# Patient Record
Sex: MALE | Race: BLACK OR AFRICAN AMERICAN | Employment: OTHER | URBAN - METROPOLITAN AREA
[De-identification: names, ages, dates, MRNs, and addresses within clinical notes are randomized per-mention and may not be internally consistent; named-entity substitution may affect disease eponyms.]

---

## 2017-03-05 RX ORDER — OMEPRAZOLE 20 MG/1
CAPSULE, DELAYED RELEASE ORAL
Qty: 90 CAP | Refills: 1 | Status: SHIPPED | OUTPATIENT
Start: 2017-03-05 | End: 2017-09-01 | Stop reason: SDUPTHER

## 2017-03-09 ENCOUNTER — OFFICE VISIT (OUTPATIENT)
Dept: INTERNAL MEDICINE CLINIC | Age: 82
End: 2017-03-09

## 2017-03-09 VITALS
HEIGHT: 66 IN | SYSTOLIC BLOOD PRESSURE: 138 MMHG | OXYGEN SATURATION: 95 % | RESPIRATION RATE: 17 BRPM | TEMPERATURE: 97.6 F | BODY MASS INDEX: 27.97 KG/M2 | HEART RATE: 58 BPM | DIASTOLIC BLOOD PRESSURE: 71 MMHG | WEIGHT: 174 LBS

## 2017-03-09 DIAGNOSIS — E11.9 CONTROLLED TYPE 2 DIABETES MELLITUS WITHOUT COMPLICATION, WITHOUT LONG-TERM CURRENT USE OF INSULIN (HCC): Primary | ICD-10-CM

## 2017-03-09 DIAGNOSIS — C91.10 CLL (CHRONIC LYMPHOCYTIC LEUKEMIA) (HCC): ICD-10-CM

## 2017-03-09 DIAGNOSIS — Z95.1 S/P CABG X 3: ICD-10-CM

## 2017-03-09 DIAGNOSIS — I25.9 CHRONIC ISCHEMIC HEART DISEASE: ICD-10-CM

## 2017-03-09 DIAGNOSIS — E78.2 MIXED HYPERLIPIDEMIA: ICD-10-CM

## 2017-03-09 DIAGNOSIS — M12.819 ROTATOR CUFF ARTHROPATHY, UNSPECIFIED LATERALITY: ICD-10-CM

## 2017-03-09 DIAGNOSIS — I70.209 ATHEROSCLEROSIS OF NATIVE ARTERIES OF THE EXTREMITIES, UNSPECIFIED: ICD-10-CM

## 2017-03-09 DIAGNOSIS — I10 ESSENTIAL HYPERTENSION, BENIGN: ICD-10-CM

## 2017-03-09 DIAGNOSIS — I73.9 PERIPHERAL VASCULAR DISEASE (HCC): ICD-10-CM

## 2017-03-09 NOTE — PROGRESS NOTES
SPORTS MEDICINE AND PRIMARY CARE  Mc Rodriguez MD, [address] [address]  Phone:  [phone]  Fax: [phone]      Chief Complaint   Patient presents with    Diabetes         SUBECTIVE:    Alissa Workman is a 80 y.o. male Patient returns today alert and appropriate and has the capacity to give an accurate history. He has a known history of coronary artery disease, diabetes mellitus, chronic lymphocytic leukemia and is seen for evaluation. Patient returns today with his daughter and states \"I feel good everyday. \"        Current Outpatient Prescriptions   Medication Sig Dispense Refill    omeprazole (PRILOSEC) 20 mg capsule TAKE 1 CAPSULE DAILY 90 Cap 1    amLODIPine (NORVASC) 5 mg tablet TAKE 1 TABLET DAILY 90 Tab 0    simvastatin (ZOCOR) 40 mg tablet TAKE 1 TABLET DAILY IN THE EVENING 90 Tab 3    metFORMIN (GLUCOPHAGE) 500 mg tablet TAKE 1 TABLET DAILY WITH A MEAL 30 Tab 6    glucose blood VI test strips (ONETOUCH ULTRA TEST) strip 1 BID ac 60 Strip 11    metoprolol tartrate (LOPRESSOR) 25 mg tablet TAKE 1 TABLET TWICE A  Tab 3    allopurinol (ZYLOPRIM) 300 mg tablet TAKE 1 TABLET DAILY 90 Tab 7    amLODIPine (NORVASC) 2.5 mg tablet Take 1 Tab by mouth daily. 30 Tab 11    aspirin 81 mg chewable tablet Take 81 mg by mouth daily.  brimonidine (ALPHAGAN) 0.15 % ophthalmic solution Administer 1 Drop to both eyes two (2) times a day.  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 No shortness of breath. Social History     Social History    Marital status:      Spouse name: N/A    Number of children: N/A    Years of education: N/A     Social History Main Topics    Smoking status: Former Smoker     Quit date: 1/15/1984    Smokeless tobacco: Never Used      Comment: QUIT     Alcohol use No      Comment: quit in     Drug use: No    Sexual activity: Yes     Partners: Female     Other Topics Concern    None     Social History Narrative    Family History: Mother:  61 yrs, DM complicationsFather:  80 yrs, strokeSister(s):  76 yrs, pneumonia, DM,    HTNBrother(s):  61 yrs, DM, HTN, CVAChildren: aliveGrandmother: unknow nGrandfather: deceasedSpouse: deceased1 brother(s) -    healthy. 2daughter(s) - healthy. Social History: Alcohol Use Patient does not use alcohol. Smoking Status Patient is a former smoker, Quit in: 36. Caffeine: none. Marital    Status: W idow . Lives w ith: alone. Children: yes 2 d. Education/School: has highschool diploma, college 2 y.   r  Family History   Problem Relation Age of Onset    Diabetes Mother     Stroke Father        OBJECTIVE:  Visit Vitals    /71 (BP 1 Location: Right arm, BP Patient Position: Sitting)    Pulse (!) 58    Temp 97.6 °F (36.4 °C) (Oral)    Resp 17    Ht 5' 6\" (1.676 m)    Wt 174 lb (78.9 kg)    SpO2 95%    BMI 28.08 kg/m2     ENT: perrla,  eom intact  NECK: supple.  Thyroid normal  CHEST: clear to ascultation and percussion   HEART: regular rate and rhythm  ABD: soft, bowel sounds active  EXTREMITIES: no edema, pulse 1+     No visits with results within 3 Month(s) from this visit. Latest known visit with results is:    Office Visit on 11/29/2016   Component Date Value Ref Range Status    WBC 11/29/2016 49.0* 3.4 - 10.8 x10E3/uL Final    RBC 11/29/2016 4.69  4.14 - 5.80 x10E6/uL Final    HGB 11/29/2016 14.5  12.6 - 17.7 g/dL Final    HCT 11/29/2016 43.4  37.5 - 51.0 % Final    MCV 11/29/2016 93  79 - 97 fL Final    MCH 11/29/2016 30.9  26.6 - 33.0 pg Final    MCHC 11/29/2016 33.4  31.5 - 35.7 g/dL Final    RDW 11/29/2016 15.2  12.3 - 15.4 % Final    PLATELET 53/11/9561 650  150 - 379 x10E3/uL Final    NEUTROPHILS 11/29/2016 12  % Final    Lymphocytes 11/29/2016 86  % Final    Comment: Smudge cells present  Atypical lymphocytes.  MONOCYTES 11/29/2016 2  % Final    EOSINOPHILS 11/29/2016 0  % Final    BASOPHILS 11/29/2016 0  % Final    ABS. NEUTROPHILS 11/29/2016 5.9  1.4 - 7.0 x10E3/uL Final    Abs Lymphocytes 11/29/2016 42.1* 0.7 - 3.1 x10E3/uL Final    ABS. MONOCYTES 11/29/2016 1.0* 0.1 - 0.9 x10E3/uL Final    ABS. EOSINOPHILS 11/29/2016 0.0  0.0 - 0.4 x10E3/uL Final    ABS. BASOPHILS 11/29/2016 0.0  0.0 - 0.2 x10E3/uL Final    NRBC 11/29/2016 0  0 - 0 % Final    Hematology comments: 11/29/2016 Note:   Final    Manual differential was performed.  Hemoglobin A1c 11/29/2016 6.7* 4.8 - 5.6 % Final    Comment:          Pre-diabetes: 5.7 - 6.4           Diabetes: >6.4           Glycemic control for adults with diabetes: <7.0      Estimated average glucose 11/29/2016 146  mg/dL Final          ASSESSMENT:  1. Controlled type 2 diabetes mellitus without complication, without long-term current use of insulin ([address]    2. Peripheral vascular disease ([address]    3. 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 CLL (chronic lymphocytic leukemia) ([address]    5. Chronic ischemic heart disease    6.  Essential hypertension, benign    7. Mixed hyperlipidemia    8. Rotator cuff arthropathy, unspecified laterality    9. S/P CABG x 3      Patient's medical status is satisfactory. Blood pressure control is at goal.  Patient's medical status is satisfactory. Blood pressure control is at goal.  BMI is 28.08 and reflects a one pound weight gain. We continue to encourage physical activity for 30 minutes five days a week. We also encourage a heart healthy diabetic diet. We will check appropriate laboratory studies. He will return to the office in four months, sooner if he has any problems. PLAN:  .  Orders Placed This Encounter    METABOLIC PANEL, BASIC    HEMOGLOBIN A1C WITH EAG       Follow-up Disposition:  Return in about 4 months (around 7/9/2017). ATTENTION:   This medical record was transcribed using an electronic medical records system. Although proofread, it may and can contain electronic and spelling errors. Other human spelling and other errors may be present. Corrections may be executed at a later time. Please feel free to contact us for any clarifications as needed.

## 2017-03-09 NOTE — PROGRESS NOTES
.1. Have you been to the ER, urgent care clinic since your last visit? Hospitalized since your last visit? No    2. Have you seen or consulted any other health care providers outside of the [address] [place] [address] since your last visit? Include any pap smears or colon screening.  No

## 2017-03-09 NOTE — MR AVS SNAPSHOT
Visit Information Date & Time Provider Department Dept. Phone Encounter #  
 3/9/2017  9:00 AM Zenon DormanJazmin [facility] [phone] [id number] Follow-up Instructions Return in about 4 months (around 7/9/2017). Follow-up and Disposition History Your Appointments 6/13/2017 10:30 AM  
[facility]) Appt Note: 6 MO. F/U  
 [address]  
[phone] [address] [place] [address] Upcoming Health Maintenance Date Due  
 EYE EXAM RETINAL OR DILATED Q1 2/24/2017 MICROALBUMIN Q1 5/18/2017 LIPID PANEL Q1 5/18/2017 MEDICARE YEARLY EXAM 5/19/2017 HEMOGLOBIN A1C Q6M 5/29/2017 GLAUCOMA SCREENING Q2Y 2/24/2018 FOOT EXAM Q1 3/9/2018 DTaP/Tdap/Td series (2 - Td) 5/18/2026 Allergies as of 3/9/2017  Review Complete On: 3/9/2017 By: Zenon Dorman MD  
  
 Severity Noted Reaction Type Reactions Codeine  05/31/2012    Shortness of Breath Lipitor [Atorvastatin]  06/27/2013   Intolerance Nausea Only Current Immunizations  Reviewed on 11/9/2015 Name Date Influenza Vaccine (Quad) PF 11/2/2015  3:31 PM  
 Influenza Vaccine PF 12/5/2013  8:10 AM  
 Pneumococcal Vaccine (Unspecified Type) 1/1/2012 Not reviewed this visit You Were Diagnosed With   
  
 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 
ICD-9-CM: 401.1 Mixed hyperlipidemia     ICD-10-CM: E78.2 ICD-9-CM: 272.2 Rotator cuff arthropathy, unspecified laterality     ICD-10-CM: M12.819 ICD-9-CM: 716.81 S/P CABG x 3     ICD-10-CM: Z95.1 ICD-9-CM: V45.81 Vitals BP Pulse Temp Resp Height(growth percentile) Weight(growth percentile) 138/71 (BP 1 Location: Right arm, BP Patient Position: Sitting) (!) 58 97.6 °F (36.4 °C) (Oral) 17 5' 6\" (1.676 m) 174 lb (78.9 kg) SpO2 BMI Smoking Status 95% 28.08 kg/m2 Former Smoker BMI and BSA Data Body Mass Index Body Surface Area 28.08 kg/m 2 1.92 m 2 Preferred Pharmacy Pharmacy Name Phone [address] [facility] [phone] Your Updated Medication List  
  
   
This list is accurate as of: 3/9/17 10:25 AM.  Always use your most recent med list.  
  
  
  
  
 allopurinol 300 mg tablet Commonly known as:  ZYLOPRIM  
TAKE 1 TABLET DAILY  
  
 * amLODIPine 2.5 mg tablet Commonly known as:  [place] Take 1 Tab by mouth daily. * amLODIPine 5 mg tablet Commonly known as:  [place] TAKE 1 TABLET DAILY  
  
 aspirin 81 mg chewable tablet Take 81 mg by mouth daily. brimonidine 0.15 % ophthalmic solution Commonly known as:  [place]wn [place] Administer 1 Drop to both eyes two (2) times a day. Indications: OPEN ANGLE GLAUCOMA  
  
 glucose blood VI test strips strip Commonly known as:  ONETOUCH ULTRA TEST  
1 BID ac  
  
 metFORMIN 500 mg tablet Commonly known as:  GLUCOPHAGE  
TAKE 1 TABLET DAILY WITH A MEAL  
  
 metoprolol tartrate 25 mg tablet Commonly known as:  LOPRESSOR  
TAKE 1 TABLET TWICE A DAY  
  
 omeprazole 20 mg capsule Commonly known as:  PRILOSEC  
TAKE 1 CAPSULE DAILY  
  
 simvastatin 40 mg tablet Commonly known as:  ZOCOR  
 TAKE 1 TABLET DAILY IN THE EVENING  
  
 * Notice: This list has 2 medication(s) that are the same as other medications prescribed for you. Read the directions carefully, and ask your doctor or other care provider to review them with you. We Performed the Following HEMOGLOBIN A1C WITH EAG [41938 CPT(R)] METABOLIC PANEL, BASIC [17201 CPT(R)] [place] COLLECTION VENOUS BLOOD,VENIPUNCTURE [id number] CPT(R)] Follow-up Instructions Return in about 4 months (around 7/9/2017). Introducing [facility] SERVICES! [employer] introduces [employer]With patient portal. Now you can access parts of your medical record, email your doctor's office, and request medication refills online. 1. In your internet browser, go to [email or web address] 2. Click on the First Time User? Click Here link in the Sign In box. You will see the New Member Sign Up page. 3. Enter your [employer]With Access Code exactly as it appears below. You will not need to use this code after youve completed the sign-up process. If you do not sign up before the expiration date, you must request a new code. · AimWith Access Code: [email or web address] Expires: 6/7/2017 10:25 AM 
 
4. Enter the last four digits of your Social Security Number (xxxx) and Date of Birth (mm/dd/yyyy) as indicated and click Submit. You will be taken to the next sign-up page. 5. Create a AimWith ID. This will be your AimWith login ID and cannot be changed, so think of one that is secure and easy to remember. 6. Create a [employer]With password. You can change your password at any time. 7. Enter your Password Reset Question and Answer. This can be used at a later time if you forget your password. 8. Enter your e-mail address. You will receive e-mail notification when new information is available in [address] 9. Click Sign Up. You can now view and download portions of your medical record. 10. Click the Download Summary menu link to download a portable copy of your medical information. If you have questions, please visit the Frequently Asked Questions section of the [employer] website. Remember, [employer] is NOT to be used for urgent needs. For medical emergencies, dial 911. Now available from your iPhone and Android! Please provide this summary of care documentation to your next provider. Your primary care clinician is listed as Javier Borden. If you have any questions after today's visit, please call [phone].

## 2017-03-10 LAB
BUN SERPL-MCNC: 16 MG/DL (ref 8–27)
BUN/CREAT SERPL: 12 (ref 10–22)
CALCIUM SERPL-MCNC: 9.9 MG/DL (ref 8.6–10.2)
CHLORIDE SERPL-SCNC: 99 MMOL/L (ref 96–106)
CO2 SERPL-SCNC: 24 MMOL/L (ref 18–29)
CREAT SERPL-MCNC: 1.3 MG/DL (ref 0.76–1.27)
EST. AVERAGE GLUCOSE BLD GHB EST-MCNC: 137 MG/DL
GLUCOSE SERPL-MCNC: 153 MG/DL (ref 65–99)
HBA1C MFR BLD: 6.4 % (ref 4.8–5.6)
POTASSIUM SERPL-SCNC: 5 MMOL/L (ref 3.5–5.2)
SODIUM SERPL-SCNC: 140 MMOL/L (ref 134–144)

## 2017-05-19 RX ORDER — AMLODIPINE BESYLATE 5 MG/1
TABLET ORAL
Qty: 90 TAB | OUTPATIENT
Start: 2017-05-19

## 2017-05-19 RX ORDER — AMLODIPINE BESYLATE 2.5 MG/1
2.5 TABLET ORAL DAILY
Qty: 90 TAB | Refills: 0 | Status: SHIPPED | OUTPATIENT
Start: 2017-05-19 | End: 2017-11-19 | Stop reason: SDUPTHER

## 2017-05-28 ENCOUNTER — APPOINTMENT (OUTPATIENT)
Dept: GENERAL RADIOLOGY | Age: 82
End: 2017-05-28
Attending: EMERGENCY MEDICINE
Payer: MEDICARE

## 2017-05-28 ENCOUNTER — HOSPITAL ENCOUNTER (EMERGENCY)
Age: 82
Discharge: HOME | End: 2017-05-28
Attending: EMERGENCY MEDICINE
Payer: MEDICARE

## 2017-05-28 VITALS
TEMPERATURE: 97.9 F | RESPIRATION RATE: 16 BRPM | WEIGHT: 175.71 LBS | OXYGEN SATURATION: 96 % | BODY MASS INDEX: 27.58 KG/M2 | HEIGHT: 67 IN | SYSTOLIC BLOOD PRESSURE: 134 MMHG | DIASTOLIC BLOOD PRESSURE: 61 MMHG | HEART RATE: 59 BPM

## 2017-05-28 DIAGNOSIS — R06.02 SHORTNESS OF BREATH: Primary | ICD-10-CM

## 2017-05-28 LAB
ALBUMIN SERPL BCP-MCNC: 3.8 G/DL (ref 3.5–5)
ALBUMIN/GLOB SERPL: 1 {RATIO} (ref 1.1–2.2)
ALP SERPL-CCNC: 79 U/L (ref 45–117)
ALT SERPL-CCNC: 22 U/L (ref 12–78)
ANION GAP BLD CALC-SCNC: 8 MMOL/L (ref 5–15)
AST SERPL W P-5'-P-CCNC: 16 U/L (ref 15–37)
BASOPHILS # BLD AUTO: 0 K/UL
BASOPHILS # BLD: 0 %
BILIRUB SERPL-MCNC: 1.1 MG/DL (ref 0.2–1)
BNP SERPL-MCNC: 117 PG/ML (ref 0–450)
BUN SERPL-MCNC: 12 MG/DL (ref 6–20)
BUN/CREAT SERPL: 10 (ref 12–20)
CALCIUM SERPL-MCNC: 9 MG/DL (ref 8.5–10.1)
CHLORIDE SERPL-SCNC: 107 MMOL/L (ref 97–108)
CK MB CFR SERPL CALC: NORMAL % (ref 0–2.5)
CK MB SERPL-MCNC: <1 NG/ML (ref 5–25)
CK SERPL-CCNC: 113 U/L (ref 39–308)
CO2 SERPL-SCNC: 26 MMOL/L (ref 21–32)
CREAT SERPL-MCNC: 1.18 MG/DL (ref 0.7–1.3)
DIFFERENTIAL METHOD BLD: ABNORMAL
EOSINOPHIL # BLD: 0 K/UL
EOSINOPHIL NFR BLD: 0 %
ERYTHROCYTE [DISTWIDTH] IN BLOOD BY AUTOMATED COUNT: 14.2 % (ref 11.5–14.5)
GLOBULIN SER CALC-MCNC: 3.8 G/DL (ref 2–4)
GLUCOSE SERPL-MCNC: 89 MG/DL (ref 65–100)
HCT VFR BLD AUTO: 41.9 % (ref 36.6–50.3)
HGB BLD-MCNC: 14.1 G/DL (ref 12.1–17)
LYMPHOCYTES # BLD AUTO: 91 %
LYMPHOCYTES # BLD: 46 K/UL
MAGNESIUM SERPL-MCNC: 2.2 MG/DL (ref 1.6–2.4)
MCH RBC QN AUTO: 30.9 PG (ref 26–34)
MCHC RBC AUTO-ENTMCNC: 33.7 G/DL (ref 30–36.5)
MCV RBC AUTO: 91.7 FL (ref 80–99)
MONOCYTES # BLD: 1 K/UL
MONOCYTES NFR BLD AUTO: 2 %
NEUTS SEG # BLD: 3.5 K/UL
NEUTS SEG NFR BLD AUTO: 7 %
NRBC # BLD: 0 K/UL (ref 0–0.01)
NRBC BLD-RTO: 0 PER 100 WBC
PLATELET # BLD AUTO: 167 K/UL (ref 150–400)
POTASSIUM SERPL-SCNC: 4.3 MMOL/L (ref 3.5–5.1)
PROT SERPL-MCNC: 7.6 G/DL (ref 6.4–8.2)
RBC # BLD AUTO: 4.57 M/UL (ref 4.1–5.7)
RBC MORPH BLD: ABNORMAL
SODIUM SERPL-SCNC: 141 MMOL/L (ref 136–145)
TROPONIN I SERPL-MCNC: <0.04 NG/ML
WBC # BLD AUTO: 50.5 K/UL (ref 4.1–11.1)
WBC MORPH BLD: ABNORMAL

## 2017-05-28 PROCEDURE — 93005 ELECTROCARDIOGRAM TRACING: CPT

## 2017-05-28 PROCEDURE — 71020 XR CHEST PA LAT: CPT

## 2017-05-28 PROCEDURE — 36415 COLL VENOUS BLD VENIPUNCTURE: CPT | Performed by: EMERGENCY MEDICINE

## 2017-05-28 PROCEDURE — 99284 EMERGENCY DEPT VISIT MOD MDM: CPT

## 2017-05-28 PROCEDURE — 84484 ASSAY OF TROPONIN QUANT: CPT | Performed by: EMERGENCY MEDICINE

## 2017-05-28 PROCEDURE — 83880 ASSAY OF NATRIURETIC PEPTIDE: CPT | Performed by: EMERGENCY MEDICINE

## 2017-05-28 PROCEDURE — 82550 ASSAY OF CK (CPK): CPT | Performed by: EMERGENCY MEDICINE

## 2017-05-28 PROCEDURE — 83735 ASSAY OF MAGNESIUM: CPT | Performed by: EMERGENCY MEDICINE

## 2017-05-28 PROCEDURE — 85025 COMPLETE CBC W/AUTO DIFF WBC: CPT | Performed by: EMERGENCY MEDICINE

## 2017-05-28 PROCEDURE — 80053 COMPREHEN METABOLIC PANEL: CPT | Performed by: EMERGENCY MEDICINE

## 2017-05-28 NOTE — DISCHARGE INSTRUCTIONS

## 2017-05-28 NOTE — ED PROVIDER NOTES
HPI Comments: Roslyn Gleason is a 80 y.o. M with PMHx significant for CAD / HTN / DM / MI / Stent / Leukemia / GERD / CABG x 3 who presents ambulatory to [address] [facility] ED c/o intermittent episodes of SOB x 0500 today. Pt states that last night SOB caused him to wake up. He denies orthopnea. Pt reports episode of SOB while singing in [employer] today. He denies any SOB while in ED. Pt notes that he was previously on Brilinta, but has been off of it \"for a while\". He denies any recent surgeries, recent travel or hx significant for DVT/PE. He reports clear drainage from his nose that he attributes to allergies. Pt denies any leg swelling, cough, chest pain, abdominal pain, fever, chills, hemoptysis, vomiting, diarrhea, nausea or calf pain. PCP: Katey Lucas MD  Cardiology: Dr. Tl Jaramillo  Oncology: Dr. Adela Pillai    There are no other complaints, changes, or physical findings at this time. The history is provided by the patient.         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 Social History Main Topics    Smoking status: Former Smoker     Quit date: 1/15/1984    Smokeless tobacco: Never Used      Comment: QUIT     Alcohol use No      Comment: quit in     Drug use: No    Sexual activity: Yes     Partners: Female     Other Topics Concern    Not on file     Social History Narrative    Family History: Mother:  61 yrs, DM complicationsFather:  80 yrs, strokeSister(s):  76 yrs, pneumonia, DM,    HTNBrother(s):  61 yrs, DM, HTN, CVAChildren: aliveGrandmother: unknow nGrandfather: deceasedSpouse: deceased1 brother(s) -    healthy. 2daughter(s) - healthy. Social History: Alcohol Use Patient does not use alcohol. Smoking Status Patient is a former smoker, Quit in: 36. Caffeine: none. Marital    Status: W idow . Lives w ith: alone. Children: yes 2 d. Education/School: has highschool diploma, college 2 y. ALLERGIES: Codeine and Lipitor [atorvastatin]    Review of Systems   Constitutional: Negative for chills, fatigue and fever. HENT: Negative for congestion, rhinorrhea and sore throat. Eyes: Negative for pain, discharge and visual disturbance. Respiratory: Positive for shortness of breath. Negative for cough, chest tightness and wheezing. Negative for hemoptysis    Cardiovascular: Negative for chest pain, palpitations and leg swelling. Negative for calf pain   Gastrointestinal: Negative for abdominal pain, constipation, diarrhea, nausea and vomiting. Genitourinary: Negative for dysuria, frequency and hematuria. Musculoskeletal: Negative for arthralgias, back pain and myalgias. Skin: Negative for rash. Neurological: Negative for dizziness, weakness, light-headedness and headaches. Psychiatric/Behavioral: Negative.         Vitals:    17 1331 17 1613   BP: 139/67 134/61   Pulse: 65 (!) 59   Resp: 18 16   Temp: 97.9 °F (36.6 °C)    SpO2: 99% 96%   Weight: 79.7 kg (175 lb 11.3 oz)    Height: 5' 7\" (1.702 m)             Physical Exam   Constitutional: He is oriented to person, place, and time. He appears well-developed and well-nourished. No distress. HENT:   Head: Normocephalic and atraumatic. Eyes: EOM are normal. Right eye exhibits no discharge. Left eye exhibits no discharge. No scleral icterus. Neck: Normal range of motion. Neck supple. No tracheal deviation present. Cardiovascular: Normal rate, regular rhythm, normal heart sounds and intact distal pulses. Exam reveals no gallop and no friction rub. No murmur heard. Pulmonary/Chest: Effort normal and breath sounds normal. No respiratory distress. He has no wheezes. He has no rales. Abdominal: Soft. He exhibits no distension. There is no tenderness. Musculoskeletal: Normal range of motion. He exhibits edema. 1+ BL Lower extremity edema  No calf tenderness   Lymphadenopathy:     He has no cervical adenopathy. Neurological: He is alert and oriented to person, place, and time. Skin: Skin is warm and dry. No rash noted. Psychiatric: He has a normal mood and affect. Nursing note and vitals reviewed. MDM  Number of Diagnoses or Management Options  Shortness of breath:   Diagnosis management comments:     Differential includes heart failure, pulmonary edema, pleural effusion, pneumonia, ACS. Overall low suspicion for PE - no tachycardia or hypoxia (Well's score 1).   - CBC, CMP, Fareed, BNP  - CXR         Amount and/or Complexity of Data Reviewed  Clinical lab tests: ordered and reviewed  Tests in the radiology section of CPT®: ordered and reviewed  Tests in the medicine section of CPT®: ordered and reviewed  Review and summarize past medical records: yes  Independent visualization of images, tracings, or specimens: yes    Patient Progress  Patient progress: stable    ED Course       Procedures    EKG interpretation: (Preliminary)  1334  Rhythm: normal sinus rhythm; and regular . Rate (approx.): 61 bpm; Axis: normal; IL interval: normal; QRS interval: normal ; ST/T wave: T wave inverted; in anterolateral leads; Other findings: no significant changes from previous ekg. Patient Vitals for the past 12 hrs:   Temp Pulse Resp BP SpO2   05/28/17 1613 - (!) 59 16 134/61 96 %   05/28/17 1331 97.9 °F (36.6 °C) 65 18 139/67 99 %     PROGRESS NOTE:  4:15 PM  Labs unremarkable with exception of leukocytosis consistent with CLL. CXR indicates opacity left lung base - patient denies cough, sputum production, hemoptysis. Pneumonia seems less likely. Advised patient to follow up with PCP for repeat CXR and with cardiology for reevaluation. Pt ambulated and maintained oxygen saturation and denied feeling short of breath. Discussed results and follow up plan with patient. Provided customary return to ED instructions. Patient expressed understanding.   Cynthia Huagn MD    LABORATORY TESTS:  Recent Results (from the past 12 hour(s))   EKG, 12 LEAD, INITIAL    Collection Time: 05/28/17  1:34 PM   Result Value Ref Range    Ventricular Rate 61 BPM    Atrial Rate 61 BPM    P-R Interval 158 ms    QRS Duration 86 ms    Q-T Interval 386 ms    QTC Calculation (Bezet) 388 ms    Calculated P Axis 38 degrees    Calculated R Axis -17 degrees    Diagnosis       Normal sinus rhythm  Minimal voltage criteria for LVH, may be normal variant  ST & T wave abnormality, consider anterolateral ischemia  When compared with ECG of 06-NOV-2015 11:19,  T wave inversion now evident in Lateral leads  QT has shortened     CBC WITH AUTOMATED DIFF    Collection Time: 05/28/17  2:46 PM   Result Value Ref Range    WBC 50.5 (HH) 4.1 - 11.1 K/uL    RBC 4.57 4.10 - 5.70 M/uL    HGB 14.1 12.1 - 17.0 g/dL    HCT 41.9 36.6 - 50.3 %    MCV 91.7 80.0 - 99.0 FL    MCH 30.9 26.0 - 34.0 PG    MCHC 33.7 30.0 - 36.5 g/dL    RDW 14.2 11.5 - 14.5 %    PLATELET 218 258 - 165 K/uL    NEUTROPHILS 7 %    LYMPHOCYTES 91 % MONOCYTES 2 %    EOSINOPHILS 0 %    BASOPHILS 0 %    ABS. NEUTROPHILS 3.5 K/UL    ABS. LYMPHOCYTES 46.0 K/UL    ABS. MONOCYTES 1.0 K/UL    ABS. EOSINOPHILS 0.0 K/UL    ABS. BASOPHILS 0.0 K/UL    DF MANUAL      RBC COMMENTS NORMOCYTIC, NORMOCHROMIC      WBC COMMENTS REACTIVE LYMPHS     METABOLIC PANEL, COMPREHENSIVE    Collection Time: 05/28/17  2:46 PM   Result Value Ref Range    Sodium 141 136 - 145 mmol/L    Potassium 4.3 3.5 - 5.1 mmol/L    Chloride 107 97 - 108 mmol/L    CO2 26 21 - 32 mmol/L    Anion gap 8 5 - 15 mmol/L    Glucose 89 65 - 100 mg/dL    BUN 12 6 - 20 MG/DL    Creatinine 1.18 0.70 - 1.30 MG/DL    BUN/Creatinine ratio 10 (L) 12 - 20      GFR est AA >60 >60 ml/min/1.73m2    GFR est non-AA 59 (L) >60 ml/min/1.73m2    Calcium 9.0 8.5 - 10.1 MG/DL    Bilirubin, total 1.1 (H) 0.2 - 1.0 MG/DL    ALT (SGPT) 22 12 - 78 U/L    AST (SGOT) 16 15 - 37 U/L    Alk. phosphatase 79 45 - 117 U/L    Protein, total 7.6 6.4 - 8.2 g/dL    Albumin 3.8 3.5 - 5.0 g/dL    Globulin 3.8 2.0 - 4.0 g/dL    A-G Ratio 1.0 (L) 1.1 - 2.2     CK W/ CKMB & INDEX    Collection Time: 05/28/17  2:46 PM   Result Value Ref Range     39 - 308 U/L    CK - MB <1.0 <3.6 NG/ML    CK-MB Index Cannot be calulated 0 - 2.5     TROPONIN I    Collection Time: 05/28/17  2:46 PM   Result Value Ref Range    Troponin-I, Qt. <0.04 <0.05 ng/mL   MAGNESIUM    Collection Time: 05/28/17  2:46 PM   Result Value Ref Range    Magnesium 2.2 1.6 - 2.4 mg/dL   PRO-BNP    Collection Time: 05/28/17  2:46 PM   Result Value Ref Range    NT pro- 0 - 450 PG/ML   NUCLEATED RBC    Collection Time: 05/28/17  2:46 PM   Result Value Ref Range    NRBC 0.0 0  WBC    ABSOLUTE NRBC 0.00 0.00 - 0.01 K/uL       IMAGING RESULTS:  XR CHEST PA LAT   Final Result   INDICATION: . dyspnea x today  COMPARISON: Previous chest xray, 11/9/2015 and 11/3/2015. Ana Chen FINDINGS: PA and lateral view of the chest.   .  Lines/tubes/surgical: None.    Heart/mediastinum: Status post median sternotomy with vascular graft markers. Coronary artery stents. Lungs/pleura: Minimal opacity in the left base. No visualized pleural effusion  or pneumothorax. Additional Comments: Degenerative changes in the spine. .  IMPRESSION  IMPRESSION:  1. Opacity in the left base which could represent pneumonia. Recommend follow-up  until resolution. IMPRESSION:  1. Shortness of breath        PLAN:  1. Current Discharge Medication List        2. Follow-up Information     Follow up With Details Comments 464 Camilo Gambino MD In 3 days Please follow up for repeat CXR [facility] 14 [facility] [address]      Paolo Thomas MD  Please follow up with cardiology as soon as possible [address]  [phone]      [facility] EMERGENCY DEPT  As needed, If symptoms worsen [address]  [address] [facility]  [phone]        Return to ED if worse     DISCHARGE NOTE:  4:19 PM  The patient's results have been reviewed with family and/or caregiver. They verbally convey their understanding and agreement of the patient's signs, symptoms, diagnosis, treatment, and prognosis. They additionally agree to follow up as recommended in the discharge instructions or to return to the Emergency Room should the patient's condition change prior to their follow-up appointment. The family and/or caregiver verbally agrees with the care-plan and all of their questions have been answered. The discharge instructions have also been provided to the them along with educational information regarding the patient's diagnosis and a list of reasons why the patient would want to return to the ER prior to their follow-up appointment should their condition change. Written by Yumiko Lacy. Barbi Rodriguez ED Scribe, as dictated by Rosi Martin MD.        Attestations: This note is prepared by Yumiko Lacy.  Jamie, acting as Scribe for [employer] Merary Acevedo MD: The scribe's documentation has been prepared under my direction and personally reviewed by me in its entirety. I confirm that the note above accurately reflects all work, treatment, procedures, and medical decision making performed by me.

## 2017-05-28 NOTE — ED NOTES
Patient is resting quietly with daughter at bedside. He has his call bell within reach and is denying pain at this time. Patient requested a urinal and stood at bedside. No complaints of dizziness or lightheadedness.

## 2017-05-28 NOTE — ED NOTES
Pt ambulatory in hallway with continuous pulse ox. Sats 97%-100% during ambulation. Denies shortness of breath with ambulation.

## 2017-05-28 NOTE — ED NOTES
Pt given discharge instructions by Dr. La Benavidez. Saline lock removed. Pt discharged ambulatory with steady gait. No acute distress at time of discharge. Accompanied by family.

## 2017-05-28 NOTE — ED NOTES
Patient was sleeping and woke up short of breath. He reports sinus drainage \"dripping from nose. \"

## 2017-05-28 NOTE — ED NOTES
Bedside shift change report given to RN Gage Martinez (oncoming nurse) by ARGELIA Nunez and Mis Alonzo (offgoing nurse). Report included the following information SBAR, ED Summary and MAR.

## 2017-05-29 LAB
ATRIAL RATE: 61 BPM
CALCULATED P AXIS, ECG09: 38 DEGREES
CALCULATED R AXIS, ECG10: -17 DEGREES
DIAGNOSIS, 93000: NORMAL
P-R INTERVAL, ECG05: 158 MS
Q-T INTERVAL, ECG07: 386 MS
QRS DURATION, ECG06: 86 MS
QTC CALCULATION (BEZET), ECG08: 388 MS
VENTRICULAR RATE, ECG03: 61 BPM

## 2017-05-30 ENCOUNTER — PATIENT OUTREACH (OUTPATIENT)
Dept: INTERNAL MEDICINE CLINIC | Age: 82
End: 2017-05-30

## 2017-05-30 NOTE — PROGRESS NOTES
Nicholas Lee Discharge Follow-Up      Date/Time:  2017 1:24 PM    Patient listed on discharge [facility]) report on . Patient discharged from [facility] for SOB. Daughter states patient has Leukemia for approx 2yrs now. States BP today 131/67. Denied SOB today. RRAT score: Medium Risk            19       Total Score        3 Relationship with PCP    16 Charlson Comorbidity Score        Criteria that do not apply:    Patient Living Status    Patient Length of Stay > 5    More than 1 Admission in calendar year    Patient Insurance is Medicare, Medicaid or Self Pay     Moderate    Medical History:     Past Medical History:   Diagnosis Date    Abnormal nuclear stress test 2014    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 ([facility] 2015    CAD (coronary artery disease)     Chest pain, unspecified     Chronic pain     right leg    CLL (chronic lymphocytic leukemia) (HCC)     Diabetes ([facility]     Essential hypertension     GERD (gastroesophageal reflux disease)     Hyperlipidemia     Hypertension     Rotator cuff arthropathy     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 Verified  and address with patient as identifiers. Provided introduction to self, and explanation of the Nurses Navigator role. Diet:   Patient reports: Diabetic Diet    Activity:    Patient reports: mostly moving around the house    Medication:   Performed medication reconciliation with patient's daughter Jevon Nogueira, and patient verbalizes understanding of administration of home medications. There were no barriers to obtaining medications identified at this time.     Support system:   patient and daughter    Discharge Instructions :  Reviewed discharge instructions with patient's daughter who verbalizes understanding of discharge instructions and follow-up care. Red Flags: SOB. AMS. Chest Pain    Labs Reviewed:  Recent Labs      05/28/17   1446   WBC  50.5*   HGB  14.1   HCT  41.9   PLT  167     Recent Labs      05/28/17   1446   NA  141   K  4.3   CL  107   CO2  26   GLU  89   BUN  12   CREA  1.18   CA  9.0   MG  2.2   ALB  3.8   SGOT  16   ALT  22   Daughter states patient has Leukemia--noted elev WBC. Imaging results reviewed: chest:  IMPRESSION:   1. Opacity in the left base which could represent pneumonia. Recommend follow-up   until resolution. Advance Care Planning:   Patient was offered the opportunity to discuss advance care planning:  yes     Does patient have an Advance Directive:  no   If no, did you provide information on Caring Connections? yes     PCP/Specialist follow up: Patient scheduled to follow up with Chichi Cannon MD on 5/31/2017. Reviewed red flags with patient, and patient's daughter;  verbalizes understanding. Patient's daughter given an opportunity to ask questions. No other clinical/social/functional needs noted. The patient's daughter agrees to contact the PCP office for questions related to their healthcare. The patient expressed thanks, offered no additional questions and ended the call. Case management plan: Attempt to contact the patient by telephone or during office visit within the next 7-10 days. Will continue to follow as necessary for the next 30 days. Will reassess for case management needs prior to discharge from case management service on or about 30 days.

## 2017-05-30 NOTE — PROGRESS NOTES
Nicholas Lee Discharge Follow-Up      Date/Time:  5/30/2017 1:00 PM    Patient listed on discharge [facility]) report on 5/28/2017. Patient discharged from [facility] for SOB. F/u appt scheduled 6/13/2017-need sooner appt. Chart Review:   80 y.o. M with PMHx significant for CAD / HTN / DM / MI / Stent / Leukemia / GERD / CABG x 3 who presents ambulatory to [address] [facility] ED c/o intermittent episodes of SOB x 0500 today. Pt states that last night SOB caused him to wake up. He denies orthopnea. Pt reports episode of SOB while singing in [employer] today. He denies any SOB while in ED. Pt notes that he was previously on Brilinta, but has been off of it \"for a while\". Pulse: 65 (!) 59   Musculoskeletal: Normal range of motion. He exhibits edema.    1+ BL Lower extremity edema  No calf tenderness

## 2017-05-31 ENCOUNTER — OFFICE VISIT (OUTPATIENT)
Dept: INTERNAL MEDICINE CLINIC | Age: 82
End: 2017-05-31

## 2017-05-31 VITALS
WEIGHT: 173 LBS | DIASTOLIC BLOOD PRESSURE: 62 MMHG | BODY MASS INDEX: 27.15 KG/M2 | RESPIRATION RATE: 16 BRPM | HEART RATE: 59 BPM | OXYGEN SATURATION: 93 % | HEIGHT: 67 IN | TEMPERATURE: 97.9 F | SYSTOLIC BLOOD PRESSURE: 117 MMHG

## 2017-05-31 DIAGNOSIS — C91.10 CLL (CHRONIC LYMPHOCYTIC LEUKEMIA) (HCC): ICD-10-CM

## 2017-05-31 DIAGNOSIS — R91.8 OPACITY OF LUNG ON IMAGING STUDY: Primary | ICD-10-CM

## 2017-05-31 DIAGNOSIS — I10 ESSENTIAL HYPERTENSION, BENIGN: ICD-10-CM

## 2017-05-31 DIAGNOSIS — R91.8 OPACITY OF LUNG ON IMAGING STUDY: ICD-10-CM

## 2017-05-31 DIAGNOSIS — E11.9 CONTROLLED TYPE 2 DIABETES MELLITUS WITHOUT COMPLICATION, WITHOUT LONG-TERM CURRENT USE OF INSULIN (HCC): Primary | ICD-10-CM

## 2017-05-31 RX ORDER — CEFUROXIME AXETIL 500 MG/1
500 TABLET ORAL 2 TIMES DAILY
Qty: 20 TAB | Refills: 0 | Status: SHIPPED | OUTPATIENT
Start: 2017-05-31 | End: 2017-06-10

## 2017-05-31 RX ORDER — CEFUROXIME AXETIL 500 MG/1
500 TABLET ORAL 2 TIMES DAILY
Qty: 20 TAB | Refills: 0 | Status: SHIPPED | OUTPATIENT
Start: 2017-05-31 | End: 2017-05-31 | Stop reason: SDUPTHER

## 2017-05-31 NOTE — MR AVS SNAPSHOT
Visit Information Date & Time Provider Department Dept. Phone Encounter #  
 5/31/2017  9:15 AM Jazmin Hagan [facility] Sports Medicine and Primary Care [phone] [id number] Follow-up Instructions Return in about 3 months (around 8/21/2017). Follow-up and Disposition History Your Appointments 6/13/2017 10:30 AM  
ESTABLISHED PATIENT with MD [facility]) Appt Note: 6 MO. F/U  
 [address] [facility]  
[phone] [address]  
  
    
 8/21/2017  9:30 AM  
Any with Kathia Babcock MD  
[address] [place] [address] and Primary Care [address] [place] [address]) Appt Note: 4 month f/u  
 [address] [place]  
  
   
 [address] [facility] [address] Upcoming Health Maintenance Date Due  
 EYE EXAM RETINAL OR DILATED Q1 2/24/2017 MICROALBUMIN Q1 5/18/2017 LIPID PANEL Q1 5/18/2017 MEDICARE YEARLY EXAM 5/19/2017 INFLUENZA AGE 9 TO ADULT 8/1/2017 HEMOGLOBIN A1C Q6M 9/9/2017 GLAUCOMA SCREENING Q2Y 2/24/2018 FOOT EXAM Q1 3/9/2018 DTaP/Tdap/Td series (2 - Td) 5/18/2026 Allergies as of 5/31/2017  Review Complete On: 5/31/2017 By: Kathia Babcock MD  
  
 Severity Noted Reaction Type Reactions Codeine  05/31/2012    Shortness of Breath Lipitor [Atorvastatin]  06/27/2013   Intolerance Nausea Only Current Immunizations  Reviewed on 11/9/2015 Name Date Influenza Vaccine (Quad) PF 11/2/2015  3:31 PM  
 Influenza Vaccine PF 12/5/2013  8:10 AM  
 Pneumococcal Vaccine (Unspecified Type) 1/1/2012 Not reviewed this visit You Were Diagnosed With   
  
 Codes Comments Controlled type 2 diabetes mellitus without complication, without long-term current use of insulin ([facility]    -  Primary ICD-10-CM: E11.9 ICD-9-CM: 250.00 CLL (chronic lymphocytic leukemia) (HCC)     ICD-10-CM: C91.10 ICD-9-CM: 204.10 Essential hypertension, benign     ICD-10-CM: I10 
ICD-9-CM: 401.1 Opacity of lung on imaging study     ICD-10-CM: R91.8 ICD-9-CM: 793.19 Vitals BP Pulse Temp Resp Height(growth percentile) Weight(growth percentile)  
 117/62 (BP 1 Location: Right arm, BP Patient Position: Sitting) (!) 59 97.9 °F (36.6 °C) (Oral) 16 5' 7\" (1.702 m) 173 lb (78.5 kg) SpO2 BMI Smoking Status 93% 27.1 kg/m2 Former Smoker Vitals History BMI and BSA Data Body Mass Index Body Surface Area  
 27.1 kg/m 2 1.93 m 2 Preferred Pharmacy Pharmacy Name Phone [address] [facility] [phone] Your Updated Medication List  
  
   
This list is accurate as of: 5/31/17 10:41 AM.  Always use your most recent med list.  
  
  
  
  
 allopurinol 300 mg tablet Commonly known as:  ZYLOPRIM  
TAKE 1 TABLET DAILY  
  
 * amLODIPine 5 mg tablet Commonly known as:  Mabel Zimmer TAKE 1 TABLET DAILY  
  
 * amLODIPine 2.5 mg tablet Commonly known as:  Mabel Zimmer Take 1 Tab by mouth daily. aspirin 81 mg chewable tablet Take 81 mg by mouth daily. brimonidine 0.15 % ophthalmic solution Commonly known as:  Tyson Georges Administer 1 Drop to both eyes two (2) times a day. Indications: OPEN ANGLE GLAUCOMA  
  
 glucose blood VI test strips strip Commonly known as:  ONETOUCH ULTRA TEST  
1 BID ac  
  
 metFORMIN 500 mg tablet Commonly known as:  GLUCOPHAGE  
TAKE 1 TABLET DAILY WITH A MEAL  
  
 metoprolol tartrate 25 mg tablet Commonly known as:  LOPRESSOR  
TAKE 1 TABLET TWICE A DAY  
  
 omeprazole 20 mg capsule Commonly known as:  PRILOSEC  
TAKE 1 CAPSULE DAILY  
  
 simvastatin 40 mg tablet Commonly known as:  ZOCOR  
TAKE 1 TABLET DAILY IN THE EVENING  
  
 * Notice:   This list has 2 medication(s) that are the same as other medications prescribed for you. Read the directions carefully, and ask your doctor or other care provider to review them with you. Follow-up Instructions Return in about 3 months (around 8/21/2017). To-Do List   
 05/31/2017 Imaging:  XR CHEST PA LAT Introducing [facility]! Raj Rowan introduces [employer] patient portal. Now you can access parts of your medical record, email your doctor's office, and request medication refills online. 1. In your internet browser, go to [email or web address] ([email or web address]) [email or web address] mines 2. Click on the First Time User? Click Here link in the Sign In box. You will see the New Member Sign Up page. 3. Enter your [employer] Access Code exactly as it appears below. You will not need to use this code after youve completed the sign-up process. If you do not sign up before the expiration date, you must request a new code. · [employer] Access Code: [email or web address] Expires: 6/7/2017 11:25 AM 
 
4. Enter the last four digits of your Social Security Number (xxxx) and Date of Birth (mm/dd/yyyy) as indicated and click Submit. You will be taken to the next sign-up page. 5. Create a [employer] ID. This will be your [employer] login ID and cannot be changed, so think of one that is secure and easy to remember. 6. Create a [employer] password. You can change your password at any time. 7. Enter your Password Reset Question and Answer. This can be used at a later time if you forget your password. 8. Enter your e-mail address. You will receive e-mail notification when new information is available in [address] 9. Click Sign Up. You can now view and download portions of your medical record. 10. Click the Download Summary menu link to download a portable copy of your medical information. If you have questions, please visit the Frequently Asked Questions section of the [employer] website. Remember, [employer] is NOT to be used for urgent needs. For medical emergencies, dial 911. Now available from your iPhone and Android! Please provide this summary of care documentation to your next provider. Your primary care clinician is listed as Marian Infante. If you have any questions after today's visit, please call [phone].

## 2017-05-31 NOTE — PROGRESS NOTES
SPORTS MEDICINE AND PRIMARY CARE  Monica Edmond MD, [address] [place] [address],[address] [address]  Phone:  [phone]  Fax: [phone]       Chief Complaint   Patient presents with   [facility] ED Follow-up     shortness of breath   . SUBJECTIVE:    Colton Morales is a 80 y.o. male Patient returns today with his daughter, alert and appropriate and has the capacity to give an accurate history. He has a known history of coronary artery disease, diabetes, primary hypertension, and was seen in the emergency room at [facility] by Hong Garcia MD for upper respiratory symptoms and episodes of shortness of breath. An evaluation was undertaken today in the emergency room which was remarkable for opacity of the left lung base. Otherwise the evaluation was unremarkable. Cardiac enzymes were negative as would be expected and a pro BNP was also noted to be 117 thus ruling out cardiac origin of the symptoms. He has been with Ana Maria Meehan his daughter and the symptoms seem to be less. She raises the question of allergens in his home. Patient is seen for evaluation. Current Outpatient Prescriptions   Medication Sig Dispense Refill    amLODIPine (NORVASC) 2.5 mg tablet Take 1 Tab by mouth daily. 90 Tab 0    omeprazole (PRILOSEC) 20 mg capsule TAKE 1 CAPSULE DAILY 90 Cap 1    amLODIPine (NORVASC) 5 mg tablet TAKE 1 TABLET DAILY 90 Tab 0    simvastatin (ZOCOR) 40 mg tablet TAKE 1 TABLET DAILY IN THE EVENING 90 Tab 3    metFORMIN (GLUCOPHAGE) 500 mg tablet TAKE 1 TABLET DAILY WITH A MEAL 30 Tab 6    glucose blood VI test strips (ONETOUCH ULTRA TEST) strip 1 BID ac 60 Strip 11    metoprolol tartrate (LOPRESSOR) 25 mg tablet TAKE 1 TABLET TWICE A  Tab 3    allopurinol (ZYLOPRIM) 300 mg tablet TAKE 1 TABLET DAILY 90 Tab 7    aspirin 81 mg chewable tablet Take 81 mg by mouth daily.       brimonidine (ALPHAGAN) 0.15 % ophthalmic solution Administer 1 Drop to both eyes two (2) times a day.  Indications: OPEN ANGLE GLAUCOMA       Past Medical History:   Diagnosis Date    Abnormal nuclear stress test 6/6/2014    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 ([facility] 11/2/2015    CAD (coronary artery disease)     Chest pain, unspecified     Chronic pain     right leg    CLL (chronic lymphocytic leukemia) (HCC)     Diabetes ([facility]     Essential hypertension     GERD (gastroesophageal reflux disease)     Hyperlipidemia     Hypertension     Opacity of lung on imaging study 05/28/2017    cxr    Rotator cuff arthropathy     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    S/P coronary artery stent placement 6/6/2014 6/6/14 PCI/GUANAKO to prox LAD     Past Surgical History:   Procedure Laterality Date    HX COLONOSCOPY      HX HEART CATHETERIZATION      PTCA SINGLE VESSEL  4/4/2015         VASCULAR SURGERY PROCEDURE UNLIST  2014 and 2015    BLE stenting     Allergies   Allergen Reactions    Codeine Shortness of Breath    Lipitor [Atorvastatin] Nausea Only         REVIEW OF SYSTEMS:  General: negative for - chills or fever  ENT: negative for - headaches, nasal congestion or tinnitus  Respiratory: negative for - cough, hemoptysis, shortness of breath or wheezing  Cardiovascular : negative for - chest pain, edema, palpitations or shortness of breath  Gastrointestinal: negative for - abdominal pain, blood in stools, heartburn or nausea/vomiting  Genito-Urinary: no dysuria, trouble voiding, or hematuria  Musculoskeletal: negative for - gait disturbance, joint pain, joint stiffness or joint swelling  Neurological: no TIA or stroke symptoms  Hematologic: no bruises, no bleeding, no swollen glands  Integument: no lumps, mole changes, nail changes or rash  Endocrine: no malaise/lethargy or unexpected weight changes      Social History     Social History    Marital status:      Spouse name: N/A    Number of children: N/A    Years of education: N/A     Social History Main Topics    Smoking status: Former Smoker     Quit date: 1/15/1984    Smokeless tobacco: Never Used      Comment: QUIT     Alcohol use No      Comment: quit in     Drug use: No    Sexual activity: Yes     Partners: Female     Other Topics Concern    None     Social History Narrative    Family History: Mother:  61 yrs, DM complicationsFather:  80 yrs, strokeSister(s):  76 yrs, pneumonia, DM,    HTNBrother(s):  61 yrs, DM, HTN, CVAChildren: aliveGrandmother: unknow nGrandfather: deceasedSpouse: deceased1 brother(s) -    healthy. 2daughter(s) - healthy. Social History: Alcohol Use Patient does not use alcohol. Smoking Status Patient is a former smoker, Quit in: 36. Caffeine: none. Marital    Status: W idow . Lives w ith: alone. Children: yes 2 d. Education/School: has highschool diploma, college 2 y. Family History   Problem Relation Age of Onset    Diabetes Mother     Stroke Father        OBJECTIVE:    Visit Vitals    /62 (BP 1 Location: Right arm, BP Patient Position: Sitting)    Pulse (!) 59    Temp 97.9 °F (36.6 °C) (Oral)    Resp 16    Ht 5' 7\" (1.702 m)    Wt 173 lb (78.5 kg)    SpO2 93%    BMI 27.1 kg/m2     CONSTITUTIONAL: well , well nourished, appears age appropriate  EYES: perrla, eom intact  ENMT:moist mucous membranes, pharynx clear  NECK: supple. Thyroid normal  RESPIRATORY: Chest: clear bilaterally   CARDIOVASCULAR: Heart: regular rate and rhythm  GASTROINTESTINAL: Abdomen: soft, bowel sounds active  HEMATOLOGIC: no pathological lymph nodes palpated  MUSCULOSKELETAL: Extremities: no edema, pulse 1+   INTEGUMENT: No unusual rashes or suspicious skin lesions noted. Nails appear normal.  NEUROLOGIC: non-focal exam   MENTAL STATUS: alert and oriented, appropriate affect           ASSESSMENT:  1. Controlled type 2 diabetes mellitus without complication, without long-term current use of insulin ([address]    2. CLL (chronic lymphocytic leukemia) ([facility]    3. Essential hypertension, benign    4. Opacity of lung on imaging study      We are concerned about the lung opacity and we will repeat the chest x-ray today. If it is becoming more severe we will add an antibiotic to his regimen. If it is about the same we will observe it and repeat in about six or eight weeks here in the office. Blood sugar control has been adequate. He would like to wait until the next visit to repeat the hemoglobin A1C which I cannot disagree. BMI is at his ideal body weight. Blood pressure control is excellent. He will return to the office in about two or three months. We ask him to let us know if he starts coughing up any yellow or greenish stuff or if he develops chills or fever. PLAN:  .  Orders Placed This Encounter    XR CHEST PA LAT       Follow-up Disposition:  Return in about 3 months (around 8/21/2017). ATTENTION:   This medical record was transcribed using an electronic medical records system. Although proofread, it may and can contain electronic and spelling errors. Other human spelling and other errors may be present. Corrections may be executed at a later time. Please feel free to contact us for any clarifications as needed.

## 2017-06-13 ENCOUNTER — OFFICE VISIT (OUTPATIENT)
Dept: CARDIOLOGY CLINIC | Age: 82
End: 2017-06-13

## 2017-06-13 VITALS
HEIGHT: 67 IN | SYSTOLIC BLOOD PRESSURE: 145 MMHG | HEART RATE: 56 BPM | DIASTOLIC BLOOD PRESSURE: 65 MMHG | BODY MASS INDEX: 27.18 KG/M2 | OXYGEN SATURATION: 96 % | WEIGHT: 173.2 LBS

## 2017-06-13 DIAGNOSIS — R91.8 OPACITY OF LUNG ON IMAGING STUDY: ICD-10-CM

## 2017-06-13 DIAGNOSIS — I10 ESSENTIAL HYPERTENSION, BENIGN: ICD-10-CM

## 2017-06-13 DIAGNOSIS — Z95.1 S/P CABG X 3: ICD-10-CM

## 2017-06-13 DIAGNOSIS — E11.9 CONTROLLED TYPE 2 DIABETES MELLITUS WITHOUT COMPLICATION, WITHOUT LONG-TERM CURRENT USE OF INSULIN (HCC): ICD-10-CM

## 2017-06-13 DIAGNOSIS — Z95.820 S/P ANGIOPLASTY WITH STENT: ICD-10-CM

## 2017-06-13 DIAGNOSIS — R91.8 OPACITY OF LUNG ON IMAGING STUDY: Primary | ICD-10-CM

## 2017-06-13 DIAGNOSIS — I25.10 CORONARY ARTERY DISEASE INVOLVING NATIVE CORONARY ARTERY OF NATIVE HEART WITHOUT ANGINA PECTORIS: Primary | ICD-10-CM

## 2017-06-13 NOTE — PROGRESS NOTES
[facility]   [address] [facility], [address] [place] [address] Road                                          NEW PATIENT HPI/FOLLOW-UP      NAME:  Michelle Yanes   :   10/4/1933   MRN:   [id number]   PCP:  Mick Osorio MD           Subjective: The patient is a 80y.o. year old male with h/o CAD, CABG x 3, controlled [email or web address] and [email or web address] with recent diagnosis and treatment completion for pneumonia who returns for a routine follow-up. Since the last visit, patient reports no new cardiac symptoms. Reports SOB with heavy productive cough, but notes this has been improving since Abx regimen started by his PCP last week. He is planning on repeat chest CXR per PCP to follow lung opacity to resolution. No fevers. Denies change in exercise tolerance, chest pain, edema, medication intolerance, palpitations, PND/orthopnea wheezing, sputum, syncope, dizziness or light headedness. Doing satisfactorily. Review of Systems  General: Pt denies excessive weight gain or loss. Pt is able to conduct ADL's. Respiratory: +shortness of breath, productive cough, Denies JETER, wheezing or stridor.   Cardiovascular: Denies precordial pain, palpitations, edema or PND  Gastrointestinal: Denies poor appetite, indigestion, abdominal pain or blood in stool  Peripheral vascular: Denies claudication, leg cramps  Neuropsychiatric: Denies paresthesias,tingling,numbness,anxiety,depression,fatigue  Musculoskeletal: Denies pain,tenderness, soreness,swelling      Past Medical History:   Diagnosis Date    Abnormal nuclear stress test 2014    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 ([address] 2015    CAD (coronary artery disease)     Chest pain, unspecified     Chronic pain     right leg    CLL (chronic lymphocytic leukemia) (HCC)     Diabetes ([address]     Essential hypertension     GERD (gastroesophageal reflux disease)     Hyperlipidemia     Hypertension     Opacity of lung on imaging study 05/28/2017    cxr    Rotator cuff arthropathy     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    S/P coronary artery stent placement 6/6/2014 6/6/14 PCI/GUANAKO to prox LAD     Patient Active Problem List    Diagnosis Date Noted    Opacity of lung on imaging study 05/28/2017    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/06/2015    Unstable angina ([address] 11/03/2015    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 ([address] 11/02/2015    Rotator cuff arthropathy     CLL (chronic lymphocytic leukemia) ([address]     Chest pain, atypical 04/26/2015    S/P angioplasty with stent--4/15 04/26/2015    Leukocytosis     NSTEMI (non-ST elevated myocardial infarction) ([address] 04/04/2015    ASHD (arteriosclerotic heart disease) 04/04/2015    CAD (coronary artery disease)     Hyperlipidemia     Diabetes (HCC)     GERD (gastroesophageal reflux disease)     S/P coronary artery stent placement 06/06/2014    Abnormal nuclear stress test 06/06/2014    Pre-operative cardiovascular examination, class IV angina ([address] 06/04/2014    Chest pain 09/16/2013    Diabetes mellitus type 2, controlled ([address] 05/22/2012    Mixed hyperlipidemia 05/22/2012    Peripheral vascular disease ([address] 05/22/2012    Chronic ischemic heart disease 05/22/2012    Coronary atherosclerosis of native coronary artery 05/22/2012    Atherosclerosis of native arteries of the extremities with intermittent claudication 05/22/2012    Essential hypertension, benign 05/22/2012    Atherosclerosis of native arteries of the extremities, unspecified 05/22/2012      Past Surgical History:   Procedure Laterality Date    HX COLONOSCOPY      HX HEART CATHETERIZATION      PTCA SINGLE VESSEL  4/4/2015         VASCULAR SURGERY PROCEDURE UNLIST  2014 and 2015    BLE stenting     Allergies   Allergen Reactions    Codeine Shortness of Breath    Lipitor [Atorvastatin] Nausea Only      Family History   Problem Relation Age of Onset    Diabetes Mother     Stroke Father       Social History Social History    Marital status:      Spouse name: N/A    Number of children: N/A    Years of education: N/A     Occupational History    Not on file. Social History Main Topics    Smoking status: Former Smoker     Quit date: 1/15/1984    Smokeless tobacco: Never Used      Comment: QUIT     Alcohol use No      Comment: quit in     Drug use: No    Sexual activity: Yes     Partners: Female     Other Topics Concern    Not on file     Social History Narrative    Family History: Mother:  61 yrs, DM complicationsFather:  80 yrs, strokeSister(s):  76 yrs, pneumonia, DM,    HTNBrother(s):  61 yrs, DM, HTN, CVAChildren: aliveGrandmother: unknow nGrandfather: deceasedSpouse: deceased1 brother(s) -    healthy. 2daughter(s) - healthy. Social History: Alcohol Use Patient does not use alcohol. Smoking Status Patient is a former smoker, Quit in: 36. Caffeine: none. Marital    Status: W idow . Lives w ith: alone. Children: yes 2 d. Education/School: has highschool diploma, college 2 y. Current Outpatient Prescriptions   Medication Sig    amLODIPine (NORVASC) 2.5 mg tablet Take 1 Tab by mouth daily.  omeprazole (PRILOSEC) 20 mg capsule TAKE 1 CAPSULE DAILY    simvastatin (ZOCOR) 40 mg tablet TAKE 1 TABLET DAILY IN THE EVENING    metFORMIN (GLUCOPHAGE) 500 mg tablet TAKE 1 TABLET DAILY WITH A MEAL    glucose blood VI test strips (ONETOUCH ULTRA TEST) strip 1 BID ac    metoprolol tartrate (LOPRESSOR) 25 mg tablet TAKE 1 TABLET TWICE A DAY    allopurinol (ZYLOPRIM) 300 mg tablet TAKE 1 TABLET DAILY    aspirin 81 mg chewable tablet Take 81 mg by mouth daily.  brimonidine (ALPHAGAN) 0.15 % ophthalmic solution Administer 1 Drop to both eyes two (2) times a day. Indications: OPEN ANGLE GLAUCOMA    amLODIPine (NORVASC) 5 mg tablet TAKE 1 TABLET DAILY     No current facility-administered medications for this visit.          I have reviewed the MAs notes, vitals, problem list, allergy list, medical history, family medical, social history and medications. Objective:     Physical Exam:     Vitals:    06/13/17 1007   BP: 145/65   Pulse: (!) 56   SpO2: 96%   Weight: 173 lb 3.2 oz (78.6 kg)   Height: 5' 7\" (1.702 m)    Body mass index is 27.13 kg/(m^2). General: WDWN adult male in no acute distress. Accompanied by daughter. HEENT: No carotid bruits, no JVD, trach is midline. Heart:  Normal S1/S2 negative S3 or S4. Regular, no murmur, gallop or rub.   Respiratory: Clear bilaterally, with slight diminished BS in LLF but no wheezing or rales  Abdomen:   Soft, non-tender, bowel sounds are active.   Extremities:  No edema, normal cap refill, no cyanosis. Neuro: A&Ox3, speech clear, gait stable. Skin: Skin color is normal. No rashes or lesions. No diaphoresis. Vascular: 2+ pulses symmetric in all extremities      Data Review:       Cardiographics:    EKG: sinus kenneth with negative T waves.  No significant change from EKG of 5/28/2017    Cardiology Labs:    Results for orders placed or performed during the hospital encounter of 05/28/17   EKG, 12 LEAD, INITIAL   Result Value Ref Range    Ventricular Rate 61 BPM    Atrial Rate 61 BPM    P-R Interval 158 ms    QRS Duration 86 ms    Q-T Interval 386 ms    QTC Calculation (Bezet) 388 ms    Calculated P Axis 38 degrees    Calculated R Axis -17 degrees    Diagnosis       Normal sinus rhythm  Minimal voltage criteria for LVH, may be normal variant  ST & T wave abnormality, consider anterolateral ischemia  When compared with ECG of 06-NOV-2015 11:19,  T wave inversion now evident in Lateral leads  QT has shortened  Confirmed by Lilly Sen ([id number]) on 5/29/2017 8:47:47 AM         Lab Results   Component Value Date/Time    Cholesterol, total 112 05/18/2016 12:11 PM    HDL Cholesterol 30 05/18/2016 12:11 PM    LDL, calculated 58 05/18/2016 12:11 PM    Triglyceride 118 05/18/2016 12:11 PM       Lab Results   Component Value Date/Time    Sodium 141 05/28/2017 02:46 PM    Potassium 4.3 05/28/2017 02:46 PM    Chloride 107 05/28/2017 02:46 PM    CO2 26 05/28/2017 02:46 PM    Anion gap 8 05/28/2017 02:46 PM    Glucose 89 05/28/2017 02:46 PM    BUN 12 05/28/2017 02:46 PM    Creatinine 1.18 05/28/2017 02:46 PM    BUN/Creatinine ratio 10 05/28/2017 02:46 PM    GFR est AA >60 05/28/2017 02:46 PM    GFR est non-AA 59 05/28/2017 02:46 PM    Calcium 9.0 05/28/2017 02:46 PM    Bilirubin, total 1.1 05/28/2017 02:46 PM    AST (SGOT) 16 05/28/2017 02:46 PM    Alk. phosphatase 79 05/28/2017 02:46 PM    Protein, total 7.6 05/28/2017 02:46 PM    Albumin 3.8 05/28/2017 02:46 PM    Globulin 3.8 05/28/2017 02:46 PM    A-G Ratio 1.0 05/28/2017 02:46 PM    ALT (SGPT) 22 05/28/2017 02:46 PM          Assessment:       ICD-10-CM ICD-9-CM    1. Coronary artery disease involving native coronary artery of native heart without angina pectoris I25.10 414.01 AMB POC EKG ROUTINE W/ 12 LEADS, INTER & REP   2. Essential hypertension, benign I10 401.1 AMB POC EKG ROUTINE W/ 12 LEADS, INTER & REP   3. Controlled type 2 diabetes mellitus without complication, without long-term current use of insulin (HCC) E11.9 250.00    4. S/P CABG x 3 Z95.1 V45.81    5. Opacity of lung on imaging study R91.8 793.19    6. S/P angioplasty with stent--4/15 Z95.9 V45.89          Discussion: Patient presents at this time stable from a cardiac perspective. Pleased with present status. Plan: 1. Continue same meds. Lipid profile and labs followed by PCP. 2.Encouraged to exercise to tolerance, lose weight, and follow low fat, low cholesterol, low sodium predominantly Plant-based (consider Mediterranean) diet. Call with questions or concerns. Will follow up any test results by phone and/or f/u here in office if needed. Scotty Stevenson 3.Follow up: 6 months    I have discussed the diagnosis with the patient and the intended plan as seen in the above orders.   The patient has received an after-visit summary and questions were answered concerning future plans. I have discussed any concerning medication side effects and warnings with the patient as well. Patient seen and examined. All pertinent data reviewed. I have reviewed detailed note as outlined by Rosalio Pastor. Case discussed with Nursing/medical assistant staff and Rosalio Pastor. Patient with resolving early pneumonia and sinusitis. Cardiac status stable. Plans as outlined.     Zac Guevara PA-C  6/13/2017     LEROY Lundberg

## 2017-06-13 NOTE — MR AVS SNAPSHOT
Visit Information Date & Time Provider Department Dept. Phone Encounter #  
 6/13/2017 10:30 AM Ofelia John MD [facility] 1 [id number] Your Appointments 9/5/2017 10:00 AM  
Any with Edmond Infante MD  
[address] [place] [address] and Primary Care [place] campus) Appt Note: 4 month f/u; 4 month f/u  
 [address] [facility]  
  
   
 [address] [id number] Upcoming Health Maintenance Date Due  
 EYE EXAM RETINAL OR DILATED Q1 2/24/2017 MICROALBUMIN Q1 5/18/2017 LIPID PANEL Q1 5/18/2017 INFLUENZA AGE 9 TO ADULT 8/1/2017 HEMOGLOBIN A1C Q6M 9/9/2017 GLAUCOMA SCREENING Q2Y 2/24/2018 FOOT EXAM Q1 3/9/2018 MEDICARE YEARLY EXAM 6/1/2018 DTaP/Tdap/Td series (2 - Td) 5/18/2026 Allergies as of 6/13/2017  Review Complete On: 6/13/2017 By: Key Dominguez Severity Noted Reaction Type Reactions Codeine  05/31/2012    Shortness of Breath Lipitor [Atorvastatin]  06/27/2013   Intolerance Nausea Only Current Immunizations  Reviewed on 11/9/2015 Name Date Influenza Vaccine (Quad) PF 11/2/2015  3:31 PM  
 Influenza Vaccine PF 12/5/2013  8:10 AM  
 Pneumococcal Vaccine (Unspecified Type) 1/1/2012 Not reviewed this visit You Were Diagnosed With   
  
 Codes Comments NSTEMI (non-ST elevated myocardial infarction) ([facility]    -  Primary ICD-10-CM: I21.4 ICD-9-CM: 410.70 Coronary artery disease involving native coronary artery of native heart without angina pectoris     ICD-10-CM: I25.10 ICD-9-CM: 414.01 Essential hypertension, benign     ICD-10-CM: I10 
ICD-9-CM: 401.1 Controlled type 2 diabetes mellitus without complication, without long-term current use of insulin ([facility]     ICD-10-CM: E11.9 ICD-9-CM: 250.00 S/P CABG x 3     ICD-10-CM: Z95.1 ICD-9-CM: V45.81   
 Opacity of lung on imaging study     ICD-10-CM: R91.8 ICD-9-CM: 793.19 Vitals BP Pulse Height(growth percentile) Weight(growth percentile) SpO2 BMI  
 145/65 (!) 56 5' 7\" (1.702 m) 173 lb 3.2 oz (78.6 kg) 96% 27.13 kg/m2 Smoking Status Former Smoker Vitals History BMI and BSA Data Body Mass Index Body Surface Area  
 27.13 kg/m 2 1.93 m 2 Preferred Pharmacy Pharmacy Name Phone [id number] Tian Rader [facility] [phone] Your Updated Medication List  
  
   
This list is accurate as of: 6/13/17 10:42 AM.  Always use your most recent med list.  
  
  
  
  
 allopurinol 300 mg tablet Commonly known as:  ZYLOPRIM  
TAKE 1 TABLET DAILY  
  
 * amLODIPine 5 mg tablet Commonly known as:  Mabel Zimmer TAKE 1 TABLET DAILY  
  
 * amLODIPine 2.5 mg tablet Commonly known as:  Mabel Zimmer Take 1 Tab by mouth daily. aspirin 81 mg chewable tablet Take 81 mg by mouth daily. brimonidine 0.15 % ophthalmic solution Commonly known as:  Tysonkarla Georges Administer 1 Drop to both eyes two (2) times a day. Indications: OPEN ANGLE GLAUCOMA  
  
 glucose blood VI test strips strip Commonly known as:  ONETOUCH ULTRA TEST  
1 BID ac  
  
 metFORMIN 500 mg tablet Commonly known as:  GLUCOPHAGE  
TAKE 1 TABLET DAILY WITH A MEAL  
  
 metoprolol tartrate 25 mg tablet Commonly known as:  LOPRESSOR  
TAKE 1 TABLET TWICE A DAY  
  
 omeprazole 20 mg capsule Commonly known as:  PRILOSEC  
TAKE 1 CAPSULE DAILY  
  
 simvastatin 40 mg tablet Commonly known as:  ZOCOR  
TAKE 1 TABLET DAILY IN THE EVENING  
  
 * Notice: This list has 2 medication(s) that are the same as other medications prescribed for you. Read the directions carefully, and ask your doctor or other care provider to review them with you. We Performed the Following AMB POC EKG ROUTINE W/ 12 LEADS, INTER & REP [50095 CPT(R)] Introducing [facility]! [facility] introduces [employer] patient portal. Now you can access parts of your medical record, email your doctor's office, and request medication refills online. 1. In your internet browser, go to [email or web address] 2. Click on the First Time User? Click Here link in the Sign In box. You will see the New Member Sign Up page. 3. Enter your [employer] Access Code exactly as it appears below. You will not need to use this code after youve completed the sign-up process. If you do not sign up before the expiration date, you must request a new code. · [employer] Access Code: [email or web address] Expires: 9/11/2017 10:42 AM 
 
4. Enter the last four digits of your Social Security Number (xxxx) and Date of Birth (mm/dd/yyyy) as indicated and click Submit. You will be taken to the next sign-up page. 5. Create a [employer] ID. This will be your [employer] login ID and cannot be changed, so think of one that is secure and easy to remember. 6. Create a [employer] password. You can change your password at any time. 7. Enter your Password Reset Question and Answer. This can be used at a later time if you forget your password. 8. Enter your e-mail address. You will receive e-mail notification when new information is available in [address] 9. Click Sign Up. You can now view and download portions of your medical record. 10. Click the Download Summary menu link to download a portable copy of your medical information. If you have questions, please visit the Frequently Asked Questions section of the [employer] website. Remember, [employer] is NOT to be used for urgent needs. For medical emergencies, dial 911. Now available from your iPhone and Android! Please provide this summary of care documentation to your next provider. Your primary care clinician is listed as Judi Burr. If you have any questions after today's visit, please call [phone].

## 2017-06-26 ENCOUNTER — HOSPITAL ENCOUNTER (OUTPATIENT)
Dept: CT IMAGING | Age: 82
Discharge: HOME | End: 2017-06-26
Attending: INTERNAL MEDICINE
Payer: MEDICARE

## 2017-06-26 DIAGNOSIS — R91.8 OPACITY OF LUNG ON IMAGING STUDY: ICD-10-CM

## 2017-06-26 PROCEDURE — 71250 CT THORAX DX C-: CPT

## 2017-06-27 DIAGNOSIS — C91.10 CLL (CHRONIC LYMPHOCYTIC LEUKEMIA) (HCC): Primary | ICD-10-CM

## 2017-06-27 NOTE — PROGRESS NOTES
Please call the patient and advise ct abd and pelvis - no  iv contrast and immunoelectrophoresis and bmp

## 2017-06-28 DIAGNOSIS — C91.10 CLL (CHRONIC LYMPHOCYTIC LEUKEMIA) (HCC): Primary | ICD-10-CM

## 2017-07-05 ENCOUNTER — TELEPHONE (OUTPATIENT)
Dept: INTERNAL MEDICINE CLINIC | Age: 82
End: 2017-07-05

## 2017-07-05 NOTE — TELEPHONE ENCOUNTER
SPOKE WITH PATIENT AND ASK HIM PER DR. OBRIEN THAT HE RETURN TO THE OFFICE FOR IMMUNOELECTROHORESIS, AND BMP.

## 2017-07-21 ENCOUNTER — HOSPITAL ENCOUNTER (OUTPATIENT)
Dept: CT IMAGING | Age: 82
Discharge: HOME | End: 2017-07-21
Attending: INTERNAL MEDICINE
Payer: MEDICARE

## 2017-07-21 ENCOUNTER — LAB ONLY (OUTPATIENT)
Dept: INTERNAL MEDICINE CLINIC | Age: 82
End: 2017-07-21

## 2017-07-21 DIAGNOSIS — C91.10 CLL (CHRONIC LYMPHOCYTIC LEUKEMIA) (HCC): Primary | ICD-10-CM

## 2017-07-21 DIAGNOSIS — C91.10 CLL (CHRONIC LYMPHOCYTIC LEUKEMIA) (HCC): ICD-10-CM

## 2017-07-21 PROCEDURE — 74176 CT ABD & PELVIS W/O CONTRAST: CPT

## 2017-07-24 LAB
BUN SERPL-MCNC: 10 MG/DL (ref 8–27)
BUN/CREAT SERPL: 9 (ref 10–24)
CALCIUM SERPL-MCNC: 9.4 MG/DL (ref 8.6–10.2)
CHLORIDE SERPL-SCNC: 101 MMOL/L (ref 96–106)
CO2 SERPL-SCNC: 21 MMOL/L (ref 18–29)
CREAT SERPL-MCNC: 1.06 MG/DL (ref 0.76–1.27)
GLUCOSE SERPL-MCNC: 124 MG/DL (ref 65–99)
IGA SERPL-MCNC: 108 MG/DL (ref 61–437)
IGG SERPL-MCNC: 1147 MG/DL (ref 700–1600)
IGM SERPL-MCNC: 17 MG/DL (ref 15–143)
POTASSIUM SERPL-SCNC: 4.7 MMOL/L (ref 3.5–5.2)
PROT PATTERN SERPL IFE-IMP: NORMAL
SODIUM SERPL-SCNC: 141 MMOL/L (ref 134–144)

## 2017-09-01 RX ORDER — OMEPRAZOLE 20 MG/1
CAPSULE, DELAYED RELEASE ORAL
Qty: 90 CAP | Refills: 1 | Status: SHIPPED | OUTPATIENT
Start: 2017-09-01 | End: 2017-09-02

## 2017-09-02 ENCOUNTER — APPOINTMENT (OUTPATIENT)
Dept: GENERAL RADIOLOGY | Age: 82
End: 2017-09-02
Attending: EMERGENCY MEDICINE
Payer: MEDICARE

## 2017-09-02 ENCOUNTER — HOSPITAL ENCOUNTER (EMERGENCY)
Age: 82
Discharge: HOME | End: 2017-09-02
Attending: EMERGENCY MEDICINE | Admitting: EMERGENCY MEDICINE
Payer: MEDICARE

## 2017-09-02 VITALS
TEMPERATURE: 97.7 F | RESPIRATION RATE: 17 BRPM | HEART RATE: 59 BPM | OXYGEN SATURATION: 97 % | SYSTOLIC BLOOD PRESSURE: 147 MMHG | BODY MASS INDEX: 27.89 KG/M2 | HEIGHT: 67 IN | WEIGHT: 177.69 LBS | DIASTOLIC BLOOD PRESSURE: 63 MMHG

## 2017-09-02 DIAGNOSIS — R07.89 LEFT-SIDED CHEST WALL PAIN: Primary | ICD-10-CM

## 2017-09-02 LAB
ALBUMIN SERPL-MCNC: 3.8 G/DL (ref 3.5–5)
ALBUMIN/GLOB SERPL: 1 {RATIO} (ref 1.1–2.2)
ALP SERPL-CCNC: 77 U/L (ref 45–117)
ALT SERPL-CCNC: 21 U/L (ref 12–78)
ANION GAP SERPL CALC-SCNC: 6 MMOL/L (ref 5–15)
AST SERPL-CCNC: 23 U/L (ref 15–37)
BASOPHILS # BLD: 0 K/UL (ref 0–0.1)
BASOPHILS NFR BLD: 0 % (ref 0–1)
BILIRUB SERPL-MCNC: 1.1 MG/DL (ref 0.2–1)
BUN SERPL-MCNC: 16 MG/DL (ref 6–20)
BUN/CREAT SERPL: 13 (ref 12–20)
CALCIUM SERPL-MCNC: 8.6 MG/DL (ref 8.5–10.1)
CHLORIDE SERPL-SCNC: 104 MMOL/L (ref 97–108)
CO2 SERPL-SCNC: 24 MMOL/L (ref 21–32)
CREAT SERPL-MCNC: 1.22 MG/DL (ref 0.7–1.3)
DIFFERENTIAL METHOD BLD: ABNORMAL
EOSINOPHIL # BLD: 0 K/UL (ref 0–0.4)
EOSINOPHIL NFR BLD: 0 % (ref 0–7)
ERYTHROCYTE [DISTWIDTH] IN BLOOD BY AUTOMATED COUNT: 14.1 % (ref 11.5–14.5)
GLOBULIN SER CALC-MCNC: 3.9 G/DL (ref 2–4)
GLUCOSE SERPL-MCNC: 82 MG/DL (ref 65–100)
HCT VFR BLD AUTO: 41.2 % (ref 36.6–50.3)
HGB BLD-MCNC: 14.5 G/DL (ref 12.1–17)
LYMPHOCYTES # BLD: 53.5 K/UL (ref 0.8–3.5)
LYMPHOCYTES NFR BLD: 93 % (ref 12–49)
MCH RBC QN AUTO: 32.3 PG (ref 26–34)
MCHC RBC AUTO-ENTMCNC: 35.2 G/DL (ref 30–36.5)
MCV RBC AUTO: 91.8 FL (ref 80–99)
MONOCYTES # BLD: 0 K/UL (ref 0–1)
MONOCYTES NFR BLD: 0 % (ref 5–13)
NEUTS SEG # BLD: 4 K/UL (ref 1.8–8)
NEUTS SEG NFR BLD: 7 % (ref 32–75)
NRBC # BLD: 0 K/UL (ref 0–0.01)
NRBC BLD-RTO: 0 PER 100 WBC
PLATELET # BLD AUTO: 153 K/UL (ref 150–400)
POTASSIUM SERPL-SCNC: 4.3 MMOL/L (ref 3.5–5.1)
PROT SERPL-MCNC: 7.7 G/DL (ref 6.4–8.2)
RBC # BLD AUTO: 4.49 M/UL (ref 4.1–5.7)
RBC MORPH BLD: ABNORMAL
SODIUM SERPL-SCNC: 134 MMOL/L (ref 136–145)
TROPONIN I SERPL-MCNC: <0.04 NG/ML
TROPONIN I SERPL-MCNC: <0.04 NG/ML
WBC # BLD AUTO: 57.5 K/UL (ref 4.1–11.1)
WBC MORPH BLD: ABNORMAL

## 2017-09-02 PROCEDURE — 71020 XR CHEST PA LAT: CPT

## 2017-09-02 PROCEDURE — 85025 COMPLETE CBC W/AUTO DIFF WBC: CPT | Performed by: EMERGENCY MEDICINE

## 2017-09-02 PROCEDURE — 36415 COLL VENOUS BLD VENIPUNCTURE: CPT | Performed by: EMERGENCY MEDICINE

## 2017-09-02 PROCEDURE — 74011250637 HC RX REV CODE- 250/637: Performed by: EMERGENCY MEDICINE

## 2017-09-02 PROCEDURE — 99284 EMERGENCY DEPT VISIT MOD MDM: CPT

## 2017-09-02 PROCEDURE — 84484 ASSAY OF TROPONIN QUANT: CPT | Performed by: EMERGENCY MEDICINE

## 2017-09-02 PROCEDURE — 93005 ELECTROCARDIOGRAM TRACING: CPT

## 2017-09-02 PROCEDURE — 74011000250 HC RX REV CODE- 250: Performed by: EMERGENCY MEDICINE

## 2017-09-02 PROCEDURE — 80053 COMPREHEN METABOLIC PANEL: CPT | Performed by: EMERGENCY MEDICINE

## 2017-09-02 RX ORDER — ACETAMINOPHEN 325 MG/1
650 TABLET ORAL
Qty: 20 TAB | Refills: 0 | Status: SHIPPED | OUTPATIENT
Start: 2017-09-02 | End: 2019-07-24 | Stop reason: ALTCHOICE

## 2017-09-02 RX ORDER — LIDOCAINE 50 MG/G
1 PATCH TOPICAL EVERY 24 HOURS
Status: DISCONTINUED | OUTPATIENT
Start: 2017-09-02 | End: 2017-09-02 | Stop reason: HOSPADM

## 2017-09-02 RX ADMIN — LIDOCAINE HYDROCHLORIDE 40 ML: 20 SOLUTION ORAL; TOPICAL at 14:52

## 2017-09-02 NOTE — DISCHARGE INSTRUCTIONS
Musculoskeletal Chest Pain: Care Instructions  Your Care Instructions  Chest pain is not always a sign that something is wrong with your heart or that you have another serious problem. The doctor thinks your chest pain is caused by strained muscles or ligaments, inflamed chest cartilage, or another problem in your chest, rather than by your heart. You may need more tests to find the cause of your chest pain. Follow-up care is a key part of your treatment and safety. Be sure to make and go to all appointments, and call your doctor if you are having problems. Its also a good idea to know your test results and keep a list of the medicines you take. How can you care for yourself at home? · Take pain medicines exactly as directed. ¨ If the doctor gave you a prescription medicine for pain, take it as prescribed. ¨ If you are not taking a prescription pain medicine, ask your doctor if you can take an over-the-counter medicine. · Rest and protect the sore area. · Stop, change, or take a break from any activity that may be causing your pain or soreness. · Put ice or a cold pack on the sore area for 10 to 20 minutes at a time. Try to do this every 1 to 2 hours for the next 3 days (when you are awake) or until the swelling goes down. Put a thin cloth between the ice and your skin. · After 2 or 3 days, apply a heating pad set on low or a warm cloth to the area that hurts. Some doctors suggest that you go back and forth between hot and cold. · Do not wrap or tape your ribs for support. This may cause you to take smaller breaths, which could increase your risk of lung problems. · Mentholated creams such as Bengay or Icy Hot may soothe sore muscles. Follow the instructions on the package. · Follow your doctor's instructions for exercising. · Gentle stretching and massage may help you get better faster. Stretch slowly to the point just before pain begins, and hold the stretch for at least 15 to 30 seconds.  Do this 3 or 4 times a day. Stretch just after you have applied heat. · As your pain gets better, slowly return to your normal activities. Any increased pain may be a sign that you need to rest a while longer. When should you call for help? Call 911 anytime you think you may need emergency care. For example, call if:  · You have chest pain or pressure. This may occur with:  ¨ Sweating. ¨ Shortness of breath. ¨ Nausea or vomiting. ¨ Pain that spreads from the chest to the neck, jaw, or one or both shoulders or arms. ¨ Dizziness or lightheadedness. ¨ A fast or uneven pulse. After calling 911, chew 1 adult-strength aspirin. Wait for an ambulance. Do not try to drive yourself. · You have sudden chest pain and shortness of breath, or you cough up blood. Call your doctor now or seek immediate medical care if:  · You have any trouble breathing. · Your chest pain gets worse. · Your chest pain occurs consistently with exercise and is relieved by rest.  Watch closely for changes in your health, and be sure to contact your doctor if:  · Your chest pain does not get better after 1 week. Where can you learn more? Go to http://darrel-linda.info/. Enter V293 in the search box to learn more about \"Musculoskeletal Chest Pain: Care Instructions. \"  Current as of: March 20, 2017  Content Version: 11.3  © 0950-7269 [employer]. Care instructions adapted under license by [employer] (which disclaims liability or warranty for this information). If you have questions about a medical condition or this instruction, always ask your healthcare professional. [place] [address] any warranty or liability for your use of this information.

## 2017-09-02 NOTE — ED NOTES
Santana Mcintosh MD   has done a bedside review of the discharge instructions. The patient is in understanding. The patients line(s) are removed. The patient is dressed, and belongings together for discharge.

## 2017-09-02 NOTE — ED PROVIDER NOTES
HPI Comments: Dewanda Scheuermann, 80 y.o. male, with PMHx of CP, CAD, HLD, HTN, GERD, CABG presents ambulatory to [facility] ED with cc of left-sided \"steady\" CP since 10:45AM today. He rates the pain a 5/10 on the pain scale. Pt reports that the pain began while he was sitting down eating breakfast (bananas). Family member reports that the patient often experiences indigestion when eating bananas. Pt denies exacerbation of the CP while ambulating. He denies any hx of similar episodes of CP, but does note that he has a hx of MI/bypass surgery. Cardiologist is Dr. Ankur Strickland. Patient specifically denies abdominal pain, N/V/D, SOB, palpitations, diaphoresis, cough, leg swelling. PCP: Blayne Carlson MD    Social history significant for: - Tobacco, - EtOH, - Illicit Drug Use    There are no other complaints, changes, or physical findings at this time. The history is provided by the patient. No  was used.         Past Medical History:   Diagnosis Date    Abnormal nuclear stress test 6/6/2014    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 ([facility] [address] 11/2/2015    CAD (coronary artery disease)     Chest pain, unspecified     Chronic pain     right leg    CLL (chronic lymphocytic leukemia) (HCC)     Diabetes ([facility] [address]     Essential hypertension     GERD (gastroesophageal reflux disease)     Hyperlipidemia     Hypertension     Opacity of lung on imaging study 05/28/2017    cxr    Rotator cuff arthropathy     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 Social History Main Topics    Smoking status: Former Smoker     Quit date: 1/15/1984    Smokeless tobacco: Never Used      Comment: QUIT     Alcohol use No      Comment: quit in     Drug use: No    Sexual activity: Yes     Partners: Female     Other Topics Concern    Not on file     Social History Narrative    Family History: Mother:  61 yrs, DM complicationsFather:  80 yrs, strokeSister(s):  76 yrs, pneumonia, DM,    HTNBrother(s):  61 yrs, DM, HTN, CVAChildren: aliveGrandmother: unknow nGrandfather: deceasedSpouse: deceased1 brother(s) -    healthy. 2daughter(s) - healthy. Social History: Alcohol Use Patient does not use alcohol. Smoking Status Patient is a former smoker, Quit in: 36. Caffeine: none. Marital    Status: W idow . Lives w ith: alone. Children: yes 2 d. Education/School: has highschool diploma, college 2 y. ALLERGIES: Codeine and Lipitor [atorvastatin]    Review of Systems   Constitutional: Negative for chills, diaphoresis and fever. Respiratory: Negative for cough and shortness of breath. Cardiovascular: Positive for chest pain. Negative for palpitations and leg swelling. Gastrointestinal: Negative for abdominal pain, constipation, diarrhea, nausea and vomiting. Neurological: Negative for weakness and numbness. All other systems reviewed and are negative. Patient Vitals for the past 12 hrs:   Temp Pulse Resp BP SpO2   17 1435 - - - - 97 %   17 1417 97.7 °F (36.5 °C) (!) 59 17 147/63 99 %     Physical Exam   Constitutional: He is oriented to person, place, and time. He appears well-developed and well-nourished. HENT:   Head: Normocephalic and atraumatic. Eyes: Conjunctivae and EOM are normal.   Neck: Normal range of motion. Neck supple.    Cardiovascular: Normal rate and regular rhythm. Pulmonary/Chest: Effort normal and breath sounds normal. No respiratory distress. + Tenderness over left chest  + Midline surgical scar, well-healing  Lungs are clear   Abdominal: Soft. He exhibits no distension. There is no tenderness. Musculoskeletal: Normal range of motion. Neurological: He is alert and oriented to person, place, and time. Skin: Skin is warm and dry. Psychiatric: He has a normal mood and affect. Nursing note and vitals reviewed. MDM  Number of Diagnoses or Management Options  Left-sided chest wall pain:   Diagnosis management comments: Patient presents with CP. DDx:  ACS, Aortic dissection, PNA, PE, PTX, pericarditis, myocarditis, GERD, costochondritis, anxiety. Most likely gastritis since sx occurred after eating, however could be ACS given hx. Will obtain labs, CXR, EKG. - I have re-examined the patient and the patient denies chest pain on re-examination. The patient has had onset of chest pain greater than 8 hours and one negative set of cardiac enzymes or 2 negative sets of cardiac enzymes in the ER during this visit. The diagnosis, follow up, return instructions, test results, x-rays and medications have been discussed and reviewed with the patient. The patient has been given the opportunity to ask questions. The patient  expresses understanding of the diagnosis, follow-up and return instructions. The patient agrees to follow up with primary care or cardiology as directed and to return immediately if the chest pain worsens. The patient expresses understanding that although cardiac testing at this time is negative, a cardiac problem could still be present and that a follow-up appointment for further evaluation and risk factor modification is necessary to complete the evaluation of this complaint.          Amount and/or Complexity of Data Reviewed  Clinical lab tests: ordered and reviewed  Tests in the radiology section of CPT®: ordered and reviewed  Tests in the medicine section of CPT®: ordered and reviewed  Review and summarize past medical records: yes  Independent visualization of images, tracings, or specimens: yes    Patient Progress  Patient progress: stable    ED Course       Procedures       EKG interpretation: (Preliminary)   TIME: 14:43  Rhythm: sinus bradycardia; Rate (approx.): 59; Axis: normal  DC interval: normal  QRS interval: normal   ST/T wave: normal  Other findings: T wave inversions from V2-V6, all old; minimal voltage criteria for LVH, may be normal variant; ST and T wave abnormality, consider anterolateral ischemia. LABORATORY TESTS:  Recent Results (from the past 12 hour(s))   EKG, 12 LEAD, INITIAL    Collection Time: 09/02/17  2:10 PM   Result Value Ref Range    Ventricular Rate 59 BPM    Atrial Rate 59 BPM    P-R Interval 164 ms    QRS Duration 88 ms    Q-T Interval 396 ms    QTC Calculation (Bezet) 392 ms    Calculated P Axis 42 degrees    Calculated R Axis -20 degrees    Calculated T Axis -15 degrees    Diagnosis       Sinus bradycardia  Minimal voltage criteria for LVH, may be normal variant  ST & T wave abnormality, consider anterolateral ischemia  Abnormal ECG  When compared with ECG of 28-MAY-2017 13:34,  No significant change was found     CBC WITH AUTOMATED DIFF    Collection Time: 09/02/17  2:42 PM   Result Value Ref Range    WBC 57.5 (HH) 4.1 - 11.1 K/uL    RBC 4.49 4. 10 - 5.70 M/uL    HGB 14.5 12.1 - 17.0 g/dL    HCT 41.2 36.6 - 50.3 %    MCV 91.8 80.0 - 99.0 FL    MCH 32.3 26.0 - 34.0 PG    MCHC 35.2 30.0 - 36.5 g/dL    RDW 14.1 11.5 - 14.5 %    PLATELET 073 046 - 390 K/uL    NEUTROPHILS 7 (L) 32 - 75 %    LYMPHOCYTES 93 (H) 12 - 49 %    MONOCYTES 0 (L) 5 - 13 %    EOSINOPHILS 0 0 - 7 %    BASOPHILS 0 0 - 1 %    ABS. NEUTROPHILS 4.0 1.8 - 8.0 K/UL    ABS. LYMPHOCYTES 53.5 (H) 0.8 - 3.5 K/UL    ABS. MONOCYTES 0.0 0.0 - 1.0 K/UL    ABS. EOSINOPHILS 0.0 0.0 - 0.4 K/UL    ABS.  BASOPHILS 0.0 0.0 - 0.1 K/UL    DF MANUAL      RBC COMMENTS NORMOCYTIC, NORMOCHROMIC      WBC COMMENTS ATYPICAL LYMPHOCYTES PRESENT     METABOLIC PANEL, COMPREHENSIVE    Collection Time: 09/02/17  2:42 PM   Result Value Ref Range    Sodium 134 (L) 136 - 145 mmol/L    Potassium 4.3 3.5 - 5.1 mmol/L    Chloride 104 97 - 108 mmol/L    CO2 24 21 - 32 mmol/L    Anion gap 6 5 - 15 mmol/L    Glucose 82 65 - 100 mg/dL    BUN 16 6 - 20 MG/DL    Creatinine 1.22 0.70 - 1.30 MG/DL    BUN/Creatinine ratio 13 12 - 20      GFR est AA >60 >60 ml/min/1.73m2    GFR est non-AA 57 (L) >60 ml/min/1.73m2    Calcium 8.6 8.5 - 10.1 MG/DL    Bilirubin, total 1.1 (H) 0.2 - 1.0 MG/DL    ALT (SGPT) 21 12 - 78 U/L    AST (SGOT) 23 15 - 37 U/L    Alk. phosphatase 77 45 - 117 U/L    Protein, total 7.7 6.4 - 8.2 g/dL    Albumin 3.8 3.5 - 5.0 g/dL    Globulin 3.9 2.0 - 4.0 g/dL    A-G Ratio 1.0 (L) 1.1 - 2.2     TROPONIN I    Collection Time: 09/02/17  2:42 PM   Result Value Ref Range    Troponin-I, Qt. <0.04 <0.05 ng/mL   NUCLEATED RBC    Collection Time: 09/02/17  2:42 PM   Result Value Ref Range    NRBC 0.0 0  WBC    ABSOLUTE NRBC 0.00 0.00 - 0.01 K/uL   TROPONIN I    Collection Time: 09/02/17  4:25 PM   Result Value Ref Range    Troponin-I, Qt. <0.04 <0.05 ng/mL       IMAGING RESULTS:  XR CHEST PA LAT   Final Result   INDICATION:  Chest pain since this morning.     COMPARISON:  6/13/2017.     FINDINGS:  PA and lateral views were obtained of the chest.    Median sternotomy sutures from previous coronary surgery are present. The heart is normal in size. The aorta is atherosclerotic. Slight left basilar interstitial seen. No consolidation, pulmonary edema, or pleural effusion is evident. The regional osseous structures are unremarkable.          IMPRESSION  IMPRESSION:    No consolidation.        MEDICATIONS GIVEN:  Medications   lidocaine (LIDODERM) 5 % patch 1 Patch (1 Patch TransDERmal Apply Patch 9/2/17 0577)   mylanta/viscous lidocaine (SUMA)(GI COCKTAIL) (40 mL Oral Given 9/2/17 8730)       IMPRESSION:  1. Left-sided chest wall pain        PLAN:  1. Current Discharge Medication List      START taking these medications    Details   acetaminophen (TYLENOL) 325 mg tablet Take 2 Tabs by mouth every six (6) hours as needed for Pain. Qty: 20 Tab, Refills: 0           2. Follow-up Information     Follow up With Details Comments 464 Camilo Gambino MD  As needed [place] [id number] [facility] [phone]          Return to ED if worse     Discharge Note:  5:19 PM  The pt is ready for discharge. The pt's signs, symptoms, diagnosis, and discharge instructions have been discussed and pt has conveyed their understanding. The pt is to follow up as recommended or return to ER should their symptoms worsen. Plan has been discussed and pt is in agreement. This note is prepared by Santiago Browning, acting as a Scribe for Rafa Vasques MD.    Rafa Vasques MD: The scribe's documentation has been prepared under my direction and personally reviewed by me in its entirety. I confirm that the notes above accurately reflects all work, treatment, procedures, and medical decision making performed by me.

## 2017-09-02 NOTE — ED NOTES
Bedside and Verbal shift change report given to Abbie Brizuela (oncoming nurse) by Louise Solorio (offgoing nurse). Report included the following information SBAR, ED Summary and Recent Results. Pt updated on plan of care. Pt resting on stretcher with daughter at bedside.

## 2017-09-02 NOTE — ED TRIAGE NOTES
Pt arrives via triage with CC of chest pain that started this morning around 11:30 after he ate lunch. Pt has a pos cardiac history. Dr. Yobani Burciaga is cardiologist.  Pt on monitor x 3 VSS with family at bedside.

## 2017-09-03 LAB
ATRIAL RATE: 59 BPM
CALCULATED P AXIS, ECG09: 42 DEGREES
CALCULATED R AXIS, ECG10: -20 DEGREES
CALCULATED T AXIS, ECG11: -15 DEGREES
DIAGNOSIS, 93000: NORMAL
P-R INTERVAL, ECG05: 164 MS
Q-T INTERVAL, ECG07: 396 MS
QRS DURATION, ECG06: 88 MS
QTC CALCULATION (BEZET), ECG08: 392 MS
VENTRICULAR RATE, ECG03: 59 BPM

## 2017-09-06 ENCOUNTER — PATIENT OUTREACH (OUTPATIENT)
Dept: INTERNAL MEDICINE CLINIC | Age: 82
End: 2017-09-06

## 2017-09-06 RX ORDER — METOPROLOL TARTRATE 25 MG/1
TABLET, FILM COATED ORAL
Qty: 180 TAB | Refills: 3 | Status: SHIPPED | OUTPATIENT
Start: 2017-09-06 | End: 2018-09-07 | Stop reason: SDUPTHER

## 2017-09-06 NOTE — PROGRESS NOTES
NNTOCED [facility] 9/2/2017  Left-sided chest wall pain  Hospital Discharge Follow-Up      Date/Time:  9/6/2017 2:57 PM    Patient listed on discharge Daily Census report on 9/5/2017. Patient discharged from [facility] for as above. Libby Panda RRAT score: Medium Risk            19       Total Score        3 Has Seen PCP in Last 6 Months (Yes=3, No=0)    16 Charlson Comorbidity Score (Age + Comorbid Conditions)        Criteria that do not apply:    . Living with Significant Other. Assisted Living. LTAC. SNF. or   Rehab    Patient Length of Stay (>5 days = 3)    IP Visits Last 12 Months (1-3=4, 4=9, >4=11)    Pt. Coverage (Medicare=5 , Medicaid, or Self-Pay=4)     Moderate    Pt reports that the pain began while he was sitting down eating breakfast (bananas). Family member reports that the patient often experiences indigestion when eating bananas. Pt denies exacerbation of the CP while ambulating. He denies any hx of similar episodes of CP, but does note that he has a hx of MI/bypass surgery. Cardiologist is Dr. Nora Rogers. Patient specifically denies abdominal pain, N/V/D, SOB, palpitations, diaphoresis, cough, leg swelling. 09/02/17 1417 97.7 °F (36.5 °C) (!) 59 17 147/63 99 %   DDx:  ACS, Aortic dissection, PNA, PE, PTX, pericarditis, myocarditis, GERD, costochondritis, anxiety. Most likely gastritis since sx occurred after eating, however could be ACS given hx. EKG interpretation: Rhythm: sinus bradycardia; Rate (approx.): 59;   Radiology:  No consolidation.     Medical History:     Past Medical History:   Diagnosis Date    Abnormal nuclear stress test 6/6/2014    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 ([facility] [address] 11/2/2015    CAD (coronary artery disease)     Chest pain, unspecified     Chronic pain     right leg    CLL (chronic lymphocytic leukemia) (HCC)     Diabetes ([facility] [address]     Essential hypertension     GERD (gastroesophageal reflux disease)     Hyperlipidemia  Hypertension     Opacity of lung on imaging study 2017    cxr    Rotator cuff arthropathy     S/P CABG x 3 -6-15    S/P coronary artery stent placement 2014 PCI/GUANAKO to prox LAD     Nurse Navigator(NN) contacted the patient by telephone to perform post ED [facility] ED discharge assessment. Verified  and address with patient as identifiers. Provided introduction to self, and explanation of the Nurses Navigator role. NN spoke with patient's daughter as requested for next office visit. Patient stated Cardiology Appt is not until 2017. Diet:   Patient reports: Diabetic Diet    Activity:    Patient reports: somewalking outside the house    Medication:   Performed medication reconciliation with patient, and patient verbalizes understanding of administration of home medications. There were no barriers to obtaining medications identified at this time. Support system:  patient and daughter    Discharge Instructions :  Reviewed discharge instructions with patient. Patient verbalizes understanding of discharge instructions and follow-up care. Red Flags:  Chest Pain    Imaging results reviewed:  EkG. Advance Care Planning:   Patient was offered the opportunity to discuss advance care planning:  yes     Does patient have an Advance Directive:  no   If no, did you provide information on Caring Connections? yes     PCP/Specialist follow up: Patient scheduled to follow up with Serena Campbell MD on 2017. Daughter is a nurse and will not be off during the week before scheduled date. Reviewed red flags with patient & daughter who both  verbalized understanding. Patient given an opportunity to ask questions. No other clinical/social/functional needs noted. The patient agrees to contact the PCP office for questions related to their healthcare. The patient expressed thanks, offered no additional questions and ended the call. Case management plan:  Attempt to contact the patient by telephone or during office visit within the next 7-10 days. Will continue to follow as necessary for the next 30 days. Will reassess for case management needs prior to discharge from case management service on or about 30 days.

## 2017-09-10 RX ORDER — ALLOPURINOL 300 MG/1
TABLET ORAL
Qty: 90 TAB | Refills: 7 | Status: SHIPPED | OUTPATIENT
Start: 2017-09-10 | End: 2018-12-04 | Stop reason: SDUPTHER

## 2017-09-22 ENCOUNTER — OFFICE VISIT (OUTPATIENT)
Dept: INTERNAL MEDICINE CLINIC | Age: 82
End: 2017-09-22

## 2017-09-22 VITALS
DIASTOLIC BLOOD PRESSURE: 63 MMHG | SYSTOLIC BLOOD PRESSURE: 124 MMHG | TEMPERATURE: 95.8 F | OXYGEN SATURATION: 94 % | HEART RATE: 59 BPM | HEIGHT: 67 IN | WEIGHT: 174.3 LBS | BODY MASS INDEX: 27.36 KG/M2 | RESPIRATION RATE: 18 BRPM

## 2017-09-22 DIAGNOSIS — E11.9 CONTROLLED TYPE 2 DIABETES MELLITUS WITHOUT COMPLICATION, WITHOUT LONG-TERM CURRENT USE OF INSULIN (HCC): ICD-10-CM

## 2017-09-22 DIAGNOSIS — Z23 ENCOUNTER FOR IMMUNIZATION: ICD-10-CM

## 2017-09-22 DIAGNOSIS — C91.10 CLL (CHRONIC LYMPHOCYTIC LEUKEMIA) (HCC): Primary | ICD-10-CM

## 2017-09-22 DIAGNOSIS — J47.9 BRONCHIECTASIS WITHOUT COMPLICATION (HCC): ICD-10-CM

## 2017-09-22 NOTE — PROGRESS NOTES
1. Have you been to the ER, urgent care clinic since your last visit? Hospitalized since your last visit? Yes Reason for visit: chest pain    2. Have you seen or consulted any other health care providers outside of the [address] [place] [address] since your last visit? Include any pap smears or colon screening.  Yes Where: [facility]

## 2017-09-22 NOTE — PROGRESS NOTES
SPORTS MEDICINE AND PRIMARY CARE  Amol North MD, Diane Cabral[address] [address]  Phone:  [phone]  Fax: [phone]       Chief Complaint   Patient presents with   [facility] Follow Up   . SUBJECTIVE:    Zac Dunbar is a 80 y.o. male Patient returns today alert, appropriate and has the capacity to give an accurate history. Since we last saw him he was seen by Dr. Archie Shi, with whom we had a discussion regarding the patient the other day. He suggested the consideration of a pulmonary consult in view of the CT scan findings. We recall that on 07/21 areas of ground glass opacification visualized on lower lungs, bibasilar tubular bronchiectasis again is also shown. There were small right cardiophrenic lymph nodes. The CT scan is reviewed and reveals bronchiectasis, bibasilar, no pleural effusion, small right cardiophrenic lymph nodes and areas of ground glass opacification in the lower lungs. Patient's daughter comes with him, who is a nurse, and we discussed this further. Current Outpatient Prescriptions   Medication Sig Dispense Refill    influenza vaccine 2015-16, 65yr+,,PF, (FLUZONE HIGH-DOSE) syrg injection 0.5 mL by IntraMUSCular route PRIOR TO DISCHARGE for 1 dose. 1 Syringe 0    allopurinol (ZYLOPRIM) 300 mg tablet TAKE 1 TABLET DAILY 90 Tab 7    metoprolol tartrate (LOPRESSOR) 25 mg tablet TAKE 1 TABLET TWICE A  Tab 3    acetaminophen (TYLENOL) 325 mg tablet Take 2 Tabs by mouth every six (6) hours as needed for Pain. 20 Tab 0    amLODIPine (NORVASC) 2.5 mg tablet Take 1 Tab by mouth daily. 90 Tab 0    simvastatin (ZOCOR) 40 mg tablet TAKE 1 TABLET DAILY IN THE EVENING 90 Tab 3    metFORMIN (GLUCOPHAGE) 500 mg tablet TAKE 1 TABLET DAILY WITH A MEAL 30 Tab 6    glucose blood VI test strips (ONETOUCH ULTRA TEST) strip 1 BID ac 60 Strip 11    aspirin 81 mg chewable tablet Take 81 mg by mouth daily.       brimonidine (ALPHAGAN) 0.15 % ophthalmic solution Administer 1 Drop to both eyes two (2) times a day.  Indications: OPEN ANGLE GLAUCOMA       Past Medical History:   Diagnosis Date    Abnormal nuclear stress test 6/6/2014    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 ([facility] 11/2/2015    Bronchiectasis ([facility]     CAD (coronary artery disease)     Chest pain, unspecified     Chronic pain     right leg    CLL (chronic lymphocytic leukemia) (HCC)     Diabetes (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    S/P coronary artery stent placement 6/6/2014 6/6/14 PCI/GUANAKO to prox LAD     Past Surgical History:   Procedure Laterality Date    HX COLONOSCOPY      HX HEART CATHETERIZATION      PTCA SINGLE VESSEL  4/4/2015         VASCULAR SURGERY PROCEDURE UNLIST  2014 and 2015    BLE stenting     Allergies   Allergen Reactions    Codeine Shortness of Breath    Lipitor [Atorvastatin] Nausea Only         REVIEW OF SYSTEMS:  General: negative for - chills or fever  ENT: negative for - headaches, nasal congestion or tinnitus  Respiratory: negative for - cough, hemoptysis, shortness of breath or wheezing  Cardiovascular : negative for - chest pain, edema, palpitations or shortness of breath  Gastrointestinal: negative for - abdominal pain, blood in stools, heartburn or nausea/vomiting  Genito-Urinary: no dysuria, trouble voiding, or hematuria  Musculoskeletal: negative for - gait disturbance, joint pain, joint stiffness or joint swelling  Neurological: no TIA or stroke symptoms  Hematologic: no bruises, no bleeding, no swollen glands  Integument: no lumps, mole changes, nail changes or rash  Endocrine: no malaise/lethargy or unexpected weight changes      Social History     Social History    Marital status:      Spouse name: N/A    Number of children: N/A    Years of education: N/A     Social History Main Topics    Smoking status: Former Smoker     Quit date: 1/15/1984    Smokeless tobacco: Never Used      Comment: QUIT     Alcohol use No      Comment: quit in     Drug use: No    Sexual activity: Yes     Partners: Female     Other Topics Concern    None     Social History Narrative    Family History: Mother:  61 yrs, DM complicationsFather:  80 yrs, strokeSister(s):  76 yrs, pneumonia, DM,    HTNBrother(s):  61 yrs, DM, HTN, CVAChildren: aliveGrandmother: unknow nGrandfather: deceasedSpouse: deceased1 brother(s) -    healthy. 2daughter(s) - healthy. Social History: Alcohol Use Patient does not use alcohol. Smoking Status Patient is a former smoker, Quit in: 36. Caffeine: none. Marital    Status: W idow . Lives w ith: alone. Children: yes 2 d. Education/School: has highschool diploma, college 2 y. Family History   Problem Relation Age of Onset    Diabetes Mother     Stroke Father        OBJECTIVE:    Visit Vitals    /63 (BP 1 Location: Left arm, BP Patient Position: Sitting)    Pulse (!) 59    Temp 95.8 °F (35.4 °C) (Oral)    Resp 18    Ht 5' 7\" (1.702 m)    Wt 174 lb 4.8 oz (79.1 kg)    SpO2 94%    BMI 27.3 kg/m2     CONSTITUTIONAL: well , well nourished, appears age appropriate  EYES: perrla, eom intact  ENMT:moist mucous membranes, pharynx clear  NECK: supple. Thyroid normal  RESPIRATORY: Chest: clear bilaterally   CARDIOVASCULAR: Heart: regular rate and rhythm  GASTROINTESTINAL: Abdomen: soft, bowel sounds active  HEMATOLOGIC: no pathological lymph nodes palpated  MUSCULOSKELETAL: Extremities: no edema, pulse 1+   INTEGUMENT: No unusual rashes or suspicious skin lesions noted. Nails appear normal.  NEUROLOGIC: non-focal exam   MENTAL STATUS: alert and oriented, appropriate affect           ASSESSMENT:  1. CLL (chronic lymphocytic leukemia) ([facility]    2.  Bronchiectasis without complication ([address]    3. Controlled type 2 diabetes mellitus without complication, without long-term current use of insulin [facility])      Laboratory studies are reviewed. CT scan is reviewed and discussed with Susie Juan, his daughter, who is a patient. We reminded him that Dr. Bridget Patten had suggested a pulmonary consultation and consideration of bronchoscopy. Patient reminds us that he is completely asymptomatic and is about to have his 84th birthday. Neither patient nor daughter are particularly interested in seeing a pulmonary specialist at this time, although if pushed they would do so. We compromised, therefore, and suggested repeat CT of the chest, abdomen and pelvis in maybe 6-12 months after the last one to see if there is any progression, or if he should develop symptoms then CT scan would be done earlier. Will assess blood sugar control with Hgb A1c. They were concerned about his pulse. His pulse in the 50s is perfectly okay. Blood pressure control is at goal.    His BMI is 27.3, which is overweight. We don't suggest any particular change in his weight or eating pattern. We do encourage exercise for 30 minutes five days a week. He'll return to the office in three months. PLAN:  .  Orders Placed This Encounter    MICROALBUMIN, UR, RAND W/ MICROALBUMIN/CREA RATIO    LIPID PANEL    HEMOGLOBIN A1C WITH EAG    CBC WITH AUTOMATED DIFF    METABOLIC PANEL, BASIC    REFERRAL TO OPHTHALMOLOGY    influenza vaccine 2015-16, 65yr+,,PF, (FLUZONE HIGH-DOSE) syrg injection       Follow-up Disposition:  Return in about 3 months (around 12/22/2017). ATTENTION:   This medical record was transcribed using an electronic medical records system. Although proofread, it may and can contain electronic and spelling errors. Other human spelling and other errors may be present. Corrections may be executed at a later time. Please feel free to contact us for any clarifications as needed.

## 2017-09-22 NOTE — MR AVS SNAPSHOT
Visit Information Date & Time Provider Department Dept. Phone Encounter #  
 9/22/2017 10:00 AM Jazmin Noguera [facility] Sports Medicine and Primary Care [phone] [id number] Follow-up Instructions Return in about 3 months (around 12/22/2017). Follow-up and Disposition History Your Appointments 10/2/2017  9:45 AM  
Any with Kath Hughes MD  
[address] [place] [address] and Primary Care [address] [facility]) Appt Note: f/u, reschedule [address] [facility] [address] [place]  
  
   
 [address] [address]  
  
    
 12/13/2017  9:00 AM  
ESTABLISHED PATIENT with Claudeen Platts, [address] [facility]) Appt Note: 6 MO. F/U  
 [address]  
[phone] [address] Upcoming Health Maintenance Date Due  
 EYE EXAM RETINAL OR DILATED Q1 2/24/2017 MICROALBUMIN Q1 5/18/2017 LIPID PANEL Q1 5/18/2017 INFLUENZA AGE 9 TO ADULT 8/1/2017 HEMOGLOBIN A1C Q6M 9/9/2017 GLAUCOMA SCREENING Q2Y 2/24/2018 FOOT EXAM Q1 3/9/2018 MEDICARE YEARLY EXAM 6/1/2018 DTaP/Tdap/Td series (2 - Td) 5/18/2026 Allergies as of 9/22/2017  Review Complete On: 9/22/2017 By: Kath Hughes MD  
  
 Severity Noted Reaction Type Reactions Codeine  05/31/2012    Shortness of Breath Lipitor [Atorvastatin]  06/27/2013   Intolerance Nausea Only Current Immunizations  Reviewed on 11/9/2015 Name Date Influenza Vaccine (Quad) PF 11/2/2015  3:31 PM  
 Influenza Vaccine PF 12/5/2013  8:10 AM  
 Pneumococcal Vaccine (Unspecified Type) 1/1/2012 Not reviewed this visit You Were Diagnosed With   
  
 Codes Comments CLL [facility]chronic lymphocytic leukemia) ([facility]    -  Primary ICD-10-CM: C91.10 ICD-9-CM: 204.10 Bronchiectasis without complication ([facility]     LSL-68-WT: J47.9 ICD-9-CM: 494.0 Controlled type 2 diabetes mellitus without complication, without long-term current use of insulin ([facility]     ICD-10-CM: E11.9 ICD-9-CM: 250.00 Vitals BP Pulse Temp Resp Height(growth percentile) Weight(growth percentile) 124/63 (BP 1 Location: Left arm, BP Patient Position: Sitting) (!) 59 95.8 °F (35.4 °C) (Oral) 18 5' 7\" (1.702 m) 174 lb 4.8 oz (79.1 kg) SpO2 BMI Smoking Status 94% 27.3 kg/m2 Former Smoker BMI and BSA Data Body Mass Index Body Surface Area  
 27.3 kg/m 2 1.93 m 2 Preferred Pharmacy Pharmacy Name Phone [address] [facility] [phone] Your Updated Medication List  
  
   
This list is accurate as of: 9/22/17 11:26 AM.  Always use your most recent med list.  
  
  
  
  
 acetaminophen 325 mg tablet Commonly known as:  TYLENOL Take 2 Tabs by mouth every six (6) hours as needed for Pain. allopurinol 300 mg tablet Commonly known as:  ZYLOPRIM  
TAKE 1 TABLET DAILY  
  
 amLODIPine 2.5 mg tablet Commonly known as:  Mario Alberto Veronica Take 1 Tab by mouth daily. aspirin 81 mg chewable tablet Take 81 mg by mouth daily. brimonidine 0.15 % ophthalmic solution Commonly known as:  Cici Reynolds Administer 1 Drop to both eyes two (2) times a day. Indications: OPEN ANGLE GLAUCOMA  
  
 glucose blood VI test strips strip Commonly known as:  ONETOUCH ULTRA TEST  
1 BID ac  
  
 influenza vaccine 2015-16 (65yr+)(PF) Syrg injection Commonly known as:  FLUZONE HIGH-DOSE  
0.5 mL by IntraMUSCular route PRIOR TO DISCHARGE for 1 dose. metFORMIN 500 mg tablet Commonly known as:  GLUCOPHAGE  
TAKE 1 TABLET DAILY WITH A MEAL  
  
 metoprolol tartrate 25 mg tablet Commonly known as:  LOPRESSOR  
TAKE 1 TABLET TWICE A DAY  
  
 simvastatin 40 mg tablet Commonly known as:  ZOCOR  
 TAKE 1 TABLET DAILY IN THE EVENING Prescriptions Sent to Pharmacy Refills  
 influenza vaccine -, 65yr+,,PF, (FLUZONE HIGH-DOSE) syrg injection 0 Si.5 mL by IntraMUSCular route PRIOR TO DISCHARGE for 1 dose. Class: Normal  
 Pharmacy: [address], [address] [place] [address] #: [phone] Route: IntraMUSCular We Performed the Following CBC WITH AUTOMATED DIFF [36122 CPT(R)] HEMOGLOBIN A1C WITH EAG [71977 CPT(R)] LIPID PANEL [15230 CPT(R)] METABOLIC PANEL, BASIC [37303 CPT(R)] MICROALBUMIN, UR, RAND W/ MICROALBUMIN/CREA RATIO [id number] CPT(R)] SD COLLECTION VENOUS BLOOD,VENIPUNCTURE [id number] CPT(R)] REFERRAL TO OPHTHALMOLOGY [REF57 Custom] Follow-up Instructions Return in about 3 months (around 2017). Referral Information Referral ID Referred By Referred To  
  
 [id number] Paulo LIU Not Available Visits Status Start Date End Date 1 New Request 17 If your referral has a status of pending review or denied, additional information will be sent to support the outcome of this decision. Introducing [facility]! Lexy Morales introduces [employer] patient portal. Now you can access parts of your medical record, email your doctor's office, and request medication refills online. 1. In your internet browser, go to [email or web address] 2. Click on the First Time User? Click Here link in the Sign In box. You will see the New Member Sign Up page. 3. Enter your [employer] Access Code exactly as it appears below. You will not need to use this code after youve completed the sign-up process. If you do not sign up before the expiration date, you must request a new code. · [employer] Access Code: [email or web address] Expires: 2017 11:26 AM 
 
4.  Enter the last four digits of your Social Security Number (xxxx) and Date of Birth (mm/dd/yyyy) as indicated and click Submit. You will be taken to the next sign-up page. 5. Create a [employer] ID. This will be your [employer] login ID and cannot be changed, so think of one that is secure and easy to remember. 6. Create a [employer] password. You can change your password at any time. 7. Enter your Password Reset Question and Answer. This can be used at a later time if you forget your password. 8. Enter your e-mail address. You will receive e-mail notification when new information is available in [address] 9. Click Sign Up. You can now view and download portions of your medical record. 10. Click the Download Summary menu link to download a portable copy of your medical information. If you have questions, please visit the Frequently Asked Questions section of the [employer] website. Remember, [employer] is NOT to be used for urgent needs. For medical emergencies, dial 911. Now available from your iPhone and Android! Please provide this summary of care documentation to your next provider. Your primary care clinician is listed as Daiana Mcgovern. If you have any questions after today's visit, please call [phone].

## 2017-09-23 LAB
ALBUMIN/CREAT UR: 8.2 MG/G CREAT (ref 0–30)
BASOPHILS # BLD AUTO: 0 X10E3/UL (ref 0–0.2)
BASOPHILS NFR BLD AUTO: 0 %
BUN SERPL-MCNC: 14 MG/DL (ref 8–27)
BUN/CREAT SERPL: 11 (ref 10–24)
CALCIUM SERPL-MCNC: 9.6 MG/DL (ref 8.6–10.2)
CHLORIDE SERPL-SCNC: 98 MMOL/L (ref 96–106)
CHOLEST SERPL-MCNC: 115 MG/DL (ref 100–199)
CO2 SERPL-SCNC: 21 MMOL/L (ref 18–29)
CREAT SERPL-MCNC: 1.25 MG/DL (ref 0.76–1.27)
CREAT UR-MCNC: 105 MG/DL
EOSINOPHIL # BLD AUTO: 0 X10E3/UL (ref 0–0.4)
EOSINOPHIL NFR BLD AUTO: 0 %
ERYTHROCYTE [DISTWIDTH] IN BLOOD BY AUTOMATED COUNT: 14.9 % (ref 12.3–15.4)
EST. AVERAGE GLUCOSE BLD GHB EST-MCNC: 131 MG/DL
GLUCOSE SERPL-MCNC: 88 MG/DL (ref 65–99)
HBA1C MFR BLD: 6.2 % (ref 4.8–5.6)
HCT VFR BLD AUTO: 42.4 % (ref 37.5–51)
HDLC SERPL-MCNC: 25 MG/DL
HGB BLD-MCNC: 14.4 G/DL (ref 12.6–17.7)
LDLC SERPL CALC-MCNC: 56 MG/DL (ref 0–99)
LYMPHOCYTES # BLD AUTO: 53.6 X10E3/UL (ref 0.7–3.1)
LYMPHOCYTES NFR BLD AUTO: 92 %
MCH RBC QN AUTO: 31.6 PG (ref 26.6–33)
MCHC RBC AUTO-ENTMCNC: 34 G/DL (ref 31.5–35.7)
MCV RBC AUTO: 93 FL (ref 79–97)
MICROALBUMIN UR-MCNC: 8.6 UG/ML
MONOCYTES # BLD AUTO: 1.7 X10E3/UL (ref 0.1–0.9)
MONOCYTES NFR BLD AUTO: 3 %
MORPHOLOGY BLD-IMP: ABNORMAL
NEUTROPHILS # BLD AUTO: 2.9 X10E3/UL (ref 1.4–7)
NEUTROPHILS NFR BLD AUTO: 5 %
PLATELET # BLD AUTO: 184 X10E3/UL (ref 150–379)
POTASSIUM SERPL-SCNC: 4.9 MMOL/L (ref 3.5–5.2)
RBC # BLD AUTO: 4.55 X10E6/UL (ref 4.14–5.8)
SODIUM SERPL-SCNC: 136 MMOL/L (ref 134–144)
TRIGL SERPL-MCNC: 169 MG/DL (ref 0–149)
VLDLC SERPL CALC-MCNC: 34 MG/DL (ref 5–40)
WBC # BLD AUTO: 58.3 X10E3/UL (ref 3.4–10.8)

## 2017-10-06 ENCOUNTER — OFFICE VISIT (OUTPATIENT)
Dept: INTERNAL MEDICINE CLINIC | Age: 82
End: 2017-10-06

## 2017-10-06 VITALS
WEIGHT: 176.6 LBS | RESPIRATION RATE: 16 BRPM | OXYGEN SATURATION: 94 % | TEMPERATURE: 97.4 F | DIASTOLIC BLOOD PRESSURE: 72 MMHG | HEART RATE: 54 BPM | SYSTOLIC BLOOD PRESSURE: 132 MMHG | BODY MASS INDEX: 27.72 KG/M2 | HEIGHT: 67 IN

## 2017-10-06 DIAGNOSIS — I73.9 PERIPHERAL VASCULAR DISEASE (HCC): ICD-10-CM

## 2017-10-06 DIAGNOSIS — R06.02 SOB (SHORTNESS OF BREATH): ICD-10-CM

## 2017-10-06 DIAGNOSIS — J47.9 BRONCHIECTASIS WITHOUT COMPLICATION (HCC): ICD-10-CM

## 2017-10-06 DIAGNOSIS — E11.9 CONTROLLED TYPE 2 DIABETES MELLITUS WITHOUT COMPLICATION, WITHOUT LONG-TERM CURRENT USE OF INSULIN (HCC): Primary | ICD-10-CM

## 2017-10-06 DIAGNOSIS — D72.828 OTHER ELEVATED WHITE BLOOD CELL (WBC) COUNT: ICD-10-CM

## 2017-10-06 DIAGNOSIS — I25.9 CHRONIC ISCHEMIC HEART DISEASE: ICD-10-CM

## 2017-10-06 RX ORDER — ALBUTEROL SULFATE 90 UG/1
2 AEROSOL, METERED RESPIRATORY (INHALATION)
Qty: 1 INHALER | Refills: 11 | Status: SHIPPED | OUTPATIENT
Start: 2017-10-06 | End: 2017-12-13 | Stop reason: ALTCHOICE

## 2017-10-06 NOTE — PROGRESS NOTES
SPORTS MEDICINE AND PRIMARY CARE  Rose Balderas MD, [address] [address]  Phone:  [phone]  Fax: [phone]       Chief Complaint   Patient presents with    Shortness of Breath   . SUBJECTIVE:    Lina Orozco is a 80 y.o. male Patient returns today alert, appropriate, ambulatory and has the capacity to give an accurate history. He has a known history of coronary artery disease, bronchiectasis, CLL, diabetes, primary hypertension, status post  x three, and is seen for evaluation. He comes in today with his son Governor Walton, whose phone number is [phone]. His grandfather is very proud of him . Since we last saw him he had the flu shot on Friday. Last Sunday he was sitting in [employer] and had a 3-4 minute episode of shortness of breath without associated diaphoresis or chest pain. Three or four days after Orthodoxy, after the episode on Sunday, he had another episode after eating breakfast and going to the den to sit down, lasted for about 3-4 minutes, then subsequently subsided. He's had none for the past 10-14 days. Patient comes in for evaluation. Patient is also complaining of left precordial burning sensation that occurs only when he's hungry. He stopped the Prilosec, which he will now start again. Current Outpatient Prescriptions   Medication Sig Dispense Refill    albuterol (PROVENTIL HFA, VENTOLIN HFA, PROAIR HFA) 90 mcg/actuation inhaler Take 2 Puffs by inhalation every four (4) hours as needed for Wheezing. 1 Inhaler 11    inhalational spacing device 1 Each by Does Not Apply route as needed. 1 Device 11    allopurinol (ZYLOPRIM) 300 mg tablet TAKE 1 TABLET DAILY 90 Tab 7    metoprolol tartrate (LOPRESSOR) 25 mg tablet TAKE 1 TABLET TWICE A  Tab 3    acetaminophen (TYLENOL) 325 mg tablet Take 2 Tabs by mouth every six (6) hours as needed for Pain. 20 Tab 0    amLODIPine (NORVASC) 2.5 mg tablet Take 1 Tab by mouth daily.  90 Tab 0  simvastatin (ZOCOR) 40 mg tablet TAKE 1 TABLET DAILY IN THE EVENING 90 Tab 3    metFORMIN (GLUCOPHAGE) 500 mg tablet TAKE 1 TABLET DAILY WITH A MEAL 30 Tab 6    glucose blood VI test strips (ONETOUCH ULTRA TEST) strip 1 BID ac 60 Strip 11    aspirin 81 mg chewable tablet Take 81 mg by mouth daily.  brimonidine (ALPHAGAN) 0.15 % ophthalmic solution Administer 1 Drop to both eyes two (2) times a day.  Indications: OPEN ANGLE GLAUCOMA       Past Medical History:   Diagnosis Date    Abnormal nuclear stress test 6/6/2014    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 ([address] 11/2/2015    Bronchiectasis ([address]     CAD (coronary artery disease)     Chest pain, unspecified     Chronic pain     right leg    CLL (chronic lymphocytic leukemia) (HCC)     Diabetes ([address]     Essential hypertension     GERD (gastroesophageal reflux disease)     Hyperlipidemia     Hypertension     Opacity of lung on imaging study 05/28/2017    cxr    Rotator cuff arthropathy     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    S/P coronary artery stent placement 6/6/2014 6/6/14 PCI/GUANAKO to prox LAD     Past Surgical History:   Procedure Laterality Date    HX COLONOSCOPY      HX HEART CATHETERIZATION      PTCA SINGLE VESSEL  4/4/2015         VASCULAR SURGERY PROCEDURE UNLIST  2014 and 2015    BLE stenting     Allergies   Allergen Reactions    Codeine Shortness of Breath    Lipitor [Atorvastatin] Nausea Only         REVIEW OF SYSTEMS:  General: negative for - chills or fever  ENT: negative for - headaches, nasal congestion or tinnitus  Respiratory: negative for - cough, hemoptysis, shortness of breath or wheezing  Cardiovascular : negative for - chest pain, edema, palpitations or shortness of breath  Gastrointestinal: negative for - abdominal pain, blood in stools, heartburn or nausea/vomiting  Genito-Urinary: no dysuria, trouble voiding, or hematuria  Musculoskeletal: negative for - gait disturbance, joint pain, joint stiffness or joint swelling  Neurological: no TIA or stroke symptoms  Hematologic: no bruises, no bleeding, no swollen glands  Integument: no lumps, mole changes, nail changes or rash  Endocrine: no malaise/lethargy or unexpected weight changes      Social History     Social History    Marital status:      Spouse name: N/A    Number of children: N/A    Years of education: N/A     Social History Main Topics    Smoking status: Former Smoker     Quit date: 1/15/1984    Smokeless tobacco: Never Used      Comment: QUIT     Alcohol use No      Comment: quit in     Drug use: No    Sexual activity: Yes     Partners: Female     Other Topics Concern    None     Social History Narrative    Family History: Mother:  61 yrs, DM complicationsFather:  80 yrs, strokeSister(s):  76 yrs, pneumonia, DM,    HTNBrother(s):  61 yrs, DM, HTN, CVAChildren: aliveGrandmother: unknow nGrandfather: deceasedSpouse: deceased1 brother(s) -    healthy. 2daughter(s) - healthy. Social History: Alcohol Use Patient does not use alcohol. Smoking Status Patient is a former smoker, Quit in: 36. Caffeine: none. Marital    Status: W idow . Lives w ith: alone. Children: yes 2 d. Education/School: has highschool diploma, college 2 y. Family History   Problem Relation Age of Onset    Diabetes Mother     Stroke Father        OBJECTIVE:    Visit Vitals    /72    Pulse (!) 54    Temp 97.4 °F (36.3 °C) (Oral)    Resp 16    Ht 5' 7\" (1.702 m)    Wt 176 lb 9.6 oz (80.1 kg)    SpO2 94%    BMI 27.66 kg/m2     CONSTITUTIONAL: well , well nourished, appears age appropriate  EYES: perrla, eom intact  ENMT:moist mucous membranes, pharynx clear  NECK: supple.  Thyroid normal  RESPIRATORY: Chest: clear bilaterally   CARDIOVASCULAR: Heart: regular rate and rhythm  GASTROINTESTINAL: Abdomen: soft, bowel sounds active  HEMATOLOGIC: no pathological lymph nodes palpated  MUSCULOSKELETAL: Extremities: no edema, pulse 1+   INTEGUMENT: No unusual rashes or suspicious skin lesions noted. Nails appear normal.  NEUROLOGIC: non-focal exam   MENTAL STATUS: alert and oriented, appropriate affect           ASSESSMENT:  1. Controlled type 2 diabetes mellitus without complication, without long-term current use of insulin ([address]    2. Peripheral vascular disease ([address]    3. Bronchiectasis without complication ([address]    4. Chronic ischemic heart disease    5. Other elevated white blood cell (WBC) count    6. SOB (shortness of breath)      Two isolated episodes of shortness of breath, which does not have a clear etiology of the episodes. We will ask for an EKG,  and CBC looking at his white count. The fact that he runs up and down the steps would suggest it is not cardiac in origin. The BNP will be confirmatory, as well as the EKG. Blood pressure control is at goal.  His O2 sat is acceptable at 94%, which is usually where he stays. He'll return to the office as scheduled, sooner if needed. We will speak with his daughter, Krysta Nagy. We remind his grandson that Dr. Eileen Ayala and I had talked about his CT scan and suggested that he see a pulmonary specialist.  Since his other doctors are [facility], will ask him to see Dr. Roland Shoemaker. We remind him that it may be a little bit before he can see him, but there is no urgency to the appointment. PLAN:  .  Orders Placed This Encounter    CBC WITH AUTOMATED DIFF    METABOLIC PANEL, BASIC    BNP    [facility]    AMB POC EKG ROUTINE W/ 12 LEADS, INTER & REP    albuterol (PROVENTIL HFA, VENTOLIN HFA, PROAIR HFA) 90 mcg/actuation inhaler    inhalational spacing device       Follow-up Disposition:  Return in about 2 months (around 12/6/2017). ATTENTION:   This medical record was transcribed using an electronic medical records system. Although proofread, it may and can contain electronic and spelling errors.   Other human spelling and other errors may be present. Corrections may be executed at a later time. Please feel free to contact us for any clarifications as needed.

## 2017-10-06 NOTE — MR AVS SNAPSHOT
Visit Information Date & Time Provider Department Dept. Phone Encounter #  
 10/6/2017  1:00 PM Jazmin Wallace [facility] [phone] [id number] Follow-up Instructions Return in about 2 months (around 12/6/2017). Follow-up and Disposition History Your Appointments 12/13/2017  9:00 AM  
ESTABLISHED PATIENT with Balbina Kennedy MD  
[facility] at [facility]) Appt Note: 6 MO. F/U  
 [address] [place]  
[phone] [address]  
  
    
 12/28/2017 10:30 AM  
Any with Clinton Panda MD  
[address] [place] [address] and Primary Care [facility]) Appt Note: 3 month f/u dm  
 [address] [facility] [place]  
  
   
 [address] [id number] Upcoming Health Maintenance Date Due  
 EYE EXAM RETINAL OR DILATED Q1 2/24/2017 GLAUCOMA SCREENING Q2Y 2/24/2018 FOOT EXAM Q1 3/9/2018 HEMOGLOBIN A1C Q6M 3/22/2018 MEDICARE YEARLY EXAM 6/1/2018 MICROALBUMIN Q1 9/22/2018 LIPID PANEL Q1 9/22/2018 DTaP/Tdap/Td series (2 - Td) 5/18/2026 Allergies as of 10/6/2017  Review Complete On: 10/6/2017 By: Caprice Marin LPN Severity Noted Reaction Type Reactions Codeine  05/31/2012    Shortness of Breath Lipitor [Atorvastatin]  06/27/2013   Intolerance Nausea Only Current Immunizations  Reviewed on 11/9/2015 Name Date Influenza High Dose Vaccine PF 9/22/2017 Influenza Vaccine (Quad) PF 11/2/2015  3:31 PM  
 Influenza Vaccine PF 12/5/2013  8:10 AM  
 Pneumococcal Vaccine (Unspecified Type) 1/1/2012 Not reviewed this visit You Were Diagnosed With   
  
 Codes Comments Controlled type 2 diabetes mellitus without complication, without long-term current use of insulin ([facility]    -  Primary ICD-10-CM: E11.9 ICD-9-CM: 250.00 Peripheral vascular disease ([facility]     ICD-10-CM: I73.9 ICD-9-CM: 443.9 Bronchiectasis without complication ([facility]     EX-41-ZM: J47.9 ICD-9-CM: 494.0 Chronic ischemic heart disease     ICD-10-CM: I25.9 ICD-9-CM: 414.9 Other elevated white blood cell (WBC) count     ICD-10-CM: X28.933 ICD-9-CM: 288.69   
 SOB (shortness of breath)     ICD-10-CM: R06.02 
ICD-9-CM: 786.05 Vitals BP Pulse Temp Resp Height(growth percentile) Weight(growth percentile) 132/72 (!) 54 97.4 °F (36.3 °C) (Oral) 16 5' 7\" (1.702 m) 176 lb 9.6 oz (80.1 kg) SpO2 BMI Smoking Status 94% 27.66 kg/m2 Former Smoker BMI and BSA Data Body Mass Index Body Surface Area  
 27.66 kg/m 2 1.95 m 2 Preferred Pharmacy Pharmacy Name Phone [address] [facility] [phone] Your Updated Medication List  
  
   
This list is accurate as of: 10/6/17  2:47 PM.  Always use your most recent med list.  
  
  
  
  
 acetaminophen 325 mg tablet Commonly known as:  TYLENOL Take 2 Tabs by mouth every six (6) hours as needed for Pain. albuterol 90 mcg/actuation inhaler Commonly known as:  PROVENTIL HFA, VENTOLIN HFA, PROAIR HFA Take 2 Puffs by inhalation every four (4) hours as needed for Wheezing. allopurinol 300 mg tablet Commonly known as:  ZYLOPRIM  
TAKE 1 TABLET DAILY  
  
 amLODIPine 2.5 mg tablet Commonly known as:  [place] Take 1 Tab by mouth daily. aspirin 81 mg chewable tablet Take 81 mg by mouth daily. brimonidine 0.15 % ophthalmic solution Commonly known as:  Michelle Mcgovern Administer 1 Drop to both eyes two (2) times a day. Indications: OPEN ANGLE GLAUCOMA  
  
 glucose blood VI test strips strip Commonly known as:  ONETOUCH ULTRA TEST  
1 BID ac  
  
 inhalational spacing device 1 Each by Does Not Apply route as needed. metFORMIN 500 mg tablet Commonly known as:  GLUCOPHAGE  
TAKE 1 TABLET DAILY WITH A MEAL  
  
 metoprolol tartrate 25 mg tablet Commonly known as:  LOPRESSOR  
TAKE 1 TABLET TWICE A DAY  
  
 simvastatin 40 mg tablet Commonly known as:  ZOCOR  
TAKE 1 TABLET DAILY IN THE EVENING Prescriptions Sent to Pharmacy Refills  
 albuterol (PROVENTIL HFA, VENTOLIN HFA, PROAIR HFA) 90 mcg/actuation inhaler 11 Sig: Take 2 Puffs by inhalation every four (4) hours as needed for Wheezing. Class: Normal  
 Pharmacy: [address], [address] Ph #: [phone] Route: Inhalation  
 inhalational spacing device 11 Si Each by Does Not Apply route as needed. Class: Normal  
 Pharmacy: [address], [address] Ph #: [phone] Route: Does Not Apply We Performed the Following AMB POC EKG ROUTINE W/ 12 LEADS, INTER & REP [78049 CPT(R)] BNP [id number] CPT(R)] CBC WITH AUTOMATED DIFF [39175 CPT(R)] METABOLIC PANEL, BASIC [92553 CPT(R)] [place] COLLECTION VENOUS BLOOD,VENIPUNCTURE [id number] CPT(R)] REFERRAL TO PULMONARY DISEASE [OCA53 Custom] Follow-up Instructions Return in about 2 months (around 2017). Referral Information Referral ID Referred By Referred To  
  
 [id number] [facility] [address] [facility] [address] [facility] [address], [address] [facility] Visits Status Start Date End Date 1 New Request 10/6/17 10/6/18 If your referral has a status of pending review or denied, additional information will be sent to support the outcome of this decision. Introducing [facility] & HEALTH SERVICES! [facility] introduces [email or web address] patient portal. Now you can access parts of your medical record, email your doctor's office, and request medication refills online. 1. In your internet browser, go to [email or web address]" 2. Click on the First Time User? Click Here link in the Sign In box. You will see the New Member Sign Up page. 3. Enter your UserMojo Access Code exactly as it appears below. You will not need to use this code after youve completed the sign-up process. If you do not sign up before the expiration date, you must request a new code. · UserMojo Access Code: [email or web address] Expires: 12/21/2017 11:26 AM 
 
4. Enter the last four digits of your Social Security Number (xxxx) and Date of Birth (mm/dd/yyyy) as indicated and click Submit. You will be taken to the next sign-up page. 5. Create a UserMojo ID. This will be your UserMojo login ID and cannot be changed, so think of one that is secure and easy to remember. 6. Create a UserMojo password. You can change your password at any time. 7. Enter your Password Reset Question and Answer. This can be used at a later time if you forget your password. 8. Enter your e-mail address. You will receive e-mail notification when new information is available in [address] 9. Click Sign Up. You can now view and download portions of your medical record. 10. Click the Download Summary menu link to download a portable copy of your medical information. If you have questions, please visit the Frequently Asked Questions section of the UserMojo website. Remember, UserMojo is NOT to be used for urgent needs. For medical emergencies, dial 911. Now available from your iPhone and Android! Please provide this summary of care documentation to your next provider. Your primary care clinician is listed as Jenna Espinoza. If you have any questions after today's visit, please call [phone].

## 2017-10-06 NOTE — PROGRESS NOTES
1. Have you been to the ER, urgent care clinic since your last visit? Hospitalized since your last visit? No    2. Have you seen or consulted any other health care providers outside of the [address] [place] [address] since your last visit? Include any pap smears or colon screening.  No    Patient states that he had 2 episodes of SOB

## 2017-10-07 LAB
BASOPHILS # BLD AUTO: 0 X10E3/UL (ref 0–0.2)
BASOPHILS NFR BLD AUTO: 0 %
BNP SERPL-MCNC: NORMAL PG/ML
BUN SERPL-MCNC: 11 MG/DL (ref 8–27)
BUN/CREAT SERPL: 10 (ref 10–24)
CALCIUM SERPL-MCNC: 9.7 MG/DL (ref 8.6–10.2)
CHLORIDE SERPL-SCNC: 102 MMOL/L (ref 96–106)
CO2 SERPL-SCNC: 24 MMOL/L (ref 18–29)
CREAT SERPL-MCNC: 1.07 MG/DL (ref 0.76–1.27)
EOSINOPHIL # BLD AUTO: 0 X10E3/UL (ref 0–0.4)
EOSINOPHIL NFR BLD AUTO: 0 %
ERYTHROCYTE [DISTWIDTH] IN BLOOD BY AUTOMATED COUNT: 15 % (ref 12.3–15.4)
GLUCOSE SERPL-MCNC: 77 MG/DL (ref 65–99)
HCT VFR BLD AUTO: 42.3 % (ref 37.5–51)
HGB BLD-MCNC: 14.4 G/DL (ref 12.6–17.7)
LYMPHOCYTES # BLD AUTO: 48.4 X10E3/UL (ref 0.7–3.1)
LYMPHOCYTES NFR BLD AUTO: 94 %
MCH RBC QN AUTO: 31.1 PG (ref 26.6–33)
MCHC RBC AUTO-ENTMCNC: 34 G/DL (ref 31.5–35.7)
MCV RBC AUTO: 91 FL (ref 79–97)
MONOCYTES # BLD AUTO: 1.5 X10E3/UL (ref 0.1–0.9)
MONOCYTES NFR BLD AUTO: 3 %
MORPHOLOGY BLD-IMP: ABNORMAL
NEUTROPHILS # BLD AUTO: 1.5 X10E3/UL (ref 1.4–7)
NEUTROPHILS NFR BLD AUTO: 3 %
PLATELET # BLD AUTO: 163 X10E3/UL (ref 150–379)
POTASSIUM SERPL-SCNC: 4.7 MMOL/L (ref 3.5–5.2)
RBC # BLD AUTO: 4.63 X10E6/UL (ref 4.14–5.8)
SODIUM SERPL-SCNC: 141 MMOL/L (ref 134–144)
WBC # BLD AUTO: 51.5 X10E3/UL (ref 3.4–10.8)

## 2017-10-10 LAB — BNP SERPL-MCNC: 39.9 PG/ML (ref 0–100)

## 2017-11-19 RX ORDER — AMLODIPINE BESYLATE 2.5 MG/1
TABLET ORAL
Qty: 90 TAB | Refills: 0 | Status: SHIPPED | OUTPATIENT
Start: 2017-11-19 | End: 2018-02-17 | Stop reason: SDUPTHER

## 2017-12-13 ENCOUNTER — OFFICE VISIT (OUTPATIENT)
Dept: CARDIOLOGY CLINIC | Age: 82
End: 2017-12-13

## 2017-12-13 VITALS
SYSTOLIC BLOOD PRESSURE: 129 MMHG | DIASTOLIC BLOOD PRESSURE: 66 MMHG | BODY MASS INDEX: 27.37 KG/M2 | OXYGEN SATURATION: 92 % | WEIGHT: 174.4 LBS | HEIGHT: 67 IN | HEART RATE: 77 BPM

## 2017-12-13 DIAGNOSIS — Z95.820 S/P ANGIOPLASTY WITH STENT: ICD-10-CM

## 2017-12-13 DIAGNOSIS — K21.9 GASTROESOPHAGEAL REFLUX DISEASE WITHOUT ESOPHAGITIS: ICD-10-CM

## 2017-12-13 DIAGNOSIS — I25.9 CHRONIC ISCHEMIC HEART DISEASE: ICD-10-CM

## 2017-12-13 DIAGNOSIS — Z95.1 S/P CABG X 3: ICD-10-CM

## 2017-12-13 DIAGNOSIS — Z95.5 S/P CORONARY ARTERY STENT PLACEMENT: ICD-10-CM

## 2017-12-13 DIAGNOSIS — R07.89 CHEST PAIN, ATYPICAL: ICD-10-CM

## 2017-12-13 DIAGNOSIS — I10 ESSENTIAL HYPERTENSION, BENIGN: ICD-10-CM

## 2017-12-13 DIAGNOSIS — C91.10 CLL (CHRONIC LYMPHOCYTIC LEUKEMIA) (HCC): ICD-10-CM

## 2017-12-13 DIAGNOSIS — E78.2 MIXED HYPERLIPIDEMIA: ICD-10-CM

## 2017-12-13 DIAGNOSIS — I25.10 CORONARY ARTERY DISEASE INVOLVING NATIVE CORONARY ARTERY OF NATIVE HEART WITHOUT ANGINA PECTORIS: Primary | ICD-10-CM

## 2017-12-13 RX ORDER — OMEPRAZOLE 20 MG/1
CAPSULE, DELAYED RELEASE ORAL AS NEEDED
COMMUNITY
Start: 2017-11-30 | End: 2018-02-28 | Stop reason: SDUPTHER

## 2017-12-13 NOTE — MR AVS SNAPSHOT
Visit Information Date & Time Provider Department Dept. Phone Encounter #  
 12/13/2017  9:00 AM Jazmyne Tirado MD [address] [place] [address] [facility] at [address] [place] [address] [phone] [id number] Your Appointments 12/28/2017 10:30 AM  
Any with Ranulfo Fabian MD  
[address] [place] [address] and Primary Care [facility]) Appt Note: 3 month f/u dm  
 [address] [place]  
  
   
 [address] [id number] Upcoming Health Maintenance Date Due  
 EYE EXAM RETINAL OR DILATED Q1 2/24/2017 GLAUCOMA SCREENING Q2Y 2/24/2018 FOOT EXAM Q1 3/9/2018 HEMOGLOBIN A1C Q6M 3/22/2018 MEDICARE YEARLY EXAM 6/1/2018 MICROALBUMIN Q1 9/22/2018 LIPID PANEL Q1 9/22/2018 DTaP/Tdap/Td series (2 - Td) 5/18/2026 Allergies as of 12/13/2017  Review Complete On: 10/6/2017 By: Pk Peters LPN Severity Noted Reaction Type Reactions Codeine  05/31/2012    Shortness of Breath Lipitor [Atorvastatin]  06/27/2013   Intolerance Nausea Only Current Immunizations  Reviewed on 11/9/2015 Name Date Influenza High Dose Vaccine PF 9/22/2017 Influenza Vaccine (Quad) PF 11/2/2015  3:31 PM  
 Influenza Vaccine PF 12/5/2013  8:10 AM  
 Pneumococcal Vaccine (Unspecified Type) 1/1/2012 Not reviewed this visit You Were Diagnosed With   
  
 Codes Comments Coronary artery disease involving native coronary artery of native heart without angina pectoris    -  Primary ICD-10-CM: I25.10 ICD-9-CM: 414.01 Chronic ischemic heart disease     ICD-10-CM: I25.9 ICD-9-CM: 414.9 Gastroesophageal reflux disease without esophagitis     ICD-10-CM: K21.9 ICD-9-CM: 530.81 Mixed hyperlipidemia     ICD-10-CM: E78.2 ICD-9-CM: 272.2 Essential hypertension, benign     ICD-10-CM: I10 
ICD-9-CM: 401.1 S/P angioplasty with stent     ICD-10-CM: Z95.9 ICD-9-CM: V45.89   
 CLL (chronic lymphocytic leukemia) (HCC)     ICD-10-CM: C91.10 ICD-9-CM: 204.10 S/P CABG x 3     ICD-10-CM: Z95.1 ICD-9-CM: V45.81 Chest pain, atypical     ICD-10-CM: R07.89 ICD-9-CM: 786.59 S/P coronary artery stent placement     ICD-10-CM: Z95.5 ICD-9-CM: V45.82 Vitals BP Pulse Height(growth percentile) Weight(growth percentile) SpO2 BMI  
 129/66 77 5' 7\" (1.702 m) 174 lb 6.4 oz (79.1 kg) 92% 27.31 kg/m2 Smoking Status Former Smoker Vitals History BMI and BSA Data Body Mass Index Body Surface Area  
 27.31 kg/m 2 1.93 m 2 Preferred Pharmacy Pharmacy Name Phone [address] [facility] [phone] Your Updated Medication List  
  
   
This list is accurate as of: 12/13/17  9:52 AM.  Always use your most recent med list.  
  
  
  
  
 acetaminophen 325 mg tablet Commonly known as:  TYLENOL Take 2 Tabs by mouth every six (6) hours as needed for Pain. allopurinol 300 mg tablet Commonly known as:  ZYLOPRIM  
TAKE 1 TABLET DAILY  
  
 amLODIPine 2.5 mg tablet Commonly known as:  Sagastume Leopoldo TAKE 1 TABLET DAILY  
  
 aspirin 81 mg chewable tablet Take 81 mg by mouth daily. brimonidine 0.15 % ophthalmic solution Commonly known as:  Clemente Juarez Administer 1 Drop to both eyes two (2) times a day. Indications: OPEN ANGLE GLAUCOMA  
  
 glucose blood VI test strips strip Commonly known as:  ONETOUCH ULTRA TEST  
1 BID ac  
  
 metFORMIN 500 mg tablet Commonly known as:  GLUCOPHAGE  
TAKE 1 TABLET DAILY WITH A MEAL  
  
 metoprolol tartrate 25 mg tablet Commonly known as:  LOPRESSOR  
TAKE 1 TABLET TWICE A DAY  
  
 omeprazole 20 mg capsule Commonly known as:  PRILOSEC  
as needed. simvastatin 40 mg tablet Commonly known as:  ZOCOR  
TAKE 1 TABLET DAILY IN THE EVENING We Performed the Following AMB POC EKG ROUTINE W/ 12 LEADS, INTER & REP [26728 CPT(R)] Introducing [facility] & HEALTH SERVICES! Flavio Torres introduces Meizu patient portal. Now you can access parts of your medical record, email your doctor's office, and request medication refills online. 1. In your internet browser, go to [email or web address] 2. Click on the First Time User? Click Here link in the Sign In box. You will see the New Member Sign Up page. 3. Enter your Meizu Access Code exactly as it appears below. You will not need to use this code after youve completed the sign-up process. If you do not sign up before the expiration date, you must request a new code. · Meizu Access Code: [email or web address] Expires: 12/21/2017 10:26 AM 
 
4. Enter the last four digits of your Social Security Number (xxxx) and Date of Birth (mm/dd/yyyy) as indicated and click Submit. You will be taken to the next sign-up page. 5. Create a Meizu ID. This will be your Meizu login ID and cannot be changed, so think of one that is secure and easy to remember. 6. Create a Meizu password. You can change your password at any time. 7. Enter your Password Reset Question and Answer. This can be used at a later time if you forget your password. 8. Enter your e-mail address. You will receive e-mail notification when new information is available in [address] 9. Click Sign Up. You can now view and download portions of your medical record. 10. Click the Download Summary menu link to download a portable copy of your medical information. If you have questions, please visit the Frequently Asked Questions section of the Meizu website. Remember, Meizu is NOT to be used for urgent needs. For medical emergencies, dial 911. Now available from your iPhone and Android! Please provide this summary of care documentation to your next provider. Your primary care clinician is listed as Nila Martinez.  If you have any questions after today's visit, please call [phone].

## 2017-12-13 NOTE — PROGRESS NOTES
[place] [facility]   [address] [facility], [facility] [address]                                          NEW PATIENT HPI/FOLLOW-UP      NAME:  Julianna English   :   10/4/1933   MRN:   [id number]   PCP:  Shira Christie MD           Subjective: The patient is a 80y.o. year old male  who returns for a routine follow-up. Since the last visit, patient reports no new cardiac symptoms. Evaluated and followed by Pulmonary for opacity deemed to be inflammatory. Minor leg edema following plane flight to [facility] which resolved after returning home. Denies change in exercise tolerance, chest pain, edema, medication intolerance, palpitations, shortness of breath, PND/orthopnea wheezing, sputum, syncope, dizziness or light headedness. Doing satisfactorily. Review of Systems  General: Pt denies excessive weight gain or loss. Pt is able to conduct ADL's. Respiratory: Denies shortness of breath, JETER, wheezing or stridor.   Cardiovascular: Denies precordial pain, palpitations, edema or PND  Gastrointestinal: Denies poor appetite, indigestion, abdominal pain or blood in stool  Peripheral vascular: Denies claudication, leg cramps  Neuropsychiatric: Denies paresthesias,tingling,numbness,anxiety,depression,fatigue  Musculoskeletal: Denies pain,tenderness, soreness,swelling      Past Medical History:   Diagnosis Date    Abnormal nuclear stress test 2014    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 ([facility] 2015    Bronchiectasis (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    S/P coronary artery stent placement 2014 6/6/14 PCI/GUANAKO to prox LAD     Patient Active Problem List    Diagnosis Date Noted    Bronchiectasis ([address]     Opacity of lung on imaging study 05/28/2017    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/06/2015    Unstable angina (HCC) 11/03/2015    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 ([address] 11/02/2015    Rotator cuff arthropathy     CLL (chronic lymphocytic leukemia) ([address]     Chest pain, atypical 04/26/2015    S/P angioplasty with stent--4/15 04/26/2015    Leukocytosis     NSTEMI (non-ST elevated myocardial infarction) ([address] 04/04/2015    ASHD (arteriosclerotic heart disease) 04/04/2015    CAD (coronary artery disease)     Hyperlipidemia     [facility]     GERD (gastroesophageal reflux disease)     S/P coronary artery stent placement 06/06/2014    Abnormal nuclear stress test 06/06/2014    Pre-operative cardiovascular examination, class IV angina ([address] 06/04/2014    Chest pain 09/16/2013    Diabetes mellitus type 2, controlled ([address] 05/22/2012    Mixed hyperlipidemia 05/22/2012    Peripheral vascular disease ([address] 05/22/2012    Chronic ischemic heart disease 05/22/2012    Coronary atherosclerosis of native coronary artery 05/22/2012    Atherosclerosis of native arteries of the extremities with intermittent claudication 05/22/2012    Essential hypertension, benign 05/22/2012    Atherosclerosis of native arteries of the extremities, unspecified 05/22/2012      Past Surgical History:   Procedure Laterality Date    HX COLONOSCOPY      HX HEART CATHETERIZATION      PTCA SINGLE VESSEL  4/4/2015         VASCULAR SURGERY PROCEDURE UNLIST  2014 and 2015    BLE stenting     Allergies   Allergen Reactions    Codeine Shortness of Breath    Lipitor [Atorvastatin] Nausea Only      Family History   Problem Relation Age of Onset    Diabetes Mother     Stroke Father       Social History     Social History    Marital status:      Spouse name: N/A    Number of children: N/A    Years of education: N/A     Occupational History    Not on file. Social History Main Topics    Smoking status: Former Smoker     Quit date: 1/15/1984    Smokeless tobacco: Never Used      Comment: QUIT     Alcohol use No      Comment: quit in     Drug use: No    Sexual activity: Yes     Partners: Female     Other Topics Concern    Not on file     Social History Narrative    Family History: Mother:  61 yrs, DM complicationsFather:  80 yrs, strokeSister(s):  76 yrs, pneumonia, DM,    HTNBrother(s):  61 yrs, DM, HTN, CVAChildren: aliveGrandmother: unknow nGrandfather: deceasedSpouse: deceased1 brother(s) -    healthy. 2daughter(s) - healthy. Social History: Alcohol Use Patient does not use alcohol. Smoking Status Patient is a former smoker, Quit in: 36. Caffeine: none. Marital    Status: W idow . Lives w ith: alone. Children: yes 2 d. Education/School: has highschool diploma, college 2 y. Current Outpatient Prescriptions   Medication Sig    omeprazole (PRILOSEC) 20 mg capsule as needed.  amLODIPine (NORVASC) 2.5 mg tablet TAKE 1 TABLET DAILY    allopurinol (ZYLOPRIM) 300 mg tablet TAKE 1 TABLET DAILY    metoprolol tartrate (LOPRESSOR) 25 mg tablet TAKE 1 TABLET TWICE A DAY    acetaminophen (TYLENOL) 325 mg tablet Take 2 Tabs by mouth every six (6) hours as needed for Pain.  simvastatin (ZOCOR) 40 mg tablet TAKE 1 TABLET DAILY IN THE EVENING    metFORMIN (GLUCOPHAGE) 500 mg tablet TAKE 1 TABLET DAILY WITH A MEAL    glucose blood VI test strips (ONETOUCH ULTRA TEST) strip 1 BID ac    aspirin 81 mg chewable tablet Take 81 mg by mouth daily.  brimonidine (ALPHAGAN) 0.15 % ophthalmic solution Administer 1 Drop to both eyes two (2) times a day. Indications: OPEN ANGLE GLAUCOMA     No current facility-administered medications for this visit.          I have reviewed the nurses notes, vitals, problem list, allergy list, medical history, family medical, social history and medications. Objective:     Physical Exam:     Vitals:    12/13/17 0924   BP: 129/66   Pulse: 77   SpO2: 92%   Weight: 174 lb 6.4 oz (79.1 kg)   Height: 5' 7\" (1.702 m)    Body mass index is 27.31 kg/(m^2). General: Well developed, in no acute distress. HEENT: No carotid bruits, no JVD, trach is midline. Heart:  Normal S1/S2 negative S3 or S4. Regular, no murmur, gallop or rub.   Respiratory: Clear bilaterally, no wheezing or rales  Abdomen:   Soft, non-tender, bowel sounds are active.   Extremities:  No edema, normal cap refill, no cyanosis. Neuro: A&Ox3, speech clear, gait stable. Skin: Skin color is normal. No rashes or lesions. No diaphoresis.   Vascular: 2+ pulses symmetric in all extremities        Data Review:       Cardiographics:    EKG: NSR,LAD, generalized minor Twave inversion, LAE    Cardiology Labs:    Results for orders placed or performed during the hospital encounter of 09/02/17   EKG, 12 LEAD, INITIAL   Result Value Ref Range    Ventricular Rate 59 BPM    Atrial Rate 59 BPM    P-R Interval 164 ms    QRS Duration 88 ms    Q-T Interval 396 ms    QTC Calculation (Bezet) 392 ms    Calculated P Axis 42 degrees    Calculated R Axis -20 degrees    Calculated T Axis -15 degrees    Diagnosis       Sinus bradycardia  Minimal voltage criteria for LVH, may be normal variant  ST & T wave abnormality, consider anterolateral ischemia  Abnormal ECG  When compared with ECG of 28-MAY-2017 13:34,  No significant change was found  Confirmed by Stu Patrick ([id number]) on 9/3/2017 10:04:11 AM         Lab Results   Component Value Date/Time    Cholesterol, total 115 09/22/2017 11:17 AM    HDL Cholesterol 25 09/22/2017 11:17 AM    LDL, calculated 56 09/22/2017 11:17 AM    Triglyceride 169 09/22/2017 11:17 AM       Lab Results   Component Value Date/Time    Sodium 141 10/06/2017 02:39 PM    Potassium 4.7 10/06/2017 02:39 PM    Chloride 102 10/06/2017 02:39 PM    CO2 24 10/06/2017 02:39 PM Anion gap 6 09/02/2017 02:42 PM    Glucose 77 10/06/2017 02:39 PM    BUN 11 10/06/2017 02:39 PM    Creatinine 1.07 10/06/2017 02:39 PM    BUN/Creatinine ratio 10 10/06/2017 02:39 PM    GFR est AA 73 10/06/2017 02:39 PM    GFR est non-AA 63 10/06/2017 02:39 PM    Calcium 9.7 10/06/2017 02:39 PM    Bilirubin, total 1.1 09/02/2017 02:42 PM    AST (SGOT) 23 09/02/2017 02:42 PM    Alk. phosphatase 77 09/02/2017 02:42 PM    Protein, total 7.7 09/02/2017 02:42 PM    Albumin 3.8 09/02/2017 02:42 PM    Globulin 3.9 09/02/2017 02:42 PM    A-G Ratio 1.0 09/02/2017 02:42 PM    ALT (SGPT) 21 09/02/2017 02:42 PM          Assessment:       ICD-10-CM ICD-9-CM    1. Coronary artery disease involving native coronary artery of native heart without angina pectoris I25.10 414.01 AMB POC EKG ROUTINE W/ 12 LEADS, INTER & REP   2. Chronic ischemic heart disease I25.9 414.9    3. Gastroesophageal reflux disease without esophagitis K21.9 530.81    4. Mixed hyperlipidemia E78.2 272.2    5. Essential hypertension, benign I10 401.1    6. S/P angioplasty with stent--4/15 Z95.9 V45.89    7. CLL (chronic lymphocytic leukemia) (HCC) C91.10 204.10    8. S/P CABG x 3 Z95.1 V45.81    9. Chest pain, atypical R07.89 786.59    10. S/P coronary artery stent placement Z95.5 V45.82          Discussion: Patient presents at this time stable from a cardiac perspective. No cardiac issues. CLL stable. Leg edema resolved spontaneously. Pleased with present cardiac status. Plan: 1. Continue same meds. Lipid profile and labs followed by PCP. 2.Encouraged to exercise to tolerance and follow low fat, low cholesterol, low sodium predominantly Plant-based (consider Mediterranean) diet. Call with questions or concerns. Will follow up any test results by phone and/or f/u here in office if needed. Irineo Peoples 3.Follow up: 6 MONTHS    I have discussed the diagnosis with the patient and the intended plan as seen in the above orders.   The patient has received an after-visit summary and questions were answered concerning future plans. I have discussed any concerning medication side effects and warnings with the patient as well.     Sophia Gutierrez MD  12/13/2017

## 2017-12-28 ENCOUNTER — OFFICE VISIT (OUTPATIENT)
Dept: INTERNAL MEDICINE CLINIC | Age: 82
End: 2017-12-28

## 2017-12-28 VITALS
HEIGHT: 67 IN | TEMPERATURE: 97.3 F | RESPIRATION RATE: 18 BRPM | SYSTOLIC BLOOD PRESSURE: 118 MMHG | OXYGEN SATURATION: 94 % | HEART RATE: 76 BPM | DIASTOLIC BLOOD PRESSURE: 73 MMHG | BODY MASS INDEX: 27.26 KG/M2 | WEIGHT: 173.7 LBS

## 2017-12-28 DIAGNOSIS — E11.9 CONTROLLED TYPE 2 DIABETES MELLITUS WITHOUT COMPLICATION, WITHOUT LONG-TERM CURRENT USE OF INSULIN (HCC): ICD-10-CM

## 2017-12-28 DIAGNOSIS — C91.10 CLL (CHRONIC LYMPHOCYTIC LEUKEMIA) (HCC): ICD-10-CM

## 2017-12-28 DIAGNOSIS — J47.9 BRONCHIECTASIS WITHOUT COMPLICATION (HCC): ICD-10-CM

## 2017-12-28 DIAGNOSIS — I10 ESSENTIAL HYPERTENSION, BENIGN: ICD-10-CM

## 2017-12-28 DIAGNOSIS — M25.552 PAIN OF LEFT HIP JOINT: ICD-10-CM

## 2017-12-28 DIAGNOSIS — R91.8 OPACITY OF LUNG ON IMAGING STUDY: Primary | ICD-10-CM

## 2017-12-28 NOTE — PROGRESS NOTES
SPORTS MEDICINE AND PRIMARY CARE  Anna Diaz MD, [address] [address]  Phone:  [phone]  Fax: [phone]       Chief Complaint   Patient presents with    Diabetes     f/u   . SUBJECTIVE:    Mariluz Posada is a 80 y.o. male Patient returns today with known history of coronary artery disease, followed by Dr. Camryn Herndon, who he recently saw on 12/13/17 with the findings of stable cardiac risk factors,  and left leg edema that resolved spontaneously. We also spoke with Dr. César Castro, pulmonary, for an opacity that was felt to be inflammatory on chest CT. Patient is seen today for evaluation. Since we last saw him his oncologist, Dr. Olu Guardado, retired and made arrangements for him to see Omer Ziegler MD, for whom he'll be seeing next month for follow up of his CLL. We also requested a follow up chest CT, which we will also send to Dr. César Castro. He currently complains of left hip and left lower back pain radiating to his left leg. His daughter gave him some Tylenol, which brought it down to a level 2.   here in the office he had discomfort also. Patient is seen for evaluation. Current Outpatient Prescriptions   Medication Sig Dispense Refill    omeprazole (PRILOSEC) 20 mg capsule as needed.  amLODIPine (NORVASC) 2.5 mg tablet TAKE 1 TABLET DAILY 90 Tab 0    allopurinol (ZYLOPRIM) 300 mg tablet TAKE 1 TABLET DAILY 90 Tab 7    metoprolol tartrate (LOPRESSOR) 25 mg tablet TAKE 1 TABLET TWICE A  Tab 3    acetaminophen (TYLENOL) 325 mg tablet Take 2 Tabs by mouth every six (6) hours as needed for Pain. 20 Tab 0    simvastatin (ZOCOR) 40 mg tablet TAKE 1 TABLET DAILY IN THE EVENING 90 Tab 3    metFORMIN (GLUCOPHAGE) 500 mg tablet TAKE 1 TABLET DAILY WITH A MEAL 30 Tab 6    glucose blood VI test strips (ONETOUCH ULTRA TEST) strip 1 BID ac 60 Strip 11    aspirin 81 mg chewable tablet Take 81 mg by mouth daily.       brimonidine (ALPHAGAN) 0.15 % ophthalmic solution Administer 1 Drop to both eyes two (2) times a day.  Indications: OPEN ANGLE GLAUCOMA       Past Medical History:   Diagnosis Date    Abnormal nuclear stress test 6/6/2014    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 ([facility] 11/2/2015    Bronchiectasis ([facility]     CAD (coronary artery disease)     Chest pain, unspecified     Chronic pain     right leg    CLL (chronic lymphocytic leukemia) (HCC)     Diabetes ([facility]     Essential hypertension     GERD (gastroesophageal reflux disease)     Hyperlipidemia     Hypertension     Opacity of lung on imaging study 05/28/2017    cxr    Rotator cuff arthropathy     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    S/P coronary artery stent placement 6/6/2014 6/6/14 PCI/GUANAKO to prox LAD     Past Surgical History:   Procedure Laterality Date    HX COLONOSCOPY      HX HEART CATHETERIZATION      PTCA SINGLE VESSEL  4/4/2015         VASCULAR SURGERY PROCEDURE UNLIST  2014 and 2015    BLE stenting     Allergies   Allergen Reactions    Codeine Shortness of Breath    Lipitor [Atorvastatin] Nausea Only         REVIEW OF SYSTEMS:  General: negative for - chills or fever  ENT: negative for - headaches, nasal congestion or tinnitus  Respiratory: negative for - cough, hemoptysis, shortness of breath or wheezing  Cardiovascular : negative for - chest pain, edema, palpitations or shortness of breath  Gastrointestinal: negative for - abdominal pain, blood in stools, heartburn or nausea/vomiting  Genito-Urinary: no dysuria, trouble voiding, or hematuria  Musculoskeletal: negative for - gait disturbance, joint pain, joint stiffness or joint swelling  Neurological: no TIA or stroke symptoms  Hematologic: no bruises, no bleeding, no swollen glands  Integument: no lumps, mole changes, nail changes or rash  Endocrine: no malaise/lethargy or unexpected weight changes      Social History     Social History    Marital status:      Spouse name: N/A    Number of children: N/A    Years of education: N/A     Social History Main Topics    Smoking status: Former Smoker     Quit date: 1/15/1984    Smokeless tobacco: Never Used      Comment: QUIT     Alcohol use No      Comment: quit in     Drug use: No    Sexual activity: Yes     Partners: Female     Other Topics Concern    None     Social History Narrative    Family History: Mother:  61 yrs, DM complicationsFather:  80 yrs, strokeSister(s):  76 yrs, pneumonia, DM,    HTNBrother(s):  61 yrs, DM, HTN, CVAChildren: aliveGrandmother: unknow nGrandfather: deceasedSpouse: deceased1 brother(s) -    healthy. 2daughter(s) - healthy. Social History: Alcohol Use Patient does not use alcohol. Smoking Status Patient is a former smoker, Quit in: 36. Caffeine: none. Marital    Status: W idow . Lives w ith: alone. Children: yes 2 d. Education/School: has highschool diploma, college 2 y. Family History   Problem Relation Age of Onset    Diabetes Mother     Stroke Father        OBJECTIVE:    Visit Vitals    /73    Pulse 76    Temp 97.3 °F (36.3 °C) (Oral)    Resp 18    Ht 5' 7\" (1.702 m)    Wt 173 lb 11.2 oz (78.8 kg)    SpO2 94%    BMI 27.21 kg/m2     CONSTITUTIONAL: well , well nourished, appears age appropriate  EYES: perrla, eom intact  ENMT:moist mucous membranes, pharynx clear  NECK: supple. Thyroid normal  RESPIRATORY: Chest: clear bilaterally   CARDIOVASCULAR: Heart: regular rate and rhythm  GASTROINTESTINAL: Abdomen: soft, bowel sounds active  HEMATOLOGIC: no pathological lymph nodes palpated  MUSCULOSKELETAL: Extremities: no edema, pulse 1+   INTEGUMENT: No unusual rashes or suspicious skin lesions noted. Nails appear normal.  NEUROLOGIC: non-focal exam   MENTAL STATUS: alert and oriented, appropriate affect           ASSESSMENT:  1. Opacity of lung on imaging study    2. Essential hypertension, benign    3. Bronchiectasis without complication ([facility]    4. CLL (chronic lymphocytic leukemia) ([facility]    5. Controlled type 2 diabetes mellitus without complication, without long-term current use of insulin ([facility]      Will ask for an xray of the lumbar area, as well as left hip, to rule out other pathology, although I think it is related to degenerative joint disease and degenerative disc disease and agree with the nurse/daughter approach. We also suggest the addition of Biofreeze as needed. Blood pressure control is at goal.    BMI is acceptable at 27.21. We said we will repeat the CT scan next month, which we will request, of his chest.    He'll return to see us in two to three months, sooner if he has any problems. They both know that they can walk in to see us at any time and we will see him without an appointment if he has an urgency. PLAN:  .  Orders Placed This Encounter    CT CHEST WO CONT    CBC WITH AUTOMATED DIFF    HEMOGLOBIN A1C WITH EAG       Follow-up Disposition:  Return in about 3 months (around 3/28/2018). ATTENTION:   This medical record was transcribed using an electronic medical records system. Although proofread, it may and can contain electronic and spelling errors. Other human spelling and other errors may be present. Corrections may be executed at a later time. Please feel free to contact us for any clarifications as needed.

## 2017-12-28 NOTE — PROGRESS NOTES
1. Have you been to the ER, urgent care clinic since your last visit? Hospitalized since your last visit? No    2. Have you seen or consulted any other health care providers outside of the [address] [place] [address] since your last visit? Include any pap smears or colon screening. No     Complaints of left hip pain.

## 2017-12-28 NOTE — MR AVS SNAPSHOT
Visit Information Date & Time Provider Department Dept. Phone Encounter #  
 12/28/2017 10:30 AM Leesa Viera MD [facility] Sports Medicine and [address] [id number] Follow-up Instructions Return in about 3 months (around 3/28/2018). Follow-up and Disposition History Your Appointments 3/21/2018 10:45 AM  
Any with Leesa Viera MD  
[address] [place] [address] and Primary Care [address]) Appt Note: follow up 3mnths diabetes [address] [place]  
  
   
 [phone]  
  
    
 6/13/2018  9:00 AM  
ESTABLISHED PATIENT with Keeley Avina, [address] [facility]) Appt Note: 6 MO. F/U  
 [address] [place]  
[phone] [address] Upcoming Health Maintenance Date Due  
 EYE EXAM RETINAL OR DILATED Q1 2/24/2017 GLAUCOMA SCREENING Q2Y 2/24/2018 FOOT EXAM Q1 3/9/2018 HEMOGLOBIN A1C Q6M 3/22/2018 MEDICARE YEARLY EXAM 6/1/2018 MICROALBUMIN Q1 9/22/2018 LIPID PANEL Q1 9/22/2018 DTaP/Tdap/Td series (2 - Td) 5/18/2026 Allergies as of 12/28/2017  Review Complete On: 12/28/2017 By: Leesa Viera MD  
  
 Severity Noted Reaction Type Reactions Codeine  05/31/2012    Shortness of Breath Lipitor [Atorvastatin]  06/27/2013   Intolerance Nausea Only Current Immunizations  Reviewed on 11/9/2015 Name Date Influenza High Dose Vaccine PF 9/22/2017 Influenza Vaccine (Quad) PF 11/2/2015  3:31 PM  
 Influenza Vaccine PF 12/5/2013  8:10 AM  
 Pneumococcal Vaccine (Unspecified Type) 1/1/2012 Not reviewed this visit You Were Diagnosed With   
  
 Codes Comments Opacity of lung on imaging study    -  Primary ICD-10-CM: R91.8 ICD-9-CM: 793.19 Essential hypertension, benign     ICD-10-CM: I10 
ICD-9-CM: 401.1 Bronchiectasis without complication ([facility]     DPW-21-TW: J47.9 ICD-9-CM: 494.0   
 CLL (chronic lymphocytic leukemia) (HCC)     ICD-10-CM: C91.10 ICD-9-CM: 204.10 Controlled type 2 diabetes mellitus without complication, without long-term current use of insulin ([facility]     ICD-10-CM: E11.9 ICD-9-CM: 250.00 Pain of left hip joint     ICD-10-CM: M25.552 ICD-9-CM: 719.45 Vitals BP Pulse Temp Resp Height(growth percentile) Weight(growth percentile) 118/73 76 97.3 °F (36.3 °C) (Oral) 18 5' 7\" (1.702 m) 173 lb 11.2 oz (78.8 kg) SpO2 BMI Smoking Status 94% 27.21 kg/m2 Former Smoker Vitals History BMI and BSA Data Body Mass Index Body Surface Area  
 27.21 kg/m 2 1.93 m 2 Preferred Pharmacy Pharmacy Name Phone [address] [facility] [phone] Your Updated Medication List  
  
   
This list is accurate as of: 12/28/17 12:59 PM.  Always use your most recent med list.  
  
  
  
  
 acetaminophen 325 mg tablet Commonly known as:  TYLENOL Take 2 Tabs by mouth every six (6) hours as needed for Pain. allopurinol 300 mg tablet Commonly known as:  ZYLOPRIM  
TAKE 1 TABLET DAILY  
  
 amLODIPine 2.5 mg tablet Commonly known as:  [place] TAKE 1 TABLET DAILY  
  
 aspirin 81 mg chewable tablet Take 81 mg by mouth daily. brimonidine 0.15 % ophthalmic solution Commonly known as:  [place] Administer 1 Drop to both eyes two (2) times a day. Indications: OPEN ANGLE GLAUCOMA  
  
 glucose blood VI test strips strip Commonly known as:  ONETOUCH ULTRA TEST  
1 BID ac  
  
 metFORMIN 500 mg tablet Commonly known as:  GLUCOPHAGE  
TAKE 1 TABLET DAILY WITH A MEAL  
  
 metoprolol tartrate 25 mg tablet Commonly known as:  LOPRESSOR  
TAKE 1 TABLET TWICE A DAY  
  
 omeprazole 20 mg capsule Commonly known as:  PRILOSEC  
as needed. simvastatin 40 mg tablet Commonly known as:  ZOCOR  
TAKE 1 TABLET DAILY IN THE EVENING We Performed the Following CBC WITH AUTOMATED DIFF [69721 CPT(R)] HEMOGLOBIN A1C WITH EAG [74382 CPT(R)] NE COLLECTION VENOUS BLOOD,VENIPUNCTURE [id number] CPT(R)] Follow-up Instructions Return in about 3 months (around 3/28/2018). To-Do List   
 12/28/2017 Imaging:  CT CHEST WO CONT   
  
 12/28/2017 Imaging:  XR HIP LT W OR WO PELV 2-3 VWS   
  
 12/28/2017 Imaging:  XR SPINE LUMB 2 OR 3 V Introducing [facility] & HEALTH SERVICES! Jessica Fowler introduces Social Tools patient portal. Now you can access parts of your medical record, email your doctor's office, and request medication refills online. 1. In your internet browser, go to [email or web address] 2. Click on the First Time User? Click Here link in the Sign In box. You will see the New Member Sign Up page. 3. Enter your Social Tools Access Code exactly as it appears below. You will not need to use this code after youve completed the sign-up process. If you do not sign up before the expiration date, you must request a new code. · Social Tools Access Code: [email or web address] Expires: 3/28/2018 12:02 PM 
 
4. Enter the last four digits of your Social Security Number (xxxx) and Date of Birth (mm/dd/yyyy) as indicated and click Submit. You will be taken to the next sign-up page. 5. Create a SupportBeet ID. This will be your Social Tools login ID and cannot be changed, so think of one that is secure and easy to remember. 6. Create a Social Tools password. You can change your password at any time. 7. Enter your Password Reset Question and Answer. This can be used at a later time if you forget your password. 8. Enter your e-mail address. You will receive e-mail notification when new information is available in [address] 9. Click Sign Up. You can now view and download portions of your medical record. 10. Click the Download Summary menu link to download a portable copy of your medical information. If you have questions, please visit the Frequently Asked Questions section of the minicabit website. Remember, minicabit is NOT to be used for urgent needs. For medical emergencies, dial 911. Now available from your iPhone and Android! Please provide this summary of care documentation to your next provider. Your primary care clinician is listed as Dat Hernandez. If you have any questions after today's visit, please call [phone].

## 2017-12-29 LAB
BASOPHILS # BLD AUTO: 0 X10E3/UL (ref 0–0.2)
BASOPHILS NFR BLD AUTO: 0 %
EOSINOPHIL # BLD AUTO: 0.6 X10E3/UL (ref 0–0.4)
EOSINOPHIL NFR BLD AUTO: 1 %
ERYTHROCYTE [DISTWIDTH] IN BLOOD BY AUTOMATED COUNT: 15.4 % (ref 12.3–15.4)
EST. AVERAGE GLUCOSE BLD GHB EST-MCNC: 134 MG/DL
HBA1C MFR BLD: 6.3 % (ref 4.8–5.6)
HCT VFR BLD AUTO: 43.4 % (ref 37.5–51)
HGB BLD-MCNC: 14.4 G/DL (ref 13–17.7)
LYMPHOCYTES # BLD AUTO: 54.2 X10E3/UL (ref 0.7–3.1)
LYMPHOCYTES NFR BLD AUTO: 93 %
MCH RBC QN AUTO: 31.2 PG (ref 26.6–33)
MCHC RBC AUTO-ENTMCNC: 33.2 G/DL (ref 31.5–35.7)
MCV RBC AUTO: 94 FL (ref 79–97)
MONOCYTES # BLD AUTO: 1.2 X10E3/UL (ref 0.1–0.9)
MONOCYTES NFR BLD AUTO: 2 %
MORPHOLOGY BLD-IMP: ABNORMAL
NEUTROPHILS # BLD AUTO: 2.3 X10E3/UL (ref 1.4–7)
NEUTROPHILS NFR BLD AUTO: 4 %
PLATELET # BLD AUTO: 195 X10E3/UL (ref 150–379)
RBC # BLD AUTO: 4.62 X10E6/UL (ref 4.14–5.8)
WBC # BLD AUTO: 58.3 X10E3/UL (ref 3.4–10.8)

## 2018-01-15 RX ORDER — METFORMIN HYDROCHLORIDE 500 MG/1
TABLET ORAL
Qty: 90 TAB | Refills: 3 | Status: SHIPPED | OUTPATIENT
Start: 2018-01-15 | End: 2018-12-24 | Stop reason: SDUPTHER

## 2018-01-25 ENCOUNTER — HOSPITAL ENCOUNTER (OUTPATIENT)
Dept: CT IMAGING | Age: 83
Discharge: HOME | End: 2018-01-25
Attending: INTERNAL MEDICINE
Payer: MEDICARE

## 2018-01-25 DIAGNOSIS — R91.8 OPACITY OF LUNG ON IMAGING STUDY: ICD-10-CM

## 2018-01-25 PROCEDURE — 71250 CT THORAX DX C-: CPT

## 2018-01-27 RX ORDER — SIMVASTATIN 40 MG/1
TABLET, FILM COATED ORAL
Qty: 90 TAB | Refills: 3 | Status: SHIPPED | OUTPATIENT
Start: 2018-01-27 | End: 2019-01-22 | Stop reason: SDUPTHER

## 2018-02-18 RX ORDER — AMLODIPINE BESYLATE 2.5 MG/1
TABLET ORAL
Qty: 90 TAB | Refills: 0 | Status: SHIPPED | OUTPATIENT
Start: 2018-02-18 | End: 2018-05-18 | Stop reason: SDUPTHER

## 2018-02-28 RX ORDER — OMEPRAZOLE 20 MG/1
CAPSULE, DELAYED RELEASE ORAL
Qty: 90 CAP | Refills: 1 | Status: SHIPPED | OUTPATIENT
Start: 2018-02-28 | End: 2018-08-27 | Stop reason: SDUPTHER

## 2018-04-04 ENCOUNTER — OFFICE VISIT (OUTPATIENT)
Dept: INTERNAL MEDICINE CLINIC | Age: 83
End: 2018-04-04

## 2018-04-04 VITALS
RESPIRATION RATE: 16 BRPM | SYSTOLIC BLOOD PRESSURE: 124 MMHG | DIASTOLIC BLOOD PRESSURE: 68 MMHG | HEIGHT: 67 IN | OXYGEN SATURATION: 94 % | WEIGHT: 172.9 LBS | BODY MASS INDEX: 27.14 KG/M2 | HEART RATE: 60 BPM | TEMPERATURE: 97.4 F

## 2018-04-04 DIAGNOSIS — I73.9 PERIPHERAL VASCULAR DISEASE (HCC): ICD-10-CM

## 2018-04-04 DIAGNOSIS — J47.9 BRONCHIECTASIS WITHOUT COMPLICATION (HCC): ICD-10-CM

## 2018-04-04 DIAGNOSIS — E11.9 CONTROLLED TYPE 2 DIABETES MELLITUS WITHOUT COMPLICATION, WITHOUT LONG-TERM CURRENT USE OF INSULIN (HCC): ICD-10-CM

## 2018-04-04 DIAGNOSIS — C91.10 CLL (CHRONIC LYMPHOCYTIC LEUKEMIA) (HCC): ICD-10-CM

## 2018-04-04 NOTE — MR AVS SNAPSHOT
[address] [place] [address]. [address] [phone] 
[phone] Patient: Karyna Jha MRN: [id number] ZPB:[birth year] Visit Information Date & Time Provider Department Dept. Phone Encounter #  
 4/4/2018 11:45 AM Serena Campbell MD [facility] Sports Medicine and Primary Care [phone] [id number] Follow-up Instructions Return in about 4 months (around 8/4/2018). Follow-up and Disposition History Your Appointments 6/13/2018  9:00 AM  
ESTABLISHED PATIENT with Laura Wong MD  
[facility]) Appt Note: 6 MO. F/U  
 [facility] [address] [place] [address]  
[phone] [address] Upcoming Health Maintenance Date Due  
 EYE EXAM RETINAL OR DILATED Q1 2/24/2017 GLAUCOMA SCREENING Q2Y 2/24/2018 FOOT EXAM Q1 3/9/2018 MEDICARE YEARLY EXAM 6/1/2018 HEMOGLOBIN A1C Q6M 6/28/2018 MICROALBUMIN Q1 9/22/2018 LIPID PANEL Q1 9/22/2018 DTaP/Tdap/Td series (2 - Td) 5/18/2026 Allergies as of 4/4/2018  Review Complete On: 4/4/2018 By: Serena Campbell MD  
  
 Severity Noted Reaction Type Reactions Codeine  05/31/2012    Shortness of Breath Lipitor [Atorvastatin]  06/27/2013   Intolerance Nausea Only Current Immunizations  Reviewed on 11/9/2015 Name Date Influenza High Dose Vaccine PF 9/22/2017 Influenza Vaccine (Quad) PF 11/2/2015  3:31 PM  
 Influenza Vaccine PF 12/5/2013  8:10 AM  
 Pneumococcal Vaccine (Unspecified Type) 1/1/2012 Not reviewed this visit You Were Diagnosed With   
  
 Codes Comments Peripheral vascular disease ([facility]     ICD-10-CM: I73.9 ICD-9-CM: 443.9 Controlled type 2 diabetes mellitus without complication, without long-term current use of insulin ([facility]     ICD-10-CM: E11.9 ICD-9-CM: 250.00 CLL (chronic lymphocytic leukemia) (HCC)     ICD-10-CM: C91.90 ICD-9-CM: 204.10 Bronchiectasis without complication ([facility]     TLT-76-JI: J47.9 ICD-9-CM: 494.0 Vitals BP Pulse Temp Resp Height(growth percentile) Weight(growth percentile) 124/68 60 97.4 °F (36.3 °C) (Oral) 16 5' 7\" (1.702 m) 172 lb 14.4 oz (78.4 kg) SpO2 BMI Smoking Status 94% 27.08 kg/m2 Former Smoker Vitals History BMI and BSA Data Body Mass Index Body Surface Area 27.08 kg/m 2 1.93 m 2 Preferred Pharmacy Pharmacy Name Phone Tesha Reece, [facility] [phone] Your Updated Medication List  
  
   
This list is accurate as of 4/4/18  1:01 PM.  Always use your most recent med list.  
  
  
  
  
 acetaminophen 325 mg tablet Commonly known as:  TYLENOL Take 2 Tabs by mouth every six (6) hours as needed for Pain. allopurinol 300 mg tablet Commonly known as:  ZYLOPRIM  
TAKE 1 TABLET DAILY  
  
 amLODIPine 2.5 mg tablet Commonly known as:  Ira Asencio TAKE 1 TABLET DAILY  
  
 aspirin 81 mg chewable tablet Take 81 mg by mouth daily. brimonidine 0.15 % ophthalmic solution Commonly known as:  Cheryl Cornejo Administer 1 Drop to both eyes two (2) times a day. Indications: OPEN ANGLE GLAUCOMA  
  
 glucose blood VI test strips strip Commonly known as:  ONETOUCH ULTRA TEST  
1 BID ac  
  
 metFORMIN 500 mg tablet Commonly known as:  GLUCOPHAGE  
TAKE 1 TABLET DAILY  
  
 metoprolol tartrate 25 mg tablet Commonly known as:  LOPRESSOR  
TAKE 1 TABLET TWICE A DAY  
  
 omeprazole 20 mg capsule Commonly known as:  PRILOSEC  
TAKE 1 CAPSULE DAILY  
  
 simvastatin 40 mg tablet Commonly known as:  ZOCOR  
TAKE 1 TABLET DAILY IN THE EVENING We Performed the Following CBC WITH AUTOMATED DIFF [91949 CPT(R)] HEMOGLOBIN A1C WITH EAG [07098 CPT(R)]  DIABETES FOOT EXAM [7 Custom] METABOLIC PANEL, BASIC [76579 CPT(R)] REFERRAL TO OPHTHALMOLOGY [REF57 Custom] URINALYSIS W/ RFLX MICROSCOPIC [47135 CPT(R)] Follow-up Instructions Return in about 4 months (around 8/4/2018). Referral Information Referral ID Referred By Referred To  
  
 [id number] David LIU Not Available Visits Status Start Date End Date 1 New Request 4/4/18 4/4/19 If your referral has a status of pending review or denied, additional information will be sent to support the outcome of this decision. Introducing [facility] & HEALTH SERVICES! Beverly Lau introduces Hostspot patient portal. Now you can access parts of your medical record, email your doctor's office, and request medication refills online. 1. In your internet browser, go to [email or web address] 2. Click on the First Time User? Click Here link in the Sign In box. You will see the New Member Sign Up page. 3. Enter your Hostspot Access Code exactly as it appears below. You will not need to use this code after youve completed the sign-up process. If you do not sign up before the expiration date, you must request a new code. · Hostspot Access Code: [email or web address] Expires: 7/3/2018 12:06 PM 
 
4. Enter the last four digits of your Social Security Number (xxxx) and Date of Birth (mm/dd/yyyy) as indicated and click Submit. You will be taken to the next sign-up page. 5. Create a Hostspot ID. This will be your Hostspot login ID and cannot be changed, so think of one that is secure and easy to remember. 6. Create a Hostspot password. You can change your password at any time. 7. Enter your Password Reset Question and Answer. This can be used at a later time if you forget your password. 8. Enter your e-mail address. You will receive e-mail notification when new information is available in [address] 9. Click Sign Up. You can now view and download portions of your medical record. 10. Click the Download Summary menu link to download a portable copy of your medical information. If you have questions, please visit the Frequently Asked Questions section of the m2p-labs website. Remember, m2p-labs is NOT to be used for urgent needs. For medical emergencies, dial 911. Now available from your iPhone and Android! Please provide this summary of care documentation to your next provider. Your primary care clinician is listed as Davis Jamil. If you have any questions after today's visit, please call [phone].

## 2018-04-04 NOTE — ASSESSMENT & PLAN NOTE
This condition is managed by Specialist.  Key COPD Medications     Patient is on no COPD/Asthma meds.         Lab Results   Component Value Date/Time    WBC 58.3 12/28/2017 12:50 PM    HGB 14.4 12/28/2017 12:50 PM    HCT 43.4 12/28/2017 12:50 PM    PLATELET 472 04/96/3522 12:50 PM    B-type Natriuretic Peptide 39.9 10/09/2017 12:00 AM

## 2018-04-04 NOTE — PROGRESS NOTES
1. Have you been to the ER, urgent care clinic since your last visit? Hospitalized since your last visit? No    2. Have you seen or consulted any other health care providers outside of the [address] [place] [address] since your last visit? Include any pap smears or colon screening.  No

## 2018-04-04 NOTE — ACP (ADVANCE CARE PLANNING)
Advance Care Planning (ACP) Provider Conversation Snapshot    Date of ACP Conversation: 04/04/18  Persons included in Conversation:  patient  Length of ACP Conversation in minutes:  <16 minutes (Non-Billable)    Authorized Decision Maker (if patient is incapable of making informed decisions):    This person is:   Healthcare Agent/Medical Power of  under Advance Directive  daughters        For Patients with Decision Making Capacity:   Values/Goals: Exploration of values, goals, and preferences if recovery is not expected, even with continued medical treatment in the event of:  Imminent death    Conversation Outcomes / Follow-Up Plan:   Recommended completion of Advance Directive form after review of ACP materials and conversation with prospective healthcare agent

## 2018-04-04 NOTE — ASSESSMENT & PLAN NOTE
Stable, based on history, physical exam and review of pertinent labs, studies and medications; meds reconciled; continue current treatment plan.   Key Antihyperglycemic Medications             metFORMIN (GLUCOPHAGE) 500 mg tablet  (Taking) TAKE 1 TABLET DAILY        Other Key Diabetic Medications             simvastatin (ZOCOR) 40 mg tablet  (Taking) TAKE 1 TABLET DAILY IN THE EVENING        Lab Results   Component Value Date/Time    Hemoglobin A1c 6.3 12/28/2017 12:50 PM    Glucose 77 10/06/2017 02:39 PM    Creatinine 1.07 10/06/2017 02:39 PM    Microalb/Creat ratio (ug/mg creat.) 8.2 09/22/2017 11:17 AM    Cholesterol, total 115 09/22/2017 11:17 AM    HDL Cholesterol 25 09/22/2017 11:17 AM    LDL, calculated 56 09/22/2017 11:17 AM    Triglyceride 169 09/22/2017 11:17 AM     Diabetic Foot and Eye Exam HM Status   Topic Date Due    Eye Exam  02/24/2017    Diabetic Foot Care  03/09/2018

## 2018-04-04 NOTE — ASSESSMENT & PLAN NOTE
Stable, based on history, physical exam and review of pertinent labs, studies and medications; meds reconciled; continue current treatment plan. Key Peripheral Vascular Disease Meds             simvastatin (ZOCOR) 40 mg tablet  (Taking) TAKE 1 TABLET DAILY IN THE EVENING    aspirin 81 mg chewable tablet  (Taking) Take 81 mg by mouth daily.         Lab Results   Component Value Date/Time    WBC 58.3 12/28/2017 12:50 PM    HGB 14.4 12/28/2017 12:50 PM    HCT 43.4 12/28/2017 12:50 PM    PLATELET 424 94/02/1947 12:50 PM    Creatinine 1.07 10/06/2017 02:39 PM    BUN 11 10/06/2017 02:39 PM    Cholesterol, total 115 09/22/2017 11:17 AM    HDL Cholesterol 25 09/22/2017 11:17 AM    LDL, calculated 56 09/22/2017 11:17 AM    Triglyceride 169 09/22/2017 11:17 AM

## 2018-04-04 NOTE — ASSESSMENT & PLAN NOTE
This condition is managed by Specialist.  Key Oncology Meds     Patient is on no Oncologic meds. Key Pain Meds             acetaminophen (TYLENOL) 325 mg tablet  (Taking) Take 2 Tabs by mouth every six (6) hours as needed for Pain. Lab Results   Component Value Date/Time    WBC 58.3 12/28/2017 12:50 PM    ABS.  NEUTROPHILS 2.3 12/28/2017 12:50 PM    HGB 14.4 12/28/2017 12:50 PM    HCT 43.4 12/28/2017 12:50 PM    PLATELET 137 83/42/5027 12:50 PM    Creatinine 1.07 10/06/2017 02:39 PM    BUN 11 10/06/2017 02:39 PM    ALT (SGPT) 21 09/02/2017 02:42 PM    AST (SGOT) 23 09/02/2017 02:42 PM    Albumin 3.8 09/02/2017 02:42 PM

## 2018-04-05 LAB
APPEARANCE UR: CLEAR
BASOPHILS # BLD AUTO: 0 X10E3/UL (ref 0–0.2)
BASOPHILS NFR BLD AUTO: 0 %
BILIRUB UR QL STRIP: NEGATIVE
BUN SERPL-MCNC: 16 MG/DL (ref 8–27)
BUN/CREAT SERPL: 14 (ref 10–24)
CALCIUM SERPL-MCNC: 9.9 MG/DL (ref 8.6–10.2)
CHLORIDE SERPL-SCNC: 98 MMOL/L (ref 96–106)
CO2 SERPL-SCNC: 23 MMOL/L (ref 18–29)
COLOR UR: YELLOW
CREAT SERPL-MCNC: 1.14 MG/DL (ref 0.76–1.27)
EOSINOPHIL # BLD AUTO: 0 X10E3/UL (ref 0–0.4)
EOSINOPHIL NFR BLD AUTO: 0 %
ERYTHROCYTE [DISTWIDTH] IN BLOOD BY AUTOMATED COUNT: 15.3 % (ref 12.3–15.4)
EST. AVERAGE GLUCOSE BLD GHB EST-MCNC: 131 MG/DL
GFR SERPLBLD CREATININE-BSD FMLA CKD-EPI: 59 ML/MIN/1.73
GFR SERPLBLD CREATININE-BSD FMLA CKD-EPI: 68 ML/MIN/1.73
GLUCOSE SERPL-MCNC: 74 MG/DL (ref 65–99)
GLUCOSE UR QL: NEGATIVE
HBA1C MFR BLD: 6.2 % (ref 4.8–5.6)
HCT VFR BLD AUTO: 42.5 % (ref 37.5–51)
HGB BLD-MCNC: 14.2 G/DL (ref 13–17.7)
HGB UR QL STRIP: NEGATIVE
KETONES UR QL STRIP: NEGATIVE
LEUKOCYTE ESTERASE UR QL STRIP: NEGATIVE
LYMPHOCYTES # BLD AUTO: 54.2 X10E3/UL (ref 0.7–3.1)
LYMPHOCYTES NFR BLD AUTO: 92 %
MCH RBC QN AUTO: 32.3 PG (ref 26.6–33)
MCHC RBC AUTO-ENTMCNC: 33.4 G/DL (ref 31.5–35.7)
MCV RBC AUTO: 97 FL (ref 79–97)
MICRO URNS: NORMAL
MONOCYTES # BLD AUTO: 1.2 X10E3/UL (ref 0.1–0.9)
MONOCYTES NFR BLD AUTO: 2 %
MORPHOLOGY BLD-IMP: ABNORMAL
NEUTROPHILS # BLD AUTO: 3.5 X10E3/UL (ref 1.4–7)
NEUTROPHILS NFR BLD AUTO: 6 %
NITRITE UR QL STRIP: NEGATIVE
PH UR STRIP: 5.5 [PH] (ref 5–7.5)
PLATELET # BLD AUTO: 185 X10E3/UL (ref 150–379)
POTASSIUM SERPL-SCNC: 4.6 MMOL/L (ref 3.5–5.2)
PROT UR QL STRIP: NEGATIVE
RBC # BLD AUTO: 4.39 X10E6/UL (ref 4.14–5.8)
SODIUM SERPL-SCNC: 140 MMOL/L (ref 134–144)
SP GR UR: 1.02 (ref 1–1.03)
UROBILINOGEN UR STRIP-MCNC: 0.2 MG/DL (ref 0.2–1)
WBC # BLD AUTO: 58.9 X10E3/UL (ref 3.4–10.8)

## 2018-05-18 RX ORDER — AMLODIPINE BESYLATE 2.5 MG/1
TABLET ORAL
Qty: 90 TAB | Refills: 0 | Status: SHIPPED | OUTPATIENT
Start: 2018-05-18 | End: 2018-08-16 | Stop reason: SDUPTHER

## 2018-06-13 ENCOUNTER — OFFICE VISIT (OUTPATIENT)
Dept: CARDIOLOGY CLINIC | Age: 83
End: 2018-06-13

## 2018-06-13 VITALS
HEIGHT: 67 IN | SYSTOLIC BLOOD PRESSURE: 129 MMHG | OXYGEN SATURATION: 97 % | BODY MASS INDEX: 27.31 KG/M2 | RESPIRATION RATE: 12 BRPM | DIASTOLIC BLOOD PRESSURE: 61 MMHG | HEART RATE: 54 BPM | WEIGHT: 174 LBS

## 2018-06-13 DIAGNOSIS — Z95.820 S/P ANGIOPLASTY WITH STENT: ICD-10-CM

## 2018-06-13 DIAGNOSIS — C91.10 CLL (CHRONIC LYMPHOCYTIC LEUKEMIA) (HCC): ICD-10-CM

## 2018-06-13 DIAGNOSIS — K21.00 GASTROESOPHAGEAL REFLUX DISEASE WITH ESOPHAGITIS: ICD-10-CM

## 2018-06-13 DIAGNOSIS — I25.9 CHRONIC ISCHEMIC HEART DISEASE: Primary | ICD-10-CM

## 2018-06-13 DIAGNOSIS — I10 ESSENTIAL HYPERTENSION, BENIGN: ICD-10-CM

## 2018-06-13 DIAGNOSIS — Z95.1 S/P CABG X 3: ICD-10-CM

## 2018-06-13 DIAGNOSIS — I70.213 ATHEROSCLEROSIS OF NATIVE ARTERY OF BOTH LOWER EXTREMITIES WITH INTERMITTENT CLAUDICATION (HCC): ICD-10-CM

## 2018-06-13 DIAGNOSIS — E11.21 TYPE 2 DIABETES WITH NEPHROPATHY (HCC): ICD-10-CM

## 2018-06-13 DIAGNOSIS — E78.2 MIXED HYPERLIPIDEMIA: ICD-10-CM

## 2018-06-13 DIAGNOSIS — E78.5 HYPERLIPIDEMIA, UNSPECIFIED HYPERLIPIDEMIA TYPE: ICD-10-CM

## 2018-06-13 DIAGNOSIS — I73.9 PERIPHERAL VASCULAR DISEASE (HCC): ICD-10-CM

## 2018-06-13 RX ORDER — LANOLIN ALCOHOL/MO/W.PET/CERES
1000 CREAM (GRAM) TOPICAL DAILY
COMMUNITY
End: 2022-07-05

## 2018-06-13 NOTE — PROGRESS NOTES
Chief Complaint   Patient presents with    Hypertension     no other complaints. 1. Have you been to the ER, urgent care clinic since your last visit? Hospitalized since your last visit? NO    2. Have you seen or consulted any other health care providers outside of the [facility] since your last visit? Include any pap smears or colon screening.  [address] [place] [address] 5/2018

## 2018-06-13 NOTE — PROGRESS NOTES
Progress Note    Patient: Harrison Lackey MRN: [id number]  SSN: [id number]    YOB: 1933  Age: 80 y.o. Sex: male        Subjective:   Mr. Shmuel Mullen is a 79 y/o male with history of CAD, s/p LAD stent in 2014, s/p CABG x3 in 2015,HTN, DM2, hyperlipidemia, PVD and CLL who presents to clinic for routine follow up. Patient has no complaints today and denies change in exercise tolerance, chest pain, edema, medication intolerance, palpitations, shortness of breath, PND/orthopnea wheezing, sputum, syncope, dizziness or light headedness. Patient was told by pulmonology he did not need to come back unless he was having symptoms. Patient saw oncology this week for CLL follow up - labs are stable. 4 month follow ups. Review of Systems  General: Pt denies excessive weight gain or loss. Pt is able to conduct ADL's. Respiratory: Denies shortness of breath, JETER, wheezing or stridor. Cardiovascular: Denies precordial pain, palpitations, edema or PND  Peripheral vascular: Denies claudication, leg cramps  Neuropsychiatric: Denies paresthesias,tingling,numbness,anxiety,depression,fatigue  Musculoskeletal: Denies pain,tenderness, soreness,swelling    Objective:     Vitals:    06/13/18 0900 06/13/18 0902   BP: 137/65 129/61   Pulse: (!) 56 (!) 54   Resp: 12    SpO2: 97%    Weight: 174 lb (78.9 kg)    Height: 5' 7\" (1.702 m)       ED EKG interpretation:  Rhythm: sinus bradycardia; and regular . Rate (approx.): 54; Some T wave inversion in chest leads- unchanged from last EKG. This EKG was interpreted by Ruthann Lyons     General: WDWN elderly man, in no acute distress, daughter present  HEENT: No carotid bruits, no JVD, trach is midline. Heart:  Normal S1/S2 negative S3 or S4. Regular, no murmur, gallop or rub. Respiratory: Clear bilaterally, no wheezing or rales  Extremities:  No edema, normal cap refill, no cyanosis. Neuro: A&Ox3, speech clear, gait stable.    Skin: Skin color is normal. No rashes or lesions. No diaphoresis. Vascular: 2+ and symmetric radial  pulses      Assessment:   1. CAD    -S/P CABG x 3 (2015) and S/P stent (2014)   2. Chronic Ischemic Heart Disease  3. HTN  4. Hyperlipidemia   5. CLL    Discussion: Patient is stable from a cardiac perspective today. Patient has recent follow up with oncologist and CLL is stable. Patient denies any recent leg edema. Plan:   1. Continue current medications   2. Lipid profile followed by PCP  3. Follow up in 6 months or sooner if needed     Discussed/Seen with Guy Bain MD [facility]   Signed By: Lucy Lawson     June 13, 2018      Patient seen and examined. All pertinent data reviewed. I have reviewed detailed note as outlined by Obie Hamman. ARABELLA Padilla-Stucent. Case discussed with Nursing/medical assistant staff and Obie Hamman Seay,PA-Student. Patient cardiac stable. No cardiac complaints. CLL stable. Plans as outlined.     Franco Zimmer

## 2018-08-08 ENCOUNTER — OFFICE VISIT (OUTPATIENT)
Dept: INTERNAL MEDICINE CLINIC | Age: 83
End: 2018-08-08

## 2018-08-08 VITALS
HEIGHT: 67 IN | SYSTOLIC BLOOD PRESSURE: 123 MMHG | BODY MASS INDEX: 27.47 KG/M2 | WEIGHT: 175 LBS | DIASTOLIC BLOOD PRESSURE: 67 MMHG | TEMPERATURE: 97.8 F | OXYGEN SATURATION: 94 % | RESPIRATION RATE: 18 BRPM | HEART RATE: 61 BPM

## 2018-08-08 DIAGNOSIS — Z13.39 SCREENING FOR ALCOHOLISM: ICD-10-CM

## 2018-08-08 DIAGNOSIS — Z00.00 MEDICARE ANNUAL WELLNESS VISIT, SUBSEQUENT: Primary | ICD-10-CM

## 2018-08-08 DIAGNOSIS — J47.9 BRONCHIECTASIS WITHOUT COMPLICATION (HCC): ICD-10-CM

## 2018-08-08 DIAGNOSIS — E78.5 HYPERLIPIDEMIA, UNSPECIFIED HYPERLIPIDEMIA TYPE: ICD-10-CM

## 2018-08-08 DIAGNOSIS — E11.21 TYPE 2 DIABETES WITH NEPHROPATHY (HCC): ICD-10-CM

## 2018-08-08 DIAGNOSIS — I73.9 PERIPHERAL VASCULAR DISEASE (HCC): ICD-10-CM

## 2018-08-08 DIAGNOSIS — K21.00 GASTROESOPHAGEAL REFLUX DISEASE WITH ESOPHAGITIS: ICD-10-CM

## 2018-08-08 DIAGNOSIS — I70.213 ATHEROSCLEROSIS OF NATIVE ARTERY OF BOTH LOWER EXTREMITIES WITH INTERMITTENT CLAUDICATION (HCC): ICD-10-CM

## 2018-08-08 DIAGNOSIS — R35.0 FREQUENCY OF URINATION: ICD-10-CM

## 2018-08-08 DIAGNOSIS — Z13.31 SCREENING FOR DEPRESSION: ICD-10-CM

## 2018-08-08 DIAGNOSIS — C91.10 CLL (CHRONIC LYMPHOCYTIC LEUKEMIA) (HCC): ICD-10-CM

## 2018-08-08 DIAGNOSIS — I10 ESSENTIAL HYPERTENSION, BENIGN: ICD-10-CM

## 2018-08-08 NOTE — PATIENT INSTRUCTIONS
Medicare Wellness Visit, Male    The best way to live healthy is to have a lifestyle where you eat a well-balanced diet, exercise regularly, limit alcohol use, and quit all forms of tobacco/nicotine, if applicable. Regular preventive services are another way to keep healthy. Preventive services (vaccines, screening tests, monitoring & exams) can help personalize your care plan, which helps you manage your own care. Screening tests can find health problems at the earliest stages, when they are easiest to treat. [facility] follows the current, evidence-based guidelines published by the [facility] ([facility]) when recommending preventive services for our patients. Because we follow these guidelines, sometimes recommendations change over time as research supports it. (For example, a prostate screening blood test is no longer routinely recommended for men with no symptoms.)    Of course, you and your provider may decide to screen more often for some diseases, based on your risk and co-morbidities (chronic disease you are already diagnosed with). Preventive services for you include:    - Medicare offers their members a free annual wellness visit, which is time for you and your primary care provider to discuss and plan for your preventive service needs. Take advantage of this benefit every year!    -All people over age 72 should receive the recommended pneumonia vaccines. Current USPSTF guidelines recommend a series of two vaccines for the best pneumonia protection.     -All adults should have a yearly flu vaccine and a tetanus vaccine every 10 years.  All adults age 61 years should receive a shingles vaccine once in their lifetime.      -All adults age 38-68 years who are overweight should have a diabetes screening test once every three years.     -Other screening tests & preventive services for persons with diabetes include: an eye exam to screen for diabetic retinopathy, a kidney function test, a foot exam, and stricter control over your cholesterol.     -Cardiovascular screening for adults with routine risk involves an electrocardiogram (ECG) at intervals determined by the provider.     -Colorectal cancer screenings should be done for adults age 54-65 years with normal risk. There are a number of acceptable methods of screening for this type of cancer. Each test has its own benefits and drawbacks. Discuss with your provider what is most appropriate for you during your annual wellness visit. The different tests include: colonoscopy (considered the best screening method), a fecal occult blood test, a fecal DNA test, and sigmoidoscopy.    -All adults born between [facility] should be screened once for Hepatitis C.    -An Abdominal Aortic Aneurysm (AAA) Screening is recommended for men age 73-68 who has ever smoked in their lifetime.      Here is a list of your current Health Maintenance items (your personalized list of preventive services) with a due date:  Health Maintenance Due   Topic Date Due    Annual Well Visit  06/01/2018    Flu Vaccine  08/01/2018

## 2018-08-08 NOTE — PROGRESS NOTES
1. Have you been to the ER, urgent care clinic since your last visit? Hospitalized since your last visit? No    2. Have you seen or consulted any other health care providers outside of the [address] [place] [address] since your last visit? Include any pap smears or colon screening. No    Wants to discuss left shoulder pain    This is the Subsequent Medicare Annual Wellness Exam, performed 12 months or more after the Initial AWV or the last Subsequent AWV    I have reviewed the patient's medical history in detail and updated the computerized patient record. History     Past Medical History:   Diagnosis Date    Abnormal nuclear stress test 6/6/2014    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 ([facility] 11/2/2015    Bronchiectasis ([facility]     CAD (coronary artery disease)     Chest pain, unspecified     Chronic pain     right leg    CLL (chronic lymphocytic leukemia) (HCC)     Diabetes ([facility]     Essential hypertension     GERD (gastroesophageal reflux disease)     Hyperlipidemia     Hypertension     Opacity of lung on imaging study 05/28/2017    cxr    Rotator cuff arthropathy     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    S/P coronary artery stent placement 6/6/2014 6/6/14 PCI/GUANAKO to prox LAD      Past Surgical History:   Procedure Laterality Date    HX COLONOSCOPY      HX HEART CATHETERIZATION      PTCA SINGLE VESSEL  4/4/2015         VASCULAR SURGERY PROCEDURE UNLIST  2014 and 2015    BLE stenting     Current Outpatient Prescriptions   Medication Sig Dispense Refill    glucose blood VI test strips (ONETOUCH ULTRA TEST) strip Use to test blood sugar twice a day 200 Strip 3    cyanocobalamin 1,000 mcg tablet Take 1,000 mcg by mouth daily.       amLODIPine (NORVASC) 2.5 mg tablet TAKE 1 TABLET DAILY 90 Tab 0    omeprazole (PRILOSEC) 20 mg capsule TAKE 1 CAPSULE DAILY 90 Cap 1    simvastatin (ZOCOR) 40 mg tablet TAKE 1 TABLET DAILY IN THE EVENING 90 Tab 3    metFORMIN (GLUCOPHAGE) 500 mg tablet TAKE 1 TABLET DAILY 90 Tab 3    allopurinol (ZYLOPRIM) 300 mg tablet TAKE 1 TABLET DAILY 90 Tab 7    metoprolol tartrate (LOPRESSOR) 25 mg tablet TAKE 1 TABLET TWICE A  Tab 3    acetaminophen (TYLENOL) 325 mg tablet Take 2 Tabs by mouth every six (6) hours as needed for Pain. 20 Tab 0    aspirin 81 mg chewable tablet Take 81 mg by mouth daily.  brimonidine (ALPHAGAN) 0.15 % ophthalmic solution Administer 1 Drop to both eyes two (2) times a day.  Indications: OPEN ANGLE GLAUCOMA       Allergies   Allergen Reactions    Codeine Shortness of Breath    Lipitor [Atorvastatin] Nausea Only     Family History   Problem Relation Age of Onset    Diabetes Mother     Stroke Father      Social History   Substance Use Topics    Smoking status: Former Smoker     Quit date: 1/15/1984    Smokeless tobacco: Never Used      Comment: QUIT 1980    Alcohol use No      Comment: quit in 1980     Patient Active Problem List   Diagnosis Code    Diabetes mellitus type 2, controlled ([facility] E11.9    Mixed hyperlipidemia E78.2    Peripheral vascular disease ([facility] I73.9    Chronic ischemic heart disease I25.9    Coronary atherosclerosis of native coronary artery I25.10    Atherosclerosis of native artery of extremity with intermittent claudication (HCC) I70.219    Essential hypertension, benign I10    Atherosclerosis of native arteries of the extremities, unspecified I70.209    Chest pain R07.9    S/P coronary artery stent placement Z95.5    Abnormal nuclear stress test R94.39    CAD (coronary artery disease) I25.10    Hyperlipidemia E78.5    GERD (gastroesophageal reflux disease) K21.9    ASHD (arteriosclerotic heart disease) I25.10    Leukocytosis D72.829    Chest pain, atypical R07.89    S/P angioplasty with stent--4/15 Z95.9    CLL [facility]chronic lymphocytic leukemia) ([facility] C91.90    Rotator cuff arthropathy M12.819    S/P CABG x 3 Z95.1    Opacity of lung on imaging study R91.8    Bronchiectasis ([facility] J47.9    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 Functional Ability and Level of Safety:   Hearing Loss  Hearing is good. Activities of Daily Living  The home contains: no safety equipment. Patient does total self care    Fall Risk  Fall Risk Assessment, last 12 mths 8/8/2018   Able to walk? Yes   Fall in past 12 months? No       Abuse Screen  Patient is not abused    Cognitive Screening   Evaluation of Cognitive Function:  Has your family/caregiver stated any concerns about your memory: no  Normal    Patient Care Team   Patient Care Team:  Sylvia Salazar MD as PCP - General (Internal Medicine)  Kj Montano RN as Nurse Navigator (Cardiology)  Tami Stanley MD as Physician (Cardiology)  Gisella Harman NP as Nurse Practitioner (Nurse Practitioner)  Keysha Vega MD as Surgeon (Cardiothoracic Surgery)  JONAH Haque, RN as Ambulatory Care Navigator    Assessment/Plan   Education and counseling provided:  Are appropriate based on today's review and evaluation    Diagnoses and all orders for this visit:    1. Medicare annual wellness visit, subsequent    2. Screening for alcoholism  -     Annual  Alcohol Screen 15 min ()    3. Screening for depression  -     Depression Screen Annual    4. Type 2 diabetes with nephropathy (HCC)  -     CBC WITH AUTOMATED DIFF  -     METABOLIC PANEL, BASIC  -     COLLECTION VENOUS BLOOD,VENIPUNCTURE  -     HEMOGLOBIN A1C WITH EAG    5. Bronchiectasis without complication ([facility]    6. CLL (chronic lymphocytic leukemia) ([facility]    7. Peripheral vascular disease ([facility]    8.  Atherosclerosis of native artery of both lower extremities with intermittent claudication (HCC)    9. Hyperlipidemia, unspecified hyperlipidemia type    10. Gastroesophageal reflux disease with esophagitis    11. Essential hypertension, benign    12. Frequency of urination  -     URINALYSIS W/ RFLX MICROSCOPIC  -     CULTURE, URINE        Health Maintenance Due   Topic Date Due    MEDICARE YEARLY EXAM  06/01/2018     [facility]  Rose Balderas MD, [address] [address]  Phone:  [phone]  Fax: [phone]      Chief Complaint   Patient presents with    Annual Wellness Visit         SUBECTIVE:    Lina Orozco is a 80 y.o. male  Dictation on: 08/08/2018 10:48 AM by: Nora Colon [[id number]]         Current Outpatient Prescriptions   Medication Sig Dispense Refill    glucose blood VI test strips (ONETOUCH ULTRA TEST) strip Use to test blood sugar twice a day 200 Strip 3    cyanocobalamin 1,000 mcg tablet Take 1,000 mcg by mouth daily.  amLODIPine (NORVASC) 2.5 mg tablet TAKE 1 TABLET DAILY 90 Tab 0    omeprazole (PRILOSEC) 20 mg capsule TAKE 1 CAPSULE DAILY 90 Cap 1    simvastatin (ZOCOR) 40 mg tablet TAKE 1 TABLET DAILY IN THE EVENING 90 Tab 3    metFORMIN (GLUCOPHAGE) 500 mg tablet TAKE 1 TABLET DAILY 90 Tab 3    allopurinol (ZYLOPRIM) 300 mg tablet TAKE 1 TABLET DAILY 90 Tab 7    metoprolol tartrate (LOPRESSOR) 25 mg tablet TAKE 1 TABLET TWICE A  Tab 3    acetaminophen (TYLENOL) 325 mg tablet Take 2 Tabs by mouth every six (6) hours as needed for Pain. 20 Tab 0    aspirin 81 mg chewable tablet Take 81 mg by mouth daily.  brimonidine (ALPHAGAN) 0.15 % ophthalmic solution Administer 1 Drop to both eyes two (2) times a day.  Indications: OPEN ANGLE GLAUCOMA       Past Medical History:   Diagnosis Date    Abnormal nuclear stress test 6/6/2014    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 ([facility] 11/2/2015    Bronchiectasis ([facility]     CAD (coronary artery disease)     Chest pain, unspecified     Chronic pain     right leg    CLL (chronic lymphocytic leukemia) (HCC)     Diabetes ([facility]     Essential hypertension     GERD (gastroesophageal reflux disease)     Hyperlipidemia     Hypertension     Opacity of lung on imaging study 2017    cxr    Rotator cuff arthropathy     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    S/P coronary artery stent placement 2014 PCI/GUANAKO to prox LAD     Past Surgical History:   Procedure Laterality Date    HX COLONOSCOPY      HX HEART CATHETERIZATION      PTCA SINGLE VESSEL  2015         VASCULAR SURGERY PROCEDURE UNLIST   and     BLE stenting     Allergies   Allergen Reactions    Codeine Shortness of Breath    Lipitor [Atorvastatin] Nausea Only       REVIEW OF SYSTEMS:    Dictation on: 2018 10:57 AM by: Federico Newsome         Social History     Social History    Marital status:      Spouse name: N/A    Number of children: N/A    Years of education: N/A     Social History Main Topics    Smoking status: Former Smoker     Quit date: 1/15/1984    Smokeless tobacco: Never Used      Comment: QUIT     Alcohol use No      Comment: quit in     Drug use: No    Sexual activity: Yes     Partners: Female     Other Topics Concern    None     Social History Narrative    Family History: Mother:  61 yrs, DM complicationsFather:  80 yrs, strokeSister(s):  76 yrs, pneumonia, DM,    HTNBrother(s):  [place] 44 yrs, DM, HTN, CVAChildren: aliveGrandmother: unknow nGrandfather: deceasedSpouse: deceased1 brother(s) -    healthy. 2daughter(s) - healthy. Social History: Alcohol Use Patient does not use alcohol. Smoking Status Patient is a former smoker, Quit in: 36. Caffeine: none. Marital    Status: W idow . Lives w ith: alone. Children: yes 2 d.  Education/School: has highschool diploma, college 2 y.   r  Family History   Problem Relation Age of Onset    Diabetes Mother     Stroke Father        OBJECTIVE:  Visit Vitals    /67    Pulse 61    Temp 97.8 °F (36.6 °C) (Oral)    Resp 18    Ht 5' 7\" (1.702 m)    Wt 175 lb (79.4 kg)    SpO2 94%    BMI 27.41 kg/m2     ENT: perrla,  eom intact  NECK: supple. Thyroid normal  CHEST: clear to ascultation and percussion   HEART: regular rate and rhythm  ABD: soft, bowel sounds active  EXTREMITIES: no edema, pulse 1+     No visits with results within 3 Month(s) from this visit. Latest known visit with results is:    Office Visit on 04/04/2018   Component Date Value Ref Range Status    WBC 04/04/2018 58.9* 3.4 - 10.8 x10E3/uL Final    RBC 04/04/2018 4.39  4.14 - 5.80 x10E6/uL Final    HGB 04/04/2018 14.2  13.0 - 17.7 g/dL Final    HCT 04/04/2018 42.5  37.5 - 51.0 % Final    MCV 04/04/2018 97  79 - 97 fL Final    MCH 04/04/2018 32.3  26.6 - 33.0 pg Final    MCHC 04/04/2018 33.4  31.5 - 35.7 g/dL Final    RDW 04/04/2018 15.3  12.3 - 15.4 % Final    PLATELET 16/47/7410 416  150 - 379 x10E3/uL Final    NEUTROPHILS 04/04/2018 6  Not Estab. % Final    Lymphocytes 04/04/2018 92  Not Estab. % Final    Comment: Smudge cells present  Atypical lymphocytes.  MONOCYTES 04/04/2018 2  Not Estab. % Final    EOSINOPHILS 04/04/2018 0  Not Estab. % Final    BASOPHILS 04/04/2018 0  Not Estab. % Final    ABS. NEUTROPHILS 04/04/2018 3.5  1.4 - 7.0 x10E3/uL Final    Abs Lymphocytes 04/04/2018 54.2* 0.7 - 3.1 x10E3/uL Final    ABS. MONOCYTES 04/04/2018 1.2* 0.1 - 0.9 x10E3/uL Final    ABS. EOSINOPHILS 04/04/2018 0.0  0.0 - 0.4 x10E3/uL Final    ABS. BASOPHILS 04/04/2018 0.0  0.0 - 0.2 x10E3/uL Final    Hematology comments: 04/04/2018 Note:   Final    Manual differential was performed.     Hemoglobin A1c 04/04/2018 6.2* 4.8 - 5.6 % Final    Comment:          Pre-diabetes: 5.7 - 6.4           Diabetes: >6.4           Glycemic control for adults with diabetes: <7.0      Estimated average glucose 04/04/2018 131  mg/dL Final    Specific Gravity 04/04/2018 1.021  1.005 - 1.030 Final    pH (UA) 04/04/2018 5.5  5.0 - 7.5 Final    Color 04/04/2018 Yellow  Yellow Final    Appearance 04/04/2018 Clear  Clear Final    Leukocyte Esterase 04/04/2018 Negative  Negative Final    Protein 04/04/2018 Negative  Negative/Trace Final    Glucose 04/04/2018 Negative  Negative Final    Ketone 04/04/2018 Negative  Negative Final    Blood 04/04/2018 Negative  Negative Final    Bilirubin 04/04/2018 Negative  Negative Final    Urobilinogen 04/04/2018 0.2  0.2 - 1.0 mg/dL Final    Nitrites 04/04/2018 Negative  Negative Final    Microscopic Examination 04/04/2018 Comment   Final    Microscopic not indicated and not performed.  Glucose 04/04/2018 74  65 - 99 mg/dL Final    BUN 04/04/2018 16  8 - 27 mg/dL Final    Creatinine 04/04/2018 1.14  0.76 - 1.27 mg/dL Final    GFR est non-AA 04/04/2018 59* >59 mL/min/1.73 Final    GFR est AA 04/04/2018 68  >59 mL/min/1.73 Final    BUN/Creatinine ratio 04/04/2018 14  10 - 24 Final    Sodium 04/04/2018 140  134 - 144 mmol/L Final    Potassium 04/04/2018 4.6  3.5 - 5.2 mmol/L Final    Chloride 04/04/2018 98  96 - 106 mmol/L Final    CO2 04/04/2018 23  18 - 29 mmol/L Final    Calcium 04/04/2018 9.9  8.6 - 10.2 mg/dL Final          ASSESSMENT:  1. Medicare annual wellness visit, subsequent    2. Screening for alcoholism    3. Screening for depression    4. Type 2 diabetes with nephropathy ([address]    5. Bronchiectasis without complication ([address]    6. CLL (chronic lymphocytic leukemia) ([address]    7. Peripheral vascular disease ([address]    8. Atherosclerosis of native artery of both lower extremities with intermittent claudication ([address]    9. Hyperlipidemia, unspecified hyperlipidemia type    10. Gastroesophageal reflux disease with esophagitis    11. Essential hypertension, benign    12.  Frequency of urination       Dictation on: 08/08/2018 10:59 AM by: Alycia Kuhn       I have discussed the diagnosis with the patient and the intended plan as seen in the  orders above. The patient understands and agees with the plan. The patient has   received an after visit summary and questions were answered concerning  future plans  Patient labs and/or xrays were reviewed  Past records were reviewed. PLAN:  .  Orders Placed This Encounter    Depression Screen Annual    CULTURE, URINE    CBC WITH AUTOMATED DIFF    METABOLIC PANEL, BASIC    HEMOGLOBIN A1C WITH EAG    URINALYSIS W/ RFLX MICROSCOPIC       Follow-up Disposition:  Return in about 4 months (around 12/8/2018). ATTENTION:   This medical record was transcribed using an electronic medical records system. Although proofread, it may and can contain electronic and spelling errors. Other human spelling and other errors may be present. Corrections may be executed at a later time. Please feel free to contact us for any clarifications as needed.

## 2018-08-08 NOTE — MR AVS SNAPSHOT
[address] [place] [address]. [address] [phone] 
[phone] Patient: Judy Salguero MRN: [id number] Visit Information Date & Time Provider Department Dept. Phone Encounter #  
 8/8/2018 10:30 AM Marlon Monroe MD [facility] [[email or web address]] Sports Medicine and Primary Care [phone] [id number] Follow-up Instructions Return in about 4 months (around 12/8/2018). Follow-up and Disposition History Upcoming Health Maintenance Date Due  
 MEDICARE YEARLY EXAM 6/1/2018 Influenza Age 5 to Adult 8/8/2018* MICROALBUMIN Q1 9/22/2018 LIPID PANEL Q1 9/22/2018 HEMOGLOBIN A1C Q6M 10/4/2018 FOOT EXAM Q1 4/4/2019 EYE EXAM RETINAL OR DILATED Q1 5/21/2019 GLAUCOMA SCREENING Q2Y 5/21/2020 DTaP/Tdap/Td series (2 - Td) 5/18/2026 *Topic was postponed. The date shown is not the original due date. Allergies as of 8/8/2018  Review Complete On: 8/8/2018 By: Marlon Monroe MD  
  
 Severity Noted Reaction Type Reactions Codeine  05/31/2012    Shortness of Breath Lipitor [Atorvastatin]  06/27/2013   Intolerance Nausea Only Current Immunizations  Reviewed on 11/9/2015 Name Date Influenza High Dose Vaccine PF 9/22/2017 Influenza Vaccine (Quad) PF 11/2/2015  3:31 PM  
 Influenza Vaccine PF 12/5/2013  8:10 AM  
 Pneumococcal Vaccine (Unspecified Type) 1/1/2012 Not reviewed this visit You Were Diagnosed With   
  
 Codes Comments Medicare annual wellness visit, subsequent    -  Primary ICD-10-CM: Z00.00 ICD-9-CM: V70.0 Screening for alcoholism     ICD-10-CM: Z13.89 ICD-9-CM: V79.1 Screening for depression     ICD-10-CM: Z13.89 ICD-9-CM: V79.0 Type 2 diabetes with nephropathy (HCC)     ICD-10-CM: E11.21 
ICD-9-CM: 250.40, 583.81 Bronchiectasis without complication ([facility]     ECO-56-EB: J47.9 ICD-9-CM: 494.0   
 CLL (chronic lymphocytic leukemia) (HCC)     ICD-10-CM: C91.90 
 ICD-9-CM: 204.10 Peripheral vascular disease ([facility]     ICD-10-CM: I73.9 ICD-9-CM: 443.9 Atherosclerosis of native artery of both lower extremities with intermittent claudication ([facility]     ICD-10-CM: D26.817 ICD-9-CM: 440.21 Hyperlipidemia, unspecified hyperlipidemia type     ICD-10-CM: E78.5 ICD-9-CM: 272.4 Gastroesophageal reflux disease with esophagitis     ICD-10-CM: K21.0 ICD-9-CM: 530.11 Essential hypertension, benign     ICD-10-CM: I10 
ICD-9-CM: 401.1 Frequency of urination     ICD-10-CM: R35.0 ICD-9-CM: 788.41 Vitals BP Pulse Temp Resp Height(growth percentile) Weight(growth percentile) 123/67 61 97.8 °F (36.6 °C) (Oral) 18 5' 7\" (1.702 m) 175 lb (79.4 kg) SpO2 BMI Smoking Status 94% 27.41 kg/m2 Former Smoker Vitals History BMI and BSA Data Body Mass Index Body Surface Area  
 27.41 kg/m 2 1.94 m 2 Preferred Pharmacy Pharmacy Name Phone Tesha Reece, [facility] [phone] Your Updated Medication List  
  
   
This list is accurate as of 8/8/18 11:11 AM.  Always use your most recent med list.  
  
  
  
  
 acetaminophen 325 mg tablet Commonly known as:  TYLENOL Take 2 Tabs by mouth every six (6) hours as needed for Pain. allopurinol 300 mg tablet Commonly known as:  ZYLOPRIM  
TAKE 1 TABLET DAILY  
  
 amLODIPine 2.5 mg tablet Commonly known as:  [place] Aldo TAKE 1 TABLET DAILY  
  
 aspirin 81 mg chewable tablet Take 81 mg by mouth daily. brimonidine 0.15 % ophthalmic solution Commonly known as:  Judi Wilson Administer 1 Drop to both eyes two (2) times a day. Indications: OPEN ANGLE GLAUCOMA  
  
 cyanocobalamin 1,000 mcg tablet Take 1,000 mcg by mouth daily. glucose blood VI test strips strip Commonly known as:  ONETOUCH ULTRA TEST Use to test blood sugar twice a day  
  
 metFORMIN 500 mg tablet Commonly known as:  GLUCOPHAGE  
 TAKE 1 TABLET DAILY  
  
 metoprolol tartrate 25 mg tablet Commonly known as:  LOPRESSOR  
TAKE 1 TABLET TWICE A DAY  
  
 omeprazole 20 mg capsule Commonly known as:  PRILOSEC  
TAKE 1 CAPSULE DAILY  
  
 simvastatin 40 mg tablet Commonly known as:  ZOCOR  
TAKE 1 TABLET DAILY IN THE EVENING We Performed the Following CBC WITH AUTOMATED DIFF [65465 CPT(R)] COLLECTION VENOUS BLOOD,VENIPUNCTURE [id number] CPT(R)] CULTURE, URINE [id number] CPT(R)] [address] [[id number] [facility]] HEMOGLOBIN A1C WITH EAG [25586 CPT(R)] METABOLIC PANEL, BASIC [64489 CPT(R)] HI ANNUAL ALCOHOL SCREEN 15 MIN [id number] [facility]] URINALYSIS W/ RFLX MICROSCOPIC [34117 CPT(R)] Follow-up Instructions Return in about 4 months (around 12/8/2018). Patient Instructions Medicare Wellness Visit, Male The best way to live healthy is to have a lifestyle where you eat a well-balanced diet, exercise regularly, limit alcohol use, and quit all forms of tobacco/nicotine, if applicable. Regular preventive services are another way to keep healthy. Preventive services (vaccines, screening tests, monitoring & exams) can help personalize your care plan, which helps you manage your own care. Screening tests can find health problems at the earliest stages, when they are easiest to treat. [address] follows the current, evidence-based guidelines published by the [facility] States John Selina ([facility]) when recommending preventive services for our patients. Because we follow these guidelines, sometimes recommendations change over time as research supports it. (For example, a prostate screening blood test is no longer routinely recommended for men with no symptoms.) Of course, you and your provider may decide to screen more often for some diseases, based on your risk and co-morbidities (chronic disease you are already diagnosed with). Preventive services for you include: - Medicare offers their members a free annual wellness visit, which is time for you and your primary care provider to discuss and plan for your preventive service needs. Take advantage of this benefit every year! 
 
-All people over age 72 should receive the recommended pneumonia vaccines. Current USPSTF guidelines recommend a series of two vaccines for the best pneumonia protection.  
 
-All adults should have a yearly flu vaccine and a tetanus vaccine every 10 years. All adults age 61 years should receive a shingles vaccine once in their lifetime.   
 
-All adults age 38-68 years who are overweight should have a diabetes screening test once every three years.  
 
-Other screening tests & preventive services for persons with diabetes include: an eye exam to screen for diabetic retinopathy, a kidney function test, a foot exam, and stricter control over your cholesterol.  
 
-Cardiovascular screening for adults with routine risk involves an electrocardiogram (ECG) at intervals determined by the provider.  
 
-Colorectal cancer screenings should be done for adults age 54-65 years with normal risk. There are a number of acceptable methods of screening for this type of cancer. Each test has its own benefits and drawbacks. Discuss with your provider what is most appropriate for you during your annual wellness visit. The different tests include: colonoscopy (considered the best screening method), a fecal occult blood test, a fecal DNA test, and sigmoidoscopy. 
 
-All adults born between [place] should be screened once for Hepatitis C. 
 
-An Abdominal Aortic Aneurysm (AAA) Screening is recommended for men age 73-68 who has ever smoked in their lifetime. Here is a list of your current Health Maintenance items (your personalized list of preventive services) with a due date: 
Health Maintenance Due Topic Date Due  
 Annual Well Visit  06/01/2018  Flu Vaccine  08/01/2018 Introducing [facility]! [employer] introduces [employer] patient portal. Now you can access parts of your medical record, email your doctor's office, and request medication refills online. 1. In your internet browser, go to [email or web address] 2. Click on the First Time User? Click Here link in the Sign In box. You will see the New Member Sign Up page. 3. Enter your [employer] Access Code exactly as it appears below. You will not need to use this code after youve completed the sign-up process. If you do not sign up before the expiration date, you must request a new code. · [employer] Access Code: [email or web address] Expires: 11/6/2018 11:11 AM 
 
4. Enter the last four digits of your Social Security Number (xxxx) and Date of Birth (mm/dd/yyyy) as indicated and click Submit. You will be taken to the next sign-up page. 5. Create a [employer] ID. This will be your [employer] login ID and cannot be changed, so think of one that is secure and easy to remember. 6. Create a [employer] password. You can change your password at any time. 7. Enter your Password Reset Question and Answer. This can be used at a later time if you forget your password. 8. Enter your e-mail address. You will receive e-mail notification when new information is available in [address] 9. Click Sign Up. You can now view and download portions of your medical record. 10. Click the Download Summary menu link to download a portable copy of your medical information. If you have questions, please visit the Frequently Asked Questions section of the [employer] website. Remember, [employer] is NOT to be used for urgent needs. For medical emergencies, dial 911. Now available from your iPhone and Android! Please provide this summary of care documentation to your next provider. Your primary care clinician is listed as Lupillo Ray  If you have any questions after today's visit, please call [phone].

## 2018-08-09 LAB
APPEARANCE UR: CLEAR
BACTERIA UR CULT: NO GROWTH
BASOPHILS # BLD AUTO: 0.5 X10E3/UL (ref 0–0.2)
BASOPHILS NFR BLD AUTO: 1 %
BILIRUB UR QL STRIP: NEGATIVE
BUN SERPL-MCNC: 14 MG/DL (ref 8–27)
BUN/CREAT SERPL: 12 (ref 10–24)
CALCIUM SERPL-MCNC: 9.7 MG/DL (ref 8.6–10.2)
CHLORIDE SERPL-SCNC: 102 MMOL/L (ref 96–106)
CO2 SERPL-SCNC: 23 MMOL/L (ref 20–29)
COLOR UR: YELLOW
CREAT SERPL-MCNC: 1.15 MG/DL (ref 0.76–1.27)
EOSINOPHIL # BLD AUTO: 0 X10E3/UL (ref 0–0.4)
EOSINOPHIL NFR BLD AUTO: 0 %
ERYTHROCYTE [DISTWIDTH] IN BLOOD BY AUTOMATED COUNT: 15.4 % (ref 12.3–15.4)
EST. AVERAGE GLUCOSE BLD GHB EST-MCNC: 137 MG/DL
GLUCOSE SERPL-MCNC: 88 MG/DL (ref 65–99)
GLUCOSE UR QL: NEGATIVE
HBA1C MFR BLD: 6.4 % (ref 4.8–5.6)
HCT VFR BLD AUTO: 42.5 % (ref 37.5–51)
HGB BLD-MCNC: 14 G/DL (ref 13–17.7)
HGB UR QL STRIP: NEGATIVE
KETONES UR QL STRIP: NEGATIVE
LEUKOCYTE ESTERASE UR QL STRIP: NEGATIVE
LYMPHOCYTES # BLD AUTO: 50.8 X10E3/UL (ref 0.7–3.1)
LYMPHOCYTES NFR BLD AUTO: 94 %
MCH RBC QN AUTO: 32 PG (ref 26.6–33)
MCHC RBC AUTO-ENTMCNC: 32.9 G/DL (ref 31.5–35.7)
MCV RBC AUTO: 97 FL (ref 79–97)
MICRO URNS: NORMAL
MONOCYTES # BLD AUTO: 1.1 X10E3/UL (ref 0.1–0.9)
MONOCYTES NFR BLD AUTO: 2 %
MORPHOLOGY BLD-IMP: ABNORMAL
NEUTROPHILS # BLD AUTO: 1.6 X10E3/UL (ref 1.4–7)
NEUTROPHILS NFR BLD AUTO: 3 %
NITRITE UR QL STRIP: NEGATIVE
PH UR STRIP: 6 [PH] (ref 5–7.5)
PLATELET # BLD AUTO: 173 X10E3/UL (ref 150–379)
POTASSIUM SERPL-SCNC: 4.9 MMOL/L (ref 3.5–5.2)
PROT UR QL STRIP: NEGATIVE
RBC # BLD AUTO: 4.37 X10E6/UL (ref 4.14–5.8)
SODIUM SERPL-SCNC: 138 MMOL/L (ref 134–144)
SP GR UR: 1.01 (ref 1–1.03)
UROBILINOGEN UR STRIP-MCNC: 0.2 MG/DL (ref 0.2–1)
WBC # BLD AUTO: 54 X10E3/UL (ref 3.4–10.8)

## 2018-08-16 RX ORDER — AMLODIPINE BESYLATE 2.5 MG/1
TABLET ORAL
Qty: 90 TAB | Refills: 3 | Status: SHIPPED | OUTPATIENT
Start: 2018-08-16 | End: 2019-08-12 | Stop reason: SDUPTHER

## 2018-08-19 NOTE — PROGRESS NOTES
The patient returns today with his daughter with a known history  of diabetes mellitus type 2 whose control has been excellent with  hemoglobin A1c's generally less than 6.5, coronary artery  disease, chronic lymphocytic leukemia followed by Chan Soares MD and is stable, primary hypertension and  dyslipidemia. Since I last saw him, he was seen by Dr. Beto Romero who gave him a six month appointment and is stable  currently.

## 2018-08-19 NOTE — PROGRESS NOTES
The patient's medical status is stable. We will assess blood sugar control with a hemoglobin A1c. At the  same time, we will also check his embolic status and blood count. He complained of urinary frequency and nocturia x 4 but he drinks  water all the way up to the time he goes to bed. We suggested he  stop drinking water at 6 o'clock. He would prefer not to take  Flomax. He is not sure if he wants to see a urologist or not. If he does, we will refer him to Dr. Rashaad Espinoza at [facility]. We do not particularly recommend PSA test after the age of 79 but  he is concerned because his brother had prostate cancer. Blood pressure control is excellent. BMI is at ideal body  weight. He will return to the office in four months, sooner if  he has any problems.

## 2018-08-28 RX ORDER — OMEPRAZOLE 20 MG/1
CAPSULE, DELAYED RELEASE ORAL
Qty: 90 CAP | Refills: 1 | Status: SHIPPED | OUTPATIENT
Start: 2018-08-28 | End: 2022-07-05 | Stop reason: ALTCHOICE

## 2018-09-10 RX ORDER — METOPROLOL TARTRATE 25 MG/1
TABLET, FILM COATED ORAL
Qty: 180 TAB | Refills: 3 | Status: SHIPPED | OUTPATIENT
Start: 2018-09-10 | End: 2019-09-12 | Stop reason: SDUPTHER

## 2018-12-04 RX ORDER — ALLOPURINOL 300 MG/1
TABLET ORAL
Qty: 90 TAB | Refills: 7 | Status: SHIPPED | OUTPATIENT
Start: 2018-12-04 | End: 2019-06-29

## 2018-12-24 RX ORDER — METFORMIN HYDROCHLORIDE 500 MG/1
TABLET ORAL
Qty: 90 TAB | Refills: 3 | Status: SHIPPED | OUTPATIENT
Start: 2018-12-24 | End: 2019-12-21

## 2019-01-09 ENCOUNTER — OFFICE VISIT (OUTPATIENT)
Dept: INTERNAL MEDICINE CLINIC | Age: 84
End: 2019-01-09

## 2019-01-09 VITALS
BODY MASS INDEX: 27.26 KG/M2 | WEIGHT: 173.7 LBS | HEIGHT: 67 IN | HEART RATE: 60 BPM | DIASTOLIC BLOOD PRESSURE: 61 MMHG | RESPIRATION RATE: 18 BRPM | OXYGEN SATURATION: 94 % | SYSTOLIC BLOOD PRESSURE: 99 MMHG | TEMPERATURE: 97.5 F

## 2019-01-09 DIAGNOSIS — I25.9 CHRONIC ISCHEMIC HEART DISEASE: ICD-10-CM

## 2019-01-09 DIAGNOSIS — I25.10 ASHD (ARTERIOSCLEROTIC HEART DISEASE): ICD-10-CM

## 2019-01-09 DIAGNOSIS — I10 ESSENTIAL HYPERTENSION, BENIGN: ICD-10-CM

## 2019-01-09 DIAGNOSIS — I25.10 ATHEROSCLEROSIS OF NATIVE CORONARY ARTERY OF NATIVE HEART WITHOUT ANGINA PECTORIS: ICD-10-CM

## 2019-01-09 DIAGNOSIS — E78.5 HYPERLIPIDEMIA, UNSPECIFIED HYPERLIPIDEMIA TYPE: ICD-10-CM

## 2019-01-09 DIAGNOSIS — E11.21 TYPE 2 DIABETES WITH NEPHROPATHY (HCC): ICD-10-CM

## 2019-01-09 DIAGNOSIS — K21.00 GASTROESOPHAGEAL REFLUX DISEASE WITH ESOPHAGITIS: ICD-10-CM

## 2019-01-09 DIAGNOSIS — E11.9 CONTROLLED TYPE 2 DIABETES MELLITUS WITHOUT COMPLICATION, WITHOUT LONG-TERM CURRENT USE OF INSULIN (HCC): Primary | ICD-10-CM

## 2019-01-09 NOTE — PROGRESS NOTES
SPORTS MEDICINE AND PRIMARY CARE  Apolonio Aschoff, MD, [address] [place] [address],[address] [address]  Phone:  [phone]  Fax: [phone]       Chief Complaint   Patient presents with    Hypertension     f/u   . SUBJECTIVE:    Stevenson Guzman is a 80 y.o. male Patient returns today with his daughter with known history of diabetes mellitus type 2, hypertension, coronary artery disease, CLL, diabetes and is seen for evaluation. Current Outpatient Medications   Medication Sig Dispense Refill    metFORMIN (GLUCOPHAGE) 500 mg tablet TAKE 1 TABLET DAILY 90 Tab 3    allopurinol (ZYLOPRIM) 300 mg tablet TAKE 1 TABLET DAILY 90 Tab 7    metoprolol tartrate (LOPRESSOR) 25 mg tablet TAKE 1 TABLET TWICE A  Tab 3    omeprazole (PRILOSEC) 20 mg capsule TAKE 1 CAPSULE DAILY 90 Cap 1    amLODIPine (NORVASC) 2.5 mg tablet TAKE 1 TABLET DAILY 90 Tab 3    glucose blood VI test strips (ONETOUCH ULTRA TEST) strip Use to test blood sugar twice a day 200 Strip 3    cyanocobalamin 1,000 mcg tablet Take 1,000 mcg by mouth daily.  simvastatin (ZOCOR) 40 mg tablet TAKE 1 TABLET DAILY IN THE EVENING 90 Tab 3    acetaminophen (TYLENOL) 325 mg tablet Take 2 Tabs by mouth every six (6) hours as needed for Pain. 20 Tab 0    aspirin 81 mg chewable tablet Take 81 mg by mouth daily.  brimonidine (ALPHAGAN) 0.15 % ophthalmic solution Administer 1 Drop to both eyes two (2) times a day.  Indications: OPEN ANGLE GLAUCOMA       Past Medical History:   Diagnosis Date    Abnormal nuclear stress test 6/6/2014    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 ([address] 11/2/2015    Bronchiectasis ([address]     CAD (coronary artery disease)     Chest pain, unspecified     Chronic pain     right leg    CLL (chronic lymphocytic leukemia) (HCC)     Diabetes ([address]     Essential hypertension     GERD (gastroesophageal reflux disease)     Hyperlipidemia     Hypertension     Opacity of lung on imaging study 2017    cxr    Rotator cuff arthropathy     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    S/P coronary artery stent placement 2014 PCI/GUANAKO to prox LAD     Past Surgical History:   Procedure Laterality Date    HX COLONOSCOPY      HX HEART CATHETERIZATION      PTCA SINGLE VESSEL  2015         VASCULAR SURGERY PROCEDURE UNLIST   and     BLE stenting     Allergies   Allergen Reactions    Codeine Shortness of Breath    Lipitor [Atorvastatin] Nausea Only         REVIEW OF SYSTEMS:  General: negative for - chills or fever  ENT: negative for - headaches, nasal congestion or tinnitus  Respiratory: negative for - cough, hemoptysis, shortness of breath or wheezing  Cardiovascular : negative for - chest pain, edema, palpitations or shortness of breath  Gastrointestinal: negative for - abdominal pain, blood in stools, heartburn or nausea/vomiting  Genito-Urinary: no dysuria, trouble voiding, or hematuria  Musculoskeletal: negative for - gait disturbance, joint pain, joint stiffness or joint swelling  Neurological: no TIA or stroke symptoms  Hematologic: no bruises, no bleeding, no swollen glands  Integument: no lumps, mole changes, nail changes or rash  Endocrine: no malaise/lethargy or unexpected weight changes      Social History     Socioeconomic History    Marital status:      Spouse name: Not on file    Number of children: Not on file    Years of education: Not on file    Highest education level: Not on file   Tobacco Use    Smoking status: Former Smoker     Last attempt to quit: 1/15/1984     Years since quittin.0    Smokeless tobacco: Never Used    Tobacco comment: QUIT    Substance and Sexual Activity    Alcohol use: No     Alcohol/week: 0.0 oz     Comment: quit in     Drug use: No    Sexual activity: Yes     Partners: Female   Social History Narrative    Family History:  Mother:  61 yrs, DM complicationsFather:  80 yrs, strokeSister(s):  76 yrs, pneumonia, DM,    HTNBrother(s):  61 yrs, DM, HTN, CVAChildren: aliveGrandmother: unknow nGrandfather: deceasedSpouse: deceased1 brother(s) -    healthy. 2daughter(s) - healthy. Social History: Alcohol Use Patient does not use alcohol. Smoking Status Patient is a former smoker, Quit in: 36. Caffeine: none. Marital    Status: W idow . Lives w ith: alone. Children: yes 2 d. Education/School: has highschool diploma, college 2 y. Family History   Problem Relation Age of Onset    Diabetes Mother     Stroke Father        OBJECTIVE:    Visit Vitals  BP 99/61   Pulse 60   Temp 97.5 °F (36.4 °C) (Oral)   Resp 18   Ht 5' 7\" (1.702 m)   Wt 173 lb 11.2 oz (78.8 kg)   SpO2 94%   BMI 27.21 kg/m²     CONSTITUTIONAL: well , well nourished, appears age appropriate  EYES: perrla, eom intact  ENMT:moist mucous membranes, pharynx clear  NECK: supple. Thyroid normal  RESPIRATORY: Chest: clear bilaterally   CARDIOVASCULAR: Heart: regular rate and rhythm  GASTROINTESTINAL: Abdomen: soft, bowel sounds active  HEMATOLOGIC: no pathological lymph nodes palpated  MUSCULOSKELETAL: Extremities: no edema, pulse 1+   INTEGUMENT: No unusual rashes or suspicious skin lesions noted. Nails appear normal.  NEUROLOGIC: non-focal exam   MENTAL STATUS: alert and oriented, appropriate affect           ASSESSMENT:  1. Controlled type 2 diabetes mellitus without complication, without long-term current use of insulin ([address]    2. Chronic ischemic heart disease    3. Atherosclerosis of native coronary artery of native heart without angina pectoris    4. Essential hypertension, benign    5. Hyperlipidemia, unspecified hyperlipidemia type    6. Gastroesophageal reflux disease with esophagitis    7. ASHD (arteriosclerotic heart disease)    8. Type 2 diabetes with nephropathy (HCC)      Patient's eyes are getting a little cloudy. He is about ready to have cataract surgery probably in the spring. He has no other new complaints. He gave up part of his job, but is keeping the other part. His blood pressure control is adequate, at the lower limits of normal. We will continue to monitor it. Beka Horton is going to take it when she gets home to be sure it is reasonable. He has had no chest pain or shortness of breath, goes up and down the stairs freely, to that level of activity, will ask him to walk for 30 minutes five days a week. We will assess blood sugar control with hemoglobin A1c and send him results in the mail. He will be back to see us in four months, sooner if he has any problems. Reminded him he can walk in to see us at any time if he has an issue and unable to get an appointment. I have discussed the diagnosis with the patient and the intended plan as seen in the  orders above. The patient understands and agees with the plan. The patient has   received an after visit summary and questions were answered concerning  future plans  Patient labs and/or xrays were reviewed  Past records were reviewed. PLAN:  .  Orders Placed This Encounter    LIPID PANEL    MICROALBUMIN, UR, RAND W/ MICROALB/CREAT RATIO    HEMOGLOBIN A1C WITH EAG    RENAL FUNCTION PANEL    CBC WITH AUTOMATED DIFF       Follow-up Disposition:  Return in about 4 months (around 5/9/2019). ATTENTION:   This medical record was transcribed using an electronic medical records system. Although proofread, it may and can contain electronic and spelling errors. Other human spelling and other errors may be present. Corrections may be executed at a later time. Please feel free to contact us for any clarifications as needed.

## 2019-01-09 NOTE — PROGRESS NOTES
1. Have you been to the ER, urgent care clinic since your last visit? Hospitalized since your last visit? No    2. Have you seen or consulted any other health care providers outside of the [address] [place] [address] since your last visit? Include any pap smears or colon screening.  No     Wants to discuss eye surgery

## 2019-01-10 LAB
ALBUMIN SERPL-MCNC: 4.6 G/DL (ref 3.5–4.7)
ALBUMIN/CREAT UR: 6.6 MG/G CREAT (ref 0–30)
BASOPHILS # BLD AUTO: 0 X10E3/UL (ref 0–0.2)
BASOPHILS NFR BLD AUTO: 0 %
BUN SERPL-MCNC: 18 MG/DL (ref 8–27)
BUN/CREAT SERPL: 16 (ref 10–24)
CALCIUM SERPL-MCNC: 9.7 MG/DL (ref 8.6–10.2)
CHLORIDE SERPL-SCNC: 105 MMOL/L (ref 96–106)
CHOLEST SERPL-MCNC: 114 MG/DL (ref 100–199)
CO2 SERPL-SCNC: 20 MMOL/L (ref 20–29)
CREAT SERPL-MCNC: 1.12 MG/DL (ref 0.76–1.27)
CREAT UR-MCNC: 180.6 MG/DL
EOSINOPHIL # BLD AUTO: 0 X10E3/UL (ref 0–0.4)
EOSINOPHIL NFR BLD AUTO: 0 %
ERYTHROCYTE [DISTWIDTH] IN BLOOD BY AUTOMATED COUNT: 15.3 % (ref 12.3–15.4)
EST. AVERAGE GLUCOSE BLD GHB EST-MCNC: 134 MG/DL
GLUCOSE SERPL-MCNC: 119 MG/DL (ref 65–99)
HBA1C MFR BLD: 6.3 % (ref 4.8–5.6)
HCT VFR BLD AUTO: 42.3 % (ref 37.5–51)
HDLC SERPL-MCNC: 26 MG/DL
HGB BLD-MCNC: 14.2 G/DL (ref 13–17.7)
LDLC SERPL CALC-MCNC: 55 MG/DL (ref 0–99)
LYMPHOCYTES # BLD AUTO: 45.8 X10E3/UL (ref 0.7–3.1)
LYMPHOCYTES NFR BLD AUTO: 87 %
MCH RBC QN AUTO: 33 PG (ref 26.6–33)
MCHC RBC AUTO-ENTMCNC: 33.6 G/DL (ref 31.5–35.7)
MCV RBC AUTO: 98 FL (ref 79–97)
MICROALBUMIN UR-MCNC: 11.9 UG/ML
MONOCYTES # BLD AUTO: 2.1 X10E3/UL (ref 0.1–0.9)
MONOCYTES NFR BLD AUTO: 4 %
MORPHOLOGY BLD-IMP: ABNORMAL
NEUTROPHILS # BLD AUTO: 4.7 X10E3/UL (ref 1.4–7)
NEUTROPHILS NFR BLD AUTO: 9 %
PHOSPHATE SERPL-MCNC: 3.9 MG/DL (ref 2.5–4.5)
PLATELET # BLD AUTO: 167 X10E3/UL (ref 150–379)
POTASSIUM SERPL-SCNC: 4.7 MMOL/L (ref 3.5–5.2)
RBC # BLD AUTO: 4.3 X10E6/UL (ref 4.14–5.8)
SODIUM SERPL-SCNC: 141 MMOL/L (ref 134–144)
TRIGL SERPL-MCNC: 165 MG/DL (ref 0–149)
VLDLC SERPL CALC-MCNC: 33 MG/DL (ref 5–40)
WBC # BLD AUTO: 52.6 X10E3/UL (ref 3.4–10.8)

## 2019-01-22 RX ORDER — SIMVASTATIN 40 MG/1
TABLET, FILM COATED ORAL
Qty: 90 TAB | Refills: 3 | Status: SHIPPED | OUTPATIENT
Start: 2019-01-22 | End: 2020-01-17

## 2019-01-25 ENCOUNTER — OFFICE VISIT (OUTPATIENT)
Dept: INTERNAL MEDICINE CLINIC | Age: 84
End: 2019-01-25

## 2019-01-25 VITALS
HEIGHT: 67 IN | DIASTOLIC BLOOD PRESSURE: 68 MMHG | HEART RATE: 57 BPM | BODY MASS INDEX: 27.25 KG/M2 | TEMPERATURE: 97.6 F | WEIGHT: 173.6 LBS | OXYGEN SATURATION: 95 % | SYSTOLIC BLOOD PRESSURE: 127 MMHG | RESPIRATION RATE: 16 BRPM

## 2019-01-25 DIAGNOSIS — Z95.1 S/P CABG X 3: ICD-10-CM

## 2019-01-25 DIAGNOSIS — I25.10 CORONARY ARTERY DISEASE INVOLVING NATIVE CORONARY ARTERY OF NATIVE HEART WITHOUT ANGINA PECTORIS: ICD-10-CM

## 2019-01-25 DIAGNOSIS — I25.10 ASHD (ARTERIOSCLEROTIC HEART DISEASE): ICD-10-CM

## 2019-01-25 DIAGNOSIS — C91.10 CLL (CHRONIC LYMPHOCYTIC LEUKEMIA) (HCC): ICD-10-CM

## 2019-01-25 DIAGNOSIS — K21.00 GASTROESOPHAGEAL REFLUX DISEASE WITH ESOPHAGITIS: Primary | ICD-10-CM

## 2019-01-25 DIAGNOSIS — I10 ESSENTIAL HYPERTENSION, BENIGN: ICD-10-CM

## 2019-01-25 NOTE — PROGRESS NOTES
1. Have you been to the ER, urgent care clinic since your last visit? Hospitalized since your last visit? No 
 
2. Have you seen or consulted any other health care providers outside of the [address] [place] [address] since your last visit? Include any pap smears or colon screening. No  
 
Wants to discuss stomach issues

## 2019-01-25 NOTE — PROGRESS NOTES
[facility] Apolonio Aschoff, MD, [address] [address] Phone:  [phone]  Fax: [phone] Chief Complaint Patient presents with  Abdominal Pain Frutoso Jose Roberto SUBJECTIVE: 
  Stevenson Guzman is a 80 y.o. male Patient comes in today with his daughter with known history of coronary artery disease, CLL, diabetes mellitus, whose control is excellent, primary hypertension, GERD, and is seen for evaluation. Since we last saw him he decided to have a hot dog. Afterwards his stomach became upset for that day, but he has been concerned about it since that time. He decided to take some Prilosec and see if that would help. He called his daughter, who is an RN, and advised her of the same, and she gave him the most appropriate advice. In spite of all those items he decided to come in for a visit to be sure nothing else is wrong and comes in today asymptomatic. Current Outpatient Medications Medication Sig Dispense Refill  simvastatin (ZOCOR) 40 mg tablet TAKE 1 TABLET DAILY IN THE EVENING 90 Tab 3  
 metFORMIN (GLUCOPHAGE) 500 mg tablet TAKE 1 TABLET DAILY 90 Tab 3  
 allopurinol (ZYLOPRIM) 300 mg tablet TAKE 1 TABLET DAILY 90 Tab 7  
 metoprolol tartrate (LOPRESSOR) 25 mg tablet TAKE 1 TABLET TWICE A  Tab 3  
 omeprazole (PRILOSEC) 20 mg capsule TAKE 1 CAPSULE DAILY 90 Cap 1  
 amLODIPine (NORVASC) 2.5 mg tablet TAKE 1 TABLET DAILY 90 Tab 3  
 glucose blood VI test strips (ONETOUCH ULTRA TEST) strip Use to test blood sugar twice a day 200 Strip 3  cyanocobalamin 1,000 mcg tablet Take 1,000 mcg by mouth daily.  acetaminophen (TYLENOL) 325 mg tablet Take 2 Tabs by mouth every six (6) hours as needed for Pain. 20 Tab 0  
 aspirin 81 mg chewable tablet Take 81 mg by mouth daily.  brimonidine (ALPHAGAN) 0.15 % ophthalmic solution Administer 1 Drop to both eyes two (2) times a day. Indications: OPEN ANGLE GLAUCOMA Past Medical History:  
Diagnosis Date  Abnormal nuclear stress test 6/6/2014  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 [facility] 11/2/2015  Bronchiectasis ([facility]  CAD (coronary artery disease)  Chest pain, unspecified  Chronic pain   
 right leg  CLL (chronic lymphocytic leukemia) ([facility]  Diabetes ([facility]  Essential hypertension  GERD (gastroesophageal reflux disease)  Hyperlipidemia  Hypertension  Opacity of lung on imaging study 05/28/2017  
 cxr  Rotator cuff arthropathy  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  
 S/P coronary artery stent placement 6/6/2014 6/6/14 PCI/GUANAKO to prox LAD Past Surgical History:  
Procedure Laterality Date  HX COLONOSCOPY    
 HX HEART CATHETERIZATION    
 PTCA SINGLE VESSEL  4/4/2015  VASCULAR SURGERY PROCEDURE UNLIST  2014 and 2015 BLE stenting Allergies Allergen Reactions  Codeine Shortness of Breath  Lipitor [Atorvastatin] Nausea Only REVIEW OF SYSTEMS: 
General: negative for - chills or fever ENT: negative for - headaches, nasal congestion or tinnitus Respiratory: negative for - cough, hemoptysis, shortness of breath or wheezing Cardiovascular : negative for - chest pain, edema, palpitations or shortness of breath Gastrointestinal: negative for - abdominal pain, blood in stools, heartburn or nausea/vomiting Genito-Urinary: no dysuria, trouble voiding, or hematuria Musculoskeletal: negative for - gait disturbance, joint pain, joint stiffness or joint swelling Neurological: no TIA or stroke symptoms Hematologic: no bruises, no bleeding, no swollen glands Integument: no lumps, mole changes, nail changes or rash Endocrine: no malaise/lethargy or unexpected weight changes Social History Socioeconomic History  Marital status:  Spouse name: Not on file  Number of children: Not on file  Years of education: Not on file  Highest education level: Not on file Tobacco Use  Smoking status: Former Smoker Last attempt to quit: 1/15/1984 Years since quittin.0  Smokeless tobacco: Never Used  Tobacco comment: [address] Substance and Sexual Activity  Alcohol use: No  
  Alcohol/week: 0.0 oz  
  Comment: quit in   Drug use: No  
 Sexual activity: Yes  
  Partners: Female Social History Narrative Family History: Mother:  61 yrs, DM complicationsFather:  80 yrs, strokeSister(s):  76 yrs, pneumonia, DM, HTNBrother(s):  61 yrs, DM, HTN, CVAChildren: aliveGrandmother: unknow nGrandfather: deceasedSpouse: deceased1 brother(s) -  
 healthy. 2daughter(s) - healthy. Social History: Alcohol Use Patient does not use alcohol. Smoking Status Patient is a former smoker, Quit in: 36. Caffeine: none. Marital  
 Status: W idow . Lives w ith: alone. Children: yes 2 d. Education/School: has highschool diploma, college 2 y. Family History Problem Relation Age of Onset  Diabetes Mother  Stroke Father OBJECTIVE: 
 
Visit Vitals /68 Pulse (!) 57 Temp 97.6 °F (36.4 °C) (Oral) Resp 16 Ht 5' 7\" (1.702 m) Wt 173 lb 9.6 oz (78.7 kg) SpO2 95% BMI 27.19 kg/m² CONSTITUTIONAL: well , well nourished, appears age appropriate EYES: perrla, eom intact ENMT:moist mucous membranes, pharynx clear NECK: supple. Thyroid normal 
RESPIRATORY: Chest: clear bilaterally CARDIOVASCULAR: Heart: regular rate and rhythm GASTROINTESTINAL: Abdomen: soft, bowel sounds active HEMATOLOGIC: no pathological lymph nodes palpated MUSCULOSKELETAL: Extremities: no edema, pulse 1+ INTEGUMENT: No unusual rashes or suspicious skin lesions noted. Nails appear normal. 
NEUROLOGIC: non-focal exam  
MENTAL STATUS: alert and oriented, appropriate affect ASSESSMENT: 
1. Gastroesophageal reflux disease with esophagitis 2. ASHD (arteriosclerotic heart disease) 3. CLL (chronic lymphocytic leukemia) ([facility] 4. S/P CABG x 3   
5. Essential hypertension, benign 6. Coronary artery disease involving native coronary artery of native heart without angina pectoris Patient likely had an episode of acute gastritis or acute episode of gastroesophageal reflux disease. We suggest Prilosec twice a day for the next three days, then once a day for two weeks. We review foods he should and should not be eating as a reminder of what an 80year old coronary artery disease diabetic should be doing. He seems to understand and agrees. His blood pressure control is excellent. Lab studies are reviewed and his CLL is stable. He will return to the office as previously scheduled. I have discussed the diagnosis with the patient and the intended plan as seen in the 
orders above. The patient understands and agees with the plan. The patient has  
received an after visit summary and questions were answered concerning 
future plans Patient labs and/or xrays were reviewed Past records were reviewed. PLAN: 
. No orders of the defined types were placed in this encounter. Follow-up Disposition: Not on File ATTENTION:  
This medical record was transcribed using an electronic medical records system. Although proofread, it may and can contain electronic and spelling errors. Other human spelling and other errors may be present. Corrections may be executed at a later time. Please feel free to contact us for any clarifications as needed. Patient returns today with his daughter with known history of CLL, coronary artery disease, gastroesophageal reflux disease, primary hypertension and diabetes, whose control has been excellent with hemoglobin A1c less than 6.5.

## 2019-04-03 ENCOUNTER — HOSPITAL ENCOUNTER (EMERGENCY)
Age: 84
Discharge: HOME | End: 2019-04-03
Attending: EMERGENCY MEDICINE
Payer: MEDICARE

## 2019-04-03 ENCOUNTER — APPOINTMENT (OUTPATIENT)
Dept: GENERAL RADIOLOGY | Age: 84
End: 2019-04-03
Attending: PHYSICIAN ASSISTANT
Payer: MEDICARE

## 2019-04-03 VITALS
RESPIRATION RATE: 18 BRPM | BODY MASS INDEX: 24.96 KG/M2 | DIASTOLIC BLOOD PRESSURE: 60 MMHG | HEART RATE: 62 BPM | WEIGHT: 164.68 LBS | SYSTOLIC BLOOD PRESSURE: 160 MMHG | OXYGEN SATURATION: 100 % | HEIGHT: 68 IN | TEMPERATURE: 97.3 F

## 2019-04-03 DIAGNOSIS — M25.461 EFFUSION OF RIGHT KNEE: ICD-10-CM

## 2019-04-03 DIAGNOSIS — M17.11 ARTHRITIS OF RIGHT KNEE: ICD-10-CM

## 2019-04-03 DIAGNOSIS — M25.561 ACUTE PAIN OF RIGHT KNEE: Primary | ICD-10-CM

## 2019-04-03 PROCEDURE — 74011250637 HC RX REV CODE- 250/637: Performed by: PHYSICIAN ASSISTANT

## 2019-04-03 PROCEDURE — 73562 X-RAY EXAM OF KNEE 3: CPT

## 2019-04-03 PROCEDURE — 99283 EMERGENCY DEPT VISIT LOW MDM: CPT

## 2019-04-03 RX ORDER — NAPROXEN 250 MG/1
500 TABLET ORAL
Status: COMPLETED | OUTPATIENT
Start: 2019-04-03 | End: 2019-04-03

## 2019-04-03 RX ORDER — NAPROXEN 500 MG/1
500 TABLET ORAL 2 TIMES DAILY WITH MEALS
Qty: 60 TAB | Refills: 0 | Status: SHIPPED | OUTPATIENT
Start: 2019-04-03 | End: 2019-05-03

## 2019-04-03 RX ADMIN — NAPROXEN 500 MG: 250 TABLET ORAL at 15:21

## 2019-04-03 NOTE — ED NOTES
Patient discharged by Bhavik Ayala. Patient provided with discharge instructions Rx and instructions on follow up care.

## 2019-04-03 NOTE — DISCHARGE INSTRUCTIONS
Patient Education        Knee Arthritis: Care Instructions  Your Care Instructions    Knee arthritis is a breakdown of the cartilage that cushions your knee joint. When the cartilage wears down, your bones rub against each other. This causes pain and stiffness. Knee arthritis tends to get worse with time. Treatment for knee arthritis involves reducing pain, making the leg muscles stronger, and staying at a healthy body weight. The treatment usually does not improve the health of the cartilage, but it can reduce pain and improve how well your knee works. You can take simple measures to protect your knee joints, ease your pain, and help you stay active. Follow-up care is a key part of your treatment and safety. Be sure to make and go to all appointments, and call your doctor if you are having problems. It's also a good idea to know your test results and keep a list of the medicines you take. How can you care for yourself at home? · Know that knee arthritis will cause more pain on some days than on others. · Stay at a healthy weight. Lose weight if you are overweight. When you stand up, the pressure on your knees from every pound of body weight is multiplied four times. So if you lose 10 pounds, you will reduce the pressure on your knees by 40 pounds. · Talk to your doctor or physical therapist about exercises that will help ease joint pain. ? Stretch to help prevent stiffness and to prevent injury before you exercise. You may enjoy gentle forms of yoga to help keep your knee joints and muscles flexible. ? Walk instead of jog.  ? Ride a bike. This makes your thigh muscles stronger and takes pressure off your knee. ? Wear well-fitting and comfortable shoes. ? Exercise in chest-deep water. This can help you exercise longer with less pain. ? Avoid exercises that include squatting or kneeling. They can put a lot of strain on your knees.   ? Talk to your doctor to make sure that the exercise you do is not making the arthritis worse. · Do not sit for long periods of time. Try to walk once in a while to keep your knee from getting stiff. · Ask your doctor or physical therapist whether shoe inserts may reduce your arthritis pain. · If you can afford it, get new athletic shoes at least every year. This can help reduce the strain on your knees. · Use a device to help you do everyday activities. ? A cane or walking stick can help you keep your balance when you walk. Hold the cane or walking stick in the hand opposite the painful knee. ? If you feel like you may fall when you walk, try using crutches or a front-wheeled walker. These can prevent falls that could cause more damage to your knee. ? A knee brace may help keep your knee stable and prevent pain. ? You also can use other things to make life easier, such as a higher toilet seat and handrails in the bathtub or shower. · Take pain medicines exactly as directed. ? Do not wait until you are in severe pain. You will get better results if you take it sooner. ? If you are not taking a prescription pain medicine, take an over-the-counter medicine such as acetaminophen (Tylenol), ibuprofen (Advil, Motrin), or naproxen (Aleve). Read and follow all instructions on the label. ? Do not take two or more pain medicines at the same time unless the doctor told you to. Many pain medicines have acetaminophen, which is Tylenol. Too much acetaminophen (Tylenol) can be harmful. ? Tell your doctor if you take a blood thinner, have diabetes, or have allergies to shellfish. · Ask your doctor if you might benefit from a shot of steroid medicine into your knee. This may provide pain relief for several months. · Many people take the supplements glucosamine and chondroitin for osteoarthritis. Some people feel they help, but the medical research does not show that they work. Talk to your doctor before you take these supplements. When should you call for help?   Call your doctor now or seek immediate medical care if:    · You have sudden swelling, warmth, or pain in your knee.     · You have knee pain and a fever or rash.     · You have such bad pain that you cannot use your knee.    Watch closely for changes in your health, and be sure to contact your doctor if you have any problems. Where can you learn more? Go to http://darrel-linda.info/. Enter M310 in the search box to learn more about \"Knee Arthritis: Care Instructions. \"  Current as of: Yadi 10, 2018  Content Version: 11.9  © 4990-7767 [employer]. Care instructions adapted under license by [employer] (which disclaims liability or warranty for this information). If you have questions about a medical condition or this instruction, always ask your healthcare professional. [address] [address] any warranty or liability for your use of this information. Patient Education        Knee Arthritis: Exercises  Your Care Instructions  Here are some examples of exercises for knee arthritis. Start each exercise slowly. Ease off the exercise if you start to have pain. Your doctor or physical therapist will tell you when you can start these exercises and which ones will work best for you. How to do the exercises  Knee flexion with heel slide    1. Lie on your back with your knees bent. 2. Slide your heel back by bending your affected knee as far as you can. Then hook your other foot around your ankle to help pull your heel even farther back. 3. Hold for about 6 seconds, then rest for up to 10 seconds. 4. Repeat 8 to 12 times. 5. Switch legs and repeat steps 1 through 4, even if only one knee is sore. Quad sets    1. Sit with your affected leg straight and supported on the floor or a firm bed. Place a small, rolled-up towel under your knee. Your other leg should be bent, with that foot flat on the floor.   2. Tighten the thigh muscles of your affected leg by pressing the back of your knee down into the towel. 3. Hold for about 6 seconds, then rest for up to 10 seconds. 4. Repeat 8 to 12 times. 5. Switch legs and repeat steps 1 through 4, even if only one knee is sore. Straight-leg raises to the front    1. Lie on your back with your good knee bent so that your foot rests flat on the floor. Your affected leg should be straight. Make sure that your low back has a normal curve. You should be able to slip your hand in between the floor and the small of your back, with your palm touching the floor and your back touching the back of your hand. 2. Tighten the thigh muscles in your affected leg by pressing the back of your knee flat down to the floor. Hold your knee straight. 3. Keeping the thigh muscles tight and your leg straight, lift your affected leg up so that your heel is about 12 inches off the floor. Hold for about 6 seconds, then lower slowly. 4. Relax for up to 10 seconds between repetitions. 5. Repeat 8 to 12 times. 6. Switch legs and repeat steps 1 through 5, even if only one knee is sore. Active knee flexion    1. Lie on your stomach with your knees straight. If your kneecap is uncomfortable, roll up a washcloth and put it under your leg just above your kneecap. 2. Lift the foot of your affected leg by bending the knee so that you bring the foot up toward your buttock. If this motion hurts, try it without bending your knee quite as far. This may help you avoid any painful motion. 3. Slowly move your leg up and down. 4. Repeat 8 to 12 times. 5. Switch legs and repeat steps 1 through 4, even if only one knee is sore. Quadriceps stretch (facedown)    1. Lie flat on your stomach, and rest your face on the floor. 2. Wrap a towel or belt strap around the lower part of your affected leg. Then use the towel or belt strap to slowly pull your heel toward your buttock until you feel a stretch.   3. Hold for about 15 to 30 seconds, then relax your leg against the towel or belt strap. 4. Repeat 2 to 4 times. 5. Switch legs and repeat steps 1 through 4, even if only one knee is sore. Stationary exercise bike    1. If you do not have a stationary exercise bike at home, you can find one to ride at your local health club or community center. 2. Adjust the height of the bike seat so that your knee is slightly bent when your leg is extended downward. If your knee hurts when the pedal reaches the top, you can raise the seat so that your knee does not bend as much. 3. Start slowly. At first, try to do 5 to 10 minutes of cycling with little to no resistance. Then increase your time and the resistance bit by bit until you can do 20 to 30 minutes without pain. 4. If you start to have pain, rest your knee until your pain gets back to the level that is normal for you. Or cycle for less time or with less effort. Follow-up care is a key part of your treatment and safety. Be sure to make and go to all appointments, and call your doctor if you are having problems. It's also a good idea to know your test results and keep a list of the medicines you take. Where can you learn more? Go to http://darrel-ilnda.info/. Enter C159 in the search box to learn more about \"Knee Arthritis: Exercises. \"  Current as of: September 20, 2018  Content Version: 11.9  © 4003-6002 [employer]. Care instructions adapted under license by [employer] (which disclaims liability or warranty for this information). If you have questions about a medical condition or this instruction, always ask your healthcare professional. [address] [address] any warranty or liability for your use of this information. Patient Education        Knee Pain or Injury: Care Instructions  Your Care Instructions    Injuries are a common cause of knee problems. Sudden (acute) injuries may be caused by a direct blow to the knee.  They can also be caused by abnormal twisting, bending, or falling on the knee. Pain, bruising, or swelling may be severe, and may start within minutes of the injury. Overuse is another cause of knee pain. Other causes are climbing stairs, kneeling, and other activities that use the knee. Everyday wear and tear, especially as you get older, also can cause knee pain. Rest, along with home treatment, often relieves pain and allows your knee to heal. If you have a serious knee injury, you may need tests and treatment. Follow-up care is a key part of your treatment and safety. Be sure to make and go to all appointments, and call your doctor if you are having problems. It's also a good idea to know your test results and keep a list of the medicines you take. How can you care for yourself at home? · Be safe with medicines. Read and follow all instructions on the label. ? If the doctor gave you a prescription medicine for pain, take it as prescribed. ? If you are not taking a prescription pain medicine, ask your doctor if you can take an over-the-counter medicine. · Rest and protect your knee. Take a break from any activity that may cause pain. · Put ice or a cold pack on your knee for 10 to 20 minutes at a time. Put a thin cloth between the ice and your skin. · Prop up a sore knee on a pillow when you ice it or anytime you sit or lie down for the next 3 days. Try to keep it above the level of your heart. This will help reduce swelling. · If your knee is not swollen, you can put moist heat, a heating pad, or a warm cloth on your knee. · If your doctor recommends an elastic bandage, sleeve, or other type of support for your knee, wear it as directed. · Follow your doctor's instructions about how much weight you can put on your leg. Use a cane, crutches, or a walker as instructed. · Follow your doctor's instructions about activity during your healing process. If you can do mild exercise, slowly increase your activity. · Reach and stay at a healthy weight. Extra weight can strain the joints, especially the knees and hips, and make the pain worse. Losing even a few pounds may help. When should you call for help? Call 911 anytime you think you may need emergency care. For example, call if:    · You have symptoms of a blood clot in your lung (called a pulmonary embolism). These may include:  ? Sudden chest pain. ? Trouble breathing. ? Coughing up blood.    Call your doctor now or seek immediate medical care if:    · You have severe or increasing pain.     · Your leg or foot turns cold or changes color.     · You cannot stand or put weight on your knee.     · Your knee looks twisted or bent out of shape.     · You cannot move your knee.     · You have signs of infection, such as:  ? Increased pain, swelling, warmth, or redness. ? Red streaks leading from the knee. ? Pus draining from a place on your knee. ? A fever.     · You have signs of a blood clot in your leg (called a deep vein thrombosis), such as:  ? Pain in your calf, back of the knee, thigh, or groin. ? Redness and swelling in your leg or groin.    Watch closely for changes in your health, and be sure to contact your doctor if:    · You have tingling, weakness, or numbness in your knee.     · You have any new symptoms, such as swelling.     · You have bruises from a knee injury that last longer than 2 weeks.     · You do not get better as expected. Where can you learn more? Go to http://darrel-linda.info/. Enter K195 in the search box to learn more about \"Knee Pain or Injury: Care Instructions. \"  Current as of: September 23, 2018  Content Version: 11.9  © 0238-8015 [employer]. Care instructions adapted under license by [employer] (which disclaims liability or warranty for this information).  If you have questions about a medical condition or this instruction, always ask your healthcare professional. Rustam Mercedes disclaims any warranty or liability for your use of this information.

## 2019-04-03 NOTE — ED PROVIDER NOTES
EMERGENCY DEPARTMENT HISTORY AND PHYSICAL EXAM 
     
 
Date: 4/3/2019 Patient Name: Nicolasa Schmitt History of Presenting Illness Chief Complaint Patient presents with  Knee Pain  
  pt ambulatory to triage with cane, patient has been traveling to [facility] [address] and upon getting back on Monday patient has been having sone pain, denies redness or swelling History Provided By: Patient and Patient's Daughter HPI: Nicolasa Schmitt is a 80 y.o. male, pmhx diabetes, degenerative disc disease lumbar, who presents ambulatory with cane to the ED c/o right knee pain for 4 days. Pain is throbbing involving his whole knee and it does not radiate. He denies any swelling or redness of his thigh or calf. Patient is taken Tylenol for the last 2 nights with little relief. Patient states the pain is worse with ambulation. Patient did recently go on a trip to [address] [place], [place] [address] for a .  While there he walks more than he usually does. He does not smoke. He is not on hormones. He has not had any recent surgeries. He specifically denies any recent fevers, chills, nausea, vomiting, diarrhea, abd pain, CP, urinary sxs, changes in BM, or headache. He denies history of kidney disease, liver disease, thyroid disease PCP: Jean Elmore MD 
 
There are no other complaints, changes, or physical findings at this time. Current Outpatient Medications Medication Sig Dispense Refill  simvastatin (ZOCOR) 40 mg tablet TAKE 1 TABLET DAILY IN THE EVENING 90 Tab 3  
 metFORMIN (GLUCOPHAGE) 500 mg tablet TAKE 1 TABLET DAILY 90 Tab 3  
 allopurinol (ZYLOPRIM) 300 mg tablet TAKE 1 TABLET DAILY 90 Tab 7  
 metoprolol tartrate (LOPRESSOR) 25 mg tablet TAKE 1 TABLET TWICE A  Tab 3  
 omeprazole (PRILOSEC) 20 mg capsule TAKE 1 CAPSULE DAILY 90 Cap 1  
 amLODIPine (NORVASC) 2.5 mg tablet TAKE 1 TABLET DAILY 90 Tab 3  
 glucose blood VI test strips (ONETOUCH ULTRA TEST) strip Use to test blood sugar twice a day 200 Strip 3  cyanocobalamin 1,000 mcg tablet Take 1,000 mcg by mouth daily.  acetaminophen (TYLENOL) 325 mg tablet Take 2 Tabs by mouth every six (6) hours as needed for Pain. 20 Tab 0  
 aspirin 81 mg chewable tablet Take 81 mg by mouth daily.  brimonidine (ALPHAGAN) 0.15 % ophthalmic solution Administer 1 Drop to both eyes two (2) times a day. Indications: OPEN ANGLE GLAUCOMA Past History Past Medical History: 
Past Medical History:  
Diagnosis Date  Abnormal nuclear stress test 2014  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 ([facility] 2015  Bronchiectasis ([facility]  CAD (coronary artery disease)  Chest pain, unspecified  Chronic pain   
 right leg  CLL (chronic lymphocytic leukemia) ([facility]  Diabetes ([facility]  Essential hypertension  GERD (gastroesophageal reflux disease)  Hyperlipidemia  Hypertension  Opacity of lung on imaging study 2017  
 cxr  Rotator cuff arthropathy  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  
 S/P coronary artery stent placement 2014 PCI/GUANAKO to prox LAD Past Surgical History: 
Past Surgical History:  
Procedure Laterality Date  HX COLONOSCOPY    
 HX HEART CATHETERIZATION    
 PTCA SINGLE VESSEL  2015  VASCULAR SURGERY PROCEDURE UNLIST   and  BLE stenting Family History: 
Family History Problem Relation Age of Onset  Diabetes Mother  Stroke Father Social History: 
Social History Tobacco Use  Smoking status: Former Smoker Last attempt to quit: 1/15/1984 Years since quittin.2  Smokeless tobacco: Never Used  Tobacco comment: [address] Substance Use Topics  Alcohol use: No  
  Alcohol/week: 0.0 oz  
  Comment: quit in   Drug use: No  
  Types: Prescription, OTC Allergies: Allergies Allergen Reactions  Codeine Shortness of Breath  Lipitor [Atorvastatin] Nausea Only Review of Systems Review of Systems Constitutional: Negative for activity change, appetite change, chills, fatigue and fever. HENT: Negative for congestion, dental problem, rhinorrhea and sore throat. Eyes: Negative for photophobia, pain, discharge, redness, itching and visual disturbance. Respiratory: Negative for cough, chest tightness, shortness of breath and wheezing. Cardiovascular: Negative for chest pain and leg swelling. Gastrointestinal: Negative for abdominal distention, abdominal pain, blood in stool, constipation, diarrhea, nausea and vomiting. Genitourinary: Negative for discharge, dysuria, flank pain, hematuria and penile pain. Musculoskeletal: Positive for arthralgias and gait problem. Negative for back pain, joint swelling, myalgias, neck pain and neck stiffness. Skin: Negative for color change and rash. Neurological: Negative for dizziness, syncope, weakness, numbness and headaches. Psychiatric/Behavioral: Negative for confusion and decreased concentration. The patient is not nervous/anxious. Physical Exam  
Physical Exam  
Constitutional: He is oriented to person, place, and time. Vital signs are normal. He appears well-developed and well-nourished. No distress. Ambulatory with cane. HENT:  
Head: Normocephalic and atraumatic. Right Ear: External ear normal.  
Left Ear: External ear normal.  
Nose: Nose normal.  
Eyes: Pupils are equal, round, and reactive to light. Conjunctivae and EOM are normal. Right eye exhibits no discharge. Left eye exhibits no discharge. No scleral icterus. Cardiovascular: Normal rate, regular rhythm, normal heart sounds and intact distal pulses. Exam reveals no gallop and no friction rub. No murmur heard. Pulmonary/Chest: Effort normal and breath sounds normal. No respiratory distress. He has no wheezes. He has no rales. He exhibits no tenderness. Musculoskeletal: He exhibits no edema. Right hip: Normal.  
     Right knee: He exhibits normal range of motion, no swelling, no effusion, no ecchymosis, no deformity and no bony tenderness. Tenderness found. Medial joint line and lateral joint line tenderness noted. Left knee: Normal.  
     Right ankle: Normal.  
     Left ankle: Normal.  
     Right upper leg: Normal.  
     Left upper leg: Normal.  
     Right lower leg: Normal.  
     Left lower leg: Normal.  
Tenderness to the right knee. There is no erythema no swelling of the right calf or thigh. Strong pedal pulse distal right lower extremity. Neurological: He is alert and oriented to person, place, and time. No cranial nerve deficit. Coordination normal.  
Skin: Skin is warm and dry. No rash noted. No erythema. Psychiatric: He has a normal mood and affect. His behavior is normal. Judgment and thought content normal.  
Nursing note and vitals reviewed. Diagnostic Study Results Labs - No results found for this or any previous visit (from the past 12 hour(s)). Radiologic Studies -  
XR KNEE RT 3 V Final Result IMPRESSION: Small suprapatellar joint effusion with mild osteoarthritis no other  
acute findings. Atherosclerotic and postsurgical changes with distal femoral  
artery stent. CT Results  (Last 48 hours) None CXR Results  (Last 48 hours) None Medical Decision Making I am the first provider for this patient. I reviewed the vital signs, available nursing notes, past medical history, past surgical history, family history and social history. Vital Signs-Reviewed the patient's vital signs. Patient Vitals for the past 12 hrs: 
 Temp Pulse Resp BP SpO2  
04/03/19 1453 97.3 °F (36.3 °C) 62 18 160/60 100 % Records Reviewed: Nursing Notes, Old Medical Records and Previous Radiology Studies Provider Notes (Medical Decision Making):  
 
 DDx: Arthritis, sprain, strain, DVT (low suspicion of DVT reassuring physical exam and lack of risk factors) ED Course:  
Initial assessment performed. The patients presenting problems have been discussed, and they are in agreement with the care plan formulated and outlined with them. I have encouraged them to ask questions as they arise throughout their visit. PROGRESS NOTE: 
  
 
 
4:29 PM 
Pt noted to be feeling better , ready for discharge. Updated pt and/or family on available findings. Will follow up as instructed. All questions have been answered, pt voiced understanding and agreement with plan. Specific return precautions provided as well as instructions to return to the ED should sx worsen at any time. Vital signs stable for discharge. REBECCA Tiwari Disposition: 
 
DISCHARGE NOTE: 
4:29 PM 
The patient's results have been reviewed with pt and his daughter. They verbally convey their understanding and agreement of the patient's signs, symptoms, diagnosis, treatment, and prognosis. They additionally agree to follow up as recommended in the discharge instructions or to return to the Emergency Room should the patient's condition change prior to their follow-up appointment. The patient verbally agrees with the care-plan and all of their questions have been answered. The discharge instructions have also been provided to the them along with educational information regarding the patient's diagnosis and a list of reasons why the patient would want to return to the ER prior to their follow-up appointment should their condition change. REBECCA Tiwari PLAN: 
1. Discharge Medication List as of 4/3/2019  4:22 PM  
  
START taking these medications Details  
naproxen (NAPROSYN) 500 mg tablet Take 1 Tab by mouth two (2) times daily (with meals) for 30 days.  Indications: Joint Damage causing Pain and Loss of Function, Pain, Print, Disp-60 Tab, R-0  
  
  
 CONTINUE these medications which have NOT CHANGED Details  
simvastatin (ZOCOR) 40 mg tablet TAKE 1 TABLET DAILY IN THE EVENING, Normal, Disp-90 Tab, R-3  
  
metFORMIN (GLUCOPHAGE) 500 mg tablet TAKE 1 TABLET DAILY, Normal, Disp-90 Tab, R-3  
  
allopurinol (ZYLOPRIM) 300 mg tablet TAKE 1 TABLET DAILY, Normal, Disp-90 Tab, R-7  
  
metoprolol tartrate (LOPRESSOR) 25 mg tablet TAKE 1 TABLET TWICE A DAY, Normal, Disp-180 Tab, R-3  
  
omeprazole (PRILOSEC) 20 mg capsule TAKE 1 CAPSULE DAILY, Normal, Disp-90 Cap, R-1  
  
amLODIPine (NORVASC) 2.5 mg tablet TAKE 1 TABLET DAILY, Normal, Disp-90 Tab, R-3  
  
glucose blood VI test strips (ONETOUCH ULTRA TEST) strip Use to test blood sugar twice a day, Normal, Disp-200 Strip, R-3  
  
cyanocobalamin 1,000 mcg tablet Take 1,000 mcg by mouth daily. , Historical Med  
  
acetaminophen (TYLENOL) 325 mg tablet Take 2 Tabs by mouth every six (6) hours as needed for Pain., Print, Disp-20 Tab, R-0  
  
aspirin 81 mg chewable tablet Take 81 mg by mouth daily. , Historical Med  
  
brimonidine (ALPHAGAN) 0.15 % ophthalmic solution Administer 1 Drop to both eyes two (2) times a day. Indications: OPEN ANGLE GLAUCOMA, Historical Med 2. Follow-up Information Follow up With Specialties Details Why Contact Info Harley Arguelles MD Internal Medicine   [address] 
[phone] Yaakov Meade MD Orthopedic Surgery  knee specialist , As needed [address] [place] [address] 
[phone] [facility] EMERGENCY DEPT Emergency Medicine  If symptoms worsen [address] [place] [address] [address] [facility] 
[phone] Return to ED if worse Diagnosis Clinical Impression: 1. Acute pain of right knee 2. Arthritis of right knee 3. Effusion of right knee This note will not be viewable in [address]  
 
 
7:58 AM 
 I was personally available for consultation in the emergency department. I have reviewed the chart and agree with the documentation recorded by the [facility], including the assessment, treatment plan, and disposition.  
Lyn Snyder MD

## 2019-04-17 ENCOUNTER — OFFICE VISIT (OUTPATIENT)
Dept: INTERNAL MEDICINE CLINIC | Age: 84
End: 2019-04-17

## 2019-04-17 VITALS
HEART RATE: 62 BPM | SYSTOLIC BLOOD PRESSURE: 112 MMHG | RESPIRATION RATE: 16 BRPM | DIASTOLIC BLOOD PRESSURE: 60 MMHG | HEIGHT: 68 IN | TEMPERATURE: 97.8 F | BODY MASS INDEX: 25.93 KG/M2 | WEIGHT: 171.1 LBS | OXYGEN SATURATION: 95 %

## 2019-04-17 DIAGNOSIS — E11.9 CONTROLLED TYPE 2 DIABETES MELLITUS WITHOUT COMPLICATION, WITHOUT LONG-TERM CURRENT USE OF INSULIN (HCC): ICD-10-CM

## 2019-04-17 DIAGNOSIS — Z95.1 S/P CABG X 3: ICD-10-CM

## 2019-04-17 DIAGNOSIS — I10 ESSENTIAL HYPERTENSION, BENIGN: ICD-10-CM

## 2019-04-17 DIAGNOSIS — E78.2 MIXED HYPERLIPIDEMIA: ICD-10-CM

## 2019-04-17 DIAGNOSIS — I73.9 PERIPHERAL VASCULAR DISEASE (HCC): ICD-10-CM

## 2019-04-17 DIAGNOSIS — E78.5 HYPERLIPIDEMIA, UNSPECIFIED HYPERLIPIDEMIA TYPE: ICD-10-CM

## 2019-04-17 DIAGNOSIS — E11.21 TYPE 2 DIABETES WITH NEPHROPATHY (HCC): Primary | ICD-10-CM

## 2019-04-17 NOTE — PROGRESS NOTES
SPORTS MEDICINE AND PRIMARY CARE Harry Portillo MD, [address] [id number] Phone:  [phone]  Fax: [phone] Chief Complaint Patient presents with  Diabetes f/u Herminio Ascencio SUBJECTIVE: 
  Dolores Galeana is a 80 y.o. male Patient returns today with known history of CLL, CAD, DM type 2, primary hypertension, GERD, dyslipidemia, and is seen for evaluation. Since we last saw him he was seen in the ER by Aron Stafford MD, on 04/08/19 and noted some knee pain after traveling to [facility]. He had an x-ray taken that revealed a small suprapatellar joint effusion with mild osteoarthritis and comes in for follow up. Patient's knee is feeling better. He still uses a cane periodically. He also comes in today for [facility] preoperative H&P examination for cataract extraction with lens implant using local anesthesia on the left eye by VINCENT Vences MD on 04/29/19. Other new complaints denied and patient is seen for evaluation. Current Outpatient Medications Medication Sig Dispense Refill  naproxen (NAPROSYN) 500 mg tablet Take 1 Tab by mouth two (2) times daily (with meals) for 30 days. Indications: Joint Damage causing Pain and Loss of Function, Pain 60 Tab 0  
 simvastatin (ZOCOR) 40 mg tablet TAKE 1 TABLET DAILY IN THE EVENING 90 Tab 3  
 metFORMIN (GLUCOPHAGE) 500 mg tablet TAKE 1 TABLET DAILY 90 Tab 3  
 allopurinol (ZYLOPRIM) 300 mg tablet TAKE 1 TABLET DAILY 90 Tab 7  
 metoprolol tartrate (LOPRESSOR) 25 mg tablet TAKE 1 TABLET TWICE A  Tab 3  
 omeprazole (PRILOSEC) 20 mg capsule TAKE 1 CAPSULE DAILY 90 Cap 1  
 amLODIPine (NORVASC) 2.5 mg tablet TAKE 1 TABLET DAILY 90 Tab 3  
 glucose blood VI test strips (ONETOUCH ULTRA TEST) strip Use to test blood sugar twice a day 200 Strip 3  cyanocobalamin 1,000 mcg tablet Take 1,000 mcg by mouth daily.  acetaminophen (TYLENOL) 325 mg tablet Take 2 Tabs by mouth every six (6) hours as needed for Pain. 20 Tab 0  
 aspirin 81 mg chewable tablet Take 81 mg by mouth daily.  brimonidine (ALPHAGAN) 0.15 % ophthalmic solution Administer 1 Drop to both eyes two (2) times a day. Indications: OPEN ANGLE GLAUCOMA Past Medical History:  
Diagnosis Date  Abnormal nuclear stress test 6/6/2014  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 ([facility] [address] 11/2/2015  Bereavement 02/06/2019  
 ltcf - uti -strangulated hernia - prostate ca - brother  Bronchiectasis ([facility]  CAD (coronary artery disease)  Chest pain, unspecified  Chronic pain   
 right leg  CLL (chronic lymphocytic leukemia) ([facility] [address]  Diabetes ([facility]  Essential hypertension  GERD (gastroesophageal reflux disease)  Hyperlipidemia  Hypertension  Opacity of lung on imaging study 05/28/2017  
 cxr  Rotator cuff arthropathy  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  
 S/P coronary artery stent placement 6/6/2014 6/6/14 PCI/GUANAKO to prox LAD Past Surgical History:  
Procedure Laterality Date  HX COLONOSCOPY    
 HX HEART CATHETERIZATION    
 PTCA SINGLE VESSEL  4/4/2015  VASCULAR SURGERY PROCEDURE UNLIST  2014 and 2015 BLE stenting Allergies Allergen Reactions  Codeine Shortness of Breath  Lipitor [Atorvastatin] Nausea Only REVIEW OF SYSTEMS: 
General: negative for - chills or fever ENT: negative for - headaches, nasal congestion or tinnitus Respiratory: negative for - cough, hemoptysis, shortness of breath or wheezing Cardiovascular : negative for - chest pain, edema, palpitations or shortness of breath Gastrointestinal: negative for - abdominal pain, blood in stools, heartburn or nausea/vomiting Genito-Urinary: no dysuria, trouble voiding, or hematuria Musculoskeletal: negative for - gait disturbance, joint pain, joint stiffness or joint swelling Neurological: no TIA or stroke symptoms Hematologic: no bruises, no bleeding, no swollen glands Integument: no lumps, mole changes, nail changes or rash Endocrine: no malaise/lethargy or unexpected weight changes Social History Socioeconomic History  Marital status:  Spouse name: Not on file  Number of children: Not on file  Years of education: Not on file  Highest education level: Not on file Tobacco Use  Smoking status: Former Smoker Last attempt to quit: 1/15/1984 Years since quittin.2  Smokeless tobacco: Never Used  Tobacco comment: [address] [employer] Substance and Sexual Activity  Alcohol use: No  
  Alcohol/week: 0.0 oz  
  Comment: quit in   Drug use: No  
  Types: Prescription, OTC  Sexual activity: Yes  
  Partners: Female Social History Narrative Family History: Mother:  61 yrs, DM complicationsFather:  80 yrs, strokeSister(s):  76 yrs, pneumonia, DM, HTNBrother(s):  61 yrs, DM, HTN, CVAChildren: aliveGrandmother: unknow nGrandfather: deceasedSpouse: deceased1 brother(s) -  
 healthy. 2daughter(s) - healthy. Social History: Alcohol Use Patient does not use alcohol. Smoking Status Patient is a former smoker, Quit in: 36. Caffeine: none. Marital  
 Status: W idow . Lives w ith: alone. Children: yes 2 d. Education/School: has highschool diploma, college 2 y. Family History Problem Relation Age of Onset  Diabetes Mother  Stroke Father OBJECTIVE: 
 
Visit Vitals /60 Pulse 62 Temp 97.8 °F (36.6 °C) (Oral) Resp 16 Ht 5' 8\" (1.727 m) Wt 171 lb 1.6 oz (77.6 kg) SpO2 95% BMI 26.02 kg/m² CONSTITUTIONAL: well , well nourished, appears age appropriate EYES: perrla, eom intact ENMT:moist mucous membranes, pharynx clear NECK: supple. Thyroid normal 
RESPIRATORY: Chest: clear bilaterally CARDIOVASCULAR: Heart: regular rate and rhythm GASTROINTESTINAL: Abdomen: soft, bowel sounds active HEMATOLOGIC: no pathological lymph nodes palpated MUSCULOSKELETAL: Extremities: no edema, pulse 1+ INTEGUMENT: No unusual rashes or suspicious skin lesions noted. Nails appear normal. 
NEUROLOGIC: non-focal exam  
MENTAL STATUS: alert and oriented, appropriate affect ASSESSMENT: 
1. Type 2 diabetes with nephropathy (HCC) 2. S/P CABG x 3 3. Peripheral vascular disease ([facility] [address] 4. Mixed hyperlipidemia 5. Hyperlipidemia, unspecified hyperlipidemia type 6. Essential hypertension, benign 7. 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 I think it aggravated some underlying DJD, which seems to be responding to just conservative therapy with him resting and elevating it. Blood sugar control has been excellent with a hemoglobin A1c in January of 6. 6.  will repeat it today with appropriate lab studies and confirm stability so the procedure can continue for his eyes. Blood pressure control is at goal. 
 
No evidence of symptomatic coronary artery disease. Patient's medical status is stable for the planned surgical procedure. He will be back to see us in three months. I have discussed the diagnosis with the patient and the intended plan as seen in the 
orders above. The patient understands and agees with the plan. The patient has  
received an after visit summary and questions were answered concerning 
future plans Patient labs and/or xrays were reviewed Past records were reviewed. PLAN: 
. Orders Placed This Encounter  CBC WITH AUTOMATED DIFF  
 RENAL FUNCTION PANEL  
 HEMOGLOBIN A1C WITH EAG Follow-up and Dispositions · Return in about 3 months (around 7/17/2019). ATTENTION:  
This medical record was transcribed using an electronic medical records system. Although proofread, it may and can contain electronic and spelling errors. Other human spelling and other errors may be present. Corrections may be executed at a later time. Please feel free to contact us for any clarifications as needed.

## 2019-04-17 NOTE — PROGRESS NOTES
1. Have you been to the ER, urgent care clinic since your last visit? Hospitalized since your last visit? Yes When: 4-3-19 Where: [facility] Reason for visit: right knee issues 2. Have you seen or consulted any other health care providers outside of the [address] [place] [address] since your last visit? Include any pap smears or colon screening. No  
 
Pre op exam 
 
ER follow up

## 2019-04-18 LAB
ALBUMIN SERPL-MCNC: 4.7 G/DL (ref 3.5–4.7)
BASOPHILS # BLD AUTO: 0 X10E3/UL (ref 0–0.2)
BASOPHILS NFR BLD AUTO: 0 %
BUN SERPL-MCNC: 13 MG/DL (ref 8–27)
BUN/CREAT SERPL: 12 (ref 10–24)
CALCIUM SERPL-MCNC: 9.7 MG/DL (ref 8.6–10.2)
CHLORIDE SERPL-SCNC: 101 MMOL/L (ref 96–106)
CO2 SERPL-SCNC: 21 MMOL/L (ref 20–29)
CREAT SERPL-MCNC: 1.12 MG/DL (ref 0.76–1.27)
EOSINOPHIL # BLD AUTO: 0 X10E3/UL (ref 0–0.4)
EOSINOPHIL NFR BLD AUTO: 0 %
ERYTHROCYTE [DISTWIDTH] IN BLOOD BY AUTOMATED COUNT: 15.8 % (ref 12.3–15.4)
EST. AVERAGE GLUCOSE BLD GHB EST-MCNC: 134 MG/DL
GLUCOSE SERPL-MCNC: 92 MG/DL (ref 65–99)
HBA1C MFR BLD: 6.3 % (ref 4.8–5.6)
HCT VFR BLD AUTO: 42.8 % (ref 37.5–51)
HGB BLD-MCNC: 14.6 G/DL (ref 13–17.7)
LYMPHOCYTES # BLD AUTO: 49.1 X10E3/UL (ref 0.7–3.1)
LYMPHOCYTES NFR BLD AUTO: 90 %
MCH RBC QN AUTO: 32.5 PG (ref 26.6–33)
MCHC RBC AUTO-ENTMCNC: 34.1 G/DL (ref 31.5–35.7)
MCV RBC AUTO: 95 FL (ref 79–97)
MONOCYTES # BLD AUTO: 1.6 X10E3/UL (ref 0.1–0.9)
MONOCYTES NFR BLD AUTO: 3 %
MORPHOLOGY BLD-IMP: ABNORMAL
NEUTROPHILS # BLD AUTO: 3.8 X10E3/UL (ref 1.4–7)
NEUTROPHILS NFR BLD AUTO: 7 %
PHOSPHATE SERPL-MCNC: 3.9 MG/DL (ref 2.5–4.5)
PLATELET # BLD AUTO: 166 X10E3/UL (ref 150–379)
POTASSIUM SERPL-SCNC: 4.9 MMOL/L (ref 3.5–5.2)
RBC # BLD AUTO: 4.49 X10E6/UL (ref 4.14–5.8)
SODIUM SERPL-SCNC: 139 MMOL/L (ref 134–144)
WBC # BLD AUTO: 54.6 X10E3/UL (ref 3.4–10.8)

## 2019-06-29 RX ORDER — PREDNISONE 20 MG/1
40 TABLET ORAL
Qty: 10 TAB | Refills: 1 | Status: SHIPPED | OUTPATIENT
Start: 2019-06-29 | End: 2019-06-29 | Stop reason: SDUPTHER

## 2019-06-29 RX ORDER — PREDNISONE 20 MG/1
40 TABLET ORAL
Qty: 10 TAB | Refills: 1 | Status: SHIPPED | OUTPATIENT
Start: 2019-06-29 | End: 2019-07-24 | Stop reason: ALTCHOICE

## 2019-06-29 RX ORDER — ALLOPURINOL 300 MG/1
150 TABLET ORAL DAILY
Qty: 90 TAB | Refills: 7
Start: 2019-06-29 | End: 2020-04-28 | Stop reason: SDUPTHER

## 2019-07-24 ENCOUNTER — OFFICE VISIT (OUTPATIENT)
Dept: INTERNAL MEDICINE CLINIC | Age: 84
End: 2019-07-24

## 2019-07-24 VITALS
WEIGHT: 170.8 LBS | BODY MASS INDEX: 25.88 KG/M2 | SYSTOLIC BLOOD PRESSURE: 121 MMHG | TEMPERATURE: 97.4 F | OXYGEN SATURATION: 95 % | RESPIRATION RATE: 16 BRPM | HEART RATE: 60 BPM | DIASTOLIC BLOOD PRESSURE: 67 MMHG | HEIGHT: 68 IN

## 2019-07-24 DIAGNOSIS — K21.00 GASTROESOPHAGEAL REFLUX DISEASE WITH ESOPHAGITIS: ICD-10-CM

## 2019-07-24 DIAGNOSIS — I25.10 ATHEROSCLEROSIS OF NATIVE CORONARY ARTERY OF NATIVE HEART WITHOUT ANGINA PECTORIS: ICD-10-CM

## 2019-07-24 DIAGNOSIS — E11.9 CONTROLLED TYPE 2 DIABETES MELLITUS WITHOUT COMPLICATION, WITHOUT LONG-TERM CURRENT USE OF INSULIN (HCC): ICD-10-CM

## 2019-07-24 DIAGNOSIS — I10 ESSENTIAL HYPERTENSION, BENIGN: ICD-10-CM

## 2019-07-24 DIAGNOSIS — C91.10 CLL (CHRONIC LYMPHOCYTIC LEUKEMIA) (HCC): ICD-10-CM

## 2019-07-24 DIAGNOSIS — E11.21 TYPE 2 DIABETES WITH NEPHROPATHY (HCC): Primary | ICD-10-CM

## 2019-07-24 DIAGNOSIS — J47.9 BRONCHIECTASIS WITHOUT COMPLICATION (HCC): ICD-10-CM

## 2019-07-24 DIAGNOSIS — I73.9 PERIPHERAL VASCULAR DISEASE (HCC): ICD-10-CM

## 2019-07-24 NOTE — PROGRESS NOTES
1. Have you been to the ER, urgent care clinic since your last visit? Hospitalized since your last visit? No    2. Have you seen or consulted any other health care providers outside of the [address] [place] [address] since your last visit? Include any pap smears or colon screening.  No     Wants to discuss dental issue

## 2019-07-24 NOTE — PROGRESS NOTES
SPORTS MEDICINE AND PRIMARY CARE  Lucille Cardoza MD, [address] [address]  Phone:  [phone]  Fax: [phone]       Chief Complaint   Patient presents with    Diabetes     f/u   . SUBJECTIVE:    Casie Ashton is a 80 y.o. male Patient returns today with known history of diabetes mellitus type 2 with nephropathy, peripheral vascular disease, GERD, primary hypertension, diabetes mellitus type 2, coronary artery disease, and is seen for evaluation. _______________, bronchiectasis, the former followed by oncology. Patient returns today after having cataract surgery and he states he can \"see all the way to [address] [place] [address] Street". He tells me the trees look clearer, he feels a lot better with his vision. He has a plate that dug into his gums and he wants two teeth pulled because they are uncomfortable. Other new complaints denied and patient is seen for evaluation. Current Outpatient Medications   Medication Sig Dispense Refill    allopurinol (ZYLOPRIM) 300 mg tablet Take 0.5 Tabs by mouth daily. 90 Tab 7    ONETOUCH ULTRA BLUE TEST STRIP strip USE TO TEST BLOOD SUGAR TWICE A  Strip 3    simvastatin (ZOCOR) 40 mg tablet TAKE 1 TABLET DAILY IN THE EVENING 90 Tab 3    metFORMIN (GLUCOPHAGE) 500 mg tablet TAKE 1 TABLET DAILY 90 Tab 3    metoprolol tartrate (LOPRESSOR) 25 mg tablet TAKE 1 TABLET TWICE A  Tab 3    omeprazole (PRILOSEC) 20 mg capsule TAKE 1 CAPSULE DAILY 90 Cap 1    amLODIPine (NORVASC) 2.5 mg tablet TAKE 1 TABLET DAILY 90 Tab 3    cyanocobalamin 1,000 mcg tablet Take 1,000 mcg by mouth daily.  aspirin 81 mg chewable tablet Take 81 mg by mouth daily.  brimonidine (ALPHAGAN) 0.15 % ophthalmic solution Administer 1 Drop to both eyes two (2) times a day.  Indications: OPEN ANGLE GLAUCOMA       Past Medical History:   Diagnosis Date    Abnormal nuclear stress test 6/6/2014    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 ([facility] 11/2/2015    Bereavement 02/06/2019    ltcf - uti -strangulated hernia - prostate ca - brother    [facility]asis ([facility]     CAD (coronary artery disease)     Chest pain, unspecified     Chronic pain     right leg    CLL (chronic lymphocytic leukemia) ([facility]     Jerica Segura md  [facility]    Diabetes ([facility]     Essential hypertension     GERD (gastroesophageal reflux disease)     Hyperlipidemia     Hypertension     Opacity of lung on imaging study 05/28/2017    cxr    Rotator cuff arthropathy     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    S/P coronary artery stent placement 6/6/2014 6/6/14 PCI/GUANAKO to prox LAD     Past Surgical History:   Procedure Laterality Date    HX COLONOSCOPY      HX HEART CATHETERIZATION      PTCA SINGLE VESSEL  4/4/2015         VASCULAR SURGERY PROCEDURE UNLIST  2014 and 2015    BLE stenting     Allergies   Allergen Reactions    Codeine Shortness of Breath    Lipitor [Atorvastatin] Nausea Only         REVIEW OF SYSTEMS:  General: negative for - chills or fever  ENT: negative for - headaches, nasal congestion or tinnitus  Respiratory: negative for - cough, hemoptysis, shortness of breath or wheezing  Cardiovascular : negative for - chest pain, edema, palpitations or shortness of breath  Gastrointestinal: negative for - abdominal pain, blood in stools, heartburn or nausea/vomiting  Genito-Urinary: no dysuria, trouble voiding, or hematuria  Musculoskeletal: negative for - gait disturbance, joint pain, joint stiffness or joint swelling  Neurological: no TIA or stroke symptoms  Hematologic: no bruises, no bleeding, no swollen glands  Integument: no lumps, mole changes, nail changes or rash  Endocrine: no malaise/lethargy or unexpected weight changes      Social History     Socioeconomic History    Marital status:      Spouse name: Not on file    Number of children: Not on file    Years of education: Not on file    Highest education level: Not on file   Tobacco Use  Smoking status: Former Smoker     Last attempt to quit: 1/15/1984     Years since quittin.5    Smokeless tobacco: Never Used    Tobacco comment: QUIT    Substance and Sexual Activity    Alcohol use: No     Alcohol/week: 0.0 standard drinks     Comment: quit in     Drug use: No     Types: Prescription, OTC    Sexual activity: Yes     Partners: Female   Social History Narrative    Family History: Mother:  61 yrs, DM complicationsFather:  80 yrs, strokeSister(s):  76 yrs, pneumonia, DM,    HTNBrother(s):  61 yrs, DM, HTN, CVAChildren: aliveGrandmother: unknow nGrandfather: deceasedSpouse: deceased1 brother(s) -    healthy. 2daughter(s) - healthy. Social History: Alcohol Use Patient does not use alcohol. Smoking Status Patient is a former smoker, Quit in: 36. Caffeine: none. Marital    Status: W idow . Lives w ith: alone. Children: yes 2 d. Education/School: has highschool diploma, college 2 y. Family History   Problem Relation Age of Onset    Diabetes Mother     Stroke Father        OBJECTIVE:    Visit Vitals  /67   Pulse 60   Temp 97.4 °F (36.3 °C) (Oral)   Resp 16   Ht 5' 8\" (1.727 m)   Wt 170 lb 12.8 oz (77.5 kg)   SpO2 95%   BMI 25.97 kg/m²     CONSTITUTIONAL: well , well nourished, appears age appropriate  EYES: perrla, eom intact  ENMT:moist mucous membranes, pharynx clear  NECK: supple. Thyroid normal  RESPIRATORY: Chest: clear bilaterally   CARDIOVASCULAR: Heart: regular rate and rhythm  GASTROINTESTINAL: Abdomen: soft, bowel sounds active  HEMATOLOGIC: no pathological lymph nodes palpated  MUSCULOSKELETAL: Extremities: no edema, pulse 1+   INTEGUMENT: No unusual rashes or suspicious skin lesions noted. Nails appear normal.  NEUROLOGIC: non-focal exam   MENTAL STATUS: alert and oriented, appropriate affect           ASSESSMENT:  1. Type 2 diabetes with nephropathy ([address]    2. Peripheral vascular disease ([address]    3.  Gastroesophageal reflux disease with esophagitis    4. Essential hypertension, benign    5. Controlled type 2 diabetes mellitus without complication, without long-term current use of insulin ([facility]    6. Atherosclerosis of native coronary artery of native heart without angina pectoris    7. CLL (chronic lymphocytic leukemia) ([facility]    8. Bronchiectasis without complication ([facility]    Blood sugar control has been excellent with hemoglobin A1c in April of 6.3. Will repeat that today to confirm. Peripheral vascular disease is currently asymptomatic. Gastroesophageal reflux disease is currently asymptomatic. Blood pressure control is at goal.  We congratulate him. He is asymptomatic with his coronary artery disease. Regarding the dental extraction he will be seeing his oncologist prior to the procedure for confirmation of acceptability of the bleeding risk with associated dental extractions. He is asymptomatic from his bronchiectasis. He will be back to see us in three months, sooner if he has any problems. All in all I think he is doing exceptionally well at 80years old. When we see him next time he will have passed another milestone and will be 80years old. Patient's medical status is therefore stable from our perspective for dental procedures and/or oral surgery for the extraction. I have discussed the diagnosis with the patient and the intended plan as seen in the  orders above. The patient understands and agees with the plan. The patient has   received an after visit summary and questions were answered concerning  future plans  Patient labs and/or xrays were reviewed  Past records were reviewed. PLAN:  .  Orders Placed This Encounter    CBC WITH AUTOMATED DIFF    RENAL FUNCTION PANEL    HEMOGLOBIN A1C WITH EAG                  ATTENTION:   This medical record was transcribed using an electronic medical records system. Although proofread, it may and can contain electronic and spelling errors.   Other human spelling and other errors may be present. Corrections may be executed at a later time. Please feel free to contact us for any clarifications as needed.

## 2019-07-25 LAB
ALBUMIN SERPL-MCNC: 4.4 G/DL (ref 3.5–4.7)
BASOPHILS # BLD AUTO: 0 X10E3/UL (ref 0–0.2)
BASOPHILS NFR BLD AUTO: 0 %
BUN SERPL-MCNC: 12 MG/DL (ref 8–27)
BUN/CREAT SERPL: 10 (ref 10–24)
CALCIUM SERPL-MCNC: 9.6 MG/DL (ref 8.6–10.2)
CHLORIDE SERPL-SCNC: 102 MMOL/L (ref 96–106)
CO2 SERPL-SCNC: 23 MMOL/L (ref 20–29)
CREAT SERPL-MCNC: 1.18 MG/DL (ref 0.76–1.27)
EOSINOPHIL # BLD AUTO: 0 X10E3/UL (ref 0–0.4)
EOSINOPHIL NFR BLD AUTO: 0 %
ERYTHROCYTE [DISTWIDTH] IN BLOOD BY AUTOMATED COUNT: 13.8 % (ref 12.3–15.4)
EST. AVERAGE GLUCOSE BLD GHB EST-MCNC: 151 MG/DL
GLUCOSE SERPL-MCNC: 75 MG/DL (ref 65–99)
HBA1C MFR BLD: 6.9 % (ref 4.8–5.6)
HCT VFR BLD AUTO: 39.2 % (ref 37.5–51)
HGB BLD-MCNC: 13.1 G/DL (ref 13–17.7)
LYMPHOCYTES # BLD AUTO: 47.8 X10E3/UL (ref 0.7–3.1)
LYMPHOCYTES NFR BLD AUTO: 89 %
MCH RBC QN AUTO: 31.6 PG (ref 26.6–33)
MCHC RBC AUTO-ENTMCNC: 33.4 G/DL (ref 31.5–35.7)
MCV RBC AUTO: 95 FL (ref 79–97)
MONOCYTES # BLD AUTO: 4.3 X10E3/UL (ref 0.1–0.9)
MONOCYTES NFR BLD AUTO: 8 %
MORPHOLOGY BLD-IMP: ABNORMAL
NEUTROPHILS # BLD AUTO: 1.6 X10E3/UL (ref 1.4–7)
NEUTROPHILS NFR BLD AUTO: 3 %
PHOSPHATE SERPL-MCNC: 3.4 MG/DL (ref 2.5–4.5)
PLATELET # BLD AUTO: 201 X10E3/UL (ref 150–450)
POTASSIUM SERPL-SCNC: 5.1 MMOL/L (ref 3.5–5.2)
RBC # BLD AUTO: 4.15 X10E6/UL (ref 4.14–5.8)
SODIUM SERPL-SCNC: 139 MMOL/L (ref 134–144)
WBC # BLD AUTO: 53.7 X10E3/UL (ref 3.4–10.8)

## 2019-08-14 ENCOUNTER — TELEPHONE (OUTPATIENT)
Dept: INTERNAL MEDICINE CLINIC | Age: 84
End: 2019-08-14

## 2019-08-14 RX ORDER — AMLODIPINE BESYLATE 2.5 MG/1
TABLET ORAL
Qty: 90 TAB | Refills: 3 | Status: SHIPPED | OUTPATIENT
Start: 2019-08-14 | End: 2020-12-21 | Stop reason: SDUPTHER

## 2019-09-03 ENCOUNTER — OFFICE VISIT (OUTPATIENT)
Dept: CARDIOLOGY CLINIC | Age: 84
End: 2019-09-03

## 2019-09-03 VITALS
RESPIRATION RATE: 16 BRPM | OXYGEN SATURATION: 93 % | SYSTOLIC BLOOD PRESSURE: 116 MMHG | WEIGHT: 170 LBS | DIASTOLIC BLOOD PRESSURE: 66 MMHG | BODY MASS INDEX: 25.76 KG/M2 | HEART RATE: 63 BPM | HEIGHT: 68 IN

## 2019-09-03 DIAGNOSIS — I25.10 ASHD (ARTERIOSCLEROTIC HEART DISEASE): ICD-10-CM

## 2019-09-03 DIAGNOSIS — I25.9 CHRONIC ISCHEMIC HEART DISEASE: Primary | ICD-10-CM

## 2019-09-03 DIAGNOSIS — I73.9 PERIPHERAL VASCULAR DISEASE (HCC): ICD-10-CM

## 2019-09-03 DIAGNOSIS — Z95.1 S/P CABG X 3: ICD-10-CM

## 2019-09-03 DIAGNOSIS — E78.2 MIXED HYPERLIPIDEMIA: ICD-10-CM

## 2019-09-03 DIAGNOSIS — Z95.5 S/P CORONARY ARTERY STENT PLACEMENT: ICD-10-CM

## 2019-09-03 DIAGNOSIS — I10 ESSENTIAL HYPERTENSION, BENIGN: ICD-10-CM

## 2019-09-03 DIAGNOSIS — E11.21 TYPE 2 DIABETES WITH NEPHROPATHY (HCC): ICD-10-CM

## 2019-09-03 DIAGNOSIS — C91.10 CLL (CHRONIC LYMPHOCYTIC LEUKEMIA) (HCC): ICD-10-CM

## 2019-09-03 NOTE — PROGRESS NOTES
Chief Complaint   Patient presents with    Follow-up    Hypertension    Coronary Artery Disease     1. Have you been to the ER, urgent care clinic since your last visit? Hospitalized since your last visit? Yes When: 4/3/19 Where: [facility] Reason for visit: Knee Pain. 2. Have you seen or consulted any other health care providers outside of the [address] [place] [address] since your last visit? Include any pap smears or colon screening.  No

## 2019-09-03 NOTE — PROGRESS NOTES
[facility]   [address] [facility], [address] [place] [address]                                          NEW PATIENT HPI/FOLLOW-UP      NAME:  Wilfrid Jewell   :   10/4/1933   MRN:   [id number]   PCP:  Kole Marinelli MD           Subjective: The patient is a 80y.o. year old male  who returns for a routine follow-up. Since the last visit, patient reports no new symptoms. No chest pain nor SOB ambulating. \"On the go all the time\" per daughter. CLL stable per patient and Nurse daughter. Denies change in exercise tolerance, chest pain, edema, medication intolerance, palpitations, shortness of breath, PND/orthopnea wheezing, sputum, syncope, dizziness or light headedness. Doing satisfactorily. Review of Systems  General: Pt denies excessive weight gain or loss. Pt is able to conduct ADL's. Respiratory: Denies shortness of breath, JETER, wheezing or stridor.   Cardiovascular: Denies precordial pain, palpitations, edema or PND  Gastrointestinal: Denies poor appetite, indigestion, abdominal pain or blood in stool  Peripheral vascular: Denies claudication, leg cramps  Neuropsychiatric: Denies paresthesias,tingling,numbness,anxiety,depression,fatigue  Musculoskeletal: Denies pain,tenderness, soreness,swelling      Past Medical History:   Diagnosis Date    Abnormal nuclear stress test 2014    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 ([address] 2015    Bereavement 2019    ltcf - uti -strangulated hernia - prostate ca - brother    Bronchiectasis ([address]     CAD (coronary artery disease)     Chest pain, unspecified     Chronic pain     right leg    CLL (chronic lymphocytic leukemia) ([address]     Jerica Segura md  [facility]    Diabetes ([address]     Essential hypertension     GERD (gastroesophageal reflux disease)     Hyperlipidemia     Hypertension     Opacity of lung on imaging study 2017    cxr    Rotator cuff arthropathy     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    S/P coronary artery stent placement 6/6/2014 6/6/14 PCI/GUANAKO to prox LAD     Patient Active Problem List    Diagnosis Date Noted    Type 2 diabetes with nephropathy ([address] 06/13/2018    Bronchiectasis ([address]     Opacity of lung on imaging study 05/28/2017    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/06/2015    Rotator cuff arthropathy     CLL (chronic lymphocytic leukemia) ([address]     Chest pain, atypical 04/26/2015    S/P angioplasty with stent--4/15 04/26/2015    Leukocytosis     ASHD (arteriosclerotic heart disease) 04/04/2015    CAD (coronary artery disease)     Hyperlipidemia     GERD (gastroesophageal reflux disease)     S/P coronary artery stent placement 06/06/2014    Abnormal nuclear stress test 06/06/2014    Chest pain 09/16/2013    Diabetes mellitus type 2, controlled ([address] 05/22/2012    Mixed hyperlipidemia 05/22/2012    Peripheral vascular disease ([address] 05/22/2012    Chronic ischemic heart disease 05/22/2012    Coronary atherosclerosis of native coronary artery 05/22/2012    Atherosclerosis of native artery of extremity with intermittent claudication ([address] 05/22/2012    Essential hypertension, benign 05/22/2012    Atherosclerosis of native arteries of the extremities, unspecified 05/22/2012      Past Surgical History:   Procedure Laterality Date    HX COLONOSCOPY      HX HEART CATHETERIZATION      PTCA SINGLE VESSEL  4/4/2015         VASCULAR SURGERY PROCEDURE UNLIST  2014 and 2015    BLE stenting     Allergies   Allergen Reactions    Codeine Shortness of Breath    Lipitor [Atorvastatin] Nausea Only      Family History   Problem Relation Age of Onset    Diabetes Mother     Stroke Father       Social History     Socioeconomic History    Marital status:      Spouse name: Not on file    Number of children: Not on file    Years of education: Not on file    Highest education level: Not on file   Occupational History    Not on file   Social Needs    Financial resource strain: Not on file    Food insecurity:     Worry: Not on file     Inability: Not on file    Transportation needs:     Medical: Not on file     Non-medical: Not on file   Tobacco Use    Smoking status: Former Smoker     Last attempt to quit: 1/15/1984     Years since quittin.6    Smokeless tobacco: Never Used    Tobacco comment: QUIT    Substance and Sexual Activity    Alcohol use: No     Alcohol/week: 0.0 standard drinks     Comment: quit in     Drug use: No     Types: Prescription, OTC    Sexual activity: Yes     Partners: Female   Lifestyle    Physical activity:     Days per week: Not on file     Minutes per session: Not on file    Stress: Not on file   Relationships    Social connections:     Talks on phone: Not on file     Gets together: Not on file     Attends [employer]ism service: Not on file     Active member of club or organization: Not on file     Attends meetings of clubs or organizations: Not on file     Relationship status: Not on file    Intimate partner violence:     Fear of current or ex partner: Not on file     Emotionally abused: Not on file     Physically abused: Not on file     Forced sexual activity: Not on file   Other Topics Concern    Not on file   Social History Narrative    Family History: Mother:  61 yrs, DM complicationsFather:  80 yrs, strokeSister(s):  76 yrs, pneumonia, DM,    HTNBrother(s):  61 yrs, DM, HTN, CVAChildren: aliveGrandmother: unknow nGrandfather: deceasedSpouse: deceased1 brother(s) -    healthy. 2daughter(s) - healthy. Social History: Alcohol Use Patient does not use alcohol. Smoking Status Patient is a former smoker, Quit in: 36. Caffeine: none. Marital    Status: W idow . Lives w ith: alone. Children: yes 2 d. Education/School: has highschool diploma, college 2 y.       Current Outpatient Medications   Medication Sig    amLODIPine (NORVASC) 2.5 mg tablet TAKE 1 TABLET DAILY    allopurinol (ZYLOPRIM) 300 mg tablet Take 0.5 Tabs by mouth daily.  ONETOUCH ULTRA BLUE TEST STRIP strip USE TO TEST BLOOD SUGAR TWICE A DAY    simvastatin (ZOCOR) 40 mg tablet TAKE 1 TABLET DAILY IN THE EVENING    metFORMIN (GLUCOPHAGE) 500 mg tablet TAKE 1 TABLET DAILY    metoprolol tartrate (LOPRESSOR) 25 mg tablet TAKE 1 TABLET TWICE A DAY    omeprazole (PRILOSEC) 20 mg capsule TAKE 1 CAPSULE DAILY    aspirin 81 mg chewable tablet Take 81 mg by mouth daily.  brimonidine (ALPHAGAN) 0.15 % ophthalmic solution Administer 1 Drop to both eyes two (2) times a day. Indications: OPEN ANGLE GLAUCOMA    cyanocobalamin 1,000 mcg tablet Take 1,000 mcg by mouth daily. No current facility-administered medications for this visit. I have reviewed the nurses notes, vitals, problem list, allergy list, medical history, family medical, social history and medications. Objective:     Physical Exam:     Vitals:    09/03/19 0958   BP: 116/66   Pulse: 63   Resp: 16   SpO2: 93%   Weight: 170 lb (77.1 kg)   Height: 5' 8\" (1.727 m)    Body mass index is 25.85 kg/m². General: Well developed, in no acute distress. HEENT: No carotid bruits, no JVD, trach is midline. Heart:  Normal S1/S2 negative S3 or S4. Regular, Gr 1-2/6 SE murmur, -gallop or rub.   Respiratory: Clear bilaterally, no wheezing or rales  Abdomen:   Soft, non-tender, bowel sounds are active.   Extremities:  No edema, normal cap refill, no cyanosis. Neuro: A&Ox3, speech clear, gait stable. Skin: Skin color is normal. No rashes or lesions. No diaphoresis.   Vascular: 2+ pulses symmetric in all extremities        Data Review:       Cardiographics:    EKG: NSR,very mild LAD,LVH,minor inferolateral Twave inversion    Cardiology Labs:    Results for orders placed or performed during the hospital encounter of 09/02/17   EKG, 12 LEAD, INITIAL   Result Value Ref Range    Ventricular Rate 59 BPM    Atrial Rate 59 BPM    P-R Interval 164 ms    QRS Duration 88 ms    Q-T Interval 396 ms QTC Calculation (Bezet) 392 ms    Calculated P Axis 42 degrees    Calculated R Axis -20 degrees    Calculated T Axis -15 degrees    Diagnosis       Sinus bradycardia  Minimal voltage criteria for LVH, may be normal variant  ST & T wave abnormality, consider anterolateral ischemia  Abnormal ECG  When compared with ECG of 28-MAY-2017 13:34,  No significant change was found  Confirmed by [facility] ([id number]) on 9/3/2017 10:04:11 AM         Lab Results   Component Value Date/Time    Cholesterol, total 114 01/09/2019 01:09 PM    HDL Cholesterol 26 (L) 01/09/2019 01:09 PM    LDL, calculated 55 01/09/2019 01:09 PM    Triglyceride 165 (H) 01/09/2019 01:09 PM       Lab Results   Component Value Date/Time    Sodium 139 07/24/2019 10:31 AM    Potassium 5.1 07/24/2019 10:31 AM    Chloride 102 07/24/2019 10:31 AM    CO2 23 07/24/2019 10:31 AM    Anion gap 6 09/02/2017 02:42 PM    Glucose 75 07/24/2019 10:31 AM    BUN 12 07/24/2019 10:31 AM    Creatinine 1.18 07/24/2019 10:31 AM    BUN/Creatinine ratio 10 07/24/2019 10:31 AM    GFR est AA 65 07/24/2019 10:31 AM    GFR est non-AA 56 (L) 07/24/2019 10:31 AM    Calcium 9.6 07/24/2019 10:31 AM    Bilirubin, total 1.1 (H) 09/02/2017 02:42 PM    AST (SGOT) 23 09/02/2017 02:42 PM    Alk. phosphatase 77 09/02/2017 02:42 PM    Protein, total 7.7 09/02/2017 02:42 PM    Albumin 4.4 07/24/2019 10:31 AM    Globulin 3.9 09/02/2017 02:42 PM    A-G Ratio 1.0 (L) 09/02/2017 02:42 PM    ALT (SGPT) 21 09/02/2017 02:42 PM          Assessment:       ICD-10-CM ICD-9-CM    1. Chronic ischemic heart disease I25.9 414.9 AMB POC EKG ROUTINE W/ 12 LEADS, INTER & REP   2. Peripheral vascular disease (HCC) I73.9 443.9    3. Essential hypertension, benign I10 401.1 AMB POC EKG ROUTINE W/ 12 LEADS, INTER & REP   4. ASHD (arteriosclerotic heart disease) I25.10 414.00 AMB POC EKG ROUTINE W/ 12 LEADS, INTER & REP   5. CLL (chronic lymphocytic leukemia) ([facility]) C91.90 204.10    6.  S/P coronary artery stent placement--2014 Z95.5 V45.82 AMB POC EKG ROUTINE W/ 12 LEADS, INTER & REP   7. S/P CABG x 3--11/2015 Z95.1 V45.81 AMB POC EKG ROUTINE W/ 12 LEADS, INTER & REP   8. Mixed hyperlipidemia E78.2 272.2    9. Type 2 diabetes with nephropathy (HCC) E11.21 250.40      583.81          Discussion: Patient presents at this time stable from a cardiac perspective. No chest pain nor other cardiac concerns. Systolic ejection murmur stable. Echo at f/u. Pleased with present cardiac status. Plan: 1. Continue same meds. Lipid profile and labs followed by PCP--at goal essentially. 2.Encouraged to exercise to tolerance, lose weight and follow low fat, low carb, low cholesterol, low sodium predominantly Plant-based (consider Mediterranean) diet. Call with questions or concerns. Will follow up any test results by phone and/or f/u here in office if needed. Blane Ordonez 3.Follow up: 1 year or sooner if need be. I have discussed the diagnosis with the patient and the intended plan as seen in the above orders. The patient has received an after-visit summary and questions were answered concerning future plans. I have discussed any concerning medication side effects and warnings with the patient as well.     Everton Beal MD,[facility]  9/3/2019

## 2019-09-12 RX ORDER — METOPROLOL TARTRATE 25 MG/1
TABLET, FILM COATED ORAL
Qty: 180 TAB | Refills: 3 | Status: SHIPPED | OUTPATIENT
Start: 2019-09-12 | End: 2021-08-18 | Stop reason: SDUPTHER

## 2019-09-16 ENCOUNTER — TELEPHONE (OUTPATIENT)
Dept: CARDIOLOGY CLINIC | Age: 84
End: 2019-09-16

## 2019-09-16 NOTE — TELEPHONE ENCOUNTER
I called the patient and informed him Dr. Kasey Mondragon sent the script over last Thursday. I spoke with the pharmacy and it is ready.

## 2019-09-16 NOTE — TELEPHONE ENCOUNTER
Patient will need a refill on the following medication metoprolol tartrate (LOPRESSOR) 25mg . Please send it to [id number]- [facility] -[address] [facility] [phone].       Patient will be completely out by Friday    Thanks

## 2019-10-30 ENCOUNTER — OFFICE VISIT (OUTPATIENT)
Dept: INTERNAL MEDICINE CLINIC | Age: 84
End: 2019-10-30

## 2019-10-30 VITALS
RESPIRATION RATE: 18 BRPM | HEART RATE: 68 BPM | WEIGHT: 172.3 LBS | BODY MASS INDEX: 26.11 KG/M2 | SYSTOLIC BLOOD PRESSURE: 106 MMHG | HEIGHT: 68 IN | DIASTOLIC BLOOD PRESSURE: 66 MMHG | TEMPERATURE: 97.7 F | OXYGEN SATURATION: 93 %

## 2019-10-30 DIAGNOSIS — C91.10 CLL (CHRONIC LYMPHOCYTIC LEUKEMIA) (HCC): ICD-10-CM

## 2019-10-30 DIAGNOSIS — Z95.1 S/P CABG X 3: ICD-10-CM

## 2019-10-30 DIAGNOSIS — Z00.00 MEDICARE ANNUAL WELLNESS VISIT, SUBSEQUENT: Primary | ICD-10-CM

## 2019-10-30 DIAGNOSIS — I10 ESSENTIAL HYPERTENSION, BENIGN: ICD-10-CM

## 2019-10-30 DIAGNOSIS — E11.21 TYPE 2 DIABETES WITH NEPHROPATHY (HCC): ICD-10-CM

## 2019-10-30 DIAGNOSIS — J47.9 BRONCHIECTASIS WITHOUT COMPLICATION (HCC): ICD-10-CM

## 2019-10-30 DIAGNOSIS — Z23 ENCOUNTER FOR IMMUNIZATION: ICD-10-CM

## 2019-10-30 DIAGNOSIS — R94.39 ABNORMAL NUCLEAR STRESS TEST: ICD-10-CM

## 2019-10-30 DIAGNOSIS — Z13.31 SCREENING FOR DEPRESSION: ICD-10-CM

## 2019-10-30 DIAGNOSIS — Z13.39 SCREENING FOR ALCOHOLISM: ICD-10-CM

## 2019-10-30 NOTE — ACP (ADVANCE CARE PLANNING)
Advance Care Planning (ACP) Provider Conversation Snapshot    Date of ACP Conversation: 10/30/19  Persons included in Conversation:  patient  Length of ACP Conversation in minutes:  <16 minutes (Non-Billable)    Authorized Decision Maker (if patient is incapable of making informed decisions):    This person is:   Healthcare Agent/Medical Power of  under Advance Directive            For Patients with Decision Making Capacity:   Values/Goals: Exploration of values, goals, and preferences if recovery is not expected, even with continued medical treatment in the event of:  Imminent death    Conversation Outcomes / Follow-Up Plan:   Entered DNR order (If yes, complete Durable DNR form)

## 2019-10-30 NOTE — PROGRESS NOTES
1. Have you been to the ER, urgent care clinic since your last visit? Hospitalized since your last visit? No    2. Have you seen or consulted any other health care providers outside of the [address] [place] [address] since your last visit? Include any pap smears or colon screening. No  This is the Subsequent Medicare Annual Wellness Exam, performed 12 months or more after the Initial AWV or the last Subsequent AWV    I have reviewed the patient's medical history in detail and updated the computerized patient record.      History     Patient Active Problem List   Diagnosis Code    Diabetes mellitus type 2, controlled ([facility] E11.9    Mixed hyperlipidemia E78.2    Peripheral vascular disease ([facility] I73.9    Chronic ischemic heart disease I25.9    Coronary atherosclerosis of native coronary artery I25.10    Atherosclerosis of native artery of extremity with intermittent claudication ([facility]) I70.219    Essential hypertension, benign I10    Atherosclerosis of native arteries of the extremities, unspecified I70.209    Chest pain R07.9    S/P coronary artery stent placement Z95.5    Abnormal nuclear stress test R94.39    CAD (coronary artery disease) I25.10    Hyperlipidemia E78.5    GERD (gastroesophageal reflux disease) K21.9    ASHD (arteriosclerotic heart disease) I25.10    Leukocytosis D72.829    Chest pain, atypical R07.89    S/P angioplasty with stent--4/15 Z95.820    CLL (chronic lymphocytic leukemia) ([facility]) C91.10    Rotator cuff arthropathy M12.819    S/P CABG x 3 Z95.1    Opacity of lung on imaging study R91.8    Bronchiectasis ([facility]) J47.9    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     Past Medical History:   Diagnosis Date    Abnormal nuclear stress test 6/6/2014    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 ([facility] 11/2/2015    Bereavement 02/06/2019    ltcf - uti -strangulated hernia - prostate ca - brother    Bronchiectasis ([facility]     CAD (coronary artery disease)     Chest pain, unspecified     Chronic pain     right leg    CLL (chronic lymphocytic leukemia) ([facility]     Jerica Segura md  [facility]    Diabetes ([facility]     Essential hypertension     GERD (gastroesophageal reflux disease)     Hyperlipidemia     Hypertension     Opacity of lung on imaging study 2017    cxr    Rotator cuff arthropathy     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    S/P coronary artery stent placement 2014 PCI/GUANAKO to prox LAD      Past Surgical History:   Procedure Laterality Date    HX COLONOSCOPY      HX HEART CATHETERIZATION      PTCA SINGLE VESSEL  2015         VASCULAR SURGERY PROCEDURE UNLIST   and     BLE stenting     Current Outpatient Medications   Medication Sig Dispense Refill    metoprolol tartrate (LOPRESSOR) 25 mg tablet TAKE 1 TABLET TWICE A  Tab 3    amLODIPine (NORVASC) 2.5 mg tablet TAKE 1 TABLET DAILY 90 Tab 3    allopurinol (ZYLOPRIM) 300 mg tablet Take 0.5 Tabs by mouth daily. 90 Tab 7    ONETOUCH ULTRA BLUE TEST STRIP strip USE TO TEST BLOOD SUGAR TWICE A  Strip 3    simvastatin (ZOCOR) 40 mg tablet TAKE 1 TABLET DAILY IN THE EVENING 90 Tab 3    metFORMIN (GLUCOPHAGE) 500 mg tablet TAKE 1 TABLET DAILY 90 Tab 3    omeprazole (PRILOSEC) 20 mg capsule TAKE 1 CAPSULE DAILY 90 Cap 1    cyanocobalamin 1,000 mcg tablet Take 1,000 mcg by mouth daily.  aspirin 81 mg chewable tablet Take 81 mg by mouth daily.  brimonidine (ALPHAGAN) 0.15 % ophthalmic solution Administer 1 Drop to both eyes two (2) times a day.  Indications: OPEN ANGLE GLAUCOMA       Allergies   Allergen Reactions    Codeine Shortness of Breath    Lipitor [Atorvastatin] Nausea Only       Family History   Problem Relation Age of Onset    Diabetes Mother     Stroke Father      Social History     Tobacco Use    Smoking status: Former Smoker     Last attempt to quit: 1/15/1984     Years since quittin.8    Smokeless tobacco: Never Used    Tobacco comment: QUIT 1980   Substance Use Topics    Alcohol use: No     Alcohol/week: 0.0 standard drinks     Comment: quit in 1980       Depression Risk Factor Screening:     3 most recent PHQ Screens 10/30/2019   PHQ Not Done -   Little interest or pleasure in doing things Not at all   Feeling down, depressed, irritable, or hopeless Not at all   Total Score PHQ 2 0       Alcohol Risk Factor Screening (MALE > 65): Do you average more 1 drink per night or more than 7 drinks a week: No    In the past three months have you have had more than 4 drinks containing alcohol on one occasion: No      Functional Ability and Level of Safety:   Hearing: Hearing is good. Activities of Daily Living: The home contains: no safety equipment. Patient does total self care    Ambulation: with no difficulty    Fall Risk:  Fall Risk Assessment, last 12 mths 10/30/2019   Able to walk? Yes   Fall in past 12 months? No       Abuse Screen:  Patient is not abused    Cognitive Screening   Has your family/caregiver stated any concerns about your memory: no  Cognitive Screening: Normal - MMSE (Mini Mental Status Exam)       Patient Care Team   Patient Care Team:  Yamil Macias MD as PCP - General (Internal Medicine)  Yamil Macias MD as PCP - REHABILITATION [facility]  Misa Rocha MD as Physician (Cardiology)  Chito Espinoza NP as Nurse Practitioner (Nurse Practitioner)  Fortunato Nguyen MD as Surgeon (Cardiothoracic Surgery)  Khoi Conner RN as Ambulatory Care Navigator    Assessment/Plan   Education and counseling provided:  Are appropriate based on today's review and evaluation    Diagnoses and all orders for this visit:    1.  Encounter for immunization  -     INFLUENZA VIRUS VACCINE, HIGH DOSE SEASONAL, PRESERVATIVE FREE  -     PA IMMUNIZ ADMIN,1 SINGLE/COMB VAC/TOXOID        Health Maintenance Due   Topic Date Due    Influenza Age 5 to Adult  08/01/2019    MEDICARE YEARLY EXAM  08/09/2019

## 2019-10-30 NOTE — PROGRESS NOTES
SPORTS MEDICINE AND PRIMARY CARE  Kary Arnett MD, [address] [address]  Phone:  [phone]  Fax: [phone]      Chief Complaint   Patient presents with    Annual Wellness Visit         SUBECTIVE:    Kathy Ramírez is a 80 y.o. male Patient returns today with his daughter. He has a known history of type 2 diabetes with nephropathy, coronary artery disease, S/P CABG x three and stent placement, primary hypertension, CLL, bronchiectasis, last CT scan done in 2018, and primary hypertension, and is seen for evaluation. Since we last saw him he saw Dr. Jordan Avalos, who noted that his cardiac status is stable. He had a stable systolic murmur and did not recommend another follow up for a year. Patient currently denies complaints and is seen for evaluation. Current Outpatient Medications   Medication Sig Dispense Refill    metoprolol tartrate (LOPRESSOR) 25 mg tablet TAKE 1 TABLET TWICE A  Tab 3    amLODIPine (NORVASC) 2.5 mg tablet TAKE 1 TABLET DAILY 90 Tab 3    allopurinol (ZYLOPRIM) 300 mg tablet Take 0.5 Tabs by mouth daily. 90 Tab 7    ONETOUCH ULTRA BLUE TEST STRIP strip USE TO TEST BLOOD SUGAR TWICE A  Strip 3    simvastatin (ZOCOR) 40 mg tablet TAKE 1 TABLET DAILY IN THE EVENING 90 Tab 3    metFORMIN (GLUCOPHAGE) 500 mg tablet TAKE 1 TABLET DAILY 90 Tab 3    omeprazole (PRILOSEC) 20 mg capsule TAKE 1 CAPSULE DAILY 90 Cap 1    cyanocobalamin 1,000 mcg tablet Take 1,000 mcg by mouth daily.  aspirin 81 mg chewable tablet Take 81 mg by mouth daily.  brimonidine (ALPHAGAN) 0.15 % ophthalmic solution Administer 1 Drop to both eyes two (2) times a day.  Indications: OPEN ANGLE GLAUCOMA       Past Medical History:   Diagnosis Date    Abnormal nuclear stress test 6/6/2014    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 ([facility] 11/2/2015    Bereavement 02/06/2019    ltcf - uti -strangulated hernia - prostate ca - brother    [facility]     CAD (coronary artery disease)     Chest pain, unspecified     Chronic pain     right leg    CLL (chronic lymphocytic leukemia) ([facility]     Jerica Segura md  [facility]    Diabetes ([facility]     Essential hypertension     GERD (gastroesophageal reflux disease)     Hyperlipidemia     Hypertension     Opacity of lung on imaging study 2017    cxr    Rotator cuff arthropathy     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    S/P coronary artery stent placement 2014 PCI/GUANAKO to prox LAD     Past Surgical History:   Procedure Laterality Date    HX COLONOSCOPY      HX HEART CATHETERIZATION      PTCA SINGLE VESSEL  2015         VASCULAR SURGERY PROCEDURE UNLIST   and     BLE stenting     Allergies   Allergen Reactions    Codeine Shortness of Breath    Lipitor [Atorvastatin] Nausea Only       REVIEW OF SYSTEMS:   No chest pain or SOB. Social History     Socioeconomic History    Marital status:      Spouse name: Not on file    Number of children: Not on file    Years of education: Not on file    Highest education level: Not on file   Tobacco Use    Smoking status: Former Smoker     Last attempt to quit: 1/15/1984     Years since quittin.8    Smokeless tobacco: Never Used    Tobacco comment: QUIT    Substance and Sexual Activity    Alcohol use: No     Alcohol/week: 0.0 standard drinks     Comment: quit in     Drug use: No     Types: Prescription, OTC    Sexual activity: Yes     Partners: Female   Social History Narrative    Family History: Mother:  61 yrs, DM complicationsFather:  80 yrs, strokeSister(s):  76 yrs, pneumonia, DM,    HTNBrother(s):  61 yrs, DM, HTN, CVAChildren: aliveGrandmother: unknow nGrandfather: deceasedSpouse: deceased1 brother(s) -    healthy. 2daughter(s) - healthy. Social History: Alcohol Use Patient does not use alcohol. Smoking Status Patient is a former smoker, Quit in: 36. Caffeine: none. Marital    Status: W idow . Lives w ith: alone. Children: yes 2 d. Education/School: has highschool diploma, college 2 y.   r  Family History   Problem Relation Age of Onset    Diabetes Mother     Stroke Father        OBJECTIVE:  Visit Vitals  /66   Pulse 68   Temp 97.7 °F (36.5 °C) (Oral)   Resp 18   Ht 5' 8\" (1.727 m)   Wt 172 lb 4.8 oz (78.2 kg)   SpO2 93%   BMI 26.20 kg/m²     ENT: perrla,  eom intact  NECK: supple. Thyroid normal  CHEST: clear to ascultation and percussion   HEART: regular rate and rhythm  ABD: soft, bowel sounds active  EXTREMITIES: no edema, pulse 1+     No visits with results within 3 Month(s) from this visit. Latest known visit with results is:   Office Visit on 07/24/2019   Component Date Value Ref Range Status    WBC 07/24/2019 53.7* 3.4 - 10.8 x10E3/uL Final    RBC 07/24/2019 4.15  4.14 - 5.80 x10E6/uL Final    HGB 07/24/2019 13.1  13.0 - 17.7 g/dL Final    HCT 07/24/2019 39.2  37.5 - 51.0 % Final    MCV 07/24/2019 95  79 - 97 fL Final    MCH 07/24/2019 31.6  26.6 - 33.0 pg Final    MCHC 07/24/2019 33.4  31.5 - 35.7 g/dL Final    RDW 07/24/2019 13.8  12.3 - 15.4 % Final    PLATELET 08/09/1896 448  150 - 450 x10E3/uL Final    NEUTROPHILS 07/24/2019 3  Not Estab. % Final    Lymphocytes 07/24/2019 89  Not Estab. % Final    Comment: Smudge cells present  Atypical lymphocytes.  MONOCYTES 07/24/2019 8  Not Estab. % Final    EOSINOPHILS 07/24/2019 0  Not Estab. % Final    BASOPHILS 07/24/2019 0  Not Estab. % Final    ABS. NEUTROPHILS 07/24/2019 1.6  1.4 - 7.0 x10E3/uL Final    Abs Lymphocytes 07/24/2019 47.8* 0.7 - 3.1 x10E3/uL Final    ABS. MONOCYTES 07/24/2019 4.3* 0.1 - 0.9 x10E3/uL Final    ABS. EOSINOPHILS 07/24/2019 0.0  0.0 - 0.4 x10E3/uL Final    ABS. BASOPHILS 07/24/2019 0.0  0.0 - 0.2 x10E3/uL Final    Hematology comments: 07/24/2019 Note:   Final    Manual differential was performed.     Glucose 07/24/2019 75  65 - 99 mg/dL Final    BUN 07/24/2019 12  8 - 27 mg/dL Final    Creatinine 07/24/2019 1.18  0.76 - 1.27 mg/dL Final    GFR est non-AA 07/24/2019 56* >59 mL/min/1.73 Final    GFR est AA 07/24/2019 65  >59 mL/min/1.73 Final    BUN/Creatinine ratio 07/24/2019 10  10 - 24 Final    Sodium 07/24/2019 139  134 - 144 mmol/L Final    Potassium 07/24/2019 5.1  3.5 - 5.2 mmol/L Final    Chloride 07/24/2019 102  96 - 106 mmol/L Final    CO2 07/24/2019 23  20 - 29 mmol/L Final    Calcium 07/24/2019 9.6  8.6 - 10.2 mg/dL Final    Phosphorus 07/24/2019 3.4  2.5 - 4.5 mg/dL Final    Albumin 07/24/2019 4.4  3.5 - 4.7 g/dL Final    Hemoglobin A1c 07/24/2019 6.9* 4.8 - 5.6 % Final    Comment:          Prediabetes: 5.7 - 6.4           Diabetes: >6.4           Glycemic control for adults with diabetes: <7.0      Estimated average glucose 07/24/2019 151  mg/dL Final          ASSESSMENT:  1. Medicare annual wellness visit, subsequent    2. Encounter for immunization    3. Screening for alcoholism    4. Screening for depression    5. Type 2 diabetes with nephropathy ([facility]    6. S/P CABG x 3    7. Essential hypertension, benign    8. CLL (chronic lymphocytic leukemia) ([facility]    9. Bronchiectasis without complication ([facility]    10. Abnormal nuclear stress test      Patient received his flu shot today, with which we completely agree. He will talk with his oncologist regarding a shingles shot. It is my desire for him to have a shingles shot with Shingrix. His daughter will look into that for me. His blood sugar control has been good. Will check hemoglobin A1c today to confirm control. Coronary artery disease is stable and confirmed by his cardiologist.    Blood pressure control is at goal.    He follows with Dr. Deric Smiley, oncology, for the CLL. He will be back to see us in three to four months, sooner if he has any problems. Laboratory studies are requested.   We will send the laboratory studies to him in the mail and ask him to take the laboratory studies with him when he sees the oncologist.      I have discussed the diagnosis with the patient and the intended plan as seen in the  orders above. The patient understands and agees with the plan. The patient has   received an after visit summary and questions were answered concerning  future plans  Patient labs and/or xrays were reviewed  Past records were reviewed. PLAN:  .  Orders Placed This Encounter    Depression Screen Annual    Influenza virus vaccine (FLUZONE HIGH-DOSE) 65 years and older    URINALYSIS W/ RFLX MICROSCOPIC    CBC WITH AUTOMATED DIFF    METABOLIC PANEL, COMPREHENSIVE    LIPID PANEL    HEMOGLOBIN A1C WITH EAG    TSH 3RD GENERATION       Follow-up and Dispositions    · Return in about 3 months (around 1/30/2020). ATTENTION:   This medical record was transcribed using an electronic medical records system. Although proofread, it may and can contain electronic and spelling errors. Other human spelling and other errors may be present. Corrections may be executed at a later time. Please feel free to contact us for any clarifications as needed.

## 2019-10-30 NOTE — PATIENT INSTRUCTIONS
Vaccine Information Statement Influenza (Flu) Vaccine (Inactivated or Recombinant): What You Need to Know Many Vaccine Information Statements are available in Khmer and other languages. See www.immunize.org/vis Hojas de información sobre vacunas están disponibles en español y en muchos otros idiomas. Visite www.immunize.org/vis 1. Why get vaccinated? Influenza vaccine can prevent influenza (flu). Flu is a contagious disease that spreads around the [facility] every year, usually between October and May. Anyone can get the flu, but it is more dangerous for some people. Infants and young children, people 72years of age and older, pregnant women, and people with certain health conditions or a weakened immune system are at greatest risk of flu complications. Pneumonia, bronchitis, sinus infections and ear infections are examples of flu-related complications. If you have a medical condition, such as heart disease, cancer or diabetes, flu can make it worse. Flu can cause fever and chills, sore throat, muscle aches, fatigue, cough, headache, and runny or stuffy nose. Some people may have vomiting and diarrhea, though this is more common in children than adults. Each year thousands of people in the [facility] die from flu, and many more are hospitalized. Flu vaccine prevents millions of illnesses and flu-related visits to the doctor each year. 2. Influenza vaccines CDC recommends everyone 10months of age and older get vaccinated every flu season. Children 6 months through 6years of age may need 2 doses during a single flu season. Everyone else needs only 1 dose each flu season. It takes about 2 weeks for protection to develop after vaccination. There are many flu viruses, and they are always changing. Each year a new flu vaccine is made to protect against three or four viruses that are likely to cause disease in the upcoming flu season.  Even when the vaccine doesnt exactly match these viruses, it may still provide some protection. Influenza vaccine does not cause flu. Influenza vaccine may be given at the same time as other vaccines. 3. Talk with your health care provider Tell your vaccine provider if the person getting the vaccine: 
 Has had an allergic reaction after a previous dose of influenza vaccine, or has any severe, life-threatening allergies.  Has ever had Guillain-Barré Syndrome (also called GBS). In some cases, your health care provider may decide to postpone influenza vaccination to a future visit. People with minor illnesses, such as a cold, may be vaccinated. People who are moderately or severely ill should usually wait until they recover before getting influenza vaccine. Your health care provider can give you more information. 4. Risks of a reaction  Soreness, redness, and swelling where shot is given, fever, muscle aches, and headache can happen after influenza vaccine.  There may be a very small increased risk of Guillain-Barré Syndrome (GBS) after inactivated influenza vaccine (the flu shot). [facility] children who get the flu shot along with pneumococcal vaccine (PCV13), and/or DTaP vaccine at the same time might be slightly more likely to have a seizure caused by fever. Tell your health care provider if a child who is getting flu vaccine has ever had a seizure. People sometimes faint after medical procedures, including vaccination. Tell your provider if you feel dizzy or have vision changes or ringing in the ears. As with any medicine, there is a very remote chance of a vaccine causing a severe allergic reaction, other serious injury, or death. 5. What if there is a serious problem? An allergic reaction could occur after the vaccinated person leaves the clinic.  If you see signs of a severe allergic reaction (hives, swelling of the face and throat, difficulty breathing, a fast heartbeat, dizziness, or weakness), call 9-1-1 and get the person to the nearest hospital. 
 
For other signs that concern you, call your health care provider. Adverse reactions should be reported to the Vaccine Adverse Event Reporting System (VAERS). Your health care provider will usually file this report, or you can do it yourself. Visit the VAERS website at www.vaers. hhs.gov or call 8-167.119.2698. VAERS is only for reporting reactions, and VAERS staff do not give medical advice. 6. The National Vaccine Injury Compensation Program 
 
The [facility] Vaccine Injury Compensation Program (VICP) is a federal program that was created to compensate people who may have been injured by certain vaccines. Visit the VICP website at www.[facility].gov/vaccinecompensation or call 9-147.716.7059 to learn about the program and about filing a claim. There is a time limit to file a claim for compensation. 7. How can I learn more?  Ask your health care provider.  Call your local or state health department.  Contact the Centers for Disease Control and Prevention (CDC): 
- Call 8-853.249.3434 (7-528-MOI-INFO) or 
- Visit CDCs influenza website at www.cdc.gov/flu Vaccine Information Statement (Interim) Inactivated Influenza Vaccine 8/15/2019 
[address] 953QT-88 Department of [employer] Centers for Disease Control and Prevention Office Use Only Medicare Wellness Visit, Male The best way to live healthy is to have a lifestyle where you eat a well-balanced diet, exercise regularly, limit alcohol use, and quit all forms of tobacco/nicotine, if applicable. Regular preventive services are another way to keep healthy. Preventive services (vaccines, screening tests, monitoring & exams) can help personalize your care plan, which helps you manage your own care. Screening tests can find health problems at the earliest stages, when they are easiest to treat. [facility] follows the current, evidence-based guidelines published by the [facility] States John Marks ([facility]) when recommending preventive services for our patients. Because we follow these guidelines, sometimes recommendations change over time as research supports it. (For example, a prostate screening blood test is no longer routinely recommended for men with no symptoms). Of course, you and your doctor may decide to screen more often for some diseases, based on your risk and co-morbidities (chronic disease you are already diagnosed with). Preventive services for you include: - Medicare offers their members a free annual wellness visit, which is time for you and your primary care provider to discuss and plan for your preventive service needs. Take advantage of this benefit every year! 
-All adults over age 72 should receive the recommended pneumonia vaccines. Current USPSTF guidelines recommend a series of two vaccines for the best pneumonia protection.  
-All adults should have a flu vaccine yearly and tetanus vaccine every 10 years. 
-All adults age 48 and older should receive the shingles vaccines (series of two vaccines). -All adults age 38-68 who are overweight should have a diabetes screening test once every three years.  
-Other screening tests & preventive services for persons with diabetes include: an eye exam to screen for diabetic retinopathy, a kidney function test, a foot exam, and stricter control over your cholesterol.  
-Cardiovascular screening for adults with routine risk involves an electrocardiogram (ECG) at intervals determined by the provider.  
-Colorectal cancer screening should be done for adults age 54-65 with no increased risk factors for colorectal cancer. There are a number of acceptable methods of screening for this type of cancer. Each test has its own benefits and drawbacks.  Discuss with your provider what is most appropriate for you during your annual wellness visit. The different tests include: colonoscopy (considered the best screening method), a fecal occult blood test, a fecal DNA test, and sigmoidoscopy. 
-All adults born between [facility] should be screened once for Hepatitis C. 
-An Abdominal Aortic Aneurysm (AAA) Screening is recommended for men age 73-68 who has ever smoked in their lifetime. Here is a list of your current Health Maintenance items (your personalized list of preventive services) with a due date: 
Health Maintenance Due Topic Date Due  
 Flu Vaccine  08/01/2019 [facility] Annual Well Visit  08/09/2019

## 2019-10-31 LAB
ALBUMIN SERPL-MCNC: 4.6 G/DL (ref 3.5–4.7)
ALBUMIN/GLOB SERPL: 1.7 {RATIO} (ref 1.2–2.2)
ALP SERPL-CCNC: 86 IU/L (ref 39–117)
ALT SERPL-CCNC: 14 IU/L (ref 0–44)
APPEARANCE UR: CLEAR
AST SERPL-CCNC: 19 IU/L (ref 0–40)
BASOPHILS # BLD AUTO: 0.1 X10E3/UL (ref 0–0.2)
BASOPHILS NFR BLD AUTO: 0 %
BILIRUB SERPL-MCNC: 0.9 MG/DL (ref 0–1.2)
BILIRUB UR QL STRIP: NEGATIVE
BUN SERPL-MCNC: 17 MG/DL (ref 8–27)
BUN/CREAT SERPL: 13 (ref 10–24)
CALCIUM SERPL-MCNC: 9.9 MG/DL (ref 8.6–10.2)
CHLORIDE SERPL-SCNC: 104 MMOL/L (ref 96–106)
CHOLEST SERPL-MCNC: 114 MG/DL (ref 100–199)
CO2 SERPL-SCNC: 21 MMOL/L (ref 20–29)
COLOR UR: YELLOW
CREAT SERPL-MCNC: 1.28 MG/DL (ref 0.76–1.27)
EOSINOPHIL # BLD AUTO: 0 X10E3/UL (ref 0–0.4)
EOSINOPHIL NFR BLD AUTO: 0 %
ERYTHROCYTE [DISTWIDTH] IN BLOOD BY AUTOMATED COUNT: 14.9 % (ref 12.3–15.4)
EST. AVERAGE GLUCOSE BLD GHB EST-MCNC: 140 MG/DL
GLOBULIN SER CALC-MCNC: 2.7 G/DL (ref 1.5–4.5)
GLUCOSE SERPL-MCNC: 121 MG/DL (ref 65–99)
GLUCOSE UR QL: NEGATIVE
HBA1C MFR BLD: 6.5 % (ref 4.8–5.6)
HCT VFR BLD AUTO: 39.5 % (ref 37.5–51)
HDLC SERPL-MCNC: 26 MG/DL
HGB BLD-MCNC: 13.6 G/DL (ref 13–17.7)
HGB UR QL STRIP: NEGATIVE
IMM GRANULOCYTES # BLD AUTO: 0 X10E3/UL (ref 0–0.1)
IMM GRANULOCYTES NFR BLD AUTO: 0 %
KETONES UR QL STRIP: NEGATIVE
LDLC SERPL CALC-MCNC: 58 MG/DL (ref 0–99)
LEUKOCYTE ESTERASE UR QL STRIP: NEGATIVE
LYMPHOCYTES # BLD AUTO: 30.3 X10E3/UL (ref 0.7–3.1)
LYMPHOCYTES NFR BLD AUTO: 53 %
MCH RBC QN AUTO: 32.8 PG (ref 26.6–33)
MCHC RBC AUTO-ENTMCNC: 34.4 G/DL (ref 31.5–35.7)
MCV RBC AUTO: 95 FL (ref 79–97)
MICRO URNS: NORMAL
MONOCYTES # BLD AUTO: 25.4 X10E3/UL (ref 0.1–0.9)
MONOCYTES NFR BLD AUTO: 44 %
MORPHOLOGY BLD-IMP: ABNORMAL
NEUTROPHILS # BLD AUTO: 1.9 X10E3/UL (ref 1.4–7)
NEUTROPHILS NFR BLD AUTO: 3 %
NITRITE UR QL STRIP: NEGATIVE
PH UR STRIP: 5 [PH] (ref 5–7.5)
PLATELET # BLD AUTO: 183 X10E3/UL (ref 150–450)
POTASSIUM SERPL-SCNC: 4.3 MMOL/L (ref 3.5–5.2)
PROT SERPL-MCNC: 7.3 G/DL (ref 6–8.5)
PROT UR QL STRIP: NEGATIVE
RBC # BLD AUTO: 4.15 X10E6/UL (ref 4.14–5.8)
SODIUM SERPL-SCNC: 142 MMOL/L (ref 134–144)
SP GR UR: 1.02 (ref 1–1.03)
TRIGL SERPL-MCNC: 152 MG/DL (ref 0–149)
TSH SERPL DL<=0.005 MIU/L-ACNC: 4.45 UIU/ML (ref 0.45–4.5)
UROBILINOGEN UR STRIP-MCNC: 0.2 MG/DL (ref 0.2–1)
VLDLC SERPL CALC-MCNC: 30 MG/DL (ref 5–40)
WBC # BLD AUTO: 57.8 X10E3/UL (ref 3.4–10.8)

## 2019-12-06 ENCOUNTER — PATIENT OUTREACH (OUTPATIENT)
Dept: INTERNAL MEDICINE CLINIC | Age: 84
End: 2019-12-06

## 2019-12-06 NOTE — PROGRESS NOTES
ACM Complex CM: Diabetes/HTN/CAD   Ambulatory Care Management Note      Date/Time:  12/6/2019 12:49 PM    This patient was received as a referral from [address] [place] [address] reviewed with the provider   none             Ambulatory  contacted patient for discussion and case managements of Diabetes/CAD/HTN   Summary of patients top problems:   1. CAD--S/p CABG  2. Diabetes-only takes Metformin  3. Physical activity per patient  Patient's challenges to self management identified:   Lack of understanding medical conditions      Medication Management:  Good adherence    Advance Care Planning:   Does patient have an Advance Directive:  reviewed and current   Healthcare Decision Maker:  Daughter: [address], Referrals, and Durable Medical Equipment: none    PCP/Specialist follow up:   Future Appointments   Date Time Provider Renzo Gutierrez   2/5/2020  9:45 AM Raymundo Jordan MD [address] Patient verbalizes understanding of self -management goals of living with Diabetes. 12/6/2019:  Teachings done on Reducing sugar intake; Discussed PCP sent Lab Letter:  Glucose 121H; states he at pie the night before. Patient Teach-back:  States he will tell his daughter to not give him pie or sweets any more; Or agreed to just have a very small piece-stated he like that. .. Discussed S&S Diabetes:  Increased thirst.  To be cont'd. EW          goal f/u:  12/20/2019    Patient verbalized understanding of all information discussed. Patient has this Nurse Navigators contact information for any further questions, concerns, or needs.

## 2019-12-21 RX ORDER — METFORMIN HYDROCHLORIDE 500 MG/1
TABLET ORAL
Qty: 90 TAB | Refills: 4 | Status: SHIPPED | OUTPATIENT
Start: 2019-12-21 | End: 2021-03-15

## 2020-01-17 RX ORDER — SIMVASTATIN 40 MG/1
TABLET, FILM COATED ORAL
Qty: 90 TAB | Refills: 4 | Status: SHIPPED | OUTPATIENT
Start: 2020-01-17 | End: 2021-04-11

## 2020-01-28 ENCOUNTER — PATIENT OUTREACH (OUTPATIENT)
Dept: INTERNAL MEDICINE CLINIC | Age: 85
End: 2020-01-28

## 2020-01-28 NOTE — PROGRESS NOTES
ACM Complex CM: Diabetes/HTN/CAD f/u    Goals Addressed                 This Visit's Progress     Patient/Family verbalizes understanding of self-management of chronic disease. On track     1/28/2020: 10/28 PCP Letter -Cr 1.28/GFR 50;  discussed  Letter results. States drinking acceptable water during the day. Encouraged to sufficient drink water on the date of office visit also due to late visit timing can show signs of dehydration. Checked in at 9:45; signed out at 12:01(all relative). Also discussed availability of office bathroom though knowing urine sample may be needed). States BS today 107-commended.      EW         goal f/u:  2/11/2019

## 2020-02-04 ENCOUNTER — OFFICE VISIT (OUTPATIENT)
Dept: INTERNAL MEDICINE CLINIC | Age: 85
End: 2020-02-04

## 2020-02-04 VITALS
DIASTOLIC BLOOD PRESSURE: 73 MMHG | OXYGEN SATURATION: 94 % | TEMPERATURE: 98 F | SYSTOLIC BLOOD PRESSURE: 125 MMHG | WEIGHT: 167.2 LBS | HEART RATE: 68 BPM | HEIGHT: 68 IN | RESPIRATION RATE: 18 BRPM | BODY MASS INDEX: 25.34 KG/M2

## 2020-02-04 DIAGNOSIS — I21.4 NSTEMI (NON-ST ELEVATED MYOCARDIAL INFARCTION) (HCC): ICD-10-CM

## 2020-02-04 DIAGNOSIS — Z95.820 S/P ANGIOPLASTY WITH STENT: ICD-10-CM

## 2020-02-04 DIAGNOSIS — Z95.1 S/P CABG X 3: ICD-10-CM

## 2020-02-04 DIAGNOSIS — E11.21 TYPE 2 DIABETES WITH NEPHROPATHY (HCC): ICD-10-CM

## 2020-02-04 DIAGNOSIS — C91.10 CLL (CHRONIC LYMPHOCYTIC LEUKEMIA) (HCC): ICD-10-CM

## 2020-02-04 DIAGNOSIS — Z95.5 S/P CORONARY ARTERY STENT PLACEMENT: ICD-10-CM

## 2020-02-04 DIAGNOSIS — I73.9 PERIPHERAL VASCULAR DISEASE (HCC): ICD-10-CM

## 2020-02-04 DIAGNOSIS — I25.110 ATHEROSCLEROSIS OF NATIVE CORONARY ARTERY WITH UNSTABLE ANGINA PECTORIS, UNSPECIFIED WHETHER NATIVE OR TRANSPLANTED HEART (HCC): ICD-10-CM

## 2020-02-04 DIAGNOSIS — E78.5 HYPERLIPIDEMIA, UNSPECIFIED HYPERLIPIDEMIA TYPE: Primary | ICD-10-CM

## 2020-02-04 RX ORDER — AMOXICILLIN 500 MG/1
500 CAPSULE ORAL 3 TIMES DAILY
Qty: 30 CAP | Refills: 0 | Status: SHIPPED | OUTPATIENT
Start: 2020-02-04 | End: 2020-06-22 | Stop reason: ALTCHOICE

## 2020-02-04 NOTE — ASSESSMENT & PLAN NOTE
This condition is managed by Specialist.  Key CAD CHF Meds             simvastatin (ZOCOR) 40 mg tablet (Taking) TAKE 1 TABLET DAILY IN THE EVENING    metoprolol tartrate (LOPRESSOR) 25 mg tablet (Taking) TAKE 1 TABLET TWICE A DAY    amLODIPine (NORVASC) 2.5 mg tablet (Taking) TAKE 1 TABLET DAILY    aspirin 81 mg chewable tablet (Taking) Take 81 mg by mouth daily.         Lab Results   Component Value Date/Time    Sodium 142 10/30/2019 11:08 AM    Potassium 4.3 10/30/2019 11:08 AM    Cholesterol, total 114 10/30/2019 11:08 AM    HDL Cholesterol 26 10/30/2019 11:08 AM    LDL, calculated 58 10/30/2019 11:08 AM    Triglyceride 152 10/30/2019 11:08 AM

## 2020-02-04 NOTE — ASSESSMENT & PLAN NOTE
This condition is managed by Specialist.  Key Peripheral Vascular Disease Meds             simvastatin (ZOCOR) 40 mg tablet (Taking) TAKE 1 TABLET DAILY IN THE EVENING    aspirin 81 mg chewable tablet (Taking) Take 81 mg by mouth daily.         Lab Results   Component Value Date/Time    WBC 57.8 10/30/2019 11:08 AM    HGB 13.6 10/30/2019 11:08 AM    HCT 39.5 10/30/2019 11:08 AM    PLATELET 305 21/89/6590 11:08 AM    Creatinine 1.28 10/30/2019 11:08 AM    BUN 17 10/30/2019 11:08 AM    Cholesterol, total 114 10/30/2019 11:08 AM    HDL Cholesterol 26 10/30/2019 11:08 AM    LDL, calculated 58 10/30/2019 11:08 AM    Triglyceride 152 10/30/2019 11:08 AM

## 2020-02-04 NOTE — PROGRESS NOTES
SPORTS MEDICINE AND PRIMARY CARE  Delonte Berumdez MD, [address] [address]  Phone:  [phone]  Fax: [phone]       Chief Complaint   Patient presents with    Hypertension   . SUBJECTIVE:    Velia Darby is a 80 y.o. male Patient returns today with his daughter. He has a known history of DM type 2, CAD, S/P stent placement, S/P CABG x three, the last stent apparently was 11/03/15, dyslipidemia, GERD, primary hypertension, CLL, bronchiectasis, and complains of a toothache on the left lower jaw. He was seen by Yaneth Kirby, who requested cardiac and medical clearance before proceeding with extraction. Patient comes in today continuing to have considerable pain in the tooth and is seen for evaluation. Current Outpatient Medications   Medication Sig Dispense Refill    amoxicillin (AMOXIL) 500 mg capsule Take 1 Cap by mouth three (3) times daily. 30 Cap 0    simvastatin (ZOCOR) 40 mg tablet TAKE 1 TABLET DAILY IN THE EVENING 90 Tab 4    metFORMIN (GLUCOPHAGE) 500 mg tablet TAKE 1 TABLET DAILY 90 Tab 4    metoprolol tartrate (LOPRESSOR) 25 mg tablet TAKE 1 TABLET TWICE A  Tab 3    amLODIPine (NORVASC) 2.5 mg tablet TAKE 1 TABLET DAILY 90 Tab 3    allopurinol (ZYLOPRIM) 300 mg tablet Take 0.5 Tabs by mouth daily. 90 Tab 7    ONETOUCH ULTRA BLUE TEST STRIP strip USE TO TEST BLOOD SUGAR TWICE A  Strip 3    omeprazole (PRILOSEC) 20 mg capsule TAKE 1 CAPSULE DAILY 90 Cap 1    cyanocobalamin 1,000 mcg tablet Take 1,000 mcg by mouth daily.  aspirin 81 mg chewable tablet Take 81 mg by mouth daily.  brimonidine (ALPHAGAN) 0.15 % ophthalmic solution Administer 1 Drop to both eyes two (2) times a day.  Indications: OPEN ANGLE GLAUCOMA       Past Medical History:   Diagnosis Date    Abnormal nuclear stress test 6/6/2014    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 ([facility] 11/2/2015    Bereavement 02/06/2019    [facility] - uti -strangulated hernia - prostate ca - brother    Bronchiectasis ([facility]     CAD (coronary artery disease)     Chest pain, unspecified     Chronic pain     right leg    CLL (chronic lymphocytic leukemia) ([facility]     Jerica Segura md  [facility]    Diabetes ([facility]     Essential hypertension     GERD (gastroesophageal reflux disease)     Hyperlipidemia     Hypertension     Opacity of lung on imaging study 05/28/2017    cxr    Rotator cuff arthropathy     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    S/P coronary artery stent placement 6/6/2014 6/6/14 PCI/UGANAKO to prox LAD     Past Surgical History:   Procedure Laterality Date    HX COLONOSCOPY      HX HEART CATHETERIZATION      PTCA SINGLE VESSEL  4/4/2015         VASCULAR SURGERY PROCEDURE UNLIST  2014 and 2015    BLE stenting     Allergies   Allergen Reactions    Codeine Shortness of Breath    Lipitor [Atorvastatin] Nausea Only         REVIEW OF SYSTEMS:  General: negative for - chills or fever  ENT: negative for - headaches, nasal congestion or tinnitus  Respiratory: negative for - cough, hemoptysis, shortness of breath or wheezing  Cardiovascular : negative for - chest pain, edema, palpitations or shortness of breath  Gastrointestinal: negative for - abdominal pain, blood in stools, heartburn or nausea/vomiting  Genito-Urinary: no dysuria, trouble voiding, or hematuria  Musculoskeletal: negative for - gait disturbance, joint pain, joint stiffness or joint swelling  Neurological: no TIA or stroke symptoms  Hematologic: no bruises, no bleeding, no swollen glands  Integument: no lumps, mole changes, nail changes or rash  Endocrine: no malaise/lethargy or unexpected weight changes      Social History     Socioeconomic History    Marital status:      Spouse name: Not on file    Number of children: Not on file    Years of education: Not on file    Highest education level: Not on file   Social Needs    Financial resource strain: Not hard at all   Viola-Elizabeth insecurity:     Worry: Never true     Inability: Never true    Transportation needs:     Medical: No     Non-medical: No   Tobacco Use    Smoking status: Former Smoker     Last attempt to quit: 1/15/1984     Years since quittin.0    Smokeless tobacco: Never Used    Tobacco comment: QUIT    Substance and Sexual Activity    Alcohol use: No     Alcohol/week: 0.0 standard drinks     Comment: quit in     Drug use: No     Types: Prescription, OTC    Sexual activity: Yes     Partners: Female   Lifestyle    Physical activity:     Days per week: 0 days     Minutes per session: 0 min    Stress: Not at all   Relationships    Social connections:     Talks on phone: More than three times a week     Gets together: More than three times a week     Attends [employer] service: More than 4 times per year     Active member of club or organization: Yes     Attends meetings of clubs or organizations: More than 4 times per year     Relationship status:    Other Topics Concern     Service No    Blood Transfusions No    Caffeine Concern No    Occupational Exposure No    Hobby Hazards No    Sleep Concern No    Stress Concern No    Weight Concern No    Special Diet Yes     Comment: low sugar    Back Care No    Exercise No    Bike Helmet No    Seat Belt Yes    Self-Exams No   Social History Narrative    Family History: Mother:  61 yrs, DM complicationsFather:  80 yrs, strokeSister(s):  76 yrs, pneumonia, DM,    HTNBrother(s):  61 yrs, DM, HTN, CVAChildren: aliveGrandmother: unknow nGrandfather: deceasedSpouse: deceased1 brother(s) -    healthy. 2daughter(s) - healthy. Social History: Alcohol Use Patient does not use alcohol. Smoking Status Patient is a former smoker, Quit in: 36. Caffeine: none. Marital    Status: W idow . Lives w ith: alone. Children: yes 2 d. Education/School: has highschool diploma, college 2 y.      Family History   Problem Relation Age of Onset    Diabetes Mother     Stroke Father        OBJECTIVE:    Visit Vitals  /73   Pulse 68   Temp 98 °F (36.7 °C) (Oral)   Resp 18   Ht 5' 8\" (1.727 m)   Wt 167 lb 3.2 oz (75.8 kg)   SpO2 94%   BMI 25.42 kg/m²     CONSTITUTIONAL: well , well nourished, appears age appropriate  EYES: perrla, eom intact  ENMT:moist mucous membranes, pharynx clear  NECK: supple. Thyroid normal  RESPIRATORY: Chest: clear bilaterally   CARDIOVASCULAR: Heart: regular rate and rhythm  GASTROINTESTINAL: Abdomen: soft, bowel sounds active  HEMATOLOGIC: no pathological lymph nodes palpated  MUSCULOSKELETAL: Extremities: no edema, pulse 1+   INTEGUMENT: No unusual rashes or suspicious skin lesions noted. Nails appear normal.  NEUROLOGIC: non-focal exam   MENTAL STATUS: alert and oriented, appropriate affect           ASSESSMENT:  1. Hyperlipidemia, unspecified hyperlipidemia type    2. Type 2 diabetes with nephropathy ([facility]    3. Peripheral vascular disease ([facility]    4. NSTEMI (non-ST elevated myocardial infarction) ([facility]    5. CLL (chronic lymphocytic leukemia) ([facility]    6. Atherosclerosis of native coronary artery with unstable angina pectoris, unspecified whether native or transplanted heart ([facility]    7. S/P CABG x 3    8. S/P angioplasty with stent--4/15    9. S/P coronary artery stent placement      Patient is on a statin for his dyslipidemia with his last cholesterol at goal at 58. No adjustment will be made. He has DM type 2 with nephropathy. Hemoglobin A1c is generally in the goal range at 6.5 in October. Will check it again today. He has peripheral vascular occlusive disease, which is asymptomatic. He has a known history of CAD. As noted above, he is S/P CABG, as well as stent placement. His cardiologist was Dr. Joselin Goddard, who has retired, and he asked for a new cardiologist and we give him a referral to Prince Coreas MD.    CLL is followed by Dr. Cristopher Arias, which also has been stable.   Platelet counts have been normal.    I think his aspirin should be held for his dental extraction, the tooth appears infected to my eyes. Will place him on Amoxicillin. We suggest aspirin for the next several days. Patient's medical status is stable for dental procedures. He will be back to see us in about six weeks. I have discussed the diagnosis with the patient and the intended plan as seen in the  orders above. The patient understands and agees with the plan. The patient has   received an after visit summary and questions were answered concerning  future plans  Patient labs and/or xrays were reviewed  Past records were reviewed. PLAN:  .  Orders Placed This Encounter    MICROALBUMIN, UR, RAND W/ MICROALB/CREAT RATIO    CBC WITH AUTOMATED DIFF    RENAL FUNCTION PANEL    NEDRA Int Card [facility]    amoxicillin (AMOXIL) 500 mg capsule       Follow-up and Dispositions    · Return in about 6 weeks (around 3/17/2020). ATTENTION:   This medical record was transcribed using an electronic medical records system. Although proofread, it may and can contain electronic and spelling errors. Other human spelling and other errors may be present. Corrections may be executed at a later time. Please feel free to contact us for any clarifications as needed.

## 2020-02-04 NOTE — ASSESSMENT & PLAN NOTE
Stable, based on history, physical exam and review of pertinent labs, studies and medications; meds reconciled; continue current treatment plan.   Key Antihyperglycemic Medications             metFORMIN (GLUCOPHAGE) 500 mg tablet (Taking) TAKE 1 TABLET DAILY        Other Key Diabetic Medications             simvastatin (ZOCOR) 40 mg tablet (Taking) TAKE 1 TABLET DAILY IN THE EVENING        Lab Results   Component Value Date/Time    Hemoglobin A1c 6.5 10/30/2019 11:08 AM    Glucose 121 10/30/2019 11:08 AM    Creatinine 1.28 10/30/2019 11:08 AM    Microalb/Creat ratio (ug/mg creat.) 6.6 01/09/2019 01:09 PM    Cholesterol, total 114 10/30/2019 11:08 AM    HDL Cholesterol 26 10/30/2019 11:08 AM    LDL, calculated 58 10/30/2019 11:08 AM    Triglyceride 152 10/30/2019 11:08 AM     Diabetic Foot and Eye Exam HM Status   Topic Date Due    Diabetic Foot Care  04/17/2020    Eye Exam  02/06/2021

## 2020-02-04 NOTE — ASSESSMENT & PLAN NOTE
Key Oncology Meds     Patient is on no Oncologic meds. Key Pain Meds     The patient is on no pain meds. Lab Results   Component Value Date/Time    WBC 57.8 10/30/2019 11:08 AM    ABS.  NEUTROPHILS 1.9 10/30/2019 11:08 AM    HGB 13.6 10/30/2019 11:08 AM    HCT 39.5 10/30/2019 11:08 AM    PLATELET 056 94/55/6005 11:08 AM    Creatinine 1.28 10/30/2019 11:08 AM    BUN 17 10/30/2019 11:08 AM    ALT (SGPT) 14 10/30/2019 11:08 AM    AST (SGOT) 19 10/30/2019 11:08 AM    Albumin 4.6 10/30/2019 11:08 AM

## 2020-02-04 NOTE — PROGRESS NOTES
1. Have you been to the ER, urgent care clinic since your last visit? Hospitalized since your last visit? No    2. Have you seen or consulted any other health care providers outside of the [address] [place] [address] since your last visit? Include any pap smears or colon screening.  No     Want to discuss dental pain

## 2020-02-05 LAB
ALBUMIN SERPL-MCNC: 5 G/DL (ref 3.6–4.6)
ALBUMIN/CREAT UR: 27 MG/G CREAT (ref 0–29)
BASOPHILS # BLD AUTO: 0 X10E3/UL (ref 0–0.2)
BASOPHILS NFR BLD AUTO: 0 %
BUN SERPL-MCNC: 11 MG/DL (ref 8–27)
BUN/CREAT SERPL: 9 (ref 10–24)
CALCIUM SERPL-MCNC: 10.2 MG/DL (ref 8.6–10.2)
CHLORIDE SERPL-SCNC: 96 MMOL/L (ref 96–106)
CO2 SERPL-SCNC: 18 MMOL/L (ref 20–29)
CREAT SERPL-MCNC: 1.28 MG/DL (ref 0.76–1.27)
CREAT UR-MCNC: 174.4 MG/DL
EOSINOPHIL # BLD AUTO: 0 X10E3/UL (ref 0–0.4)
EOSINOPHIL NFR BLD AUTO: 0 %
ERYTHROCYTE [DISTWIDTH] IN BLOOD BY AUTOMATED COUNT: 14.2 % (ref 11.6–15.4)
GLUCOSE SERPL-MCNC: 163 MG/DL (ref 65–99)
HCT VFR BLD AUTO: 41.8 % (ref 37.5–51)
HGB BLD-MCNC: 14.3 G/DL (ref 13–17.7)
LYMPHOCYTES # BLD AUTO: 72.6 X10E3/UL (ref 0.7–3.1)
LYMPHOCYTES NFR BLD AUTO: 97 %
MCH RBC QN AUTO: 33.1 PG (ref 26.6–33)
MCHC RBC AUTO-ENTMCNC: 34.2 G/DL (ref 31.5–35.7)
MCV RBC AUTO: 97 FL (ref 79–97)
MICROALBUMIN UR-MCNC: 47.3 UG/ML
MONOCYTES # BLD AUTO: 0 X10E3/UL (ref 0.1–0.9)
MONOCYTES NFR BLD AUTO: 0 %
MORPHOLOGY BLD-IMP: ABNORMAL
NEUTROPHILS # BLD AUTO: 2.2 X10E3/UL (ref 1.4–7)
NEUTROPHILS NFR BLD AUTO: 3 %
PHOSPHATE SERPL-MCNC: 4.6 MG/DL (ref 2.8–4.1)
PLATELET # BLD AUTO: 182 X10E3/UL (ref 150–450)
POTASSIUM SERPL-SCNC: 5.6 MMOL/L (ref 3.5–5.2)
RBC # BLD AUTO: 4.32 X10E6/UL (ref 4.14–5.8)
SODIUM SERPL-SCNC: 136 MMOL/L (ref 134–144)
WBC # BLD AUTO: 74.8 X10E3/UL (ref 3.4–10.8)

## 2020-02-28 ENCOUNTER — PATIENT OUTREACH (OUTPATIENT)
Dept: INTERNAL MEDICINE CLINIC | Age: 85
End: 2020-02-28

## 2020-03-02 NOTE — PROGRESS NOTES
Patient has graduated from the Complex Case Management  program on 2/28/2020. Patient/family has the ability to self-manage at this time Care management goals have been completed. No further Ambulatory Care Manager follow up scheduled. Goals Addressed                 This Visit's Progress     Patient verbalizes understanding of self -management goals of living with Diabetes. 12/6/2019:  Teachings done on Reducing sugar intake; Discussed PCP sent Lab Letter:  Glucose 121H; states he at pie the night before. Patient Teach-back:  States he will tell his daughter to not give him pie or sweets any more; Or agreed to just have a very small piece-stated he like that. .. Discussed S&S Diabetes:  Increased thirst.  To be cont'd.  EW     2/28/2020:  Patient states he is well and takes care of himself;  Stopped the pie eating and other stuff he usually gets from the store. States drinking water rather than juices and feels he knows how to eat now. EW       Patient/Family verbalizes understanding of self-management of chronic disease. On track     1/28/2020: 10/28 PCP Letter -Cr 1.28/GFR 50;  discussed  Letter results. States drinking acceptable water during the day. Encouraged to sufficient drink water on the date of office visit also due to late visit timing can show signs of dehydration. Checked in at 9:45; signed out at 12:01(all relative). Also discussed availability of office bathroom though knowing urine sample may be needed). States BS today 107-commended. EW     2/28/2020:  Discussed signs of dehydration, elev BS & how to increase activity to burn excess glucose if a \"small amount of sweet is eaten\". States if no in the house he can't eat it. Health literacy acceptable for graduation; daughter assistance and encouragement is also given and continued.   EW        episode resolved  Goals completed  Graduation    Patient has Ambulatory Care Manager's contact information for any further questions, concerns, or needs.   Patients upcoming visits:    Future Appointments   Date Time Provider Renzo Gutierrez   3/17/2020 10:45 AM Maurizio Sim MD [address] [facility]

## 2020-03-17 ENCOUNTER — OFFICE VISIT (OUTPATIENT)
Dept: INTERNAL MEDICINE CLINIC | Age: 85
End: 2020-03-17

## 2020-03-17 VITALS
RESPIRATION RATE: 18 BRPM | HEART RATE: 60 BPM | WEIGHT: 166 LBS | HEIGHT: 68 IN | TEMPERATURE: 97.4 F | SYSTOLIC BLOOD PRESSURE: 143 MMHG | BODY MASS INDEX: 25.16 KG/M2 | DIASTOLIC BLOOD PRESSURE: 68 MMHG | OXYGEN SATURATION: 94 %

## 2020-03-17 DIAGNOSIS — E11.21 TYPE 2 DIABETES WITH NEPHROPATHY (HCC): Primary | ICD-10-CM

## 2020-03-17 DIAGNOSIS — I21.4 NSTEMI (NON-ST ELEVATED MYOCARDIAL INFARCTION) (HCC): ICD-10-CM

## 2020-03-17 DIAGNOSIS — C91.10 CLL (CHRONIC LYMPHOCYTIC LEUKEMIA) (HCC): ICD-10-CM

## 2020-03-17 DIAGNOSIS — J47.9 BRONCHIECTASIS WITHOUT COMPLICATION (HCC): ICD-10-CM

## 2020-03-17 DIAGNOSIS — I25.110 ATHEROSCLEROSIS OF NATIVE CORONARY ARTERY WITH UNSTABLE ANGINA PECTORIS, UNSPECIFIED WHETHER NATIVE OR TRANSPLANTED HEART (HCC): ICD-10-CM

## 2020-03-17 DIAGNOSIS — I10 ESSENTIAL HYPERTENSION, BENIGN: ICD-10-CM

## 2020-03-17 DIAGNOSIS — I70.213 ATHEROSCLEROSIS OF NATIVE ARTERY OF BOTH LOWER EXTREMITIES WITH INTERMITTENT CLAUDICATION (HCC): ICD-10-CM

## 2020-03-17 DIAGNOSIS — I73.9 PERIPHERAL VASCULAR DISEASE (HCC): ICD-10-CM

## 2020-03-17 NOTE — PROGRESS NOTES
SPORTS MEDICINE AND PRIMARY CARE  Mc Rodriguez MD, [address] [address]  Phone:  [phone]  Fax: [phone]       Chief Complaint   Patient presents with    Hypertension   . SUBJECTIVE:    Alissa Workman is a 80 y.o. male Patient returns today with his daughter after being seen by Dr. Reginald Helms, who suggested that for the teeth pain he will hold the aspirin for five days. The dentist referred him to an oral surgeon, who is going to do extensive surgery and wonders if he should have it done as it is completely elective and not necessary for comfort. He has a known history of coronary artery disease, peripheral vascular occlusive disease, CLL, type 2 diabetes, primary hypertension, and is seen for evaluation. Current Outpatient Medications   Medication Sig Dispense Refill    simvastatin (ZOCOR) 40 mg tablet TAKE 1 TABLET DAILY IN THE EVENING 90 Tab 4    metFORMIN (GLUCOPHAGE) 500 mg tablet TAKE 1 TABLET DAILY 90 Tab 4    metoprolol tartrate (LOPRESSOR) 25 mg tablet TAKE 1 TABLET TWICE A  Tab 3    amLODIPine (NORVASC) 2.5 mg tablet TAKE 1 TABLET DAILY 90 Tab 3    allopurinol (ZYLOPRIM) 300 mg tablet Take 0.5 Tabs by mouth daily. 90 Tab 7    ONETOUCH ULTRA BLUE TEST STRIP strip USE TO TEST BLOOD SUGAR TWICE A  Strip 3    omeprazole (PRILOSEC) 20 mg capsule TAKE 1 CAPSULE DAILY 90 Cap 1    cyanocobalamin 1,000 mcg tablet Take 1,000 mcg by mouth daily.  aspirin 81 mg chewable tablet Take 81 mg by mouth daily.  brimonidine (ALPHAGAN) 0.15 % ophthalmic solution Administer 1 Drop to both eyes two (2) times a day. Indications: OPEN ANGLE GLAUCOMA      amoxicillin (AMOXIL) 500 mg capsule Take 1 Cap by mouth three (3) times daily.  27 Cap 0     Past Medical History:   Diagnosis Date    Abnormal nuclear stress test 6/6/2014    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 ([facility] 11/2/2015    Bereavement 02/06/2019    [facility] - uti -strangulated hernia - prostate ca - brother    Bronchiectasis ([facility]     CAD (coronary artery disease)     Chest pain, unspecified     Chronic pain     right leg    CLL (chronic lymphocytic leukemia) ([facility]     Jerica Segura md  [facility]    Diabetes (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    S/P coronary artery stent placement 6/6/2014 6/6/14 PCI/GUANAKO to prox LAD     Past Surgical History:   Procedure Laterality Date    HX COLONOSCOPY      HX HEART CATHETERIZATION      PTCA SINGLE VESSEL  4/4/2015         VASCULAR SURGERY PROCEDURE UNLIST  2014 and 2015    BLE stenting     Allergies   Allergen Reactions    Codeine Shortness of Breath    Lipitor [Atorvastatin] Nausea Only         REVIEW OF SYSTEMS:  General: negative for - chills or fever  ENT: negative for - headaches, nasal congestion or tinnitus  Respiratory: negative for - cough, hemoptysis, shortness of breath or wheezing  Cardiovascular : negative for - chest pain, edema, palpitations or shortness of breath  Gastrointestinal: negative for - abdominal pain, blood in stools, heartburn or nausea/vomiting  Genito-Urinary: no dysuria, trouble voiding, or hematuria  Musculoskeletal: negative for - gait disturbance, joint pain, joint stiffness or joint swelling  Neurological: no TIA or stroke symptoms  Hematologic: no bruises, no bleeding, no swollen glands  Integument: no lumps, mole changes, nail changes or rash  Endocrine: no malaise/lethargy or unexpected weight changes      Social History     Socioeconomic History    Marital status:      Spouse name: Not on file    Number of children: Not on file    Years of education: Not on file    Highest education level: Not on file   Social Needs    Financial resource strain: Not hard at all   [place] insecurity     Worry: Never true     Inability: Never true    Transportation needs     Medical: No     Non-medical: No   Tobacco Use    Smoking status: Former Smoker     Last attempt to quit: 1/15/1984     Years since quittin.1    Smokeless tobacco: Never Used    Tobacco comment: QUIT    Substance and Sexual Activity    Alcohol use: No     Alcohol/week: 0.0 standard drinks     Comment: quit in     Drug use: No     Types: Prescription, OTC    Sexual activity: Yes     Partners: Female   Lifestyle    Physical activity     Days per week: 0 days     Minutes per session: 0 min    Stress: Not at all   Relationships    Social connections     Talks on phone: More than three times a week     Gets together: More than three times a week     Attends Bahai service: More than 4 times per year     Active member of club or organization: Yes     Attends meetings of clubs or organizations: More than 4 times per year     Relationship status:    Other Topics Concern     Service No    Blood Transfusions No    Caffeine Concern No    Occupational Exposure No    Hobby Hazards No    Sleep Concern No    Stress Concern No    Weight Concern No    Special Diet Yes     Comment: low sugar    Back Care No    Exercise No    Bike Helmet No    Seat Belt Yes    Self-Exams No   Social History Narrative    Family History: Mother:  61 yrs, DM complicationsFather:  80 yrs, strokeSister(s):  76 yrs, pneumonia, DM,    HTNBrother(s):  61 yrs, DM, HTN, CVAChildren: aliveGrandmother: unknow nGrandfather: deceasedSpouse: deceased1 brother(s) -    healthy. 2daughter(s) - healthy. Social History: Alcohol Use Patient does not use alcohol. Smoking Status Patient is a former smoker, Quit in: 36. Caffeine: none. Marital    Status: W idow . Lives w ith: alone. Children: yes 2 d. Education/School: has highschool diploma, college 2 y.      Family History   Problem Relation Age of Onset    Diabetes Mother     Stroke Father        OBJECTIVE:    Visit Vitals  /68   Pulse 60   Temp 97.4 °F (36.3 °C) (Oral)   Resp 18   Ht 5' 8\" (1.727 m)   Wt 166 lb (75.3 kg)   SpO2 94%   BMI 25.24 kg/m²     CONSTITUTIONAL: well , well nourished, appears age appropriate  EYES: perrla, eom intact  ENMT:moist mucous membranes, pharynx clear  NECK: supple. Thyroid normal  RESPIRATORY: Chest: clear bilaterally   CARDIOVASCULAR: Heart: regular rate and rhythm  GASTROINTESTINAL: Abdomen: soft, bowel sounds active  HEMATOLOGIC: no pathological lymph nodes palpated  MUSCULOSKELETAL: Extremities: no edema, pulse 1+   INTEGUMENT: No unusual rashes or suspicious skin lesions noted. Nails appear normal.  NEUROLOGIC: non-focal exam   MENTAL STATUS: alert and oriented, appropriate affect           ASSESSMENT:  1. Type 2 diabetes with nephropathy ([address]    2. Peripheral vascular disease ([address]    3. NSTEMI (non-ST elevated myocardial infarction) ([address]    4. CLL (chronic lymphocytic leukemia) ([address]    5. Atherosclerosis of native artery of both lower extremities with intermittent claudication ([address]    6. Atherosclerosis of native coronary artery with unstable angina pectoris, unspecified whether native or transplanted heart ([address]    7. Bronchiectasis without complication ([address]    8. Essential hypertension, benign      Patient's medical status is stable. Will confirm that with hemoglobin A1c and renal panel. CLL is followed by oncology, will check that again likewise. BP control is approaching goal, no adjustments will be made. It is our opinion that he should not have elective major oral surgery for a procedure that is not really required for comfort or eating. He does not disagree. His daughter also agrees. He will be back to see us in three months, sooner if he has any problems. We do give him a note for the teeth _______________ of his two teeth that he should be off aspirin for five days.       I have discussed the diagnosis with the patient and the intended plan as seen in the  orders above. The patient understands and agees with the plan. The patient has   received an after visit summary and questions were answered concerning  future plans  Patient labs and/or xrays were reviewed  Past records were reviewed. PLAN:  .  Orders Placed This Encounter    RENAL FUNCTION PANEL    HEMOGLOBIN A1C WITH EAG       Follow-up and Dispositions    · Return in about 3 months (around 6/17/2020). ATTENTION:   This medical record was transcribed using an electronic medical records system. Although proofread, it may and can contain electronic and spelling errors. Other human spelling and other errors may be present. Corrections may be executed at a later time. Please feel free to contact us for any clarifications as needed.

## 2020-03-17 NOTE — ASSESSMENT & PLAN NOTE
This condition is managed by Specialist.  Key COPD Medications     Patient is on no COPD/Asthma meds.         Lab Results   Component Value Date/Time    WBC 74.8 02/04/2020 02:49 PM    HGB 14.3 02/04/2020 02:49 PM    HCT 41.8 02/04/2020 02:49 PM    PLATELET 460 72/72/8664 02:49 PM

## 2020-03-17 NOTE — ASSESSMENT & PLAN NOTE
This condition is managed by Specialist.  Key CAD CHF Meds             simvastatin (ZOCOR) 40 mg tablet (Taking) TAKE 1 TABLET DAILY IN THE EVENING    metoprolol tartrate (LOPRESSOR) 25 mg tablet (Taking) TAKE 1 TABLET TWICE A DAY    amLODIPine (NORVASC) 2.5 mg tablet (Taking) TAKE 1 TABLET DAILY    aspirin 81 mg chewable tablet (Taking) Take 81 mg by mouth daily.         Lab Results   Component Value Date/Time    Sodium 136 02/04/2020 02:49 PM    Potassium 5.6 02/04/2020 02:49 PM    Cholesterol, total 114 10/30/2019 11:08 AM    HDL Cholesterol 26 10/30/2019 11:08 AM    LDL, calculated 58 10/30/2019 11:08 AM    Triglyceride 152 10/30/2019 11:08 AM

## 2020-03-17 NOTE — ASSESSMENT & PLAN NOTE
This condition is managed by Specialist.  Key Peripheral Vascular Disease Meds             simvastatin (ZOCOR) 40 mg tablet (Taking) TAKE 1 TABLET DAILY IN THE EVENING    aspirin 81 mg chewable tablet (Taking) Take 81 mg by mouth daily.         Lab Results   Component Value Date/Time    WBC 74.8 02/04/2020 02:49 PM    HGB 14.3 02/04/2020 02:49 PM    HCT 41.8 02/04/2020 02:49 PM    PLATELET 781 15/50/1268 02:49 PM    Creatinine 1.28 02/04/2020 02:49 PM    BUN 11 02/04/2020 02:49 PM    Cholesterol, total 114 10/30/2019 11:08 AM    HDL Cholesterol 26 10/30/2019 11:08 AM    LDL, calculated 58 10/30/2019 11:08 AM    Triglyceride 152 10/30/2019 11:08 AM

## 2020-03-17 NOTE — ASSESSMENT & PLAN NOTE
This condition is managed by Specialist.  Key Peripheral Vascular Disease Meds             simvastatin (ZOCOR) 40 mg tablet (Taking) TAKE 1 TABLET DAILY IN THE EVENING    aspirin 81 mg chewable tablet (Taking) Take 81 mg by mouth daily.         Lab Results   Component Value Date/Time    WBC 74.8 02/04/2020 02:49 PM    HGB 14.3 02/04/2020 02:49 PM    HCT 41.8 02/04/2020 02:49 PM    PLATELET 868 14/54/5336 02:49 PM    Creatinine 1.28 02/04/2020 02:49 PM    BUN 11 02/04/2020 02:49 PM    Cholesterol, total 114 10/30/2019 11:08 AM    HDL Cholesterol 26 10/30/2019 11:08 AM    LDL, calculated 58 10/30/2019 11:08 AM    Triglyceride 152 10/30/2019 11:08 AM

## 2020-03-17 NOTE — ASSESSMENT & PLAN NOTE
Stable, based on history, physical exam and review of pertinent labs, studies and medications; meds reconciled; continue current treatment plan.   Key Antihyperglycemic Medications             metFORMIN (GLUCOPHAGE) 500 mg tablet (Taking) TAKE 1 TABLET DAILY        Other Key Diabetic Medications             simvastatin (ZOCOR) 40 mg tablet (Taking) TAKE 1 TABLET DAILY IN THE EVENING        Lab Results   Component Value Date/Time    Hemoglobin A1c 6.5 10/30/2019 11:08 AM    Glucose 163 02/04/2020 02:49 PM    Creatinine 1.28 02/04/2020 02:49 PM    Microalb/Creat ratio (ug/mg creat.) 27 02/04/2020 02:49 PM    Cholesterol, total 114 10/30/2019 11:08 AM    HDL Cholesterol 26 10/30/2019 11:08 AM    LDL, calculated 58 10/30/2019 11:08 AM    Triglyceride 152 10/30/2019 11:08 AM     Diabetic Foot and Eye Exam HM Status   Topic Date Due    Diabetic Foot Care  04/17/2020    Eye Exam  02/06/2021

## 2020-03-17 NOTE — PROGRESS NOTES
1. Have you been to the ER, urgent care clinic since your last visit? Hospitalized since your last visit? No    2. Have you seen or consulted any other health care providers outside of the [address] [place] [address] since your last visit? Include any pap smears or colon screening.  No     Wants to discuss visit with cardio also needs referral for dentist

## 2020-03-17 NOTE — ASSESSMENT & PLAN NOTE
This condition is managed by Specialist.  Key Oncology Meds     Patient is on no Oncologic meds. Key Pain Meds     The patient is on no pain meds. Lab Results   Component Value Date/Time    WBC 74.8 02/04/2020 02:49 PM    ABS.  NEUTROPHILS 2.2 02/04/2020 02:49 PM    HGB 14.3 02/04/2020 02:49 PM    HCT 41.8 02/04/2020 02:49 PM    PLATELET 276 50/07/9643 02:49 PM    Creatinine 1.28 02/04/2020 02:49 PM    BUN 11 02/04/2020 02:49 PM    ALT (SGPT) 14 10/30/2019 11:08 AM    AST (SGOT) 19 10/30/2019 11:08 AM    Albumin 5.0 02/04/2020 02:49 PM

## 2020-03-18 ENCOUNTER — TELEPHONE (OUTPATIENT)
Dept: INTERNAL MEDICINE CLINIC | Age: 85
End: 2020-03-18

## 2020-03-18 LAB
ALBUMIN SERPL-MCNC: 4.6 G/DL (ref 3.6–4.6)
BASOPHILS # BLD AUTO: 0 X10E3/UL (ref 0–0.2)
BASOPHILS NFR BLD AUTO: 0 %
BUN SERPL-MCNC: 14 MG/DL (ref 8–27)
BUN/CREAT SERPL: 12 (ref 10–24)
CALCIUM SERPL-MCNC: 9.6 MG/DL (ref 8.6–10.2)
CHLORIDE SERPL-SCNC: 101 MMOL/L (ref 96–106)
CO2 SERPL-SCNC: 23 MMOL/L (ref 20–29)
CREAT SERPL-MCNC: 1.16 MG/DL (ref 0.76–1.27)
EOSINOPHIL # BLD AUTO: 0 X10E3/UL (ref 0–0.4)
EOSINOPHIL NFR BLD AUTO: 0 %
ERYTHROCYTE [DISTWIDTH] IN BLOOD BY AUTOMATED COUNT: 15.2 % (ref 11.6–15.4)
EST. AVERAGE GLUCOSE BLD GHB EST-MCNC: 134 MG/DL
GLUCOSE SERPL-MCNC: 107 MG/DL (ref 65–99)
HBA1C MFR BLD: 6.3 % (ref 4.8–5.6)
HCT VFR BLD AUTO: 39.6 % (ref 37.5–51)
HGB BLD-MCNC: 13.2 G/DL (ref 13–17.7)
LYMPHOCYTES # BLD AUTO: 58.1 X10E3/UL (ref 0.7–3.1)
LYMPHOCYTES NFR BLD AUTO: 86 %
MCH RBC QN AUTO: 33.7 PG (ref 26.6–33)
MCHC RBC AUTO-ENTMCNC: 33.3 G/DL (ref 31.5–35.7)
MCV RBC AUTO: 101 FL (ref 79–97)
MONOCYTES # BLD AUTO: 7.4 X10E3/UL (ref 0.1–0.9)
MONOCYTES NFR BLD AUTO: 11 %
MORPHOLOGY BLD-IMP: ABNORMAL
NEUTROPHILS # BLD AUTO: 2 X10E3/UL (ref 1.4–7)
NEUTROPHILS NFR BLD AUTO: 3 %
PHOSPHATE SERPL-MCNC: 4.2 MG/DL (ref 2.8–4.1)
PLATELET # BLD AUTO: 178 X10E3/UL (ref 150–450)
POTASSIUM SERPL-SCNC: 4.9 MMOL/L (ref 3.5–5.2)
RBC # BLD AUTO: 3.92 X10E6/UL (ref 4.14–5.8)
SODIUM SERPL-SCNC: 139 MMOL/L (ref 134–144)
WBC # BLD AUTO: 67.6 X10E3/UL (ref 3.4–10.8)

## 2020-04-28 RX ORDER — ALLOPURINOL 300 MG/1
150 TABLET ORAL DAILY
Qty: 90 TAB | Refills: 7 | Status: SHIPPED | OUTPATIENT
Start: 2020-04-28 | End: 2021-05-27

## 2020-06-22 ENCOUNTER — OFFICE VISIT (OUTPATIENT)
Dept: INTERNAL MEDICINE CLINIC | Age: 85
End: 2020-06-22

## 2020-06-22 VITALS
TEMPERATURE: 98.4 F | SYSTOLIC BLOOD PRESSURE: 108 MMHG | HEART RATE: 60 BPM | DIASTOLIC BLOOD PRESSURE: 65 MMHG | RESPIRATION RATE: 16 BRPM | OXYGEN SATURATION: 96 % | HEIGHT: 68 IN | BODY MASS INDEX: 25.46 KG/M2 | WEIGHT: 168 LBS

## 2020-06-22 DIAGNOSIS — I73.9 PERIPHERAL VASCULAR DISEASE (HCC): ICD-10-CM

## 2020-06-22 DIAGNOSIS — I70.213 ATHEROSCLEROSIS OF NATIVE ARTERY OF BOTH LOWER EXTREMITIES WITH INTERMITTENT CLAUDICATION (HCC): ICD-10-CM

## 2020-06-22 DIAGNOSIS — Z95.1 S/P CABG X 3: ICD-10-CM

## 2020-06-22 DIAGNOSIS — C91.10 CLL (CHRONIC LYMPHOCYTIC LEUKEMIA) (HCC): ICD-10-CM

## 2020-06-22 DIAGNOSIS — K21.00 GASTROESOPHAGEAL REFLUX DISEASE WITH ESOPHAGITIS: ICD-10-CM

## 2020-06-22 DIAGNOSIS — E78.5 HYPERLIPIDEMIA, UNSPECIFIED HYPERLIPIDEMIA TYPE: ICD-10-CM

## 2020-06-22 DIAGNOSIS — E11.21 TYPE 2 DIABETES WITH NEPHROPATHY (HCC): Primary | ICD-10-CM

## 2020-06-22 NOTE — PROGRESS NOTES
SPORTS MEDICINE AND PRIMARY CARE  Oj Delgado MD, [address] [address]  Phone:  [phone]  Fax: [phone]       Chief Complaint   Patient presents with    Diabetes   . SUBJECTIVE:    Fatimah Miller is a 80 y.o. male Patient comes in today and is seen without the benefit of Media Socks, his daughter, who has been his constant  when he comes to the doctor's office. Obviously this is related to the COVID virus. He has a known history of diabetes mellitus type 2 with nephropathy, peripheral vascular disease, chronic lymphocytic leukemia, followed by Filipe Castañeda MD, who he saw on 06/10/20 and had a WBC of 64.4 at that time, intermittent claudication, coronary artery bypass x three, GERD, dyslipidemia, and is seen for evaluation. He voices no new complaints today other than the fact that he is rather disgusted with the virus also. Since we last saw him he has been out of the house twice, each of those occasions being doctor visits. Current Outpatient Medications   Medication Sig Dispense Refill    allopurinoL (ZYLOPRIM) 300 mg tablet Take 0.5 Tabs by mouth daily. 90 Tab 7    simvastatin (ZOCOR) 40 mg tablet TAKE 1 TABLET DAILY IN THE EVENING 90 Tab 4    metFORMIN (GLUCOPHAGE) 500 mg tablet TAKE 1 TABLET DAILY 90 Tab 4    metoprolol tartrate (LOPRESSOR) 25 mg tablet TAKE 1 TABLET TWICE A  Tab 3    amLODIPine (NORVASC) 2.5 mg tablet TAKE 1 TABLET DAILY 90 Tab 3    ONETOUCH ULTRA BLUE TEST STRIP strip USE TO TEST BLOOD SUGAR TWICE A  Strip 3    cyanocobalamin 1,000 mcg tablet Take 1,000 mcg by mouth daily.  aspirin 81 mg chewable tablet Take 81 mg by mouth daily.  brimonidine (ALPHAGAN) 0.15 % ophthalmic solution Administer 1 Drop to both eyes two (2) times a day.  Indications: OPEN ANGLE GLAUCOMA      omeprazole (PRILOSEC) 20 mg capsule TAKE 1 CAPSULE DAILY 90 Cap 1     Past Medical History:   Diagnosis Date    Abnormal nuclear stress test 6/6/2014    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 ([facility] 11/2/2015    Bereavement 02/06/2019    [facility] - uti -strangulated hernia - prostate ca - brother    Bronchiectasis ([facility]     CAD (coronary artery disease)     Chest pain, unspecified     Chronic pain     right leg    CLL (chronic lymphocytic leukemia) ([facility]     Jerica Segura md  [facility]    Diabetes ([facility]     Essential hypertension     GERD (gastroesophageal reflux disease)     Hyperlipidemia     Hypertension     Opacity of lung on imaging study 05/28/2017    cxr    Rotator cuff arthropathy     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    S/P coronary artery stent placement 6/6/2014 6/6/14 PCI/GUANAKO to prox LAD     Past Surgical History:   Procedure Laterality Date    HX COLONOSCOPY      HX HEART CATHETERIZATION      PTCA SINGLE VESSEL  4/4/2015         VASCULAR SURGERY PROCEDURE UNLIST  2014 and 2015    BLE stenting     Allergies   Allergen Reactions    Codeine Shortness of Breath    Lipitor [Atorvastatin] Nausea Only         REVIEW OF SYSTEMS:  General: negative for - chills or fever  ENT: negative for - headaches, nasal congestion or tinnitus  Respiratory: negative for - cough, hemoptysis, shortness of breath or wheezing  Cardiovascular : negative for - chest pain, edema, palpitations or shortness of breath  Gastrointestinal: negative for - abdominal pain, blood in stools, heartburn or nausea/vomiting  Genito-Urinary: no dysuria, trouble voiding, or hematuria  Musculoskeletal: negative for - gait disturbance, joint pain, joint stiffness or joint swelling  Neurological: no TIA or stroke symptoms  Hematologic: no bruises, no bleeding, no swollen glands  Integument: no lumps, mole changes, nail changes or rash  Endocrine: no malaise/lethargy or unexpected weight changes      Social History     Socioeconomic History    Marital status:      Spouse name: Not on file    Number of children: Not on file    Years of education: Not on file    Highest education level: Not on file   Social Needs    Financial resource strain: Not hard at all    Food insecurity     Worry: Never true     Inability: Never true   [employer] needs     Medical: No     Non-medical: No   Tobacco Use    Smoking status: Former Smoker     Last attempt to quit: 1/15/1984     Years since quittin.4    Smokeless tobacco: Never Used    Tobacco comment: QUIT    Substance and Sexual Activity    Alcohol use: No     Alcohol/week: 0.0 standard drinks     Comment: quit in     Drug use: No     Types: Prescription, OTC    Sexual activity: Yes     Partners: Female   Lifestyle    Physical activity     Days per week: 0 days     Minutes per session: 0 min    Stress: Not at all   Relationships    Social connections     Talks on phone: More than three times a week     Gets together: More than three times a week     Attends Sikh service: More than 4 times per year     Active member of club or organization: Yes     Attends meetings of clubs or organizations: More than 4 times per year     Relationship status:    Other Topics Concern     Service No    Blood Transfusions No    Caffeine Concern No    Occupational Exposure No    Hobby Hazards No    Sleep Concern No    Stress Concern No    Weight Concern No    Special Diet Yes     Comment: low sugar    Back Care No    Exercise No    Bike Helmet No    Seat Belt Yes    Self-Exams No   Social History Narrative    Family History: Mother:  61 yrs, DM complicationsFather:  80 yrs, strokeSister(s):  76 yrs, pneumonia, DM,    HTNBrother(s):  61 yrs, DM, HTN, CVAChildren: aliveGrandmother: unknow nGrandfather: deceasedSpouse: deceased1 brother(s) -    healthy. 2daughter(s) - healthy. Social History: Alcohol Use Patient does not use alcohol. Smoking Status Patient is a former smoker, Quit in: [address] Caffeine: none.  Marital    Status: W idow . Lives w ith: alone. Children: yes 2 d. Education/School: has highschool diploma, college 2 y. Family History   Problem Relation Age of Onset    Diabetes Mother     Stroke Father        OBJECTIVE:    Visit Vitals  /65   Pulse 60   Temp 98.4 °F (36.9 °C) (Oral)   Resp 16   Ht 5' 8\" (1.727 m)   Wt 168 lb (76.2 kg)   SpO2 96%   BMI 25.54 kg/m²     CONSTITUTIONAL: well , well nourished, appears age appropriate  EYES: perrla, eom intact  ENMT:moist mucous membranes, pharynx clear  NECK: supple. Thyroid normal  RESPIRATORY: Chest: clear bilaterally   CARDIOVASCULAR: Heart: regular rate and rhythm  GASTROINTESTINAL: Abdomen: soft, bowel sounds active  HEMATOLOGIC: no pathological lymph nodes palpated  MUSCULOSKELETAL: Extremities: no edema, pulse 1+ Foot exam reveals bunions, no other lesions. Sensation is intact to fine filament, pulses are excellent. INTEGUMENT: No unusual rashes or suspicious skin lesions noted. Nails appear normal.  NEUROLOGIC: non-focal exam   MENTAL STATUS: alert and oriented, appropriate affect           ASSESSMENT:  1. Type 2 diabetes with nephropathy ([address]    2. Peripheral vascular disease ([address]    3. CLL (chronic lymphocytic leukemia) ([address]    4. Atherosclerosis of native artery of both lower extremities with intermittent claudication ([address]    5. S/P CABG x 3    6. Gastroesophageal reflux disease with esophagitis    7. Hyperlipidemia, unspecified hyperlipidemia type      Patient's medical status is stable, we are pleased with his progress. Today we will check his renal status, as well as hemoglobin A1c to confirm stable diabetes. His last hemoglobin A1c was 6.3, indicating excellent control. He has a known history of peripheral vascular occlusive disease with absent pulses in the DP and PT.    CLL has been stable under the hematologist's guidance. He has coronary artery disease without symptoms. GERD is also quiescent.       He is on Simvastatin for his dyslipidemia, which has also been stable. He will be back to see us in three to four months, sooner if he has any problems. We encouraged him to walk for at least 30 minutes five days a week. We also encouraged him to be mindful of coronavirus and take appropriate precautions. I have discussed the diagnosis with the patient and the intended plan as seen in the  orders above. The patient understands and agees with the plan. The patient has   received an after visit summary and questions were answered concerning  future plans  Patient labs and/or xrays were reviewed  Past records were reviewed. PLAN:  .  Orders Placed This Encounter    RENAL FUNCTION PANEL    HEMOGLOBIN A1C WITH EAG       Follow-up and Dispositions    · Return in about 3 months (around 9/22/2020). ATTENTION:   This medical record was transcribed using an electronic medical records system. Although proofread, it may and can contain electronic and spelling errors. Other human spelling and other errors may be present. Corrections may be executed at a later time. Please feel free to contact us for any clarifications as needed.

## 2020-06-22 NOTE — PROGRESS NOTES
1. Have you been to the ER, urgent care clinic since your last visit? Hospitalized since your last visit? No    2. Have you seen or consulted any other health care providers outside of the [address] [place] [address] since your last visit? Include any pap smears or colon screening.  No

## 2020-06-23 LAB
ALBUMIN SERPL-MCNC: 4.7 G/DL (ref 3.6–4.6)
BUN SERPL-MCNC: 14 MG/DL (ref 8–27)
BUN/CREAT SERPL: 11 (ref 10–24)
CALCIUM SERPL-MCNC: 9.4 MG/DL (ref 8.6–10.2)
CHLORIDE SERPL-SCNC: 98 MMOL/L (ref 96–106)
CO2 SERPL-SCNC: 22 MMOL/L (ref 20–29)
CREAT SERPL-MCNC: 1.22 MG/DL (ref 0.76–1.27)
EST. AVERAGE GLUCOSE BLD GHB EST-MCNC: 131 MG/DL
GLUCOSE SERPL-MCNC: 86 MG/DL (ref 65–99)
HBA1C MFR BLD: 6.2 % (ref 4.8–5.6)
PHOSPHATE SERPL-MCNC: 4 MG/DL (ref 2.8–4.1)
POTASSIUM SERPL-SCNC: 4.8 MMOL/L (ref 3.5–5.2)
SODIUM SERPL-SCNC: 137 MMOL/L (ref 134–144)

## 2020-11-23 ENCOUNTER — OFFICE VISIT (OUTPATIENT)
Dept: INTERNAL MEDICINE CLINIC | Age: 85
End: 2020-11-23
Payer: MEDICARE

## 2020-11-23 VITALS
SYSTOLIC BLOOD PRESSURE: 123 MMHG | HEIGHT: 69 IN | BODY MASS INDEX: 25.55 KG/M2 | TEMPERATURE: 97.7 F | RESPIRATION RATE: 16 BRPM | DIASTOLIC BLOOD PRESSURE: 61 MMHG | HEART RATE: 61 BPM | OXYGEN SATURATION: 96 % | WEIGHT: 172.5 LBS

## 2020-11-23 DIAGNOSIS — C91.10 CLL (CHRONIC LYMPHOCYTIC LEUKEMIA) (HCC): ICD-10-CM

## 2020-11-23 DIAGNOSIS — K21.00 GASTROESOPHAGEAL REFLUX DISEASE WITH ESOPHAGITIS WITHOUT HEMORRHAGE: ICD-10-CM

## 2020-11-23 DIAGNOSIS — J47.9 BRONCHIECTASIS WITHOUT COMPLICATION (HCC): ICD-10-CM

## 2020-11-23 DIAGNOSIS — Z13.31 SCREENING FOR DEPRESSION: ICD-10-CM

## 2020-11-23 DIAGNOSIS — Z00.00 MEDICARE ANNUAL WELLNESS VISIT, SUBSEQUENT: Primary | ICD-10-CM

## 2020-11-23 DIAGNOSIS — I10 ESSENTIAL HYPERTENSION, BENIGN: ICD-10-CM

## 2020-11-23 DIAGNOSIS — E11.21 TYPE 2 DIABETES WITH NEPHROPATHY (HCC): ICD-10-CM

## 2020-11-23 DIAGNOSIS — E78.5 HYPERLIPIDEMIA, UNSPECIFIED HYPERLIPIDEMIA TYPE: ICD-10-CM

## 2020-11-23 DIAGNOSIS — I25.10 ATHEROSCLEROSIS OF NATIVE CORONARY ARTERY OF NATIVE HEART WITHOUT ANGINA PECTORIS: ICD-10-CM

## 2020-11-23 PROCEDURE — 1101F PT FALLS ASSESS-DOCD LE1/YR: CPT | Performed by: INTERNAL MEDICINE

## 2020-11-23 PROCEDURE — G8510 SCR DEP NEG, NO PLAN REQD: HCPCS | Performed by: INTERNAL MEDICINE

## 2020-11-23 PROCEDURE — G8536 NO DOC ELDER MAL SCRN: HCPCS | Performed by: INTERNAL MEDICINE

## 2020-11-23 PROCEDURE — 36415 COLL VENOUS BLD VENIPUNCTURE: CPT | Performed by: INTERNAL MEDICINE

## 2020-11-23 PROCEDURE — G8419 CALC BMI OUT NRM PARAM NOF/U: HCPCS | Performed by: INTERNAL MEDICINE

## 2020-11-23 PROCEDURE — G8427 DOCREV CUR MEDS BY ELIG CLIN: HCPCS | Performed by: INTERNAL MEDICINE

## 2020-11-23 PROCEDURE — G0439 PPPS, SUBSEQ VISIT: HCPCS | Performed by: INTERNAL MEDICINE

## 2020-11-23 PROCEDURE — 99213 OFFICE O/P EST LOW 20 MIN: CPT | Performed by: INTERNAL MEDICINE

## 2020-11-23 NOTE — PROGRESS NOTES
SPORTS MEDICINE AND PRIMARY CARE  Wyatt Ro MD, [address] [address]  Phone:  [phone]  Fax: [phone]      Chief Complaint   Patient presents with    Annual Wellness Visit         SUBECTIVE:    Galdino Kaplan is a 80 y.o. male Patient returns today with a known history of DM type 2 with nephropathy, bronchiectasis, CLL, followed by hematology. Patient returns today without his daughter, Venecia Cm, present. She is out in the car. He is doing well, he states. He saw the hematologist, and does not have a follow up appointment for six months because he has been stable. He has a known history of diabetes, bronchiectasis, CLL, GERD, primary hypertension, dyslipidemia, CAD and is seen for evaluation. Current Outpatient Medications   Medication Sig Dispense Refill    glucose blood VI test strips (OneTouch Ultra Blue Test Strip) strip USE TO TEST BLOOD SUGAR TWICE A DAY. Dx.e11.9 200 Strip 3    allopurinoL (ZYLOPRIM) 300 mg tablet Take 0.5 Tabs by mouth daily. 90 Tab 7    simvastatin (ZOCOR) 40 mg tablet TAKE 1 TABLET DAILY IN THE EVENING 90 Tab 4    metFORMIN (GLUCOPHAGE) 500 mg tablet TAKE 1 TABLET DAILY 90 Tab 4    metoprolol tartrate (LOPRESSOR) 25 mg tablet TAKE 1 TABLET TWICE A  Tab 3    amLODIPine (NORVASC) 2.5 mg tablet TAKE 1 TABLET DAILY 90 Tab 3    omeprazole (PRILOSEC) 20 mg capsule TAKE 1 CAPSULE DAILY 90 Cap 1    cyanocobalamin 1,000 mcg tablet Take 1,000 mcg by mouth daily.  aspirin 81 mg chewable tablet Take 81 mg by mouth daily.  brimonidine (ALPHAGAN) 0.15 % ophthalmic solution Administer 1 Drop to both eyes two (2) times a day.  Indications: OPEN ANGLE GLAUCOMA       Past Medical History:   Diagnosis Date    Abnormal nuclear stress test 6/6/2014    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 [facility] 11/2/2015    Bereavement 02/06/2019    ltcf - uti -strangulated hernia - prostate ca - brother    Bronchiectasis ([facility]     CAD (coronary artery disease)     Chest pain, unspecified     Chronic pain     right leg    CLL (chronic lymphocytic leukemia) ([facility]     Jerica Segura md  [facility]    Diabetes ([facility]     Essential hypertension     GERD (gastroesophageal reflux disease)     Hyperlipidemia     Hypertension     Opacity of lung on imaging study 2017    cxr    Rotator cuff arthropathy     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    S/P coronary artery stent placement 2014 PCI/GUANAKO to prox LAD     Past Surgical History:   Procedure Laterality Date    HX COLONOSCOPY      HX HEART CATHETERIZATION      PTCA SINGLE VESSEL  2015         VASCULAR SURGERY PROCEDURE UNLIST   and     BLE stenting     Allergies   Allergen Reactions    Codeine Shortness of Breath    Lipitor [Atorvastatin] Nausea Only       REVIEW OF SYSTEMS:   No chest pain, no shortness of breath.         Social History     Socioeconomic History    Marital status:      Spouse name: Not on file    Number of children: Not on file    Years of education: Not on file    Highest education level: Not on file   Social Needs    Financial resource strain: Not hard at all   Key Cybersecurity insecurity     Worry: Never true     Inability: Never true   [employer] needs     Medical: No     Non-medical: No   Tobacco Use    Smoking status: Former Smoker     Last attempt to quit: 1/15/1984     Years since quittin.8    Smokeless tobacco: Never Used    Tobacco comment: QUIT    Substance and Sexual Activity    Alcohol use: No     Alcohol/week: 0.0 standard drinks     Comment: quit in     Drug use: No     Types: Prescription, OTC    Sexual activity: Not Currently     Partners: Female   Lifestyle    Physical activity     Days per week: 0 days     Minutes per session: 0 min    Stress: Not at all   Relationships    Social connections     Talks on phone: More than three times a week     Gets together: More than three times a week Attends Protestant service: More than 4 times per year     Active member of club or organization: Yes     Attends meetings of clubs or organizations: More than 4 times per year     Relationship status:    Other Topics Concern     Service No    Blood Transfusions No    Caffeine Concern No    Occupational Exposure No    Hobby Hazards No    Sleep Concern No    Stress Concern No    Weight Concern No    Special Diet Yes     Comment: low sugar    Back Care No    Exercise No    Bike Helmet No    Seat Belt Yes    Self-Exams No   Social History Narrative    Family History: Mother:  61 yrs, DM complicationsFather:  80 yrs, strokeSister(s):  76 yrs, pneumonia, DM,    HTNBrother(s):  61 yrs, DM, HTN, CVAChildren: aliveGrandmother: unknow nGrandfather: deceasedSpouse: deceased1 brother(s) -    healthy. 2daughter(s) - healthy. Social History: Alcohol Use Patient does not use alcohol. Smoking Status Patient is a former smoker, Quit in: 36. Caffeine: none. Marital    Status: W idow . Lives w ith: alone. Children: yes 2 d. Education/School: has highschool diploma, college 2 y.   r  Family History   Problem Relation Age of Onset    Diabetes Mother     Stroke Father        OBJECTIVE:  Visit Vitals  /61   Pulse 61   Temp 97.7 °F (36.5 °C) (Oral)   Resp 16   Ht 5' 9\" (1.753 m)   Wt 172 lb 8 oz (78.2 kg)   SpO2 96%   BMI 25.47 kg/m²     ENT: perrla,  eom intact  NECK: supple. Thyroid normal  CHEST: clear to ascultation and percussion   HEART: regular rate and rhythm  ABD: soft, bowel sounds active  EXTREMITIES: no edema, pulse 1+     No visits with results within 3 Month(s) from this visit.    Latest known visit with results is:   Office Visit on 2020   Component Date Value Ref Range Status    Glucose 2020 86  65 - 99 mg/dL Final    BUN 2020 14  8 - 27 mg/dL Final    Creatinine 2020 1.22  0.76 - 1.27 mg/dL Final    GFR est non-AA 06/22/2020 53* >59 mL/min/1.73 Final    GFR est AA 06/22/2020 62  >59 mL/min/1.73 Final    BUN/Creatinine ratio 06/22/2020 11  10 - 24 Final    Sodium 06/22/2020 137  134 - 144 mmol/L Final    Potassium 06/22/2020 4.8  3.5 - 5.2 mmol/L Final    Chloride 06/22/2020 98  96 - 106 mmol/L Final    CO2 06/22/2020 22  20 - 29 mmol/L Final    Calcium 06/22/2020 9.4  8.6 - 10.2 mg/dL Final    Phosphorus 06/22/2020 4.0  2.8 - 4.1 mg/dL Final    Albumin 06/22/2020 4.7* 3.6 - 4.6 g/dL Final    Hemoglobin A1c 06/22/2020 6.2* 4.8 - 5.6 % Final    Comment:          Prediabetes: 5.7 - 6.4           Diabetes: >6.4           Glycemic control for adults with diabetes: <7.0      Estimated average glucose 06/22/2020 131  mg/dL Final          ASSESSMENT:  1. Medicare annual wellness visit, subsequent    2. Screening for depression    3. Type 2 diabetes with nephropathy ([facility]    4. Bronchiectasis without complication ([facility]    5. CLL (chronic lymphocytic leukemia) ([facility]    6. Gastroesophageal reflux disease with esophagitis without hemorrhage    7. Essential hypertension, benign    8. Hyperlipidemia, unspecified hyperlipidemia type    9. Atherosclerosis of native coronary artery of native heart without angina pectoris      Patient's medical status is stable. Blood sugar control will be assessed with hemoglobin A1c, which has been good. He has had bronchiectasis without symptoms currently. Chronic lymphocytic leukemia has been stable with a white count in the 60k range. No symptoms of GERD today. BP control is at goal.    No symptoms of CAD. He will be back to see us in about four months, sooner if any problems. I have discussed the diagnosis with the patient and the intended plan as seen in the  orders above. The patient understands and agees with the plan.   The patient has   received an after visit summary and questions were answered concerning  future plans  Patient labs and/or xrays were reviewed  Past records were reviewed. PLAN:  .  Orders Placed This Encounter    Depression Screen Annual    URINALYSIS W/ RFLX MICROSCOPIC    CBC WITH AUTOMATED DIFF    METABOLIC PANEL, COMPREHENSIVE    LIPID PANEL    TSH 3RD GENERATION    HEMOGLOBIN A1C WITH EAG       Follow-up and Dispositions    · Return in about 4 months (around 3/23/2021). ATTENTION:   This medical record was transcribed using an electronic medical records system. Although proofread, it may and can contain electronic and spelling errors. Other human spelling and other errors may be present. Corrections may be executed at a later time. Please feel free to contact us for any clarifications as needed.

## 2020-11-23 NOTE — PATIENT INSTRUCTIONS
Medicare Wellness Visit, Male The best way to live healthy is to have a lifestyle where you eat a well-balanced diet, exercise regularly, limit alcohol use, and quit all forms of tobacco/nicotine, if applicable. Regular preventive services are another way to keep healthy. Preventive services (vaccines, screening tests, monitoring & exams) can help personalize your care plan, which helps you manage your own care. Screening tests can find health problems at the earliest stages, when they are easiest to treat. [facility] follows the current, evidence-based guidelines published by the [facility] ([facility] [[facility]]STF) when recommending preventive services for our patients. Because we follow these guidelines, sometimes recommendations change over time as research supports it. (For example, a prostate screening blood test is no longer routinely recommended for men with no symptoms). Of course, you and your doctor may decide to screen more often for some diseases, based on your risk and co-morbidities (chronic disease you are already diagnosed with). Preventive services for you include: - Medicare offers their members a free annual wellness visit, which is time for you and your primary care provider to discuss and plan for your preventive service needs. Take advantage of this benefit every year! 
-All adults over age 72 should receive the recommended pneumonia vaccines. Current USPSTF guidelines recommend a series of two vaccines for the best pneumonia protection.  
-All adults should have a flu vaccine yearly and tetanus vaccine every 10 years. 
-All adults age 48 and older should receive the shingles vaccines (series of two vaccines).       
-All adults age 38-68 who are overweight should have a diabetes screening test once every three years.  
-Other screening tests & preventive services for persons with diabetes include: an eye exam to screen for diabetic retinopathy, a kidney function test, a foot exam, and stricter control over your cholesterol.  
-Cardiovascular screening for adults with routine risk involves an electrocardiogram (ECG) at intervals determined by the provider.  
-Colorectal cancer screening should be done for adults age 54-65 with no increased risk factors for colorectal cancer. There are a number of acceptable methods of screening for this type of cancer. Each test has its own benefits and drawbacks. Discuss with your provider what is most appropriate for you during your annual wellness visit. The different tests include: colonoscopy (considered the best screening method), a fecal occult blood test, a fecal DNA test, and sigmoidoscopy. 
-All adults born between [facility] should be screened once for Hepatitis C. 
-An Abdominal Aortic Aneurysm (AAA) Screening is recommended for men age 73-68 who has ever smoked in their lifetime. Here is a list of your current Health Maintenance items (your personalized list of preventive services) with a due date: 
Health Maintenance Due Topic Date Due  
 Annual Well Visit  10/30/2020  Cholesterol Test   10/30/2020

## 2020-11-23 NOTE — PROGRESS NOTES
1. Have you been to the ER, urgent care clinic since your last visit? Hospitalized since your last visit? No    2. Have you seen or consulted any other health care providers outside of the [address] [place] [address] since your last visit? Include any pap smears or colon screening. No  This is the Subsequent Medicare Annual Wellness Exam, performed 12 months or more after the Initial AWV or the last Subsequent AWV    I have reviewed the patient's medical history in detail and updated the computerized patient record. Depression Risk Factor Screening:     3 most recent PHQ Screens 11/23/2020   PHQ Not Done -   Little interest or pleasure in doing things Not at all   Feeling down, depressed, irritable, or hopeless Not at all   Total Score PHQ 2 0       Alcohol Risk Screen   Do you average more than 1 drink per night or more than 7 drinks a week: No    In the past three months have you have had more than 4 drinks containing alcohol on one occasion: No        Functional Ability and Level of Safety:   Hearing: Hearing is good. Activities of Daily Living: The home contains: no safety equipment. Patient does total self care     Ambulation: with no difficulty     Fall Risk:  Fall Risk Assessment, last 12 mths 11/23/2020   Able to walk? Yes   Fall in past 12 months?  No     Abuse Screen:  Patient is not abused       Cognitive Screening   Has your family/caregiver stated any concerns about your memory: no     Cognitive Screening: not necessary    Assessment/Plan   Education and counseling provided:  Are appropriate based on today's review and evaluation        Health Maintenance Due     Health Maintenance Due   Topic Date Due    Shingrix Vaccine Age 49> (1 of 2) 10/04/1983    Medicare Yearly Exam  10/30/2020    Lipid Screen  10/30/2020       Patient Care Team   Patient Care Team:  Susana Barger MD as PCP - General (Internal Medicine)  Susana Barger MD as PCP - [facility]  Jaden Emily Deng MD as Physician (Cardiology)  Camila Lovelace NP as Nurse Practitioner (Nurse Practitioner)  Dagoberto Martin MD as Surgeon (Cardiothoracic Surgery)  Moses Holman RN as Ambulatory Care Manager  Luisana Flores MD (Hematology and Oncology)    History     Patient Active Problem List   Diagnosis Code    Mixed hyperlipidemia E78.2    Peripheral vascular disease ([address] I73.9    Chronic ischemic heart disease I25.9    Coronary atherosclerosis of native coronary artery I25.10    Atherosclerosis of native artery of extremity with intermittent claudication ([address] I70.219    Essential hypertension, benign I10    Atherosclerosis of native arteries of the extremities, unspecified I70.209    Chest pain R07.9    S/P coronary artery stent placement Z95.5    Abnormal nuclear stress test R94.39    CAD (coronary artery disease) I25.10    Hyperlipidemia E78.5    GERD (gastroesophageal reflux disease) K21.9    ASHD (arteriosclerotic heart disease) I25.10    Leukocytosis D72.829    Chest pain, atypical R07.89    S/P angioplasty with stent--4/15 Z95.820    CLL (chronic lymphocytic leukemia) ([address] C91.10    Rotator cuff arthropathy M12.819    S/P CABG x 3 Z95.1    Opacity of lung on imaging study R91.8    Bronchiectasis ([address] J47.9    Type 2 diabetes with nephropathy ([address] E11.21     Past Medical History:   Diagnosis Date    Abnormal nuclear stress test 6/6/2014    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 ([address] 11/2/2015    Bereavement 02/06/2019    ltcf - uti -strangulated hernia - prostate ca - brother    Bronchiectasis ([address]     CAD (coronary artery disease)     Chest pain, unspecified     Chronic pain     right leg    CLL (chronic lymphocytic leukemia) ([address]     Jerica Segura md  [facility]    Diabetes ([address]     Essential hypertension     GERD (gastroesophageal reflux disease)     Hyperlipidemia     Hypertension     Opacity of lung on imaging study 2017    cxr    Rotator cuff arthropathy     S/P CABG x 3 -6-15    S/P coronary artery stent placement 2014 PCI/GUANAKO to prox LAD      Past Surgical History:   Procedure Laterality Date    HX COLONOSCOPY      HX HEART CATHETERIZATION      PTCA SINGLE VESSEL  2015         VASCULAR SURGERY PROCEDURE UNLIST   and     BLE stenting     Current Outpatient Medications   Medication Sig Dispense Refill    glucose blood VI test strips (OneTouch Ultra Blue Test Strip) strip USE TO TEST BLOOD SUGAR TWICE A DAY. Dx.e11.9 200 Strip 3    allopurinoL (ZYLOPRIM) 300 mg tablet Take 0.5 Tabs by mouth daily. 90 Tab 7    simvastatin (ZOCOR) 40 mg tablet TAKE 1 TABLET DAILY IN THE EVENING 90 Tab 4    metFORMIN (GLUCOPHAGE) 500 mg tablet TAKE 1 TABLET DAILY 90 Tab 4    metoprolol tartrate (LOPRESSOR) 25 mg tablet TAKE 1 TABLET TWICE A  Tab 3    amLODIPine (NORVASC) 2.5 mg tablet TAKE 1 TABLET DAILY 90 Tab 3    omeprazole (PRILOSEC) 20 mg capsule TAKE 1 CAPSULE DAILY 90 Cap 1    cyanocobalamin 1,000 mcg tablet Take 1,000 mcg by mouth daily.  aspirin 81 mg chewable tablet Take 81 mg by mouth daily.  brimonidine (ALPHAGAN) 0.15 % ophthalmic solution Administer 1 Drop to both eyes two (2) times a day.  Indications: OPEN ANGLE GLAUCOMA       Allergies   Allergen Reactions    Codeine Shortness of Breath    Lipitor [Atorvastatin] Nausea Only       Family History   Problem Relation Age of Onset    Diabetes Mother     Stroke Father      Social History     Tobacco Use    Smoking status: Former Smoker     Last attempt to quit: 1/15/1984     Years since quittin.8    Smokeless tobacco: Never Used    Tobacco comment: QUIT    Substance Use Topics    Alcohol use: No     Alcohol/week: 0.0 standard drinks     Comment: quit in

## 2020-11-24 LAB
ALBUMIN SERPL-MCNC: 4.2 G/DL (ref 3.5–5)
ALBUMIN/GLOB SERPL: 1.3 {RATIO} (ref 1.1–2.2)
ALP SERPL-CCNC: 87 U/L (ref 45–117)
ALT SERPL-CCNC: 20 U/L (ref 12–78)
ANION GAP SERPL CALC-SCNC: 7 MMOL/L (ref 5–15)
APPEARANCE UR: CLEAR
AST SERPL-CCNC: 19 U/L (ref 15–37)
BASOPHILS # BLD: 0 K/UL (ref 0–0.1)
BASOPHILS NFR BLD: 0 % (ref 0–1)
BILIRUB SERPL-MCNC: 0.9 MG/DL (ref 0.2–1)
BILIRUB UR QL: NEGATIVE
BUN SERPL-MCNC: 18 MG/DL (ref 6–20)
BUN/CREAT SERPL: 13 (ref 12–20)
CALCIUM SERPL-MCNC: 9.7 MG/DL (ref 8.5–10.1)
CHLORIDE SERPL-SCNC: 104 MMOL/L (ref 97–108)
CHOLEST SERPL-MCNC: 97 MG/DL
CO2 SERPL-SCNC: 25 MMOL/L (ref 21–32)
COLOR UR: NORMAL
CREAT SERPL-MCNC: 1.35 MG/DL (ref 0.7–1.3)
DIFFERENTIAL METHOD BLD: ABNORMAL
EOSINOPHIL # BLD: 0.8 K/UL (ref 0–0.4)
EOSINOPHIL NFR BLD: 1 % (ref 0–7)
ERYTHROCYTE [DISTWIDTH] IN BLOOD BY AUTOMATED COUNT: 16.7 % (ref 11.5–14.5)
EST. AVERAGE GLUCOSE BLD GHB EST-MCNC: 128 MG/DL
GLOBULIN SER CALC-MCNC: 3.3 G/DL (ref 2–4)
GLUCOSE SERPL-MCNC: 89 MG/DL (ref 65–100)
GLUCOSE UR STRIP.AUTO-MCNC: NEGATIVE MG/DL
HBA1C MFR BLD: 6.1 % (ref 4–5.6)
HCT VFR BLD AUTO: 44 % (ref 36.6–50.3)
HDLC SERPL-MCNC: 27 MG/DL
HDLC SERPL: 3.6 {RATIO} (ref 0–5)
HGB BLD-MCNC: 13.2 G/DL (ref 12.1–17)
HGB UR QL STRIP: NEGATIVE
IMM GRANULOCYTES # BLD AUTO: 0 K/UL
IMM GRANULOCYTES NFR BLD AUTO: 0 %
KETONES UR QL STRIP.AUTO: NEGATIVE MG/DL
LDLC SERPL CALC-MCNC: 38.2 MG/DL (ref 0–100)
LEUKOCYTE ESTERASE UR QL STRIP.AUTO: NEGATIVE
LIPID PROFILE,FLP: ABNORMAL
LYMPHOCYTES # BLD: 63.4 K/UL (ref 0.8–3.5)
LYMPHOCYTES NFR BLD: 79 % (ref 12–49)
MCH RBC QN AUTO: 33.2 PG (ref 26–34)
MCHC RBC AUTO-ENTMCNC: 30 G/DL (ref 30–36.5)
MCV RBC AUTO: 110.6 FL (ref 80–99)
MONOCYTES # BLD: 12 K/UL (ref 0–1)
MONOCYTES NFR BLD: 15 % (ref 5–13)
NEUTS SEG # BLD: 4 K/UL (ref 1.8–8)
NEUTS SEG NFR BLD: 5 % (ref 32–75)
NITRITE UR QL STRIP.AUTO: NEGATIVE
NRBC # BLD: 0.02 K/UL (ref 0–0.01)
NRBC BLD-RTO: 0 PER 100 WBC
PATH REV BLD -IMP: ABNORMAL
PH UR STRIP: 5.5 [PH] (ref 5–8)
PLATELET # BLD AUTO: 193 K/UL (ref 150–400)
PMV BLD AUTO: 10.9 FL (ref 8.9–12.9)
POTASSIUM SERPL-SCNC: 4.7 MMOL/L (ref 3.5–5.1)
PROT SERPL-MCNC: 7.5 G/DL (ref 6.4–8.2)
PROT UR STRIP-MCNC: NEGATIVE MG/DL
RBC # BLD AUTO: 3.98 M/UL (ref 4.1–5.7)
RBC MORPH BLD: ABNORMAL
RBC MORPH BLD: ABNORMAL
SODIUM SERPL-SCNC: 136 MMOL/L (ref 136–145)
SP GR UR REFRACTOMETRY: 1.02 (ref 1–1.03)
TRIGL SERPL-MCNC: 159 MG/DL (ref ?–150)
TSH SERPL DL<=0.05 MIU/L-ACNC: 5.07 UIU/ML (ref 0.36–3.74)
UROBILINOGEN UR QL STRIP.AUTO: 0.2 EU/DL (ref 0.2–1)
VLDLC SERPL CALC-MCNC: 31.8 MG/DL
WBC # BLD AUTO: 80.2 K/UL (ref 4.1–11.1)
WBC MORPH BLD: ABNORMAL

## 2020-12-21 RX ORDER — AMLODIPINE BESYLATE 2.5 MG/1
2.5 TABLET ORAL DAILY
Qty: 90 TAB | Refills: 3 | Status: SHIPPED | OUTPATIENT
Start: 2020-12-21 | End: 2021-10-12

## 2021-03-15 RX ORDER — METFORMIN HYDROCHLORIDE 500 MG/1
TABLET ORAL
Qty: 90 TAB | Refills: 3 | Status: SHIPPED | OUTPATIENT
Start: 2021-03-15 | End: 2022-02-10

## 2021-03-18 ENCOUNTER — OFFICE VISIT (OUTPATIENT)
Dept: INTERNAL MEDICINE CLINIC | Age: 86
End: 2021-03-18
Payer: MEDICARE

## 2021-03-18 VITALS
BODY MASS INDEX: 26.62 KG/M2 | HEART RATE: 61 BPM | SYSTOLIC BLOOD PRESSURE: 119 MMHG | WEIGHT: 169.6 LBS | TEMPERATURE: 98 F | OXYGEN SATURATION: 97 % | HEIGHT: 67 IN | DIASTOLIC BLOOD PRESSURE: 60 MMHG | RESPIRATION RATE: 18 BRPM

## 2021-03-18 DIAGNOSIS — J47.9 BRONCHIECTASIS WITHOUT COMPLICATION (HCC): ICD-10-CM

## 2021-03-18 DIAGNOSIS — I10 ESSENTIAL HYPERTENSION, BENIGN: Primary | ICD-10-CM

## 2021-03-18 DIAGNOSIS — E11.21 TYPE 2 DIABETES WITH NEPHROPATHY (HCC): ICD-10-CM

## 2021-03-18 DIAGNOSIS — I70.213 ATHEROSCLEROSIS OF NATIVE ARTERY OF BOTH LOWER EXTREMITIES WITH INTERMITTENT CLAUDICATION (HCC): ICD-10-CM

## 2021-03-18 DIAGNOSIS — I73.9 PERIPHERAL VASCULAR DISEASE (HCC): ICD-10-CM

## 2021-03-18 DIAGNOSIS — K21.00 GASTROESOPHAGEAL REFLUX DISEASE WITH ESOPHAGITIS WITHOUT HEMORRHAGE: ICD-10-CM

## 2021-03-18 DIAGNOSIS — C91.10 CLL (CHRONIC LYMPHOCYTIC LEUKEMIA) (HCC): ICD-10-CM

## 2021-03-18 PROCEDURE — 1101F PT FALLS ASSESS-DOCD LE1/YR: CPT | Performed by: INTERNAL MEDICINE

## 2021-03-18 PROCEDURE — G8510 SCR DEP NEG, NO PLAN REQD: HCPCS | Performed by: INTERNAL MEDICINE

## 2021-03-18 PROCEDURE — G8419 CALC BMI OUT NRM PARAM NOF/U: HCPCS | Performed by: INTERNAL MEDICINE

## 2021-03-18 PROCEDURE — 99214 OFFICE O/P EST MOD 30 MIN: CPT | Performed by: INTERNAL MEDICINE

## 2021-03-18 PROCEDURE — G8536 NO DOC ELDER MAL SCRN: HCPCS | Performed by: INTERNAL MEDICINE

## 2021-03-18 PROCEDURE — G8427 DOCREV CUR MEDS BY ELIG CLIN: HCPCS | Performed by: INTERNAL MEDICINE

## 2021-03-18 NOTE — PROGRESS NOTES
SPORTS MEDICINE AND PRIMARY CARE  Kathe Baltazar MD, [address] [address]  Phone:  [phone]  Fax: [phone]       Chief Complaint   Patient presents with    Diabetes   . SUBJECTIVE:    Nereida Middleton is a 80 y.o. male Patient returns today with his daughter with a known history of diabetes mellitus type 2, bronchiectasis, chronic lymphocytic leukemia, coronary artery disease, peripheral vascular occlusive disease, diabetic nephropathy, primary hypertension, GERD, and is seen for evaluation. Since we last saw him, his hematologist saw him and did blood work except for hemoglobin A1c and daughter sent the physician a note to have him start doing it every three months and it will save him a trip and a stick. Other new complaints denied and patient is seen for evaluation. He has had his first COVID vaccine. Current Outpatient Medications   Medication Sig Dispense Refill    metFORMIN (GLUCOPHAGE) 500 mg tablet TAKE 1 TABLET DAILY 90 Tab 3    amLODIPine (NORVASC) 2.5 mg tablet Take 1 Tab by mouth daily. 90 Tab 3    glucose blood VI test strips (OneTouch Ultra Blue Test Strip) strip USE TO TEST BLOOD SUGAR TWICE A DAY. Dx.e11.9 200 Strip 3    allopurinoL (ZYLOPRIM) 300 mg tablet Take 0.5 Tabs by mouth daily. 90 Tab 7    simvastatin (ZOCOR) 40 mg tablet TAKE 1 TABLET DAILY IN THE EVENING 90 Tab 4    metoprolol tartrate (LOPRESSOR) 25 mg tablet TAKE 1 TABLET TWICE A  Tab 3    omeprazole (PRILOSEC) 20 mg capsule TAKE 1 CAPSULE DAILY 90 Cap 1    cyanocobalamin 1,000 mcg tablet Take 1,000 mcg by mouth daily.  aspirin 81 mg chewable tablet Take 81 mg by mouth daily.  brimonidine (ALPHAGAN) 0.15 % ophthalmic solution Administer 1 Drop to both eyes two (2) times a day.  Indications: OPEN ANGLE GLAUCOMA       Past Medical History:   Diagnosis Date    Abnormal nuclear stress test 6/6/2014    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 ([facility] 11/2/2015    Bereavement 02/06/2019    ltcf - uti -strangulated hernia - prostate ca - brother    Bron[facility]asis [facility]     CAD (coronary artery disease)     Chest pain, unspecified     Chronic pain     right leg    CLL (chronic lymphocytic leukemia) ([facility]     Jerica Segura md  [facility]    Diabetes ([facility]     Essential hypertension     GERD (gastroesophageal reflux disease)     Hyperlipidemia     Hypertension     Opacity of lung on imaging study 05/28/2017    cxr    Rotator cuff arthropathy     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    S/P coronary artery stent placement 6/6/2014 6/6/14 PCI/GUANAKO to prox LAD     Past Surgical History:   Procedure Laterality Date    HX COLONOSCOPY      HX HEART CATHETERIZATION      PTCA SINGLE VESSEL  4/4/2015         VASCULAR SURGERY PROCEDURE UNLIST  2014 and 2015    BLE stenting     Allergies   Allergen Reactions    Codeine Shortness of Breath    Lipitor [Atorvastatin] Nausea Only         REVIEW OF SYSTEMS:  General: negative for - chills or fever  ENT: negative for - headaches, nasal congestion or tinnitus  Respiratory: negative for - cough, hemoptysis, shortness of breath or wheezing  Cardiovascular : negative for - chest pain, edema, palpitations or shortness of breath  Gastrointestinal: negative for - abdominal pain, blood in stools, heartburn or nausea/vomiting  Genito-Urinary: no dysuria, trouble voiding, or hematuria  Musculoskeletal: negative for - gait disturbance, joint pain, joint stiffness or joint swelling  Neurological: no TIA or stroke symptoms  Hematologic: no bruises, no bleeding, no swollen glands  Integument: no lumps, mole changes, nail changes or rash  Endocrine: no malaise/lethargy or unexpected weight changes      Social History     Socioeconomic History    Marital status:      Spouse name: Not on file    Number of children: Not on file    Years of education: Not on file    Highest education level: Not on file   Social Needs  Financial resource strain: Not hard at all   [facility] insecurity     Worry: Never true     Inability: Never true    Transportation needs     Medical: No     Non-medical: No   Tobacco Use    Smoking status: Former Smoker     Quit date: 1/15/1984     Years since quittin.1    Smokeless tobacco: Never Used    Tobacco comment: QUIT    Substance and Sexual Activity    Alcohol use: No     Alcohol/week: 0.0 standard drinks     Comment: quit in     Drug use: No     Types: Prescription, OTC    Sexual activity: Not Currently     Partners: Female   Lifestyle    Physical activity     Days per week: 0 days     Minutes per session: 0 min    Stress: Not at all   Relationships    Social connections     Talks on phone: More than three times a week     Gets together: More than three times a week     Attends Latter-day service: More than 4 times per year     Active member of club or organization: Yes     Attends meetings of clubs or organizations: More than 4 times per year     Relationship status:    Other Topics Concern     Service No    Blood Transfusions No    Caffeine Concern No    Occupational Exposure No    Hobby Hazards No    Sleep Concern No    Stress Concern No    Weight Concern No    Special Diet Yes     Comment: low sugar    Back Care No    Exercise No    Bike Helmet No    Seat Belt Yes    Self-Exams No   Social History Narrative    Family History: Mother:  61 yrs, DM complicationsFather:  80 yrs, strokeSister(s):  76 yrs, pneumonia, DM,    HTNBrother(s):  61 yrs, DM, HTN, CVAChildren: aliveGrandmother: unknow nGrandfather: deceasedSpouse: deceased1 brother(s) -    healthy. 2daughter(s) - healthy. Social History: Alcohol Use Patient does not use alcohol. Smoking Status Patient is a former smoker, Quit in: 36. Caffeine: none. Marital    Status: W idow . Lives w ith: alone. Children: yes 2 d.  Education/School: has highschool diploma, college 2 y. Family History   Problem Relation Age of Onset    Diabetes Mother     Stroke Father        OBJECTIVE:    Visit Vitals  /60   Pulse 61   Temp 98 °F (36.7 °C) (Oral)   Resp 18   Ht 5' 7\" (1.702 m)   Wt 169 lb 9.6 oz (76.9 kg)   SpO2 97%   BMI 26.56 kg/m²     CONSTITUTIONAL: well , well nourished, appears age appropriate  EYES: perrla, eom intact  ENMT:moist mucous membranes, pharynx clear  NECK: supple. Thyroid normal  RESPIRATORY: Chest: clear bilaterally   CARDIOVASCULAR: Heart: regular rate and rhythm  GASTROINTESTINAL: Abdomen: soft, bowel sounds active  HEMATOLOGIC: no pathological lymph nodes palpated  MUSCULOSKELETAL: Extremities: no edema, pulse 1+   INTEGUMENT: No unusual rashes or suspicious skin lesions noted. Nails appear normal.  NEUROLOGIC: non-focal exam   MENTAL STATUS: alert and oriented, appropriate affect           ASSESSMENT:  1. Essential hypertension, benign    2. Type 2 diabetes with nephropathy ([address]    3. Peripheral vascular disease ([address]    4. Atherosclerosis of native artery of both lower extremities with intermittent claudication ([address]    5. CLL (chronic lymphocytic leukemia) ([address]    6. Bronchiectasis without complication ([address]    7. Gastroesophageal reflux disease with esophagitis without hemorrhage      Blood pressure control is excellent, no titration is needed. I am not concerned about his hemoglobin K3V, but I would certainly like to have it checked and Jerica Jamil will ask the doctor to start checking it for us so he will not have to be stuck so many times. He is on Metformin once a day. Known history of peripheral vascular occlusive disease, but he is currently asymptomatic. Coronary artery disease is currently asymptomatic. Chronic lymphocytic leukemia is now followed on a regular basis by hematology and he is without treatment. Patient's GERD is quiescent. He has Omeprazole as needed for it.     Fortunately his bronchiectasis remains asymptomatic. At 80years old, I think he is doing exceptionally well. He will be back to see me in four months, sooner if he has any problems. I have discussed the diagnosis with the patient and the intended plan as seen in the  Orders. The patient understands and agees with the plan. The patient has   received an after visit summary and questions were answered concerning  future plans  Patient labs and/or xrays were reviewed  Past records were reviewed. PLAN:  . No orders of the defined types were placed in this encounter. Follow-up and Dispositions    · Return in about 4 months (around 7/18/2021). ATTENTION:   This medical record was transcribed using an electronic medical records system. Although proofread, it may and can contain electronic and spelling errors. Other human spelling and other errors may be present. Corrections may be executed at a later time. Please feel free to contact us for any clarifications as needed.

## 2021-03-18 NOTE — PROGRESS NOTES
Jim Salcido is a 80 y.o. male  HIPAA verified by two patient identifiers. Health Maintenance Due   Topic    COVID-19 Vaccine (1)    Shingrix Vaccine Age 50> (1 of 2)    MICROALBUMIN Q1      Chief Complaint   Patient presents with    Diabetes     Visit Vitals  /60   Pulse 61   Temp 98 °F (36.7 °C) (Oral)   Resp 18   Ht 5' 7\" (1.702 m)   Wt 169 lb 9.6 oz (76.9 kg)   SpO2 97%   BMI 26.56 kg/m²       Pain Scale: 0 - No pain/10  Pain Location:   1. Have you been to the ER, urgent care clinic since your last visit? Hospitalized since your last visit? No    2. Have you seen or consulted any other health care providers outside of the [address] [place] [address] since your last visit? Include any pap smears or colon screening.  No

## 2021-03-18 NOTE — ASSESSMENT & PLAN NOTE
Stable, based on history, physical exam and review of pertinent labs, studies and medications; meds reconciled; continue current treatment plan. Yes

## 2021-04-11 RX ORDER — SIMVASTATIN 40 MG/1
TABLET, FILM COATED ORAL
Qty: 90 TAB | Refills: 3 | Status: SHIPPED | OUTPATIENT
Start: 2021-04-11 | End: 2022-04-06

## 2021-04-12 NOTE — PROGRESS NOTES
Chief Complaint   Patient presents with   [facility] ED Follow-up     shortness of breath     1. Have you been to the ER, urgent care clinic since your last visit? Hospitalized since your last visit? Yes Reason for visit: shortness of breath    2. Have you seen or consulted any other health care providers outside of the [address] [place] [address] since your last visit? Include any pap smears or colon screening. No    This is a Subsequent Medicare Annual Wellness Visit providing Personalized Prevention Plan Services (PPPS) (Performed 12 months after initial AWV and PPPS )    I have reviewed the patient's medical history in detail and updated the computerized patient record. HistoryPatient returns today ambulatory, alert and appropriate and has the capacity to give an accurate history and comes in for his yearly physical for Medicare. Past Medical History:   Diagnosis Date    Abnormal nuclear stress test 6/6/2014    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 ([facility]) 11/2/2015    CAD (coronary artery disease)     Chest pain, unspecified     Chronic pain     right leg    CLL (chronic lymphocytic leukemia) (HCC)     Diabetes ([facility])     Essential hypertension     GERD (gastroesophageal reflux disease)     Hyperlipidemia     Hypertension     Rotator cuff arthropathy     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    S/P coronary artery stent placement 6/6/2014 6/6/14 PCI/GUANAKO to prox LAD      Past Surgical History:   Procedure Laterality Date    HX COLONOSCOPY      HX HEART CATHETERIZATION      PTCA SINGLE VESSEL  4/4/2015         VASCULAR SURGERY PROCEDURE UNLIST  2014 and 2015    BLE stenting     Current Outpatient Prescriptions   Medication Sig Dispense Refill    amLODIPine (NORVASC) 2.5 mg tablet Take 1 Tab by mouth daily.  90 Tab 0    omeprazole (PRILOSEC) 20 mg capsule TAKE 1 CAPSULE DAILY 90 Cap 1    amLODIPine (NORVASC) 5 mg tablet TAKE 1 TABLET DAILY 90 Tab 0    simvastatin (ZOCOR) 40 mg tablet TAKE 1 TABLET DAILY IN THE EVENING 90 Tab 3    metFORMIN (GLUCOPHAGE) 500 mg tablet TAKE 1 TABLET DAILY WITH A MEAL 30 Tab 6    glucose blood VI test strips (ONETOUCH ULTRA TEST) strip 1 BID ac 60 Strip 11    metoprolol tartrate (LOPRESSOR) 25 mg tablet TAKE 1 TABLET TWICE A  Tab 3    allopurinol (ZYLOPRIM) 300 mg tablet TAKE 1 TABLET DAILY 90 Tab 7    aspirin 81 mg chewable tablet Take 81 mg by mouth daily.  brimonidine (ALPHAGAN) 0.15 % ophthalmic solution Administer 1 Drop to both eyes two (2) times a day.  Indications: OPEN ANGLE GLAUCOMA       Allergies   Allergen Reactions    Codeine Shortness of Breath    Lipitor [Atorvastatin] Nausea Only     Family History   Problem Relation Age of Onset    Diabetes Mother     Stroke Father      Social History   Substance Use Topics    Smoking status: Former Smoker     Quit date: 1/15/1984    Smokeless tobacco: Never Used      Comment: QUIT 1980    Alcohol use No      Comment: quit in 1980     Patient Active Problem List   Diagnosis Code    Diabetes mellitus type 2, controlled ([facility] E11.9    Mixed hyperlipidemia E78.2    Peripheral vascular disease [facility] I73.9    Chronic ischemic heart disease I25.9    Coronary atherosclerosis of native coronary artery I25.10    Atherosclerosis of native arteries of the extremities with intermittent claudication I70.219    Essential hypertension, benign I10    Atherosclerosis of native arteries of the extremities, unspecified I70.209    Chest pain R07.9    Pre-operative cardiovascular examination, class IV angina ([facility]) Z01.810, I20.9    S/P coronary artery stent placement Z95.5    Abnormal nuclear stress test R94.39    CAD (coronary artery disease) I25.10    Hyperlipidemia E78.5    Diabetes ([facility]) E11.9    GERD (gastroesophageal reflux disease) K21.9    NSTEMI (non-ST elevated myocardial infarction) ([facility]) I21.4    ASHD (arteriosclerotic heart disease) I25.10    Leukocytosis D72.829    Chest pain, atypical R07.89    S/P angioplasty with stent--4/15 Z95.9    CLL (chronic lymphocytic leukemia) ([facility]) C91.10    Rotator cuff arthropathy M12.819    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 ([facility]) I25.110    Unstable angina ([facility]) I20.0    S/P CABG x 3 Z95.1       Depression Risk Factor Screening:     PHQ over the last two weeks 5/31/2017   PHQ Not Done -   Little interest or pleasure in doing things Not at all   Feeling down, depressed or hopeless Not at all   Total Score PHQ 2 0     Alcohol Risk Factor Screening: On any occasion during the past 3 months, have you had more than 4 drinks containing alcohol? No    Do you average more than 14 drinks per week? No      Functional Ability and Level of Safety:     Hearing Loss   mild-to-moderate    Activities of Daily Living   Self-care. Requires assistance with: no ADLs    Fall Risk     Fall Risk Assessment, last 12 mths 5/31/2017   Able to walk? Yes   Fall in past 12 months? No     Abuse Screen   Patient is not abused    Review of Systems   A comprehensive review of systems was negative except for that written in the HPI. Physical Examination     Evaluation of Cognitive Function:  Mood/affect:  neutral  Appearance: age appropriate  Family member/caregiver input:     Visit Vitals    /62 (BP 1 Location: Right arm, BP Patient Position: Sitting)    Pulse (!) 59    Temp 97.9 °F (36.6 °C) (Oral)    Resp 16    Ht 5' 7\" (1.702 m)    Wt 173 lb (78.5 kg)    SpO2 93%    BMI 27.1 kg/m2     General:  Alert, cooperative, no distress, appears stated age. Head:  Normocephalic, without obvious abnormality, atraumatic. Eyes:  Conjunctivae/corneas clear. PERRL, EOMs intact. Fundi benign   Ears:  Normal TMs and external ear canals both ears. Nose: Nares normal. Septum midline. Mucosa normal. No drainage or sinus tenderness.    Throat: Lips, mucosa, and tongue normal. Teeth and gums normal.   Neck: Supple, symmetrical, trachea midline, no adenopathy, thyroid: no enlargement/tenderness/nodules, no carotid bruit and no JVD. Back:   Symmetric, no curvature. ROM normal. No CVA tenderness. Lungs:   Clear to auscultation bilaterally. Chest wall:  No tenderness or deformity. Heart:  Regular rate and rhythm, S1, S2 normal, no murmur, click, rub or gallop. Abdomen:   Soft, non-tender. Bowel sounds normal. No masses,  No organomegaly. Genitalia:  na   Rectal:     Extremities: Extremities normal, atraumatic, no cyanosis or edema. Pulses: 2+ and symmetric all extremities. Skin: Skin color, texture, turgor normal. No rashes or lesions   Lymph nodes: Cervical, supraclavicular, and axillary nodes normal.   Neurologic: CNII-XII intact. Normal strength, sensation and reflexes throughout. Patient Care Team:  Rita Jaramillo MD as PCP - General (Internal Medicine)  Aimee Alvarado RN as Nurse Navigator (Cardiology)  Berhane Avalos MD as Physician (Cardiology)  Chico Haddad NP as Nurse Practitioner (Nurse Practitioner)  Melvina Davidson MD as Surgeon (Cardiothoracic Surgery)  JONAH Lopez Aas, RN as Ambulatory Care Navigator    Advice/Referrals/Counseling   Education and counseling provided:  Are appropriate based on today's review and evaluation      Assessment/Plan       ICD-10-CM ICD-9-CM    1. Controlled type 2 diabetes mellitus without complication, without long-term current use of insulin (HCC) E11.9 250.00    2. CLL (chronic lymphocytic leukemia) ([facility]) C91.10 204.10    3. Essential hypertension, benign I10 401.1    4. Opacity of lung on imaging study R91.8 793.19 XR CHEST PA LAT   . Patient's medical status is stable. We are concerned as noted above about the opacity of her left lower lobe. We will ask for a chest x-ray today. If it has no change we will repeat the chest x-ray in August when we see him.   If there is increase in opacity we will give him a short course of antibiotics although he is asymptomatic which is concerning. Blood pressure control is at goal.  BMI is above his ideal body weight but acceptable. We advise him that he can go to [facility] to visit his daughter.   He will return to see us in August. Complex Repair And Flap Additional Text (Will Appearing After The Standard Complex Repair Text): The complex repair was not sufficient to completely close the primary defect. The remaining additional defect was repaired with the flap mentioned below.

## 2021-05-27 RX ORDER — ALLOPURINOL 300 MG/1
TABLET ORAL
Qty: 90 TABLET | Refills: 3 | Status: SHIPPED | OUTPATIENT
Start: 2021-05-27 | End: 2022-08-22

## 2021-07-19 ENCOUNTER — OFFICE VISIT (OUTPATIENT)
Dept: INTERNAL MEDICINE CLINIC | Age: 86
End: 2021-07-19
Payer: MEDICARE

## 2021-07-19 VITALS
HEART RATE: 60 BPM | RESPIRATION RATE: 16 BRPM | WEIGHT: 165.9 LBS | BODY MASS INDEX: 26.04 KG/M2 | OXYGEN SATURATION: 96 % | SYSTOLIC BLOOD PRESSURE: 122 MMHG | TEMPERATURE: 98 F | DIASTOLIC BLOOD PRESSURE: 60 MMHG | HEIGHT: 67 IN

## 2021-07-19 DIAGNOSIS — E11.21 TYPE 2 DIABETES WITH NEPHROPATHY (HCC): Primary | ICD-10-CM

## 2021-07-19 DIAGNOSIS — N13.8 BPH WITH OBSTRUCTION/LOWER URINARY TRACT SYMPTOMS: ICD-10-CM

## 2021-07-19 DIAGNOSIS — N40.1 BPH WITH OBSTRUCTION/LOWER URINARY TRACT SYMPTOMS: ICD-10-CM

## 2021-07-19 DIAGNOSIS — Z95.1 S/P CABG X 3: ICD-10-CM

## 2021-07-19 DIAGNOSIS — C91.10 CLL (CHRONIC LYMPHOCYTIC LEUKEMIA) (HCC): ICD-10-CM

## 2021-07-19 DIAGNOSIS — K21.00 GASTROESOPHAGEAL REFLUX DISEASE WITH ESOPHAGITIS WITHOUT HEMORRHAGE: ICD-10-CM

## 2021-07-19 DIAGNOSIS — E78.2 MIXED HYPERLIPIDEMIA: ICD-10-CM

## 2021-07-19 DIAGNOSIS — I73.9 PERIPHERAL VASCULAR DISEASE (HCC): ICD-10-CM

## 2021-07-19 PROCEDURE — 99214 OFFICE O/P EST MOD 30 MIN: CPT | Performed by: INTERNAL MEDICINE

## 2021-07-19 PROCEDURE — G8510 SCR DEP NEG, NO PLAN REQD: HCPCS | Performed by: INTERNAL MEDICINE

## 2021-07-19 PROCEDURE — G8419 CALC BMI OUT NRM PARAM NOF/U: HCPCS | Performed by: INTERNAL MEDICINE

## 2021-07-19 PROCEDURE — G8536 NO DOC ELDER MAL SCRN: HCPCS | Performed by: INTERNAL MEDICINE

## 2021-07-19 PROCEDURE — G8427 DOCREV CUR MEDS BY ELIG CLIN: HCPCS | Performed by: INTERNAL MEDICINE

## 2021-07-19 PROCEDURE — 1101F PT FALLS ASSESS-DOCD LE1/YR: CPT | Performed by: INTERNAL MEDICINE

## 2021-07-19 NOTE — PROGRESS NOTES
1. Have you been to the ER, urgent care clinic since your last visit? Hospitalized since your last visit? No    2. Have you seen or consulted any other health care providers outside of the [address] [place] [address] since your last visit? Include any pap smears or colon screening.  No     Wants to discuss frequent urination  Requesting referral to urology

## 2021-07-19 NOTE — PROGRESS NOTES
SPORTS MEDICINE AND PRIMARY CARE  Tristan Ortiz MD, [address] [address]  Phone:  [phone]  Fax: [phone]       Chief Complaint   Patient presents with    Diabetes   . SUBJECTIVE:    Christiano Zimmer is a 80 y.o. male Since we last saw him, he was seen in follow up at the [facility] with CLL Trisomy 12, (?? 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 Miranda Miller.  She recommended continuing a watch and wait approach with a relatively stable CBC and no indication for treatment, nor increased doubling time. She plans to monitor him every five months with a CBC and a CMP. He was also seen by Donald Rizo of cardiology on 04/15/12 for coronary atherosclerosis and hypertensive heart disease, status post aortocoronary bypass graft. Other medical problems include type 2 diabetes with nephropathy, peripheral vascular disease, GERD, dyslipidemia, and is seen for evaluation. Patient comes in today with Svetlana Tinajero, his daughter, who is concerned about urinary frequency and wears Depends, although he will not admit to urinary incontinence. He is seen for evaluation. Apparently he was previously under the care of Dr. Tuyet Mckeon and referred to another physician, the records of which I do not have. Other new complaints denied and patient is seen for evaluation. Current Outpatient Medications   Medication Sig Dispense Refill    allopurinoL (ZYLOPRIM) 300 mg tablet TAKE ONE-HALF (1/2) TABLET DAILY 90 Tablet 3    glucose blood VI test strips (OneTouch Ultra Blue Test Strip) strip USE TO TEST BLOOD SUGAR TWICE A DAY. Dx.e11.9 200 Strip 3    simvastatin (ZOCOR) 40 mg tablet TAKE 1 TABLET DAILY IN THE EVENING 90 Tab 3    metFORMIN (GLUCOPHAGE) 500 mg tablet TAKE 1 TABLET DAILY 90 Tab 3    amLODIPine (NORVASC) 2.5 mg tablet Take 1 Tab by mouth daily.  90 Tab 3    metoprolol tartrate (LOPRESSOR) 25 mg tablet TAKE 1 TABLET TWICE A  Tab 3    omeprazole (PRILOSEC) 20 mg capsule TAKE 1 CAPSULE DAILY 90 Cap 1    cyanocobalamin 1,000 mcg tablet Take 1,000 mcg by mouth daily.  aspirin 81 mg chewable tablet Take 81 mg by mouth daily.  brimonidine (ALPHAGAN) 0.15 % ophthalmic solution Administer 1 Drop to both eyes two (2) times a day.  Indications: OPEN ANGLE GLAUCOMA       Past Medical History:   Diagnosis Date    Abnormal nuclear stress test 6/6/2014    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 ([facility] 11/2/2015    Bereavement 02/06/2019    ltcf - uti -strangulated hernia - prostate ca - brother    Bronchiectasis ([facility]     CAD (coronary artery disease)     Chest pain, unspecified     Chronic pain     right leg    CLL (chronic lymphocytic leukemia) ([facility]     Jerica Segura md  [facility]    Diabetes ([facility]     Essential hypertension     GERD (gastroesophageal reflux disease)     Hyperlipidemia     Hypertension     Opacity of lung on imaging study 05/28/2017    cxr    Rotator cuff arthropathy     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    S/P coronary artery stent placement 6/6/2014 6/6/14 PCI/GUANAKO to prox LAD     Past Surgical History:   Procedure Laterality Date    HX COLONOSCOPY      HX HEART CATHETERIZATION      PTCA SINGLE VESSEL  4/4/2015         VASCULAR SURGERY PROCEDURE UNLIST  2014 and 2015    BLE stenting     Allergies   Allergen Reactions    Codeine Shortness of Breath    Lipitor [Atorvastatin] Nausea Only         REVIEW OF SYSTEMS:  General: negative for - chills or fever  ENT: negative for - headaches, nasal congestion or tinnitus  Respiratory: negative for - cough, hemoptysis, shortness of breath or wheezing  Cardiovascular : negative for - chest pain, edema, palpitations or shortness of breath  Gastrointestinal: negative for - abdominal pain, blood in stools, heartburn or nausea/vomiting  Genito-Urinary: no dysuria, trouble voiding, or hematuria  Musculoskeletal: negative for - gait disturbance, joint pain, joint stiffness or joint swelling  Neurological: no TIA or stroke symptoms  Hematologic: no bruises, no bleeding, no swollen glands  Integument: no lumps, mole changes, nail changes or rash  Endocrine: no malaise/lethargy or unexpected weight changes      Social History     Socioeconomic History    Marital status:      Spouse name: Not on file    Number of children: Not on file    Years of education: Not on file    Highest education level: Not on file   Tobacco Use    Smoking status: Former Smoker     Quit date: 1/15/1984     Years since quittin.5    Smokeless tobacco: Never Used    Tobacco comment: QUIT    Vaping Use    Vaping Use: Never used   Substance and Sexual Activity    Alcohol use: No     Alcohol/week: 0.0 standard drinks     Comment: quit in     Drug use: No     Types: Prescription, OTC    Sexual activity: Not Currently     Partners: Female   Other Topics Concern     Service No    Blood Transfusions No    Caffeine Concern No    Occupational Exposure No    Hobby Hazards No    Sleep Concern No    Stress Concern No    Weight Concern No    Special Diet Yes     Comment: low sugar    Back Care No    Exercise No    Bike Helmet No    Seat Belt Yes    Self-Exams No   Social History Narrative    Family History: Mother:  61 yrs, DM complicationsFather:  80 yrs, strokeSister(s):  76 yrs, pneumonia, DM,    HTNBrother(s):  61 yrs, DM, HTN, CVAChildren: aliveGrandmother: unknow nGrandfather: deceasedSpouse: deceased1 brother(s) -    healthy. 2daughter(s) - healthy. Social History: Alcohol Use Patient does not use alcohol. Smoking Status Patient is a former smoker, Quit in: 36. Caffeine: none. Marital    Status: W idow . Lives w ith: alone. Children: yes 2 d. Education/School: has highschool diploma, college 2 y.      Social Determinants of Health     Financial Resource Strain:     Difficulty of Paying Living Expenses:    Food Insecurity:     Worried About Running Out of Food in the Last Year:     [address] in the Last Year:    Transportation Needs:     Lack of Transportation (Medical):  Lack of Transportation (Non-Medical):    Physical Activity:     Days of Exercise per Week:     Minutes of Exercise per Session:    Stress:     Feeling of Stress :    Social Connections:     Frequency of Communication with Friends and Family:     Frequency of Social Gatherings with Friends and Family:     Attends Temple Services:     Active Member of Clubs or Organizations:     Attends Club or Organization Meetings:     Marital Status:      Family History   Problem Relation Age of Onset    Diabetes Mother     Stroke Father        OBJECTIVE:    Visit Vitals  /60   Pulse 60   Temp 98 °F (36.7 °C) (Oral)   Resp 16   Ht 5' 7\" (1.702 m)   Wt 165 lb 14.4 oz (75.3 kg)   SpO2 96%   BMI 25.98 kg/m²     CONSTITUTIONAL: well , well nourished, appears age appropriate  EYES: perrla, eom intact  ENMT:moist mucous membranes, pharynx clear  NECK: supple. Thyroid normal  RESPIRATORY: Chest: clear bilaterally   CARDIOVASCULAR: Heart: regular rate and rhythm  GASTROINTESTINAL: Abdomen: soft, bowel sounds active  HEMATOLOGIC: no pathological lymph nodes palpated  MUSCULOSKELETAL: Extremities: no edema, pulse 1+Patient's foot exam reveals small bunions bilaterally. Absent pulses. Sensation is intact. No lesions. INTEGUMENT: No unusual rashes or suspicious skin lesions noted. Nails appear normal.  NEUROLOGIC: non-focal exam   MENTAL STATUS: alert and oriented, appropriate affect           ASSESSMENT:  1. Type 2 diabetes with nephropathy ([address]    2. Peripheral vascular disease ([address]    3. CLL (chronic lymphocytic leukemia) ([address]    4. S/P CABG x 3    5. Gastroesophageal reflux disease with esophagitis without hemorrhage    6. Mixed hyperlipidemia    7.  BPH with obstruction/lower urinary tract symptoms      His blood sugar control will be assessed with hemoglobin A1c. We will also check his renal status with a CMP. He has peripheral vascular occlusive disease that is asymptomatic. Chronic lymphocytic leukemia is stable. He is followed by cardiology yearly for his CABG and CAD, which have been stable. No symptoms related to GERD. His dyslipidemia is stable, LDL is 63, which is excellent on the statin. His current symptoms are related to BPH. We are reluctant to use Flomax in view of his glaucoma. Therefore, we will refer him back to urology. He will be back to see me in four months, sooner if needed. I have discussed the diagnosis with the patient and the intended plan as seen in the  Orders. The patient understands and agees with the plan. The patient has   received an after visit summary and questions were answered concerning  future plans  Patient labs and/or xrays were reviewed  Past records were reviewed. PLAN:  .  Orders Placed This Encounter    CBC WITH AUTOMATED DIFF    HEMOGLOBIN A1C WITH EAG    METABOLIC PANEL, COMPREHENSIVE    REFERRAL TO UROLOGY       Follow-up and Dispositions    · Return in about 4 months (around 11/19/2021). ATTENTION:   This medical record was transcribed using an electronic medical records system. Although proofread, it may and can contain electronic and spelling errors. Other human spelling and other errors may be present. Corrections may be executed at a later time. Please feel free to contact us for any clarifications as needed.

## 2021-07-20 LAB
ALBUMIN SERPL-MCNC: 4.1 G/DL (ref 3.5–5)
ALBUMIN/GLOB SERPL: 1.2 {RATIO} (ref 1.1–2.2)
ALP SERPL-CCNC: 71 U/L (ref 45–117)
ALT SERPL-CCNC: 20 U/L (ref 12–78)
ANION GAP SERPL CALC-SCNC: 7 MMOL/L (ref 5–15)
AST SERPL-CCNC: 20 U/L (ref 15–37)
BASOPHILS # BLD: 0 K/UL (ref 0–0.1)
BASOPHILS NFR BLD: 0 % (ref 0–1)
BILIRUB SERPL-MCNC: 0.9 MG/DL (ref 0.2–1)
BLASTS NFR BLD MANUAL: 6 %
BUN SERPL-MCNC: 16 MG/DL (ref 6–20)
BUN/CREAT SERPL: 13 (ref 12–20)
CALCIUM SERPL-MCNC: 9.5 MG/DL (ref 8.5–10.1)
CHLORIDE SERPL-SCNC: 102 MMOL/L (ref 97–108)
CO2 SERPL-SCNC: 27 MMOL/L (ref 21–32)
CREAT SERPL-MCNC: 1.24 MG/DL (ref 0.7–1.3)
DIFFERENTIAL METHOD BLD: ABNORMAL
EOSINOPHIL # BLD: 0 K/UL (ref 0–0.4)
EOSINOPHIL NFR BLD: 0 % (ref 0–7)
ERYTHROCYTE [DISTWIDTH] IN BLOOD BY AUTOMATED COUNT: 16 % (ref 11.5–14.5)
EST. AVERAGE GLUCOSE BLD GHB EST-MCNC: 137 MG/DL
GLOBULIN SER CALC-MCNC: 3.3 G/DL (ref 2–4)
GLUCOSE SERPL-MCNC: 77 MG/DL (ref 65–100)
HBA1C MFR BLD: 6.4 % (ref 4–5.6)
HCT VFR BLD AUTO: 37 % (ref 36.6–50.3)
HGB BLD-MCNC: 12 G/DL (ref 12.1–17)
IMM GRANULOCYTES # BLD AUTO: 0 K/UL
IMM GRANULOCYTES NFR BLD AUTO: 0 %
LYMPHOCYTES # BLD: 74.4 K/UL (ref 0.8–3.5)
LYMPHOCYTES NFR BLD: 75 % (ref 12–49)
MCH RBC QN AUTO: 34.7 PG (ref 26–34)
MCHC RBC AUTO-ENTMCNC: 32.4 G/DL (ref 30–36.5)
MCV RBC AUTO: 106.9 FL (ref 80–99)
MONOCYTES # BLD: 18.8 K/UL (ref 0–1)
MONOCYTES NFR BLD: 19 % (ref 5–13)
NEUTS SEG # BLD: 0 K/UL (ref 1.8–8)
NEUTS SEG NFR BLD: 0 % (ref 32–75)
NRBC # BLD: 0 K/UL (ref 0–0.01)
NRBC BLD-RTO: 0 PER 100 WBC
PATH REV BLD -IMP: ABNORMAL
PLATELET # BLD AUTO: 156 K/UL (ref 150–400)
PMV BLD AUTO: 11.2 FL (ref 8.9–12.9)
POTASSIUM SERPL-SCNC: 4.6 MMOL/L (ref 3.5–5.1)
PROT SERPL-MCNC: 7.4 G/DL (ref 6.4–8.2)
RBC # BLD AUTO: 3.46 M/UL (ref 4.1–5.7)
RBC MORPH BLD: ABNORMAL
SODIUM SERPL-SCNC: 136 MMOL/L (ref 136–145)
WBC # BLD AUTO: 99.2 K/UL (ref 4.1–11.1)
WBC MORPH BLD: ABNORMAL

## 2021-08-18 RX ORDER — METOPROLOL TARTRATE 25 MG/1
TABLET, FILM COATED ORAL
Qty: 180 TABLET | Refills: 3 | Status: SHIPPED | OUTPATIENT
Start: 2021-08-18 | End: 2021-08-20 | Stop reason: SDUPTHER

## 2021-08-19 RX ORDER — METOPROLOL TARTRATE 25 MG/1
TABLET, FILM COATED ORAL
Qty: 180 TABLET | Refills: 3 | Status: CANCELLED | OUTPATIENT
Start: 2021-08-19

## 2021-08-21 RX ORDER — METOPROLOL TARTRATE 25 MG/1
TABLET, FILM COATED ORAL
Qty: 180 TABLET | Refills: 3 | Status: SHIPPED | OUTPATIENT
Start: 2021-08-21 | End: 2021-09-03 | Stop reason: SDUPTHER

## 2021-09-03 RX ORDER — METOPROLOL TARTRATE 25 MG/1
TABLET, FILM COATED ORAL
Qty: 180 TABLET | Refills: 3 | Status: SHIPPED | OUTPATIENT
Start: 2021-09-03 | End: 2022-08-30

## 2021-10-12 ENCOUNTER — OFFICE VISIT (OUTPATIENT)
Dept: INTERNAL MEDICINE CLINIC | Age: 86
End: 2021-10-12
Payer: MEDICARE

## 2021-10-12 VITALS
OXYGEN SATURATION: 94 % | RESPIRATION RATE: 18 BRPM | HEIGHT: 67 IN | SYSTOLIC BLOOD PRESSURE: 104 MMHG | HEART RATE: 71 BPM | BODY MASS INDEX: 24.74 KG/M2 | WEIGHT: 157.6 LBS | DIASTOLIC BLOOD PRESSURE: 61 MMHG | TEMPERATURE: 98.6 F

## 2021-10-12 DIAGNOSIS — I70.213 ATHEROSCLEROSIS OF NATIVE ARTERY OF BOTH LOWER EXTREMITIES WITH INTERMITTENT CLAUDICATION (HCC): ICD-10-CM

## 2021-10-12 DIAGNOSIS — K21.00 GASTROESOPHAGEAL REFLUX DISEASE WITH ESOPHAGITIS WITHOUT HEMORRHAGE: ICD-10-CM

## 2021-10-12 DIAGNOSIS — I10 ESSENTIAL HYPERTENSION, BENIGN: ICD-10-CM

## 2021-10-12 DIAGNOSIS — C91.10 CLL (CHRONIC LYMPHOCYTIC LEUKEMIA) (HCC): ICD-10-CM

## 2021-10-12 DIAGNOSIS — E78.2 MIXED HYPERLIPIDEMIA: ICD-10-CM

## 2021-10-12 DIAGNOSIS — J47.9 BRONCHIECTASIS WITHOUT COMPLICATION (HCC): ICD-10-CM

## 2021-10-12 DIAGNOSIS — Z95.5 S/P CORONARY ARTERY STENT PLACEMENT: ICD-10-CM

## 2021-10-12 DIAGNOSIS — E11.21 TYPE 2 DIABETES WITH NEPHROPATHY (HCC): Primary | ICD-10-CM

## 2021-10-12 PROCEDURE — G8427 DOCREV CUR MEDS BY ELIG CLIN: HCPCS | Performed by: INTERNAL MEDICINE

## 2021-10-12 PROCEDURE — G8536 NO DOC ELDER MAL SCRN: HCPCS | Performed by: INTERNAL MEDICINE

## 2021-10-12 PROCEDURE — 99215 OFFICE O/P EST HI 40 MIN: CPT | Performed by: INTERNAL MEDICINE

## 2021-10-12 PROCEDURE — G8510 SCR DEP NEG, NO PLAN REQD: HCPCS | Performed by: INTERNAL MEDICINE

## 2021-10-12 PROCEDURE — 1101F PT FALLS ASSESS-DOCD LE1/YR: CPT | Performed by: INTERNAL MEDICINE

## 2021-10-12 PROCEDURE — G8420 CALC BMI NORM PARAMETERS: HCPCS | Performed by: INTERNAL MEDICINE

## 2021-10-12 RX ORDER — TAMSULOSIN HYDROCHLORIDE 0.4 MG/1
CAPSULE ORAL
COMMUNITY
Start: 2021-09-24 | End: 2021-12-08 | Stop reason: SINTOL

## 2021-10-12 RX ORDER — ERGOCALCIFEROL 1.25 MG/1
CAPSULE ORAL
COMMUNITY
Start: 2021-09-17 | End: 2022-07-05 | Stop reason: DRUGHIGH

## 2021-10-12 NOTE — PROGRESS NOTES
1. Have you been to the ER, urgent care clinic since your last visit? Hospitalized since your last visit? Yes When: 8-9-21 Reason for visit: leg pain    2. Have you seen or consulted any other health care providers outside of the [address] [place] [address] since your last visit? Include any pap smears or colon screening.  Yes [facility]      Wants to discuss off/on knee pain

## 2021-10-12 NOTE — PROGRESS NOTES
SPORTS MEDICINE AND PRIMARY CARE  Rosey Hinson MD, [address] [address]  Phone:  [phone]  Fax: [phone]       Chief Complaint   Patient presents with    Knee Pain   . SUBJECTIVE:    Jony Ocasio is a 80 y.o. male Patient returns today with a known history of type 2 diabetes with nephropathy, bronchiectasis, CLL, atherosclerosis, GERD, primary hypertension, dyslipidemia, CAD, and is seen for evaluation. Back around August 9th his daughter tells me that he was having discomfort in his thigh and back and she related it to he was out in the yard working with the neighbor. The following week they saw Dr. Radha Linda, his hematologist, on the 18th. The following week he went to [facility], x-rays were taken that were unremarkable, and by the way, when he saw the oncologist, she asked if it was a clot and they gave her reassurance that there was not a clot. But the vitamin D level was low and he was given a prescription for vitamin D 50,000 units q. weekly. When he started taking it, the pain resolved. He saw Dr. Silvio Lozoya on the first, was given Flomax, and he has been doing much better with Flomax. He gets up about three times at night now, 1:00, 3:00 and 6:00 in the morning. He goes to bed at 8:00. Patient notes that he has pain in his knees when he stands up too long. Daughter notes it is in the popliteal area bilaterally, but when he walks he notes calf discomfort. He goes up and down the steps without any discomfort. She put a heating pad on it last night and it seemed to help. Mirela Juarez was wondering if it could be diabetic neuropathy or circulatory. She went down a whole litany of concerns. Patient is seen for evaluation.            Current Outpatient Medications   Medication Sig Dispense Refill    ergocalciferol (ERGOCALCIFEROL) 1,250 mcg (50,000 unit) capsule       tamsulosin (FLOMAX) 0.4 mg capsule       metoprolol tartrate (LOPRESSOR) 25 mg tablet TAKE 1 TABLET TWICE A  Tablet 3    allopurinoL (ZYLOPRIM) 300 mg tablet TAKE ONE-HALF (1/2) TABLET DAILY 90 Tablet 3    glucose blood VI test strips (OneTouch Ultra Blue Test Strip) strip USE TO TEST BLOOD SUGAR TWICE A DAY. Dx.e11.9 200 Strip 3    simvastatin (ZOCOR) 40 mg tablet TAKE 1 TABLET DAILY IN THE EVENING 90 Tab 3    metFORMIN (GLUCOPHAGE) 500 mg tablet TAKE 1 TABLET DAILY 90 Tab 3    omeprazole (PRILOSEC) 20 mg capsule TAKE 1 CAPSULE DAILY 90 Cap 1    cyanocobalamin 1,000 mcg tablet Take 1,000 mcg by mouth daily.  aspirin 81 mg chewable tablet Take 81 mg by mouth daily.  brimonidine (ALPHAGAN) 0.15 % ophthalmic solution Administer 1 Drop to both eyes two (2) times a day.  Indications: OPEN ANGLE GLAUCOMA       Past Medical History:   Diagnosis Date    Abnormal nuclear stress test 6/6/2014    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 ([facility] 11/2/2015    Bereavement 02/06/2019    ltcf - uti -strangulated hernia - prostate ca - brother    Bronchiectasis ([facility]     CAD (coronary artery disease)     Chest pain, unspecified     Chronic pain     right leg    CLL (chronic lymphocytic leukemia) ([facility]     Jerica Segura md  [facility]    Diabetes ([facility]     Essential hypertension     GERD (gastroesophageal reflux disease)     Hyperlipidemia     Hypertension     Opacity of lung on imaging study 05/28/2017    cxr    Rotator cuff arthropathy     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    S/P coronary artery stent placement 6/6/2014 6/6/14 PCI/GUANAKO to prox LAD     Past Surgical History:   Procedure Laterality Date    HX COLONOSCOPY      HX HEART CATHETERIZATION      PTCA SINGLE VESSEL  4/4/2015         VASCULAR SURGERY PROCEDURE UNLIST  2014 and 2015    BLE stenting     Allergies   Allergen Reactions    Codeine Shortness of Breath    Lipitor [Atorvastatin] Nausea Only         REVIEW OF SYSTEMS:  General: negative for - chills or fever  ENT: negative for - headaches, nasal congestion or tinnitus  Respiratory: negative for - cough, hemoptysis, shortness of breath or wheezing  Cardiovascular : negative for - chest pain, edema, palpitations or shortness of breath  Gastrointestinal: negative for - abdominal pain, blood in stools, heartburn or nausea/vomiting  Genito-Urinary: no dysuria, trouble voiding, or hematuria  Musculoskeletal: negative for - gait disturbance, joint pain, joint stiffness or joint swelling  Neurological: no TIA or stroke symptoms  Hematologic: no bruises, no bleeding, no swollen glands  Integument: no lumps, mole changes, nail changes or rash  Endocrine: no malaise/lethargy or unexpected weight changes      Social History     Socioeconomic History    Marital status:      Spouse name: Not on file    Number of children: Not on file    Years of education: Not on file    Highest education level: Not on file   Tobacco Use    Smoking status: Former Smoker     Quit date: 1/15/1984     Years since quittin.7    Smokeless tobacco: Never Used    Tobacco comment: QUIT    Vaping Use    Vaping Use: Never used   Substance and Sexual Activity    Alcohol use: No     Alcohol/week: 0.0 standard drinks     Comment: quit in     Drug use: No     Types: Prescription, OTC    Sexual activity: Not Currently     Partners: Female   Other Topics Concern     Service No    Blood Transfusions No    Caffeine Concern No    Occupational Exposure No    Hobby Hazards No    Sleep Concern No    Stress Concern No    Weight Concern No    Special Diet Yes     Comment: low sugar    Back Care No    Exercise No    Bike Helmet No    Seat Belt Yes    Self-Exams No   Social History Narrative    Family History:  Mother:  61 yrs, DM complicationsFather:  80 yrs, strokeSister(s):  76 yrs, pneumonia, DM,    HTNBrother(s):  61 yrs, DM, HTN, CVAChildren: aliveGrandmother: unknow nGrandfather: deceasedSpouse: deceased1 brother(s) - healthy. 2daughter(s) - healthy. Social History: Alcohol Use Patient does not use alcohol. Smoking Status Patient is a former smoker, Quit in: 36. Caffeine: none. Marital    Status: W idow . Lives w ith: alone. Children: yes 2 d. Education/School: has highschool diploma, college 2 y. Social Determinants of Health     Financial Resource Strain:     Difficulty of Paying Living Expenses:    Food Insecurity:     Worried About Running Out of Food in the Last Year:     [address] in the Last Year:    Transportation Needs:     Lack of Transportation (Medical):  Lack of Transportation (Non-Medical):    Physical Activity:     Days of Exercise per Week:     Minutes of Exercise per Session:    Stress:     Feeling of Stress :    Social Connections:     Frequency of Communication with Friends and Family:     Frequency of Social Gatherings with Friends and Family:     Attends Zoroastrian Services:     Active Member of Clubs or Organizations:     Attends Club or Organization Meetings:     Marital Status:      Family History   Problem Relation Age of Onset    Diabetes Mother     Stroke Father        OBJECTIVE:    Visit Vitals  /61   Pulse 71   Temp 98.6 °F (37 °C) (Oral)   Resp 18   Ht 5' 7\" (1.702 m)   Wt 157 lb 9.6 oz (71.5 kg)   SpO2 94%   BMI 24.68 kg/m²     CONSTITUTIONAL: well , well nourished, appears age appropriate  EYES: perrla, eom intact  ENMT:moist mucous membranes, pharynx clear  NECK: supple. Thyroid normal  RESPIRATORY: Chest: clear bilaterally   CARDIOVASCULAR: Heart: regular rate and rhythm  GASTROINTESTINAL: Abdomen: soft, bowel sounds active  HEMATOLOGIC: no pathological lymph nodes palpated  MUSCULOSKELETAL: Extremities: no edema, pulse 1+   INTEGUMENT: No unusual rashes or suspicious skin lesions noted. Nails appear normal.  NEUROLOGIC: non-focal exam   MENTAL STATUS: alert and oriented, appropriate affect           ASSESSMENT:  1.  Type 2 diabetes with nephropathy (HCC) 2. Bronchiectasis without complication ([facility]    3. CLL (chronic lymphocytic leukemia) ([facility]    4. Atherosclerosis of native artery of both lower extremities with intermittent claudication ([facility]    5. Gastroesophageal reflux disease with esophagitis without hemorrhage    6. Essential hypertension, benign    7. Mixed hyperlipidemia    8. S/P coronary artery stent placement      Blood sugar control has been very good, no adjustments will be made. Hemoglobin A1c is usually less than 6.5. He has a known history of bronchiectasis and no symptoms related to the disease. CLL is followed by hematology/oncology and he was just seen. We will not check the blood count today. He has symptoms compatible with intermittent claudication of the lower extremities and absent pulses in his feet, although his feet are warm. This is not a new finding, however symptoms are new. He is on Flomax, which may be dropping his blood pressure a little lower than we would like to see it and may be a contributing factor. He is currently on aspirin. He is at fall risk so I am reluctant to add Plavix. Pletal may be a consideration and we will wait for the CIERRA to come back. He has a history of GERD without symptoms. He has a 10 mm drop in his BP when he stands and therefore we will stop Amlodipine. Daughter will monitor BP periodically at the house. Dyslipidemia is controlled with statin. No symptoms related to CAD. He will be back to see me in December as scheduled. I have discussed the diagnosis with the patient and the intended plan as seen in the  Orders. The patient understands and agees with the plan. The patient has   received an after visit summary and questions were answered concerning  future plans  Patient labs and/or xrays were reviewed  Past records were reviewed.     PLAN:  .  Orders Placed This Encounter    ergocalciferol (ERGOCALCIFEROL) 1,250 mcg (50,000 unit) capsule    tamsulosin (FLOMAX) 0.4 mg capsule       Follow-up and Dispositions    · Return in about 2 months (around 12/12/2021). ATTENTION:   This medical record was transcribed using an electronic medical records system. Although proofread, it may and can contain electronic and spelling errors. Other human spelling and other errors may be present. Corrections may be executed at a later time. Please feel free to contact us for any clarifications as needed.

## 2021-10-14 ENCOUNTER — HOSPITAL ENCOUNTER (OUTPATIENT)
Dept: VASCULAR SURGERY | Age: 86
Discharge: HOME | End: 2021-10-14
Attending: INTERNAL MEDICINE
Payer: MEDICARE

## 2021-10-14 DIAGNOSIS — I70.213 ATHEROSCLEROSIS OF NATIVE ARTERY OF BOTH LOWER EXTREMITIES WITH INTERMITTENT CLAUDICATION (HCC): ICD-10-CM

## 2021-10-14 PROCEDURE — 93922 UPR/L XTREMITY ART 2 LEVELS: CPT

## 2021-10-15 LAB
LEFT ABI: 0.8
LEFT ANTERIOR TIBIAL: 70 MMHG
LEFT ARM BP: 120 MMHG
LEFT POSTERIOR TIBIAL: 96 MMHG
RIGHT ARM BP: 117 MMHG

## 2021-10-15 NOTE — PROGRESS NOTES
The results are good news. It would suggest that the pain you are experiencing is not coming from large vessel blockages.   It may be small vessel disease or more likely neuropathy  Blessings

## 2021-10-20 ENCOUNTER — TELEPHONE (OUTPATIENT)
Dept: INTERNAL MEDICINE CLINIC | Age: 86
End: 2021-10-20

## 2021-10-20 NOTE — TELEPHONE ENCOUNTER
Patient daughter called stating that patient bp dropped and they d/c the flomax and now bp back up to 144/60.  Per dr. Heavenly Marroquin patient can restart the amlodipine 2.5mg daily

## 2021-12-08 ENCOUNTER — OFFICE VISIT (OUTPATIENT)
Dept: INTERNAL MEDICINE CLINIC | Age: 86
End: 2021-12-08
Payer: MEDICARE

## 2021-12-08 VITALS
TEMPERATURE: 97.7 F | OXYGEN SATURATION: 95 % | DIASTOLIC BLOOD PRESSURE: 58 MMHG | BODY MASS INDEX: 24.39 KG/M2 | WEIGHT: 155.4 LBS | SYSTOLIC BLOOD PRESSURE: 102 MMHG | HEART RATE: 62 BPM | HEIGHT: 67 IN | RESPIRATION RATE: 16 BRPM

## 2021-12-08 DIAGNOSIS — K21.00 GASTROESOPHAGEAL REFLUX DISEASE WITH ESOPHAGITIS WITHOUT HEMORRHAGE: ICD-10-CM

## 2021-12-08 DIAGNOSIS — R53.1 WEAKNESS: ICD-10-CM

## 2021-12-08 DIAGNOSIS — E78.2 MIXED HYPERLIPIDEMIA: ICD-10-CM

## 2021-12-08 DIAGNOSIS — E11.21 TYPE 2 DIABETES WITH NEPHROPATHY (HCC): ICD-10-CM

## 2021-12-08 DIAGNOSIS — I25.9 CHRONIC ISCHEMIC HEART DISEASE: ICD-10-CM

## 2021-12-08 DIAGNOSIS — Z13.31 SCREENING FOR DEPRESSION: ICD-10-CM

## 2021-12-08 DIAGNOSIS — I70.213 ATHEROSCLEROSIS OF NATIVE ARTERY OF BOTH LOWER EXTREMITIES WITH INTERMITTENT CLAUDICATION (HCC): ICD-10-CM

## 2021-12-08 DIAGNOSIS — C91.10 CLL (CHRONIC LYMPHOCYTIC LEUKEMIA) (HCC): ICD-10-CM

## 2021-12-08 DIAGNOSIS — Z00.00 MEDICARE ANNUAL WELLNESS VISIT, SUBSEQUENT: Primary | ICD-10-CM

## 2021-12-08 PROCEDURE — 99213 OFFICE O/P EST LOW 20 MIN: CPT | Performed by: INTERNAL MEDICINE

## 2021-12-08 PROCEDURE — 1101F PT FALLS ASSESS-DOCD LE1/YR: CPT | Performed by: INTERNAL MEDICINE

## 2021-12-08 PROCEDURE — G0439 PPPS, SUBSEQ VISIT: HCPCS | Performed by: INTERNAL MEDICINE

## 2021-12-08 PROCEDURE — G8432 DEP SCR NOT DOC, RNG: HCPCS | Performed by: INTERNAL MEDICINE

## 2021-12-08 PROCEDURE — G8420 CALC BMI NORM PARAMETERS: HCPCS | Performed by: INTERNAL MEDICINE

## 2021-12-08 PROCEDURE — G8536 NO DOC ELDER MAL SCRN: HCPCS | Performed by: INTERNAL MEDICINE

## 2021-12-08 PROCEDURE — G8427 DOCREV CUR MEDS BY ELIG CLIN: HCPCS | Performed by: INTERNAL MEDICINE

## 2021-12-08 RX ORDER — AMLODIPINE BESYLATE 2.5 MG/1
2.5 TABLET ORAL DAILY
Qty: 90 TABLET | Refills: 3
Start: 2021-12-08 | End: 2021-12-22 | Stop reason: SDUPTHER

## 2021-12-08 NOTE — PATIENT INSTRUCTIONS
Medicare Wellness Visit, Male    The best way to live healthy is to have a lifestyle where you eat a well-balanced diet, exercise regularly, limit alcohol use, and quit all forms of tobacco/nicotine, if applicable. Regular preventive services are another way to keep healthy. Preventive services (vaccines, screening tests, monitoring & exams) can help personalize your care plan, which helps you manage your own care. Screening tests can find health problems at the earliest stages, when they are easiest to treat. [facility] follows the current, evidence-based guidelines published by the [facility] ([facility]) when recommending preventive services for our patients. Because we follow these guidelines, sometimes recommendations change over time as research supports it. (For example, a prostate screening blood test is no longer routinely recommended for men with no symptoms). Of course, you and your doctor may decide to screen more often for some diseases, based on your risk and co-morbidities (chronic disease you are already diagnosed with). Preventive services for you include:  - Medicare offers their members a free annual wellness visit, which is time for you and your primary care provider to discuss and plan for your preventive service needs. Take advantage of this benefit every year!  -All adults over age 72 should receive the recommended pneumonia vaccines. Current USPSTF guidelines recommend a series of two vaccines for the best pneumonia protection.   -All adults should have a flu vaccine yearly and tetanus vaccine every 10 years.  -All adults age 48 and older should receive the shingles vaccines (series of two vaccines).        -All adults age 38-68 who are overweight should have a diabetes screening test once every three years.   -Other screening tests & preventive services for persons with diabetes include: an eye exam to screen for diabetic retinopathy, a kidney function test, a foot exam, and stricter control over your cholesterol.   -Cardiovascular screening for adults with routine risk involves an electrocardiogram (ECG) at intervals determined by the provider.   -Colorectal cancer screening should be done for adults age 54-65 with no increased risk factors for colorectal cancer. There are a number of acceptable methods of screening for this type of cancer. Each test has its own benefits and drawbacks. Discuss with your provider what is most appropriate for you during your annual wellness visit. The different tests include: colonoscopy (considered the best screening method), a fecal occult blood test, a fecal DNA test, and sigmoidoscopy.  -All adults born between [facility] should be screened once for Hepatitis C.  -An Abdominal Aortic Aneurysm (AAA) Screening is recommended for men age 73-68 who has ever smoked in their lifetime.      Here is a list of your current Health Maintenance items (your personalized list of preventive services) with a due date:  Health Maintenance Due   Topic Date Due    Shingles Vaccine (1 of 2) Never done    Albumin Urine Test  02/04/2021    Cholesterol Test   11/24/2021

## 2021-12-08 NOTE — PROGRESS NOTES
1. Have you been to the ER, urgent care clinic since your last visit? Hospitalized since your last visit? No    2. Have you seen or consulted any other health care providers outside of the [address] [place] [address] since your last visit? Include any pap smears or colon screening. No     Wants to discuss flomax  This is the Subsequent Medicare Annual Wellness Exam, performed 12 months or more after the Initial AWV or the last Subsequent AWV    I have reviewed the patient's medical history in detail and updated the computerized patient record. Assessment/Plan   Education and counseling provided:  Are appropriate based on today's review and evaluation         Depression Risk Factor Screening     3 most recent PHQ Screens 12/8/2021   PHQ Not Done -   Little interest or pleasure in doing things Not at all   Feeling down, depressed, irritable, or hopeless Not at all   Total Score PHQ 2 0       Alcohol Risk Screen    Do you average more than 1 drink per night or more than 7 drinks a week: No    In the past three months have you have had more than 4 drinks containing alcohol on one occasion: No        Functional Ability and Level of Safety    Hearing: Hearing is good. Activities of Daily Living: The home contains: handrails and grab bars  Patient does total self care      Ambulation: with no difficulty     Fall Risk:  Fall Risk Assessment, last 12 mths 12/8/2021   Able to walk? Yes   Fall in past 12 months? 0   Do you feel unsteady? 0   Are you worried about falling 0   Number of falls in past 12 months -   Fall with injury?  -      Abuse Screen:  Patient is not abused       Cognitive Screening    Has your family/caregiver stated any concerns about your memory: no     Cognitive Screening: not necessary    Health Maintenance Due     Health Maintenance Due   Topic Date Due    Shingrix Vaccine Age 49> (1 of 2) Never done    MICROALBUMIN Q1  02/04/2021    Medicare Yearly Exam  11/24/2021    Lipid Screen 11/24/2021       Patient Care Team   Patient Care Team:  Janett Murray MD as PCP - General (Internal Medicine)  Janett Murray MD as PCP - [address] [place] [address] Provider  Don Diallo MD as Physician (Cardiology)  Philip Davila NP as Nurse Practitioner (Nurse Practitioner)  Ashlee Matias MD as Surgeon (Cardiothoracic Surgery)  Filemon Esposito MD (Hematology and Oncology)    History     Patient Active Problem List   Diagnosis Code    Mixed hyperlipidemia E78.2    Peripheral vascular disease ([address] I73.9    Chronic ischemic heart disease I25.9    Coronary atherosclerosis of native coronary artery I25.10    Atherosclerosis of native artery of extremity with intermittent claudication ([address] I70.219    Essential hypertension, benign I10    S/P coronary artery stent placement Z95.5    GERD (gastroesophageal reflux disease) K21.9    Leukocytosis D72.829    S/P angioplasty with stent--4/15 Z95.820    CLL (chronic lymphocytic leukemia) ([address] C91.10    Rotator cuff arthropathy M12.819    S/P CABG x 3 Z95.1    Opacity of lung on imaging study R91.8    Bronchiectasis ([address] J47.9    Type 2 diabetes with nephropathy ([address] E11.21     Past Medical History:   Diagnosis Date    Abnormal nuclear stress test 6/6/2014    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 ([address] 11/2/2015    Bereavement 02/06/2019    ltcf - uti -strangulated hernia - prostate ca - brother    Bronchiectasis ([address]     CAD (coronary artery disease)     Chest pain, unspecified     Chronic pain     right leg    CLL (chronic lymphocytic leukemia) ([address]     Jerica Segura md  [facility]    Diabetes ([address]     Essential hypertension     GERD (gastroesophageal reflux disease)     Hyperlipidemia     Hypertension     Opacity of lung on imaging study 05/28/2017    cxr    Rotator cuff arthropathy     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    S/P coronary artery stent placement 6/6/2014 6/6/14 PCI/GUANAKO to prox LAD      Past Surgical History:   Procedure Laterality Date    HX COLONOSCOPY      HX HEART CATHETERIZATION      PTCA SINGLE VESSEL  2015         VASCULAR SURGERY PROCEDURE UNLIST   and     BLE stenting     Current Outpatient Medications   Medication Sig Dispense Refill    ergocalciferol (ERGOCALCIFEROL) 1,250 mcg (50,000 unit) capsule       metoprolol tartrate (LOPRESSOR) 25 mg tablet TAKE 1 TABLET TWICE A  Tablet 3    allopurinoL (ZYLOPRIM) 300 mg tablet TAKE ONE-HALF (1/2) TABLET DAILY 90 Tablet 3    glucose blood VI test strips (OneTouch Ultra Blue Test Strip) strip USE TO TEST BLOOD SUGAR TWICE A DAY. Dx.e11.9 200 Strip 3    simvastatin (ZOCOR) 40 mg tablet TAKE 1 TABLET DAILY IN THE EVENING 90 Tab 3    metFORMIN (GLUCOPHAGE) 500 mg tablet TAKE 1 TABLET DAILY 90 Tab 3    omeprazole (PRILOSEC) 20 mg capsule TAKE 1 CAPSULE DAILY 90 Cap 1    cyanocobalamin 1,000 mcg tablet Take 1,000 mcg by mouth daily.  aspirin 81 mg chewable tablet Take 81 mg by mouth daily.  brimonidine (ALPHAGAN) 0.15 % ophthalmic solution Administer 1 Drop to both eyes two (2) times a day.  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

## 2021-12-08 NOTE — PROGRESS NOTES
[facility]  Anastacio Law MD, [address] [address]  Phone:  [phone]  Fax: [phone]      Chief Complaint   Patient presents with    Annual Wellness Visit         SUBECTIVE:    Siria Luevano is a 80 y.o. male Patient returns today with a known history of diabetes mellitus type 2, chronic lymphocytic leukemia, primary hypertension, gout, coronary artery disease and comes in with his daughter, Chris Harrison, for evaluation. Patient returns today and since we last saw him the Tamsulosin has been discontinued due to postural changes and he immediately began to feel better, even his legs felt better. His daughter has been checking his blood pressure periodically, both sitting and standing, and all of them appear to be within a reasonable range. He is on now Amlodipine. Current Outpatient Medications   Medication Sig Dispense Refill    amLODIPine (NORVASC) 2.5 mg tablet Take 1 Tablet by mouth daily. 90 Tablet 3    ergocalciferol (ERGOCALCIFEROL) 1,250 mcg (50,000 unit) capsule       metoprolol tartrate (LOPRESSOR) 25 mg tablet TAKE 1 TABLET TWICE A  Tablet 3    allopurinoL (ZYLOPRIM) 300 mg tablet TAKE ONE-HALF (1/2) TABLET DAILY 90 Tablet 3    glucose blood VI test strips (OneTouch Ultra Blue Test Strip) strip USE TO TEST BLOOD SUGAR TWICE A DAY. Dx.e11.9 200 Strip 3    simvastatin (ZOCOR) 40 mg tablet TAKE 1 TABLET DAILY IN THE EVENING 90 Tab 3    metFORMIN (GLUCOPHAGE) 500 mg tablet TAKE 1 TABLET DAILY 90 Tab 3    omeprazole (PRILOSEC) 20 mg capsule TAKE 1 CAPSULE DAILY 90 Cap 1    cyanocobalamin 1,000 mcg tablet Take 1,000 mcg by mouth daily.  aspirin 81 mg chewable tablet Take 81 mg by mouth daily.  brimonidine (ALPHAGAN) 0.15 % ophthalmic solution Administer 1 Drop to both eyes two (2) times a day.  Indications: OPEN ANGLE GLAUCOMA       Past Medical History:   Diagnosis Date    Abnormal nuclear stress test 6/6/2014    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 ([facility] 2015    Bereavement 2019    ltcf - uti -strangulated hernia - prostate ca - brother    Bronchiectasis ([facility]     CAD (coronary artery disease)     Chest pain, unspecified     Chronic pain     right leg    CLL (chronic lymphocytic leukemia) ([facility]     Jerica Segura md  [facility]    Diabetes ([facility]     Essential hypertension     GERD (gastroesophageal reflux disease)     Hyperlipidemia     Hypertension     Opacity of lung on imaging study 2017    cxr    Rotator cuff arthropathy     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    S/P coronary artery stent placement 2014 PCI/GUANAKO to prox LAD         Past Surgical History:   Procedure Laterality Date    HX COLONOSCOPY      HX HEART CATHETERIZATION      PTCA SINGLE VESSEL  2015         VASCULAR SURGERY PROCEDURE UNLIST   and     BLE stenting     Allergies   Allergen Reactions    Codeine Shortness of Breath    Lipitor [Atorvastatin] Nausea Only       REVIEW OF SYSTEMS:   No chest pain, no shortness of breath. He does not feel tired.         Social History     Socioeconomic History    Marital status:    Tobacco Use    Smoking status: Former Smoker     Quit date: 1/15/1984     Years since quittin.9    Smokeless tobacco: Never Used    Tobacco comment: QUIT    Vaping Use    Vaping Use: Never used   Substance and Sexual Activity    Alcohol use: No     Alcohol/week: 0.0 standard drinks     Comment: quit in     Drug use: No     Types: Prescription, OTC    Sexual activity: Not Currently     Partners: Female   Other Topics Concern     Service No    Blood Transfusions No    Caffeine Concern No    Occupational Exposure No    Hobby Hazards No    Sleep Concern No    Stress Concern No    Weight Concern No    Special Diet Yes     Comment: low sugar    Back Care No    Exercise No    Bike Helmet No    Seat Belt Yes    Self-Exams No Social History Narrative    Family History: Mother:  61 yrs, DM complicationsFather:  80 yrs, strokeSister(s):  76 yrs, pneumonia, DM,    HTNBrother(s):  61 yrs, DM, HTN, CVAChildren: aliveGrandmother: unknow nGrandfather: deceasedSpouse: deceased1 brother(s) -    healthy. 2daughter(s) - healthy. Social History: Alcohol Use Patient does not use alcohol. Smoking Status Patient is a former smoker, Quit in: 36. Caffeine: none. Marital    Status: W idow . Lives w ith: alone. Children: yes 2 d. Education/School: has highschool diploma, college 2 y.   r  Family History   Problem Relation Age of Onset    Diabetes Mother     Stroke Father        OBJECTIVE:  Visit Vitals  BP (!) 102/58   Pulse 62   Temp 97.7 °F (36.5 °C) (Oral)   Resp 16   Ht 5' 7\" (1.702 m)   Wt 155 lb 6.4 oz (70.5 kg)   SpO2 95%   BMI 24.34 kg/m²     ENT: perrla,  eom intact  NECK: supple. Thyroid normal  CHEST: clear to ascultation and percussion   HEART: regular rate and rhythm  ABD: soft, bowel sounds active  EXTREMITIES: no edema, pulse 1+     Hospital Outpatient Visit on 10/14/2021   Component Date Value Ref Range Status    Left anterior tibial 10/14/2021 70  mmHg Final    Left posterior tibial 10/14/2021 96  mmHg Final    Left arm BP 10/14/2021 120  mmHg Final    Right arm BP 10/14/2021 117  mmHg Final    Left CIERRA 10/14/2021 0.80   Final          ASSESSMENT:  1. Medicare annual wellness visit, subsequent    2. Screening for depression    3. Type 2 diabetes with nephropathy ([address]    4. CLL (chronic lymphocytic leukemia) ([address]    5. Atherosclerosis of native artery of both lower extremities with intermittent claudication ([address]    6. Gastroesophageal reflux disease with esophagitis without hemorrhage    7. Mixed hyperlipidemia    8. Chronic ischemic heart disease    9. Weakness       Blood sugar control has been excellent. No changes or made.       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  Consideration of medication is being made now. The intermittent claudication is certainly improved with stopping the Flomax, which is telling, and she probably had postural changes for some time. No symptoms related to GERD. Dyslipidemia is controlled. Chronic ischemic heart disease with no chest pain or shortness of breath. He will be back to see me in three months. Appropriate lab studies have been requested. I have discussed the diagnosis with the patient and the intended plan as seen in the  orders above. The patient understands and agees with the plan. The patient has   received an after visit summary and questions were answered concerning  future plans  Patient labs and/or xrays were reviewed  Past records were reviewed. PLAN:  .  Orders Placed This Encounter   [address] 8-15 MIN    CBC WITH AUTOMATED DIFF    RENAL FUNCTION PANEL    HEMOGLOBIN A1C WITH EAG    MICROALBUMIN, UR, RAND W/ MICROALB/CREAT RATIO    HEPATITIS PANEL, ACUTE    LIPID PANEL    amLODIPine (NORVASC) 2.5 mg tablet       Follow-up and Dispositions    · Return in about 3 months (around 3/8/2022). ATTENTION:   This medical record was transcribed using an electronic medical records system. Although proofread, it may and can contain electronic and spelling errors. Other human spelling and other errors may be present. Corrections may be executed at a later time. Please feel free to contact us for any clarifications as needed.

## 2021-12-09 LAB
ALBUMIN SERPL-MCNC: 4 G/DL (ref 3.5–5)
ANION GAP SERPL CALC-SCNC: 6 MMOL/L (ref 5–15)
BASOPHILS # BLD: 0 K/UL (ref 0–0.1)
BASOPHILS NFR BLD: 0 % (ref 0–1)
BUN SERPL-MCNC: 16 MG/DL (ref 6–20)
BUN/CREAT SERPL: 12 (ref 12–20)
CALCIUM SERPL-MCNC: 9.5 MG/DL (ref 8.5–10.1)
CHLORIDE SERPL-SCNC: 104 MMOL/L (ref 97–108)
CHOLEST SERPL-MCNC: 109 MG/DL
CO2 SERPL-SCNC: 28 MMOL/L (ref 21–32)
CREAT SERPL-MCNC: 1.31 MG/DL (ref 0.7–1.3)
CREAT UR-MCNC: 248 MG/DL
DIFFERENTIAL METHOD BLD: ABNORMAL
EOSINOPHIL # BLD: 0 K/UL (ref 0–0.4)
EOSINOPHIL NFR BLD: 0 % (ref 0–7)
ERYTHROCYTE [DISTWIDTH] IN BLOOD BY AUTOMATED COUNT: 17.6 % (ref 11.5–14.5)
EST. AVERAGE GLUCOSE BLD GHB EST-MCNC: 128 MG/DL
GLUCOSE SERPL-MCNC: 106 MG/DL (ref 65–100)
HAV IGM SER QL: NONREACTIVE
HBA1C MFR BLD: 6.1 % (ref 4–5.6)
HBV CORE IGM SER QL: NONREACTIVE
HBV SURFACE AG SER QL: <0.1 INDEX
HBV SURFACE AG SER QL: NEGATIVE
HCT VFR BLD AUTO: 36.3 % (ref 36.6–50.3)
HCV AB SERPL QL IA: NONREACTIVE
HDLC SERPL-MCNC: 24 MG/DL
HDLC SERPL: 4.5 {RATIO} (ref 0–5)
HGB BLD-MCNC: 11.1 G/DL (ref 12.1–17)
IMM GRANULOCYTES # BLD AUTO: 0 K/UL
IMM GRANULOCYTES NFR BLD AUTO: 0 %
LDLC SERPL CALC-MCNC: 63.6 MG/DL (ref 0–100)
LYMPHOCYTES # BLD: 142.8 K/UL (ref 0.8–3.5)
LYMPHOCYTES NFR BLD: 85 % (ref 12–49)
MCH RBC QN AUTO: 34.5 PG (ref 26–34)
MCHC RBC AUTO-ENTMCNC: 30.6 G/DL (ref 30–36.5)
MCV RBC AUTO: 112.7 FL (ref 80–99)
MICROALBUMIN UR-MCNC: 2.94 MG/DL
MICROALBUMIN/CREAT UR-RTO: 12 MG/G (ref 0–30)
MONOCYTES # BLD: 20.1 K/UL (ref 0–1)
MONOCYTES NFR BLD: 12 % (ref 5–13)
NEUTS SEG # BLD: 5 K/UL (ref 1.8–8)
NEUTS SEG NFR BLD: 3 % (ref 32–75)
NRBC # BLD: 0.05 K/UL (ref 0–0.01)
NRBC BLD-RTO: 0 PER 100 WBC
PHOSPHATE SERPL-MCNC: 4.8 MG/DL (ref 2.6–4.7)
PLATELET # BLD AUTO: 163 K/UL (ref 150–400)
PMV BLD AUTO: 10.3 FL (ref 8.9–12.9)
POTASSIUM SERPL-SCNC: 4.6 MMOL/L (ref 3.5–5.1)
RBC # BLD AUTO: 3.22 M/UL (ref 4.1–5.7)
RBC MORPH BLD: ABNORMAL
RBC MORPH BLD: ABNORMAL
SODIUM SERPL-SCNC: 138 MMOL/L (ref 136–145)
SP1: NORMAL
SP2: NORMAL
SP3: NORMAL
TRIGL SERPL-MCNC: 107 MG/DL (ref ?–150)
VLDLC SERPL CALC-MCNC: 21.4 MG/DL
WBC # BLD AUTO: 167.9 K/UL (ref 4.1–11.1)
WBC MORPH BLD: ABNORMAL

## 2021-12-22 ENCOUNTER — TELEPHONE (OUTPATIENT)
Dept: INTERNAL MEDICINE CLINIC | Age: 86
End: 2021-12-22

## 2021-12-22 RX ORDER — AMLODIPINE BESYLATE 2.5 MG/1
2.5 TABLET ORAL DAILY
Qty: 90 TABLET | Refills: 3 | Status: SHIPPED | OUTPATIENT
Start: 2021-12-22 | End: 2022-06-23

## 2021-12-22 NOTE — TELEPHONE ENCOUNTER
----- Message from Davidjacqueline Zendejas sent at 12/22/2021 12:49 PM EST -----  Subject: Refill Request    QUESTIONS  Name of Medication? amLODIPine (NORVASC) 2.5 mg tablet  Patient-reported dosage and instructions? 2.5mg 1 ah day   How many days do you have left? 14  Preferred Pharmacy? [address]  Pharmacy phone number (if available)?   ---------------------------------------------------------------------------  --------------  CALL BACK INFO  What is the best way for the office to contact you? OK to leave message on   voicemail  Preferred Call Back Phone Number?  [phone]

## 2022-01-07 ENCOUNTER — TRANSCRIBE ORDER (OUTPATIENT)
Dept: SCHEDULING | Age: 87
End: 2022-01-07

## 2022-01-07 DIAGNOSIS — C91.11 CHRONIC LYMPHOCYTIC LEUKEMIA OF B-CELL TYPE IN REMISSION (HCC): Primary | ICD-10-CM

## 2022-01-19 ENCOUNTER — HOSPITAL ENCOUNTER (OUTPATIENT)
Dept: CT IMAGING | Age: 87
Discharge: HOME | End: 2022-01-19
Attending: INTERNAL MEDICINE
Payer: MEDICARE

## 2022-01-19 DIAGNOSIS — C91.11 CHRONIC LYMPHOCYTIC LEUKEMIA OF B-CELL TYPE IN REMISSION (HCC): ICD-10-CM

## 2022-01-19 PROCEDURE — 74177 CT ABD & PELVIS W/CONTRAST: CPT

## 2022-01-19 PROCEDURE — 71260 CT THORAX DX C+: CPT

## 2022-01-19 PROCEDURE — 74011000636 HC RX REV CODE- 636: Performed by: INTERNAL MEDICINE

## 2022-01-19 PROCEDURE — 74011000250 HC RX REV CODE- 250: Performed by: INTERNAL MEDICINE

## 2022-01-19 RX ORDER — BARIUM SULFATE 20 MG/ML
900 SUSPENSION ORAL
Status: COMPLETED | OUTPATIENT
Start: 2022-01-19 | End: 2022-01-19

## 2022-01-19 RX ADMIN — BARIUM SULFATE 450 ML: 21 SUSPENSION ORAL at 09:39

## 2022-01-19 RX ADMIN — IOPAMIDOL 100 ML: 755 INJECTION, SOLUTION INTRAVENOUS at 09:39

## 2022-02-02 RX ORDER — BLOOD SUGAR DIAGNOSTIC
STRIP MISCELLANEOUS
Qty: 100 STRIP | Refills: 7 | Status: SHIPPED | OUTPATIENT
Start: 2022-02-02 | End: 2022-02-07 | Stop reason: SDUPTHER

## 2022-02-07 ENCOUNTER — TELEPHONE (OUTPATIENT)
Dept: INTERNAL MEDICINE CLINIC | Age: 87
End: 2022-02-07

## 2022-02-07 RX ORDER — BLOOD SUGAR DIAGNOSTIC
STRIP MISCELLANEOUS
Qty: 100 STRIP | Refills: 11 | Status: SHIPPED | OUTPATIENT
Start: 2022-02-07 | End: 2022-07-21 | Stop reason: SDUPTHER

## 2022-02-07 NOTE — TELEPHONE ENCOUNTER
----- Message from Phuong Bedoya sent at 2/7/2022 12:42 PM EST -----  Subject: Message to Provider    QUESTIONS  Information for Provider? Patient went to go get his test strips but when   he got there they didn't give it i him. He was told that they need a call   from Dr. Jose Ponce. please advise  ---------------------------------------------------------------------------  --------------  CALL BACK INFO  What is the best way for the office to contact you? OK to leave message on   voicemail  Preferred Call Back Phone Number? [phone]  ---------------------------------------------------------------------------  --------------  SCRIPT ANSWERS  Relationship to Patient?  Self

## 2022-02-10 RX ORDER — METFORMIN HYDROCHLORIDE 500 MG/1
TABLET ORAL
Qty: 90 TABLET | Refills: 3 | Status: SHIPPED | OUTPATIENT
Start: 2022-02-10

## 2022-02-25 ENCOUNTER — HOSPITAL ENCOUNTER (EMERGENCY)
Age: 87
Discharge: HOME | End: 2022-02-26
Attending: EMERGENCY MEDICINE
Payer: MEDICARE

## 2022-02-25 ENCOUNTER — APPOINTMENT (OUTPATIENT)
Dept: CT IMAGING | Age: 87
End: 2022-02-25
Attending: EMERGENCY MEDICINE
Payer: MEDICARE

## 2022-02-25 DIAGNOSIS — R16.1 SPLENOMEGALY: Primary | ICD-10-CM

## 2022-02-25 DIAGNOSIS — R10.12 ABDOMINAL PAIN, LUQ (LEFT UPPER QUADRANT): ICD-10-CM

## 2022-02-25 LAB
ALBUMIN SERPL-MCNC: 3.8 G/DL (ref 3.5–5)
ALBUMIN/GLOB SERPL: 1.2 {RATIO} (ref 1.1–2.2)
ALP SERPL-CCNC: 87 U/L (ref 45–117)
ALT SERPL-CCNC: 27 U/L (ref 12–78)
ANION GAP SERPL CALC-SCNC: 4 MMOL/L (ref 5–15)
AST SERPL-CCNC: 36 U/L (ref 15–37)
BILIRUB SERPL-MCNC: 1 MG/DL (ref 0.2–1)
BUN SERPL-MCNC: 13 MG/DL (ref 6–20)
BUN/CREAT SERPL: 11 (ref 12–20)
CALCIUM SERPL-MCNC: 8.9 MG/DL (ref 8.5–10.1)
CHLORIDE SERPL-SCNC: 104 MMOL/L (ref 97–108)
CO2 SERPL-SCNC: 27 MMOL/L (ref 21–32)
CREAT SERPL-MCNC: 1.17 MG/DL (ref 0.7–1.3)
GLOBULIN SER CALC-MCNC: 3.2 G/DL (ref 2–4)
GLUCOSE SERPL-MCNC: 102 MG/DL (ref 65–100)
LIPASE SERPL-CCNC: 87 U/L (ref 73–393)
POTASSIUM SERPL-SCNC: 4.7 MMOL/L (ref 3.5–5.1)
PROT SERPL-MCNC: 7 G/DL (ref 6.4–8.2)
SODIUM SERPL-SCNC: 135 MMOL/L (ref 136–145)

## 2022-02-25 PROCEDURE — 36415 COLL VENOUS BLD VENIPUNCTURE: CPT

## 2022-02-25 PROCEDURE — 83690 ASSAY OF LIPASE: CPT

## 2022-02-25 PROCEDURE — 99285 EMERGENCY DEPT VISIT HI MDM: CPT

## 2022-02-25 PROCEDURE — 85025 COMPLETE CBC W/AUTO DIFF WBC: CPT

## 2022-02-25 PROCEDURE — 80053 COMPREHEN METABOLIC PANEL: CPT

## 2022-02-25 PROCEDURE — 93005 ELECTROCARDIOGRAM TRACING: CPT

## 2022-02-25 RX ORDER — DICYCLOMINE HYDROCHLORIDE 20 MG/1
20 TABLET ORAL
Status: COMPLETED | OUTPATIENT
Start: 2022-02-25 | End: 2022-02-26

## 2022-02-26 ENCOUNTER — APPOINTMENT (OUTPATIENT)
Dept: CT IMAGING | Age: 87
End: 2022-02-26
Attending: EMERGENCY MEDICINE
Payer: MEDICARE

## 2022-02-26 VITALS
DIASTOLIC BLOOD PRESSURE: 54 MMHG | TEMPERATURE: 97.7 F | SYSTOLIC BLOOD PRESSURE: 151 MMHG | OXYGEN SATURATION: 100 % | HEIGHT: 67 IN | RESPIRATION RATE: 18 BRPM | WEIGHT: 157.85 LBS | HEART RATE: 63 BPM | BODY MASS INDEX: 24.78 KG/M2

## 2022-02-26 LAB
APPEARANCE UR: CLEAR
ATRIAL RATE: 57 BPM
BACTERIA URNS QL MICRO: NEGATIVE /HPF
BASOPHILS # BLD: 0 K/UL (ref 0–0.1)
BASOPHILS NFR BLD: 0 % (ref 0–1)
BILIRUB UR QL: NEGATIVE
CALCULATED P AXIS, ECG09: 119 DEGREES
CALCULATED R AXIS, ECG10: -14 DEGREES
CALCULATED T AXIS, ECG11: 0 DEGREES
COLOR UR: NORMAL
DIAGNOSIS, 93000: NORMAL
DIFFERENTIAL METHOD BLD: ABNORMAL
EOSINOPHIL # BLD: 0 K/UL (ref 0–0.4)
EOSINOPHIL NFR BLD: 0 % (ref 0–7)
EPITH CASTS URNS QL MICRO: NORMAL /LPF
ERYTHROCYTE [DISTWIDTH] IN BLOOD BY AUTOMATED COUNT: 17.3 % (ref 11.5–14.5)
GLUCOSE UR STRIP.AUTO-MCNC: NEGATIVE MG/DL
HCT VFR BLD AUTO: 31 % (ref 36.6–50.3)
HGB BLD-MCNC: 9.8 G/DL (ref 12.1–17)
HGB UR QL STRIP: NEGATIVE
HYALINE CASTS URNS QL MICRO: NORMAL /LPF (ref 0–5)
IMM GRANULOCYTES # BLD AUTO: 0 K/UL (ref 0–0.04)
IMM GRANULOCYTES NFR BLD AUTO: 0 % (ref 0–0.5)
KETONES UR QL STRIP.AUTO: NEGATIVE MG/DL
LEUKOCYTE ESTERASE UR QL STRIP.AUTO: NEGATIVE
LYMPHOCYTES # BLD: 101.3 K/UL (ref 0.8–3.5)
LYMPHOCYTES NFR BLD: 52 % (ref 12–49)
MCH RBC QN AUTO: 34.6 PG (ref 26–34)
MCHC RBC AUTO-ENTMCNC: 31.6 G/DL (ref 30–36.5)
MCV RBC AUTO: 109.5 FL (ref 80–99)
MONOCYTES # BLD: 93.6 K/UL (ref 0–1)
MONOCYTES NFR BLD: 48 % (ref 5–13)
NEUTS SEG # BLD: 0 K/UL (ref 1.8–8)
NEUTS SEG NFR BLD: 0 % (ref 32–75)
NITRITE UR QL STRIP.AUTO: NEGATIVE
NRBC # BLD: 0 K/UL (ref 0–0.01)
NRBC BLD-RTO: 0 PER 100 WBC
P-R INTERVAL, ECG05: 168 MS
PH UR STRIP: 7 [PH] (ref 5–8)
PLATELET # BLD AUTO: 149 K/UL (ref 150–400)
PMV BLD AUTO: 10.9 FL (ref 8.9–12.9)
PROT UR STRIP-MCNC: NEGATIVE MG/DL
Q-T INTERVAL, ECG07: 436 MS
QRS DURATION, ECG06: 88 MS
QTC CALCULATION (BEZET), ECG08: 424 MS
RBC # BLD AUTO: 2.83 M/UL (ref 4.1–5.7)
RBC #/AREA URNS HPF: NORMAL /HPF (ref 0–5)
RBC MORPH BLD: ABNORMAL
RBC MORPH BLD: ABNORMAL
SP GR UR REFRACTOMETRY: 1.03 (ref 1–1.03)
UA: UC IF INDICATED,UAUC: NORMAL
UROBILINOGEN UR QL STRIP.AUTO: 0.2 EU/DL (ref 0.2–1)
VENTRICULAR RATE, ECG03: 57 BPM
WBC # BLD AUTO: 194.9 K/UL (ref 4.1–11.1)
WBC URNS QL MICRO: NORMAL /HPF (ref 0–4)

## 2022-02-26 PROCEDURE — 74011250637 HC RX REV CODE- 250/637: Performed by: EMERGENCY MEDICINE

## 2022-02-26 PROCEDURE — 74177 CT ABD & PELVIS W/CONTRAST: CPT

## 2022-02-26 PROCEDURE — 74011000636 HC RX REV CODE- 636: Performed by: EMERGENCY MEDICINE

## 2022-02-26 PROCEDURE — 81001 URINALYSIS AUTO W/SCOPE: CPT

## 2022-02-26 RX ADMIN — IOPAMIDOL 100 ML: 755 INJECTION, SOLUTION INTRAVENOUS at 00:25

## 2022-02-26 RX ADMIN — DICYCLOMINE HYDROCHLORIDE 20 MG: 20 TABLET ORAL at 00:14

## 2022-02-26 NOTE — DISCHARGE INSTRUCTIONS
You were evaluated for abdominal pain. Your labs looked pristine, with the exception of your elevated white blood cell count which is related to your chronic leukemia. Your liver and kidney function testing looked great. A CT scan showed an enlarged spleen (which has increased in size since your last scan in January). Please take ibuprofen/acetaminophen for pain control and follow up with your hematologist/oncologist as scheduled. Please return to the ED if you experience high fevers/chills, worsening abdominal pain, nausea/vomiting, or any other symptoms requiring emergency care.

## 2022-02-26 NOTE — ED PROVIDER NOTES
EMERGENCY DEPARTMENT HISTORY AND PHYSICAL EXAM          Date: 2/25/2022  Patient Name: Margi Sterling  Attending of Record: Erika Garza    History of Presenting Illness     Chief Complaint   Patient presents with    Abdominal Pain     Patient arrives with complaint of LUQ abdominal pain starting after breakfast.        History Provided By: Patient and Patient's Daughter    HPI: Margi Sterling is a 80 y.o. male with PmHx significant for CLL (followed at [facility]), HTN, DMII, HLD, peripheral vascular disease s/p lower extremity stenting, CAD s/p CABG who presents to the ED with chief complaint of left-sided abdominal pain x 1 day w/o any associated symptoms beyond one day of constipation (the patient is still passing gas). The patient denies chest pain, shortness of breath. He denies fevers/chills/systemic infectious symptoms. He reports that he has not had any nausea/vomiting, diarrhea, hematochezia/melena. The patient reports that he had some decreased PO intake today after the pain intensified overnight. He denies any other acute symptoms at this time. PCP: Petrona Bullock MD    There are no other complaints, changes, or physical findings at this time. Current Facility-Administered Medications   Medication Dose Route Frequency Provider Last Rate Last Admin    iopamidoL (ISOVUE-370) 76 % injection 100 mL  100 mL IntraVENous RAD ONCE Isabel Manning MD         Current Outpatient Medications   Medication Sig Dispense Refill    metFORMIN (GLUCOPHAGE) 500 mg tablet TAKE 1 TABLET DAILY 90 Tablet 3    glucose blood VI test strips (OneTouch Ultra Test) strip USE TO TEST BLOOD SUGAR ONCE DAILY. DX.E11.9 100 Strip 11    amLODIPine (NORVASC) 2.5 mg tablet Take 1 Tablet by mouth daily.  90 Tablet 3    ergocalciferol (ERGOCALCIFEROL) 1,250 mcg (50,000 unit) capsule       metoprolol tartrate (LOPRESSOR) 25 mg tablet TAKE 1 TABLET TWICE A  Tablet 3    allopurinoL (ZYLOPRIM) 300 mg tablet TAKE ONE-HALF (1/2) TABLET DAILY 90 Tablet 3    simvastatin (ZOCOR) 40 mg tablet TAKE 1 TABLET DAILY IN THE EVENING 90 Tab 3    omeprazole (PRILOSEC) 20 mg capsule TAKE 1 CAPSULE DAILY 90 Cap 1    cyanocobalamin 1,000 mcg tablet Take 1,000 mcg by mouth daily.  aspirin 81 mg chewable tablet Take 81 mg by mouth daily.  brimonidine (ALPHAGAN) 0.15 % ophthalmic solution Administer 1 Drop to both eyes two (2) times a day.  Indications: OPEN ANGLE GLAUCOMA         Past History     Past Medical History:  Past Medical History:   Diagnosis Date    Abnormal nuclear stress test 2014    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 [facility] 2015    Bereavement 2019    ltcf - uti -strangulated hernia - prostate ca - brother    Bronchiectasis ([facility]     CAD (coronary artery disease)     Chest pain, unspecified     Chronic pain     right leg    CLL (chronic lymphocytic leukemia) ([facility]     Jerica Segura md  [facility]    Diabetes ([facility]     Essential hypertension     GERD (gastroesophageal reflux disease)     Hyperlipidemia     Hypertension     Opacity of lung on imaging study 2017    cxr    Rotator cuff arthropathy     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    S/P coronary artery stent placement 2014 PCI/GUANAKO to prox LAD       Past Surgical History:  Past Surgical History:   Procedure Laterality Date    HX COLONOSCOPY      HX HEART CATHETERIZATION      PTCA SINGLE VESSEL  2015         VASCULAR SURGERY PROCEDURE UNLIST   and     BLE stenting     Family History:  Family History   Problem Relation Age of Onset    Diabetes Mother     Stroke Father      Social History:  Social History     Tobacco Use    Smoking status: Former Smoker     Quit date: 1/15/1984     Years since quittin.1    Smokeless tobacco: Never Used    Tobacco comment: QUIT    Vaping Use    Vaping Use: Never used   Substance Use Topics    Alcohol use: No     Alcohol/week: 0.0 standard drinks Comment: quit in 1980    Drug use: No     Types: Prescription, OTC       Allergies: Allergies   Allergen Reactions    Codeine Shortness of Breath    Lipitor [Atorvastatin] Nausea Only         Review of Systems   Review of Systems   Constitutional: Negative for chills and fever. HENT: Negative for congestion, rhinorrhea and sore throat. Eyes: Negative for pain, discharge and visual disturbance. Respiratory: Negative for cough, chest tightness and shortness of breath. Cardiovascular: Negative for chest pain, palpitations and leg swelling. Gastrointestinal: Positive for abdominal pain and constipation. Negative for blood in stool, diarrhea, nausea and vomiting. Genitourinary: Negative for dysuria and hematuria. Musculoskeletal: Negative for arthralgias, back pain, joint swelling and myalgias. Skin: Negative for color change and rash. Neurological: Negative for dizziness, weakness, light-headedness and headaches. All other systems reviewed and are negative. Physical Exam   Physical Exam  Vitals and nursing note reviewed. Constitutional:       General: He is not in acute distress. Appearance: Normal appearance. He is not ill-appearing. HENT:      Head: Normocephalic and atraumatic. Right Ear: External ear normal.      Left Ear: External ear normal.      Nose: Nose normal.      Mouth/Throat:      Mouth: Mucous membranes are moist.      Pharynx: Oropharynx is clear. Eyes:      General: No scleral icterus. Right eye: No discharge. Left eye: No discharge. Conjunctiva/sclera: Conjunctivae normal.   Cardiovascular:      Rate and Rhythm: Normal rate and regular rhythm. Pulses: Normal pulses. Heart sounds: Normal heart sounds. No murmur heard. No friction rub. No gallop. Pulmonary:      Effort: Pulmonary effort is normal.      Breath sounds: Normal breath sounds. No wheezing, rhonchi or rales. Abdominal:      General: Abdomen is flat. Palpations: Abdomen is soft. Tenderness: There is abdominal tenderness (Mild tenderness to palpation over left side). Musculoskeletal:         General: Normal range of motion. Cervical back: Normal range of motion and neck supple. Skin:     General: Skin is warm and dry. Capillary Refill: Capillary refill takes less than 2 seconds. Neurological:      General: No focal deficit present. Mental Status: He is alert and oriented to person, place, and time. Diagnostic Study Results     Labs -     Recent Results (from the past 12 hour(s))   CBC WITH AUTOMATED DIFF    Collection Time: 02/25/22 10:36 PM   Result Value Ref Range    .9 (HH) 4.1 - 11.1 K/uL    RBC 2.83 (L) 4.10 - 5.70 M/uL    HGB 9.8 (L) 12.1 - 17.0 g/dL    HCT 31.0 (L) 36.6 - 50.3 %    .5 (H) 80.0 - 99.0 FL    MCH 34.6 (H) 26.0 - 34.0 PG    MCHC 31.6 30.0 - 36.5 g/dL    RDW 17.3 (H) 11.5 - 14.5 %    PLATELET 232 (L) 934 - 400 K/uL    MPV 10.9 8.9 - 12.9 FL    NRBC 0.0 0  WBC    ABSOLUTE NRBC 0.00 0.00 - 0.01 K/uL    NEUTROPHILS PENDING %    LYMPHOCYTES PENDING %    MONOCYTES PENDING %    EOSINOPHILS PENDING %    BASOPHILS PENDING %    IMMATURE GRANULOCYTES PENDING %    ABS. NEUTROPHILS PENDING K/UL    ABS. LYMPHOCYTES PENDING K/UL    ABS. MONOCYTES PENDING K/UL    ABS. EOSINOPHILS PENDING K/UL    ABS. BASOPHILS PENDING K/UL    ABS. IMM. GRANS.  PENDING K/UL    DF PENDING    EKG, 12 LEAD, INITIAL    Collection Time: 02/25/22 10:36 PM   Result Value Ref Range    Ventricular Rate 57 BPM    Atrial Rate 57 BPM    P-R Interval 168 ms    QRS Duration 88 ms    Q-T Interval 436 ms    QTC Calculation (Bezet) 424 ms    Calculated P Axis 119 degrees    Calculated R Axis -14 degrees    Calculated T Axis 0 degrees    Diagnosis       Sinus bradycardia  Nonspecific ST abnormality  When compared with ECG of 02-SEP-2017 14:10,  T wave inversion no longer evident in Anterolateral leads     METABOLIC PANEL, COMPREHENSIVE Collection Time: 02/25/22 11:12 PM   Result Value Ref Range    Sodium 135 (L) 136 - 145 mmol/L    Potassium 4.7 3.5 - 5.1 mmol/L    Chloride 104 97 - 108 mmol/L    CO2 27 21 - 32 mmol/L    Anion gap 4 (L) 5 - 15 mmol/L    Glucose 102 (H) 65 - 100 mg/dL    BUN 13 6 - 20 MG/DL    Creatinine 1.17 0.70 - 1.30 MG/DL    BUN/Creatinine ratio 11 (L) 12 - 20      GFR est AA >60 >60 ml/min/1.73m2    GFR est non-AA 59 (L) >60 ml/min/1.73m2    Calcium 8.9 8.5 - 10.1 MG/DL    Bilirubin, total 1.0 0.2 - 1.0 MG/DL    ALT (SGPT) 27 12 - 78 U/L    AST (SGOT) 36 15 - 37 U/L    Alk. phosphatase 87 45 - 117 U/L    Protein, total 7.0 6.4 - 8.2 g/dL    Albumin 3.8 3.5 - 5.0 g/dL    Globulin 3.2 2.0 - 4.0 g/dL    A-G Ratio 1.2 1.1 - 2.2     LIPASE    Collection Time: 02/25/22 11:12 PM   Result Value Ref Range    Lipase 87 73 - 393 U/L       Radiologic Studies -   CT ABD PELV W CONT    (Results Pending)     CT Results  (Last 48 hours)    None        CXR Results  (Last 48 hours)    None        Medical Decision Making   I am the first provider for this patient. I reviewed the vital signs, available nursing notes, past medical history, past surgical history, family history and social history. Vital Signs-Reviewed the patient's vital signs. Patient Vitals for the past 12 hrs:   Temp Pulse Resp BP SpO2   02/25/22 2232 97.7 °F (36.5 °C) 63 18 (!) 170/62 97 %       ECG Interpretation: By my interpretation, sinus bradycardia w/o evidence of heart block; axis wnl, intervals wnl; no ST elevation/depression, inverted t-waves to suggest acute ischemic change    Records Reviewed: Nursing Notes and Old Medical Records    Provider Notes (Medical Decision Making):   DDx: The patient is hemodynamically stable, afebrile, and well-appearing. His physical exam is notable for negligible tenderness over the left side of the abdomen, but given the patient's medical history is is reasonable to evaluate for acute intraabdominal processes.  Labs notable for marked leukocytosis c/w known history of CLL, no substantial worsening of chronic anemia, mild thrombocytopenia, stable renal function and no derangement of LFTs to suggest acute insult to liver. There is no elevation of lipase to support pancreatitis as a diagnosis. Will plan for CT abdomen/pelvis to evaluate for acute intraabdominal process. If workup proves unremarkable, will plan for discharge with outpatient follow-up. ED Course and Progress Notes:   Initial assessment performed. The patients presenting problems have been discussed, and they are in agreement with the care plan formulated and outlined with them. I have encouraged them to ask questions as they arise throughout their visit. ED Course as of 02/26/22 0127   Sat Feb 26, 2022   0119 CT w/o acute abnormality beyond mildly increased size of spleen; given strict return precautions and counseled on possible symptoms of splenic rupture, other acute processes; will discharge to care of patient's daughter; both patient and his daughter's questions have been answered [JL]      ED Course User Index  [JL] Hai Garcia MD       Diagnosis     Clinical Impression:   1. Splenomegaly    2. Abdominal pain, LUQ (left upper quadrant)        Disposition:  Discharge    DISCHARGE PLAN:  1. No new medications  Current Discharge Medication List        2. Follow-up Information     Follow up With Specialties Details Why Contact Info    Farheen Ramos MD Internal Medicine Schedule an appointment as soon as possible for a visit   [address]  [place] [address] [phone]      [facility] EMERGENCY [facility]PT Emergency Medicine   [address] [facility]  [address] [facility]  [phone]    Follow up with your hematologist/oncologist            3. Return to ED if worse         Resident Signature: Shelia Mendoza.  Taras Goldmann, MD

## 2022-03-15 ENCOUNTER — OFFICE VISIT (OUTPATIENT)
Dept: INTERNAL MEDICINE CLINIC | Age: 87
End: 2022-03-15
Payer: MEDICARE

## 2022-03-15 VITALS
HEIGHT: 67 IN | TEMPERATURE: 98.2 F | SYSTOLIC BLOOD PRESSURE: 117 MMHG | BODY MASS INDEX: 24.5 KG/M2 | DIASTOLIC BLOOD PRESSURE: 65 MMHG | WEIGHT: 156.1 LBS | OXYGEN SATURATION: 96 % | RESPIRATION RATE: 16 BRPM | HEART RATE: 63 BPM

## 2022-03-15 DIAGNOSIS — I73.9 PERIPHERAL VASCULAR DISEASE (HCC): ICD-10-CM

## 2022-03-15 DIAGNOSIS — E11.21 TYPE 2 DIABETES WITH NEPHROPATHY (HCC): ICD-10-CM

## 2022-03-15 DIAGNOSIS — I25.10 ATHEROSCLEROSIS OF NATIVE CORONARY ARTERY OF NATIVE HEART WITHOUT ANGINA PECTORIS: ICD-10-CM

## 2022-03-15 DIAGNOSIS — I25.9 CHRONIC ISCHEMIC HEART DISEASE: ICD-10-CM

## 2022-03-15 DIAGNOSIS — K21.00 GASTROESOPHAGEAL REFLUX DISEASE WITH ESOPHAGITIS WITHOUT HEMORRHAGE: ICD-10-CM

## 2022-03-15 DIAGNOSIS — J47.9 BRONCHIECTASIS WITHOUT COMPLICATION (HCC): ICD-10-CM

## 2022-03-15 DIAGNOSIS — C91.10 CLL (CHRONIC LYMPHOCYTIC LEUKEMIA) (HCC): ICD-10-CM

## 2022-03-15 DIAGNOSIS — I10 ESSENTIAL HYPERTENSION, BENIGN: Primary | ICD-10-CM

## 2022-03-15 PROCEDURE — G8420 CALC BMI NORM PARAMETERS: HCPCS | Performed by: INTERNAL MEDICINE

## 2022-03-15 PROCEDURE — G8536 NO DOC ELDER MAL SCRN: HCPCS | Performed by: INTERNAL MEDICINE

## 2022-03-15 PROCEDURE — 99214 OFFICE O/P EST MOD 30 MIN: CPT | Performed by: INTERNAL MEDICINE

## 2022-03-15 PROCEDURE — 1101F PT FALLS ASSESS-DOCD LE1/YR: CPT | Performed by: INTERNAL MEDICINE

## 2022-03-15 PROCEDURE — G8510 SCR DEP NEG, NO PLAN REQD: HCPCS | Performed by: INTERNAL MEDICINE

## 2022-03-15 PROCEDURE — G8427 DOCREV CUR MEDS BY ELIG CLIN: HCPCS | Performed by: INTERNAL MEDICINE

## 2022-03-15 NOTE — PROGRESS NOTES
1. Have you been to the ER, urgent care clinic since your last visit? Hospitalized since your last visit? Yes When: 2-25-22 Where: [facility] Reason for visit: abdominal pain    2. Have you seen or consulted any other health care providers outside of the [address] [place] [address] since your last visit? Include any pap smears or colon screening.  No     ED follow up

## 2022-03-15 NOTE — PROGRESS NOTES
[facility]  Arthur Glass MD, [address] [address]  Phone:  [phone]  Fax: [phone]       Chief Complaint   Patient presents with   Shira Ramos ED Follow-up   . SUBJECTIVE:    Aixa Graham is a 80 y.o. male Since we last saw him, he was seen in the emergency room on 02/26 by Aguilar Brewer for evaluation of abdominal pain, left lower quadrant, that started after breakfast.  Evaluation included a CT scan of the abdomen and pelvis, which revealed mildly increased splenomegaly since 01/19/22, stable mesenteric lymphadenopathy, no other acute abnormalities were seen. White count was up to 194.9, HGB 9.8 and platelets 672V. His chemistries were unremarkable. Diagnosis of splenomegaly and abdominal pain with no new medications. He was advised to follow up with us, as well as follow up with hematologist.  Other medical problems include type 2 diabetes with nephropathy, peripheral vascular disease and bronchiectasis. He denies specific complaints and is seen for evaluation. Current Outpatient Medications   Medication Sig Dispense Refill    metFORMIN (GLUCOPHAGE) 500 mg tablet TAKE 1 TABLET DAILY 90 Tablet 3    glucose blood VI test strips (OneTouch Ultra Test) strip USE TO TEST BLOOD SUGAR ONCE DAILY. DX.E11.9 100 Strip 11    amLODIPine (NORVASC) 2.5 mg tablet Take 1 Tablet by mouth daily. 90 Tablet 3    ergocalciferol (ERGOCALCIFEROL) 1,250 mcg (50,000 unit) capsule       metoprolol tartrate (LOPRESSOR) 25 mg tablet TAKE 1 TABLET TWICE A  Tablet 3    allopurinoL (ZYLOPRIM) 300 mg tablet TAKE ONE-HALF (1/2) TABLET DAILY 90 Tablet 3    simvastatin (ZOCOR) 40 mg tablet TAKE 1 TABLET DAILY IN THE EVENING 90 Tab 3    omeprazole (PRILOSEC) 20 mg capsule TAKE 1 CAPSULE DAILY 90 Cap 1    cyanocobalamin 1,000 mcg tablet Take 1,000 mcg by mouth daily.  aspirin 81 mg chewable tablet Take 81 mg by mouth daily.       brimonidine (ALPHAGAN) 0.15 % ophthalmic solution Administer 1 Drop to both eyes two (2) times a day.  Indications: OPEN ANGLE GLAUCOMA       Past Medical History:   Diagnosis Date    Abnormal nuclear stress test 6/6/2014    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 ([facility] 11/2/2015    Bereavement 02/06/2019    ltcf - uti -strangulated hernia - prostate ca - brother    Bronchiectasis ([facility]     CAD (coronary artery disease)     Chest pain, unspecified     Chronic pain     right leg    CLL (chronic lymphocytic leukemia) ([facility]     Jerica Segura md  [facility]    Diabetes ([facility]     Essential hypertension     GERD (gastroesophageal reflux disease)     Hyperlipidemia     Hypertension     Opacity of lung on imaging study 05/28/2017    cxr    Rotator cuff arthropathy     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    S/P coronary artery stent placement 6/6/2014 6/6/14 PCI/GUANAKO to prox LAD     Past Surgical History:   Procedure Laterality Date    HX COLONOSCOPY      HX HEART CATHETERIZATION      PTCA SINGLE VESSEL  4/4/2015         VASCULAR SURGERY PROCEDURE UNLIST  2014 and 2015    BLE stenting     Allergies   Allergen Reactions    Codeine Shortness of Breath    Lipitor [Atorvastatin] Nausea Only         REVIEW OF SYSTEMS:  General: negative for - chills or fever  ENT: negative for - headaches, nasal congestion or tinnitus  Respiratory: negative for - cough, hemoptysis, shortness of breath or wheezing  Cardiovascular : negative for - chest pain, edema, palpitations or shortness of breath  Gastrointestinal: negative for - abdominal pain, blood in stools, heartburn or nausea/vomiting  Genito-Urinary: no dysuria, trouble voiding, or hematuria  Musculoskeletal: negative for - gait disturbance, joint pain, joint stiffness or joint swelling  Neurological: no TIA or stroke symptoms  Hematologic: no bruises, no bleeding, no swollen glands  Integument: no lumps, mole changes, nail changes or rash  Endocrine: no malaise/lethargy or unexpected weight changes      Social History     Socioeconomic History    Marital status:    Tobacco Use    Smoking status: Former Smoker     Quit date: 1/15/1984     Years since quittin.1    Smokeless tobacco: Never Used    Tobacco comment: QUIT    Vaping Use    Vaping Use: Never used   Substance and Sexual Activity    Alcohol use: No     Alcohol/week: 0.0 standard drinks     Comment: quit in     Drug use: No     Types: Prescription, OTC    Sexual activity: Not Currently     Partners: Female   Other Topics Concern     Service No    Blood Transfusions No    Caffeine Concern No    Occupational Exposure No    Hobby Hazards No    Sleep Concern No    Stress Concern No    Weight Concern No    Special Diet Yes     Comment: low sugar    Back Care No    Exercise No    Bike Helmet No    Seat Belt Yes    Self-Exams No   Social History Narrative    Family History: Mother:  61 yrs, DM complicationsFather:  80 yrs, strokeSister(s):  76 yrs, pneumonia, DM,    HTNBrother(s):  61 yrs, DM, HTN, CVAChildren: aliveGrandmother: unknow nGrandfather: deceasedSpouse: deceased1 brother(s) -    healthy. 2daughter(s) - healthy. Social History: Alcohol Use Patient does not use alcohol. Smoking Status Patient is a former smoker, Quit in: 36. Caffeine: none. Marital    Status: W idow . Lives w ith: alone. Children: yes 2 d. Education/School: has highschool diploma, college 2 y. Family History   Problem Relation Age of Onset    Diabetes Mother     Stroke Father        OBJECTIVE:    Visit Vitals  /65   Pulse 63   Temp 98.2 °F (36.8 °C) (Oral)   Resp 16   Ht 5' 7\" (1.702 m)   Wt 156 lb 1.6 oz (70.8 kg)   SpO2 96%   BMI 24.45 kg/m²     CONSTITUTIONAL: well , well nourished, appears age appropriate  EYES: perrla, eom intact  ENMT:moist mucous membranes, pharynx clear  NECK: supple.  Thyroid normal  RESPIRATORY: Chest: clear bilaterally CARDIOVASCULAR: Heart: regular rate and rhythm  GASTROINTESTINAL: Abdomen: soft, bowel sounds active  HEMATOLOGIC: no pathological lymph nodes palpated  MUSCULOSKELETAL: Extremities: no edema, pulse 1+   INTEGUMENT: No unusual rashes or suspicious skin lesions noted. Nails appear normal.  NEUROLOGIC: non-focal exam   MENTAL STATUS: alert and oriented, appropriate affect           ASSESSMENT:  1. Essential hypertension, benign    2. Atherosclerosis of native coronary artery of native heart without angina pectoris    3. Chronic ischemic heart disease    4. Peripheral vascular disease ([address]    5. Gastroesophageal reflux disease with esophagitis without hemorrhage    6. Type 2 diabetes with nephropathy ([address]    7. Bronchiectasis without complication ([address]    8. CLL (chronic lymphocytic leukemia) (HCC)      Blood pressure control is excellent, no titration is needed. He has a known history of coronary artery disease and ischemic heart disease and is completely asymptomatic. There has been no chest pain, no shortness of breath. He has peripheral vascular occlusive disease, but has a relatively sedentary lifestyle and therefore has no symptoms. I wonder if the episode he described in the ER was related to GERD. He has symptomatic therapy for it if needed. He has type 2 diabetes with nephropathy. His renal studies were done in the ER. They did not check hemoglobin A1c there, but hemoglobin A1c's have generally been around the 6.5 range, therefore it will not be checked today and will be checked on his next visit, however. He has an appointment to see the oncologist in May. She saw him since the ER visit and did not want to do anything different. She told him that with the spleen involved, he has stage II chronic lymphocytic leukemia. The spleen tip is easily palpated. Bronchiectasis is asymptomatic. He will be back to see us in three months, sooner if he needs to.       I have discussed the diagnosis with the patient and the intended plan as seen in the  Orders. The patient understands and agees with the plan. The patient has   received an after visit summary and questions were answered concerning  future plans  Patient labs and/or xrays were reviewed  Past records were reviewed. PLAN:  . No orders of the defined types were placed in this encounter. Follow-up and Dispositions    · Return in about 3 months (around 6/15/2022). ATTENTION:   This medical record was transcribed using an electronic medical records system. Although proofread, it may and can contain electronic and spelling errors. Other human spelling and other errors may be present. Corrections may be executed at a later time. Please feel free to contact us for any clarifications as needed.

## 2022-03-18 PROBLEM — E11.21 TYPE 2 DIABETES WITH NEPHROPATHY (HCC): Status: ACTIVE | Noted: 2018-06-13

## 2022-03-20 PROBLEM — R91.8 OPACITY OF LUNG ON IMAGING STUDY: Status: ACTIVE | Noted: 2017-05-28

## 2022-04-06 RX ORDER — SIMVASTATIN 40 MG/1
TABLET, FILM COATED ORAL
Qty: 90 TABLET | Refills: 3 | Status: SHIPPED | OUTPATIENT
Start: 2022-04-06

## 2022-05-24 ENCOUNTER — TRANSCRIBE ORDER (OUTPATIENT)
Dept: SCHEDULING | Age: 87
End: 2022-05-24

## 2022-05-24 DIAGNOSIS — C91.11 CHRONIC LYMPHOID LEUKEMIA IN REMISSION (HCC): Primary | ICD-10-CM

## 2022-05-24 DIAGNOSIS — D72.819 DECREASED WHITE BLOOD CELL COUNT: ICD-10-CM

## 2022-05-25 ENCOUNTER — HOSPITAL ENCOUNTER (OUTPATIENT)
Dept: CT IMAGING | Age: 87
Discharge: HOME | End: 2022-05-25
Attending: INTERNAL MEDICINE
Payer: MEDICARE

## 2022-05-25 DIAGNOSIS — C91.11 CHRONIC LYMPHOID LEUKEMIA IN REMISSION (HCC): ICD-10-CM

## 2022-05-25 DIAGNOSIS — D72.819 DECREASED WHITE BLOOD CELL COUNT: ICD-10-CM

## 2022-05-25 LAB — CREAT BLD-MCNC: 1.3 MG/DL (ref 0.6–1.3)

## 2022-05-25 PROCEDURE — 74177 CT ABD & PELVIS W/CONTRAST: CPT

## 2022-05-25 PROCEDURE — 71260 CT THORAX DX C+: CPT

## 2022-05-25 PROCEDURE — 74011000636 HC RX REV CODE- 636: Performed by: INTERNAL MEDICINE

## 2022-05-25 PROCEDURE — 82565 ASSAY OF CREATININE: CPT

## 2022-05-25 RX ORDER — BARIUM SULFATE 20 MG/ML
900 SUSPENSION ORAL
Status: DISCONTINUED | OUTPATIENT
Start: 2022-05-25 | End: 2022-05-26 | Stop reason: HOSPADM

## 2022-05-25 RX ADMIN — IOPAMIDOL 100 ML: 755 INJECTION, SOLUTION INTRAVENOUS at 16:35

## 2022-06-23 ENCOUNTER — OFFICE VISIT (OUTPATIENT)
Dept: INTERNAL MEDICINE CLINIC | Age: 87
End: 2022-06-23
Payer: MEDICARE

## 2022-06-23 VITALS
WEIGHT: 148.9 LBS | BODY MASS INDEX: 23.37 KG/M2 | HEIGHT: 67 IN | HEART RATE: 61 BPM | DIASTOLIC BLOOD PRESSURE: 52 MMHG | TEMPERATURE: 98 F | OXYGEN SATURATION: 97 % | RESPIRATION RATE: 18 BRPM | SYSTOLIC BLOOD PRESSURE: 102 MMHG

## 2022-06-23 DIAGNOSIS — J47.9 BRONCHIECTASIS WITHOUT COMPLICATION (HCC): ICD-10-CM

## 2022-06-23 DIAGNOSIS — C91.10 CLL (CHRONIC LYMPHOCYTIC LEUKEMIA) (HCC): ICD-10-CM

## 2022-06-23 DIAGNOSIS — E11.21 TYPE 2 DIABETES WITH NEPHROPATHY (HCC): Primary | ICD-10-CM

## 2022-06-23 DIAGNOSIS — N18.31 STAGE 3A CHRONIC KIDNEY DISEASE (HCC): ICD-10-CM

## 2022-06-23 DIAGNOSIS — E78.2 MIXED HYPERLIPIDEMIA: ICD-10-CM

## 2022-06-23 PROBLEM — N18.30 CHRONIC RENAL DISEASE, STAGE III (HCC): Status: ACTIVE | Noted: 2022-01-01

## 2022-06-23 PROCEDURE — G8427 DOCREV CUR MEDS BY ELIG CLIN: HCPCS | Performed by: INTERNAL MEDICINE

## 2022-06-23 PROCEDURE — 1101F PT FALLS ASSESS-DOCD LE1/YR: CPT | Performed by: INTERNAL MEDICINE

## 2022-06-23 PROCEDURE — G8510 SCR DEP NEG, NO PLAN REQD: HCPCS | Performed by: INTERNAL MEDICINE

## 2022-06-23 PROCEDURE — G8536 NO DOC ELDER MAL SCRN: HCPCS | Performed by: INTERNAL MEDICINE

## 2022-06-23 PROCEDURE — 99214 OFFICE O/P EST MOD 30 MIN: CPT | Performed by: INTERNAL MEDICINE

## 2022-06-23 PROCEDURE — G8420 CALC BMI NORM PARAMETERS: HCPCS | Performed by: INTERNAL MEDICINE

## 2022-06-23 PROCEDURE — 1123F ACP DISCUSS/DSCN MKR DOCD: CPT | Performed by: INTERNAL MEDICINE

## 2022-06-23 NOTE — PROGRESS NOTES
SPORTS MEDICINE AND PRIMARY CARE  Yvonne Carlisle MD, [address] [address]  Phone:  [phone]  Fax: [phone]       Chief Complaint   Patient presents with    Diabetes    Hypertension   . SUBJECTIVE:    Norm Villarreal is a 80 y.o. male Patient returns today with his daughter with a known history of diabetes mellitus type 2 with nephropathy, primary hypertension, coronary artery disease, dyslipidemia, chronic lymphocytic leukemia, followed by Yonny Jefferson MD with [facility], and is seen for evaluation. Since we last saw him, his WBCs went up to 394,000. A CT scan of the abdomen, pelvis and chest was performed on 05/25/22, which revealed slight increase in size of the hilar mediastinal lymph nodes, increasing splenomegaly, retroperitoneal mesenteric adenopathy stable. Dr. Hardy Villarreal put him on Acalabrutinib, trade name, brand name is Calquence. She did this for the elevated white count. It was 7.2. If it goes below 7, he is going to get a transfusion. Platelets dropped from 130,000 to 66,000. She will stop the Calquence if the platelets drop less than 50,000. He has not had any of the side effects expected from the medication. He has had some ankle swelling, which is minimal.  He states he has no pain. He gets an upset stomach if he eats spicy foods. He cannot eat spicy foods with this medication. Local treatment seems to help. Patient is seen for evaluation. Current Outpatient Medications   Medication Sig Dispense Refill    simvastatin (ZOCOR) 40 mg tablet TAKE 1 TABLET DAILY IN THE EVENING 90 Tablet 3    metFORMIN (GLUCOPHAGE) 500 mg tablet TAKE 1 TABLET DAILY 90 Tablet 3    glucose blood VI test strips (OneTouch Ultra Test) strip USE TO TEST BLOOD SUGAR ONCE DAILY.  DX.E11.9 100 Strip 11    ergocalciferol (ERGOCALCIFEROL) 1,250 mcg (50,000 unit) capsule       metoprolol tartrate (LOPRESSOR) 25 mg tablet TAKE 1 TABLET TWICE A  Tablet 3  allopurinoL (ZYLOPRIM) 300 mg tablet TAKE ONE-HALF (1/2) TABLET DAILY 90 Tablet 3    omeprazole (PRILOSEC) 20 mg capsule TAKE 1 CAPSULE DAILY 90 Cap 1    cyanocobalamin 1,000 mcg tablet Take 1,000 mcg by mouth daily.  aspirin 81 mg chewable tablet Take 81 mg by mouth daily.  brimonidine (ALPHAGAN) 0.15 % ophthalmic solution Administer 1 Drop to both eyes two (2) times a day.  Indications: OPEN ANGLE GLAUCOMA       Past Medical History:   Diagnosis Date    Abnormal nuclear stress test 6/6/2014    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 ([facility] 11/2/2015    Bereavement 02/06/2019    ltcf - uti -strangulated hernia - prostate ca - brother    Bronchiectasis ([facility]     CAD (coronary artery disease)     Chest pain, unspecified     Chronic pain     right leg    CLL (chronic lymphocytic leukemia) ([facility]     Jerica Segura md  [facility]    Diabetes ([facility]     Essential hypertension     GERD (gastroesophageal reflux disease)     Hyperlipidemia     Hypertension     Opacity of lung on imaging study 05/28/2017    cxr    Rotator cuff arthropathy     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    S/P coronary artery stent placement 6/6/2014 6/6/14 PCI/GUANAKO to prox LAD     Past Surgical History:   Procedure Laterality Date    HX COLONOSCOPY      HX HEART CATHETERIZATION      PTCA SINGLE VESSEL  4/4/2015         VASCULAR SURGERY PROCEDURE UNLIST  2014 and 2015    BLE stenting     Allergies   Allergen Reactions    Codeine Shortness of Breath    Lipitor [Atorvastatin] Nausea Only         REVIEW OF SYSTEMS:  General: negative for - chills or fever  ENT: negative for - headaches, nasal congestion or tinnitus  Respiratory: negative for - cough, hemoptysis, shortness of breath or wheezing  Cardiovascular : negative for - chest pain, edema, palpitations or shortness of breath  Gastrointestinal: negative for - abdominal pain, blood in stools, heartburn or nausea/vomiting  Genito-Urinary: no dysuria, trouble voiding, or hematuria  Musculoskeletal: negative for - gait disturbance, joint pain, joint stiffness or joint swelling  Neurological: no TIA or stroke symptoms  Hematologic: no bruises, no bleeding, no swollen glands  Integument: no lumps, mole changes, nail changes or rash  Endocrine: no malaise/lethargy or unexpected weight changes      Social History     Socioeconomic History    Marital status:    Tobacco Use    Smoking status: Former Smoker     Quit date: 1/15/1984     Years since quittin.4    Smokeless tobacco: Never Used    Tobacco comment: QUIT    Vaping Use    Vaping Use: Never used   Substance and Sexual Activity    Alcohol use: No     Alcohol/week: 0.0 standard drinks     Comment: quit in     Drug use: No     Types: Prescription, OTC    Sexual activity: Not Currently     Partners: Female   Other Topics Concern     Service No    Blood Transfusions No    Caffeine Concern No    Occupational Exposure No    Hobby Hazards No    Sleep Concern No    Stress Concern No    Weight Concern No    Special Diet Yes     Comment: low sugar    Back Care No    Exercise No    Bike Helmet No    Seat Belt Yes    Self-Exams No   Social History Narrative    Family History: Mother:  61 yrs, DM complicationsFather:  80 yrs, strokeSister(s):  76 yrs, pneumonia, DM,    HTNBrother(s):  61 yrs, DM, HTN, CVAChildren: aliveGrandmother: unknow nGrandfather: deceasedSpouse: deceased1 brother(s) -    healthy. 2daughter(s) - healthy. Social History: Alcohol Use Patient does not use alcohol. Smoking Status Patient is a former smoker, Quit in: 36. Caffeine: none. Marital    Status: W idow . Lives w ith: alone. Children: yes 2 d. Education/School: has highschool diploma, college 2 y.      Family History   Problem Relation Age of Onset    Diabetes Mother     Stroke Father        OBJECTIVE:    Visit Vitals  BP (!) 102/52 (BP 1 Location: Right arm, BP Patient Position: Sitting)   Pulse 61   Temp 98 °F (36.7 °C) (Oral)   Resp 18   Ht 5' 7\" (1.702 m)   Wt 148 lb 14.4 oz (67.5 kg)   SpO2 97%   BMI 23.32 kg/m²     CONSTITUTIONAL: well , well nourished, appears age appropriate  EYES: perrla, eom intact  ENMT:moist mucous membranes, pharynx clear  NECK: supple. Thyroid normal  RESPIRATORY: Chest: clear bilaterally   CARDIOVASCULAR: Heart: regular rate and rhythm  GASTROINTESTINAL: Abdomen: soft, bowel sounds active  HEMATOLOGIC: no pathological lymph nodes palpated  MUSCULOSKELETAL: Extremities: no edema, pulse 1+   INTEGUMENT: No unusual rashes or suspicious skin lesions noted. Nails appear normal.  NEUROLOGIC: non-focal exam   MENTAL STATUS: alert and oriented, appropriate affect           ASSESSMENT:  1. Type 2 diabetes with nephropathy (HCC)    2. Stage 3a chronic kidney disease ([facility]    3. Bronchiectasis without complication ([facility]    4. CLL (chronic lymphocytic leukemia) ([facility]    5. Mixed hyperlipidemia      Blood sugar control will be assessed with hemoglobin A1c. We will ask his hematologist to draw it when she draws her blood on Tuesday. We will also ask for a renal panel since she has CKD stage 3A. Bronchiectasis is asymptomatic. We are concerned about CLL, which had suddenly increased to over 300,000. He is on medication now and hopefully it will bring it down without symptoms. He has had no side effects fortunately. Dyslipidemia is under good control. His blood pressure has been very soft, his daughter notes, she is a nurse. We will ask him to stop Amlodipine 2.5 mg daily. She will restart it if his blood sugar jumps up again. He will be back to see me in three months, sooner if he has any problems. I have discussed the diagnosis with the patient and the intended plan as seen in the  Orders. The patient understands and agees with the plan.   The patient has   received an after visit summary and questions were answered concerning  future plans  Patient labs and/or xrays were reviewed  Past records were reviewed. PLAN:  . No orders of the defined types were placed in this encounter. Follow-up and Dispositions    · Return in about 3 months (around 9/23/2022). ATTENTION:   This medical record was transcribed using an electronic medical records system. Although proofread, it may and can contain electronic and spelling errors. Other human spelling and other errors may be present. Corrections may be executed at a later time. Please feel free to contact us for any clarifications as needed.

## 2022-06-23 NOTE — PROGRESS NOTES
Petrona Pamler is a 80 y.o. male    Chief Complaint   Patient presents with    Diabetes    Hypertension     1. Have you been to the ER, urgent care clinic since your last visit? Hospitalized since your last visit? No     2. Have you seen or consulted any other health care providers outside of the [address] [place] [address] since your last visit? Include any pap smears or colon screening.   No

## 2022-06-29 ENCOUNTER — HOSPITAL ENCOUNTER (OUTPATIENT)
Dept: INFUSION THERAPY | Age: 87
Discharge: HOME | End: 2022-06-29
Payer: MEDICARE

## 2022-06-29 VITALS
WEIGHT: 145.8 LBS | TEMPERATURE: 99.3 F | SYSTOLIC BLOOD PRESSURE: 122 MMHG | BODY MASS INDEX: 22.84 KG/M2 | OXYGEN SATURATION: 99 % | HEART RATE: 63 BPM | RESPIRATION RATE: 18 BRPM | DIASTOLIC BLOOD PRESSURE: 44 MMHG

## 2022-06-29 LAB — HISTORY CHECKED?,CKHIST: NORMAL

## 2022-06-29 PROCEDURE — 86900 BLOOD TYPING SEROLOGIC ABO: CPT

## 2022-06-29 PROCEDURE — 86920 COMPATIBILITY TEST SPIN: CPT

## 2022-06-29 RX ORDER — ACETAMINOPHEN 325 MG/1
650 TABLET ORAL ONCE
Status: COMPLETED | OUTPATIENT
Start: 2022-06-30 | End: 2022-06-30

## 2022-06-29 RX ORDER — DIPHENHYDRAMINE HCL 25 MG
25 CAPSULE ORAL ONCE
Status: COMPLETED | OUTPATIENT
Start: 2022-06-30 | End: 2022-06-30

## 2022-06-29 RX ORDER — SODIUM CHLORIDE 9 MG/ML
25 INJECTION, SOLUTION INTRAVENOUS CONTINUOUS
Status: DISPENSED | OUTPATIENT
Start: 2022-06-30 | End: 2022-06-30

## 2022-06-29 NOTE — PROGRESS NOTES
[facility] Progress Note    Date: 2022    Name: Queenie Tena    MRN: [id number]         : 10/4/1933      1255:  Pt arrived ambulatory and in no distress, for lab visit. Labs drawn via RAC, patient tolerated well. The patient was asked the following questions and answered as documented below:    1. Do you have any symptoms of COVID-19? SOB, coughing, fever, or generally not feeling well? NO  2. Have you been exposed to COVID-19 recently? NO  3. Have you had any recent contact with family/friend that has a pending COVID test? NO      Follow Up: Proceed with treatment    Visit Vitals  BP (!) 122/44 (BP 1 Location: Right arm, BP Patient Position: Sitting)   Pulse 63   Temp 99.3 °F (37.4 °C)   Resp 18   Wt 66.1 kg (145 lb 12.8 oz)   SpO2 99%   BMI 22.84 kg/m²       Lab results were obtained and reviewed. Recent Results (from the past 12 hour(s))   RBC, ALLOCATE    Collection Time: 22  1:00 PM   Result Value Ref Range    HISTORY CHECKED? Historical check performed        1310: Departed [facility] ambulatory and in no distress.   Patient to return for blood transfusion    Future Appointments   Date Time Provider Renzo Gutierrez   2022 11:00  [facility] [address] [place] [address]   2022 12:00 PM Janett Murray MD [facility] AMINTA Cadena RN  2022

## 2022-06-30 ENCOUNTER — APPOINTMENT (OUTPATIENT)
Dept: ULTRASOUND IMAGING | Age: 87
DRG: 841 | End: 2022-06-30
Attending: EMERGENCY MEDICINE
Payer: MEDICARE

## 2022-06-30 ENCOUNTER — HOSPITAL ENCOUNTER (INPATIENT)
Age: 87
LOS: 1 days | Discharge: HOME | DRG: 841 | End: 2022-07-02
Attending: EMERGENCY MEDICINE | Admitting: HOSPITALIST
Payer: MEDICARE

## 2022-06-30 ENCOUNTER — HOSPITAL ENCOUNTER (OUTPATIENT)
Dept: INFUSION THERAPY | Age: 87
Discharge: HOME | End: 2022-06-30
Payer: MEDICARE

## 2022-06-30 VITALS
RESPIRATION RATE: 16 BRPM | DIASTOLIC BLOOD PRESSURE: 43 MMHG | HEART RATE: 56 BPM | TEMPERATURE: 98.2 F | OXYGEN SATURATION: 100 % | SYSTOLIC BLOOD PRESSURE: 147 MMHG

## 2022-06-30 DIAGNOSIS — E87.1 HYPONATREMIA: ICD-10-CM

## 2022-06-30 DIAGNOSIS — L03.116 CELLULITIS OF LEFT LOWER EXTREMITY: Primary | ICD-10-CM

## 2022-06-30 DIAGNOSIS — D64.9 ANEMIA, UNSPECIFIED TYPE: ICD-10-CM

## 2022-06-30 LAB
ANION GAP SERPL CALC-SCNC: 8 MMOL/L (ref 5–15)
BASOPHILS # BLD: 0 K/UL (ref 0–0.1)
BASOPHILS NFR BLD: 0 % (ref 0–1)
BUN SERPL-MCNC: 21 MG/DL (ref 6–20)
BUN/CREAT SERPL: 16 (ref 12–20)
CALCIUM SERPL-MCNC: 8.7 MG/DL (ref 8.5–10.1)
CHLORIDE SERPL-SCNC: 93 MMOL/L (ref 97–108)
CO2 SERPL-SCNC: 21 MMOL/L (ref 21–32)
COMMENT, HOLDF: NORMAL
CREAT SERPL-MCNC: 1.29 MG/DL (ref 0.7–1.3)
DIFFERENTIAL METHOD BLD: ABNORMAL
EOSINOPHIL # BLD: 0 K/UL (ref 0–0.4)
EOSINOPHIL NFR BLD: 0 % (ref 0–7)
ERYTHROCYTE [DISTWIDTH] IN BLOOD BY AUTOMATED COUNT: ABNORMAL % (ref 11.5–14.5)
GLUCOSE SERPL-MCNC: 104 MG/DL (ref 65–100)
HCT VFR BLD AUTO: 25.9 % (ref 36.6–50.3)
HGB BLD-MCNC: 6.7 G/DL (ref 12.1–17)
IMM GRANULOCYTES # BLD AUTO: 0 K/UL (ref 0–0.04)
IMM GRANULOCYTES NFR BLD AUTO: 0 % (ref 0–0.5)
LYMPHOCYTES # BLD: 317.8 K/UL (ref 0.8–3.5)
LYMPHOCYTES NFR BLD: 96 % (ref 12–49)
MCH RBC QN AUTO: 29.8 PG (ref 26–34)
MCHC RBC AUTO-ENTMCNC: 25.9 G/DL (ref 30–36.5)
MCV RBC AUTO: 115.1 FL (ref 80–99)
MONOCYTES # BLD: 6.6 K/UL (ref 0–1)
MONOCYTES NFR BLD: 2 % (ref 5–13)
NEUTS SEG # BLD: 6.6 K/UL (ref 1.8–8)
NEUTS SEG NFR BLD: 2 % (ref 32–75)
NRBC # BLD: 0.03 K/UL (ref 0–0.01)
NRBC BLD-RTO: 0 PER 100 WBC
PLATELET # BLD AUTO: 122 K/UL (ref 150–400)
PMV BLD AUTO: 11.9 FL (ref 8.9–12.9)
POTASSIUM SERPL-SCNC: 5.3 MMOL/L (ref 3.5–5.1)
RBC # BLD AUTO: 2.25 M/UL (ref 4.1–5.7)
RBC MORPH BLD: ABNORMAL
SAMPLES BEING HELD,HOLD: NORMAL
SODIUM SERPL-SCNC: 122 MMOL/L (ref 136–145)
WBC # BLD AUTO: 331 K/UL (ref 4.1–11.1)
WBC MORPH BLD: ABNORMAL

## 2022-06-30 PROCEDURE — 86923 COMPATIBILITY TEST ELECTRIC: CPT

## 2022-06-30 PROCEDURE — 74011250636 HC RX REV CODE- 250/636: Performed by: EMERGENCY MEDICINE

## 2022-06-30 PROCEDURE — 85025 COMPLETE CBC W/AUTO DIFF WBC: CPT

## 2022-06-30 PROCEDURE — 96365 THER/PROPH/DIAG IV INF INIT: CPT

## 2022-06-30 PROCEDURE — 30233N1 TRANSFUSION OF NONAUTOLOGOUS RED BLOOD CELLS INTO PERIPHERAL VEIN, PERCUTANEOUS APPROACH: ICD-10-PCS | Performed by: HOSPITALIST

## 2022-06-30 PROCEDURE — 77030013169 SET IV BLD ICUM -A

## 2022-06-30 PROCEDURE — 74011250637 HC RX REV CODE- 250/637: Performed by: NURSE PRACTITIONER

## 2022-06-30 PROCEDURE — 86900 BLOOD TYPING SEROLOGIC ABO: CPT

## 2022-06-30 PROCEDURE — 36415 COLL VENOUS BLD VENIPUNCTURE: CPT

## 2022-06-30 PROCEDURE — 99285 EMERGENCY DEPT VISIT HI MDM: CPT

## 2022-06-30 PROCEDURE — 74011250636 HC RX REV CODE- 250/636: Performed by: NURSE PRACTITIONER

## 2022-06-30 PROCEDURE — 80048 BASIC METABOLIC PNL TOTAL CA: CPT

## 2022-06-30 PROCEDURE — P9016 RBC LEUKOCYTES REDUCED: HCPCS

## 2022-06-30 PROCEDURE — 36430 TRANSFUSION BLD/BLD COMPNT: CPT

## 2022-06-30 PROCEDURE — 93971 EXTREMITY STUDY: CPT

## 2022-06-30 RX ORDER — SODIUM CHLORIDE 9 MG/ML
250 INJECTION, SOLUTION INTRAVENOUS AS NEEDED
Status: DISCONTINUED | OUTPATIENT
Start: 2022-06-30 | End: 2022-07-01 | Stop reason: SDUPTHER

## 2022-06-30 RX ADMIN — ACETAMINOPHEN 650 MG: 325 TABLET ORAL at 11:40

## 2022-06-30 RX ADMIN — SODIUM CHLORIDE 25 ML/HR: 9 INJECTION, SOLUTION INTRAVENOUS at 11:40

## 2022-06-30 RX ADMIN — SODIUM CHLORIDE 500 ML: 9 INJECTION, SOLUTION INTRAVENOUS at 23:24

## 2022-06-30 RX ADMIN — DIPHENHYDRAMINE HYDROCHLORIDE 25 MG: 25 CAPSULE ORAL at 11:40

## 2022-06-30 NOTE — DISCHARGE INSTRUCTIONS
OUTPATIENT INFUSION CENTER    DISCHARGE INSTRUCTIONS FOR:  BLOOD TRANSFUSION    We hope you are feeling better after your blood transfusion. Some mild tenderness or slight bruising at your IV site is normal.  Avoid lifting or heavy use of that extremity for the rest of the day. Drink plenty of fluids, eat a normal diet and get some rest.    There are some important signs that you need to watch for in case you experience a delayed reaction to the blood you have received. Call your physician immediately if you develop any of the following symptoms:    1. Severe headache or backache;    2. Fever above 100 degrees;    3. Chills;    4. Difficulty breathing;    5.  Blood or red color in urine;    6. The feeling of weakness or constant fatigue;    7. Yellowing of the whites of your eyes or skin (jaundice). If your physician is not available, call or go to the nearest emergency room, or dial 911.     Fatimah Miller, Signature: ___________________________ 6/30/2022  Nadya Sykes RN

## 2022-06-30 NOTE — PROGRESS NOTES
[facility] Progress Note  June 30, 2022      [address] arrived ambulatory and in no acute distress for 1 unit PRBCs. Patient COVID Screening completed:   Do you have any symptoms of COVID-19? SOB, coughing, fever, or generally not feeling well? NO   Have you been exposed to COVID-19 recently? NO   Have you had any recent contact with family/friend that has a pending COVID test? NO    Assessment completed, no new complaints voiced. 22G PIV established in R A/C without difficulty. Education provided regarding reason for blood transfusion and possible reactions. All questions and concerns regarding transfusion answered, patient voiced understanding. Problem: Anemia Care Plan (Adult and Pediatric)  Goal: *Labs within defined limits  Outcome: Progressing Towards Goal     Problem: Knowledge Deficit  Goal: *Verbalizes understanding of procedures and medications  Outcome: Progressing Towards Goal     Problem: Patient Education:  Go to Education Activity  Goal: Patient/Family Education  Outcome: Progressing Towards Goal    Medications received:  NS @ KVO  Tylenol 650 mg PO  Benadryl 25 mg PO    1200:  1st unit PRBCs started and infusing without difficulty, observed x 15 minutes  1410:  1st unit completed without adverse reaction noted, NS flushing line. Patient Vitals for the past 12 hrs:   Temp Pulse Resp BP SpO2   06/30/22 1417 98.2 °F (36.8 °C) (!) 56 16 (!) 147/43 100 %   06/30/22 1320 98.5 °F (36.9 °C) (!) 58 16 (!) 123/43 --   06/30/22 1215 98.4 °F (36.9 °C) (!) 55 16 (!) 116/38 --   06/30/22 1149 98.6 °F (37 °C) (!) 57 18 (!) 105/38 99 %       1425 Patient tolerated transfusion  well, no adverse reaction noted. D/C instructions reviewed, copy to pt, voiced understanding. Patient declined 1 hour post transfusion observation/vitals signs. PIV flushed and removed. D/Cd from [facility] ambulatory and in no distress accompanied by family member.       Future Appointments   Date Time Provider Department Center   9/28/2022 12:00 PM Jackeline Adhikari MD [facility] MAIN BS AMB     Krista Briggs RN  June 30, 2022

## 2022-07-01 PROBLEM — D64.9 ANEMIA: Status: ACTIVE | Noted: 2022-07-01

## 2022-07-01 PROBLEM — L03.116 LEFT LEG CELLULITIS: Status: ACTIVE | Noted: 2022-07-01

## 2022-07-01 PROBLEM — E87.1 HYPONATREMIA: Status: ACTIVE | Noted: 2022-01-01

## 2022-07-01 PROBLEM — L03.116 LEFT LEG CELLULITIS: Status: ACTIVE | Noted: 2022-01-01

## 2022-07-01 PROBLEM — E87.1 HYPONATREMIA: Status: ACTIVE | Noted: 2022-07-01

## 2022-07-01 PROBLEM — D64.9 ANEMIA: Status: ACTIVE | Noted: 2022-01-01

## 2022-07-01 LAB
ABO + RH BLD: NORMAL
ANION GAP SERPL CALC-SCNC: 5 MMOL/L (ref 5–15)
ANION GAP SERPL CALC-SCNC: 7 MMOL/L (ref 5–15)
APTT PPP: 28.2 SEC (ref 22.1–31)
BASOPHILS # BLD: 0 K/UL (ref 0–0.1)
BASOPHILS NFR BLD: 0 % (ref 0–1)
BLD PROD TYP BPU: NORMAL
BLOOD GROUP ANTIBODIES SERPL: NORMAL
BPU ID: NORMAL
BUN SERPL-MCNC: 16 MG/DL (ref 6–20)
BUN SERPL-MCNC: 17 MG/DL (ref 6–20)
BUN/CREAT SERPL: 14 (ref 12–20)
BUN/CREAT SERPL: 16 (ref 12–20)
CALCIUM SERPL-MCNC: 8.3 MG/DL (ref 8.5–10.1)
CALCIUM SERPL-MCNC: 8.4 MG/DL (ref 8.5–10.1)
CHLORIDE SERPL-SCNC: 96 MMOL/L (ref 97–108)
CHLORIDE SERPL-SCNC: 98 MMOL/L (ref 97–108)
CO2 SERPL-SCNC: 21 MMOL/L (ref 21–32)
CO2 SERPL-SCNC: 23 MMOL/L (ref 21–32)
CREAT SERPL-MCNC: 1.08 MG/DL (ref 0.7–1.3)
CREAT SERPL-MCNC: 1.12 MG/DL (ref 0.7–1.3)
CREAT UR-MCNC: 73.5 MG/DL
CROSSMATCH RESULT,%XM: NORMAL
DIFFERENTIAL METHOD BLD: ABNORMAL
EOSINOPHIL # BLD: 0 K/UL (ref 0–0.4)
EOSINOPHIL NFR BLD: 0 % (ref 0–7)
ERYTHROCYTE [DISTWIDTH] IN BLOOD BY AUTOMATED COUNT: ABNORMAL % (ref 11.5–14.5)
FERRITIN SERPL-MCNC: 391 NG/ML (ref 26–388)
FOLATE SERPL-MCNC: 39.5 NG/ML (ref 5–21)
GLUCOSE BLD STRIP.AUTO-MCNC: 101 MG/DL (ref 65–117)
GLUCOSE BLD STRIP.AUTO-MCNC: 114 MG/DL (ref 65–117)
GLUCOSE BLD STRIP.AUTO-MCNC: 88 MG/DL (ref 65–117)
GLUCOSE BLD STRIP.AUTO-MCNC: 99 MG/DL (ref 65–117)
GLUCOSE SERPL-MCNC: 110 MG/DL (ref 65–100)
GLUCOSE SERPL-MCNC: 88 MG/DL (ref 65–100)
HAPTOGLOB SERPL-MCNC: 189 MG/DL (ref 30–200)
HCT VFR BLD AUTO: 22.8 % (ref 36.6–50.3)
HCT VFR BLD AUTO: 24.3 % (ref 36.6–50.3)
HGB BLD-MCNC: 6.8 G/DL (ref 12.1–17)
HGB BLD-MCNC: 7.6 G/DL (ref 12.1–17)
HISTORY CHECKED?,CKHIST: NORMAL
HISTORY CHECKED?,CKHIST: NORMAL
IMM GRANULOCYTES # BLD AUTO: 0 K/UL (ref 0–0.04)
IMM GRANULOCYTES NFR BLD AUTO: 0 % (ref 0–0.5)
INR PPP: 1.2 (ref 0.9–1.1)
IRON SATN MFR SERPL: 32 % (ref 20–50)
IRON SERPL-MCNC: 106 UG/DL (ref 35–150)
LDH SERPL L TO P-CCNC: 215 U/L (ref 85–241)
LYMPHOCYTES # BLD: 236.7 K/UL (ref 0.8–3.5)
LYMPHOCYTES NFR BLD: 93 % (ref 12–49)
MCH RBC QN AUTO: 31.1 PG (ref 26–34)
MCHC RBC AUTO-ENTMCNC: 28 G/DL (ref 30–36.5)
MCV RBC AUTO: 111 FL (ref 80–99)
MONOCYTES # BLD: 15.3 K/UL (ref 0–1)
MONOCYTES NFR BLD: 6 % (ref 5–13)
NEUTS SEG # BLD: 2.5 K/UL (ref 1.8–8)
NEUTS SEG NFR BLD: 1 % (ref 32–75)
NRBC # BLD: 0 K/UL (ref 0–0.01)
NRBC BLD-RTO: 0 PER 100 WBC
OSMOLALITY SERPL: 267 MOSM/KG H2O
OSMOLALITY SERPL: 269 MOSM/KG H2O
OSMOLALITY UR: 399 MOSM/KG H2O
PLATELET # BLD AUTO: 88 K/UL (ref 150–400)
PMV BLD AUTO: 11.7 FL (ref 8.9–12.9)
POTASSIUM SERPL-SCNC: 4.1 MMOL/L (ref 3.5–5.1)
POTASSIUM SERPL-SCNC: 5 MMOL/L (ref 3.5–5.1)
PROTHROMBIN TIME: 12.2 SEC (ref 9–11.1)
RBC # BLD AUTO: 2.19 M/UL (ref 4.1–5.7)
RBC MORPH BLD: ABNORMAL
RBC MORPH BLD: ABNORMAL
RETICS # AUTO: 0.04 M/UL (ref 0.03–0.1)
RETICS/RBC NFR AUTO: 1.8 % (ref 0.7–2.1)
SERVICE CMNT-IMP: NORMAL
SODIUM SERPL-SCNC: 124 MMOL/L (ref 136–145)
SODIUM SERPL-SCNC: 126 MMOL/L (ref 136–145)
SODIUM UR-SCNC: 43 MMOL/L
SPECIMEN EXP DATE BLD: NORMAL
STATUS OF UNIT,%ST: NORMAL
THERAPEUTIC RANGE,PTTT: NORMAL SECS (ref 58–77)
TIBC SERPL-MCNC: 330 UG/DL (ref 250–450)
UNIT DIVISION, %UDIV: 0
VIT B12 SERPL-MCNC: 291 PG/ML (ref 193–986)
WBC # BLD AUTO: 254.5 K/UL (ref 4.1–11.1)
WBC MORPH BLD: ABNORMAL

## 2022-07-01 PROCEDURE — 65270000029 HC RM PRIVATE

## 2022-07-01 PROCEDURE — 83935 ASSAY OF URINE OSMOLALITY: CPT

## 2022-07-01 PROCEDURE — 82746 ASSAY OF FOLIC ACID SERUM: CPT

## 2022-07-01 PROCEDURE — 85025 COMPLETE CBC W/AUTO DIFF WBC: CPT

## 2022-07-01 PROCEDURE — 85730 THROMBOPLASTIN TIME PARTIAL: CPT

## 2022-07-01 PROCEDURE — 74011250637 HC RX REV CODE- 250/637: Performed by: HOSPITALIST

## 2022-07-01 PROCEDURE — 83010 ASSAY OF HAPTOGLOBIN QUANT: CPT

## 2022-07-01 PROCEDURE — 51798 US URINE CAPACITY MEASURE: CPT

## 2022-07-01 PROCEDURE — 74011250636 HC RX REV CODE- 250/636: Performed by: EMERGENCY MEDICINE

## 2022-07-01 PROCEDURE — 74011250636 HC RX REV CODE- 250/636: Performed by: HOSPITALIST

## 2022-07-01 PROCEDURE — 84300 ASSAY OF URINE SODIUM: CPT

## 2022-07-01 PROCEDURE — 82962 GLUCOSE BLOOD TEST: CPT

## 2022-07-01 PROCEDURE — 83615 LACTATE (LD) (LDH) ENZYME: CPT

## 2022-07-01 PROCEDURE — 74011000250 HC RX REV CODE- 250: Performed by: HOSPITALIST

## 2022-07-01 PROCEDURE — 82570 ASSAY OF URINE CREATININE: CPT

## 2022-07-01 PROCEDURE — 83930 ASSAY OF BLOOD OSMOLALITY: CPT

## 2022-07-01 PROCEDURE — 36415 COLL VENOUS BLD VENIPUNCTURE: CPT

## 2022-07-01 PROCEDURE — 82607 VITAMIN B-12: CPT

## 2022-07-01 PROCEDURE — 74011250637 HC RX REV CODE- 250/637: Performed by: NURSE PRACTITIONER

## 2022-07-01 PROCEDURE — 36430 TRANSFUSION BLD/BLD COMPNT: CPT

## 2022-07-01 PROCEDURE — 85045 AUTOMATED RETICULOCYTE COUNT: CPT

## 2022-07-01 PROCEDURE — 82728 ASSAY OF FERRITIN: CPT

## 2022-07-01 PROCEDURE — 83540 ASSAY OF IRON: CPT

## 2022-07-01 PROCEDURE — 85610 PROTHROMBIN TIME: CPT

## 2022-07-01 PROCEDURE — 74011000258 HC RX REV CODE- 258: Performed by: EMERGENCY MEDICINE

## 2022-07-01 PROCEDURE — 85018 HEMOGLOBIN: CPT

## 2022-07-01 PROCEDURE — P9016 RBC LEUKOCYTES REDUCED: HCPCS

## 2022-07-01 PROCEDURE — 80048 BASIC METABOLIC PNL TOTAL CA: CPT

## 2022-07-01 RX ORDER — POLYETHYLENE GLYCOL 3350 17 G/17G
17 POWDER, FOR SOLUTION ORAL DAILY PRN
Status: DISCONTINUED | OUTPATIENT
Start: 2022-07-01 | End: 2022-07-02 | Stop reason: HOSPADM

## 2022-07-01 RX ORDER — SODIUM CHLORIDE 9 MG/ML
250 INJECTION, SOLUTION INTRAVENOUS AS NEEDED
Status: DISCONTINUED | OUTPATIENT
Start: 2022-07-01 | End: 2022-07-02 | Stop reason: HOSPADM

## 2022-07-01 RX ORDER — ACETAMINOPHEN 650 MG/1
650 SUPPOSITORY RECTAL
Status: DISCONTINUED | OUTPATIENT
Start: 2022-07-01 | End: 2022-07-02 | Stop reason: HOSPADM

## 2022-07-01 RX ORDER — SODIUM CHLORIDE 0.9 % (FLUSH) 0.9 %
5-40 SYRINGE (ML) INJECTION AS NEEDED
Status: DISCONTINUED | OUTPATIENT
Start: 2022-07-01 | End: 2022-07-02 | Stop reason: HOSPADM

## 2022-07-01 RX ORDER — BRIMONIDINE TARTRATE 2 MG/ML
1 SOLUTION/ DROPS OPHTHALMIC 2 TIMES DAILY
Status: DISCONTINUED | OUTPATIENT
Start: 2022-07-01 | End: 2022-07-02 | Stop reason: HOSPADM

## 2022-07-01 RX ORDER — SODIUM CHLORIDE 9 MG/ML
75 INJECTION, SOLUTION INTRAVENOUS CONTINUOUS
Status: DISCONTINUED | OUTPATIENT
Start: 2022-07-01 | End: 2022-07-02 | Stop reason: HOSPADM

## 2022-07-01 RX ORDER — METOPROLOL TARTRATE 25 MG/1
25 TABLET, FILM COATED ORAL 2 TIMES DAILY
Status: DISCONTINUED | OUTPATIENT
Start: 2022-07-01 | End: 2022-07-01

## 2022-07-01 RX ORDER — MAGNESIUM SULFATE 100 %
4 CRYSTALS MISCELLANEOUS AS NEEDED
Status: DISCONTINUED | OUTPATIENT
Start: 2022-07-01 | End: 2022-07-02 | Stop reason: HOSPADM

## 2022-07-01 RX ORDER — SODIUM CHLORIDE 0.9 % (FLUSH) 0.9 %
5-40 SYRINGE (ML) INJECTION EVERY 8 HOURS
Status: DISCONTINUED | OUTPATIENT
Start: 2022-07-01 | End: 2022-07-02 | Stop reason: HOSPADM

## 2022-07-01 RX ORDER — ONDANSETRON 4 MG/1
4 TABLET, ORALLY DISINTEGRATING ORAL
Status: DISCONTINUED | OUTPATIENT
Start: 2022-07-01 | End: 2022-07-02 | Stop reason: HOSPADM

## 2022-07-01 RX ORDER — FUROSEMIDE 10 MG/ML
20 INJECTION INTRAMUSCULAR; INTRAVENOUS ONCE
Status: DISCONTINUED | OUTPATIENT
Start: 2022-07-01 | End: 2022-07-01

## 2022-07-01 RX ORDER — ENOXAPARIN SODIUM 100 MG/ML
40 INJECTION SUBCUTANEOUS DAILY
Status: DISCONTINUED | OUTPATIENT
Start: 2022-07-01 | End: 2022-07-01

## 2022-07-01 RX ORDER — METFORMIN HYDROCHLORIDE 500 MG/1
500 TABLET ORAL
Status: DISCONTINUED | OUTPATIENT
Start: 2022-07-02 | End: 2022-07-02 | Stop reason: HOSPADM

## 2022-07-01 RX ORDER — ALLOPURINOL 100 MG/1
100 TABLET ORAL DAILY
Status: DISCONTINUED | OUTPATIENT
Start: 2022-07-01 | End: 2022-07-02 | Stop reason: HOSPADM

## 2022-07-01 RX ORDER — ONDANSETRON 2 MG/ML
4 INJECTION INTRAMUSCULAR; INTRAVENOUS
Status: DISCONTINUED | OUTPATIENT
Start: 2022-07-01 | End: 2022-07-02 | Stop reason: HOSPADM

## 2022-07-01 RX ORDER — INSULIN LISPRO 100 [IU]/ML
INJECTION, SOLUTION INTRAVENOUS; SUBCUTANEOUS
Status: DISCONTINUED | OUTPATIENT
Start: 2022-07-01 | End: 2022-07-02 | Stop reason: HOSPADM

## 2022-07-01 RX ORDER — METOPROLOL TARTRATE 25 MG/1
12.5 TABLET, FILM COATED ORAL 2 TIMES DAILY
Status: DISCONTINUED | OUTPATIENT
Start: 2022-07-01 | End: 2022-07-02 | Stop reason: HOSPADM

## 2022-07-01 RX ORDER — ACETAMINOPHEN 325 MG/1
650 TABLET ORAL
Status: DISCONTINUED | OUTPATIENT
Start: 2022-07-01 | End: 2022-07-02 | Stop reason: HOSPADM

## 2022-07-01 RX ORDER — GUAIFENESIN 100 MG/5ML
81 LIQUID (ML) ORAL DAILY
Status: DISCONTINUED | OUTPATIENT
Start: 2022-07-01 | End: 2022-07-02 | Stop reason: HOSPADM

## 2022-07-01 RX ORDER — ATORVASTATIN CALCIUM 20 MG/1
20 TABLET, FILM COATED ORAL
Status: DISCONTINUED | OUTPATIENT
Start: 2022-07-01 | End: 2022-07-02 | Stop reason: HOSPADM

## 2022-07-01 RX ADMIN — SODIUM CHLORIDE 75 ML/HR: 9 INJECTION, SOLUTION INTRAVENOUS at 21:56

## 2022-07-01 RX ADMIN — METOPROLOL TARTRATE 12.5 MG: 25 TABLET, FILM COATED ORAL at 19:17

## 2022-07-01 RX ADMIN — SODIUM CHLORIDE 75 ML/HR: 9 INJECTION, SOLUTION INTRAVENOUS at 01:23

## 2022-07-01 RX ADMIN — SODIUM CHLORIDE, PRESERVATIVE FREE 10 ML: 5 INJECTION INTRAVENOUS at 21:56

## 2022-07-01 RX ADMIN — ATORVASTATIN CALCIUM 20 MG: 20 TABLET, FILM COATED ORAL at 21:56

## 2022-07-01 RX ADMIN — DOXYCYCLINE 100 MG: 100 INJECTION, POWDER, LYOPHILIZED, FOR SOLUTION INTRAVENOUS at 00:00

## 2022-07-01 RX ADMIN — METOPROLOL TARTRATE 12.5 MG: 25 TABLET, FILM COATED ORAL at 09:43

## 2022-07-01 RX ADMIN — SODIUM CHLORIDE, PRESERVATIVE FREE 10 ML: 5 INJECTION INTRAVENOUS at 13:42

## 2022-07-01 RX ADMIN — BRIMONIDINE TARTRATE 1 DROP: 2 SOLUTION OPHTHALMIC at 19:19

## 2022-07-01 RX ADMIN — BRIMONIDINE TARTRATE 1 DROP: 2 SOLUTION OPHTHALMIC at 09:44

## 2022-07-01 RX ADMIN — ALLOPURINOL 100 MG: 100 TABLET ORAL at 09:43

## 2022-07-01 NOTE — ED NOTES
Patient is being transferred to hospitals Medical Oncology, Room # 1140. Report given to Vandana Solis RN on Nelile Marc for routine progression of care. Report consisted of the following information SBAR, Kardex, ED Summary, Intake/Output, MAR, Recent Results and Cardiac Rhythm sinus. Patient transferred to receiving unit by: Liliya Rocha (RN or tech name). Outstanding consults needed: Yes    Next labs due: Yes    The following personal items will be sent with the patient during transfer to the floor:     All valuables:    Cardiac monitoring ordered: No     The following CURRENT information was reported to the receiving RN:    Code status: DNR at time of transfer    Last set of vital signs:  Vital Signs  Level of Consciousness: Alert (0) (07/01/22 0141)  Temp: 99.2 °F (37.3 °C) (07/01/22 0141)  Temp Source: Oral (07/01/22 0141)  Pulse (Heart Rate): 67 (07/01/22 0226)  Heart Rate Source: Monitor (07/01/22 0141)  Resp Rate: 18 (07/01/22 0226)  BP: (!) 112/45 (07/01/22 0226)  MAP (Monitor): 66 (07/01/22 0226)  MAP (Calculated): 67 (07/01/22 0226)  BP 1 Location: Right upper arm (07/01/22 0141)  BP 1 Method: Automatic (07/01/22 0141)  BP Patient Position: At rest (07/01/22 0141)  MEWS Score: 1 (07/01/22 0141)         Oxygen Therapy  O2 Sat (%): 98 % (07/01/22 0226)  Pulse via Oximetry: 58 beats per minute (07/01/22 0226)  O2 Device: None (Room air) (06/30/22 2139)      Last pain assessment:  Pain 1  Pain Scale 1: Numeric (0 - 10)  Pain Intensity 1: 0  Patient Stated Pain Goal: 0      Wounds: No     Urinary catheter: straight cath  Is there a alvarado order: No     LDAs:       Peripheral IV 06/30/22 Right Forearm (Active)       Peripheral IV 07/01/22 Left Antecubital (Active)   Site Assessment Clean, dry, & intact 07/01/22 0022   Phlebitis Assessment 0 07/01/22 0022   Infiltration Assessment 0 07/01/22 0022   Dressing Status Clean, dry, & intact 07/01/22 0022         Opportunity for questions and clarification was provided.     Thomas Henning RN

## 2022-07-01 NOTE — PROGRESS NOTES
Hospitalist Progress Note    Subjective:   Daily Progress Note: 7/1/2022 11:08 AM    Hospital Course:  Kristian Shah[age] y. o. male,with a known history of diabetes mellitus type 2 with nephropathy, primary hypertension, coronary artery disease, dyslipidemia, chronic lymphocytic leukemia, followed by Charlene Fink MD with [facility],  presenting the emergency department complaining of leg pain.  Reports pain in the left thigh.  Started today.  No distal numbness or tingling, no falls or injury.  Patient has a history of leukemia, received chemo and a blood transfusion today and he has CLL. nothing makes it better, nothing makes it worse.  Noticed some erythema over the left medial thigh. Pt presented with low hemoglobin and hyponatremia. Subjective: Pt seen in room, states has some leg weakness when ambulating,   No other complaints this morning.      Current Facility-Administered Medications   Medication Dose Route Frequency    allopurinoL (ZYLOPRIM) tablet 100 mg  100 mg Oral DAILY    aspirin chewable tablet 81 mg  81 mg Oral DAILY    brimonidine (ALPHAGAN) 0.2 % ophthalmic solution 1 Drop  1 Drop Both Eyes BID    atorvastatin (LIPITOR) tablet 20 mg  20 mg Oral QHS    sodium chloride (NS) flush 5-40 mL  5-40 mL IntraVENous Q8H    sodium chloride (NS) flush 5-40 mL  5-40 mL IntraVENous PRN    acetaminophen (TYLENOL) tablet 650 mg  650 mg Oral Q6H PRN    Or    acetaminophen (TYLENOL) suppository 650 mg  650 mg Rectal Q6H PRN    polyethylene glycol (MIRALAX) packet 17 g  17 g Oral DAILY PRN    ondansetron (ZOFRAN ODT) tablet 4 mg  4 mg Oral Q8H PRN    Or    ondansetron (ZOFRAN) injection 4 mg  4 mg IntraVENous Q6H PRN    0.9% sodium chloride infusion  75 mL/hr IntraVENous CONTINUOUS    0.9% sodium chloride infusion 250 mL  250 mL IntraVENous PRN    metoprolol tartrate (LOPRESSOR) tablet 12.5 mg  12.5 mg Oral BID    [START ON 7/2/2022] metFORMIN (GLUCOPHAGE) tablet 500 mg  500 mg Oral DAILY WITH BREAKFAST    0.9% sodium chloride infusion 250 mL  250 mL IntraVENous PRN     Facility-Administered Medications Ordered in Other Encounters   Medication Dose Route Frequency    0.9% sodium chloride infusion 250 mL  250 mL IntraVENous PRN        Review of Systems:    Review of Systems   Constitutional: Negative for chills and fever. HENT: Negative for congestion and sore throat. Respiratory: Negative. Negative for wheezing. Cardiovascular: Negative for chest pain and palpitations. Gastrointestinal: Negative for abdominal pain, heartburn, nausea and vomiting. Genitourinary: Negative for dysuria and urgency. Musculoskeletal: Positive for joint pain. Neurological: Negative for dizziness and headaches. Objective:     Visit Vitals  BP (!) 126/43 (BP 1 Location: Right upper arm, BP Patient Position: At rest)   Pulse (!) 59   Temp 98.3 °F (36.8 °C)   Resp 18   Ht 5' 7\" (1.702 m)   Wt 66.8 kg (147 lb 4.3 oz)   SpO2 98%   BMI 23.07 kg/m²      O2 Device: None (Room air)    Temp (24hrs), Av.5 °F (36.9 °C), Min:98 °F (36.7 °C), Max:99.2 °F (37.3 °C)      No intake/output data recorded.  1901 -  0700  In: 1417.1 [I.V.:1008.3]  Out: 875 [Urine:875]    PHYSICAL EXAM:    Physical Exam  Constitutional:       General: He is not in acute distress. Cardiovascular:      Rate and Rhythm: Normal rate and regular rhythm. Pulses: Normal pulses. Heart sounds: Murmur heard. Abdominal:      General: Bowel sounds are normal.      Palpations: Abdomen is soft. Musculoskeletal:         General: Normal range of motion. Skin:     General: Skin is warm and dry. Neurological:      Mental Status: He is oriented to person, place, and time.    Psychiatric:         Behavior: Behavior normal.            Data Review    Recent Results (from the past 24 hour(s))   SAMPLES BEING HELD    Collection Time: 22  9:46 PM   Result Value Ref Range    SAMPLES BEING HELD PST, BLUE, LAV     COMMENT Add-on orders for these samples will be processed based on acceptable specimen integrity and analyte stability, which may vary by analyte. METABOLIC PANEL, BASIC    Collection Time: 06/30/22  9:46 PM   Result Value Ref Range    Sodium 122 (L) 136 - 145 mmol/L    Potassium 5.3 (H) 3.5 - 5.1 mmol/L    Chloride 93 (L) 97 - 108 mmol/L    CO2 21 21 - 32 mmol/L    Anion gap 8 5 - 15 mmol/L    Glucose 104 (H) 65 - 100 mg/dL    BUN 21 (H) 6 - 20 MG/DL    Creatinine 1.29 0.70 - 1.30 MG/DL    BUN/Creatinine ratio 16 12 - 20      GFR est AA >60 >60 ml/min/1.73m2    GFR est non-AA 53 (L) >60 ml/min/1.73m2    Calcium 8.7 8.5 - 10.1 MG/DL   CBC WITH AUTOMATED DIFF    Collection Time: 06/30/22  9:46 PM   Result Value Ref Range    .0 (HH) 4.1 - 11.1 K/uL    RBC 2.25 (L) 4.10 - 5.70 M/uL    HGB 6.7 (L) 12.1 - 17.0 g/dL    HCT 25.9 (L) 36.6 - 50.3 %    .1 (H) 80.0 - 99.0 FL    MCH 29.8 26.0 - 34.0 PG    MCHC 25.9 (L) 30.0 - 36.5 g/dL    RDW ABNORMAL 11.5 - 14.5 %    PLATELET 307 (L) 021 - 400 K/uL    MPV 11.9 8.9 - 12.9 FL    NRBC 0.0 0  WBC    ABSOLUTE NRBC 0.03 (H) 0.00 - 0.01 K/uL    NEUTROPHILS 2 (L) 32 - 75 %    LYMPHOCYTES 96 (H) 12 - 49 %    MONOCYTES 2 (L) 5 - 13 %    EOSINOPHILS 0 0 - 7 %    BASOPHILS 0 0 - 1 %    IMMATURE GRANULOCYTES 0 0.0 - 0.5 %    ABS. NEUTROPHILS 6.6 1.8 - 8.0 K/UL    ABS. LYMPHOCYTES 317.8 (H) 0.8 - 3.5 K/UL    ABS. MONOCYTES 6.6 (H) 0.0 - 1.0 K/UL    ABS. EOSINOPHILS 0.0 0.0 - 0.4 K/UL    ABS. BASOPHILS 0.0 0.0 - 0.1 K/UL    ABS. IMM.  GRANS. 0.0 0.00 - 0.04 K/UL    DF MANUAL      RBC COMMENTS ANISOCYTOSIS  1+        RBC COMMENTS MACROCYTOSIS  1+        RBC COMMENTS MICROCYTOSIS  1+        RBC COMMENTS HYPOCHROMIA  PRESENT        WBC COMMENTS SMUDGE CELLS     TYPE & SCREEN    Collection Time: 06/30/22 11:29 PM   Result Value Ref Range    Crossmatch Expiration 07/03/2022,2359     ABO/Rh(D) A POSITIVE     Antibody screen NEG     Unit number [id number]     Blood component type [facility]     Unit division 00     Status of unit ISSUED     Crossmatch result Compatible     Unit number [id number]     Blood component type [facility]     Unit division 00     Status of unit ALLOCATED     Crossmatch result Compatible    RBC, ALLOCATE    Collection Time: 06/30/22 11:30 PM   Result Value Ref Range    HISTORY CHECKED? Historical check performed    SODIUM, UR, RANDOM    Collection Time: 07/01/22  2:17 AM   Result Value Ref Range    Sodium,urine random 43 MMOL/L   CREATININE, UR, RANDOM    Collection Time: 07/01/22  2:17 AM   Result Value Ref Range    Creatinine, urine 73.50 mg/dL   OSMOLALITY, UR    Collection Time: 07/01/22  2:17 AM   Result Value Ref Range    Osmolality,urine 399 MOSM/kg W1W   METABOLIC PANEL, BASIC    Collection Time: 07/01/22  5:40 AM   Result Value Ref Range    Sodium 124 (L) 136 - 145 mmol/L    Potassium 5.0 3.5 - 5.1 mmol/L    Chloride 98 97 - 108 mmol/L    CO2 21 21 - 32 mmol/L    Anion gap 5 5 - 15 mmol/L    Glucose 88 65 - 100 mg/dL    BUN 17 6 - 20 MG/DL    Creatinine 1.08 0.70 - 1.30 MG/DL    BUN/Creatinine ratio 16 12 - 20      GFR est AA >60 >60 ml/min/1.73m2    GFR est non-AA >60 >60 ml/min/1.73m2    Calcium 8.3 (L) 8.5 - 10.1 MG/DL   CBC WITH AUTOMATED DIFF    Collection Time: 07/01/22  5:40 AM   Result Value Ref Range    .5 (HH) 4.1 - 11.1 K/uL    RBC 2.19 (L) 4.10 - 5.70 M/uL    HGB 6.8 (L) 12.1 - 17.0 g/dL    HCT 24.3 (L) 36.6 - 50.3 %    .0 (H) 80.0 - 99.0 FL    MCH 31.1 26.0 - 34.0 PG    MCHC 28.0 (L) 30.0 - 36.5 g/dL    RDW ABNORMAL 11.5 - 14.5 %    PLATELET 88 (L) 496 - 400 K/uL    MPV 11.7 8.9 - 12.9 FL    NRBC 0.0 0  WBC    ABSOLUTE NRBC 0.00 0.00 - 0.01 K/uL    NEUTROPHILS 1 (L) 32 - 75 %    LYMPHOCYTES 93 (H) 12 - 49 %    MONOCYTES 6 5 - 13 %    EOSINOPHILS 0 0 - 7 %    BASOPHILS 0 0 - 1 %    IMMATURE GRANULOCYTES 0 0.0 - 0.5 %    ABS. NEUTROPHILS 2.5 1.8 - 8.0 K/UL    ABS. LYMPHOCYTES 236.7 (H) 0.8 - 3.5 K/UL    ABS.  MONOCYTES 15.3 (H) 0.0 - 1.0 K/UL    ABS. EOSINOPHILS 0.0 0.0 - 0.4 K/UL    ABS. BASOPHILS 0.0 0.0 - 0.1 K/UL    ABS. IMM. GRANS. 0.0 0.00 - 0.04 K/UL    DF MANUAL      RBC COMMENTS ANISOCYTOSIS  1+        RBC COMMENTS MACROCYTOSIS  2+        WBC COMMENTS SMUDGE CELLS SEEN     PROTHROMBIN TIME + INR    Collection Time: 07/01/22  5:40 AM   Result Value Ref Range    INR 1.2 (H) 0.9 - 1.1      Prothrombin time 12.2 (H) 9.0 - 11.1 sec   PTT    Collection Time: 07/01/22  5:40 AM   Result Value Ref Range    aPTT 28.2 22.1 - 31.0 sec    aPTT, therapeutic range     58.0 - 77.0 SECS   OSMOLALITY, SERUM/PLASMA    Collection Time: 07/01/22  5:40 AM   Result Value Ref Range    Osmolality, serum/plasma 269 mOsm/kg H2O   RBC, ALLOCATE    Collection Time: 07/01/22  8:15 AM   Result Value Ref Range    HISTORY CHECKED? Historical check performed    GLUCOSE, POC    Collection Time: 07/01/22  8:28 AM   Result Value Ref Range    Glucose (POC) 99 65 - 117 mg/dL    Performed by [facility] PCT        DUPLEX LOWER EXT VENOUS LEFT   Final Result          Active Problems:    CLL (chronic lymphocytic leukemia) (HCC) ()      Hyponatremia (7/1/2022)      Anemia (7/1/2022)      Left leg cellulitis (7/1/2022)        Assessment/Plan: 1. Hyponatremia- continue IV NS, nephrology following, check bmp at 1500 today/    2. Acute anemia- ? R/t to chemotherapy treatment, heme/onc has been consulted, hold PO chemo medication for now. Transfuse second unit of PRBCs this morning, will repeat cbc in am    3. Left leg cellulitis- there is no evidence , d/c doxycycline. 4. Generalized weakness - OT/PT consults , OOB to chair.          DVT Prophylaxis:sequential compression  Code Status:  DNR  POA: daughter America Stout [address] discussed with:   ______staff nurse, ARAM, patient_________________________________________________________    Eliceo Bedoya NP

## 2022-07-01 NOTE — PROGRESS NOTES
Transition of Care Plan:    RUR: 15% moderate risk for readmission. Disposition: Home with private duty support vs home with daughterBasim  Follow up appointments: PCP, Specialists if indicated  DME needed: Pt owns a cane. Therapy consults pending   Transportation at Discharge: Pt's daughter, Kushal Yan or means to access home:  Has access      IM Medicare Letter: Will need [facility]nd [facility] Letter prior to d/c  Is patient a BCPI-A Bundle:  N/A         If yes, was Bundle Letter given?:    Is patient a [place] and connected with the [place]? N/A               If yes, was [place] transfer form completed and [facility] notified? Caregiver Contact: Pt's daughterMicaela Kori) [phone]  Discharge Caregiver contacted prior to discharge? To be contacted again prior to d/c  Care Conference needed?: Not indicated                     Reason for Admission: Hyponatremia, decreased oral intake                       RUR Score:  15% moderate risk for readmission. PCP: First and Last name:   Craig Castillo MD     Name of Practice:    Are you a current patient: Yes/No: Yes   Approximate date of last visit: 6/23/22   Can you participate in a virtual visit if needed: If needed    Do you (patient/family) have any concerns for transition/discharge? No concerns voiced at this time - disposition will depend on pt's progress during hospitalization              Plan for utilizing home health:  Pt has previous [facility] hx with [address]. Awaiting PT/OT recommendations. Current Advanced Directive/Advance Care Plan:  DNR   Advance Care Planning   Healthcare Decision Maker: Legal next of kin: Mary Casperet, daughter and Archie Daltonty, daughter. Today we documented Decision Maker(s) consistent with Legal Next of Kin hierarchy. Healthcare Decision Maker:   No ACP documents, pt's daughters would be legal next of kin for healthcare decision making.      Transition of Care Plan:  Home with private duty support vs home with daughter, Chris Tesfaye     CM reviewed chart. CM completed assessment with pt's daughter, Chris Tesfaye, per request of pt. CM introduced self, role of CM, verified demographics, and discussed transition of care planning. Pt typically resides alone in his home in [place], [place] where family has hired private duty care support 5 days/week from 9 AM - 1 PM. Care support assists with meals and light housekeeping. Pt's daughter, Chris Tesfaye, stays with him occasionally and at time pt stays with her at her home: [address] [facility], [address] Pt has a cane, is independent with ADLs at baseline. Pt's daughter provides transportation to [facility] and will transport home at d/c. Pending OT/PT consults. [facility] and sees Urologist, Dr. Paola Flanagan, and Dr. Romelia Biggs, cardiology 1x/year. Care management will continue to be available to assist as transition of care planning needs arise. Care Management Interventions  PCP Verified by CM: Yes  Palliative Care Criteria Met (RRAT>21 & CHF Dx)?: No  Mode of Transport at Discharge:  Other (see comment) (Daughter, Chris Tesfaye)  Transition of Care Consult (CM Consult): Discharge Planning  Discharge Durable Medical Equipment: No (Has a cane)  Physical Therapy Consult: Yes  Occupational Therapy Consult: Yes  Speech Therapy Consult: No  Support Systems: Child(charmaine),Other Family Member(s) (Daughters, son in law)  Confirm Follow Up Transport: Family (Tiffany, Chris Tesfaye)  Discharge Location  Patient Expects to be Discharged to[de-identified] [address] [place] [address] [id number], [address]

## 2022-07-01 NOTE — PROGRESS NOTES
Spiritual Care Assessment/Progress Note  [facility]      NAME: Warden Mclean      MRN: [id number]  AGE: 80 y.o.  SEX: male  Yarsani Affiliation: Anabaptist   Language: English     7/1/2022     Total Time (in minutes): 68     Spiritual Assessment begun in [facility] CLINICAL [facility]BS through conversation with:         [x]Patient        [x] Family    [] Friend(s)        Reason for Consult: Advance medical directive consult     Spiritual beliefs: (Please include comment if needed)     [x] Identifies with a jose tradition:    Anabaptist     [x] Supported by a jose community:  [employer]          [] Claims no spiritual orientation:           [] Seeking spiritual identity:                [] Adheres to an individual form of spirituality:           [] Not able to assess:                           Identified resources for coping:      [x] Prayer                               [] Music                  [] Guided Imagery     [x] Family/friends                 [] Pet visits     [] Devotional reading                         [] Unknown     [] Other:                                          Interventions offered during this visit: (See comments for more details)    Patient Interventions: Advance medical directive completed,Advance medical directive consult,Affirmation of emotions/emotional suffering,Iconic (affirming the presence of God/Higher Power),Initial/Spiritual assessment, patient floor,Normalization of emotional/spiritual concerns,Prayer (assurance of),Yarsani beliefs/image of God discussed     Family/Friend(s): Advance medical directive consult     Plan of Care:     [x] Support spiritual and/or cultural needs    [] Support AMD and/or advance care planning process      [] Support grieving process   [] Coordinate Rites and/or Rituals    [] Coordination with community clergy   [] No spiritual needs identified at this time   [] Detailed Plan of Care below (See Comments)  [] Make referral to Music Therapy  [] Make referral to Pet Therapy     [] Make referral to Addiction services  [] Make referral to [facility]  [] Make referral to Spiritual Care Partner  [] No future visits requested        [x] Contact Spiritual Care for further referrals     Comments:    ACP Assessment  Received in basket request for advance medical directive consult. Consulted with Iza prior to meeting with patient on Clinical Obs unit. Daughter Alon Springer and son-in-law were present. Explained state hierarchy of decision makers. Patient indicated he wanted Alon Springer as his primary decision maker and daughter Zeeshan Lott as his secondary decision maker. Maryjane Albarran; she is in agreement with serving as secondary decision maker. Explained document to patient and family present and assisted in completing document. Gave original and two copies to patient's daughter and placed one copy on patient's chart. Initiated spiritual assessment. Patient is a member of [employer] [place]. Consuelo is important to him. No spiritual concerns were expressed during visit. Mr. Krishna Hdez was receptive to assurance of prayer.      CARLINE Pierce, [facility], Staff [address]    [address] Paging Service  [phone]

## 2022-07-01 NOTE — PROGRESS NOTES
Problem: Falls - Risk of  Goal: *Absence of Falls  Description: Document Oscar Stockton Fall Risk and appropriate interventions in the flowsheet.   Outcome: Progressing Towards Goal  Note: Fall Risk Interventions:            Medication Interventions: Patient to call before getting OOB,Bed/chair exit alarm                   Problem: Patient Education: Go to Patient Education Activity  Goal: Patient/Family Education  Outcome: Progressing Towards Goal

## 2022-07-01 NOTE — ACP (ADVANCE CARE PLANNING)
Advance Care Planning   Advance Care Planning Inpatient Note  [address]  [facility] Care Department    Today's Date: 7/1/2022  Unit: [facility] CLINICAL OBS    Received request from in basket. Upon review of chart and communication with care team, patient's decision making abilities are not in question. Patient and Child/children were present in the room during visit. Goals of ACP Conversation:  Discuss Advance Care planning documents  Establish healthcare decision makers    Health Care Decision Makers:      Primary Decision Maker: Xiomy Spicer - Daughter - [phone]    Secondary Decision Maker: Kota Oneal - Daughter - [phone]      Summary:  Completed [address] [place] (Patient Wishes) on file:  Healthcare Power of /Advance Directive appointment of Health care agent  Living Will/ Advance Directive  Portable DNR form     Assessment  Received in basket request for advance medical directive consult. Consulted with Iza prior to meeting with patient on Clinical Obs unit. Daughter Ashley Brody and son-in-law were present. Explained state hierarchy of decision makers. Patient indicated he wanted Ashley Brody as his primary decision maker and daughter Madelyn Wray as his secondary decision maker. Chava Nguyen; she is in agreement with serving as secondary decision maker. Explained document to patient and family present and assisted in completing document. Gave original and two copies to patient's daughter and placed one copy on patient's chart.      Interventions:  Provided education on documents for clarity and greater understanding  Discussed and provided education on state decision maker hierarchy  Assisted in the completion of documents according to patient's wishes at this time    Care Preferences Communicated:  No    Outcomes/Plan:  ACP Discussion Completed  New Advance Directive completed  Returned original document(s) to patient, as well as copies for distribution to appointed agents  placed copy of document on patient chart    Gerardo Christopher [facility] on 7/1/2022 at 11:07 AM

## 2022-07-01 NOTE — H&P
Hospitalist Admission Note    NAME: Maddy Henderson   :  10/4/1933   MRN:  [id number]     Date/Time:  2022 12:35 AM    Patient PCP: Gil Vanessa MD  _____________________________________________________________________  Given the patient's current clinical presentation, I have a high level of concern for decompensation if discharged from the emergency department. Complex decision making was performed, which includes reviewing the patient's available past medical records, laboratory results, and x-ray films. My assessment of this patient's clinical condition and my plan of care is as follows. Assessment / Plan:  Hyponatremia 122 patient is hypovolemic   Decrease Oral Intake Per her daughter at bedside who is a nurse  No Mental status Changes  Left leg probably cellulitis  Anemia patient had a blood transfusion earlier yesterday  Today hemoglobin is 6.7 ED is transfusing 1 more unit  Potassium 5.3  Leukocytosis 331 likely secondary to CLL    Admit to hospitalist service  Will give IV hydration and will check urine osmolality and serum osmolality we will check urine lites, closely monitor sodium level, nephrology consulted for input, will start on IV doxycycline for possible early left leg cellulitis, hematology also consulted as patient has persistently low hemoglobin despite transfusion, will check stool occult blood        Stage 3a chronic kidney disease  Cr 1.29  monitor  CLL (follow[facility])   HTN  DMII  HLD  Peripheral vascular disease s/p lower extremity stenting, CAD s/p CABG     Resume home meds as per  Med rec. Except hold asa 2/2 worseing anemia            Code Status: DNR  Confirm with daughter  Surrogate Decision MakerNannie Exon Daughter [phone]   Kota Oneal Daughter   [phone]       DVT Prophylaxis: Hold anticoagulation secondary to anemia  GI Prophylaxis: not indicated    Baseline:  Independent        Subjective:   CHIEF COMPLAINT:  Leg Pain  Pt arrives from home via EMS for left leg pain that began this evening. Pt has Leukemia and recieved his first blood transfusion today  6/30. Pt also recieves  recieved yesterday 6/29. No other complaints. HISTORY OF PRESENT ILLNESS:     Aixa Thomas, 80 y.o. Carlos Alejandro a known history of diabetes mellitus type 2 with nephropathy, primary hypertension, coronary artery disease, dyslipidemia, chronic lymphocytic leukemia, followed by Patrice Mathias MD with [facility],  presenting the emergency department complaining of leg pain. Reports pain in the left thigh. Started today. No distal numbness or tingling, no falls or injury. Patient has a history of leukemia, received chemo and a blood transfusion today and he has CLL. nothing makes it better, nothing makes it worse. Noticed some erythema over the left medial thigh. Vital Signs-Reviewed the patient's vital signs. Patient Vitals for the past 12 hrs:    Temp Pulse Resp BP SpO2   06/30/22 2139 98 °F (36.7 °C) 70 21 (!) 122/49 99 %        We were asked to admit for work up and evaluation of the above problems.      Past Medical History:   Diagnosis Date    Abnormal nuclear stress test 6/6/2014    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 ([facility] [address] 11/2/2015    Bereavement 02/06/2019    ltcf - uti -strangulated hernia - prostate ca - brother    Bronchiectasis ([address]     CAD (coronary artery disease)     Chest pain, unspecified     Chronic pain     right leg    CLL (chronic lymphocytic leukemia) ([facility] [address]     Jerica Segura md  [facility]    Diabetes ([facility] [address]     Essential hypertension     GERD (gastroesophageal reflux disease)     Hyperlipidemia     Hypertension     Opacity of lung on imaging study 05/28/2017    cxr    Rotator cuff arthropathy     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    S/P coronary artery stent placement 6/6/2014 6/6/14 PCI/GUANAKO to prox LAD        Past Surgical History:   Procedure Laterality Date    HX COLONOSCOPY      HX HEART CATHETERIZATION      PTCA SINGLE VESSEL  2015         VASCULAR SURGERY PROCEDURE UNLIST   and     BLE stenting       Social History     Tobacco Use    Smoking status: Former Smoker     Quit date: 1/15/1984     Years since quittin.4    Smokeless tobacco: Never Used    Tobacco comment: QUIT    Substance Use Topics    Alcohol use: No     Alcohol/week: 0.0 standard drinks     Comment: quit in         Family History   Problem Relation Age of Onset    Diabetes Mother     Stroke Father      Allergies   Allergen Reactions    Codeine Shortness of Breath    Lipitor [Atorvastatin] Nausea Only        Prior to Admission medications    Medication Sig Start Date End Date Taking? Authorizing Provider   simvastatin (ZOCOR) 40 mg tablet TAKE 1 TABLET DAILY IN THE EVENING 22   Paul Jordan MD   metFORMIN (GLUCOPHAGE) 500 mg tablet TAKE 1 TABLET DAILY 2/10/22   Val Espinoza MD   glucose blood VI test strips (OneTouch Ultra Test) strip USE TO TEST BLOOD SUGAR ONCE DAILY. DX.E11.9 22   Harry Mckinney MD   ergocalciferol (ERGOCALCIFEROL) 1,250 mcg (50,000 unit) capsule  21   Provider, Historical   metoprolol tartrate (LOPRESSOR) 25 mg tablet TAKE 1 TABLET TWICE A DAY 9/3/21   Harry Mckinney MD   allopurinoL (ZYLOPRIM) 300 mg tablet TAKE ONE-HALF (1/2) TABLET DAILY 21   Harry Mckinney MD   omeprazole (PRILOSEC) 20 mg capsule TAKE 1 CAPSULE DAILY 18   Paul Jordan MD   cyanocobalamin 1,000 mcg tablet Take 1,000 mcg by mouth daily. Provider, Historical   aspirin 81 mg chewable tablet Take 81 mg by mouth daily. Provider, Historical   brimonidine (ALPHAGAN) 0.15 % ophthalmic solution Administer 1 Drop to both eyes two (2) times a day. Indications: OPEN ANGLE GLAUCOMA    Provider, Historical       REVIEW OF SYSTEMS:     I am not able to complete the review of systems because:    The patient is intubated and sedated    The patient has altered mental status due to his acute medical problems    The patient has baseline aphasia from prior stroke(s)    The patient has baseline dementia and is not reliable historian    The patient is in acute medical distress and unable to provide information           Constitutional: Negative for chills and fever. HENT: Negative for congestion and sore throat. Eyes: Negative for visual disturbance. Respiratory: Negative for cough and shortness of breath. Cardiovascular: Negative for chest pain and leg swelling. Gastrointestinal: Negative for abdominal pain, blood in stool, diarrhea, nausea and vomiting. Endocrine: Negative for polyuria. Genitourinary: Negative for dysuria and testicular pain. Musculoskeletal: Positive for arthralgias and myalgias. Negative for joint swelling. Skin: Positive for rash. Allergic/Immunologic: Negative for immunocompromised state. Neurological: Negative for weakness and headaches. Hematological: Does not bruise/bleed easily. Psychiatric/Behavioral: Negative for confusion. Objective:   VITALS:    Visit Vitals  BP (!) 125/41   Pulse 63   Temp 98 °F (36.7 °C)   Resp 22   Ht 5' 7\" (1.702 m)   Wt 66.8 kg (147 lb 4.3 oz)   SpO2 99%   BMI 23.07 kg/m²       PHYSICAL EXAM:    Constitutional:       Appearance: He is well-developed. HENT:      Head: Normocephalic and atraumatic. Eyes:      General:         Right eye: No discharge. Left eye: No discharge. Conjunctiva/sclera: Conjunctivae pale     Pupils: Pupils are equal, round, and reactive to light. Neck:      Trachea: No tracheal deviation. Cardiovascular:      Rate and Rhythm: Normal rate and regular rhythm. Heart sounds: Normal heart sounds. No murmur heard. Pulmonary:      Effort: Pulmonary effort is normal. No respiratory distress. Breath sounds: Normal breath sounds. No wheezing or rales. Abdominal:      General: Bowel sounds are normal.      Palpations: Abdomen is soft. Tenderness: There is no abdominal tenderness. There is no guarding or rebound. Musculoskeletal:         General: No tenderness or deformity. Normal range of motion. Cervical back: Normal range of motion and neck supple. Comments: Palpable pulses in the DP and PT bilaterally, normal sensation, normal plantar flexion, dorsiflexion, hip flexion. Has some erythema on the left medial thigh   Skin:     General: Skin is warm and dry. Findings: Erythema present. No rash. Comments: Mild erythema on the left medial thigh measures about 6 cm. No evidence of abscess. Neurological:      Mental Status: He is alert and oriented to person, place, and time. Psychiatric:         Behavior: Behavior normal.             _______________________________________________________________________  Care Plan discussed with:    Comments   Patient y    Family      RN y    Care Manager                    Consultant:  krystle Whitehead md   _______________________________________________________________________  Expected  Disposition:   Home with Family x   HH/PT/OT/RN    SNF/LTC    AMIRA    ________________________________________________________________________  TOTAL TIME:  54  Minutes    Critical Care Provided     Minutes non procedure based      Comments    x Reviewed previous records   >50% of visit spent in counseling and coordination of care x Discussion with patient and/or family and questions answered       Given the patient's current clinical presentation, I have a high level of concern for decompensation if discharged from the ED. Complex decision making was performed which includes reviewing the patient's available past medical records, laboratory results, and Xray films.  I have also directly communicated my plan and discussed this case with the involved ED physician.     ____________________________________________________________________  Rexie Adrian, MD    Procedures: see electronic medical records for all procedures/Xrays and details which were not copied into this note but were reviewed prior to creation of Plan. LAB DATA REVIEWED:    Recent Results (from the past 24 hour(s))   SAMPLES BEING HELD    Collection Time: 06/30/22  9:46 PM   Result Value Ref Range    SAMPLES BEING HELD PST, BLUE, LAV     COMMENT        Add-on orders for these samples will be processed based on acceptable specimen integrity and analyte stability, which may vary by analyte. METABOLIC PANEL, BASIC    Collection Time: 06/30/22  9:46 PM   Result Value Ref Range    Sodium 122 (L) 136 - 145 mmol/L    Potassium 5.3 (H) 3.5 - 5.1 mmol/L    Chloride 93 (L) 97 - 108 mmol/L    CO2 21 21 - 32 mmol/L    Anion gap 8 5 - 15 mmol/L    Glucose 104 (H) 65 - 100 mg/dL    BUN 21 (H) 6 - 20 MG/DL    Creatinine 1.29 0.70 - 1.30 MG/DL    BUN/Creatinine ratio 16 12 - 20      GFR est AA >60 >60 ml/min/1.73m2    GFR est non-AA 53 (L) >60 ml/min/1.73m2    Calcium 8.7 8.5 - 10.1 MG/DL   CBC WITH AUTOMATED DIFF    Collection Time: 06/30/22  9:46 PM   Result Value Ref Range    .0 (HH) 4.1 - 11.1 K/uL    RBC 2.25 (L) 4.10 - 5.70 M/uL    HGB 6.7 (L) 12.1 - 17.0 g/dL    HCT 25.9 (L) 36.6 - 50.3 %    .1 (H) 80.0 - 99.0 FL    MCH 29.8 26.0 - 34.0 PG    MCHC 25.9 (L) 30.0 - 36.5 g/dL    RDW ABNORMAL 11.5 - 14.5 %    PLATELET 903 (L) 608 - 400 K/uL    MPV 11.9 8.9 - 12.9 FL    NRBC 0.0 0  WBC    ABSOLUTE NRBC 0.03 (H) 0.00 - 0.01 K/uL    NEUTROPHILS 2 (L) 32 - 75 %    LYMPHOCYTES 96 (H) 12 - 49 %    MONOCYTES 2 (L) 5 - 13 %    EOSINOPHILS 0 0 - 7 %    BASOPHILS 0 0 - 1 %    IMMATURE GRANULOCYTES 0 0.0 - 0.5 %    ABS. NEUTROPHILS 6.6 1.8 - 8.0 K/UL    ABS. LYMPHOCYTES 317.8 (H) 0.8 - 3.5 K/UL    ABS. MONOCYTES 6.6 (H) 0.0 - 1.0 K/UL    ABS. EOSINOPHILS 0.0 0.0 - 0.4 K/UL    ABS. BASOPHILS 0.0 0.0 - 0.1 K/UL    ABS. IMM.  GRANS. 0.0 0.00 - 0.04 K/UL    DF MANUAL      RBC COMMENTS ANISOCYTOSIS  1+        RBC COMMENTS MACROCYTOSIS  1+        RBC COMMENTS MICROCYTOSIS  1+        RBC COMMENTS HYPOCHROMIA  PRESENT        WBC COMMENTS SMUDGE CELLS

## 2022-07-01 NOTE — PROGRESS NOTES
Comprehensive Nutrition Assessment    Type and Reason for Visit: Initial,Positive nutrition screen    Nutrition Recommendations/Plan:   1. Continue current diet. Consider liberalizing renal restrictions if not necessary. 2. Add Nepro BID (vanilla)  3. Please document % meals and supplements consumed in flowsheet I/O's under intake        Nutrition Assessment:     Auto-referral triggered by MST. Pt admitted for left leg pain and hyponatremia, medically noted for CLL, on chemo, DM2, neuropathy, primary HTN, CAD, DLD, CKD3. Pt reports a good appetite today and PTA. He has has some gradual weight loss recently, not quite significant for the time frame, but will need to monitor. Pt reports drinking one Ensure daily at home. Will order supplements BID while in house and continue monitoring. Wt Readings from Last 10 Encounters:   06/30/22 66.8 kg (147 lb 4.3 oz)   06/29/22 66.1 kg (145 lb 12.8 oz)   06/23/22 67.5 kg (148 lb 14.4 oz)   03/15/22 70.8 kg (156 lb 1.6 oz)   02/25/22 71.6 kg (157 lb 13.6 oz)   12/08/21 70.5 kg (155 lb 6.4 oz)   10/12/21 71.5 kg (157 lb 9.6 oz)   07/19/21 75.3 kg (165 lb 14.4 oz)   03/18/21 76.9 kg (169 lb 9.6 oz)   11/23/20 78.2 kg (172 lb 8 oz)   ]    Nutrition Related Findings:    Labs: Na 124, hgb 6.8. Meds: lipitor, humalog, metformin, doxycydline, NS. Edema: 1+LLE. BM PTA. Wound Type: None    Current Nutrition Intake & Therapies:  Average Meal Intake: %  Average Supplement Intake: None ordered  ADULT DIET Regular; Low Potassium (Less than 3000 mg/day); Low Phosphorus (Less than 1000 mg)    Anthropometric Measures:  Height: 5' 7\" (170.2 cm)  Ideal Body Weight (IBW): 148 lbs (67 kg)     Current Body Wt:  66.8 kg (147 lb 4.3 oz), 99.5 % IBW.  Bed scale  Current BMI (kg/m2): 23.1        Weight Adjustment: No adjustment                 BMI Category: Normal weight (BMI 22.0-24.9) age over 72    Estimated Daily Nutrient Needs:  Energy Requirements Based On: Formula  Weight Used for Energy Requirements: Current  Energy (kcal/day): 1686 kcals (BMR x 1. 3AF)  Weight Used for Protein Requirements: Current  Protein (g/day): 67g (1.0g/kg)  Method Used for Fluid Requirements: 1 ml/kcal  Fluid (ml/day): 1700mL    Nutrition Diagnosis:   · Increased nutrient needs related to catabolic illness as evidenced by  (CLL)      Nutrition Interventions:   Food and/or Nutrient Delivery: Continue current diet,Start oral nutrition supplement  Nutrition Education/Counseling: No recommendations at this time  Coordination of Nutrition Care: Continue to monitor while inpatient       Goals:     Goals:  (PO intake >70% meals and supplements next 4-6 days)       Nutrition Monitoring and Evaluation:   Behavioral-Environmental Outcomes: None identified  Food/Nutrient Intake Outcomes: Food and nutrient intake,Supplement intake  Physical Signs/Symptoms Outcomes: Biochemical data,GI status,Weight    Discharge Planning:    Continue current diet,Continue oral nutrition supplement    Afsaneh Aguilar RD  Contact: [phone]

## 2022-07-01 NOTE — ED PROVIDER NOTES
EMERGENCY DEPARTMENT HISTORY AND PHYSICAL EXAM      Date: 2022  Patient Name: Marivel Fajardo    Please note that this dictation was completed with [employer], the computer voice recognition software. Quite often unanticipated grammatical, syntax, homophones, and other interpretive errors are inadvertently transcribed by the computer software. Please disregard these errors. Please excuse any errors that have escaped final proofreading. History of Presenting Illness     Chief Complaint   Patient presents with    Leg Pain     Pt arrives from home via EMS for left leg pain that began this evening. Pt has Leukemia and recieved his first blood transfusion today. Pt also recieves Chemo and recieved yesterday. No other complaints. History Provided By: Patient     HPI: Marivel Fajardo, 80 y.o. male, presenting the emergency department complaining of leg pain. Reports pain in the left thigh. Started today. No distal numbness or tingling, no falls or injury. Patient has a history of leukemia, received chemo and a blood transfusion today and he has CLL. nothing makes it better, nothing makes it worse. Noticed some erythema over the left medial thigh.     PCP: Harry Mckinney MD    Current Facility-Administered Medications on File Prior to Encounter   Medication Dose Route Frequency Provider Last Rate Last Admin    0.9% sodium chloride infusion 250 mL  250 mL IntraVENous PRN Josiephine Jacob, NP        [COMPLETED] acetaminophen (TYLENOL) tablet 650 mg  650 mg Oral Nader Bookbinder B, NP   650 mg at 22 1140    [COMPLETED] diphenhydrAMINE (BENADRYL) capsule 25 mg  25 mg Oral Nader Bookbinder B, NP   25 mg at 22 1140    [] 0.9% sodium chloride infusion  25 mL/hr IntraVENous CONTINUOUS Josiephine MAREK James   Stopped at 22 1425     Current Outpatient Medications on File Prior to Encounter   Medication Sig Dispense Refill    simvastatin (ZOCOR) 40 mg tablet TAKE 1 TABLET DAILY IN THE EVENING 90 Tablet 3    metFORMIN (GLUCOPHAGE) 500 mg tablet TAKE 1 TABLET DAILY 90 Tablet 3    glucose blood VI test strips (OneTouch Ultra Test) strip USE TO TEST BLOOD SUGAR ONCE DAILY. DX.E11.9 100 Strip 11    ergocalciferol (ERGOCALCIFEROL) 1,250 mcg (50,000 unit) capsule  (Patient not taking: Reported on 7/1/2022)      metoprolol tartrate (LOPRESSOR) 25 mg tablet TAKE 1 TABLET TWICE A  Tablet 3    allopurinoL (ZYLOPRIM) 300 mg tablet TAKE ONE-HALF (1/2) TABLET DAILY 90 Tablet 3    omeprazole (PRILOSEC) 20 mg capsule TAKE 1 CAPSULE DAILY (Patient not taking: Reported on 7/1/2022) 90 Cap 1    cyanocobalamin 1,000 mcg tablet Take 1,000 mcg by mouth daily. (Patient not taking: Reported on 7/1/2022)      aspirin 81 mg chewable tablet Take 81 mg by mouth daily.  brimonidine (ALPHAGAN) 0.15 % ophthalmic solution Administer 1 Drop to both eyes two (2) times a day.  Indications: OPEN ANGLE GLAUCOMA         Past History     Past Medical History:  Past Medical History:   Diagnosis Date    Abnormal nuclear stress test 6/6/2014    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 ([facility] [address] 11/2/2015    Bereavement 02/06/2019    [facility]cf - uti -strangulated hernia - prostate ca - brother    Bronchiectasis ([facility] [address]     CAD (coronary artery disease)     Chest pain, unspecified     Chronic pain     right leg    CLL (chronic lymphocytic leukemia) ([facility] [address]     Jerica Segura md  [facility]    Diabetes ([facility] [address] [facility]     Essential hypertension     GERD (gastroesophageal reflux disease)     Hyperlipidemia     Hypertension     Opacity of lung on imaging study 05/28/2017    cxr    Rotator cuff arthropathy     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    S/P coronary artery stent placement 6/6/2014 6/6/14 PCI/GUANAKO to prox LAD       Past Surgical History:  Past Surgical History:   Procedure Laterality Date    HX COLONOSCOPY      HX HEART CATHETERIZATION      PTCA SINGLE VESSEL  4/4/2015         VASCULAR SURGERY PROCEDURE UNLIST  2014 and 2015    BLE stenting       Family History:  Family History   Problem Relation Age of Onset    Diabetes Mother     Stroke Father        Social History:  Social History     Tobacco Use    Smoking status: Former Smoker     Quit date: 1/15/1984     Years since quittin.4    Smokeless tobacco: Never Used    Tobacco comment: QUIT    Vaping Use    Vaping Use: Never used   Substance Use Topics    Alcohol use: No     Alcohol/week: 0.0 standard drinks     Comment: quit in     Drug use: No     Types: Prescription, OTC       Allergies: Allergies   Allergen Reactions    Codeine Shortness of Breath    Lipitor [Atorvastatin] Nausea Only         Review of Systems   Review of Systems   Constitutional: Negative for chills and fever. HENT: Negative for congestion and sore throat. Eyes: Negative for visual disturbance. Respiratory: Negative for cough and shortness of breath. Cardiovascular: Negative for chest pain and leg swelling. Gastrointestinal: Negative for abdominal pain, blood in stool, diarrhea, nausea and vomiting. Endocrine: Negative for polyuria. Genitourinary: Negative for dysuria and testicular pain. Musculoskeletal: Positive for arthralgias and myalgias. Negative for joint swelling. Skin: Positive for rash. Allergic/Immunologic: Negative for immunocompromised state. Neurological: Negative for weakness and headaches. Hematological: Does not bruise/bleed easily. Psychiatric/Behavioral: Negative for confusion. Physical Exam   Physical Exam  Vitals and nursing note reviewed. Constitutional:       Appearance: He is well-developed. HENT:      Head: Normocephalic and atraumatic. Eyes:      General:         Right eye: No discharge. Left eye: No discharge. Conjunctiva/sclera: Conjunctivae normal.      Pupils: Pupils are equal, round, and reactive to light. Neck:      Trachea: No tracheal deviation. Cardiovascular:      Rate and Rhythm: Normal rate and regular rhythm. Heart sounds: Normal heart sounds. No murmur heard. Pulmonary:      Effort: Pulmonary effort is normal. No respiratory distress. Breath sounds: Normal breath sounds. No wheezing or rales. Abdominal:      General: Bowel sounds are normal.      Palpations: Abdomen is soft. Tenderness: There is no abdominal tenderness. There is no guarding or rebound. Musculoskeletal:         General: No tenderness or deformity. Normal range of motion. Cervical back: Normal range of motion and neck supple. Comments: Palpable pulses in the DP and PT bilaterally, normal sensation, normal plantar flexion, dorsiflexion, hip flexion. Has some erythema on the left medial thigh   Skin:     General: Skin is warm and dry. Findings: Erythema present. No rash. Comments: Mild erythema on the left medial thigh measures about 6 cm. No evidence of abscess. Neurological:      Mental Status: He is alert and oriented to person, place, and time. Psychiatric:         Behavior: Behavior normal.         Diagnostic Study Results     Labs -     Recent Results (from the past 12 hour(s))   SAMPLES BEING HELD    Collection Time: 06/30/22  9:46 PM   Result Value Ref Range    SAMPLES BEING HELD PST, BLUE, LAV     COMMENT        Add-on orders for these samples will be processed based on acceptable specimen integrity and analyte stability, which may vary by analyte.    METABOLIC PANEL, BASIC    Collection Time: 06/30/22  9:46 PM   Result Value Ref Range    Sodium 122 (L) 136 - 145 mmol/L    Potassium 5.3 (H) 3.5 - 5.1 mmol/L    Chloride 93 (L) 97 - 108 mmol/L    CO2 21 21 - 32 mmol/L    Anion gap 8 5 - 15 mmol/L    Glucose 104 (H) 65 - 100 mg/dL    BUN 21 (H) 6 - 20 MG/DL    Creatinine 1.29 0.70 - 1.30 MG/DL    BUN/Creatinine ratio 16 12 - 20      GFR est AA >60 >60 ml/min/1.73m2    GFR est non-AA 53 (L) >60 ml/min/1.73m2    Calcium 8.7 8.5 - 10.1 MG/DL   CBC WITH AUTOMATED DIFF    Collection Time: 06/30/22  9:46 PM   Result Value Ref Range    .0 (HH) 4.1 - 11.1 K/uL    RBC 2.25 (L) 4.10 - 5.70 M/uL    HGB 6.7 (L) 12.1 - 17.0 g/dL    HCT 25.9 (L) 36.6 - 50.3 %    .1 (H) 80.0 - 99.0 FL    MCH 29.8 26.0 - 34.0 PG    MCHC 25.9 (L) 30.0 - 36.5 g/dL    RDW ABNORMAL 11.5 - 14.5 %    PLATELET 945 (L) 966 - 400 K/uL    MPV 11.9 8.9 - 12.9 FL    NRBC 0.0 0  WBC    ABSOLUTE NRBC 0.03 (H) 0.00 - 0.01 K/uL    NEUTROPHILS 2 (L) 32 - 75 %    LYMPHOCYTES 96 (H) 12 - 49 %    MONOCYTES 2 (L) 5 - 13 %    EOSINOPHILS 0 0 - 7 %    BASOPHILS 0 0 - 1 %    IMMATURE GRANULOCYTES 0 0.0 - 0.5 %    ABS. NEUTROPHILS 6.6 1.8 - 8.0 K/UL    ABS. LYMPHOCYTES 317.8 (H) 0.8 - 3.5 K/UL    ABS. MONOCYTES 6.6 (H) 0.0 - 1.0 K/UL    ABS. EOSINOPHILS 0.0 0.0 - 0.4 K/UL    ABS. BASOPHILS 0.0 0.0 - 0.1 K/UL    ABS. IMM. GRANS. 0.0 0.00 - 0.04 K/UL    DF MANUAL      RBC COMMENTS ANISOCYTOSIS  1+        RBC COMMENTS MACROCYTOSIS  1+        RBC COMMENTS MICROCYTOSIS  1+        RBC COMMENTS HYPOCHROMIA  PRESENT        WBC COMMENTS SMUDGE CELLS     TYPE & SCREEN    Collection Time: 06/30/22 11:29 PM   Result Value Ref Range    Crossmatch Expiration 07/03/2022,235     ABO/Rh(D) A POSITIVE     Antibody screen NEG     Unit number [id number]     Blood component type RC LR     Unit division 00     Status of unit ISSUED     Crossmatch result Compatible    RBC, ALLOCATE    Collection Time: 06/30/22 11:30 PM   Result Value Ref Range    HISTORY CHECKED? Historical check performed        Radiologic Studies -   DUPLEX LOWER EXT VENOUS LEFT   Final Result        CT Results  (Last 48 hours)    None        CXR Results  (Last 48 hours)    None            Medical Decision Making   I am the first provider for this patient. I reviewed the vital signs, available nursing notes, past medical history, past surgical history, family history and social history.     Vital Signs-Reviewed the patient's vital signs. Patient Vitals for the past 12 hrs:   Temp Pulse Resp BP SpO2   07/01/22 0141 99.2 °F (37.3 °C) 63 15 (!) 117/44 100 %   07/01/22 0126 99 °F (37.2 °C) 61 17 (!) 109/40 96 %   07/01/22 0054 -- 67 22 -- 98 %   07/01/22 0024 -- 62 20 -- 97 %   07/01/22 0009 -- 64 18 -- 97 %   06/30/22 2354 -- 64 24 (!) 116/52 98 %   06/30/22 2339 -- 63 22 (!) 125/41 99 %   06/30/22 2324 -- 64 19 (!) 116/42 97 %   06/30/22 2254 -- 65 21 (!) 117/42 98 %   06/30/22 2239 -- 66 23 (!) 112/45 98 %   06/30/22 2139 98 °F (36.7 °C) 70 21 (!) 122/49 99 %         Records Reviewed:   Nursing notes, Prior visits     Provider Notes (Medical Decision Making):   Cellulitis versus drug reaction, versus superficial thrombophlebitis related patient's malignancy and chemotherapy. Doubt DVT, will obtain ultrasound. Disposition pending ultrasound imaging. If ultrasound is unremarkable will discharge home with a course of antibiotics    ED Course:   Initial assessment performed. The patients presenting problems have been discussed, and they are in agreement with the care plan formulated and outlined with them. I have encouraged them to ask questions as they arise throughout their visit. Critical Care Time:   none    Disposition:  Patient is being admitted to the hospital by Dr. Randal Summers. The results of their tests and reasons for their admission have been discussed with them and/or available family. They convey agreement and understanding for the need to be admitted and for their admission diagnosis. Consultation has been made with the inpatient physician specialist for hospitalization. PLAN:  1. Current Discharge Medication List        2. Follow-up Information    None         Return to ED if worse     Diagnosis     Clinical Impression:   1. Cellulitis of left lower extremity    2. Hyponatremia    3. Anemia, unspecified type        Attestations:   This note was completed by Lee Romero DO

## 2022-07-01 NOTE — CONSULTS
Hematology Oncology Consultation    Reason for consult: Anemia    Admitting Diagnosis: Anemia [D64.9]  Left leg cellulitis [O33.366]  CLL (chronic lymphocytic leukemia) ([facility] [address] [C91.10]  Hyponatremia [E87.1]    Impression:   *) CLL with symptomatic anemia:  - Follows with me in clinic for long standing CLL and recently started single agent Acalabrutinib ~14 days ago. WBC initially increased as expected but now starting downtrend. Seen in clinic 6/28 with hgb 6.8 and transfused. Admitted with hgb 6.7 transfused 1U and hgb 6.8 this am, currently another unit PRBC pending.  - Will hold his Acalabrutinib as this could be related to medication SE.  - Transfuse leukoreduced/irradiated PRBC for hgb <7.  - Will Check FOBT, hemolysis las, b12/FA, iron studies, retic  - In clinic last week did not have hemolysis and uric acid wnl.    *) Thrombocytopenia:  - disease/drug related. Plt 88K here which is improved from clinic visit. Will follow and hold treatment for now.    *) Hyponatremia:  - Na 122 on admission, having decreased po intake and suspected culprit, improving with hydration. Renal following. *)  Reported leg pain:  - Doppler here negative, exam didn't show any abnormalities this am. Leg pain resolved. Thank you for this kind referral, we will follow along with you. Discussion: Ryley Martínez is a  80y.o. year old seen in consultation at the request of Dr. Serafin Higginbotham for anemia. He is known to me and I see him for his CLL, recently started Acalabrutinib 2 weeks ago for increasing wbc doubling time. Has new symptomatic anemia and had transfusion on 6/28 as outpt for hgb 6.8. Came to ER with leg pain/weakness, decreased oral intake last several days but no fever or nausea/vomiting. In ED hgb 6.7, plt 122K, wbc 331, Na 122. He was admitted and transfused 2UPRBC thus far.   His daughter is at bedside and states he hasn't been eating/drinking well and more fatigued with some leg weakness she initially was concerned about a CVA so brought him into ER. Doppler in er was negative and today denies leg pain or leg weakness. Imaging:        Labs:  Recent Results (from the past 24 hour(s))   SAMPLES BEING HELD    Collection Time: 06/30/22  9:46 PM   Result Value Ref Range    SAMPLES BEING HELD PST, BLUE, LAV     COMMENT        Add-on orders for these samples will be processed based on acceptable specimen integrity and analyte stability, which may vary by analyte. METABOLIC PANEL, BASIC    Collection Time: 06/30/22  9:46 PM   Result Value Ref Range    Sodium 122 (L) 136 - 145 mmol/L    Potassium 5.3 (H) 3.5 - 5.1 mmol/L    Chloride 93 (L) 97 - 108 mmol/L    CO2 21 21 - 32 mmol/L    Anion gap 8 5 - 15 mmol/L    Glucose 104 (H) 65 - 100 mg/dL    BUN 21 (H) 6 - 20 MG/DL    Creatinine 1.29 0.70 - 1.30 MG/DL    BUN/Creatinine ratio 16 12 - 20      GFR est AA >60 >60 ml/min/1.73m2    GFR est non-AA 53 (L) >60 ml/min/1.73m2    Calcium 8.7 8.5 - 10.1 MG/DL   CBC WITH AUTOMATED DIFF    Collection Time: 06/30/22  9:46 PM   Result Value Ref Range    .0 (HH) 4.1 - 11.1 K/uL    RBC 2.25 (L) 4.10 - 5.70 M/uL    HGB 6.7 (L) 12.1 - 17.0 g/dL    HCT 25.9 (L) 36.6 - 50.3 %    .1 (H) 80.0 - 99.0 FL    MCH 29.8 26.0 - 34.0 PG    MCHC 25.9 (L) 30.0 - 36.5 g/dL    RDW ABNORMAL 11.5 - 14.5 %    PLATELET 643 (L) 112 - 400 K/uL    MPV 11.9 8.9 - 12.9 FL    NRBC 0.0 0  WBC    ABSOLUTE NRBC 0.03 (H) 0.00 - 0.01 K/uL    NEUTROPHILS 2 (L) 32 - 75 %    LYMPHOCYTES 96 (H) 12 - 49 %    MONOCYTES 2 (L) 5 - 13 %    EOSINOPHILS 0 0 - 7 %    BASOPHILS 0 0 - 1 %    IMMATURE GRANULOCYTES 0 0.0 - 0.5 %    ABS. NEUTROPHILS 6.6 1.8 - 8.0 K/UL    ABS. LYMPHOCYTES 317.8 (H) 0.8 - 3.5 K/UL    ABS. MONOCYTES 6.6 (H) 0.0 - 1.0 K/UL    ABS. EOSINOPHILS 0.0 0.0 - 0.4 K/UL    ABS. BASOPHILS 0.0 0.0 - 0.1 K/UL    ABS. IMM.  GRANS. 0.0 0.00 - 0.04 K/UL    DF MANUAL      RBC COMMENTS ANISOCYTOSIS  1+        RBC COMMENTS MACROCYTOSIS  1+        RBC COMMENTS MICROCYTOSIS  1+        RBC COMMENTS HYPOCHROMIA  PRESENT        WBC COMMENTS SMUDGE CELLS     TYPE & SCREEN    Collection Time: 06/30/22 11:29 PM   Result Value Ref Range    Crossmatch Expiration 07/03/2022,2359     ABO/Rh(D) A POSITIVE     Antibody screen NEG     Unit number [id number]     Blood component type [facility]     Unit division 00     Status of unit ISSUED     Crossmatch result Compatible     Unit number [id number]     Blood component type [facility]     Unit division 00     Status of unit ALLOCATED     Crossmatch result Compatible    RBC, ALLOCATE    Collection Time: 06/30/22 11:30 PM   Result Value Ref Range    HISTORY CHECKED?  Historical check performed    SODIUM, UR, RANDOM    Collection Time: 07/01/22  2:17 AM   Result Value Ref Range    Sodium,urine random 43 MMOL/L   CREATININE, UR, RANDOM    Collection Time: 07/01/22  2:17 AM   Result Value Ref Range    Creatinine, urine 73.50 mg/dL   OSMOLALITY, UR    Collection Time: 07/01/22  2:17 AM   Result Value Ref Range    Osmolality,urine 399 MOSM/kg N3D   METABOLIC PANEL, BASIC    Collection Time: 07/01/22  5:40 AM   Result Value Ref Range    Sodium 124 (L) 136 - 145 mmol/L    Potassium 5.0 3.5 - 5.1 mmol/L    Chloride 98 97 - 108 mmol/L    CO2 21 21 - 32 mmol/L    Anion gap 5 5 - 15 mmol/L    Glucose 88 65 - 100 mg/dL    BUN 17 6 - 20 MG/DL    Creatinine 1.08 0.70 - 1.30 MG/DL    BUN/Creatinine ratio 16 12 - 20      GFR est AA >60 >60 ml/min/1.73m2    GFR est non-AA >60 >60 ml/min/1.73m2    Calcium 8.3 (L) 8.5 - 10.1 MG/DL   CBC WITH AUTOMATED DIFF    Collection Time: 07/01/22  5:40 AM   Result Value Ref Range    .5 (HH) 4.1 - 11.1 K/uL    RBC 2.19 (L) 4.10 - 5.70 M/uL    HGB 6.8 (L) 12.1 - 17.0 g/dL    HCT 24.3 (L) 36.6 - 50.3 %    .0 (H) 80.0 - 99.0 FL    MCH 31.1 26.0 - 34.0 PG    MCHC 28.0 (L) 30.0 - 36.5 g/dL    RDW ABNORMAL 11.5 - 14.5 %    PLATELET 88 (L) 564 - 400 K/uL    MPV 11.7 8.9 - 12.9 FL    NRBC 0.0 0  WBC    ABSOLUTE NRBC 0.00 0.00 - 0.01 K/uL    NEUTROPHILS 1 (L) 32 - 75 %    LYMPHOCYTES 93 (H) 12 - 49 %    MONOCYTES 6 5 - 13 %    EOSINOPHILS 0 0 - 7 %    BASOPHILS 0 0 - 1 %    IMMATURE GRANULOCYTES 0 0.0 - 0.5 %    ABS. NEUTROPHILS 2.5 1.8 - 8.0 K/UL    ABS. LYMPHOCYTES 236.7 (H) 0.8 - 3.5 K/UL    ABS. MONOCYTES 15.3 (H) 0.0 - 1.0 K/UL    ABS. EOSINOPHILS 0.0 0.0 - 0.4 K/UL    ABS. BASOPHILS 0.0 0.0 - 0.1 K/UL    ABS. IMM. GRANS. 0.0 0.00 - 0.04 K/UL    DF MANUAL      RBC COMMENTS ANISOCYTOSIS  1+        RBC COMMENTS MACROCYTOSIS  2+        WBC COMMENTS SMUDGE CELLS SEEN     PROTHROMBIN TIME + INR    Collection Time: 07/01/22  5:40 AM   Result Value Ref Range    INR 1.2 (H) 0.9 - 1.1      Prothrombin time 12.2 (H) 9.0 - 11.1 sec   PTT    Collection Time: 07/01/22  5:40 AM   Result Value Ref Range    aPTT 28.2 22.1 - 31.0 sec    aPTT, therapeutic range     58.0 - 77.0 SECS   RBC, ALLOCATE    Collection Time: 07/01/22  8:15 AM   Result Value Ref Range    HISTORY CHECKED?  Historical check performed    GLUCOSE, POC    Collection Time: 07/01/22  8:28 AM   Result Value Ref Range    Glucose (POC) 99 65 - 117 mg/dL    Performed by Kailee Reyes PCT        History:  Past Medical History:   Diagnosis Date    Abnormal nuclear stress test 6/6/2014    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 11/2/2015    Bereavement 02/06/2019    ltcf - uti -strangulated hernia - prostate ca - brother    Bron[facility]ctasis ([facility] [address]     CAD (coronary artery disease)     Chest pain, unspecified     Chronic pain     right leg    CLL (chronic lymphocytic leukemia) ([facility] [address]     Jerica Segura md  [facility]    Diabetes [facility]     Essential hypertension     GERD (gastroesophageal reflux disease)     Hyperlipidemia     Hypertension     Opacity of lung on imaging study 05/28/2017    cxr    Rotator cuff arthropathy     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 Authorizing Provider   simvastatin (ZOCOR) 40 mg tablet TAKE 1 TABLET DAILY IN THE EVENING 22  Yes Harry Mckinney MD   metFORMIN (GLUCOPHAGE) 500 mg tablet TAKE 1 TABLET DAILY 2/10/22  Yes Val Espinoza MD   glucose blood VI test strips (OneTouch Ultra Test) strip USE TO TEST BLOOD SUGAR ONCE DAILY. DX.E11.9 22  Yes Harry Mckinney MD   metoprolol tartrate (LOPRESSOR) 25 mg tablet TAKE 1 TABLET TWICE A DAY 9/3/21  Yes Harry Mckinney MD   allopurinoL (ZYLOPRIM) 300 mg tablet TAKE ONE-HALF (1/2) TABLET DAILY 21  Yes Harry Mckinney MD   aspirin 81 mg chewable tablet Take 81 mg by mouth daily. Yes Provider, Historical   brimonidine (ALPHAGAN) 0.15 % ophthalmic solution Administer 1 Drop to both eyes two (2) times a day. Indications: OPEN ANGLE GLAUCOMA   Yes Provider, Historical   ergocalciferol (ERGOCALCIFEROL) 1,250 mcg (50,000 unit) capsule  21   Provider, Historical   omeprazole (PRILOSEC) 20 mg capsule TAKE 1 CAPSULE DAILY  Patient not taking: Reported on 2022   Harry Mckinney MD   cyanocobalamin 1,000 mcg tablet Take 1,000 mcg by mouth daily.   Patient not taking: Reported on 2022    Provider, Historical     Allergies   Allergen Reactions    Codeine Shortness of Breath    Lipitor [Atorvastatin] Nausea Only      Social History     Tobacco Use    Smoking status: Former Smoker     Quit date: 1/15/1984     Years since quittin.4    Smokeless tobacco: Never Used    Tobacco comment: QUIT    Substance Use Topics    Alcohol use: No     Alcohol/week: 0.0 standard drinks     Comment: quit in       Family History   Problem Relation Age of Onset    Diabetes Mother     Stroke Father         Current Medications:  Current Facility-Administered Medications   Medication Dose Route Frequency    allopurinoL (ZYLOPRIM) tablet 100 mg  100 mg Oral DAILY    [Held by provider] aspirin chewable tablet 81 mg  81 mg Oral DAILY    brimonidine (ALPHAGAN) 0.2 % ophthalmic solution 1 Drop  1 Drop Both Eyes BID    atorvastatin (LIPITOR) tablet 20 mg  20 mg Oral QHS    sodium chloride (NS) flush 5-40 mL  5-40 mL IntraVENous Q8H    sodium chloride (NS) flush 5-40 mL  5-40 mL IntraVENous PRN    acetaminophen (TYLENOL) tablet 650 mg  650 mg Oral Q6H PRN    Or    acetaminophen (TYLENOL) suppository 650 mg  650 mg Rectal Q6H PRN    polyethylene glycol (MIRALAX) packet 17 g  17 g Oral DAILY PRN    ondansetron (ZOFRAN ODT) tablet 4 mg  4 mg Oral Q8H PRN    Or    ondansetron (ZOFRAN) injection 4 mg  4 mg IntraVENous Q6H PRN    0.9% sodium chloride infusion  75 mL/hr IntraVENous CONTINUOUS    0.9% sodium chloride infusion 250 mL  250 mL IntraVENous PRN    metoprolol tartrate (LOPRESSOR) tablet 12.5 mg  12.5 mg Oral BID    0.9% sodium chloride infusion 250 mL  250 mL IntraVENous PRN     Facility-Administered Medications Ordered in Other Encounters   Medication Dose Route Frequency    0.9% sodium chloride infusion 250 mL  250 mL IntraVENous PRN         Review of Systems:  Constitutional No fevers, chills, night sweats, ++excessive fatigue + weight loss. Allergic/Immunologic No recent allergic reactions   Eyes No significant visual difficulties. No diplopia. ENMT No problems with hearing, no sore throat, no sinus drainage. Endocrine No hot flashes or night sweats. No cold intolerance, polyuria, or polydipsia   Hematologic/Lymphatic No easy bruising or bleeding. The patient denies any tender or palpable lymph nodes   Breasts No abnormal masses of breast, nipple discharge or pain. Respiratory No dyspnea on exertion, orthopnea, chest pain, cough or hemoptysis. Cardiovascular No anginal chest pain, irregular heart beat, tachycardia, palpitations or orthopnea. Gastrointestinal No nausea, vomiting, diarrhea, constipation, cramping, dysphagia, reflux, heartburn, GI bleeding, or early satiety. No change in bowel habits. decreased appetite. Genitourinary (M) No hematuria, dysuria, increased frequency, urgency, hesitancy or incontinence. Musculoskeletal No joint pain, swelling or redness. No decreased range of motion. Integumentary No chronic rashes, inflammation, ulcerations, pruritus, petechiae, purpura, ecchymoses, or skin changes. Neurologic No headache, blurred vision, and no areas of focal weakness or numbness. Normal gait. No sensory problems. Psychiatric No insomnia, depression, roni or mood swings. No psychotropic drugs. Physical Exam:  Constitutional Alert, cooperative, oriented. Mood and affect appropriate. Pale in appearance, pleasant. Head Normocephalic; no scars   Eyes Conjunctivae and sclerae are clear and without icterus. Pupils are reactive and equal.   ENMT Sinuses are nontender. No oral exudates, ulcers, masses, thrush or mucositis. Oropharynx clear. Tongue normal.   Neck Supple without masses or thyromegaly. No jugular venous distension. Hematologic/Lymphatic No petechiae or purpura. No tender or palpable lymph nodes in the cervical, supraclavicular, axillary or inguinal area. Respiratory Lungs are clear to auscultation without rhonchi or wheezing. Cardiovascular Regular rate and rhythm of heart without murmurs, gallops or rubs. Chest / Line Site Chest is symmetric with no chest wall deformities. Abdomen Non-tender, non-distended, no masses, ascites or hepatosplenomegaly. Good bowel sounds. No guarding or rebound tenderness. No pulsatile masses. Musculoskeletal No tenderness or swelling, normal range of motion without obvious weakness. Extremities No visible deformities, no cyanosis, clubbing or edema.     Skin No rashes, scars, or lesions suggestive of malignancy. No petechiae, purpura, or ecchymoses. No excoriations.

## 2022-07-01 NOTE — CONSULTS
Consultation Note    NAME: Ion Garrison   :  10/4/1933   MRN:  [id number]     Date/Time:  2022 10:23 AM    I have been asked to see this patient by Dr. Betina Lane  for advice/opinion re: hyponatremia . Assessment :    Plan:  Hyponatremia  Anemia  DM I suspect that he has hypovolemic hyponatremia given his poor po intake. His sodium has improved a little with NS. Continue for now. Check Sosm. BMP this afternoon. Subjective:   CHIEF COMPLAINT:  \"They sent me here. \"    HISTORY OF PRESENT ILLNESS:     Federico Hairston is a 80 y.o.   male who has a history of DM admitted with hyponatremia. He came to the ER with left leg pain. 4/10. Worse with movement. In the ER his sodium was low and he received IVF.   He says that the pain is better this AM.    Past Medical History:   Diagnosis Date    Abnormal nuclear stress test 2014    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 ([facility] [address] 2015    Bereavement 2019    ltcf - uti -strangulated hernia - prostate ca - brother    Bronchiectasis ([facility] [address]     CAD (coronary artery disease)     Chest pain, unspecified     Chronic pain     right leg    CLL (chronic lymphocytic leukemia) ([facility] [address]     Jerica Segura md  [facility]    Diabetes ([facility] [address]     Essential hypertension     GERD (gastroesophageal reflux disease)     Hyperlipidemia     Hypertension     Opacity of lung on imaging study 2017    cxr    Rotator cuff arthropathy     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    S/P coronary artery stent placement 2014 PCI/GUANAKO to prox LAD      Past Surgical History:   Procedure Laterality Date    HX COLONOSCOPY      HX HEART CATHETERIZATION      PTCA SINGLE VESSEL  2015         VASCULAR SURGERY PROCEDURE UNLIST   and     BLE stenting     Social History     Tobacco Use    Smoking status: Former Smoker     Quit date: 1/15/1984     Years since quittin.4    Smokeless tobacco: Never Used    Tobacco comment: [address] Substance Use Topics    Alcohol use: No     Alcohol/week: 0.0 standard drinks     Comment: quit in 1980      Family History   Problem Relation Age of Onset    Diabetes Mother     Stroke Father       Allergies   Allergen Reactions    Codeine Shortness of Breath    Lipitor [Atorvastatin] Nausea Only      Prior to Admission medications    Medication Sig Start Date End Date Taking? Authorizing Provider   simvastatin (ZOCOR) 40 mg tablet TAKE 1 TABLET DAILY IN THE EVENING 4/6/22  Yes Alex Marroquin MD   metFORMIN (GLUCOPHAGE) 500 mg tablet TAKE 1 TABLET DAILY 2/10/22  Yes Fernando Pearce MD   glucose blood VI test strips (OneTouch Ultra Test) strip USE TO TEST BLOOD SUGAR ONCE DAILY. DX.E11.9 2/7/22  Yes Alex Marroquin MD   metoprolol tartrate (LOPRESSOR) 25 mg tablet TAKE 1 TABLET TWICE A DAY 9/3/21  Yes Alex Marroquin MD   allopurinoL (ZYLOPRIM) 300 mg tablet TAKE ONE-HALF (1/2) TABLET DAILY 5/27/21  Yes Alex Marroquin MD   aspirin 81 mg chewable tablet Take 81 mg by mouth daily. Yes Provider, Historical   brimonidine (ALPHAGAN) 0.15 % ophthalmic solution Administer 1 Drop to both eyes two (2) times a day. Indications: OPEN ANGLE GLAUCOMA   Yes Provider, Historical   ergocalciferol (ERGOCALCIFEROL) 1,250 mcg (50,000 unit) capsule  9/17/21   Provider, Historical   omeprazole (PRILOSEC) 20 mg capsule TAKE 1 CAPSULE DAILY  Patient not taking: Reported on 7/1/2022 8/28/18   Alex Marroquin MD   cyanocobalamin 1,000 mcg tablet Take 1,000 mcg by mouth daily.   Patient not taking: Reported on 7/1/2022    Provider, Historical     REVIEW OF SYSTEMS:     []  Unable to obtain reliable ROS due to  [] mental status  [] sedated   [] intubated   [x] Total of 12 systems reviewed as follows:  Constitutional: negative fever, negative chills, negative weight loss  Eyes:   negative visual changes  ENT:   negative sore throat, tongue or lip swelling  Respiratory:  negative cough, negative dyspnea  Cards:  negative for chest pain, palpitations, lower extremity edema  GI:   negative for nausea, vomiting, diarrhea, and abdominal pain  :  negative for frequency, dysuria  Integument:  negative for rash and pruritus  Heme:  negative for easy bruising and gum/nose bleeding  Musculoskel: negative for myalgias,  back pain and muscle weakness  Neuro:  negative for headaches, dizziness, vertigo  Psych:  negative for feelings of anxiety, depression   Travel?: none    Objective:   VITALS:    Visit Vitals  BP (!) 126/43 (BP 1 Location: Right upper arm, BP Patient Position: At rest)   Pulse (!) 59   Temp 98.3 °F (36.8 °C)   Resp 18   Ht 5' 7\" (1.702 m)   Wt 66.8 kg (147 lb 4.3 oz)   SpO2 98%   BMI 23.07 kg/m²     PHYSICAL EXAM:  Gen:  []  WD []  WN  [] cachectic []  thin []  obese []  disheveled             []  ill apearing  []   Critical  [x]   Chronic    [x]  No acute distress    HEENT:   [x] NC/AT/PERRL    [x] pink conjunctivae      [] pale conjunctivae                  PERRL  [] yes  [] no      [] moist mucosa    [] dry mucosa    hearing intact to voice [] yes  [] No                 NECK:   supple [x] yes  [] no        masses [] yes  [x] No               thyroid  []  non tender  []  tender    RESP:   [x] CTA bilaterally/no wheezing/rhonchi/rales/crackles    [] rhonchi bilaterally - no dullness  [] wheezing   [] rhonchi   [] crackles     use of accessory muscles [] yes [] no    CARD:   [x]  regular rate and rhythm/No murmurs/rubs/gallops    murmur  [] yes ()  [] no      Rubs  [] yes  [] no       Gallops [] yes  [] no    Rate []  regular  []  irregular        carotid bruits  [] Right  []  Left                 LE edema [] yes  [x] no           JVP  []  yes   []  no    ABD:    [x] soft/non distended/non tender/+bowel sounds/no HSM    []  Rigid    tenderness [] yes [] no   Liver enlargement  []  yes []  no                Spleen enlargement  []  yes []  no     distended []  yes [] no     bowel sound  [] hypoactive [] hyperactive    LYMPH:    Neck []  yes [x]  no       Axillae []  yes [x]  no    SKIN:   Rashes []  yes   [x]  no    Ulcers []  yes   [x]  no               [] tight to palpitation    skin turgor []  good  [] poor  [] decreased               Cyanosis/clubbing []  yes []  no    NEUR:   [x] cranial nerves II-XII grossly intact       [] Cranial nerves deficit                 []  facial droop    []  slurred speech   [] aphasic     [] Strength normal     []  weakness  []  LUE  []   RUE/ []  LLE  []   RLE    follows commands  [x]  yes []  no           PSYCH:   insight [] poor [x] good   Alert and Oriented to  [x] person  [x] place  [x]  time                    [] depressed [] anxious [] agitated  [] lethargic [] stuporous  [] sedated     LAB DATA REVIEWED:    Recent Labs     07/01/22  0540 06/30/22  2146   .5* 331.0*   HGB 6.8* 6.7*   HCT 24.3* 25.9*   PLT 88* 122*     Recent Labs     07/01/22  0540 06/30/22  2146   * 122*   K 5.0 5.3*   CL 98 93*   CO2 21 21   BUN 17 21*   CREA 1.08 1.29   GLU 88 104*   CA 8.3* 8.7     No results for input(s): ALT, AP, TBIL, TBILI, ALB, GLOB, GGT, AML, LPSE in the last 72 hours. No lab exists for component: SGOT, GPT, AMYP, HLPSE  Recent Labs     07/01/22  0540   INR 1.2*   PTP 12.2*   APTT 28.2      No results for input(s): FE, TIBC, PSAT, FERR in the last 72 hours. No results for input(s): PH, PCO2, PO2 in the last 72 hours. No results for input(s): CPK, CKMB in the last 72 hours. No lab exists for component: TROPONINI  Lab Results   Component Value Date/Time    Glucose (POC) 99 07/01/2022 08:28 AM    Glucose (POC) 187 (H) 11/10/2015 11:00 AM    Glucose (POC) 115 (H) 11/10/2015 07:23 AM    Glucose (POC) 114 (H) 11/09/2015 09:39 PM    Glucose (POC) 119 (H) 11/09/2015 04:36 PM       Procedures: see electronic medical records for all procedures/Xrays and details which were not copied into this note but were reviewed prior to creation of Plan. ________________________________________________________________________       ___________________________________________________  Consulting Physician: Tres Duhamel, MD

## 2022-07-02 VITALS
SYSTOLIC BLOOD PRESSURE: 132 MMHG | WEIGHT: 147.27 LBS | OXYGEN SATURATION: 95 % | HEART RATE: 63 BPM | DIASTOLIC BLOOD PRESSURE: 47 MMHG | HEIGHT: 67 IN | TEMPERATURE: 97.9 F | BODY MASS INDEX: 23.11 KG/M2 | RESPIRATION RATE: 18 BRPM

## 2022-07-02 LAB
ABO + RH BLD: NORMAL
ANION GAP SERPL CALC-SCNC: 6 MMOL/L (ref 5–15)
BLD PROD TYP BPU: NORMAL
BLD PROD TYP BPU: NORMAL
BLOOD GROUP ANTIBODIES SERPL: NORMAL
BPU ID: NORMAL
BPU ID: NORMAL
BUN SERPL-MCNC: 19 MG/DL (ref 6–20)
BUN/CREAT SERPL: 19 (ref 12–20)
CALCIUM SERPL-MCNC: 8.4 MG/DL (ref 8.5–10.1)
CHLORIDE SERPL-SCNC: 101 MMOL/L (ref 97–108)
CO2 SERPL-SCNC: 20 MMOL/L (ref 21–32)
CREAT SERPL-MCNC: 1.01 MG/DL (ref 0.7–1.3)
CROSSMATCH RESULT,%XM: NORMAL
CROSSMATCH RESULT,%XM: NORMAL
ERYTHROCYTE [DISTWIDTH] IN BLOOD BY AUTOMATED COUNT: 23.8 % (ref 11.5–14.5)
GLUCOSE BLD STRIP.AUTO-MCNC: 88 MG/DL (ref 65–117)
GLUCOSE SERPL-MCNC: 86 MG/DL (ref 65–100)
HCT VFR BLD AUTO: 27.7 % (ref 36.6–50.3)
HGB BLD-MCNC: 8.4 G/DL (ref 12.1–17)
MAGNESIUM SERPL-MCNC: 2.1 MG/DL (ref 1.6–2.4)
MCH RBC QN AUTO: 21.1 PG (ref 26–34)
MCHC RBC AUTO-ENTMCNC: 30.3 G/DL (ref 30–36.5)
MCV RBC AUTO: 105.7 FL (ref 80–99)
NRBC # BLD: 0.02 K/UL (ref 0–0.01)
NRBC BLD-RTO: 0 PER 100 WBC
PLATELET # BLD AUTO: 69 K/UL (ref 150–400)
PMV BLD AUTO: 11.5 FL (ref 8.9–12.9)
POTASSIUM SERPL-SCNC: 5 MMOL/L (ref 3.5–5.1)
RBC # BLD AUTO: 2.62 M/UL (ref 4.1–5.7)
SERVICE CMNT-IMP: NORMAL
SODIUM SERPL-SCNC: 127 MMOL/L (ref 136–145)
SPECIMEN EXP DATE BLD: NORMAL
STATUS OF UNIT,%ST: NORMAL
STATUS OF UNIT,%ST: NORMAL
UNIT DIVISION, %UDIV: 0
UNIT DIVISION, %UDIV: 0
WBC # BLD AUTO: 208.6 K/UL (ref 4.1–11.1)

## 2022-07-02 PROCEDURE — 74011000250 HC RX REV CODE- 250: Performed by: HOSPITALIST

## 2022-07-02 PROCEDURE — 83735 ASSAY OF MAGNESIUM: CPT

## 2022-07-02 PROCEDURE — 82962 GLUCOSE BLOOD TEST: CPT

## 2022-07-02 PROCEDURE — 80048 BASIC METABOLIC PNL TOTAL CA: CPT

## 2022-07-02 PROCEDURE — 74011250637 HC RX REV CODE- 250/637: Performed by: NURSE PRACTITIONER

## 2022-07-02 PROCEDURE — 36415 COLL VENOUS BLD VENIPUNCTURE: CPT

## 2022-07-02 PROCEDURE — 74011250637 HC RX REV CODE- 250/637: Performed by: HOSPITALIST

## 2022-07-02 PROCEDURE — 85027 COMPLETE CBC AUTOMATED: CPT

## 2022-07-02 RX ORDER — ACETAMINOPHEN 325 MG/1
650 TABLET ORAL
Qty: 180 TABLET | Refills: 0 | Status: SHIPPED
Start: 2022-07-02 | End: 2022-08-01

## 2022-07-02 RX ADMIN — ALLOPURINOL 100 MG: 100 TABLET ORAL at 08:54

## 2022-07-02 RX ADMIN — BRIMONIDINE TARTRATE 1 DROP: 2 SOLUTION OPHTHALMIC at 08:52

## 2022-07-02 RX ADMIN — METFORMIN HYDROCHLORIDE 500 MG: 500 TABLET ORAL at 08:54

## 2022-07-02 RX ADMIN — METOPROLOL TARTRATE 12.5 MG: 25 TABLET, FILM COATED ORAL at 08:54

## 2022-07-02 RX ADMIN — ASPIRIN 81 MG: 81 TABLET, CHEWABLE ORAL at 08:54

## 2022-07-02 RX ADMIN — SODIUM CHLORIDE, PRESERVATIVE FREE 10 ML: 5 INJECTION INTRAVENOUS at 06:24

## 2022-07-02 NOTE — PROGRESS NOTES
End of Shift Note    Bedside shift change report given to Marybel Khan RN (oncoming nurse) by Jazmin Romero RN (offgoing nurse). Report included the following information SBAR, Kardex, Intake/Output, MAR and Recent Results    Shift worked:  [id number]     Shift summary and any significant changes:     Patient had no complaints of pain, all scheduled meds and frequent rounding provided. All labs collected, 1 unit of PRBCs provided, patient tolerated well. Patient ambulates with cane and 1 assist to the bathroom.      Concerns for physician to address:       Zone phone for oncoming shift:              Jazmin Romero RN

## 2022-07-02 NOTE — PROGRESS NOTES
Transition of Care Plan:     RUR: 15% moderate risk for readmission. DC today  Disposition: Home  with daughterJessi  Follow up appointments: PCP, Specialists if indicated  DME needed: Pt owns a cane. Therapy consults pending   Transportation at Discharge: Pt's daughterJessi will be here around 11 AM to transport Pt home. [place] or means to access home:  Has access      IM Medicare Letter:1st IM Letter delivered 6/30/22. Is patient a BCPI-A Bundle:  N/A                    If yes, was Bundle Letter given?:    Is patient a [place] and connected with the [place]? N/A               If yes, was [employer] transfer form completed and [facility] notified? Caregiver Contact: Pt's daughterArmida) [phone]  Discharge Caregiver contacted prior to discharge? yes  Care Conference needed?: Not indicated     No further CM needs. Care Management Interventions  PCP Verified by CM: Yes  Palliative Care Criteria Met (RRAT>21 & CHF Dx)?: No  Mode of Transport at Discharge:  Other (see comment) (DaughterJessi)  Transition of Care Consult (CM Consult): Discharge Planning  Discharge Durable Medical Equipment: No (Has a cane)  Physical Therapy Consult: Yes  Occupational Therapy Consult: Yes  Speech Therapy Consult: No  Support Systems: Child(charmaine),Other Family Member(s) (Daughters, son in law)  Confirm Follow Up Transport: Family (Jessi Allen)  Discharge Location  Patient Expects to be Discharged to[de-identified] [address] [place] [address], [address] [facility]

## 2022-07-02 NOTE — DISCHARGE SUMMARY
Hospitalist Discharge Summary     Patient ID:    Casie Dao  [id number]  21 y.o.  10/4/1933    Admit date: 6/30/2022    Discharge date : 7/2/2022    Chronic Diagnoses:    Problem List as of 7/2/2022 Date Reviewed: 9/3/2019          Codes Class Noted - Resolved    Hyponatremia ICD-10-CM: E87.1  ICD-9-CM: 276.1  7/1/2022 - Present        Anemia ICD-10-CM: D64.9  ICD-9-CM: 285.9  7/1/2022 - Present        Left leg cellulitis ICD-10-CM: L03.116  ICD-9-CM: 682.6  7/1/2022 - Present        Chronic renal disease, stage III ICD-10-CM: N18.30  ICD-9-CM: 585.3  6/23/2022 - Present        Type 2 diabetes with nephropathy ([facility] ICD-10-CM: E11.21  ICD-9-CM: 250.40, 583.81  6/13/2018 - Present        Bronchiectasis ([facility] ICD-10-CM: J47.9  ICD-9-CM: 494.0  Unknown - Present        Opacity of lung on imaging study ICD-10-CM: R91.8  ICD-9-CM: 793.19  5/28/2017 - Present        S/P CABG x 3 ICD-10-CM: Z95.1  ICD-9-CM: V45.81  11/6/2015 - Present        Rotator cuff arthropathy ICD-10-CM: M12.819  ICD-9-CM: 716.81  Unknown - Present        CLL (chronic lymphocytic leukemia) (HCC) ICD-10-CM: C91.10  ICD-9-CM: 204.10  Unknown - Present        S/P angioplasty with stent--4/15 ICD-10-CM: Z95.820  ICD-9-CM: V45.89  4/26/2015 - Present        Leukocytosis ICD-10-CM: D72.829  ICD-9-CM: 288.60  Unknown - Present        GERD (gastroesophageal reflux disease) ICD-10-CM: K21.9  ICD-9-CM: 530.81  Unknown - Present        S/P coronary artery stent placement ICD-10-CM: Z95.5  ICD-9-CM: V45.82  6/6/2014 - Present    Overview Signed 6/6/2014  8:02 AM by Patricia Yañez NP     6/6/14 PCI/GUANAKO to prox LAD             Mixed hyperlipidemia ICD-10-CM: E78.2  ICD-9-CM: 272.2  5/22/2012 - Present        Peripheral vascular disease ([facility] ICD-10-CM: I73.9  ICD-9-CM: 443.9  5/22/2012 - Present        Chronic ischemic heart disease ICD-10-CM: I25.9  ICD-9-CM: 414.9  5/22/2012 - Present        Coronary atherosclerosis of native coronary artery ICD-10-CM: I25.10  ICD-9-CM: 414.01  5/22/2012 - Present        Atherosclerosis of native artery of extremity with intermittent claudication (HCC) ICD-10-CM: O09.818  ICD-9-CM: 440.21  5/22/2012 - Present    Overview Signed 1/15/2014  2:04 PM by Devon MCCLAIN     1/15/14 Right superficial femoral artery angioplasty and stent placement, 3rd order catheter placement in right common femoral artery. Essential hypertension, benign ICD-10-CM: I10  ICD-9-CM: 401.1  5/22/2012 - Present          22    Final Diagnoses: Active Problems:    CLL (chronic lymphocytic leukemia) (HCC) ()      Hyponatremia (7/1/2022)      Anemia (7/1/2022)      Left leg cellulitis (7/1/2022)        Reason for Hospitalization:  Anemia, hyponatremia    Hospital Course:   Franchesca [age] y. o. male,with a known history of diabetes mellitus type 2 with nephropathy, primary hypertension, coronary artery disease, dyslipidemia, chronic lymphocytic leukemia, followed by Nan Henderson MD with [facility],  presenting the emergency department complaining of leg pain.  Reports pain in the left thigh.  Started today.  No distal numbness or tingling, no falls or injury.  Patient has a history of leukemia, received chemo and a blood transfusion today and he has CLL. nothing makes it better, nothing makes it worse.  Noticed some erythema over the left medial thigh. Pt presented with low hemoglobin and hyponatremia. Pt was started on IV NS for NA level of 122 and nephrology was consulted for management. He received 2 units of PRBCS for hemoglobin 6.8. Hemoglobin is 8.4 today and NA is 127. Pt is cleared for discharge home today with family. Instructed not to take oral chemotherapy medication until see oncology after discharge. Pt to continue with other home medications and follow up with PCP in 3 weeks or as needed. He denies pain in his thigh and there is no evidence of cellulitis.      Discharge Medications: Current Discharge Medication List      START taking these medications    Details   acetaminophen (TYLENOL) 325 mg tablet Take 2 Tablets by mouth every four (4) hours as needed for Fever for up to 30 days. Qty: 180 Tablet, Refills: 0  Start date: 7/2/2022, End date: 8/1/2022         CONTINUE these medications which have NOT CHANGED    Details   simvastatin (ZOCOR) 40 mg tablet TAKE 1 TABLET DAILY IN THE EVENING  Qty: 90 Tablet, Refills: 3      metFORMIN (GLUCOPHAGE) 500 mg tablet TAKE 1 TABLET DAILY  Qty: 90 Tablet, Refills: 3      glucose blood VI test strips (OneTouch Ultra Test) strip USE TO TEST BLOOD SUGAR ONCE DAILY. DX.E11.9  Qty: 100 Strip, Refills: 11      metoprolol tartrate (LOPRESSOR) 25 mg tablet TAKE 1 TABLET TWICE A DAY  Qty: 180 Tablet, Refills: 3      allopurinoL (ZYLOPRIM) 300 mg tablet TAKE ONE-HALF (1/2) TABLET DAILY  Qty: 90 Tablet, Refills: 3      aspirin 81 mg chewable tablet Take 81 mg by mouth daily. brimonidine (ALPHAGAN) 0.15 % ophthalmic solution Administer 1 Drop to both eyes two (2) times a day. Indications: OPEN ANGLE GLAUCOMA      ergocalciferol (ERGOCALCIFEROL) 1,250 mcg (50,000 unit) capsule       omeprazole (PRILOSEC) 20 mg capsule TAKE 1 CAPSULE DAILY  Qty: 90 Cap, Refills: 1      cyanocobalamin 1,000 mcg tablet Take 1,000 mcg by mouth daily. Follow up Care:    1. Yung Duarte MD in 3 weeks. Follow-up Information     Follow up With Specialties Details Why Contact Info    Yung Duarte MD Internal Medicine Physician In 3 weeks  [address] [address]      Yash Eaton MD Hematology and Oncology In 2 weeks follow up after hospital discharge [address]  [address]  [phone]              * Follow-up Care/Patient Instructions:   Activity: Activity as tolerated  Diet: Regular Diet  Wound Care: None needed      Condition at Discharge: Stable  __________________________________________________________________    Disposition  Home or Self Care  ____________________________________________________________________    Code Status:  DNR  ___________________________________________________________________    Discharge Exam:  Patient seen and examined by me on discharge day. Physical Exam  Constitutional:       General: He is not in acute distress. Cardiovascular:      Rate and Rhythm: Normal rate and regular rhythm. Pulses: Normal pulses. Heart sounds: Murmur heard. Pulmonary:      Effort: Pulmonary effort is normal.      Breath sounds: Normal breath sounds. Abdominal:      General: Bowel sounds are normal.      Palpations: Abdomen is soft. Skin:     General: Skin is warm and dry. Neurological:      Mental Status: He is oriented to person, place, and time.    Psychiatric:         Mood and Affect: Mood normal.         Behavior: Behavior normal.          CONSULTATIONS: Nephrology, Oncology    Significant Diagnostic Studies:   Recent Results (from the past 24 hour(s))   HGB & HCT    Collection Time: 07/01/22 11:56 AM   Result Value Ref Range    HGB 7.6 (L) 12.1 - 17.0 g/dL    HCT 22.8 (L) 36.6 - 31.8 %   METABOLIC PANEL, BASIC    Collection Time: 07/01/22 11:56 AM   Result Value Ref Range    Sodium 126 (L) 136 - 145 mmol/L    Potassium 4.1 3.5 - 5.1 mmol/L    Chloride 96 (L) 97 - 108 mmol/L    CO2 23 21 - 32 mmol/L    Anion gap 7 5 - 15 mmol/L    Glucose 110 (H) 65 - 100 mg/dL    BUN 16 6 - 20 MG/DL    Creatinine 1.12 0.70 - 1.30 MG/DL    BUN/Creatinine ratio 14 12 - 20      GFR est AA >60 >60 ml/min/1.73m2    GFR est non-AA >60 >60 ml/min/1.73m2    Calcium 8.4 (L) 8.5 - 10.1 MG/DL   FERRITIN    Collection Time: 07/01/22 11:56 AM   Result Value Ref Range    Ferritin 391 (H) 26 - 388 NG/ML   IRON PROFILE    Collection Time: 07/01/22 11:56 AM   Result Value Ref Range    Iron 106 35 - 150 ug/dL    TIBC 330 250 - 450 ug/dL    Iron % saturation 32 20 - 50 %   VITAMIN B12    Collection Time: 07/01/22 11:56 AM   Result Value Ref Range    Vitamin B12 291 193 - 986 pg/mL   FOLATE    Collection Time: 07/01/22 11:56 AM   Result Value Ref Range    Folate 39.5 (H) 5.0 - 21.0 ng/mL   RETICULOCYTE COUNT    Collection Time: 07/01/22 11:56 AM   Result Value Ref Range    Reticulocyte count 1.8 0.7 - 2.1 %    Absolute Retic Cnt. 0.0396 0.0260 - 0.0950 M/ul   HAPTOGLOBIN    Collection Time: 07/01/22 11:56 AM   Result Value Ref Range    Haptoglobin 189 30 - 200 mg/dL   LD    Collection Time: 07/01/22 11:56 AM   Result Value Ref Range     85 - 241 U/L   OSMOLALITY, SERUM/PLASMA    Collection Time: 07/01/22 11:56 AM   Result Value Ref Range    Osmolality, serum/plasma 267 mOsm/kg H2O   GLUCOSE, POC    Collection Time: 07/01/22 12:15 PM   Result Value Ref Range    Glucose (POC) 114 65 - 117 mg/dL    Performed by Trav Fonseca PCT    GLUCOSE, POC    Collection Time: 07/01/22  5:17 PM   Result Value Ref Range    Glucose (POC) 88 65 - 117 mg/dL    Performed by Trav Fonseca PCT    GLUCOSE, POC    Collection Time: 07/01/22  9:53 PM   Result Value Ref Range    Glucose (POC) 101 65 - 117 mg/dL    Performed by Sudhir Clayton (PCT)    METABOLIC PANEL, BASIC    Collection Time: 07/02/22  3:39 AM   Result Value Ref Range    Sodium 127 (L) 136 - 145 mmol/L    Potassium 5.0 3.5 - 5.1 mmol/L    Chloride 101 97 - 108 mmol/L    CO2 20 (L) 21 - 32 mmol/L    Anion gap 6 5 - 15 mmol/L    Glucose 86 65 - 100 mg/dL    BUN 19 6 - 20 MG/DL    Creatinine 1.01 0.70 - 1.30 MG/DL    BUN/Creatinine ratio 19 12 - 20      GFR est AA >60 >60 ml/min/1.73m2    GFR est non-AA >60 >60 ml/min/1.73m2    Calcium 8.4 (L) 8.5 - 10.1 MG/DL   MAGNESIUM    Collection Time: 07/02/22  3:39 AM   Result Value Ref Range    Magnesium 2.1 1.6 - 2.4 mg/dL   CBC W/O DIFF    Collection Time: 07/02/22  3:39 AM   Result Value Ref Range    .6 (HH) 4.1 - 11.1 K/uL    RBC 2.62 (L) 4.10 - 5.70 M/uL    HGB 8.4 (L) 12.1 - 17.0 g/dL    HCT 27.7 (L) 36.6 - 50.3 %    .7 (H) 80.0 - 99.0 FL    MCH 21.1 (L) 26.0 - 34.0 PG    MCHC 30.3 30.0 - 36.5 g/dL    RDW 23.8 (H) 11.5 - 14.5 %    PLATELET 69 (L) 393 - 400 K/uL    MPV 11.5 8.9 - 12.9 FL    NRBC 0.0 0  WBC    ABSOLUTE NRBC 0.02 (H) 0.00 - 0.01 K/uL   GLUCOSE, POC    Collection Time: 07/02/22  7:54 AM   Result Value Ref Range    Glucose (POC) 88 65 - 117 mg/dL    Performed by Pradip Dean PCT      DUPLEX LOWER EXT VENOUS LEFT   Final Result          Discharge: time spent 35 minutes in discharge  Education and counseling.      Signed:  Keyla Shea NP  7/2/2022  9:15 AM

## 2022-07-02 NOTE — PROGRESS NOTES
Discharge instructions reviewed with the patient. The daughter at the bedside. Both able to verbalize an understanding. Patient left via wheelchair. All personal belongings with the patient.

## 2022-07-05 ENCOUNTER — PATIENT OUTREACH (OUTPATIENT)
Dept: CASE MANAGEMENT | Age: 87
End: 2022-07-05

## 2022-07-05 RX ORDER — MELATONIN
1000 DAILY
COMMUNITY

## 2022-07-05 RX ORDER — MAG HYDROX/ALUMINUM HYD/SIMETH 200-200-20
30 SUSPENSION, ORAL (FINAL DOSE FORM) ORAL DAILY
COMMUNITY
End: 2022-10-18

## 2022-07-05 RX ORDER — ASCORBIC ACID 500 MG
500 TABLET ORAL DAILY
COMMUNITY
End: 2022-10-18

## 2022-07-05 RX ORDER — MULTIVITAMIN
1 TABLET ORAL DAILY
COMMUNITY

## 2022-07-05 NOTE — PROGRESS NOTES
Care Transitions Initial Call    Call within 2 business days of discharge: Yes     Patient: Everton Washington Patient : 10/4/1933 MRN: [id number]    Last Discharge  [address]       Complaint Diagnosis Description Type Department Provider    22 Leg Pain Cellulitis of left lower extremity . .. ED to Hosp-Admission (Discharged) (ADMIT) Azul Wong, Jessy Nguyễn MD; R... Was this an external facility discharge? No     Challenges to be reviewed by the provider   Additional needs identified to be addressed with provider: yes    22- seen in [place] for Blood transfusion- received one unit P-RBC's.   22- presented EMS for Left leg pain. Acute Anemia and Hyponatremia- POA. Nephrology and Hem-Oncology consulted. HGB- 6.7 POA- s/p 2 units P-RBC's last on 22. HGB - 8.4 on , cont ASA 81mg daily. Acalabrutinib - currently on hold. WBC decreasing- 208.6 on   Has follow up with Dr. Favian Patel on  (previously scheduled). Hyponatremia- improved with IVF's. NA- 122, POA. -127. Cr+- 1.31, POA- -1.01. A1C- 21- 6.1- continue Metformin 500 mg daily. Method of communication with provider : staff message    COVID-23 related testing was not done at this time. Advance Care Planning:   Does patient have an Advance Directive: yes, reviewed and current. Spoke with daughter, Galen Mitchell. She is a RN. Explained DDNR on file (dated 10/30/2019) does have signature but no \"choices were elected on document\". She confirmed recent AMD done on 22- he elects to be a DDNR. Asked her to update form with provider- Hem-oncology or PCP. Explained that copy be provided to local EMS and posted in home and she have copy along with all providers    Inpatient Readmission Risk score: Unplanned Readmit Risk Score: 17.3 ( )    Was this a readmission?  no     Patients top risk factors for readmission: medical condition-    Interventions to address risk factors: Education of patient/family/caregiver/guardian to support self-management- , Assessment and support for treatment adherence and medication management-  and communication with PCP    Care Transition Nurse (CTN) contacted the patient by telephone to perform post hospital discharge assessment. Verified name and  with daughterYanci as identifiers. Provided introduction to self, and explanation of the CTN role. CTN reviewed discharge instructions, medical action plan and red flags with family who verbalized understanding. Were discharge instructions available to patient? yes. Reviewed appropriate site of care based on symptoms and resources available to patient including: PCP, Specialist and [employer]. Family given an opportunity to ask questions and does not have any further questions or concerns at this time. The family agrees to contact the PCP office for questions related to their healthcare. Medication reconciliation was performed with family, who verbalizes understanding of administration of home medications. Advised obtaining a 90-day supply of all daily and as-needed medications. Referral to Pharm D needed: no     Home Health/Outpatient orders at discharge: none  Refer to goals section [facility]. Durable Medical Equipment ordered at discharge: None    Was patient discharged with a pulse oximeter? NA    Discussed follow-up appointments. If no appointment was previously scheduled, appointment scheduling offered: yes. Is follow up appointment scheduled within 7 days of discharge? yes. [facility] follow up appointment(s):   Future Appointments   Date Time Provider Renzo Gutierrez   2022  1:15 PM Alex Marroquin MD [facility] MAIN BS AMB   2022 12:00 PM Alex Marroquin MD [facility] MAIN BS AMB     Non-[facility] follow up appointment(s):   Hem-Oncology- Dr. Zac Zambrano- 22.    Cardiology- Dr. Elvin Sena- sees yearly Due Sept   Urology- Dr. Luis Abernathy- sees yearly    Plan for follow-up call in 5-7 days based on severity of symptoms and risk factors. Plan for next call:   · Assess current symptoms including daily monitoring items  · Attended Hem-Oncology appt on 7/6- plan for chemo and labs done. · Follow up on possible referral to [facility] PT.     CTN provided contact information for future needs. Goals Addressed                 This Visit's Progress       General     Reduce Risk of Hospitalization        7/5/22- return call from daughter, Kota Moment- she is an RN. She has excellent understanding of diagnosis, evaluation, treatment and recovery.  He is able to drink and stay hydrated, much improved since oral chemo meds currently on hold. Abdominal discomfort post meals- resolved. Increased urination resolved. Oncology felt that chemo med SE, etc were responsible.  Small left nostril bleed Saturday- resolved. Monitoring due to decreasing PLT. Daughter states he is continuing ASA 81 mg daily. Will continue to monitor for bleeding.  She is monitoring BG values- continues on metformin 500 mg daily. BG are above 100 and <150's.  He is ambulating without cane- staying with her in [place]. Will return home when her sister comes to stay with him at his home on 7/18. She will see if PT evaluation needs to be done. Encouraged her to have him use cane outside the home. He will keep previous follow up with Hem-Onc on 7/6/22-discharge paperwork recommends 2 week follow up. Daughter wants lab work checked sooner and secure plan for future treatment. CTN reached out to PCP to ask for SHIRIN appt- recommendations was for 3 weeks - has new appt for 7/25- checking with PCP to check on date.      [employer]

## 2022-07-05 NOTE — ACP (ADVANCE CARE PLANNING)
Spoke with daughter, Saintclair Milling. She is a RN. Explained DDNR on file does have signature but no \"choices were elected on document\". She confirmed recent AMD done on 7/1/22- he elects to be a DDNR. Asked her to update form with provider- Hem-oncology or PCP. Explained that copy be provided to local EMS and posted in home and she have copy along with all providers.

## 2022-07-21 RX ORDER — BLOOD SUGAR DIAGNOSTIC
STRIP MISCELLANEOUS
Qty: 100 STRIP | Refills: 11 | Status: SHIPPED | OUTPATIENT
Start: 2022-07-21 | End: 2022-07-25 | Stop reason: SDUPTHER

## 2022-07-25 ENCOUNTER — OFFICE VISIT (OUTPATIENT)
Dept: INTERNAL MEDICINE CLINIC | Age: 87
End: 2022-07-25
Payer: MEDICARE

## 2022-07-25 VITALS
HEART RATE: 69 BPM | HEIGHT: 67 IN | DIASTOLIC BLOOD PRESSURE: 62 MMHG | WEIGHT: 144.1 LBS | RESPIRATION RATE: 16 BRPM | OXYGEN SATURATION: 94 % | BODY MASS INDEX: 22.62 KG/M2 | SYSTOLIC BLOOD PRESSURE: 109 MMHG

## 2022-07-25 DIAGNOSIS — N18.31 STAGE 3A CHRONIC KIDNEY DISEASE (HCC): ICD-10-CM

## 2022-07-25 DIAGNOSIS — E11.21 TYPE 2 DIABETES WITH NEPHROPATHY (HCC): Primary | ICD-10-CM

## 2022-07-25 DIAGNOSIS — K21.00 GASTROESOPHAGEAL REFLUX DISEASE WITH ESOPHAGITIS WITHOUT HEMORRHAGE: ICD-10-CM

## 2022-07-25 DIAGNOSIS — E87.1 HYPONATREMIA: ICD-10-CM

## 2022-07-25 DIAGNOSIS — I25.9 CHRONIC ISCHEMIC HEART DISEASE: ICD-10-CM

## 2022-07-25 DIAGNOSIS — C91.10 CLL (CHRONIC LYMPHOCYTIC LEUKEMIA) (HCC): ICD-10-CM

## 2022-07-25 PROCEDURE — G8427 DOCREV CUR MEDS BY ELIG CLIN: HCPCS | Performed by: INTERNAL MEDICINE

## 2022-07-25 PROCEDURE — 1123F ACP DISCUSS/DSCN MKR DOCD: CPT | Performed by: INTERNAL MEDICINE

## 2022-07-25 PROCEDURE — 99214 OFFICE O/P EST MOD 30 MIN: CPT | Performed by: INTERNAL MEDICINE

## 2022-07-25 PROCEDURE — G8420 CALC BMI NORM PARAMETERS: HCPCS | Performed by: INTERNAL MEDICINE

## 2022-07-25 PROCEDURE — 1101F PT FALLS ASSESS-DOCD LE1/YR: CPT | Performed by: INTERNAL MEDICINE

## 2022-07-25 PROCEDURE — 1111F DSCHRG MED/CURRENT MED MERGE: CPT | Performed by: INTERNAL MEDICINE

## 2022-07-25 PROCEDURE — G8432 DEP SCR NOT DOC, RNG: HCPCS | Performed by: INTERNAL MEDICINE

## 2022-07-25 PROCEDURE — G8536 NO DOC ELDER MAL SCRN: HCPCS | Performed by: INTERNAL MEDICINE

## 2022-07-25 RX ORDER — BLOOD SUGAR DIAGNOSTIC
STRIP MISCELLANEOUS
Qty: 100 STRIP | Refills: 11 | Status: SHIPPED | OUTPATIENT
Start: 2022-07-25 | End: 2022-10-18

## 2022-07-25 NOTE — PROGRESS NOTES
Identified pt with two pt identifiers(name and ). Reviewed record in preparation for visit and have obtained necessary documentation. Chief Complaint   Patient presents with    Hospital Follow Up      Yes, 2022 - 2022 [id number] [facility]         Health Maintenance Due   Topic    Shingrix Vaccine Age 50> (1 of 2)    COVID-19 Vaccine (4 - Booster for Aldair Daunt series)    Eye Exam Retinal or Dilated       Visit Vitals  /62 (BP 1 Location: Right arm, BP Patient Position: Sitting, BP Cuff Size: Adult)   Pulse 69   Resp 16   Ht 5' 7\" (1.702 m)   Wt 144 lb 1.6 oz (65.4 kg)   SpO2 94%   BMI 22.57 kg/m²     Pain Scale: 0 - No pain/10    Coordination of Care Questionnaire:  :   1. Have you been to the ER, urgent care clinic since your last visit? Hospitalized since your last visit? Yes, 2022 - 2022 [facility]     2. Have you seen or consulted any other health care providers outside of the [address] [place] [address] since your last visit? Include any pap smears or colon screening.   Oncology, Dr. Arellano Child

## 2022-07-26 NOTE — PROGRESS NOTES
SPORTS MEDICINE AND PRIMARY CARE  Miguelangel Donahue MD, [address] [address]  Phone:  [phone]  Fax: [phone]       Chief Complaint   Patient presents with    Hospital Follow Up      Yes, 6/30/2022 - 7/2/2022 [facility]    . SUBJECTIVE:    James Orozco is a 80 y.o. male Patient returns today with another daughter, who comes up from [place] to help with her father. He was admitted on 06/30 and discharged on 07/02 from [id number] [place] [address] following admission diagnoses of hyponatremia, anemia, cellulitis, type 2 diabetes, bronchiectasis, chronic lymphocytic leukemia, ischemic heart disease, hypertension, and for final diagnosis he was found to have chronic lymphocytic leukemia, hyponatremia, anemia and left leg cellulitis, for which he finished a course of antibiotics. He was started on chemotherapy, but did not take it, but was advised to stop it because of neutropenia. He was continued on Metformin and blood sugars have been in the normal range. He voices no complaints and is seen for evaluation. Current Outpatient Medications   Medication Sig Dispense Refill    glucose blood VI test strips (OneTouch Ultra Test) strip USE TO TEST BLOOD SUGAR ONCE DAILY. DX.E11.9 100 Strip 11    cholecalciferol (VITAMIN D3) (1000 Units /25 mcg) tablet Take 1,000 Units by mouth daily. alum-mag hydroxide-simeth (MYLANTA) 200-200-20 mg/5 mL susp Take 30 mL by mouth daily. Was taking daily while on acalabrutinib      multivitamin with folic acid (ONE DAILY WITH FOLIC ACID) 138 mcg tab tablet Take 1 Tablet by mouth daily. ascorbic acid, vitamin C, (VITAMIN C) 500 mg tablet Take 500 mg by mouth daily. acetaminophen (TYLENOL) 325 mg tablet Take 2 Tablets by mouth every four (4) hours as needed for Fever for up to 30 days.  180 Tablet 0    simvastatin (ZOCOR) 40 mg tablet TAKE 1 TABLET DAILY IN THE EVENING 90 Tablet 3    metFORMIN (GLUCOPHAGE) 500 mg tablet TAKE 1 TABLET DAILY 90 Tablet 3 metoprolol tartrate (LOPRESSOR) 25 mg tablet TAKE 1 TABLET TWICE A  Tablet 3    allopurinoL (ZYLOPRIM) 300 mg tablet TAKE ONE-HALF (1/2) TABLET DAILY 90 Tablet 3    aspirin 81 mg chewable tablet Take 81 mg by mouth daily. brimonidine (ALPHAGAN) 0.15 % ophthalmic solution Administer 1 Drop to both eyes two (2) times a day.  Indications: OPEN ANGLE GLAUCOMA       Past Medical History:   Diagnosis Date    Abnormal nuclear stress test 6/6/2014    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 ([facility] 11/2/2015    Bereavement 02/06/2019    ltcf - uti -strangulated hernia - prostate ca - brother    Bronchiectasis ([facility]     CAD (coronary artery disease)     Chest pain, unspecified     Chronic pain     right leg    CLL (chronic lymphocytic leukemia) [facility]     Jerica Segura md  VCI    Diabetes ([facility]     Essential hypertension     GERD (gastroesophageal reflux disease)     Hyperlipidemia     Hypertension     Opacity of lung on imaging study 05/28/2017    cxr    Rotator cuff arthropathy     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    S/P coronary artery stent placement 6/6/2014 6/6/14 PCI/GUANAKO to prox LAD     Past Surgical History:   Procedure Laterality Date    HX COLONOSCOPY      HX HEART CATHETERIZATION      PTCA SINGLE VESSEL  4/4/2015         VASCULAR SURGERY PROCEDURE UNLIST  2014 and 2015    BLE stenting     Allergies   Allergen Reactions    Codeine Shortness of Breath    Lipitor [Atorvastatin] Nausea Only         REVIEW OF SYSTEMS:  General: negative for - chills or fever  ENT: negative for - headaches, nasal congestion or tinnitus  Respiratory: negative for - cough, hemoptysis, shortness of breath or wheezing  Cardiovascular : negative for - chest pain, edema, palpitations or shortness of breath  Gastrointestinal: negative for - abdominal pain, blood in stools, heartburn or nausea/vomiting  Genito-Urinary: no dysuria, trouble voiding, or hematuria  Musculoskeletal: negative for - gait disturbance, joint pain, joint stiffness or joint swelling  Neurological: no TIA or stroke symptoms  Hematologic: no bruises, no bleeding, no swollen glands  Integument: no lumps, mole changes, nail changes or rash  Endocrine: no malaise/lethargy or unexpected weight changes      Social History     Socioeconomic History    Marital status:    Tobacco Use    Smoking status: Former     Types: Cigarettes     Quit date: 1/15/1984     Years since quittin.5    Smokeless tobacco: Never    Tobacco comments:     QUIT    Vaping Use    Vaping Use: Never used   Substance and Sexual Activity    Alcohol use: No     Alcohol/week: 0.0 standard drinks     Comment: quit in     Drug use: No     Types: Prescription, OTC    Sexual activity: Not Currently     Partners: Female   Other Topics Concern     Service No    Blood Transfusions No    Caffeine Concern No    Occupational Exposure No    Hobby Hazards No    Sleep Concern No    Stress Concern No    Weight Concern No    Special Diet Yes     Comment: low sugar    Back Care No    Exercise No    Bike Helmet No    Seat Belt Yes    Self-Exams No   Social History Narrative    Family History: Mother:  61 yrs, DM complicationsFather:  80 yrs, strokeSister(s):  76 yrs, pneumonia, DM,    HTNBrother(s):  61 yrs, DM, HTN, CVAChildren: aliveGrandmother: unknow nGrandfather: deceasedSpouse: deceased1 brother(s) -    healthy. 2daughter(s) - healthy. Social History: Alcohol Use Patient does not use alcohol. Smoking Status Patient is a former smoker, Quit in: 36. Caffeine: none. Marital    Status: W idow . Lives w ith: alone. Children: yes 2 d. Education/School: has highschool diploma, college 2 y.      Family History   Problem Relation Age of Onset    Diabetes Mother     Stroke Father        OBJECTIVE:    Visit Vitals  /62 (BP 1 Location: Right arm, BP Patient Position: Sitting, BP Cuff Size: Adult)   Pulse 69   Resp 16   Ht 5' 7\" (1.702 m)   Wt 144 lb 1.6 oz (65.4 kg)   SpO2 94%   BMI 22.57 kg/m²     CONSTITUTIONAL: well , well nourished, appears age appropriate  EYES: perrla, eom intact  ENMT:moist mucous membranes, pharynx clear  NECK: supple. Thyroid normal  RESPIRATORY: Chest: clear bilaterally   CARDIOVASCULAR: Heart: regular rate and rhythm  GASTROINTESTINAL: Abdomen: soft, bowel sounds active  HEMATOLOGIC: no pathological lymph nodes palpated  MUSCULOSKELETAL: Extremities: no edema, pulse 1+   INTEGUMENT: No unusual rashes or suspicious skin lesions noted. Nails appear normal.  NEUROLOGIC: non-focal exam   MENTAL STATUS: alert and oriented, appropriate affect           ASSESSMENT:  1. Type 2 diabetes with nephropathy (HCC)    2. Stage 3a chronic kidney disease ([facility] [address]    3. Hyponatremia    4. CLL (chronic lymphocytic leukemia) ([facility] [address]    5. Gastroesophageal reflux disease with esophagitis without hemorrhage    6. Chronic ischemic heart disease      Blood sugar is well controlled on a small dose of Metformin and we suggest he decrease the frequency of blood sugar checks to three times a week. CKD remains stable. Hyponatremia is improved. Lymphocytic leukemia unfortunately is progressive and now ill effects from either the disease itself or the chemotherapy. No symptoms related to GERD. No chest pain related to ischemic heart disease. Return to the office in two months, sooner if any problems. I have discussed the diagnosis with the patient and the intended plan as seen in the  Orders. The patient understands and agees with the plan. The patient has   received an after visit summary and questions were answered concerning  future plans  Patient labs and/or xrays were reviewed  Past records were reviewed. PLAN:  .  Orders Placed This Encounter    glucose blood VI test strips (OneTouch Ultra Test) strip       Follow-up and Dispositions    Return in about 2 months (around 9/25/2022).                 ATTENTION:   This medical record was transcribed using an electronic medical records system. Although proofread, it may and can contain electronic and spelling errors. Other human spelling and other errors may be present. Corrections may be executed at a later time. Please feel free to contact us for any clarifications as needed.

## 2022-07-27 DIAGNOSIS — C91.10 CLL (CHRONIC LYMPHOCYTIC LEUKEMIA) (HCC): Primary | ICD-10-CM

## 2022-07-27 DIAGNOSIS — R53.81 PHYSICAL DECONDITIONING: ICD-10-CM

## 2022-07-27 DIAGNOSIS — R53.81 DEBILITY: ICD-10-CM

## 2022-08-20 ENCOUNTER — HOSPITAL ENCOUNTER (EMERGENCY)
Age: 87
Discharge: HOME | End: 2022-08-20
Attending: EMERGENCY MEDICINE
Payer: MEDICARE

## 2022-08-20 ENCOUNTER — APPOINTMENT (OUTPATIENT)
Dept: CT IMAGING | Age: 87
End: 2022-08-20
Attending: EMERGENCY MEDICINE
Payer: MEDICARE

## 2022-08-20 VITALS
WEIGHT: 142.86 LBS | SYSTOLIC BLOOD PRESSURE: 127 MMHG | BODY MASS INDEX: 22.42 KG/M2 | RESPIRATION RATE: 23 BRPM | DIASTOLIC BLOOD PRESSURE: 42 MMHG | TEMPERATURE: 98 F | HEIGHT: 67 IN | OXYGEN SATURATION: 90 % | HEART RATE: 50 BPM

## 2022-08-20 DIAGNOSIS — D64.9 ANEMIA, UNSPECIFIED TYPE: ICD-10-CM

## 2022-08-20 DIAGNOSIS — E87.5 ACUTE HYPERKALEMIA: ICD-10-CM

## 2022-08-20 DIAGNOSIS — R53.83 MALAISE AND FATIGUE: ICD-10-CM

## 2022-08-20 DIAGNOSIS — C91.10 CLL (CHRONIC LYMPHOCYTIC LEUKEMIA) (HCC): ICD-10-CM

## 2022-08-20 DIAGNOSIS — R53.81 MALAISE AND FATIGUE: ICD-10-CM

## 2022-08-20 DIAGNOSIS — E87.1 HYPONATREMIA: Primary | ICD-10-CM

## 2022-08-20 LAB
ABO + RH BLD: NORMAL
ALBUMIN SERPL-MCNC: 3.4 G/DL (ref 3.5–5)
ALBUMIN/GLOB SERPL: 0.9 {RATIO} (ref 1.1–2.2)
ALP SERPL-CCNC: 103 U/L (ref 45–117)
ALT SERPL-CCNC: 18 U/L (ref 12–78)
ANION GAP SERPL CALC-SCNC: 6 MMOL/L (ref 5–15)
APPEARANCE UR: CLEAR
AST SERPL-CCNC: 22 U/L (ref 15–37)
BACTERIA URNS QL MICRO: NEGATIVE /HPF
BASOPHILS # BLD: 0 K/UL (ref 0–0.1)
BASOPHILS NFR BLD: 0 % (ref 0–1)
BILIRUB SERPL-MCNC: 1.6 MG/DL (ref 0.2–1)
BILIRUB UR QL: NEGATIVE
BLOOD GROUP ANTIBODIES SERPL: NORMAL
BUN SERPL-MCNC: 17 MG/DL (ref 6–20)
BUN/CREAT SERPL: 17 (ref 12–20)
CALCIUM SERPL-MCNC: 9.1 MG/DL (ref 8.5–10.1)
CHLORIDE SERPL-SCNC: 93 MMOL/L (ref 97–108)
CO2 SERPL-SCNC: 26 MMOL/L (ref 21–32)
COLOR UR: NORMAL
CREAT SERPL-MCNC: 1.02 MG/DL (ref 0.7–1.3)
DIFFERENTIAL METHOD BLD: ABNORMAL
EOSINOPHIL # BLD: 0 K/UL (ref 0–0.4)
EOSINOPHIL NFR BLD: 0 % (ref 0–7)
EPITH CASTS URNS QL MICRO: NORMAL /LPF
ERYTHROCYTE [DISTWIDTH] IN BLOOD BY AUTOMATED COUNT: ABNORMAL % (ref 11.5–14.5)
GLOBULIN SER CALC-MCNC: 3.7 G/DL (ref 2–4)
GLUCOSE SERPL-MCNC: 83 MG/DL (ref 65–100)
GLUCOSE UR STRIP.AUTO-MCNC: NEGATIVE MG/DL
HCT VFR BLD AUTO: 28 % (ref 36.6–50.3)
HGB BLD-MCNC: 7.5 G/DL (ref 12.1–17)
HGB UR QL STRIP: NEGATIVE
HYALINE CASTS URNS QL MICRO: NORMAL /LPF (ref 0–2)
IMM GRANULOCYTES # BLD AUTO: 0 K/UL (ref 0–0.04)
IMM GRANULOCYTES NFR BLD AUTO: 0 % (ref 0–0.5)
KETONES UR QL STRIP.AUTO: NEGATIVE MG/DL
LEUKOCYTE ESTERASE UR QL STRIP.AUTO: NEGATIVE
LYMPHOCYTES # BLD: 268.7 K/UL (ref 0.8–3.5)
LYMPHOCYTES NFR BLD: 96 % (ref 12–49)
MCH RBC QN AUTO: 30.4 PG (ref 26–34)
MCHC RBC AUTO-ENTMCNC: 26.8 G/DL (ref 30–36.5)
MCV RBC AUTO: 113.4 FL (ref 80–99)
MONOCYTES # BLD: 5.6 K/UL (ref 0–1)
MONOCYTES NFR BLD: 2 % (ref 5–13)
NEUTS SEG # BLD: 5.6 K/UL (ref 1.8–8)
NEUTS SEG NFR BLD: 2 % (ref 32–75)
NITRITE UR QL STRIP.AUTO: NEGATIVE
NRBC # BLD: 0.15 K/UL (ref 0–0.01)
NRBC BLD-RTO: 0.1 PER 100 WBC
PH UR STRIP: 7 [PH] (ref 5–8)
PLATELET # BLD AUTO: 153 K/UL (ref 150–400)
PLATELET COMMENTS,PCOM: ABNORMAL
PMV BLD AUTO: 10.7 FL (ref 8.9–12.9)
POTASSIUM SERPL-SCNC: 5.2 MMOL/L (ref 3.5–5.1)
PROT SERPL-MCNC: 7.1 G/DL (ref 6.4–8.2)
PROT UR STRIP-MCNC: NEGATIVE MG/DL
RBC # BLD AUTO: 2.47 M/UL (ref 4.1–5.7)
RBC #/AREA URNS HPF: NORMAL /HPF (ref 0–5)
RBC MORPH BLD: ABNORMAL
RBC MORPH BLD: ABNORMAL
SODIUM SERPL-SCNC: 125 MMOL/L (ref 136–145)
SP GR UR REFRACTOMETRY: 1.01
SPECIMEN EXP DATE BLD: NORMAL
UA: UC IF INDICATED,UAUC: NORMAL
UROBILINOGEN UR QL STRIP.AUTO: 1 EU/DL (ref 0.2–1)
WBC # BLD AUTO: 279.9 K/UL (ref 4.1–11.1)
WBC MORPH BLD: ABNORMAL
WBC URNS QL MICRO: NORMAL /HPF (ref 0–4)

## 2022-08-20 PROCEDURE — 80053 COMPREHEN METABOLIC PANEL: CPT

## 2022-08-20 PROCEDURE — 36415 COLL VENOUS BLD VENIPUNCTURE: CPT

## 2022-08-20 PROCEDURE — 86900 BLOOD TYPING SEROLOGIC ABO: CPT

## 2022-08-20 PROCEDURE — 93005 ELECTROCARDIOGRAM TRACING: CPT

## 2022-08-20 PROCEDURE — 74011250636 HC RX REV CODE- 250/636: Performed by: EMERGENCY MEDICINE

## 2022-08-20 PROCEDURE — 70450 CT HEAD/BRAIN W/O DYE: CPT

## 2022-08-20 PROCEDURE — 81001 URINALYSIS AUTO W/SCOPE: CPT

## 2022-08-20 PROCEDURE — 85025 COMPLETE CBC W/AUTO DIFF WBC: CPT

## 2022-08-20 PROCEDURE — 96360 HYDRATION IV INFUSION INIT: CPT

## 2022-08-20 PROCEDURE — 99284 EMERGENCY DEPT VISIT MOD MDM: CPT

## 2022-08-20 RX ADMIN — SODIUM CHLORIDE 500 ML: 9 INJECTION, SOLUTION INTRAVENOUS at 14:50

## 2022-08-20 NOTE — ED PROVIDER NOTES
EMERGENCY DEPARTMENT HISTORY AND PHYSICAL EXAM      Date: 8/20/2022  Patient Name: Wen Fregoso    History of Presenting Illness     Chief Complaint   Patient presents with    Lethargy     Per pt's daughter, pt has had new onset lethargy and lightheadedness starting this morning. Pt currently taking oral chemo for leukemia. Daughter mentioned recent Hgb of 7.4. Pt slightly hypotensive in triage. Denied pain, hx of GI bleeds, and not currently taking blood thinner. History Provided By: Patient and Patient's Daughter    HPI: Wen December, 80 y.o. male with PMHx significant for CLL, hypertension, hyperlipidemia, GERD, CAD, who presents with a chief complaint of wooziness this morning. Patient states that he was walking around in his home he felt very \"woozy\" and lightheaded. Daughter reports that his hemoglobin was checked recently and was low at 7.4. He has a history of CLL and is on oral chemotherapy medication. Daughter tells me his baseline white count is about 266 and he is followed by Dr. Josh Velazquez from oncology. He has previously required blood transfusions related to his CLL. Patient denies any chest pain, shortness of breath, abdominal pain, nausea, vomiting, diarrhea, urinary symptoms. He has had a poor appetite and notably has not had a caregivers at the house within the last 2 nights to remind him to eat dinner. PCP: Chon Woodard MD    There are no other complaints, changes, or physical findings at this time. Current Outpatient Medications   Medication Sig Dispense Refill    glucose blood VI test strips (OneTouch Ultra Test) strip USE TO TEST BLOOD SUGAR ONCE DAILY. DX.E11.9 100 Strip 11    cholecalciferol (VITAMIN D3) (1000 Units /25 mcg) tablet Take 1,000 Units by mouth daily. alum-mag hydroxide-simeth (MYLANTA) 200-200-20 mg/5 mL susp Take 30 mL by mouth daily.  Was taking daily while on acalabrutinib      multivitamin with folic acid (ONE DAILY WITH FOLIC ACID) 400 mcg tab tablet Take 1 Tablet by mouth daily. ascorbic acid, vitamin C, (VITAMIN C) 500 mg tablet Take 500 mg by mouth daily. simvastatin (ZOCOR) 40 mg tablet TAKE 1 TABLET DAILY IN THE EVENING 90 Tablet 3    metFORMIN (GLUCOPHAGE) 500 mg tablet TAKE 1 TABLET DAILY 90 Tablet 3    metoprolol tartrate (LOPRESSOR) 25 mg tablet TAKE 1 TABLET TWICE A  Tablet 3    allopurinoL (ZYLOPRIM) 300 mg tablet TAKE ONE-HALF (1/2) TABLET DAILY 90 Tablet 3    aspirin 81 mg chewable tablet Take 81 mg by mouth daily. brimonidine (ALPHAGAN) 0.15 % ophthalmic solution Administer 1 Drop to both eyes two (2) times a day.  Indications: OPEN ANGLE GLAUCOMA       Past History     Past Medical History:  Past Medical History:   Diagnosis Date    Abnormal nuclear stress test 6/6/2014    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 ([facility] 11/2/2015    Bereavement 02/06/2019    ltcf - uti -strangulated hernia - prostate ca - brother    Bronchiectasis ([facility]     CAD (coronary artery disease)     Chest pain, unspecified     Chronic pain     right leg    CLL (chronic lymphocytic leukemia) ([facility] [address]     Jerica Segura md  VCI    Diabetes ([facility]     Essential hypertension     GERD (gastroesophageal reflux disease)     Hyperlipidemia     Hypertension     Opacity of lung on imaging study 05/28/2017    cxr    Rotator cuff arthropathy     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    S/P coronary artery stent placement 6/6/2014 6/6/14 PCI/GUANAKO to prox LAD     Past Surgical History:  Past Surgical History:   Procedure Laterality Date    HX COLONOSCOPY      HX HEART CATHETERIZATION      PTCA SINGLE VESSEL  4/4/2015         VASCULAR SURGERY PROCEDURE UNLIST  2014 and 2015    BLE stenting     Family History:  Family History   Problem Relation Age of Onset    Diabetes Mother     Stroke Father      Social History:  Social History     Tobacco Use    Smoking status: Former     Types: Cigarettes     Quit date: 1/15/1984     Years since quittin.6    Smokeless tobacco: Never    Tobacco comments:     QUIT    Vaping Use    Vaping Use: Never used   Substance Use Topics    Alcohol use: No     Alcohol/week: 0.0 standard drinks     Comment: quit in     Drug use: No     Types: Prescription, OTC     Allergies: Allergies   Allergen Reactions    Codeine Shortness of Breath    Lipitor [Atorvastatin] Nausea Only     Review of Systems   Review of Systems   Constitutional:  Negative for chills and fever. HENT:  Negative for congestion, rhinorrhea and sore throat. Respiratory:  Negative for cough and shortness of breath. Cardiovascular:  Negative for chest pain. Gastrointestinal:  Negative for abdominal pain, nausea and vomiting. Genitourinary:  Negative for dysuria and urgency. Skin:  Negative for rash. Neurological:  Positive for light-headedness. Negative for dizziness and headaches. All other systems reviewed and are negative. Physical Exam   Physical Exam  Vitals and nursing note reviewed. Constitutional:       General: He is not in acute distress. Appearance: He is well-developed. HENT:      Head: Normocephalic and atraumatic. Eyes:      Conjunctiva/sclera: Conjunctivae normal.      Pupils: Pupils are equal, round, and reactive to light. Cardiovascular:      Rate and Rhythm: Normal rate and regular rhythm. Pulmonary:      Effort: Pulmonary effort is normal. No respiratory distress. Breath sounds: Normal breath sounds. No stridor. Abdominal:      General: There is no distension. Palpations: Abdomen is soft. Tenderness: There is no abdominal tenderness. Musculoskeletal:         General: Normal range of motion. Cervical back: Normal range of motion. Skin:     General: Skin is warm and dry. Coloration: Skin is pale. Neurological:      Mental Status: He is alert and oriented to person, place, and time.    Psychiatric:         Mood and Affect: Mood normal.         Thought Content: Thought content normal.     Diagnostic Study Results   Labs -     Recent Results (from the past 12 hour(s))   EKG, 12 LEAD, INITIAL    Collection Time: 08/20/22 12:19 PM   Result Value Ref Range    Ventricular Rate 58 BPM    Atrial Rate 58 BPM    P-R Interval 174 ms    QRS Duration 98 ms    Q-T Interval 442 ms    QTC Calculation (Bezet) 433 ms    Calculated P Axis -10 degrees    Calculated R Axis -24 degrees    Calculated T Axis -10 degrees    Diagnosis       Sinus bradycardia  Minimal voltage criteria for LVH, may be normal variant  Septal infarct , age undetermined  When compared with ECG of 25-FEB-2022 22:36,  No significant change was found     CBC WITH AUTOMATED DIFF    Collection Time: 08/20/22 12:28 PM   Result Value Ref Range    .9 (HH) 4.1 - 11.1 K/uL    RBC 2.47 (L) 4.10 - 5.70 M/uL    HGB 7.5 (L) 12.1 - 17.0 g/dL    HCT 28.0 (L) 36.6 - 50.3 %    .4 (H) 80.0 - 99.0 FL    MCH 30.4 26.0 - 34.0 PG    MCHC 26.8 (L) 30.0 - 36.5 g/dL    RDW ABNORMAL 11.5 - 14.5 %    PLATELET 914 952 - 602 K/uL    MPV 10.7 8.9 - 12.9 FL    NRBC 0.1 (H) 0  WBC    ABSOLUTE NRBC 0.15 (H) 0.00 - 0.01 K/uL    NEUTROPHILS 2 (L) 32 - 75 %    LYMPHOCYTES 96 (H) 12 - 49 %    MONOCYTES 2 (L) 5 - 13 %    EOSINOPHILS 0 0 - 7 %    BASOPHILS 0 0 - 1 %    IMMATURE GRANULOCYTES 0 0.0 - 0.5 %    ABS. NEUTROPHILS 5.6 1.8 - 8.0 K/UL    ABS. LYMPHOCYTES 268.7 (H) 0.8 - 3.5 K/UL    ABS. MONOCYTES 5.6 (H) 0.0 - 1.0 K/UL    ABS. EOSINOPHILS 0.0 0.0 - 0.4 K/UL    ABS. BASOPHILS 0.0 0.0 - 0.1 K/UL    ABS. IMM.  GRANS. 0.0 0.00 - 0.04 K/UL    DF MANUAL      PLATELET COMMENTS Large Platelets      RBC COMMENTS ANISOCYTOSIS  1+        RBC COMMENTS MACROCYTOSIS  2+        WBC COMMENTS SMUDGE CELLS SEEN     METABOLIC PANEL, COMPREHENSIVE    Collection Time: 08/20/22 12:28 PM   Result Value Ref Range    Sodium 125 (L) 136 - 145 mmol/L    Potassium 5.2 (H) 3.5 - 5.1 mmol/L    Chloride 93 (L) 97 - 108 mmol/L    CO2 26 21 - 32 mmol/L    Anion gap 6 5 - 15 mmol/L    Glucose 83 65 - 100 mg/dL    BUN 17 6 - 20 MG/DL    Creatinine 1.02 0.70 - 1.30 MG/DL    BUN/Creatinine ratio 17 12 - 20      GFR est AA >60 >60 ml/min/1.73m2    GFR est non-AA >60 >60 ml/min/1.73m2    Calcium 9.1 8.5 - 10.1 MG/DL    Bilirubin, total 1.6 (H) 0.2 - 1.0 MG/DL    ALT (SGPT) 18 12 - 78 U/L    AST (SGOT) 22 15 - 37 U/L    Alk. phosphatase 103 45 - 117 U/L    Protein, total 7.1 6.4 - 8.2 g/dL    Albumin 3.4 (L) 3.5 - 5.0 g/dL    Globulin 3.7 2.0 - 4.0 g/dL    A-G Ratio 0.9 (L) 1.1 - 2.2     URINALYSIS W/ REFLEX CULTURE    Collection Time: 08/20/22  2:03 PM    Specimen: Urine   Result Value Ref Range    Color YELLOW/STRAW      Appearance CLEAR CLEAR      Specific gravity 1.010      pH (UA) 7.0 5.0 - 8.0      Protein Negative NEG mg/dL    Glucose Negative NEG mg/dL    Ketone Negative NEG mg/dL    Bilirubin Negative NEG      Blood Negative NEG      Urobilinogen 1.0 0.2 - 1.0 EU/dL    Nitrites Negative NEG      Leukocyte Esterase Negative NEG      UA:UC IF INDICATED CULTURE NOT INDICATED BY UA RESULT CNI      WBC 0-4 0 - 4 /hpf    RBC 0-5 0 - 5 /hpf    Epithelial cells FEW FEW /lpf    Bacteria Negative NEG /hpf    Hyaline cast 0-2 0 - 2 /lpf   TYPE & SCREEN    Collection Time: 08/20/22  2:03 PM   Result Value Ref Range    Crossmatch Expiration 08/23/2022,2359     ABO/Rh(D) A POSITIVE     Antibody screen NEG        Radiologic Studies -   CT HEAD WO CONT   Final Result   No acute intracranial process. Imaging findings consistent with severe chronic microvascular ischemic change. There is a moderate degree of cerebral atrophy. CT HEAD WO CONT    Result Date: 8/20/2022  No acute intracranial process. Imaging findings consistent with severe chronic microvascular ischemic change. There is a moderate degree of cerebral atrophy. Medical Decision Making   I am the first provider for this patient.     I reviewed the vital signs, available nursing notes, past medical history, past surgical history, family history and social history. Vital Signs-Reviewed the patient's vital signs. Patient Vitals for the past 12 hrs:   Temp Pulse Resp BP SpO2   08/20/22 1515 -- (!) 50 23 (!) 127/42 90 %   08/20/22 1445 -- (!) 52 15 (!) 134/46 91 %   08/20/22 1430 -- (!) 52 17 (!) 132/45 92 %   08/20/22 1400 -- (!) 57 16 (!) 142/49 99 %   08/20/22 1354 -- 61 15 (!) 145/51 96 %   08/20/22 1353 -- (!) 59 16 (!) 154/53 95 %   08/20/22 1352 -- 75 -- (!) 172/64 95 %   08/20/22 1330 -- (!) 58 17 (!) 126/45 96 %   08/20/22 1315 -- (!) 59 16 (!) 127/44 95 %   08/20/22 1300 -- (!) 58 16 (!) 120/43 95 %   08/20/22 1250 -- -- -- (!) 127/46 --   08/20/22 1249 -- (!) 58 17 -- 95 %   08/20/22 1213 98 °F (36.7 °C) 66 17 (!) 123/43 100 %       Pulse Oximetry Analysis - 95% on ra    Cardiac Monitor:   Rate: 61 bpm  Rhythm: Normal Sinus Rhythm      ED EKG interpretation:  Rhythm: sinus bradycardia; and regular . Rate (approx.): 58; Axis: normal; P wave: normal; QRS interval: normal ; ST/T wave: normal; Other findings: borderline ekg. This EKG was interpreted by SAVANA Lanier MD,ED Provider. Records Reviewed: Nursing Notes and Old Medical Records    Provider Notes (Medical Decision Making):   Patient presents with a chief complaint of feeling lightheaded. He does have a history of CLL and baseline anemia. Differential includes worsening anemia, electrolyte imbalance, less likely intracranial process given patient has had normal platelets so spontaneous bleeding is less likely but given his lightheadedness will CT scan his head. Also check basic lab work, urinalysis. ED Course:   Initial assessment performed. The patients presenting problems have been discussed, and they are in agreement with the care plan formulated and outlined with them. I have encouraged them to ask questions as they arise throughout their visit. Labs with stable hemoglobin of 7.5. Platelets within normal limits.   White blood cell count chronically elevated. He does also have some chronic hyponatremia which is not significantly changed. He was advised to follow-up with his oncologist for repeat lab work. Daughter states she will call on Monday morning. ED return precautions were discussed. Procedures:  Procedures    Critical Care:  none    Disposition:  Discharge Note:  The patient has been re-evaluated and is ready for discharge. Reviewed available results with patient. Counseled patient on diagnosis and care plan. Patient has expressed understanding, and all questions have been answered. Patient agrees with plan and agrees to follow up as recommended, or to return to the ED if their symptoms worsen. Discharge instructions have been provided and explained to the patient, along with reasons to return to the ED. PLAN:  1. Discharge Medication List as of 8/20/2022  3:31 PM        2. Follow-up Information       Follow up With Specialties Details Why Contact Info    Stefan Lua MD Hematology and Oncology Schedule an appointment as soon as possible for a visit   [address]  [phone]      [facility] EMERGENCY DEPT Emergency Medicine  As needed, If symptoms worsen [address]  [address] [facility]  [phone]          Return to ED if worse     Diagnosis     Clinical Impression:   1. Hyponatremia    2. Acute hyperkalemia    3. Anemia, unspecified type    4. CLL (chronic lymphocytic leukemia) ([facility] [address]    5. Malaise and fatigue            Please note that this dictation was completed with [employer], the computer voice recognition software. Quite often unanticipated grammatical, syntax, homophones, and other interpretive errors are inadvertently transcribed by the computer software. Please disregard these errors.   Please excuse any errors that have escaped final proofreading

## 2022-08-20 NOTE — ED NOTES
Pt tolerated ambulating. Denies any dizziness, lightheaded. Uses a cane while ambulating, No acute distress noted.

## 2022-08-21 LAB
ATRIAL RATE: 58 BPM
CALCULATED P AXIS, ECG09: -10 DEGREES
CALCULATED R AXIS, ECG10: -24 DEGREES
CALCULATED T AXIS, ECG11: -10 DEGREES
DIAGNOSIS, 93000: NORMAL
P-R INTERVAL, ECG05: 174 MS
Q-T INTERVAL, ECG07: 442 MS
QRS DURATION, ECG06: 98 MS
QTC CALCULATION (BEZET), ECG08: 433 MS
VENTRICULAR RATE, ECG03: 58 BPM

## 2022-08-22 RX ORDER — ALLOPURINOL 300 MG/1
TABLET ORAL
Qty: 90 TABLET | Refills: 3 | Status: SHIPPED | OUTPATIENT
Start: 2022-08-22 | End: 2022-10-18

## 2022-08-30 RX ORDER — METOPROLOL TARTRATE 25 MG/1
TABLET, FILM COATED ORAL
Qty: 180 TABLET | Refills: 3 | Status: SHIPPED | OUTPATIENT
Start: 2022-08-30

## 2022-09-02 ENCOUNTER — PATIENT OUTREACH (OUTPATIENT)
Dept: CASE MANAGEMENT | Age: 87
End: 2022-09-02

## 2022-09-02 NOTE — PROGRESS NOTES
Ambulatory Care Management Note    Date/Time:  9/2/2022 3:35 PM    This patient was received as a referral from [address] [facility]. Ambulatory Care Manager outreached to patient today to offer care management services. Introduction to self and role of care manager provided. Patient accepted care management services at this time. Follow up call scheduled at this time. Patient has Ambulatory Care Manager's contact number for for any questions or concerns. PCP consult:  patient asks, with leukemia, can he return to going to [employer] now? Discussed COVID vaccinations-states he has all the COVID vaccinations including the booster.

## 2022-09-25 ENCOUNTER — APPOINTMENT (OUTPATIENT)
Dept: CT IMAGING | Age: 87
End: 2022-09-25
Attending: EMERGENCY MEDICINE
Payer: MEDICARE

## 2022-09-25 ENCOUNTER — APPOINTMENT (OUTPATIENT)
Dept: GENERAL RADIOLOGY | Age: 87
End: 2022-09-25
Attending: EMERGENCY MEDICINE
Payer: MEDICARE

## 2022-09-25 ENCOUNTER — HOSPITAL ENCOUNTER (OUTPATIENT)
Age: 87
Setting detail: OBSERVATION
Discharge: HOME HEALTH | End: 2022-09-26
Attending: EMERGENCY MEDICINE | Admitting: INTERNAL MEDICINE
Payer: MEDICARE

## 2022-09-25 DIAGNOSIS — R53.1 GENERALIZED WEAKNESS: ICD-10-CM

## 2022-09-25 DIAGNOSIS — C91.10 CLL (CHRONIC LYMPHOCYTIC LEUKEMIA) (HCC): ICD-10-CM

## 2022-09-25 DIAGNOSIS — R55 SYNCOPE AND COLLAPSE: Primary | ICD-10-CM

## 2022-09-25 LAB
ALBUMIN SERPL-MCNC: 3.4 G/DL (ref 3.5–5)
ALBUMIN/GLOB SERPL: 1.2 {RATIO} (ref 1.1–2.2)
ALP SERPL-CCNC: 73 U/L (ref 45–117)
ALT SERPL-CCNC: 22 U/L (ref 12–78)
ANION GAP SERPL CALC-SCNC: 7 MMOL/L (ref 5–15)
APPEARANCE UR: CLEAR
AST SERPL W P-5'-P-CCNC: 30 U/L (ref 15–37)
BACTERIA URNS QL MICRO: NEGATIVE /HPF
BASOPHILS # BLD: 0 K/UL (ref 0–0.1)
BASOPHILS NFR BLD: 0 % (ref 0–1)
BILIRUB SERPL-MCNC: 1 MG/DL (ref 0.2–1)
BILIRUB UR QL: NEGATIVE
BUN SERPL-MCNC: 18 MG/DL (ref 6–20)
BUN/CREAT SERPL: 18 (ref 12–20)
CA-I BLD-MCNC: 8.6 MG/DL (ref 8.5–10.1)
CHLORIDE SERPL-SCNC: 101 MMOL/L (ref 97–108)
CO2 SERPL-SCNC: 23 MMOL/L (ref 21–32)
COLOR UR: ABNORMAL
CREAT SERPL-MCNC: 1 MG/DL (ref 0.7–1.3)
DIFFERENTIAL METHOD BLD: ABNORMAL
EOSINOPHIL # BLD: 0 K/UL (ref 0–0.4)
EOSINOPHIL NFR BLD: 0 % (ref 0–7)
GLOBULIN SER CALC-MCNC: 2.9 G/DL (ref 2–4)
GLUCOSE BLD STRIP.AUTO-MCNC: 136 MG/DL (ref 65–100)
GLUCOSE BLD STRIP.AUTO-MCNC: 80 MG/DL (ref 65–100)
GLUCOSE SERPL-MCNC: 97 MG/DL (ref 65–100)
GLUCOSE UR STRIP.AUTO-MCNC: NEGATIVE MG/DL
HCT VFR BLD AUTO: 30.5 % (ref 36.6–50.3)
HGB BLD-MCNC: 8.8 G/DL (ref 12.1–17)
HGB UR QL STRIP: NEGATIVE
IMM GRANULOCYTES # BLD AUTO: 0 K/UL
IMM GRANULOCYTES NFR BLD AUTO: 0 %
KETONES UR QL STRIP.AUTO: NEGATIVE MG/DL
LEUKOCYTE ESTERASE UR QL STRIP.AUTO: NEGATIVE
LYMPHOCYTES # BLD: 224.8 K/UL (ref 0.8–3.5)
LYMPHOCYTES NFR BLD: 98 % (ref 12–49)
MCH RBC QN AUTO: 34 PG (ref 26–34)
MCHC RBC AUTO-ENTMCNC: 28.9 G/DL (ref 30–36.5)
MCV RBC AUTO: 117.8 FL (ref 80–99)
MONOCYTES # BLD: 2.3 K/UL (ref 0–1)
MONOCYTES NFR BLD: 1 % (ref 5–13)
NEUTS SEG # BLD: 2.3 K/UL (ref 1.8–8)
NEUTS SEG NFR BLD: 1 % (ref 32–75)
NITRITE UR QL STRIP.AUTO: NEGATIVE
NRBC # BLD: 0 K/UL (ref 0–0.01)
NRBC BLD-RTO: 0 PER 100 WBC
PERFORMED BY, TECHID: ABNORMAL
PERFORMED BY, TECHID: NORMAL
PH UR STRIP: 6 [PH] (ref 5–8)
PHOSPHATE SERPL-MCNC: 3.3 MG/DL (ref 2.6–4.7)
PLATELET # BLD AUTO: 144 K/UL (ref 150–400)
PMV BLD AUTO: 10.5 FL (ref 8.9–12.9)
POTASSIUM SERPL-SCNC: 4.4 MMOL/L (ref 3.5–5.1)
PROT SERPL-MCNC: 6.3 G/DL (ref 6.4–8.2)
PROT UR STRIP-MCNC: NEGATIVE MG/DL
RBC # BLD AUTO: 2.59 M/UL (ref 4.1–5.7)
RBC #/AREA URNS HPF: ABNORMAL /HPF (ref 0–5)
RBC MORPH BLD: ABNORMAL
RBC MORPH BLD: ABNORMAL
SODIUM SERPL-SCNC: 131 MMOL/L (ref 136–145)
SP GR UR REFRACTOMETRY: 1.01 (ref 1–1.03)
TROPONIN-HIGH SENSITIVITY: 11 NG/L (ref 0–76)
UA: UC IF INDICATED,UAUC: ABNORMAL
UROBILINOGEN UR QL STRIP.AUTO: 2 EU/DL (ref 0.1–1)
WBC # BLD AUTO: 229.4 K/UL (ref 4.1–11.1)
WBC URNS QL MICRO: ABNORMAL /HPF (ref 0–4)

## 2022-09-25 PROCEDURE — 71045 X-RAY EXAM CHEST 1 VIEW: CPT

## 2022-09-25 PROCEDURE — 84484 ASSAY OF TROPONIN QUANT: CPT

## 2022-09-25 PROCEDURE — 70450 CT HEAD/BRAIN W/O DYE: CPT

## 2022-09-25 PROCEDURE — 81001 URINALYSIS AUTO W/SCOPE: CPT

## 2022-09-25 PROCEDURE — 74011250637 HC RX REV CODE- 250/637: Performed by: INTERNAL MEDICINE

## 2022-09-25 PROCEDURE — 74011250636 HC RX REV CODE- 250/636: Performed by: EMERGENCY MEDICINE

## 2022-09-25 PROCEDURE — 99285 EMERGENCY DEPT VISIT HI MDM: CPT

## 2022-09-25 PROCEDURE — 93005 ELECTROCARDIOGRAM TRACING: CPT

## 2022-09-25 PROCEDURE — 82962 GLUCOSE BLOOD TEST: CPT

## 2022-09-25 PROCEDURE — 84100 ASSAY OF PHOSPHORUS: CPT

## 2022-09-25 PROCEDURE — 80053 COMPREHEN METABOLIC PANEL: CPT

## 2022-09-25 PROCEDURE — 36415 COLL VENOUS BLD VENIPUNCTURE: CPT

## 2022-09-25 PROCEDURE — 85025 COMPLETE CBC W/AUTO DIFF WBC: CPT

## 2022-09-25 RX ORDER — ACETAMINOPHEN 650 MG/1
650 SUPPOSITORY RECTAL
Status: DISCONTINUED | OUTPATIENT
Start: 2022-09-25 | End: 2022-09-26 | Stop reason: HOSPADM

## 2022-09-25 RX ORDER — INSULIN LISPRO 100 [IU]/ML
INJECTION, SOLUTION INTRAVENOUS; SUBCUTANEOUS
Status: DISCONTINUED | OUTPATIENT
Start: 2022-09-25 | End: 2022-09-26 | Stop reason: HOSPADM

## 2022-09-25 RX ORDER — ONDANSETRON 4 MG/1
4 TABLET, ORALLY DISINTEGRATING ORAL
Status: DISCONTINUED | OUTPATIENT
Start: 2022-09-25 | End: 2022-09-26 | Stop reason: HOSPADM

## 2022-09-25 RX ORDER — METOPROLOL TARTRATE 25 MG/1
25 TABLET, FILM COATED ORAL 2 TIMES DAILY
Status: DISCONTINUED | OUTPATIENT
Start: 2022-09-25 | End: 2022-09-26 | Stop reason: HOSPADM

## 2022-09-25 RX ORDER — ALLOPURINOL 100 MG/1
150 TABLET ORAL DAILY
Status: DISCONTINUED | OUTPATIENT
Start: 2022-09-26 | End: 2022-09-26 | Stop reason: HOSPADM

## 2022-09-25 RX ORDER — ALLOPURINOL 100 MG/1
200 TABLET ORAL DAILY
Status: DISCONTINUED | OUTPATIENT
Start: 2022-09-26 | End: 2022-09-25

## 2022-09-25 RX ORDER — MELATONIN
1000 DAILY
Status: DISCONTINUED | OUTPATIENT
Start: 2022-09-26 | End: 2022-09-26 | Stop reason: HOSPADM

## 2022-09-25 RX ORDER — THERA TABS 400 MCG
1 TAB ORAL DAILY
Status: DISCONTINUED | OUTPATIENT
Start: 2022-09-26 | End: 2022-09-26 | Stop reason: HOSPADM

## 2022-09-25 RX ORDER — SODIUM CHLORIDE 0.9 % (FLUSH) 0.9 %
5-40 SYRINGE (ML) INJECTION EVERY 8 HOURS
Status: DISCONTINUED | OUTPATIENT
Start: 2022-09-25 | End: 2022-09-26 | Stop reason: HOSPADM

## 2022-09-25 RX ORDER — POLYETHYLENE GLYCOL 3350 17 G/17G
17 POWDER, FOR SOLUTION ORAL DAILY PRN
Status: DISCONTINUED | OUTPATIENT
Start: 2022-09-25 | End: 2022-09-26 | Stop reason: HOSPADM

## 2022-09-25 RX ORDER — ENOXAPARIN SODIUM 100 MG/ML
40 INJECTION SUBCUTANEOUS DAILY
Status: DISCONTINUED | OUTPATIENT
Start: 2022-09-26 | End: 2022-09-26 | Stop reason: HOSPADM

## 2022-09-25 RX ORDER — ONDANSETRON 2 MG/ML
4 INJECTION INTRAMUSCULAR; INTRAVENOUS
Status: DISCONTINUED | OUTPATIENT
Start: 2022-09-25 | End: 2022-09-26 | Stop reason: HOSPADM

## 2022-09-25 RX ORDER — ACETAMINOPHEN 325 MG/1
650 TABLET ORAL
Status: DISCONTINUED | OUTPATIENT
Start: 2022-09-25 | End: 2022-09-26 | Stop reason: HOSPADM

## 2022-09-25 RX ORDER — PRAVASTATIN SODIUM 80 MG/1
80 TABLET ORAL
Status: DISCONTINUED | OUTPATIENT
Start: 2022-09-25 | End: 2022-09-26 | Stop reason: HOSPADM

## 2022-09-25 RX ORDER — GUAIFENESIN 100 MG/5ML
81 LIQUID (ML) ORAL DAILY
Status: DISCONTINUED | OUTPATIENT
Start: 2022-09-26 | End: 2022-09-26 | Stop reason: HOSPADM

## 2022-09-25 RX ORDER — SODIUM CHLORIDE 0.9 % (FLUSH) 0.9 %
5-40 SYRINGE (ML) INJECTION AS NEEDED
Status: DISCONTINUED | OUTPATIENT
Start: 2022-09-25 | End: 2022-09-26 | Stop reason: HOSPADM

## 2022-09-25 RX ORDER — SODIUM CHLORIDE 9 MG/ML
100 INJECTION, SOLUTION INTRAVENOUS CONTINUOUS
Status: DISCONTINUED | OUTPATIENT
Start: 2022-09-25 | End: 2022-09-26 | Stop reason: HOSPADM

## 2022-09-25 RX ADMIN — PRAVASTATIN SODIUM 80 MG: 80 TABLET ORAL at 21:53

## 2022-09-25 RX ADMIN — SODIUM CHLORIDE 100 ML/HR: 9 INJECTION, SOLUTION INTRAVENOUS at 13:30

## 2022-09-25 NOTE — ED TRIAGE NOTES
Patient was at [employer] when he had a syncopal episode, nausea, and vomiting. Patient states he got over heated, no ac in [employer]. Glucose 127. No complaints at this time. Hx of leukemia.

## 2022-09-25 NOTE — H&P
History & Physical    Primary Care Provider: Jackeline Adhikari MD  Source of Information: Patient self    History of Presenting Illness:   Siria Luevano is a 80 y.o. male with significant history of chronic lymphocytic leukemia and follows up with [facility], coronary artery disease status post CABG, diabetes and essential hypertension who presents to the ED via EMS for a reported syncopal episode at [facility]. Patient has no recollection of what happens. Reports diaphoresis while at [facility] due to [facility] not working. Does not remember the sequence of events thereafter. Blood sugar checked by EMS was 127. Twelve-lead EKG showed nonspecific ST-T wave changes with sinus bradycardia heart rate of 57 bpm.  Hospitalist service consulted to admit patient for observation overnight       Review of Systems:  Review of systems not obtained due to patient factors.      Past Medical History:   Diagnosis Date    Abnormal nuclear stress test 6/6/2014    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 ([facility] [address] 11/2/2015    Bereavement 02/06/2019    ltcf - uti -strangulated hernia - prostate ca - brother    Bronchiectasis ([address]     CAD (coronary artery disease)     Chest pain, unspecified     Chronic pain     right leg    CLL (chronic lymphocytic leukemia) ([address]     Jerica Segura md  [facility]    Diabetes ([facility] [address]     Essential hypertension     GERD (gastroesophageal reflux disease)     Hyperlipidemia     Hypertension     Opacity of lung on imaging study 05/28/2017    cxr    Rotator cuff arthropathy     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    S/P coronary artery stent placement 6/6/2014 6/6/14 PCI/GUANAKO to prox LAD      Past Surgical History:   Procedure Laterality Date    HX COLONOSCOPY      HX HEART CATHETERIZATION      PTCA SINGLE VESSEL  4/4/2015         VASCULAR SURGERY PROCEDURE UNLIST  2014 and 2015    BLE stenting     Prior to Admission medications    Medication Sig Start Date End Date Taking? Authorizing Provider   metoprolol tartrate (LOPRESSOR) 25 mg tablet TAKE 1 TABLET TWICE A DAY 8/30/22   Paul Jordan MD   allopurinoL (ZYLOPRIM) 300 mg tablet TAKE ONE-HALF (1/2) TABLET DAILY 8/22/22   Harry Mckinney MD   glucose blood VI test strips (OneTouch Ultra Test) strip USE TO TEST BLOOD SUGAR ONCE DAILY. DX.E11.9 7/25/22   Paul Jordan MD   cholecalciferol (VITAMIN D3) (1000 Units /25 mcg) tablet Take 1,000 Units by mouth daily. Provider, Historical   alum-mag hydroxide-simeth (MYLANTA) 200-200-20 mg/5 mL susp Take 30 mL by mouth daily. Was taking daily while on acalabrutinib    Provider, Historical   multivitamin with folic acid (ONE DAILY WITH FOLIC ACID) 801 mcg tab tablet Take 1 Tablet by mouth daily. Provider, Historical   ascorbic acid, vitamin C, (VITAMIN C) 500 mg tablet Take 500 mg by mouth daily. Provider, Historical   simvastatin (ZOCOR) 40 mg tablet TAKE 1 TABLET DAILY IN THE EVENING 4/6/22   Harry Mckinney MD   metFORMIN (GLUCOPHAGE) 500 mg tablet TAKE 1 TABLET DAILY 2/10/22   Val Espinoza MD   aspirin 81 mg chewable tablet Take 81 mg by mouth daily. Provider, Historical   brimonidine (ALPHAGAN) 0.15 % ophthalmic solution Administer 1 Drop to both eyes two (2) times a day.  Indications: OPEN ANGLE GLAUCOMA    Provider, Historical     Allergies   Allergen Reactions    Codeine Shortness of Breath    Lipitor [Atorvastatin] Nausea Only      Family History   Problem Relation Age of Onset    Diabetes Mother     Stroke Father         SOCIAL HISTORY:  Patient resides:  Independently    Assisted Living    SNF    With family care x      Smoking history:   None    Former x   Chronic      Alcohol history:   None x   Social    Chronic      Ambulates:   Independently x   w/cane    w/walker    w/wc    CODE STATUS:  DNR x   Full    Other      Objective:     Physical Exam:     Visit Vitals  BP (!) 158/80   Pulse (!) 54   Temp 97.9 °F (36.6 °C)   Resp 20   Ht 5' 7\" (1.702 m)   Wt 67.1 kg (148 lb)   SpO2 98%   BMI 23.18 kg/m²      O2 Device: None (Room air)    General:  Alert, cooperative, no distress, appears stated age. Head:  Normocephalic, without obvious abnormality, atraumatic. Eyes:  Conjunctivae/corneas clear. PERRL, EOMs intact. Nose: Nares normal. Septum midline. Mucosa normal. No drainage or sinus tenderness. Throat: Lips, mucosa, and tongue normal. Teeth and gums normal.   Neck: Supple, symmetrical, trachea midline, no adenopathy, thyroid: no enlargement/tenderness/nodules, no carotid bruit and no JVD. Back:   Symmetric, no curvature. ROM normal. No CVA tenderness. Lungs:   Clear to auscultation bilaterally. Chest wall:  No tenderness or deformity. Heart:  Regular rate and rhythm, S1, S2 normal, no murmur, click, rub or gallop. Abdomen:   Soft, non-tender. Bowel sounds normal. No masses,  No organomegaly. Extremities: Extremities normal, atraumatic, no cyanosis or edema. Pulses: 2+ and symmetric all extremities. Skin: Skin color, texture, turgor normal. No rashes or lesions   Neurologic: CNII-XII intact. No motor or sensory deficits. EKG:  nonspecific ST and T waves changes. Data Review:     Recent Days:  Recent Labs     09/25/22  1301   .4*   HGB 8.8*   HCT 30.5*   *     Recent Labs     09/25/22  1240   *   K 4.4      CO2 23   GLU 97   BUN 18   CREA 1.00   CA 8.6   ALB 3.4*   TBILI 1.0   ALT 22     No results for input(s): PH, PCO2, PO2, HCO3, FIO2 in the last 72 hours.     24 Hour Results:  Recent Results (from the past 24 hour(s))   METABOLIC PANEL, COMPREHENSIVE    Collection Time: 09/25/22 12:40 PM   Result Value Ref Range    Sodium 131 (L) 136 - 145 mmol/L    Potassium 4.4 3.5 - 5.1 mmol/L    Chloride 101 97 - 108 mmol/L    CO2 23 21 - 32 mmol/L    Anion gap 7 5 - 15 mmol/L    Glucose 97 65 - 100 mg/dL    BUN 18 6 - 20 mg/dL    Creatinine 1.00 0.70 - 1.30 mg/dL BUN/Creatinine ratio 18 12 - 20      GFR est AA >60 >60 ml/min/1.73m2    GFR est non-AA >60 >60 ml/min/1.73m2    Calcium 8.6 8.5 - 10.1 mg/dL    Bilirubin, total 1.0 0.2 - 1.0 mg/dL    AST (SGOT) 30 15 - 37 U/L    ALT (SGPT) 22 12 - 78 U/L    Alk. phosphatase 73 45 - 117 U/L    Protein, total 6.3 (L) 6.4 - 8.2 g/dL    Albumin 3.4 (L) 3.5 - 5.0 g/dL    Globulin 2.9 2.0 - 4.0 g/dL    A-G Ratio 1.2 1.1 - 2.2     TROPONIN-HIGH SENSITIVITY    Collection Time: 09/25/22 12:40 PM   Result Value Ref Range    Troponin-High Sensitivity 11 0 - 76 ng/L   CBC WITH AUTOMATED DIFF    Collection Time: 09/25/22  1:01 PM   Result Value Ref Range    .4 (HH) 4.1 - 11.1 K/uL    RBC 2.59 (L) 4.10 - 5.70 M/uL    HGB 8.8 (L) 12.1 - 17.0 g/dL    HCT 30.5 (L) 36.6 - 50.3 %    .8 (H) 80.0 - 99.0 FL    MCH 34.0 26.0 - 34.0 PG    MCHC 28.9 (L) 30.0 - 36.5 g/dL    PLATELET 360 (L) 437 - 400 K/uL    MPV 10.5 8.9 - 12.9 FL    NRBC 0.0 0.0  WBC    ABSOLUTE NRBC 0.00 0.00 - 0.01 K/uL    NEUTROPHILS 1 (L) 32 - 75 %    LYMPHOCYTES 98 (H) 12 - 49 %    MONOCYTES 1 (L) 5 - 13 %    EOSINOPHILS 0 0 - 7 %    BASOPHILS 0 0 - 1 %    IMMATURE GRANULOCYTES 0 %    ABS. NEUTROPHILS 2.3 1.8 - 8.0 K/UL    ABS. LYMPHOCYTES 224.8 (H) 0.8 - 3.5 K/UL    ABS. MONOCYTES 2.3 (H) 0.0 - 1.0 K/UL    ABS. EOSINOPHILS 0.0 0.0 - 0.4 K/UL    ABS. BASOPHILS 0.0 0.0 - 0.1 K/UL    ABS. IMM. GRANS. 0.0 K/UL    DF Manual      RBC COMMENTS Macrocytosis  3+        RBC COMMENTS Hypochromia  2+             Imaging:   CT HEAD WO CONT   Final Result   No acute intracranial abnormality. Moderate chronic small vessel ischemic   disease. XR CHEST PORT   Final Result   No acute cardiopulmonary process.             Assessment:     Syncope    -Most likely vasovagal    -Neuro check every 4 hours x24 hours    -Obtain orthostatic vitals    -No need for additional radiographic imaging    History of chronic lymphocytic leukemia    -Reports taking the pill at home every other day    -WBC count chronically elevated    -Advised follow-up outpatient    Type 2 diabetes    -We will begin sliding scale insulin    -Monitor blood sugar ACH S        Plan:     Admit to telemetry floor observation  Neuro check as indicated above  PT OT eval in the morning  Plan of care discussed with patient  DVT GI prophylaxis  CODE STATUS discussed.   He request a DNR/DNI    Signed By: Elías Krueger MD     September 25, 2022

## 2022-09-26 VITALS
RESPIRATION RATE: 20 BRPM | HEIGHT: 67 IN | TEMPERATURE: 97.9 F | SYSTOLIC BLOOD PRESSURE: 136 MMHG | WEIGHT: 148 LBS | OXYGEN SATURATION: 98 % | HEART RATE: 56 BPM | DIASTOLIC BLOOD PRESSURE: 46 MMHG | BODY MASS INDEX: 23.23 KG/M2

## 2022-09-26 PROBLEM — R55 SYNCOPE AND COLLAPSE: Status: ACTIVE | Noted: 2022-01-01

## 2022-09-26 LAB
ANION GAP SERPL CALC-SCNC: 6 MMOL/L (ref 5–15)
ATRIAL RATE: 53 BPM
BASOPHILS # BLD: 0 K/UL (ref 0–0.1)
BASOPHILS NFR BLD: 0 % (ref 0–1)
BUN SERPL-MCNC: 15 MG/DL (ref 6–20)
BUN/CREAT SERPL: 18 (ref 12–20)
CA-I BLD-MCNC: 7.3 MG/DL (ref 8.5–10.1)
CALCULATED P AXIS, ECG09: 50 DEGREES
CALCULATED R AXIS, ECG10: -29 DEGREES
CALCULATED T AXIS, ECG11: -31 DEGREES
CHLORIDE SERPL-SCNC: 106 MMOL/L (ref 97–108)
CO2 SERPL-SCNC: 22 MMOL/L (ref 21–32)
CREAT SERPL-MCNC: 0.82 MG/DL (ref 0.7–1.3)
DIAGNOSIS, 93000: NORMAL
DIFFERENTIAL METHOD BLD: ABNORMAL
EOSINOPHIL # BLD: 0 K/UL (ref 0–0.4)
EOSINOPHIL NFR BLD: 0 % (ref 0–7)
ERYTHROCYTE [DISTWIDTH] IN BLOOD BY AUTOMATED COUNT: 20.5 % (ref 11.5–14.5)
GLUCOSE BLD STRIP.AUTO-MCNC: 100 MG/DL (ref 65–100)
GLUCOSE BLD STRIP.AUTO-MCNC: 87 MG/DL (ref 65–100)
GLUCOSE SERPL-MCNC: 78 MG/DL (ref 65–100)
HCT VFR BLD AUTO: 27.4 % (ref 36.6–50.3)
HGB BLD-MCNC: 8 G/DL (ref 12.1–17)
IMM GRANULOCYTES # BLD AUTO: 0 K/UL
IMM GRANULOCYTES NFR BLD AUTO: 0 %
LYMPHOCYTES # BLD: 167.6 K/UL (ref 0.8–3.5)
LYMPHOCYTES NFR BLD: 96 % (ref 12–49)
MCH RBC QN AUTO: 34.5 PG (ref 26–34)
MCHC RBC AUTO-ENTMCNC: 29.2 G/DL (ref 30–36.5)
MCV RBC AUTO: 118.1 FL (ref 80–99)
MONOCYTES # BLD: 1.7 K/UL (ref 0–1)
MONOCYTES NFR BLD: 1 % (ref 5–13)
NEUTS SEG # BLD: 5.2 K/UL (ref 1.8–8)
NEUTS SEG NFR BLD: 3 % (ref 32–75)
NRBC # BLD: 0.02 K/UL (ref 0–0.01)
NRBC BLD-RTO: 0 PER 100 WBC
P-R INTERVAL, ECG05: 180 MS
PERFORMED BY, TECHID: NORMAL
PERFORMED BY, TECHID: NORMAL
PLATELET # BLD AUTO: 101 K/UL (ref 150–400)
PMV BLD AUTO: 9.9 FL (ref 8.9–12.9)
POTASSIUM SERPL-SCNC: 3.9 MMOL/L (ref 3.5–5.1)
Q-T INTERVAL, ECG07: 452 MS
QRS DURATION, ECG06: 84 MS
QTC CALCULATION (BEZET), ECG08: 424 MS
RBC # BLD AUTO: 2.32 M/UL (ref 4.1–5.7)
RBC MORPH BLD: ABNORMAL
RBC MORPH BLD: ABNORMAL
SODIUM SERPL-SCNC: 134 MMOL/L (ref 136–145)
VENTRICULAR RATE, ECG03: 53 BPM
WBC # BLD AUTO: 174.5 K/UL (ref 4.1–11.1)

## 2022-09-26 PROCEDURE — 80048 BASIC METABOLIC PNL TOTAL CA: CPT

## 2022-09-26 PROCEDURE — G0378 HOSPITAL OBSERVATION PER HR: HCPCS

## 2022-09-26 PROCEDURE — 85025 COMPLETE CBC W/AUTO DIFF WBC: CPT

## 2022-09-26 PROCEDURE — 36415 COLL VENOUS BLD VENIPUNCTURE: CPT

## 2022-09-26 PROCEDURE — 74011250636 HC RX REV CODE- 250/636: Performed by: EMERGENCY MEDICINE

## 2022-09-26 PROCEDURE — 74011250636 HC RX REV CODE- 250/636: Performed by: INTERNAL MEDICINE

## 2022-09-26 PROCEDURE — 96372 THER/PROPH/DIAG INJ SC/IM: CPT

## 2022-09-26 PROCEDURE — 82962 GLUCOSE BLOOD TEST: CPT

## 2022-09-26 PROCEDURE — 74011000250 HC RX REV CODE- 250: Performed by: INTERNAL MEDICINE

## 2022-09-26 PROCEDURE — 74011250637 HC RX REV CODE- 250/637: Performed by: INTERNAL MEDICINE

## 2022-09-26 RX ADMIN — Medication 1000 UNITS: at 08:43

## 2022-09-26 RX ADMIN — SODIUM CHLORIDE, PRESERVATIVE FREE 10 ML: 5 INJECTION INTRAVENOUS at 05:02

## 2022-09-26 RX ADMIN — ALLOPURINOL 150 MG: 100 TABLET ORAL at 08:43

## 2022-09-26 RX ADMIN — SODIUM CHLORIDE 100 ML/HR: 9 INJECTION, SOLUTION INTRAVENOUS at 00:38

## 2022-09-26 RX ADMIN — ASPIRIN 81 MG CHEWABLE TABLET 81 MG: 81 TABLET CHEWABLE at 08:43

## 2022-09-26 RX ADMIN — THERA TABS 1 TABLET: TAB at 08:43

## 2022-09-26 RX ADMIN — ENOXAPARIN SODIUM 40 MG: 100 INJECTION SUBCUTANEOUS at 08:44

## 2022-09-26 NOTE — PROGRESS NOTES
Dual RN skin assessment performed with Jessie Luciano RN. Skin assessment revealed skin warm dry and intact. Blanchable redness to sacrum. No open wounds noted.

## 2022-09-26 NOTE — PROGRESS NOTES
Patient discharge instructions given and patient and son verbalize understanding. IV removed. No bleeding noted.      Belongings with patient

## 2022-09-26 NOTE — PROGRESS NOTES
Medicare Outpatient Observation Notice (MOON)/ [place] Outpatient Observation Notice (Lita Deep) provided to patient/representative with verbal explanation of the notice. Time allotted for questions regarding the notice. Patient /representative provided a completed copy of the MOON/VOON notice. Copy placed on bedside chart. CM met with patient at bedside to discuss home health per discharge order present. Patient states he has home health coming to his house now but cannot remember the name. Patient called his daughter with no answer to telephone. CM called his PCP Nikki at [phone], no answer to telephone after elongated wait period . Patient has been in hospital less than 24 hours.  [id number] [facility] need updated home health orders at this time

## 2022-09-26 NOTE — DISCHARGE SUMMARY
Physician Discharge Summary     Patient ID:    Sadie Galan  [id number]  74 y.o.  10/4/1933    Admit date: 9/25/2022    Discharge date : 9/26/2022    Chronic Diagnoses:    Problem List as of 9/26/2022 Date Reviewed: 7/25/2022            Codes Class Noted - Resolved    Syncope and collapse ICD-10-CM: R55  ICD-9-CM: 780.2  9/26/2022 - Present        Hyponatremia ICD-10-CM: E87.1  ICD-9-CM: 276.1  7/1/2022 - Present        Anemia ICD-10-CM: D64.9  ICD-9-CM: 285.9  7/1/2022 - Present        Left leg cellulitis ICD-10-CM: L03.116  ICD-9-CM: 682.6  7/1/2022 - Present        Chronic renal disease, stage III ICD-10-CM: N18.30  ICD-9-CM: 585.3  6/23/2022 - Present        Type 2 diabetes with nephropathy ([facility] ICD-10-CM: E11.21  ICD-9-CM: 250.40, 583.81  6/13/2018 - Present        Bronchiectasis ([facility] ICD-10-CM: J47.9  ICD-9-CM: 494.0  Unknown - Present        Opacity of lung on imaging study ICD-10-CM: R91.8  ICD-9-CM: 793.19  5/28/2017 - Present        S/P CABG x 3 ICD-10-CM: Z95.1  ICD-9-CM: V45.81  11/6/2015 - Present        Rotator cuff arthropathy ICD-10-CM: M12.819  ICD-9-CM: 716.81  Unknown - Present        CLL (chronic lymphocytic leukemia) ([facility] ICD-10-CM: C91.10  ICD-9-CM: 204.10  Unknown - Present        S/P angioplasty with stent--4/15 ICD-10-CM: Z95.820  ICD-9-CM: V45.89  4/26/2015 - Present        Leukocytosis ICD-10-CM: D72.829  ICD-9-CM: 288.60  Unknown - Present        GERD (gastroesophageal reflux disease) ICD-10-CM: K21.9  ICD-9-CM: 530.81  Unknown - Present        S/P coronary artery stent placement ICD-10-CM: Z95.5  ICD-9-CM: V45.82  6/6/2014 - Present    Overview Signed 6/6/2014  8:02 AM by Gem Leon NP     6/6/14 PCI/GUANAKO to prox LAD             Mixed hyperlipidemia ICD-10-CM: E78.2  ICD-9-CM: 272.2  5/22/2012 - Present        Peripheral vascular disease ([facility] ICD-10-CM: I73.9  ICD-9-CM: 443.9  5/22/2012 - Present        Chronic ischemic heart disease ICD-10-CM: I25.9  ICD-9-CM: 414.9  5/22/2012 - Present        Coronary atherosclerosis of native coronary artery ICD-10-CM: I25.10  ICD-9-CM: 414.01  5/22/2012 - Present        Atherosclerosis of native artery of extremity with intermittent claudication (HCC) ICD-10-CM: U55.810  ICD-9-CM: 440.21  5/22/2012 - Present    Overview Signed 1/15/2014  2:04 PM by Juanita MCCLAIN     1/15/14 Right superficial femoral artery angioplasty and stent placement, 3rd order catheter placement in right common femoral artery. Essential hypertension, benign ICD-10-CM: I10  ICD-9-CM: 401.1  5/22/2012 - Present       22    Final Diagnoses:   Syncope and collapse [R55]    Reason for Hospitalization:  Syncope- likely vasovagal  CLL  DMII      Hospital Course:       Wen Fregoso is a 80 y.o. male with significant history of chronic lymphocytic leukemia and follows up with [facility], coronary artery disease status post CABG, diabetes and essential hypertension who presents to the ED via EMS for a reported syncopal episode at [facility]. Patient has no recollection of what happens. Reports diaphoresis while at [facility] due to [facility] not working. Does not remember the sequence of events thereafter. Blood sugar checked by EMS was 127. Twelve-lead EKG showed nonspecific ST-T wave changes with sinus bradycardia heart rate of 57 bpm.      Likely, vasovagal.         Discharge Medications:   Current Discharge Medication List        CONTINUE these medications which have NOT CHANGED    Details   acalabrutinib 100 mg cap Take 100 mg by mouth daily.  Taken every other day at night      metFORMIN (GLUCOPHAGE) 500 mg tablet TAKE 1 TABLET DAILY  Qty: 90 Tablet, Refills: 3      metoprolol tartrate (LOPRESSOR) 25 mg tablet TAKE 1 TABLET TWICE A DAY  Qty: 180 Tablet, Refills: 3      allopurinoL (ZYLOPRIM) 300 mg tablet TAKE ONE-HALF (1/2) TABLET DAILY  Qty: 90 Tablet, Refills: 3      glucose blood VI test strips (OneTouch Ultra Test) strip USE TO TEST BLOOD SUGAR ONCE DAILY. DX.E11.9  Qty: 100 Strip, Refills: 11      cholecalciferol (VITAMIN D3) (1000 Units /25 mcg) tablet Take 1,000 Units by mouth daily. alum-mag hydroxide-simeth (MYLANTA) 200-200-20 mg/5 mL susp Take 30 mL by mouth daily. Was taking daily while on acalabrutinib      multivitamin with folic acid (ONE DAILY WITH FOLIC ACID) 410 mcg tab tablet Take 1 Tablet by mouth daily. ascorbic acid, vitamin C, (VITAMIN C) 500 mg tablet Take 500 mg by mouth daily. simvastatin (ZOCOR) 40 mg tablet TAKE 1 TABLET DAILY IN THE EVENING  Qty: 90 Tablet, Refills: 3      aspirin 81 mg chewable tablet Take 81 mg by mouth daily. brimonidine (ALPHAGAN) 0.15 % ophthalmic solution Administer 1 Drop to both eyes two (2) times a day. Indications: OPEN ANGLE GLAUCOMA               Follow up Care:    1. Jean Claude Polk MD in 1-2 weeks. Please call to set up an appointment shortly after discharge. Diet:  Diabetic Diet    Disposition:  Home. Advanced Directive:   FULL x   DNR      Discharge Exam:  General:  Alert, cooperative, no distress, appears stated age. Head:  Normocephalic, without obvious abnormality, atraumatic. Eyes:  Conjunctivae/corneas clear. PERRL, EOMs intact. Nose: Nares normal. Septum midline. Mucosa normal. No drainage or sinus tenderness. Throat: Lips, mucosa, and tongue normal. Teeth and gums normal.   Neck: Supple, symmetrical, trachea midline, no adenopathy, thyroid: no enlargement/tenderness/nodules, no carotid bruit and no JVD. Back:   Symmetric, no curvature. ROM normal. No CVA tenderness. Lungs:   Clear to auscultation bilaterally. Chest wall:  No tenderness or deformity. Heart:  Regular rate and rhythm, S1, S2 normal, no murmur, click, rub or gallop. Abdomen:   Soft, non-tender. Bowel sounds normal. No masses,  No organomegaly. Extremities: Extremities normal, atraumatic, no cyanosis or edema. Pulses: 2+ and symmetric all extremities. Skin: Skin color, texture, turgor normal. No rashes or lesions   Neurologic: CNII-XII intact. No motor or sensory deficits. CONSULTATIONS: None    Significant Diagnostic Studies:     Radiologic Studies -   Results from Hospital Encounter encounter on 09/25/22    XR CHEST PORT    Narrative  EXAM: XR CHEST PORT    INDICATION: chest pain    COMPARISON:  2. 17.    FINDINGS: A portable AP radiograph of the chest was obtained at 1244 hours. Median sternotomy changes. . The lungs are clear. The cardiac and mediastinal  contours and pulmonary vascularity are normal.  The bones and soft tissues are  grossly within normal limits. Impression  No acute cardiopulmonary process. CT Results  (Last 48 hours)                 09/25/22 1357  CT HEAD WO CONT Final result    Impression:  No acute intracranial abnormality. Moderate chronic small vessel ischemic   disease. Narrative:  EXAM: CT HEAD WO CONT       INDICATION: ct head       COMPARISON: None. CONTRAST: None. TECHNIQUE: Unenhanced CT of the head was performed using 5 mm images. Brain and   bone windows were generated. Coronal and sagittal reformats. CT dose reduction   was achieved through use of a standardized protocol tailored for this   examination and automatic exposure control for dose modulation. FINDINGS:   The ventricles and sulci are normal in size, shape and configuration. . Moderate   chronic small vessel seen disease. . There is no intracranial hemorrhage,   extra-axial collection, or mass effect. The basilar cisterns are open. No CT   evidence of acute infarct. The bone windows demonstrate no abnormalities. The visualized portions of the   paranasal sinuses and mastoid air cells are clear.                        Discharge time spent 35 minutes    Signed:  Ginger Hough MD  9/26/2022  12:31 PM

## 2022-09-26 NOTE — ED PROVIDER NOTES
EMERGENCY DEPARTMENT HISTORY AND PHYSICAL EXAM        Date: 9/25/2022  Patient Name: Candice Portillo    History of Presenting Illness     Chief Complaint   Patient presents with    Syncope    Vomiting       11:40 PM    History Provided By: Patient, Patient's Daughter, EMS, and son in law    HPI: Candice Portillo, 80 y.o. male with extensive cardiac    Past medical history including CABG x3, multiple stents with angioplasty, hypertension, and longstanding diagnosis of chronic lymphocytic leukemia (currently on oral chemotherapy) presents to the ED with a syncopal episode that was witnessed while he was in [employer] today. History per patient and witnesses is consistent with the patient appearing as if he had dozed off and was noted to have his head chin resting on his chest.  Attempts to awaken the patient were unsuccessful and it was noted that he had drooled onto his shirt with some degree of food particulate in it. Patient eventually was aroused and able to stand up and very slowly walk his way holding on the back of the pews to waiting EMS. Patient gives no history of feeling unwell prior to this incident other than suddenly noticing that he felt \"hot and had began to sweat\" right before his loss of consciousness. Patient denies any previous episodes of same. Patient states he has been in his usual state of health even upon awakening this morning with no experience of any shortness of breath generalized weakness or confusion. Review of symptoms is negative for fever, chills, diarrhea, tonic-clonic/seizure activity, chest pain, loss of bowel or bladder control, known COVID-19 and influenza exposures. Patient denies any recent travel or extended sitting.;  Patient endorses that he took his medications this morning and ate a significant breakfast prior to leaving for [employer]. Patient states that he is COVID-19 and influenza vaccinated.           PCP: Madhavi Simmons MD    Current Facility-Administered Medications   Medication Dose Route Frequency Provider Last Rate Last Admin    0.9% sodium chloride infusion  100 mL/hr IntraVENous CONTINUOUS Azra Vaca  mL/hr at 09/25/22 1330 100 mL/hr at 09/25/22 1330    sodium chloride (NS) flush 5-40 mL  5-40 mL IntraVENous Q8H Nelson Skinner MD        sodium chloride (NS) flush 5-40 mL  5-40 mL IntraVENous PRN Nelson Skinner MD        acetaminophen (TYLENOL) tablet 650 mg  650 mg Oral Q6H PRN Nelson Skinner MD        Or    acetaminophen (TYLENOL) suppository 650 mg  650 mg Rectal Q6H PRN Nelson Skinner MD        polyethylene glycol (MIRALAX) packet 17 g  17 g Oral DAILY PRN Nelson Skinner MD        ondansetron (ZOFRAN ODT) tablet 4 mg  4 mg Oral Q8H PRN Nelson Skinner MD        Or    ondansetron [facility]) injection 4 mg  4 mg IntraVENous Q6H PRN Nelson Skinner MD        [START ON 9/26/2022] enoxaparin (LOVENOX) injection 40 mg  40 mg SubCUTAneous DAILY Nelson Skinner MD        [START ON 9/26/2022] aspirin chewable tablet 81 mg  81 mg Oral DAILY Nelson Skinner MD        [START ON 9/26/2022] cholecalciferol (VITAMIN D3) (1000 Units /25 mcg) tablet 1,000 Units  1,000 Units Oral DAILY Nelson Skinner MD        [facility] [place] by provider] metoprolol tartrate (LOPRESSOR) tablet 25 mg  25 mg Oral BID Nelson Skinner MD        [place] ON 9/26/2022] therapeutic multivitamin [facility]) tablet 1 Tablet  1 Tablet Oral DAILY Nelson Skinner MD        pravastatin (PRAVACHOL) tablet 80 mg  80 mg Oral QHS Nelson Skinner MD   80 mg at 09/25/22 2153    insulin lispro (HUMALOG) injection   SubCUTAneous AC&HS Nelson Skinner MD        [START ON 9/26/2022] allopurinoL (ZYLOPRIM) tablet 150 mg  150 mg Oral DAILY Nelson Skinner MD         Current Outpatient Medications   Medication Sig Dispense Refill    metoprolol tartrate (LOPRESSOR) 25 mg tablet TAKE 1 TABLET TWICE A  Tablet 3    allopurinoL (ZYLOPRIM) 300 mg tablet TAKE ONE-HALF (1/2) TABLET DAILY 90 Tablet 3    glucose blood VI test strips (OneTouch Ultra Test) strip USE TO TEST BLOOD SUGAR ONCE DAILY. DX.E11.9 100 Strip 11    cholecalciferol (VITAMIN D3) (1000 Units /25 mcg) tablet Take 1,000 Units by mouth daily. alum-mag hydroxide-simeth (MYLANTA) 200-200-20 mg/5 mL susp Take 30 mL by mouth daily. Was taking daily while on acalabrutinib      multivitamin with folic acid (ONE DAILY WITH FOLIC ACID) 894 mcg tab tablet Take 1 Tablet by mouth daily. ascorbic acid, vitamin C, (VITAMIN C) 500 mg tablet Take 500 mg by mouth daily. simvastatin (ZOCOR) 40 mg tablet TAKE 1 TABLET DAILY IN THE EVENING 90 Tablet 3    metFORMIN (GLUCOPHAGE) 500 mg tablet TAKE 1 TABLET DAILY 90 Tablet 3    aspirin 81 mg chewable tablet Take 81 mg by mouth daily. brimonidine (ALPHAGAN) 0.15 % ophthalmic solution Administer 1 Drop to both eyes two (2) times a day.  Indications: OPEN ANGLE GLAUCOMA         Past History     Past Medical History:  Past Medical History:   Diagnosis Date    Abnormal nuclear stress test 6/6/2014    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 ([facility] [address] 11/2/2015    Bereavement 02/06/2019    ltcf - uti -strangulated hernia - prostate ca - brother    Bronchiectasis ([facility] [address]     CAD (coronary artery disease)     Chest pain, unspecified     Chronic pain     right leg    CLL (chronic lymphocytic leukemia) ([facility] [address]     Jerica Segura md  [facility]    Diabetes ([facility] [address]     Essential hypertension     GERD (gastroesophageal reflux disease)     Hyperlipidemia     Hypertension     Opacity of lung on imaging study 05/28/2017    cxr    Rotator cuff arthropathy     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    S/P coronary artery stent placement 6/6/2014 6/6/14 PCI/GUANAKO to prox LAD       Past Surgical History:  Past Surgical History:   Procedure Laterality Date    HX COLONOSCOPY      HX HEART CATHETERIZATION      PTCA SINGLE VESSEL  4/4/2015         VASCULAR SURGERY PROCEDURE UNLIST  2014 and 2015    BLE stenting       Family History:  Family History   Problem Relation Age of Onset    Diabetes Mother     Stroke Father        Social History:  Social History     Tobacco Use    Smoking status: Former     Types: Cigarettes     Quit date: 1/15/1984     Years since quittin.7    Smokeless tobacco: Never    Tobacco comments:     QUIT    Vaping Use    Vaping Use: Never used   Substance Use Topics    Alcohol use: No     Alcohol/week: 0.0 standard drinks     Comment: quit in     Drug use: No     Types: Prescription, OTC       Allergies: Allergies   Allergen Reactions    Codeine Shortness of Breath    Lipitor [Atorvastatin] Nausea Only       Review of Systems   Review of Systems   Constitutional:  Positive for fatigue. Negative for chills, diaphoresis and fever. HENT:  Negative for congestion, sore throat and trouble swallowing. Eyes:  Negative for visual disturbance. Respiratory:  Negative for cough and shortness of breath. Cardiovascular:  Negative for chest pain and palpitations. Gastrointestinal:  Negative for abdominal pain, diarrhea, nausea and vomiting. Endocrine: Negative for polydipsia, polyphagia and polyuria. Genitourinary:  Negative for dysuria, frequency, hematuria and urgency. Musculoskeletal:  Negative for gait problem and neck pain. Skin:  Negative for rash. Neurological:  Positive for syncope. Negative for dizziness and headaches. Physical Exam   Physical Exam  Vitals and nursing note reviewed. Constitutional:       General: He is not in acute distress. Appearance: Normal appearance. He is well-developed. He is not ill-appearing or toxic-appearing. Comments: Well-developed well-nourished elderly male; looks fatigued but does GCS of 15 with excellent memory of the events as well as his past medical history. Family members at bedside. HENT:      Head: Normocephalic and atraumatic.       Nose: Nose normal.      Mouth/Throat:      Mouth: Mucous membranes are moist.      Pharynx: No posterior oropharyngeal erythema. Eyes:      General: Vision grossly intact. Extraocular Movements: Extraocular movements intact. Conjunctiva/sclera: Conjunctivae normal.      Pupils: Pupils are equal, round, and reactive to light. Neck:      Vascular: No JVD. Trachea: No tracheal deviation. Cardiovascular:      Rate and Rhythm: Normal rate and regular rhythm. Pulses: Normal pulses. Carotid pulses are 2+ on the right side and 2+ on the left side. Radial pulses are 2+ on the right side and 2+ on the left side. Femoral pulses are 2+ on the right side and 2+ on the left side. Popliteal pulses are 2+ on the right side and 2+ on the left side. Dorsalis pedis pulses are 2+ on the right side and 2+ on the left side. Posterior tibial pulses are 2+ on the right side and 2+ on the left side. Heart sounds: Normal heart sounds. Pulmonary:      Effort: Pulmonary effort is normal.      Breath sounds: Normal breath sounds and air entry. No wheezing or rhonchi. Abdominal:      General: Bowel sounds are normal.      Palpations: Abdomen is soft. Tenderness: There is no abdominal tenderness. There is no guarding or rebound. Musculoskeletal:         General: No swelling or deformity. Right shoulder: No swelling. Normal range of motion. Cervical back: Neck supple. Right lower leg: No edema. Left lower leg: No edema. Skin:     General: Skin is warm and dry. Capillary Refill: Capillary refill takes less than 2 seconds. Findings: No signs of injury or rash. Neurological:      General: No focal deficit present. Mental Status: He is alert. Psychiatric:         Behavior: Behavior is cooperative.        Diagnostic Study Results     Labs -     Recent Results (from the past 48 hour(s))   METABOLIC PANEL, COMPREHENSIVE    Collection Time: 09/25/22 12:40 PM   Result Value Ref Range Sodium 131 (L) 136 - 145 mmol/L    Potassium 4.4 3.5 - 5.1 mmol/L    Chloride 101 97 - 108 mmol/L    CO2 23 21 - 32 mmol/L    Anion gap 7 5 - 15 mmol/L    Glucose 97 65 - 100 mg/dL    BUN 18 6 - 20 mg/dL    Creatinine 1.00 0.70 - 1.30 mg/dL    BUN/Creatinine ratio 18 12 - 20      GFR est AA >60 >60 ml/min/1.73m2    GFR est non-AA >60 >60 ml/min/1.73m2    Calcium 8.6 8.5 - 10.1 mg/dL    Bilirubin, total 1.0 0.2 - 1.0 mg/dL    AST (SGOT) 30 15 - 37 U/L    ALT (SGPT) 22 12 - 78 U/L    Alk. phosphatase 73 45 - 117 U/L    Protein, total 6.3 (L) 6.4 - 8.2 g/dL    Albumin 3.4 (L) 3.5 - 5.0 g/dL    Globulin 2.9 2.0 - 4.0 g/dL    A-G Ratio 1.2 1.1 - 2.2     TROPONIN-HIGH SENSITIVITY    Collection Time: 09/25/22 12:40 PM   Result Value Ref Range    Troponin-High Sensitivity 11 0 - 76 ng/L   PHOSPHORUS    Collection Time: 09/25/22 12:40 PM   Result Value Ref Range    Phosphorus 3.3 2.6 - 4.7 mg/dL   CBC WITH AUTOMATED DIFF    Collection Time: 09/25/22  1:01 PM   Result Value Ref Range    .4 (HH) 4.1 - 11.1 K/uL    RBC 2.59 (L) 4.10 - 5.70 M/uL    HGB 8.8 (L) 12.1 - 17.0 g/dL    HCT 30.5 (L) 36.6 - 50.3 %    .8 (H) 80.0 - 99.0 FL    MCH 34.0 26.0 - 34.0 PG    MCHC 28.9 (L) 30.0 - 36.5 g/dL    PLATELET 964 (L) 892 - 400 K/uL    MPV 10.5 8.9 - 12.9 FL    NRBC 0.0 0.0  WBC    ABSOLUTE NRBC 0.00 0.00 - 0.01 K/uL    NEUTROPHILS 1 (L) 32 - 75 %    LYMPHOCYTES 98 (H) 12 - 49 %    MONOCYTES 1 (L) 5 - 13 %    EOSINOPHILS 0 0 - 7 %    BASOPHILS 0 0 - 1 %    IMMATURE GRANULOCYTES 0 %    ABS. NEUTROPHILS 2.3 1.8 - 8.0 K/UL    ABS. LYMPHOCYTES 224.8 (H) 0.8 - 3.5 K/UL    ABS. MONOCYTES 2.3 (H) 0.0 - 1.0 K/UL    ABS. EOSINOPHILS 0.0 0.0 - 0.4 K/UL    ABS. BASOPHILS 0.0 0.0 - 0.1 K/UL    ABS. IMM.  GRANS. 0.0 K/UL    DF Manual      RBC COMMENTS Macrocytosis  3+        RBC COMMENTS Hypochromia  2+       URINALYSIS W/ REFLEX CULTURE    Collection Time: 09/25/22  3:39 PM    Specimen: Urine   Result Value Ref Range    Color Yellow/Straw      Appearance Clear Clear      Specific gravity 1.013 1.003 - 1.030      pH (UA) 6.0 5.0 - 8.0      Protein Negative Negative mg/dL    Glucose Negative Negative mg/dL    Ketone Negative Negative mg/dL    Bilirubin Negative Negative      Blood Negative Negative      Urobilinogen 2.0 (H) 0.1 - 1.0 EU/dL    Nitrites Negative Negative      Leukocyte Esterase Negative Negative      UA:UC IF INDICATED Culture not indicated by UA result Culture not indicated by UA result      WBC 0-4 0 - 4 /hpf    RBC 0-5 0 - 5 /hpf    Bacteria Negative Negative /hpf   GLUCOSE, POC    Collection Time: 09/25/22  5:25 PM   Result Value Ref Range    Glucose (POC) 80 65 - 100 mg/dL    Performed by Kimi Faria, POC    Collection Time: 09/25/22  9:40 PM   Result Value Ref Range    Glucose (POC) 136 (H) 65 - 100 mg/dL    Performed by Juan Antonio Red        Radiologic Studies -   CT HEAD WO CONT   Final Result   No acute intracranial abnormality. Moderate chronic small vessel ischemic   disease. XR CHEST PORT   Final Result   No acute cardiopulmonary process. CT Results  (Last 48 hours)                 09/25/22 1357  CT HEAD WO CONT Final result    Impression:  No acute intracranial abnormality. Moderate chronic small vessel ischemic   disease. Narrative:  EXAM: CT HEAD WO CONT       INDICATION: ct head       COMPARISON: None. CONTRAST: None. TECHNIQUE: Unenhanced CT of the head was performed using 5 mm images. Brain and   bone windows were generated. Coronal and sagittal reformats. CT dose reduction   was achieved through use of a standardized protocol tailored for this   examination and automatic exposure control for dose modulation. FINDINGS:   The ventricles and sulci are normal in size, shape and configuration. . Moderate   chronic small vessel seen disease. . There is no intracranial hemorrhage,   extra-axial collection, or mass effect.  The basilar cisterns are open. No CT   evidence of acute infarct. The bone windows demonstrate no abnormalities. The visualized portions of the   paranasal sinuses and mastoid air cells are clear. CXR Results  (Last 48 hours)                 09/25/22 1250  XR CHEST PORT Final result    Impression:  No acute cardiopulmonary process. Narrative:          EXAM: XR CHEST PORT       INDICATION: chest pain       COMPARISON:    2. 17.       FINDINGS: A portable AP radiograph of the chest was obtained at 1244 hours. Median sternotomy changes. . The lungs are clear. The cardiac and mediastinal   contours and pulmonary vascularity are normal.  The bones and soft tissues are   grossly within normal limits. Medical Decision Making and ED Course     I have also reviewed the vital signs, available nursing notes, past medical history, past surgical history, family history and social history as made available. Vital Signs - Reviewed the patient's vital signs. Patient Vitals for the past 12 hrs:   Temp Pulse Resp BP SpO2   09/25/22 2154 98.1 °F (36.7 °C) 67 18 (!) 121/53 97 %   09/25/22 2119 97.8 °F (36.6 °C) 72 18 (!) 121/53 96 %   09/25/22 2048 -- 69 -- -- 98 %   09/25/22 2018 -- 69 -- -- 97 %   09/25/22 2003 -- 72 -- (!) 121/53 97 %   09/25/22 1948 -- 71 -- -- 97 %   09/25/22 1933 -- 71 -- -- 96 %   09/25/22 1918 -- 74 -- -- 96 %   09/25/22 1903 -- 72 -- -- 97 %   09/25/22 1843 98.3 °F (36.8 °C) 68 18 (!) 146/56 97 %   09/25/22 1428 -- (!) 54 20 (!) 158/80 98 %   09/25/22 1329 97.9 °F (36.6 °C) (!) 55 16 (!) 141/53 99 %   09/25/22 1327 -- (!) 55 19 (!) 155/57 98 %   09/25/22 1324 -- (!) 52 16 (!) 149/51 98 %   09/25/22 1300 -- (!) 52 18 (!) 152/53 96 %   09/25/22 1246 -- -- -- -- 100 %   09/25/22 1233 97.9 °F (36.6 °C) (!) 55 20 (!) 137/55 100 %           EKG interpretation: (Preliminary): Performed at 1232  Rhythm: sinus bradycardia  ; and regular .  Rate (approx.): 53; Axis: left axis deviation; Medical Decision Making/Diff Dx:  Differential diagnoses: Syncope, dehydration, arrhythmia, electrolyte abnormalities, UTI, pneumonia, influenza, COVID-19 infection, STEMI/NSTEMI PE (less likely)      MDM:     80year-old with significant cardiac history followed closely by his cardiologist to outside of this facility presents with a syncopal episode while in [employer] of perhaps several minute duration. Etiology unclear at this time we will do screening labs chest x-ray head CT and planned admission. In light of patient's CLL and likely elevated white count and the possibility of white cell emboli is a possibility. ED course/Re-evaluation/Consultations/Other   Patient's work-up essentially negative except for white count greater than 200,000 which patient family confirm is consistent with his baseline while on chemotherapy. Will admit patient in the hospital for closer monitoring and further assessment. Patient and family       Procedures     PROCEDURES:  Procedures  Rahul Jimenez MD      Disposition     Admitted      Diagnosis     Clinical impression:   1. Syncope and collapse    2. Generalized weakness    3.  CLL (chronic lymphocytic leukemia) ([facility] [address]

## 2022-09-27 ENCOUNTER — PATIENT OUTREACH (OUTPATIENT)
Dept: CASE MANAGEMENT | Age: 87
End: 2022-09-27

## 2022-09-28 ENCOUNTER — OFFICE VISIT (OUTPATIENT)
Dept: INTERNAL MEDICINE CLINIC | Age: 87
End: 2022-09-28
Payer: MEDICARE

## 2022-09-28 VITALS
DIASTOLIC BLOOD PRESSURE: 54 MMHG | SYSTOLIC BLOOD PRESSURE: 132 MMHG | TEMPERATURE: 97.4 F | RESPIRATION RATE: 18 BRPM | BODY MASS INDEX: 23.39 KG/M2 | HEIGHT: 67 IN | OXYGEN SATURATION: 97 % | HEART RATE: 54 BPM | WEIGHT: 149 LBS

## 2022-09-28 DIAGNOSIS — R55 SYNCOPE AND COLLAPSE: ICD-10-CM

## 2022-09-28 DIAGNOSIS — E11.21 TYPE 2 DIABETES WITH NEPHROPATHY (HCC): ICD-10-CM

## 2022-09-28 DIAGNOSIS — C91.10 CLL (CHRONIC LYMPHOCYTIC LEUKEMIA) (HCC): Primary | ICD-10-CM

## 2022-09-28 DIAGNOSIS — K21.00 GASTROESOPHAGEAL REFLUX DISEASE WITH ESOPHAGITIS WITHOUT HEMORRHAGE: ICD-10-CM

## 2022-09-28 DIAGNOSIS — N18.31 STAGE 3A CHRONIC KIDNEY DISEASE (HCC): ICD-10-CM

## 2022-09-28 PROCEDURE — 99496 TRANSJ CARE MGMT HIGH F2F 7D: CPT | Performed by: INTERNAL MEDICINE

## 2022-09-28 PROCEDURE — G8427 DOCREV CUR MEDS BY ELIG CLIN: HCPCS | Performed by: INTERNAL MEDICINE

## 2022-09-28 NOTE — PROGRESS NOTES
SPORTS MEDICINE AND PRIMARY CARE  Hector Gillis MD, Sunny Johnson[address] [address]  Phone:  [phone]  Fax: [phone]       Chief Complaint   Patient presents with    Hospital Follow Up   . SUBJECTIVE:    Aixa Thomas is a 80 y.o. male The patient returns today after admission to [facility] on 09/25 and discharged on 09/26 where he presented via EMS with reported syncope episode at [facility]. He does not recall what happened. He was diaphoretic at [facility] since the [facility] was not working and does not remember the sequence of events thereafter. Sugar was 127 by EMS and a 12-lead EKG showed no significant T-wave changes. Evaluation included a CT scan of the head that was unremarkable and chest x-ray revealed no acute process. He had no new medications and was discharged to continue his previous medications. Laboratory studies revealed a white count of 174,000 during the hospital stay which was down from 279 on August 20th. The patient comes in with his daughter for evaluation. Comes in with his daughter who details the events of the day on Sunday and I agree with the hospitalist that it was likely vasovagal.  He is doing fine now. I do not think he was drinking his water like he should because the first time he went to [facility], he had no problems. I encouraged him to continue to go to [facility]. He feels fine now. Current Outpatient Medications   Medication Sig Dispense Refill    acalabrutinib 100 mg cap Take 100 mg by mouth daily. Taken every other day at night      metoprolol tartrate (LOPRESSOR) 25 mg tablet TAKE 1 TABLET TWICE A  Tablet 3    allopurinoL (ZYLOPRIM) 300 mg tablet TAKE ONE-HALF (1/2) TABLET DAILY 90 Tablet 3    glucose blood VI test strips (OneTouch Ultra Test) strip USE TO TEST BLOOD SUGAR ONCE DAILY. DX.E11.9 100 Strip 11    cholecalciferol (VITAMIN D3) (1000 Units /25 mcg) tablet Take 1,000 Units by mouth daily. alum-mag hydroxide-simeth (MYLANTA) 200-200-20 mg/5 mL susp Take 30 mL by mouth daily. Was taking daily while on acalabrutinib      multivitamin with folic acid (ONE DAILY WITH FOLIC ACID) 991 mcg tab tablet Take 1 Tablet by mouth daily. ascorbic acid, vitamin C, (VITAMIN C) 500 mg tablet Take 500 mg by mouth daily. simvastatin (ZOCOR) 40 mg tablet TAKE 1 TABLET DAILY IN THE EVENING 90 Tablet 3    metFORMIN (GLUCOPHAGE) 500 mg tablet TAKE 1 TABLET DAILY 90 Tablet 3    aspirin 81 mg chewable tablet Take 81 mg by mouth daily. brimonidine (ALPHAGAN) 0.15 % ophthalmic solution Administer 1 Drop to both eyes two (2) times a day.  Indications: OPEN ANGLE GLAUCOMA       Past Medical History:   Diagnosis Date    Abnormal nuclear stress test 6/6/2014    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 ([facility] [address] 11/2/2015    Bereavement 02/06/2019    ltcf - uti -strangulated hernia - prostate ca - brother    Bronchiectasis ([facility] [address]     CAD (coronary artery disease)     Chest pain, unspecified     Chronic pain     right leg    CLL (chronic lymphocytic leukemia) ([facility] [address]     Jerica Segura md  VCI    Diabetes ([facility] [address]     Essential hypertension     GERD (gastroesophageal reflux disease)     Hyperlipidemia     Hypertension     Opacity of lung on imaging study 05/28/2017    cxr    Rotator cuff arthropathy     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    S/P coronary artery stent placement 6/6/2014 6/6/14 PCI/GUANAKO to prox LAD     Past Surgical History:   Procedure Laterality Date    HX COLONOSCOPY      HX HEART CATHETERIZATION      PTCA SINGLE VESSEL  4/4/2015         VASCULAR SURGERY PROCEDURE UNLIST  2014 and 2015    BLE stenting     Allergies   Allergen Reactions    Codeine Shortness of Breath    Lipitor [Atorvastatin] Nausea Only         REVIEW OF SYSTEMS:  General: negative for - chills or fever  ENT: negative for - headaches, nasal congestion or tinnitus  Respiratory: negative for - cough, hemoptysis, shortness of breath or wheezing  Cardiovascular : negative for - chest pain, edema, palpitations or shortness of breath  Gastrointestinal: negative for - abdominal pain, blood in stools, heartburn or nausea/vomiting  Genito-Urinary: no dysuria, trouble voiding, or hematuria  Musculoskeletal: negative for - gait disturbance, joint pain, joint stiffness or joint swelling  Neurological: no TIA or stroke symptoms  Hematologic: no bruises, no bleeding, no swollen glands  Integument: no lumps, mole changes, nail changes or rash  Endocrine: no malaise/lethargy or unexpected weight changes      Social History     Socioeconomic History    Marital status:    Tobacco Use    Smoking status: Former     Types: Cigarettes     Quit date: 1/15/1984     Years since quittin.7    Smokeless tobacco: Never    Tobacco comments:     QUIT    Vaping Use    Vaping Use: Never used   Substance and Sexual Activity    Alcohol use: No     Alcohol/week: 0.0 standard drinks     Comment: quit in     Drug use: No     Types: Prescription, OTC    Sexual activity: Not Currently     Partners: Female   Other Topics Concern     Service No    Blood Transfusions No    Caffeine Concern No    Occupational Exposure No    Hobby Hazards No    Sleep Concern No    Stress Concern No    Weight Concern No    Special Diet Yes     Comment: low sugar    Back Care No    Exercise No    Bike Helmet No    Seat Belt Yes    Self-Exams No   Social History Narrative    Family History: Mother:  61 yrs, DM complicationsFather:  80 yrs, strokeSister(s):  76 yrs, pneumonia, DM,    HTNBrother(s):  61 yrs, DM, HTN, CVAChildren: aliveGrandmother: unknow nGrandfather: deceasedSpouse: deceased1 brother(s) -    healthy. 2daughter(s) - healthy. Adela Warner [phone] [facility] ([phone])[facility] ([phone])        Social History: Alcohol Use Patient does not use alcohol.  Smoking Status Patient is a former smoker, Quit in: 1980. Caffeine: none. Marital Status: W idow . Lives w ith: alone. Children: yes 2 d. Education/School: has highschool diploma, college 2 y. Family History   Problem Relation Age of Onset    Diabetes Mother     Stroke Father        OBJECTIVE:    Visit Vitals  BP (!) 132/54 (BP 1 Location: Right arm, BP Patient Position: Sitting)   Pulse (!) 54   Temp 97.4 °F (36.3 °C) (Oral)   Resp 18   Ht 5' 7\" (1.702 m)   Wt 149 lb (67.6 kg)   SpO2 97%   BMI 23.34 kg/m²     CONSTITUTIONAL: well , well nourished, appears age appropriate  EYES: perrla, eom intact  ENMT:moist mucous membranes, pharynx clear  NECK: supple. Thyroid normal  RESPIRATORY: Chest: clear bilaterally   CARDIOVASCULAR: Heart: regular rate and rhythm  GASTROINTESTINAL: Abdomen: soft, bowel sounds active  HEMATOLOGIC: no pathological lymph nodes palpated  MUSCULOSKELETAL: Extremities: no edema, pulse 1+   INTEGUMENT: No unusual rashes or suspicious skin lesions noted. Nails appear normal.  NEUROLOGIC: non-focal exam   MENTAL STATUS: alert and oriented, appropriate affect           ASSESSMENT:  1. CLL (chronic lymphocytic leukemia) ([facility]    2. Type 2 diabetes with nephropathy (HCC)    3. Stage 3a chronic kidney disease ([address]    4. Gastroesophageal reflux disease with esophagitis without hemorrhage    5. Syncope and collapse      Events of hospital stay are reviewed with him and his daughter. CLL is improved. Type 2 diabetes is stable. CKD is stable. No symptoms related to GERD. No further syncope episodes. He will be back to see me in about three months or as scheduled. We just really encourage him to drink at least 64 oz of water a day. This is a SHIRIN visit with the nurse navigator completing medication reconciliation and assessment of condition. I have discussed the diagnosis with the patient and the intended plan as seen in the  Orders. The patient understands and agees with the plan.   The patient has   received an after visit summary and questions were answered concerning  future plans  Patient labs and/or xrays were reviewed  Past records were reviewed. PLAN:  . No orders of the defined types were placed in this encounter. Follow-up and Dispositions    Return in about 3 months (around 12/28/2022). ATTENTION:   This medical record was transcribed using an electronic medical records system. Although proofread, it may and can contain electronic and spelling errors. Other human spelling and other errors may be present. Corrections may be executed at a later time. Please feel free to contact us for any clarifications as needed.

## 2022-09-28 NOTE — PROGRESS NOTES
Willi Downs is a 80 y.o. male    Chief Complaint   Patient presents with    Hospital Follow Up     1. Have you been to the ER, urgent care clinic since your last visit? Hospitalized since your last visit? No    2. Have you seen or consulted any other health care providers outside of the [address] [place] [address] since your last visit? Include any pap smears or colon screening.  No

## 2022-10-04 ENCOUNTER — PATIENT OUTREACH (OUTPATIENT)
Dept: CASE MANAGEMENT | Age: 87
End: 2022-10-04

## 2022-10-05 NOTE — PROGRESS NOTES
Care Transitions Follow Up Call    Challenges to be reviewed by the provider   Additional needs identified to be addressed with provider: no           Method of communication with provider : none    Care Transition Nurse (CTN) contacted the family by telephone to follow up after admission on 2022. Verified name and  with family as identifiers. Addressed changes since last contact: none  Follow up appointment completed? yes. Was follow up appointment scheduled within 7 days of discharge? yes. Advance Care Planning:   Does patient have an Advance Directive:  yes; reviewed and current     CTN reviewed discharge instructions, medical action plan and red flags with family and discussed any barriers to care and/or understanding of plan of care after discharge. Discussed appropriate site of care based on symptoms and resources available to patient including: PCP and Specialist. The family agrees to contact the PCP office for questions related to their healthcare. Patients top risk factors for readmission:  none noted    Interventions to address risk factors: Obtained and reviewed discharge summary and/or continuity of care documents    [facility] follow up appointment(s):   Future Appointments   Date Time Provider Renzo Gutierrez   2023 11:00 AM Brice Youngblood [facility]     Non-[facility] follow up appointment(s): none    CTN provided contact information for future needs. Plan for follow-up call in 5-7 days based on severity of symptoms and risk factors. Plan for next call:  follow up        Goals Addressed                   This Visit's Progress     Attends follow-up appointments as directed. On track     10/5/2022  -Attended PCP follow up on 2022  -Will follow up with PCP in 3 months  SP  2022  -Will attend follow up with PCP on 2022  -CTN to follow up in 1 week  SP       Prevent complications post hospitalization.    On track     10/5/2022  -Daughter reports BP is stable 130's/60's. Will take BP daily and record. Will report BP >180/100 or <80/40.   -Per PCP recommendations patient to drink 64oz of water daily  -Will follow up in 1 week  SP  9/27/2022  -Daughter to monitor fluid intake and hydration status. Will take BP daily and record. Will report BP >180/100 or <80/40.    -CTN to follow up in 1 week  SP

## 2022-10-11 ENCOUNTER — APPOINTMENT (OUTPATIENT)
Dept: GENERAL RADIOLOGY | Age: 87
DRG: 641 | End: 2022-10-11
Attending: EMERGENCY MEDICINE
Payer: MEDICARE

## 2022-10-11 ENCOUNTER — PATIENT OUTREACH (OUTPATIENT)
Dept: CASE MANAGEMENT | Age: 87
End: 2022-10-11

## 2022-10-11 ENCOUNTER — HOSPITAL ENCOUNTER (INPATIENT)
Age: 87
LOS: 7 days | Discharge: NURSING, REHAB OR LONG-TERM CARE | DRG: 641 | End: 2022-10-18
Attending: EMERGENCY MEDICINE | Admitting: STUDENT IN AN ORGANIZED HEALTH CARE EDUCATION/TRAINING PROGRAM
Payer: MEDICARE

## 2022-10-11 DIAGNOSIS — C91.10 CLL (CHRONIC LYMPHOCYTIC LEUKEMIA) (HCC): ICD-10-CM

## 2022-10-11 DIAGNOSIS — E87.1 HYPONATREMIA: Primary | ICD-10-CM

## 2022-10-11 LAB
ANION GAP SERPL CALC-SCNC: 6 MMOL/L (ref 5–15)
APPEARANCE UR: CLEAR
BACTERIA URNS QL MICRO: NEGATIVE /HPF
BASOPHILS # BLD: 0 K/UL (ref 0–0.1)
BASOPHILS NFR BLD: 0 % (ref 0–1)
BILIRUB UR QL: NEGATIVE
BUN SERPL-MCNC: 17 MG/DL (ref 6–20)
BUN/CREAT SERPL: 15 (ref 12–20)
CALCIUM SERPL-MCNC: 9.2 MG/DL (ref 8.5–10.1)
CHLORIDE SERPL-SCNC: 94 MMOL/L (ref 97–108)
CO2 SERPL-SCNC: 26 MMOL/L (ref 21–32)
COLOR UR: NORMAL
CREAT SERPL-MCNC: 1.14 MG/DL (ref 0.7–1.3)
DIFFERENTIAL METHOD BLD: ABNORMAL
EOSINOPHIL # BLD: 0 K/UL (ref 0–0.4)
EOSINOPHIL NFR BLD: 0 % (ref 0–7)
EPITH CASTS URNS QL MICRO: NORMAL /LPF
ERYTHROCYTE [DISTWIDTH] IN BLOOD BY AUTOMATED COUNT: 17.8 % (ref 11.5–14.5)
GLUCOSE SERPL-MCNC: 104 MG/DL (ref 65–100)
GLUCOSE UR STRIP.AUTO-MCNC: NEGATIVE MG/DL
HCT VFR BLD AUTO: 34.1 % (ref 36.6–50.3)
HGB BLD-MCNC: 9.9 G/DL (ref 12.1–17)
HGB UR QL STRIP: NEGATIVE
HYALINE CASTS URNS QL MICRO: NORMAL /LPF (ref 0–2)
IMM GRANULOCYTES # BLD AUTO: 0 K/UL (ref 0–0.04)
IMM GRANULOCYTES NFR BLD AUTO: 0 % (ref 0–0.5)
KETONES UR QL STRIP.AUTO: NEGATIVE MG/DL
LEUKOCYTE ESTERASE UR QL STRIP.AUTO: NEGATIVE
LYMPHOCYTES # BLD: 197.2 K/UL (ref 0.8–3.5)
LYMPHOCYTES NFR BLD: 95 % (ref 12–49)
MAGNESIUM SERPL-MCNC: 2.1 MG/DL (ref 1.6–2.4)
MCH RBC QN AUTO: 33.3 PG (ref 26–34)
MCHC RBC AUTO-ENTMCNC: 29 G/DL (ref 30–36.5)
MCV RBC AUTO: 114.8 FL (ref 80–99)
MONOCYTES # BLD: 4.2 K/UL (ref 0–1)
MONOCYTES NFR BLD: 2 % (ref 5–13)
NEUTS SEG # BLD: 6.2 K/UL (ref 1.8–8)
NEUTS SEG NFR BLD: 3 % (ref 32–75)
NITRITE UR QL STRIP.AUTO: NEGATIVE
NRBC # BLD: 0 K/UL (ref 0–0.01)
NRBC BLD-RTO: 0 PER 100 WBC
PH UR STRIP: 6 [PH] (ref 5–8)
PLATELET # BLD AUTO: 147 K/UL (ref 150–400)
PMV BLD AUTO: 9.8 FL (ref 8.9–12.9)
POTASSIUM SERPL-SCNC: 4.8 MMOL/L (ref 3.5–5.1)
PROT UR STRIP-MCNC: NEGATIVE MG/DL
RBC # BLD AUTO: 2.97 M/UL (ref 4.1–5.7)
RBC #/AREA URNS HPF: NORMAL /HPF (ref 0–5)
RBC MORPH BLD: ABNORMAL
SODIUM SERPL-SCNC: 126 MMOL/L (ref 136–145)
SP GR UR REFRACTOMETRY: 1.01
TSH SERPL DL<=0.05 MIU/L-ACNC: 4.21 UIU/ML (ref 0.36–3.74)
UA: UC IF INDICATED,UAUC: NORMAL
UROBILINOGEN UR QL STRIP.AUTO: 0.2 EU/DL (ref 0.2–1)
WBC # BLD AUTO: 207.6 K/UL (ref 4.1–11.1)
WBC MORPH BLD: ABNORMAL
WBC URNS QL MICRO: NORMAL /HPF (ref 0–4)

## 2022-10-11 PROCEDURE — 36415 COLL VENOUS BLD VENIPUNCTURE: CPT

## 2022-10-11 PROCEDURE — 99285 EMERGENCY DEPT VISIT HI MDM: CPT

## 2022-10-11 PROCEDURE — 71045 X-RAY EXAM CHEST 1 VIEW: CPT

## 2022-10-11 PROCEDURE — 83930 ASSAY OF BLOOD OSMOLALITY: CPT

## 2022-10-11 PROCEDURE — 81001 URINALYSIS AUTO W/SCOPE: CPT

## 2022-10-11 PROCEDURE — 85025 COMPLETE CBC W/AUTO DIFF WBC: CPT

## 2022-10-11 PROCEDURE — 74011000250 HC RX REV CODE- 250: Performed by: STUDENT IN AN ORGANIZED HEALTH CARE EDUCATION/TRAINING PROGRAM

## 2022-10-11 PROCEDURE — 80061 LIPID PANEL: CPT

## 2022-10-11 PROCEDURE — 96365 THER/PROPH/DIAG IV INF INIT: CPT

## 2022-10-11 PROCEDURE — 84300 ASSAY OF URINE SODIUM: CPT

## 2022-10-11 PROCEDURE — 93005 ELECTROCARDIOGRAM TRACING: CPT

## 2022-10-11 PROCEDURE — 84443 ASSAY THYROID STIM HORMONE: CPT

## 2022-10-11 PROCEDURE — 80048 BASIC METABOLIC PNL TOTAL CA: CPT

## 2022-10-11 PROCEDURE — 83735 ASSAY OF MAGNESIUM: CPT

## 2022-10-11 PROCEDURE — 83935 ASSAY OF URINE OSMOLALITY: CPT

## 2022-10-11 PROCEDURE — 74011250636 HC RX REV CODE- 250/636: Performed by: STUDENT IN AN ORGANIZED HEALTH CARE EDUCATION/TRAINING PROGRAM

## 2022-10-11 PROCEDURE — 74011250636 HC RX REV CODE- 250/636: Performed by: EMERGENCY MEDICINE

## 2022-10-11 PROCEDURE — 65270000046 HC RM TELEMETRY

## 2022-10-11 RX ORDER — ONDANSETRON 2 MG/ML
4 INJECTION INTRAMUSCULAR; INTRAVENOUS
Status: DISCONTINUED | OUTPATIENT
Start: 2022-10-11 | End: 2022-10-18 | Stop reason: HOSPADM

## 2022-10-11 RX ORDER — ATORVASTATIN CALCIUM 20 MG/1
20 TABLET, FILM COATED ORAL
Status: DISCONTINUED | OUTPATIENT
Start: 2022-10-11 | End: 2022-10-18 | Stop reason: HOSPADM

## 2022-10-11 RX ORDER — ENOXAPARIN SODIUM 100 MG/ML
40 INJECTION SUBCUTANEOUS DAILY
Status: DISCONTINUED | OUTPATIENT
Start: 2022-10-12 | End: 2022-10-18 | Stop reason: HOSPADM

## 2022-10-11 RX ORDER — POLYETHYLENE GLYCOL 3350 17 G/17G
17 POWDER, FOR SOLUTION ORAL DAILY PRN
Status: DISCONTINUED | OUTPATIENT
Start: 2022-10-11 | End: 2022-10-18 | Stop reason: HOSPADM

## 2022-10-11 RX ORDER — MELATONIN
1000 DAILY
Status: DISCONTINUED | OUTPATIENT
Start: 2022-10-12 | End: 2022-10-18 | Stop reason: HOSPADM

## 2022-10-11 RX ORDER — METOPROLOL TARTRATE 25 MG/1
25 TABLET, FILM COATED ORAL 2 TIMES DAILY
Status: DISCONTINUED | OUTPATIENT
Start: 2022-10-12 | End: 2022-10-18 | Stop reason: HOSPADM

## 2022-10-11 RX ORDER — ALLOPURINOL 300 MG/1
150 TABLET ORAL DAILY
Status: DISCONTINUED | OUTPATIENT
Start: 2022-10-12 | End: 2022-10-18 | Stop reason: HOSPADM

## 2022-10-11 RX ORDER — SODIUM CHLORIDE 9 MG/ML
50 INJECTION, SOLUTION INTRAVENOUS CONTINUOUS
Status: DISPENSED | OUTPATIENT
Start: 2022-10-11 | End: 2022-10-12

## 2022-10-11 RX ORDER — MAGNESIUM SULFATE 100 %
4 CRYSTALS MISCELLANEOUS AS NEEDED
Status: DISCONTINUED | OUTPATIENT
Start: 2022-10-11 | End: 2022-10-18 | Stop reason: HOSPADM

## 2022-10-11 RX ORDER — ONDANSETRON 4 MG/1
4 TABLET, ORALLY DISINTEGRATING ORAL
Status: DISCONTINUED | OUTPATIENT
Start: 2022-10-11 | End: 2022-10-18 | Stop reason: HOSPADM

## 2022-10-11 RX ORDER — ACETAMINOPHEN 325 MG/1
650 TABLET ORAL
Status: DISCONTINUED | OUTPATIENT
Start: 2022-10-11 | End: 2022-10-18 | Stop reason: HOSPADM

## 2022-10-11 RX ORDER — GUAIFENESIN 100 MG/5ML
81 LIQUID (ML) ORAL DAILY
Status: DISCONTINUED | OUTPATIENT
Start: 2022-10-12 | End: 2022-10-18 | Stop reason: HOSPADM

## 2022-10-11 RX ORDER — BRIMONIDINE TARTRATE 2 MG/ML
1 SOLUTION/ DROPS OPHTHALMIC 2 TIMES DAILY
Status: DISCONTINUED | OUTPATIENT
Start: 2022-10-12 | End: 2022-10-18 | Stop reason: HOSPADM

## 2022-10-11 RX ORDER — SODIUM CHLORIDE 0.9 % (FLUSH) 0.9 %
5-40 SYRINGE (ML) INJECTION EVERY 8 HOURS
Status: DISCONTINUED | OUTPATIENT
Start: 2022-10-11 | End: 2022-10-18 | Stop reason: HOSPADM

## 2022-10-11 RX ORDER — SODIUM CHLORIDE 0.9 % (FLUSH) 0.9 %
5-40 SYRINGE (ML) INJECTION AS NEEDED
Status: DISCONTINUED | OUTPATIENT
Start: 2022-10-11 | End: 2022-10-18 | Stop reason: HOSPADM

## 2022-10-11 RX ORDER — INSULIN LISPRO 100 [IU]/ML
INJECTION, SOLUTION INTRAVENOUS; SUBCUTANEOUS
Status: DISCONTINUED | OUTPATIENT
Start: 2022-10-12 | End: 2022-10-18 | Stop reason: HOSPADM

## 2022-10-11 RX ORDER — ACETAMINOPHEN 650 MG/1
650 SUPPOSITORY RECTAL
Status: DISCONTINUED | OUTPATIENT
Start: 2022-10-11 | End: 2022-10-18 | Stop reason: HOSPADM

## 2022-10-11 RX ORDER — DEXTROSE MONOHYDRATE 100 MG/ML
0-250 INJECTION, SOLUTION INTRAVENOUS AS NEEDED
Status: DISCONTINUED | OUTPATIENT
Start: 2022-10-11 | End: 2022-10-18 | Stop reason: HOSPADM

## 2022-10-11 RX ADMIN — SODIUM CHLORIDE, PRESERVATIVE FREE 5 ML: 5 INJECTION INTRAVENOUS at 22:26

## 2022-10-11 RX ADMIN — SODIUM CHLORIDE 500 ML: 9 INJECTION, SOLUTION INTRAVENOUS at 21:10

## 2022-10-11 RX ADMIN — SODIUM CHLORIDE 50 ML/HR: 0.9 INJECTION, SOLUTION INTRAVENOUS at 22:26

## 2022-10-11 NOTE — PROGRESS NOTES
CCM: Diabetes w/ Nephropathy/Chronic lymphocytic leukemia/GERD/Chronic Ischemic Heart Disease    Chart Review Only:   Patient would have been on Complex CM f/u Contact. Patient is now under SHIRIN follow and will resume CCM post SHIRIN.

## 2022-10-12 LAB
ANION GAP SERPL CALC-SCNC: 6 MMOL/L (ref 5–15)
ATRIAL RATE: 76 BPM
BASOPHILS # BLD: 0 K/UL (ref 0–0.1)
BASOPHILS NFR BLD: 0 % (ref 0–1)
BUN SERPL-MCNC: 13 MG/DL (ref 6–20)
BUN/CREAT SERPL: 14 (ref 12–20)
CALCIUM SERPL-MCNC: 8.5 MG/DL (ref 8.5–10.1)
CALCULATED P AXIS, ECG09: -15 DEGREES
CALCULATED R AXIS, ECG10: -20 DEGREES
CALCULATED T AXIS, ECG11: -50 DEGREES
CHLORIDE SERPL-SCNC: 99 MMOL/L (ref 97–108)
CHOLEST SERPL-MCNC: 66 MG/DL
CO2 SERPL-SCNC: 25 MMOL/L (ref 21–32)
CREAT SERPL-MCNC: 0.94 MG/DL (ref 0.7–1.3)
DIAGNOSIS, 93000: NORMAL
DIFFERENTIAL METHOD BLD: ABNORMAL
EOSINOPHIL # BLD: 0 K/UL (ref 0–0.4)
EOSINOPHIL NFR BLD: 0 % (ref 0–7)
ERYTHROCYTE [DISTWIDTH] IN BLOOD BY AUTOMATED COUNT: 16.3 % (ref 11.5–14.5)
GLUCOSE BLD STRIP.AUTO-MCNC: 100 MG/DL (ref 65–117)
GLUCOSE BLD STRIP.AUTO-MCNC: 83 MG/DL (ref 65–117)
GLUCOSE BLD STRIP.AUTO-MCNC: 89 MG/DL (ref 65–117)
GLUCOSE BLD STRIP.AUTO-MCNC: 90 MG/DL (ref 65–117)
GLUCOSE SERPL-MCNC: 80 MG/DL (ref 65–100)
HCT VFR BLD AUTO: 28.8 % (ref 36.6–50.3)
HDLC SERPL-MCNC: 37 MG/DL
HDLC SERPL: 1.8 {RATIO} (ref 0–5)
HGB BLD-MCNC: 8.9 G/DL (ref 12.1–17)
IMM GRANULOCYTES # BLD AUTO: 0 K/UL (ref 0–0.04)
IMM GRANULOCYTES NFR BLD AUTO: 0 % (ref 0–0.5)
LDLC SERPL CALC-MCNC: 21.4 MG/DL (ref 0–100)
LYMPHOCYTES # BLD: 131.5 K/UL (ref 0.8–3.5)
LYMPHOCYTES NFR BLD: 92 % (ref 12–49)
MCH RBC QN AUTO: 34.5 PG (ref 26–34)
MCHC RBC AUTO-ENTMCNC: 30.9 G/DL (ref 30–36.5)
MCV RBC AUTO: 111.6 FL (ref 80–99)
MONOCYTES # BLD: 4.3 K/UL (ref 0–1)
MONOCYTES NFR BLD: 3 % (ref 5–13)
NEUTS SEG # BLD: 7.2 K/UL (ref 1.8–8)
NEUTS SEG NFR BLD: 5 % (ref 32–75)
NRBC # BLD: 0 K/UL (ref 0–0.01)
NRBC BLD-RTO: 0 PER 100 WBC
OSMOLALITY SERPL: 267 MOSM/KG H2O
OSMOLALITY UR: 432 MOSM/KG H2O
P-R INTERVAL, ECG05: 146 MS
PLATELET # BLD AUTO: 121 K/UL (ref 150–400)
PMV BLD AUTO: 10.5 FL (ref 8.9–12.9)
POTASSIUM SERPL-SCNC: 4.6 MMOL/L (ref 3.5–5.1)
Q-T INTERVAL, ECG07: 388 MS
QRS DURATION, ECG06: 86 MS
QTC CALCULATION (BEZET), ECG08: 436 MS
RBC # BLD AUTO: 2.58 M/UL (ref 4.1–5.7)
RBC MORPH BLD: ABNORMAL
RBC MORPH BLD: ABNORMAL
SERVICE CMNT-IMP: NORMAL
SODIUM SERPL-SCNC: 130 MMOL/L (ref 136–145)
SODIUM UR-SCNC: 68 MMOL/L
TRIGL SERPL-MCNC: 38 MG/DL (ref ?–150)
VENTRICULAR RATE, ECG03: 76 BPM
VLDLC SERPL CALC-MCNC: 7.6 MG/DL
WBC # BLD AUTO: 143 K/UL (ref 4.1–11.1)
WBC MORPH BLD: ABNORMAL

## 2022-10-12 PROCEDURE — 74011250637 HC RX REV CODE- 250/637: Performed by: STUDENT IN AN ORGANIZED HEALTH CARE EDUCATION/TRAINING PROGRAM

## 2022-10-12 PROCEDURE — 74011000250 HC RX REV CODE- 250: Performed by: STUDENT IN AN ORGANIZED HEALTH CARE EDUCATION/TRAINING PROGRAM

## 2022-10-12 PROCEDURE — 36415 COLL VENOUS BLD VENIPUNCTURE: CPT

## 2022-10-12 PROCEDURE — 82962 GLUCOSE BLOOD TEST: CPT

## 2022-10-12 PROCEDURE — 74011250636 HC RX REV CODE- 250/636: Performed by: STUDENT IN AN ORGANIZED HEALTH CARE EDUCATION/TRAINING PROGRAM

## 2022-10-12 PROCEDURE — 80048 BASIC METABOLIC PNL TOTAL CA: CPT

## 2022-10-12 PROCEDURE — 65270000046 HC RM TELEMETRY

## 2022-10-12 PROCEDURE — 85025 COMPLETE CBC W/AUTO DIFF WBC: CPT

## 2022-10-12 RX ADMIN — METOPROLOL TARTRATE 25 MG: 25 TABLET, FILM COATED ORAL at 09:37

## 2022-10-12 RX ADMIN — BRIMONIDINE TARTRATE 1 DROP: 2 SOLUTION OPHTHALMIC at 18:03

## 2022-10-12 RX ADMIN — ATORVASTATIN CALCIUM 20 MG: 20 TABLET, FILM COATED ORAL at 00:33

## 2022-10-12 RX ADMIN — CHOLECALCIFEROL TAB 25 MCG (1000 UNIT) 1000 UNITS: 25 TAB at 10:43

## 2022-10-12 RX ADMIN — BRIMONIDINE TARTRATE 1 DROP: 2 SOLUTION OPHTHALMIC at 09:46

## 2022-10-12 RX ADMIN — ASPIRIN 81 MG CHEWABLE TABLET 81 MG: 81 TABLET CHEWABLE at 09:37

## 2022-10-12 RX ADMIN — ATORVASTATIN CALCIUM 20 MG: 20 TABLET, FILM COATED ORAL at 22:31

## 2022-10-12 RX ADMIN — ALLOPURINOL 150 MG: 300 TABLET ORAL at 09:38

## 2022-10-12 RX ADMIN — METOPROLOL TARTRATE 25 MG: 25 TABLET, FILM COATED ORAL at 18:02

## 2022-10-12 RX ADMIN — ENOXAPARIN SODIUM 40 MG: 100 INJECTION SUBCUTANEOUS at 09:40

## 2022-10-12 NOTE — PROGRESS NOTES
Hospitalist Progress Note    NAME: Carlos Knutson   :  10/4/1933   MRN:  [id number]       Assessment / Plan:  Weakness/malaise  Mild/moderate hyponatremia  Sodium level improving  Sodium level 130  Recheck tomorrow     CLL undergoing chemotherapy  Hold PTA acalabrutinib  Counts appear at baseline and are likely not contributing to current condition     Gout  Continue PTA allopurinol     CAD status post CABG  Essential hypertension  Continue PTA: Aspirin, atorvastatin, metoprolol     Non-insulin-dependent diabetes mellitus  Sliding scale corrective coverage insulin     BPH  Not currently on medication for this, monitor for urinary retention  If retaining would initiate tamsulosin     Glaucoma  Continue PTA eyedrops     GERD  Family reports patient not taking any scheduled medications, but will use Pepcid or Mylanta as needed  Monitor for symptoms and initiate therapy as indicated      18.5 - 24.9 Normal weight / Body mass index is 23.34 kg/m². Estimated discharge date:   Barriers:    Code status: Full  Prophylaxis: Lovenox  Recommended Disposition: Home w/Family     Subjective:     Chief Complaint / Reason for Physician Visit  \"\". Discussed with RN events overnight. PT OT evaluation discharge planning    Review of Systems:  Symptom Y/N Comments  Symptom Y/N Comments   Fever/Chills    Chest Pain     Poor Appetite    Edema     Cough    Abdominal Pain     Sputum    Joint Pain     SOB/JETER    Pruritis/Rash     Nausea/vomit    Tolerating PT/OT     Diarrhea    Tolerating Diet     Constipation    Other       Could NOT obtain due to:      Objective:     VITALS:   Last 24hrs VS reviewed since prior progress note.  Most recent are:  Patient Vitals for the past 24 hrs:   Temp Pulse Resp BP SpO2   10/12/22 1600 -- (!) 56 -- -- --   10/12/22 1555 98.1 °F (36.7 °C) (!) 54 16 (!) 127/53 98 %   10/12/22 1355 97.8 °F (36.6 °C) (!) 54 17 (!) 114/59 97 %   10/12/22 0937 -- 65 -- 137/61 --   10/12/22 0730 -- (!) 56 15 Jaleesa Jimenez ) 142/53 97 %   10/12/22 0630 -- (!) 52 16 (!) 148/59 98 %   10/12/22 0600 -- (!) 54 16 (!) 145/52 95 %   10/12/22 0530 -- (!) 54 15 (!) 142/54 97 %   10/12/22 0438 -- (!) 58 16 (!) 143/57 98 %   10/12/22 0402 -- (!) 52 17 (!) 149/50 99 %   10/12/22 0300 -- (!) 53 17 (!) 150/53 97 %   10/12/22 0230 -- (!) 54 20 (!) 146/57 97 %   10/12/22 0130 -- (!) 55 16 (!) 166/59 98 %   10/12/22 0100 -- (!) 55 21 138/63 97 %   10/12/22 0000 -- (!) 58 17 (!) 146/54 98 %   10/11/22 2330 -- (!) 56 16 (!) 146/55 96 %   10/11/22 2240 -- (!) 55 18 (!) 159/57 98 %   10/11/22 2100 -- 60 19 (!) 147/53 98 %   10/11/22 2030 97.5 °F (36.4 °C) 63 16 (!) 156/52 98 %   10/11/22 2028 -- -- -- -- 97 %   10/11/22 2000 -- 65 17 (!) 149/63 96 %   10/11/22 1811 97.3 °F (36.3 °C) 72 18 (!) 145/53 99 %       Intake/Output Summary (Last 24 hours) at 10/12/2022 1752  Last data filed at 10/12/2022 1355  Gross per 24 hour   Intake 740 ml   Output --   Net 740 ml        I had a face to face encounter and independently examined this patient on 10/12/2022, as outlined below:  PHYSICAL EXAM:  General: WD, WN. Alert, cooperative, no acute distress    EENT:  EOMI. Anicteric sclerae. MMM  Resp:  CTA bilaterally, no wheezing or rales. No accessory muscle use  CV:  Regular  rhythm,  No edema  GI:  Soft, Non distended, Non tender. +Bowel sounds  Neurologic:  Alert and oriented X 3, normal speech,   Psych:   Good insight. Not anxious nor agitated  Skin:  No rashes.   No jaundice    Reviewed most current lab test results and cultures  YES  Reviewed most current radiology test results   YES  Review and summation of old records today    NO  Reviewed patient's current orders and MAR    YES  PMH/SH reviewed - no change compared to H&P  ________________________________________________________________________  Care Plan discussed with:    Comments   Patient     Family      RN     Care Manager     Consultant                        Multidiciplinary team rounds were held today with , nursing, pharmacist and clinical coordinator. Patient's plan of care was discussed; medications were reviewed and discharge planning was addressed. ________________________________________________________________________  Total NON critical care TIME:  35   Minutes    Total CRITICAL CARE TIME Spent:   Minutes non procedure based      Comments   >50% of visit spent in counseling and coordination of care     ________________________________________________________________________  Micahel Carranza MD     Procedures: see electronic medical records for all procedures/Xrays and details which were not copied into this note but were reviewed prior to creation of Plan. LABS:  I reviewed today's most current labs and imaging studies. Pertinent labs include:  Recent Labs     10/12/22  0406 10/11/22  1834   .0* 207.6*   HGB 8.9* 9.9*   HCT 28.8* 34.1*   * 147*     Recent Labs     10/12/22  0527 10/11/22  1834   * 126*   K 4.6 4.8   CL 99 94*   CO2 25 26   GLU 80 104*   BUN 13 17   CREA 0.94 1.14   CA 8.5 9.2   MG  --  2.1       Signed:  Michael Carranza MD

## 2022-10-12 NOTE — ED PROVIDER NOTES
EMERGENCY DEPARTMENT HISTORY AND PHYSICAL EXAM      Date: 10/11/2022  Patient Name: Nile Taylor    History of Presenting Illness     Chief Complaint   Patient presents with    Abnormal Lab Results     Was referred to ER by PCP d/t Na of 125. Pt has CLL and currently getting chemotherapy. Family reports increased weakness and confusion from pt. Pt denied any chest pain or SOB. History Provided By: Patient and Patient's Daughter    HPI: Nile Taylor, 80 y.o. male presents to the ED with cc of abnormal lab results. 58-year-old male presents emergency department chief complaint of abnormal labs. Patient is accompanied by his daughter who provides the majority of the symptoms. Patient reportedly has been more lethargic over the past 2 or 3 days. Saw his oncologist, and they suspected that this could be a UTI. They had labs drawn but he did not tolerate a urinalysis. Was started on Cipro empirically. Patient is currently on oral chemotherapy which is started on Friday. Denies decreased p.o. intake. No nausea, vomiting or diarrhea. No fevers or chills. No other complaints. No falls. Was called and referred to ED due to sodium level of 125 on outpatient labs. Seen by nephrology in past and this was attributed to hypovolemia. There are no other complaints, changes, or physical findings at this time. PCP: Cyrus Leyden, MD    No current facility-administered medications on file prior to encounter. Current Outpatient Medications on File Prior to Encounter   Medication Sig Dispense Refill    acalabrutinib 100 mg cap Take 100 mg by mouth daily. Taken every other day at night      metoprolol tartrate (LOPRESSOR) 25 mg tablet TAKE 1 TABLET TWICE A  Tablet 3    allopurinoL (ZYLOPRIM) 300 mg tablet TAKE ONE-HALF (1/2) TABLET DAILY 90 Tablet 3    glucose blood VI test strips (OneTouch Ultra Test) strip USE TO TEST BLOOD SUGAR ONCE DAILY.  DX.E11.9 100 Strip 11    cholecalciferol (VITAMIN D3) (1000 Units /25 mcg) tablet Take 1,000 Units by mouth daily. alum-mag hydroxide-simeth (MYLANTA) 200-200-20 mg/5 mL susp Take 30 mL by mouth daily. Was taking daily while on acalabrutinib      multivitamin with folic acid (ONE DAILY WITH FOLIC ACID) 412 mcg tab tablet Take 1 Tablet by mouth daily. ascorbic acid, vitamin C, (VITAMIN C) 500 mg tablet Take 500 mg by mouth daily. simvastatin (ZOCOR) 40 mg tablet TAKE 1 TABLET DAILY IN THE EVENING 90 Tablet 3    metFORMIN (GLUCOPHAGE) 500 mg tablet TAKE 1 TABLET DAILY 90 Tablet 3    aspirin 81 mg chewable tablet Take 81 mg by mouth daily. brimonidine (ALPHAGAN) 0.15 % ophthalmic solution Administer 1 Drop to both eyes two (2) times a day.  Indications: OPEN ANGLE GLAUCOMA         Past History     Past Medical History:  Past Medical History:   Diagnosis Date    Abnormal nuclear stress test 6/6/2014    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 ([facility] [address] 11/2/2015    Bereavement 02/06/2019    ltcf - uti -strangulated hernia - prostate ca - brother    Bronchiectasis ([facility] [address]     CAD (coronary artery disease)     Chest pain, unspecified     Chronic pain     right leg    CLL (chronic lymphocytic leukemia) ([facility] [address]     Jerica Segura md  [facility]    Diabetes ([facility] [address]     Essential hypertension     GERD (gastroesophageal reflux disease)     Hyperlipidemia     Hypertension     Opacity of lung on imaging study 05/28/2017    cxr    Rotator cuff arthropathy     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    S/P coronary artery stent placement 6/6/2014 6/6/14 PCI/GUANAKO to prox LAD       Past Surgical History:  Past Surgical History:   Procedure Laterality Date    HX COLONOSCOPY      HX HEART CATHETERIZATION      PTCA SINGLE VESSEL  4/4/2015         VASCULAR SURGERY PROCEDURE UNLIST  2014 and 2015    BLE stenting       Family History:  Family History   Problem Relation Age of Onset    Diabetes Mother     Stroke Father        Social History:  Social History Tobacco Use    Smoking status: Former     Types: Cigarettes     Quit date: 1/15/1984     Years since quittin.7    Smokeless tobacco: Never    Tobacco comments:     QUIT    Vaping Use    Vaping Use: Never used   Substance Use Topics    Alcohol use: No     Alcohol/week: 0.0 standard drinks     Comment: quit in     Drug use: No     Types: Prescription, OTC       Allergies: Allergies   Allergen Reactions    Codeine Shortness of Breath    Lipitor [Atorvastatin] Nausea Only         Review of Systems   Review of Systems   Constitutional:  Negative for appetite change and fever. HENT:  Negative for voice change. Eyes:  Negative for pain and redness. Respiratory:  Negative for cough and chest tightness. Cardiovascular:  Negative for chest pain and leg swelling. Gastrointestinal:  Negative for abdominal pain, diarrhea, nausea and vomiting. Genitourinary:  Negative for hematuria. Musculoskeletal:  Negative for gait problem. Skin:  Negative for color change, pallor and rash. Neurological:  Negative for facial asymmetry, weakness, light-headedness and headaches. Hematological:  Does not bruise/bleed easily. Psychiatric/Behavioral:  Positive for confusion. Negative for behavioral problems. All other systems reviewed and are negative. Physical Exam   Physical Exam  Vitals and nursing note reviewed. Constitutional:       Comments: 66-year-old male, resting bed, no distress   HENT:      Head: Normocephalic and atraumatic. Nose: Nose normal.      Mouth/Throat:      Mouth: Mucous membranes are moist.   Eyes:      Pupils: Pupils are equal, round, and reactive to light. Cardiovascular:      Rate and Rhythm: Normal rate and regular rhythm. Pulses: Normal pulses. Heart sounds: No murmur heard. No friction rub. No gallop. Pulmonary:      Effort: Pulmonary effort is normal.      Breath sounds: Normal breath sounds. No wheezing, rhonchi or rales.    Abdominal: General: Abdomen is flat. There is no distension. Palpations: Abdomen is soft. Tenderness: There is no abdominal tenderness. Musculoskeletal:         General: Normal range of motion. Cervical back: Normal range of motion. Right lower leg: No edema. Left lower leg: No edema. Skin:     General: Skin is warm and dry. Capillary Refill: Capillary refill takes less than 2 seconds. Neurological:      General: No focal deficit present. Mental Status: He is alert. Sensory: No sensory deficit. Motor: No weakness. Psychiatric:         Mood and Affect: Mood normal.       Diagnostic Study Results     Labs -     Recent Results (from the past 12 hour(s))   EKG, 12 LEAD, INITIAL    Collection Time: 10/11/22  6:30 PM   Result Value Ref Range    Ventricular Rate 76 BPM    Atrial Rate 76 BPM    P-R Interval 146 ms    QRS Duration 86 ms    Q-T Interval 388 ms    QTC Calculation (Bezet) 436 ms    Calculated P Axis -15 degrees    Calculated R Axis -20 degrees    Calculated T Axis -50 degrees    Diagnosis       Normal sinus rhythm  Minimal voltage criteria for LVH, may be normal variant  Septal infarct (cited on or before 11-OCT-2022)  When compared with ECG of 20-AUG-2022 12:19,  Nonspecific T wave abnormality now evident in Anterolateral leads     CBC WITH AUTOMATED DIFF    Collection Time: 10/11/22  6:34 PM   Result Value Ref Range    .6 (HH) 4.1 - 11.1 K/uL    RBC 2.97 (L) 4.10 - 5.70 M/uL    HGB 9.9 (L) 12.1 - 17.0 g/dL    HCT 34.1 (L) 36.6 - 50.3 %    .8 (H) 80.0 - 99.0 FL    MCH 33.3 26.0 - 34.0 PG    MCHC 29.0 (L) 30.0 - 36.5 g/dL    RDW 17.8 (H) 11.5 - 14.5 %    PLATELET 876 (L) 921 - 400 K/uL    MPV 9.8 8.9 - 12.9 FL    NRBC 0.0 0  WBC    ABSOLUTE NRBC 0.00 0.00 - 0.01 K/uL    NEUTROPHILS 3 (L) 32 - 75 %    LYMPHOCYTES 95 (H) 12 - 49 %    MONOCYTES 2 (L) 5 - 13 %    EOSINOPHILS 0 0 - 7 %    BASOPHILS 0 0 - 1 %    IMMATURE GRANULOCYTES 0 0.0 - 0.5 %    ABS. NEUTROPHILS 6.2 1.8 - 8.0 K/UL    ABS. LYMPHOCYTES 197.2 (H) 0.8 - 3.5 K/UL    ABS. MONOCYTES 4.2 (H) 0.0 - 1.0 K/UL    ABS. EOSINOPHILS 0.0 0.0 - 0.4 K/UL    ABS. BASOPHILS 0.0 0.0 - 0.1 K/UL    ABS. IMM. GRANS. 0.0 0.00 - 0.04 K/UL    DF MANUAL      RBC COMMENTS MACROCYTOSIS  1+        WBC COMMENTS ATYPICAL LYMPHOCYTES PRESENT     METABOLIC PANEL, BASIC    Collection Time: 10/11/22  6:34 PM   Result Value Ref Range    Sodium 126 (L) 136 - 145 mmol/L    Potassium 4.8 3.5 - 5.1 mmol/L    Chloride 94 (L) 97 - 108 mmol/L    CO2 26 21 - 32 mmol/L    Anion gap 6 5 - 15 mmol/L    Glucose 104 (H) 65 - 100 mg/dL    BUN 17 6 - 20 MG/DL    Creatinine 1.14 0.70 - 1.30 MG/DL    BUN/Creatinine ratio 15 12 - 20      eGFR >60 >60 ml/min/1.73m2    Calcium 9.2 8.5 - 10.1 MG/DL   MAGNESIUM    Collection Time: 10/11/22  6:34 PM   Result Value Ref Range    Magnesium 2.1 1.6 - 2.4 mg/dL       Radiologic Studies -   XR CHEST PORT    (Results Pending)     CT Results  (Last 48 hours)      None          CXR Results  (Last 48 hours)      None            Medical Decision Making   I am the first provider for this patient. I reviewed the vital signs, available nursing notes, past medical history, past surgical history, family history and social history. Vital Signs-Reviewed the patient's vital signs. Patient Vitals for the past 12 hrs:   Temp Pulse Resp BP SpO2   10/11/22 2100 -- 60 19 (!) 147/53 98 %   10/11/22 2030 97.5 °F (36.4 °C) 63 16 (!) 156/52 98 %   10/11/22 2028 -- -- -- -- 97 %   10/11/22 2000 -- 65 17 (!) 149/63 96 %   10/11/22 1811 97.3 °F (36.3 °C) 72 18 (!) 145/53 99 %     Records Reviewed: Nursing Notes and Old Medical Records    Provider Notes (Medical Decision Making):     59-year-old male presents emergency department with a chief complaint of abnormal labs. Patient has a history of CLL and he has chronic leukocytosis. Patient sodium is 126 here. This could be SIADH or hypovolemia.   Does appear mildly confused but no history of seizure. We will small bolus of fluids, trend BMP. Will discuss with hospitalist for admission. ED Course:   Initial assessment performed. The patients presenting problems have been discussed, and they are in agreement with the care plan formulated and outlined with them. I have encouraged them to ask questions as they arise throughout their visit. ED Course as of 10/11/22 2118   Tue Oct 11, 2022   1928 Sodium(!): 126 [MB]      ED Course User Index  [MB] Cassius Wise MD     Critical Care Time:   CRITICAL CARE NOTE :    8:34 PM    IMPENDING DETERIORATION -Metabolic  ASSOCIATED RISK FACTORS - Metabolic changes  MANAGEMENT- Bedside Assessment  INTERPRETATION -  Xrays  INTERVENTIONS - hemodynamic mngmt and Metobolic interventions  CASE REVIEW - Hospitalist/Intensivist, Nursing, and Family  TREATMENT RESPONSE -Stable  PERFORMED BY - Self    NOTES   :  I have spent 35 minutes of critical care time involved in lab review, consultations with specialist, family decision- making, bedside attention and documentation. This time excludes time spent in any separate billed procedures. During this entire length of time I was immediately available to the patient . Sarah Brown MD      Disposition:    Admitted    Diagnosis     Clinical Impression:   1. Hyponatremia    2. CLL (chronic lymphocytic leukemia) ([facility])        Attestations:    Sarah Brown MD        Please note that this dictation was completed with [email or web address], the computer voice recognition software. Quite often unanticipated grammatical, syntax, homophones, and other interpretive errors are inadvertently transcribed by the computer software. Please disregard these errors. Please excuse any errors that have escaped final proofreading. Thank you.

## 2022-10-12 NOTE — ED NOTES
Fluids initiated and patient said he will try to pee soon. Notified patient of order for straight cath if unable to provide sample soon. Family and patient verbalize understanding.

## 2022-10-12 NOTE — PROGRESS NOTES
1400 TRANSFER - IN REPORT:    Verbal [facility] Carlos Knutson  being received from ED (unit) for routine progression of care      Report consisted of patients Situation, Background, Assessment and   Recommendations(SBAR). Information from the following report(s) SBAR, Kardex, Intake/Output, MAR, and Recent Results was reviewed with the receiving nurse. Opportunity for questions and clarification was provided. Assessment completed upon patients arrival to unit and care assumed. 1900 Bedside shift change report given to Mamie Tse RN (oncoming nurse) by Isabel Alcantar RN (offgoing nurse). Report included the following information SBAR, Kardex, Intake/Output, MAR, and Recent Results.

## 2022-10-12 NOTE — CONSULTS
Hematology Oncology Consultation    Reason for consult: CLL    Admitting Diagnosis: Hyponatremia [E87.1]    Impression:   *) CLL:  - On low low dose acalabrutinib 100mg every other day. Last dose Saturday. Will hold for now and consider switching to Zanubrutinib once improved after DC.  - FU with me after DC    *) Hyponatremia:  - Admission in July Nephrology felt it was hypovolecmic hyponatremia, he is improving with IV hydration again here. - Low Na not on SE profile for acalabrutnib.  -Na improved from 126 to 130 with hydration. *)Bradycardia:  - Pulse in 50's    *) Fatigue, FTT:  - HOld acalabrutinib    Thank you for this kind referral, we will follow a long with you    Discussion: Nallely Cam is a  80y.o. year old seen in consultation at the request of Dr. Sylvia Nguyen for CLL. Mr Talia Luther is well known to me and follows me for CLL, was started on Acalabutinb  in July 2022 and has had several dose reductions. He has been on 100mg every other day dosing last few months. He was admitted overnight on 9/25/22 with syncopy felt to be vasovagal in nature. Prior to that he was admitted in July  He has had an admission in July with hypovolemic hyponatremia (Na 122), FTT, and cellulitis. He was admitted after complaining of severe fatigue and my NP in office noted Na of 125, when she called to check on him Tuesday after appt on Monday his dtr stated he was more fatigued and had some temporary confusion, wasn't eating or drinking a lot at home so he was instructed to go to ED for evaluation and evaluation for infection etc. Daughter give hx and states he is improved after hydration today, no fever, chills, N/V, or diarrhea. His last dose of acalabrutinib was on Saturday per daughter.  Wbc is 143K, hgb 8.9, plt 121K  Imaging:      Labs:  Recent Results (from the past 24 hour(s))   EKG, 12 LEAD, INITIAL    Collection Time: 10/11/22  6:30 PM   Result Value Ref Range    Ventricular Rate 76 BPM    Atrial Rate 76 BPM    P-R Interval 146 ms    QRS Duration 86 ms    Q-T Interval 388 ms    QTC Calculation (Bezet) 436 ms    Calculated P Axis -15 degrees    Calculated R Axis -20 degrees    Calculated T Axis -50 degrees    Diagnosis       Normal sinus rhythm  Minimal voltage criteria for LVH, may be normal variant  Septal infarct (cited on or before 11-OCT-2022)  When compared with ECG of 20-AUG-2022 12:19,  Nonspecific T wave abnormality now evident in Anterolateral leads  Confirmed by Taisha Link ([id number]) on 10/12/2022 8:38:32 AM     CBC WITH AUTOMATED DIFF    Collection Time: 10/11/22  6:34 PM   Result Value Ref Range    .6 (HH) 4.1 - 11.1 K/uL    RBC 2.97 (L) 4.10 - 5.70 M/uL    HGB 9.9 (L) 12.1 - 17.0 g/dL    HCT 34.1 (L) 36.6 - 50.3 %    .8 (H) 80.0 - 99.0 FL    MCH 33.3 26.0 - 34.0 PG    MCHC 29.0 (L) 30.0 - 36.5 g/dL    RDW 17.8 (H) 11.5 - 14.5 %    PLATELET 250 (L) 432 - 400 K/uL    MPV 9.8 8.9 - 12.9 FL    NRBC 0.0 0  WBC    ABSOLUTE NRBC 0.00 0.00 - 0.01 K/uL    NEUTROPHILS 3 (L) 32 - 75 %    LYMPHOCYTES 95 (H) 12 - 49 %    MONOCYTES 2 (L) 5 - 13 %    EOSINOPHILS 0 0 - 7 %    BASOPHILS 0 0 - 1 %    IMMATURE GRANULOCYTES 0 0.0 - 0.5 %    ABS. NEUTROPHILS 6.2 1.8 - 8.0 K/UL    ABS. LYMPHOCYTES 197.2 (H) 0.8 - 3.5 K/UL    ABS. MONOCYTES 4.2 (H) 0.0 - 1.0 K/UL    ABS. EOSINOPHILS 0.0 0.0 - 0.4 K/UL    ABS. BASOPHILS 0.0 0.0 - 0.1 K/UL    ABS. IMM.  GRANS. 0.0 0.00 - 0.04 K/UL    DF MANUAL      RBC COMMENTS MACROCYTOSIS  1+        WBC COMMENTS ATYPICAL LYMPHOCYTES PRESENT     METABOLIC PANEL, BASIC    Collection Time: 10/11/22  6:34 PM   Result Value Ref Range    Sodium 126 (L) 136 - 145 mmol/L    Potassium 4.8 3.5 - 5.1 mmol/L    Chloride 94 (L) 97 - 108 mmol/L    CO2 26 21 - 32 mmol/L    Anion gap 6 5 - 15 mmol/L    Glucose 104 (H) 65 - 100 mg/dL    BUN 17 6 - 20 MG/DL    Creatinine 1.14 0.70 - 1.30 MG/DL    BUN/Creatinine ratio 15 12 - 20      eGFR >60 >60 ml/min/1.73m2    Calcium 9.2 8.5 - 10.1 MG/DL MAGNESIUM    Collection Time: 10/11/22  6:34 PM   Result Value Ref Range    Magnesium 2.1 1.6 - 2.4 mg/dL   URINALYSIS W/ REFLEX CULTURE    Collection Time: 10/11/22 10:28 PM    Specimen: Urine   Result Value Ref Range    Color YELLOW/STRAW      Appearance CLEAR CLEAR      Specific gravity 1.012      pH (UA) 6.0 5.0 - 8.0      Protein Negative NEG mg/dL    Glucose Negative NEG mg/dL    Ketone Negative NEG mg/dL    Bilirubin Negative NEG      Blood Negative NEG      Urobilinogen 0.2 0.2 - 1.0 EU/dL    Nitrites Negative NEG      Leukocyte Esterase Negative NEG      UA:UC IF INDICATED CULTURE NOT INDICATED BY UA RESULT CNI      WBC 0-4 0 - 4 /hpf    RBC 0-5 0 - 5 /hpf    Epithelial cells FEW FEW /lpf    Bacteria Negative NEG /hpf    Hyaline cast 0-2 0 - 2 /lpf   OSMOLALITY, SERUM/PLASMA    Collection Time: 10/11/22 10:28 PM   Result Value Ref Range    Osmolality, serum/plasma 267 mOsm/kg H2O   OSMOLALITY, UR    Collection Time: 10/11/22 10:28 PM   Result Value Ref Range    Osmolality,urine 432 MOSM/kg H2O   SODIUM, UR, RANDOM    Collection Time: 10/11/22 10:28 PM   Result Value Ref Range    Sodium,urine random 68 MMOL/L   LIPID PANEL    Collection Time: 10/11/22 10:28 PM   Result Value Ref Range    Cholesterol, total 66 <200 MG/DL    Triglyceride 38 <150 MG/DL    HDL Cholesterol 37 MG/DL    LDL, calculated 21.4 0 - 100 MG/DL    VLDL, calculated 7.6 MG/DL    CHOL/HDL Ratio 1.8 0.0 - 5.0     TSH 3RD GENERATION    Collection Time: 10/11/22 10:28 PM   Result Value Ref Range    TSH 4.21 (H) 0.36 - 3.74 uIU/mL   CBC WITH AUTOMATED DIFF    Collection Time: 10/12/22  4:06 AM   Result Value Ref Range    .0 (HH) 4.1 - 11.1 K/uL    RBC 2.58 (L) 4.10 - 5.70 M/uL    HGB 8.9 (L) 12.1 - 17.0 g/dL    HCT 28.8 (L) 36.6 - 50.3 %    .6 (H) 80.0 - 99.0 FL    MCH 34.5 (H) 26.0 - 34.0 PG    MCHC 30.9 30.0 - 36.5 g/dL    RDW 16.3 (H) 11.5 - 14.5 %    PLATELET 231 (L) 760 - 400 K/uL    MPV 10.5 8.9 - 12.9 FL    NRBC 0.0 0 PER 100 WBC    ABSOLUTE NRBC 0.00 0.00 - 0.01 K/uL    NEUTROPHILS 5 (L) 32 - 75 %    LYMPHOCYTES 92 (H) 12 - 49 %    MONOCYTES 3 (L) 5 - 13 %    EOSINOPHILS 0 0 - 7 %    BASOPHILS 0 0 - 1 %    IMMATURE GRANULOCYTES 0 0.0 - 0.5 %    ABS. NEUTROPHILS 7.2 1.8 - 8.0 K/UL    ABS. LYMPHOCYTES 131.5 (H) 0.8 - 3.5 K/UL    ABS. MONOCYTES 4.3 (H) 0.0 - 1.0 K/UL    ABS. EOSINOPHILS 0.0 0.0 - 0.4 K/UL    ABS. BASOPHILS 0.0 0.0 - 0.1 K/UL    ABS. IMM.  GRANS. 0.0 0.00 - 0.04 K/UL    DF MANUAL      RBC COMMENTS MACROCYTOSIS  1+        RBC COMMENTS ANISOCYTOSIS  1+        WBC COMMENTS ATYPICAL LYMPHOCYTES PRESENT     METABOLIC PANEL, BASIC    Collection Time: 10/12/22  5:27 AM   Result Value Ref Range    Sodium 130 (L) 136 - 145 mmol/L    Potassium 4.6 3.5 - 5.1 mmol/L    Chloride 99 97 - 108 mmol/L    CO2 25 21 - 32 mmol/L    Anion gap 6 5 - 15 mmol/L    Glucose 80 65 - 100 mg/dL    BUN 13 6 - 20 MG/DL    Creatinine 0.94 0.70 - 1.30 MG/DL    BUN/Creatinine ratio 14 12 - 20      eGFR >60 >60 ml/min/1.73m2    Calcium 8.5 8.5 - 10.1 MG/DL   GLUCOSE, POC    Collection Time: 10/12/22  9:30 AM   Result Value Ref Range    Glucose (POC) 89 65 - 117 mg/dL    Performed by Angelica Dunbar \"Ashley\" (TRV RN)    GLUCOSE, POC    Collection Time: 10/12/22  2:05 PM   Result Value Ref Range    Glucose (POC) 90 65 - 117 mg/dL    Performed by Ambrosio Lomeli RN        History:  Past Medical History:   Diagnosis Date    Abnormal nuclear stress test 6/6/2014    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 ([facility] [address] 11/2/2015    Bereavement 02/06/2019    ltcf - uti -strangulated hernia - prostate ca - brother    Bronchiectasis ([facility] [address]     CAD (coronary artery disease)     Chest pain, unspecified     Chronic pain     right leg    CLL (chronic lymphocytic leukemia) (HCC)     Jerica Segura md  [facility]    Di[facility]     Essential hypertension     GERD (gastroesophageal reflux disease)     Hyperlipidemia     Hypertension     Opacity of lung on imaging study 05/28/2017    cxr    Rotator cuff arthropathy     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 Authorizing Provider   acalabrutinib 100 mg cap Take 100 mg by mouth daily. Taken every other day at night   Yes Provider, Historical   metoprolol tartrate (LOPRESSOR) 25 mg tablet TAKE 1 TABLET TWICE A DAY 8/30/22  Yes Coreen Martinez MD   allopurinoL (ZYLOPRIM) 300 mg tablet TAKE ONE-HALF (1/2) TABLET DAILY 8/22/22  Yes Coreen Martinez MD   glucose blood VI test strips (OneTouch Ultra Test) strip USE TO TEST BLOOD SUGAR ONCE DAILY. DX.E11.9 7/25/22  Yes Coreen Martinez MD   cholecalciferol (VITAMIN D3) (1000 Units /25 mcg) tablet Take 1,000 Units by mouth daily. Yes Provider, Historical   alum-mag hydroxide-simeth (MYLANTA) 200-200-20 mg/5 mL susp Take 30 mL by mouth daily. Was taking daily while on acalabrutinib   Yes Provider, Historical   multivitamin with folic acid (ONE DAILY WITH FOLIC ACID) 975 mcg tab tablet Take 1 Tablet by mouth daily. Yes Provider, Historical   ascorbic acid, vitamin C, (VITAMIN C) 500 mg tablet Take 500 mg by mouth daily. Yes Provider, Historical   simvastatin (ZOCOR) 40 mg tablet TAKE 1 TABLET DAILY IN THE EVENING 4/6/22  Yes Coreen Martinez MD   metFORMIN (GLUCOPHAGE) 500 mg tablet TAKE 1 TABLET DAILY 2/10/22  Yes Ketty Abdullahi MD   aspirin 81 mg chewable tablet Take 81 mg by mouth daily. Yes Provider, Historical   brimonidine (ALPHAGAN) 0.15 % ophthalmic solution Administer 1 Drop to both eyes two (2) times a day.  Indications: OPEN ANGLE GLAUCOMA   Yes Provider, Historical     Allergies Allergen Reactions    Codeine Shortness of Breath    Lipitor [Atorvastatin] Nausea Only      Social History     Tobacco Use    Smoking status: Former     Types: Cigarettes     Quit date: 1/15/1984     Years since quittin.7    Smokeless tobacco: Never    Tobacco comments:     QUIT    Substance Use Topics    Alcohol use: No     Alcohol/week: 0.0 standard drinks     Comment: quit in       Family History   Problem Relation Age of Onset    Diabetes Mother     Stroke Father         Current Medications:  Current Facility-Administered Medications   Medication Dose Route Frequency    allopurinoL (ZYLOPRIM) tablet 150 mg  150 mg Oral DAILY    aspirin chewable tablet 81 mg  81 mg Oral DAILY    brimonidine (ALPHAGAN) 0.2 % ophthalmic solution 1 Drop  1 Drop Both Eyes BID    cholecalciferol (VITAMIN D3) (1000 Units /25 mcg) tablet 1,000 Units  1,000 Units Oral DAILY    metoprolol tartrate (LOPRESSOR) tablet 25 mg  25 mg Oral BID    atorvastatin (LIPITOR) tablet 20 mg  20 mg Oral QHS    insulin lispro (HUMALOG) injection   SubCUTAneous AC&HS    glucose chewable tablet 16 g  4 Tablet Oral PRN    glucagon (GLUCAGEN) injection 1 mg  1 mg IntraMUSCular PRN    dextrose 10% infusion 0-250 mL  0-250 mL IntraVENous PRN    sodium chloride (NS) flush 5-40 mL  5-40 mL IntraVENous Q8H    sodium chloride (NS) flush 5-40 mL  5-40 mL IntraVENous PRN    acetaminophen (TYLENOL) tablet 650 mg  650 mg Oral Q6H PRN    Or    acetaminophen (TYLENOL) suppository 650 mg  650 mg Rectal Q6H PRN    polyethylene glycol (MIRALAX) packet 17 g  17 g Oral DAILY PRN    ondansetron (ZOFRAN ODT) tablet 4 mg  4 mg Oral Q8H PRN    Or    ondansetron (ZOFRAN) injection 4 mg  4 mg IntraVENous Q6H PRN    enoxaparin (LOVENOX) injection 40 mg  40 mg SubCUTAneous DAILY     Current Outpatient Medications   Medication Sig    acalabrutinib 100 mg cap Take 100 mg by mouth daily.  Taken every other day at night    metoprolol tartrate (LOPRESSOR) 25 mg tablet TAKE 1 TABLET TWICE A DAY    allopurinoL (ZYLOPRIM) 300 mg tablet TAKE ONE-HALF (1/2) TABLET DAILY    glucose blood VI test strips (OneTouch Ultra Test) strip USE TO TEST BLOOD SUGAR ONCE DAILY. DX.E11.9    cholecalciferol (VITAMIN D3) (1000 Units /25 mcg) tablet Take 1,000 Units by mouth daily. alum-mag hydroxide-simeth (MYLANTA) 200-200-20 mg/5 mL susp Take 30 mL by mouth daily. Was taking daily while on acalabrutinib    multivitamin with folic acid (ONE DAILY WITH FOLIC ACID) 014 mcg tab tablet Take 1 Tablet by mouth daily. ascorbic acid, vitamin C, (VITAMIN C) 500 mg tablet Take 500 mg by mouth daily. simvastatin (ZOCOR) 40 mg tablet TAKE 1 TABLET DAILY IN THE EVENING    metFORMIN (GLUCOPHAGE) 500 mg tablet TAKE 1 TABLET DAILY    aspirin 81 mg chewable tablet Take 81 mg by mouth daily. brimonidine (ALPHAGAN) 0.15 % ophthalmic solution Administer 1 Drop to both eyes two (2) times a day. Indications: OPEN ANGLE GLAUCOMA         Review of Systems:  Constitutional No fevers, chills, night sweats, +excessive fatigue or weight loss. Allergic/Immunologic No recent allergic reactions   Eyes No significant visual difficulties. No diplopia. ENMT No problems with hearing, no sore throat, no sinus drainage. Endocrine No hot flashes or night sweats. No cold intolerance, polyuria, or polydipsia   Hematologic/Lymphatic No easy bruising or bleeding. The patient denies any tender or palpable lymph nodes   Breasts No abnormal masses of breast, nipple discharge or pain. Respiratory No dyspnea on exertion, orthopnea, chest pain, cough or hemoptysis. Cardiovascular No anginal chest pain, irregular heart beat, tachycardia, palpitations or orthopnea. Gastrointestinal No nausea, vomiting, diarrhea, constipation, cramping, dysphagia, reflux, heartburn, GI bleeding, or early satiety. No change in bowel habits. Genitourinary (M) No hematuria, dysuria, increased frequency, urgency, hesitancy or incontinence. Musculoskeletal No joint pain, swelling or redness. No decreased range of motion. Integumentary No chronic rashes, inflammation, ulcerations, pruritus, petechiae, purpura, ecchymoses, or skin changes. Neurologic No headache, blurred vision, and no areas of focal weakness or numbness. Normal gait. No sensory problems. Psychiatric No insomnia, depression, roni or mood swings. No psychotropic drugs. Physical Exam:  Constitutional Alert, cooperative, oriented. Mood and affect appropriate. Appears close to chronological age. Frail appearing   Head Normocephalic; no scars   Eyes Conjunctivae and sclerae are clear and without icterus. Pupils are reactive and equal.   ENMT Sinuses are nontender. No oral exudates, ulcers, masses, thrush or mucositis. Oropharynx clear. Tongue normal.   Neck Supple without masses or thyromegaly. No jugular venous distension. Hematologic/Lymphatic No petechiae or purpura. No tender or palpable lymph nodes in the cervical, supraclavicular, axillary or inguinal area. Respiratory Lungs are clear to auscultation without rhonchi or wheezing. Cardiovascular Regular rate and rhythm of heart without murmurs, gallops or rubs. Chest / Line Site Chest is symmetric with no chest wall deformities. Abdomen Non-tender, non-distended, no masses, ascites or hepatosplenomegaly. Good bowel sounds. No guarding or rebound tenderness. No pulsatile masses. Musculoskeletal No tenderness or swelling, normal range of motion without obvious weakness. Extremities No visible deformities, no cyanosis, clubbing or edema. Skin No rashes, scars, or lesions suggestive of malignancy. No petechiae, purpura, or ecchymoses. No excoriations. Neurologic No sensory or motor deficits, normal cerebellar function, normal gait, cranial nerves intact. Psychiatric Alert and oriented times three. Coherent speech. Verbalizes understanding of our discussions today.

## 2022-10-12 NOTE — H&P
This note is compiled to communicate with the medical care team. It may contain sensitive and protected information. It is not intended to serve as a source of communication with patients/families/friends; that communication occurs at the bedside or via alternative direct communications means (secure messaging, letters, video/telephone, etc.). Hospitalist Admission Note    NAME: Wilder Walsh   :  10/4/1933   MRN:  [id number]     Date/Time:  10/12/2022 2:37 AM    Patient PCP: Adi Rodriguez MD  ______________________________________________________________________  Given the patient's current clinical presentation, I have a high level of concern for decompensation if discharged from the emergency department. Complex decision making was performed, which includes reviewing the patient's available past medical records, laboratory results, and x-ray films. My assessment of this patient's clinical condition and my plan of care is as follows. Subjective:   CHIEF COMPLAINT: Weakness/malaise    HISTORY OF PRESENT ILLNESS:     Jn Bentley is a 80 y.o.  male with pertinent past medical history of CAD status post CABG, essential hypertension, non-insulin-dependent diabetes mellitus, BPH, glaucoma, GERD, CLL on acalabrutinib who presents with complaints of progressively worsening weakness/malaise starting Saturday 10/8. Patient accompanied by daughter at bedside who provides majority of history. She reports patient was evaluated by PCP yesterday and started on ciprofloxacin empirically for possible UTI as patient had been experiencing episodes of large-volume urinary incontinence which is fairly uncommon for him. Today, labs resulted from visit demonstrating sodium of 125 and patient continued to decline so PCP directed family to take patient to the emergency department for further evaluation.   ROS otherwise negative and they deny any other complaints or concerns at this time.    In the ED, afebrile, bradycardic in the 50s (sinus rhythm) hypertensive 140s/50s, saturating upper 90s on room air. CXR demonstrates no evidence of pneumonia or edema. Labs demonstrate: Sodium 126, potassium 4.8, chloride 94, BUN 17, creatinine 1.14 (baseline), magnesium 2.1, .6) baseline), hemoglobin 9.9, platelets 978, UA unremarkable, TSH 4.21. Patient administered 500 cc normal saline with significant improvement in fatigue/malaise. We were asked to admit for work up and evaluation of the above problems.      Past Medical History:   Diagnosis Date    Abnormal nuclear stress test 2014    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 ([facility] 2015    Bereavement 2019    ltcf - uti -strangulated hernia - prostate ca - brother    Bronchiectasis ([facility]     CAD (coronary artery disease)     Chest pain, unspecified     Chronic pain     right leg    CLL (chronic lymphocytic leukemia) ([facility]     Jerica Segura md  [facility]    Diabetes ([facility]     Essential hypertension     GERD (gastroesophageal reflux disease)     Hyperlipidemia     Hypertension     Opacity of lung on imaging study 2017    cxr    Rotator cuff arthropathy     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    S/P coronary artery stent placement 2014 PCI/GUANAKO to prox LAD        Past Surgical History:   Procedure Laterality Date    HX COLONOSCOPY      HX HEART CATHETERIZATION      PTCA SINGLE VESSEL  2015         VASCULAR SURGERY PROCEDURE UNLIST   and     BLE stenting       Social History     Tobacco Use    Smoking status: Former     Types: Cigarettes     Quit date: 1/15/1984     Years since quittin.7    Smokeless tobacco: Never    Tobacco comments:     QUIT    Substance Use Topics    Alcohol use: No     Alcohol/week: 0.0 standard drinks     Comment: quit in         Family History   Problem Relation Age of Onset    Diabetes Mother     Stroke Father        Allergies   Allergen Reactions Codeine Shortness of Breath    Lipitor [Atorvastatin] Nausea Only        Prior to Admission medications    Medication Sig Start Date End Date Taking? Authorizing Provider   acalabrutinib 100 mg cap Take 100 mg by mouth daily. Taken every other day at night   Yes Provider, Historical   metoprolol tartrate (LOPRESSOR) 25 mg tablet TAKE 1 TABLET TWICE A DAY 8/30/22  Yes Nell Zhu MD   allopurinoL (ZYLOPRIM) 300 mg tablet TAKE ONE-HALF (1/2) TABLET DAILY 8/22/22  Yes Nell Zhu MD   glucose blood VI test strips (OneTouch Ultra Test) strip USE TO TEST BLOOD SUGAR ONCE DAILY. DX.E11.9 7/25/22  Yes Nell Zhu MD   cholecalciferol (VITAMIN D3) (1000 Units /25 mcg) tablet Take 1,000 Units by mouth daily. Yes Provider, Historical   alum-mag hydroxide-simeth (MYLANTA) 200-200-20 mg/5 mL susp Take 30 mL by mouth daily. Was taking daily while on acalabrutinib   Yes Provider, Historical   multivitamin with folic acid (ONE DAILY WITH FOLIC ACID) 190 mcg tab tablet Take 1 Tablet by mouth daily. Yes Provider, Historical   ascorbic acid, vitamin C, (VITAMIN C) 500 mg tablet Take 500 mg by mouth daily. Yes Provider, Historical   simvastatin (ZOCOR) 40 mg tablet TAKE 1 TABLET DAILY IN THE EVENING 4/6/22  Yes Nell Zhu MD   metFORMIN (GLUCOPHAGE) 500 mg tablet TAKE 1 TABLET DAILY 2/10/22  Yes Gila Carr MD   aspirin 81 mg chewable tablet Take 81 mg by mouth daily. Yes Provider, Historical   brimonidine (ALPHAGAN) 0.15 % ophthalmic solution Administer 1 Drop to both eyes two (2) times a day.  Indications: OPEN ANGLE GLAUCOMA   Yes Provider, Historical       REVIEW OF SYSTEMS:       Total of 12 systems reviewed as follows:       POSITIVE= Red text   General: Fever, chills, sweats, weakness/malaise, weight loss/gain, loss of appetite    Eyes:   Vision change, eye pain   ENT:    Rhinorrhea, pharyngitis, Hearing loss    Respiratory:   cough, sputum production, SOB, JETER, wheezing, pleuritic pain    Cardiology:   chest pain, palpitations, orthopnea, edema, syncope    Gastrointestinal:  abdominal pain , N/V, diarrhea, dysphagia, constipation, bleeding    Genitourinary:  frequency, urgency, dysuria, hematuria, incontinence    Muskuloskeletal:  arthralgia, myalgia, back pain   Hematology:  easy bruising, nose or gum bleeding, lymphadenopathy    Dermatological: rash, ulceration, pruritis, color change / jaundice   Endocrine:  hot flashes or polydipsia    Neurological:  headache, dizziness, confusion, focal weakness, paresthesia, Speech difficulties, memory loss, gait difficulty   Psychological: Feelings of anxiety, depression, agitation       Objective:   VITALS:    Visit Vitals  BP (!) 146/57   Pulse (!) 54   Temp 97.5 °F (36.4 °C)   Resp 20   Ht 5' 7\" (1.702 m)   Wt 67.6 kg (149 lb)   SpO2 97%   BMI 23.34 kg/m²       PHYSICAL EXAM:    General:   Alert, cooperative, no distress, elderly -American male        HEENT:  Atraumatic, anicteric sclerae, pink conjunctivae, mucosa moist, dentition fair     Neck:  Supple, symmetrical, trachea midline, no abnormalities on palpation     Lungs:   CTAB. Symmetric expansion. Good aeration. Normal respiratory effort. Chest wall:  No tenderness. No Accessory muscle use. Cardiovascular:   Bradycardic rate regular rhythm, No murmur/rub/gallop. No JVD. Radial/AT/DP pulse 2+     GI/:   Soft, non-tender. Not distended. Bowel sounds normal. No HSM     Extremities No edema. No cyanosis. Capillary refill normal     Skin: No significant lesions noted. Not Jaundiced. No rashes      Neurologic: PERRL. EOMI. No facial asymmetry. No aphasia or slurred speech. Moves all extremities. Sensation to light touch grossly intact BUE/BLE. No overt focal deficits appreciated     Psych:  Alert and oriented X 4. Normal affect.  Good insight     Other:   N/A         LAB DATA REVIEWED:    Recent Results (from the past 24 hour(s))   EKG, 12 LEAD, INITIAL Collection Time: 10/11/22  6:30 PM   Result Value Ref Range    Ventricular Rate 76 BPM    Atrial Rate 76 BPM    P-R Interval 146 ms    QRS Duration 86 ms    Q-T Interval 388 ms    QTC Calculation (Bezet) 436 ms    Calculated P Axis -15 degrees    Calculated R Axis -20 degrees    Calculated T Axis -50 degrees    Diagnosis       Normal sinus rhythm  Minimal voltage criteria for LVH, may be normal variant  Septal infarct (cited on or before 11-OCT-2022)  When compared with ECG of 20-AUG-2022 12:19,  Nonspecific T wave abnormality now evident in Anterolateral leads     CBC WITH AUTOMATED DIFF    Collection Time: 10/11/22  6:34 PM   Result Value Ref Range    .6 (HH) 4.1 - 11.1 K/uL    RBC 2.97 (L) 4.10 - 5.70 M/uL    HGB 9.9 (L) 12.1 - 17.0 g/dL    HCT 34.1 (L) 36.6 - 50.3 %    .8 (H) 80.0 - 99.0 FL    MCH 33.3 26.0 - 34.0 PG    MCHC 29.0 (L) 30.0 - 36.5 g/dL    RDW 17.8 (H) 11.5 - 14.5 %    PLATELET 254 (L) 346 - 400 K/uL    MPV 9.8 8.9 - 12.9 FL    NRBC 0.0 0  WBC    ABSOLUTE NRBC 0.00 0.00 - 0.01 K/uL    NEUTROPHILS 3 (L) 32 - 75 %    LYMPHOCYTES 95 (H) 12 - 49 %    MONOCYTES 2 (L) 5 - 13 %    EOSINOPHILS 0 0 - 7 %    BASOPHILS 0 0 - 1 %    IMMATURE GRANULOCYTES 0 0.0 - 0.5 %    ABS. NEUTROPHILS 6.2 1.8 - 8.0 K/UL    ABS. LYMPHOCYTES 197.2 (H) 0.8 - 3.5 K/UL    ABS. MONOCYTES 4.2 (H) 0.0 - 1.0 K/UL    ABS. EOSINOPHILS 0.0 0.0 - 0.4 K/UL    ABS. BASOPHILS 0.0 0.0 - 0.1 K/UL    ABS. IMM.  GRANS. 0.0 0.00 - 0.04 K/UL    DF MANUAL      RBC COMMENTS MACROCYTOSIS  1+        WBC COMMENTS ATYPICAL LYMPHOCYTES PRESENT     METABOLIC PANEL, BASIC    Collection Time: 10/11/22  6:34 PM   Result Value Ref Range    Sodium 126 (L) 136 - 145 mmol/L    Potassium 4.8 3.5 - 5.1 mmol/L    Chloride 94 (L) 97 - 108 mmol/L    CO2 26 21 - 32 mmol/L    Anion gap 6 5 - 15 mmol/L    Glucose 104 (H) 65 - 100 mg/dL    BUN 17 6 - 20 MG/DL    Creatinine 1.14 0.70 - 1.30 MG/DL    BUN/Creatinine ratio 15 12 - 20      eGFR >60 >60 ml/min/1.73m2    Calcium 9.2 8.5 - 10.1 MG/DL   MAGNESIUM    Collection Time: 10/11/22  6:34 PM   Result Value Ref Range    Magnesium 2.1 1.6 - 2.4 mg/dL   URINALYSIS W/ REFLEX CULTURE    Collection Time: 10/11/22 10:28 PM    Specimen: Urine   Result Value Ref Range    Color YELLOW/STRAW      Appearance CLEAR CLEAR      Specific gravity 1.012      pH (UA) 6.0 5.0 - 8.0      Protein Negative NEG mg/dL    Glucose Negative NEG mg/dL    Ketone Negative NEG mg/dL    Bilirubin Negative NEG      Blood Negative NEG      Urobilinogen 0.2 0.2 - 1.0 EU/dL    Nitrites Negative NEG      Leukocyte Esterase Negative NEG      UA:UC IF INDICATED CULTURE NOT INDICATED BY UA RESULT CNI      WBC 0-4 0 - 4 /hpf    RBC 0-5 0 - 5 /hpf    Epithelial cells FEW FEW /lpf    Bacteria Negative NEG /hpf    Hyaline cast 0-2 0 - 2 /lpf   TSH 3RD GENERATION    Collection Time: 10/11/22 10:28 PM   Result Value Ref Range    TSH 4.21 (H) 0.36 - 3.74 uIU/mL       IMAGING:  CXR-no evidence of pneumonia or edema    EKG: Normal sinus rhythm, borderline LVH criteria, rate 76, , QTc 436  ______________________________________________________________________    Assessment / Plan:  Weakness/malaise  Mild/moderate hyponatremia  CLL undergoing chemotherapy  Gout  CAD status post CABG  Essential hypertension  Non-insulin-dependent diabetes mellitus  BPH  Glaucoma  GERD    PLAN:    Weakness/malaise  Mild/moderate hyponatremia  Admit to telemetry monitoring  Significant improvement after 500 cc IV fluid bolus-we will provide another 500 cc over next 10 hours at 50 cc/h  Hyponatremia labs: Urine/serum osmolality, urine sodium, TSH, lipids  Consider nephrology consultation if no improvement with IV fluid challenge  -Family does report patient has significant urine output after chemotherapy doses and has been instructed to consume 64 ounces of water daily by his oncologist, but struggles to consume this much fluid    CLL undergoing chemotherapy  Hold PTA acalabrutinib  Counts appear at baseline and are likely not contributing to current condition    Gout  Continue PTA allopurinol    CAD status post CABG  Essential hypertension  Continue PTA: Aspirin, atorvastatin, metoprolol    Non-insulin-dependent diabetes mellitus  Sliding scale corrective coverage insulin    BPH  Not currently on medication for this, monitor for urinary retention  If retaining would initiate tamsulosin    Glaucoma  Continue PTA eyedrops    GERD  Family reports patient not taking any scheduled medications, but will use Pepcid or Mylanta as needed  Monitor for symptoms and initiate therapy as indicated    Code Status: Full    DVT Prophylaxis: Lovenox  GI Prophylaxis: Not indicated  Diet: Diabetic/heart healthy       Care Plan discussed with:    Comments   Patient x    Family  x    RN     Care Manager                    Consultant:      _______________________________________________________________________  Baseline Level of Function: Home with assistance    Expected  Disposition:   Home with Family x   HH/PT/OT/RN    SNF/LTC    AMIRA    ________________________________________________________________________  TOTAL TIME:  60 Minutes      Comments    x Reviewed previous records   >50% of visit spent in counseling and coordination of care x Discussion with patient and/or family and questions answered       ________________________________________________________________________  Signed: Moody Meehan DO  10/12/2022 2:37 AM    Please note that this documentation was completed in part with Dragon dictation software. Occasionally unanticipated grammatical, syntax, homophones, and other interpretive errors are inadvertently transcribed by the computer software. Please excuse and disregard any errors that have escaped final proofreading. If in doubt, please do not hesitate to reach out to myself or the assigned hospitalist via [facility] paging system for clarification.

## 2022-10-13 LAB
ANION GAP SERPL CALC-SCNC: 6 MMOL/L (ref 5–15)
ANION GAP SERPL CALC-SCNC: 6 MMOL/L (ref 5–15)
BASOPHILS # BLD: 0 K/UL (ref 0–0.1)
BASOPHILS NFR BLD: 0 % (ref 0–1)
BUN SERPL-MCNC: 10 MG/DL (ref 6–20)
BUN SERPL-MCNC: 11 MG/DL (ref 6–20)
BUN/CREAT SERPL: 11 (ref 12–20)
BUN/CREAT SERPL: 11 (ref 12–20)
CALCIUM SERPL-MCNC: 8.6 MG/DL (ref 8.5–10.1)
CALCIUM SERPL-MCNC: 8.7 MG/DL (ref 8.5–10.1)
CHLORIDE SERPL-SCNC: 96 MMOL/L (ref 97–108)
CHLORIDE SERPL-SCNC: 97 MMOL/L (ref 97–108)
CO2 SERPL-SCNC: 25 MMOL/L (ref 21–32)
CO2 SERPL-SCNC: 25 MMOL/L (ref 21–32)
CREAT SERPL-MCNC: 0.9 MG/DL (ref 0.7–1.3)
CREAT SERPL-MCNC: 0.99 MG/DL (ref 0.7–1.3)
DIFFERENTIAL METHOD BLD: ABNORMAL
EOSINOPHIL # BLD: 0 K/UL (ref 0–0.4)
EOSINOPHIL NFR BLD: 0 % (ref 0–7)
ERYTHROCYTE [DISTWIDTH] IN BLOOD BY AUTOMATED COUNT: 15.9 % (ref 11.5–14.5)
GLUCOSE BLD STRIP.AUTO-MCNC: 134 MG/DL (ref 65–117)
GLUCOSE BLD STRIP.AUTO-MCNC: 86 MG/DL (ref 65–117)
GLUCOSE BLD STRIP.AUTO-MCNC: 88 MG/DL (ref 65–117)
GLUCOSE BLD STRIP.AUTO-MCNC: 94 MG/DL (ref 65–117)
GLUCOSE SERPL-MCNC: 113 MG/DL (ref 65–100)
GLUCOSE SERPL-MCNC: 75 MG/DL (ref 65–100)
HCT VFR BLD AUTO: 28.6 % (ref 36.6–50.3)
HGB BLD-MCNC: 9 G/DL (ref 12.1–17)
IMM GRANULOCYTES # BLD AUTO: 0 K/UL (ref 0–0.04)
IMM GRANULOCYTES NFR BLD AUTO: 0 % (ref 0–0.5)
LYMPHOCYTES # BLD: 131.9 K/UL (ref 0.8–3.5)
LYMPHOCYTES NFR BLD: 96 % (ref 12–49)
MCH RBC QN AUTO: 34.7 PG (ref 26–34)
MCHC RBC AUTO-ENTMCNC: 31.5 G/DL (ref 30–36.5)
MCV RBC AUTO: 110.4 FL (ref 80–99)
MONOCYTES # BLD: 2.8 K/UL (ref 0–1)
MONOCYTES NFR BLD: 2 % (ref 5–13)
NEUTS SEG # BLD: 2.8 K/UL (ref 1.8–8)
NEUTS SEG NFR BLD: 2 % (ref 32–75)
NRBC # BLD: 0 K/UL (ref 0–0.01)
NRBC BLD-RTO: 0 PER 100 WBC
PLATELET # BLD AUTO: 125 K/UL (ref 150–400)
PMV BLD AUTO: 10.5 FL (ref 8.9–12.9)
POTASSIUM SERPL-SCNC: 4.1 MMOL/L (ref 3.5–5.1)
POTASSIUM SERPL-SCNC: 4.4 MMOL/L (ref 3.5–5.1)
RBC # BLD AUTO: 2.59 M/UL (ref 4.1–5.7)
RBC MORPH BLD: ABNORMAL
SERVICE CMNT-IMP: ABNORMAL
SERVICE CMNT-IMP: NORMAL
SODIUM SERPL-SCNC: 127 MMOL/L (ref 136–145)
SODIUM SERPL-SCNC: 128 MMOL/L (ref 136–145)
WBC # BLD AUTO: 137.5 K/UL (ref 4.1–11.1)
WBC MORPH BLD: ABNORMAL

## 2022-10-13 PROCEDURE — 80048 BASIC METABOLIC PNL TOTAL CA: CPT

## 2022-10-13 PROCEDURE — 97530 THERAPEUTIC ACTIVITIES: CPT | Performed by: OCCUPATIONAL THERAPIST

## 2022-10-13 PROCEDURE — 74011250636 HC RX REV CODE- 250/636: Performed by: STUDENT IN AN ORGANIZED HEALTH CARE EDUCATION/TRAINING PROGRAM

## 2022-10-13 PROCEDURE — 74011250636 HC RX REV CODE- 250/636: Performed by: INTERNAL MEDICINE

## 2022-10-13 PROCEDURE — 85025 COMPLETE CBC W/AUTO DIFF WBC: CPT

## 2022-10-13 PROCEDURE — 97530 THERAPEUTIC ACTIVITIES: CPT

## 2022-10-13 PROCEDURE — 36415 COLL VENOUS BLD VENIPUNCTURE: CPT

## 2022-10-13 PROCEDURE — 97162 PT EVAL MOD COMPLEX 30 MIN: CPT

## 2022-10-13 PROCEDURE — 74011000250 HC RX REV CODE- 250: Performed by: STUDENT IN AN ORGANIZED HEALTH CARE EDUCATION/TRAINING PROGRAM

## 2022-10-13 PROCEDURE — 65270000046 HC RM TELEMETRY

## 2022-10-13 PROCEDURE — 97166 OT EVAL MOD COMPLEX 45 MIN: CPT | Performed by: OCCUPATIONAL THERAPIST

## 2022-10-13 PROCEDURE — 82962 GLUCOSE BLOOD TEST: CPT

## 2022-10-13 PROCEDURE — 97535 SELF CARE MNGMENT TRAINING: CPT | Performed by: OCCUPATIONAL THERAPIST

## 2022-10-13 PROCEDURE — 74011250637 HC RX REV CODE- 250/637: Performed by: STUDENT IN AN ORGANIZED HEALTH CARE EDUCATION/TRAINING PROGRAM

## 2022-10-13 RX ORDER — SODIUM CHLORIDE 9 MG/ML
75 INJECTION, SOLUTION INTRAVENOUS CONTINUOUS
Status: DISCONTINUED | OUTPATIENT
Start: 2022-10-13 | End: 2022-10-13

## 2022-10-13 RX ADMIN — ENOXAPARIN SODIUM 40 MG: 100 INJECTION SUBCUTANEOUS at 09:14

## 2022-10-13 RX ADMIN — ALLOPURINOL 150 MG: 300 TABLET ORAL at 10:11

## 2022-10-13 RX ADMIN — SODIUM CHLORIDE, PRESERVATIVE FREE 10 ML: 5 INJECTION INTRAVENOUS at 14:00

## 2022-10-13 RX ADMIN — ATORVASTATIN CALCIUM 20 MG: 20 TABLET, FILM COATED ORAL at 21:55

## 2022-10-13 RX ADMIN — CHOLECALCIFEROL TAB 25 MCG (1000 UNIT) 1000 UNITS: 25 TAB at 09:13

## 2022-10-13 RX ADMIN — SODIUM CHLORIDE, PRESERVATIVE FREE 10 ML: 5 INJECTION INTRAVENOUS at 09:18

## 2022-10-13 RX ADMIN — SODIUM CHLORIDE, PRESERVATIVE FREE 10 ML: 5 INJECTION INTRAVENOUS at 21:55

## 2022-10-13 RX ADMIN — SODIUM CHLORIDE 75 ML/HR: 9 INJECTION, SOLUTION INTRAVENOUS at 10:12

## 2022-10-13 RX ADMIN — BRIMONIDINE TARTRATE 1 DROP: 2 SOLUTION OPHTHALMIC at 09:24

## 2022-10-13 RX ADMIN — ASPIRIN 81 MG CHEWABLE TABLET 81 MG: 81 TABLET CHEWABLE at 09:13

## 2022-10-13 NOTE — PROGRESS NOTES
-Hematology / Oncology ([facility]) -  -Primary Oncologist- Dr. Taylor Pastor  -CC-\"I am tired, Dr. Marcos Georges"    -S-  Still holding in ER after 2 nights, didn't rest well ON. No pain reported. Family feel he his more weak today and associate it with his Na dropping a few points. -O-    Patient Vitals for the past 24 hrs:   Temp Pulse Resp BP SpO2   10/13/22 1054 -- (!) 58 -- (!) 132/49 97 %   10/13/22 1043 -- -- -- (!) 136/49 --   10/13/22 0800 97.8 °F (36.6 °C) (!) 52 16 (!) 154/49 97 %   10/13/22 0400 98.3 °F (36.8 °C) (!) 51 16 (!) 159/51 96 %   10/13/22 0000 98.1 °F (36.7 °C) (!) 51 -- (!) 137/57 96 %   10/12/22 2022 98 °F (36.7 °C) (!) 57 21 (!) 134/51 96 %   10/12/22 1802 -- -- -- (!) 130/52 --   10/12/22 1600 -- (!) 56 -- -- --   10/12/22 1555 98.1 °F (36.7 °C) (!) 54 16 (!) 127/53 98 %   10/12/22 1355 97.8 °F (36.6 °C) (!) 54 17 (!) 114/59 97 %     No intake/output data recorded. Gen: nad, frail appearing in bed  Chest: bilateral breath sounds present  Cardiac: rrr  Abd: s/nt    -Labs-    Recent Labs     10/13/22  0438 10/12/22  0527 10/12/22  0406 10/11/22  1834   .5*  --  143.0* 207.6*   HGB 9.0*  --  8.9* 9.9*   *  --  121* 147*   ANEU 2.8  --  7.2 6.2   * 130*  --  126*   K 4.1 4.6  --  4.8   GLU 75 80  --  104*   BUN 10 13  --  17   CREA 0.90 0.94  --  1.14   CA 8.7 8.5  --  9.2   MG  --   --   --  2.1       -Imaging-       -Assessment + Plan-     *) CLL:  - On low low dose acalabrutinib 100mg every other day. Last dose Saturday. Will hold for now and consider switching to Zanubrutinib (baby dose) once/if improved after DC.  - FU with me after DC     *) Hyponatremia:  - Admission in July Nephrology felt it was likely hypovolemic hyponatremia, he initially improved with IV hydration here, Na down a touch to 127 this am.   - Low Na not on SE profile for acalabrutnib.   -Nephrology consult pending       *)Bradycardia:  - Pulse in 50's     *) Fatigue, FTT:  - Hold acalabrutinib

## 2022-10-13 NOTE — PROGRESS NOTES
Problem: Mobility Impaired (Adult and Pediatric)  Goal: *Acute Goals and Plan of Care (Insert Text)  Description: FUNCTIONAL STATUS PRIOR TO ADMISSION: Pt has caregivers 9-1 pm daily, daughter is present and reports cameras on the stairs in the home ; after 1 pm, pt is alone with daughter who works as a nurse and checks in ; pt does not drive ; pt was amb. With cane and \"nothing at all sometimes\" per daughter report    HOME SUPPORT PRIOR TO ADMISSION: Pt lives alone with caregivers 9-1 pm daily and daughter that works but checks in    [address]  Initiated 10/13/2022  1. Patient will move from supine to sit and sit to supine , scoot up and down, and roll side to side in bed with minimal assistance/contact guard assist within 7 day(s). 2.  Patient will transfer from bed to chair and chair to bed with minimal assistance/contact guard assist using the least restrictive device within 7 day(s). 3.  Patient will perform sit to stand with minimal assistance/contact guard assist within 7 day(s). 4.  Patient will ambulate with minimal assistance/contact guard assist for 50 feet with the least restrictive device within 7 day(s). Outcome: Progressing Towards Goal     PHYSICAL THERAPY EVALUATION  Patient: Mario Lobo (68 y.o. male)  Date: 10/13/2022  Primary Diagnosis: Hyponatremia [E87.1]       Precautions:   Bed Alarm, Fall      ASSESSMENT  Nurse clears pt for mobility. Based on the objective data described below, the patient presents with significant weakness, impaired standing balance and limited gait (lateral side steps toward HOB only). Pt with posterior lean/LOB in sitting and standing today at bedside. Anticipate rehab. Process at discharge and likely increased caregiver support in the home after rehab. Process given medical diagnoses and advanced age (currently caregiver only assisting with IADL's). Unsure if pt able to tolerate aggressive rehab. Process (pending progression).   Continue to follow. Patient Vitals for the past 4 hrs:   Temp Pulse Resp BP SpO2   10/13/22 1114 98.1 °F (36.7 °C) (!) 58 15 (!) 123/45 97 %   10/13/22 1054 -- (!) 58 -- (!) 132/49 97 %   10/13/22 1043 -- -- -- (!) 136/49 --          Current Level of Function Impacting Discharge (mobility/balance): bed mob. Max. ; transfers mod. X 2 ; gait max. X 2    Functional Outcome Measure: The patient scored Total Score: 3/28 on the Tinetti outcome measure which is indicative of high fall risk. Other factors to consider for discharge: pt has supportive daughter     Patient will benefit from skilled therapy intervention to address the above noted impairments. PLAN :  Recommendations and Planned Interventions: bed mobility training, transfer training, gait training, therapeutic exercises, patient and family training/education, and therapeutic activities      Frequency/Duration: Patient will be followed by physical therapy:  4 times a week to address goals. Recommendation for discharge: (in order for the patient to meet his/her long term goals)  Rehab. (Daughter wants [facility] pursued as discharge plan)    This discharge recommendation:  Has been made in collaboration with the attending provider and/or case management    IF patient discharges home will need the following DME: to be determined (TBD)         SUBJECTIVE:   Patient stated I used to do it on my own.     OBJECTIVE DATA SUMMARY:   HISTORY:    Past Medical History:   Diagnosis Date    Abnormal nuclear stress test 6/6/2014    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 ([facility] [address] 11/2/2015    Bereavement 02/06/2019    ltcf - uti -strangulated hernia - prostate ca - brother    Bronchiectasis ([facility] [address]     CAD (coronary artery disease)     Chest pain, unspecified     Chronic pain     right leg    CLL (chronic lymphocytic leukemia) ([facility] [address]     Jerica Segura md  [facility]    Diabetes ([facility] [address]     Essential hypertension     GERD (gastroesophageal reflux disease) Hyperlipidemia     Hypertension     Opacity of lung on imaging study 05/28/2017    cxr    Rotator cuff arthropathy     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    S/P coronary artery stent placement 6/6/2014 6/6/14 PCI/GUANAKO to prox LAD     Past Surgical History:   Procedure Laterality Date    HX COLONOSCOPY      HX HEART CATHETERIZATION      PTCA SINGLE VESSEL  4/4/2015         VASCULAR SURGERY PROCEDURE UNLIST  2014 and 2015    BLE stenting       Personal factors and/or comorbidities impacting plan of care:     Home Situation  Home Environment: Private residence  # Steps to Enter: 0  One/Two Story Residence: Two story  # of Interior Steps:  (stair lift)  Living Alone: Yes  Support Systems: Caregiver/Home Care Staff (M-F 9-1, daughter is also checks on pt on weekends, have cameras at top and bottom of steps for supervision)  Patient Expects to be Discharged to[de-identified] Home  Current DME Used/Available at Home: Matty Dy, straight (life alert, standard walker)  Tub or Shower Type: Shower    EXAMINATION/PRESENTATION/DECISION MAKING:   Critical Behavior:  Neurologic State: Alert  Orientation Level: Oriented X4  Cognition: Follows commands        Skin:  Intact  Edema: none  Range Of Motion:  AROM: Generally decreased, functional           PROM: Generally decreased, functional           Strength:    Strength: Generally decreased, functional                    Tone & Sensation:   Tone: Normal              Sensation: Intact               Coordination:  Coordination: Generally decreased, functional    Functional Mobility:  Bed Mobility:     Supine to Sit: Maximum assistance;Assist x2  Sit to Supine: Moderate assistance  Scooting: Maximum assistance  Transfers:  Sit to Stand: Moderate assistance;Assist x2  Stand to Sit: Moderate assistance;Assist x2                       Balance:   Sitting: Impaired  Sitting - Static: Poor (constant support); Fair (occasional) (initially poor and then with time, fair (posterior lean/LOB))  Standing: Impaired; With support  Standing - Static: Constant support;Poor (B HHA)  Standing - Dynamic : Constant support;Poor (B HHA)    Ambulation/Gait Training:  Distance (ft): 3 Feet (ft) (lateral side-steps toward [facility])  Assistive Device: Gait belt; Other (comment) (B HHA)  Ambulation - Level of Assistance: Maximum assistance;Assist x2     Gait Description (WDL): Exceptions to WDL  Gait Abnormalities: Decreased step clearance; Step to gait; Other (B knee flexion)        Base of Support: Widened     Speed/Yani: Slow  Step Length: Left shortened;Right shortened        Interventions: Verbal cues; Tactile cues; Safety awareness training       Functional Measure:  Tinetti test:    Sitting Balance: 0  Arises: 0  Attempts to Rise: 0  Immediate Standing Balance: 0  Standing Balance: 0  Nudged: 0  Eyes Closed: 0  Turn 360 Degrees - Continuous/Discontinuous: 0  Turn 360 Degrees - Steady/Unsteady: 0  Sitting Down: 1  Balance Score: 1 Balance total score  Indication of Gait: 0  R Step Length/Height: 0  L Step Length/Height: 0  R Foot Clearance: 0  L Foot Clearance: 0  Step Symmetry: 1  Step Continuity: 0  Path: 1  Trunk: 0  Walking Time: 0  Gait Score: 2 Gait total score  Total Score: 3/28 Overall total score         Tinetti Tool Score Risk of Falls  <19 = High Fall Risk  19-24 = Moderate Fall Risk  25-28 = Low Fall Risk  Tinetti ME. Performance-Oriented Assessment of Mobility Problems in Elderly Patients. Salazar 66; [id number].  (Scoring Description: PT Bulletin Feb. 10, 1993)    Older adults: Ngoc Robin et al, 2009; n = 1000 [facility] elderly evaluated with ABC, ROSALINO, ADL, and IADL)  · Mean ROSALINO score for males aged 69-68 years = 26.21(3.40)  · Mean ROSALINO score for females age 69-68 years = 25.16(4.30)  · Mean ROSALINO score for males over 80 years = 23.29(6.02)  · Mean ROSALINO score for females over 80 years = 17.20(8.32)        Physical Therapy Evaluation Charge Determination   History Examination Presentation Decision-Making   MEDIUM  Complexity : 1-2 comorbidities / personal factors will impact the outcome/ POC  MEDIUM Complexity : 3 Standardized tests and measures addressing body structure, function, activity limitation and / or participation in recreation  MEDIUM Complexity : Evolving with changing characteristics  Other outcome measures Tinetti  MEDIUM      Based on the above components, the patient evaluation is determined to be of the following complexity level: MEDIUM    Pain Rating:  No c/o pain    Activity Tolerance:   Fair      After treatment patient left in no apparent distress:   Supine in bed, Call bell within reach, Bed / chair alarm activated, Caregiver / family present, Side rails x 3, and nurse notified     COMMUNICATION/EDUCATION:   The patients plan of care was discussed with: Occupational therapist and Registered nurse. Fall prevention education was provided and the patient/caregiver indicated understanding., Patient/family have participated as able in goal setting and plan of care. , and Patient/family agree to work toward stated goals and plan of care.     Thank you for this referral.  Jany Gilmore, PT, DPT   Time Calculation: 25 mins

## 2022-10-13 NOTE — PROGRESS NOTES
Hospitalist Progress Note    NAME: Tammy David   :  10/4/1933   MRN:  [id number]       Assessment / Plan:  Weakness/malaise  Mild/moderate hyponatremia  Sodium level improving  Sodium level 127  Recheck tomorrow     CLL undergoing chemotherapy  Hold PTA acalabrutinib  Counts appear at baseline and are likely not contributing to current condition     Gout  Continue PTA allopurinol     CAD status post CABG  Essential hypertension  Continue PTA: Aspirin, atorvastatin, metoprolol     Non-insulin-dependent diabetes mellitus  Sliding scale corrective coverage insulin     BPH  Not currently on medication for this, monitor for urinary retention  If retaining would initiate tamsulosin     Glaucoma  Continue PTA eyedrops     GERD  Family reports patient not taking any scheduled medications, but will use Pepcid or Mylanta as needed  Monitor for symptoms and initiate therapy as indicated      18.5 - 24.9 Normal weight / Body mass index is 23.34 kg/m². Estimated discharge date:   Barriers:    Code status: Full  Prophylaxis: Lovenox  Recommended Disposition: Home w/Family     Subjective:     Chief Complaint / Reason for Physician Visit  \"\". Discussed with RN events overnight. PT OT evaluation ,patient will need placement     Review of Systems:  Symptom Y/N Comments  Symptom Y/N Comments   Fever/Chills n   Chest Pain n    Poor Appetite    Edema     Cough    Abdominal Pain     Sputum    Joint Pain     SOB/JETER    Pruritis/Rash     Nausea/vomit n   Tolerating PT/OT     Diarrhea    Tolerating Diet y    Constipation    Other       Could NOT obtain due to:      Objective:     VITALS:   Last 24hrs VS reviewed since prior progress note.  Most recent are:  Patient Vitals for the past 24 hrs:   Temp Pulse Resp BP SpO2   10/13/22 1114 98.1 °F (36.7 °C) (!) 58 15 (!) 123/45 97 %   10/13/22 1054 -- (!) 58 -- (!) 132/49 97 %   10/13/22 1043 -- -- -- (!) 136/49 --   10/13/22 0800 97.8 °F (36.6 °C) (!) 52 16 (!) 154/49 97 % 10/13/22 0400 98.3 °F (36.8 °C) (!) 51 16 (!) 159/51 96 %   10/13/22 0000 98.1 °F (36.7 °C) (!) 51 -- (!) 137/57 96 %   10/12/22 2022 98 °F (36.7 °C) (!) 57 21 (!) 134/51 96 %   10/12/22 1802 -- -- -- (!) 130/52 --   10/12/22 1600 -- (!) 56 -- -- --   10/12/22 1555 98.1 °F (36.7 °C) (!) 54 16 (!) 127/53 98 %       No intake or output data in the 24 hours ending 10/13/22 1430       I had a face to face encounter and independently examined this patient on 10/13/2022, as outlined below:  PHYSICAL EXAM:  General: WD, WN. Alert, cooperative, no acute distress    EENT:  EOMI. Anicteric sclerae. MMM  Resp:  CTA bilaterally, no wheezing or rales. No accessory muscle use  CV:  Regular  rhythm,  No edema  GI:  Soft, Non distended, Non tender. +Bowel sounds  Neurologic:  Alert and oriented X 3, normal speech,   Psych:   Good insight. Not anxious nor agitated  Skin:  No rashes. No jaundice    Reviewed most current lab test results and cultures  YES  Reviewed most current radiology test results   YES  Review and summation of old records today    NO  Reviewed patient's current orders and MAR    YES  PMH/SH reviewed - no change compared to H&P  ________________________________________________________________________  Care Plan discussed with:    Comments   Patient     Family  y    RN y    Care Manager     Consultant                        Multidiciplinary team rounds were held today with , nursing, pharmacist and clinical coordinator. Patient's plan of care was discussed; medications were reviewed and discharge planning was addressed.      ________________________________________________________________________  Total NON critical care TIME:  35   Minutes    Total CRITICAL CARE TIME Spent:   Minutes non procedure based      Comments   >50% of visit spent in counseling and coordination of care     ________________________________________________________________________  Hiral Salazar, MD     Procedures: see electronic medical records for all procedures/Xrays and details which were not copied into this note but were reviewed prior to creation of Plan. LABS:  I reviewed today's most current labs and imaging studies. Pertinent labs include:  Recent Labs     10/13/22  0438 10/12/22  0406 10/11/22  1834   .5* 143.0* 207.6*   HGB 9.0* 8.9* 9.9*   HCT 28.6* 28.8* 34.1*   * 121* 147*       Recent Labs     10/13/22  0438 10/12/22  0527 10/11/22  1834   * 130* 126*   K 4.1 4.6 4.8   CL 96* 99 94*   CO2 25 25 26   GLU 75 80 104*   BUN 10 13 17   CREA 0.90 0.94 1.14   CA 8.7 8.5 9.2   MG  --   --  2.1         Signed:  Hpoe Luis MD

## 2022-10-13 NOTE — PROGRESS NOTES
TRANSFER - OUT REPORT:    Verbal report given to [address] [place] [address] RN(name) on Anthony Jamil  being transferred to Silvino Deleon RN(unit) for routine progression of care       Report consisted of patients Situation, Background, Assessment and   Recommendations(SBAR). Information from the following report(s) SBAR, Kardex, Procedure Summary, Intake/Output, MAR, Accordion, Recent Results, Med Rec Status, and Cardiac Rhythm sinus kenneth  was reviewed with the receiving nurse. Lines:   Peripheral IV 10/11/22 Left Antecubital (Active)   Site Assessment Clean, dry, & intact 10/13/22 0400   Phlebitis Assessment 0 10/13/22 0400   Infiltration Assessment 0 10/13/22 0400   Dressing Status Clean, dry, & intact 10/13/22 0400   Dressing Type Tape;Transparent 10/13/22 0400   Hub Color/Line Status Pink;Flushed 10/13/22 0400   Action Taken Blood drawn 10/13/22 0400   Alcohol Cap Used No 10/13/22 0400        Opportunity for questions and clarification was provided.       Patient transported with:   VisiKard

## 2022-10-13 NOTE — PROGRESS NOTES
Transition of Care Plan:    RUR: 21% - High  Disposition: IPR - [facility] (referral pending)  Follow up appointments: PCP & specialists as indicated  DME needed: TBD by rehab - has bobby-walker, cane, life alert at home   *If pt discharges home, PT/OT recommending RW, Wheelchair, BSC, gait belt  Transportation at Discharge: [facility] or means to access home: Yes      IM Medicare Letter: Needed at d/c  Is patient a  and connected with the [facility]? N/a              If yes, was [employer] transfer form completed and VA notified? N/a  Caregiver Contact: Erika Osorio, DTR/mPOA1 ([phone])  Discharge Caregiver contacted prior to discharge? Yes  Care Conference needed?: No                 Reason for Admission:   Hyponatremia               RUR Score:  21%       PCP: First and Last name:  Jesús Montano MD     Name of Practice:   Are you a current patient: Yes/No: Yes   Approximate date of last visit: 9/28/22   Can you do a virtual visit with your PCP: In-person preferred             Resources/supports as identified by patient/family:  Supportive daughters/family, Caregiver 9:00-1:00 M-F                 Top Challenges facing patient (as identified by patient/family and CM): Finances/Medication cost?  Medicare A&B and supplement. Denies financial concerns at this time. Transportation? Family assists with transportation              Support system or lack thereof?  2 daughters, son-in-law, private caregiver                     Living arrangements? Lives alone in [facility] (stair lift)               Self-care/ADLs/Cognition? At baseline, Mod I to I with ADLs, needs assistance with some IADLs.  Currently below functional baseline          Current Advanced Directive/Advance Care Plan:  Full Code  Advance Care Planning   General Advance Care Planning (ACP) Conversation    Date of Conversation: 10/13/22  Conducted with: Patient with Decision Making Capacity and Healthcare Decision Maker: Named in Advance Directive or Healthcare Power of [address]:     Primary Decision Maker: Ken Emmanuel - Daughter - [phone]    Secondary Decision Maker: Kevan Armijo - Daughter - [phone]  Click here to complete [address] including selection of the Healthcare Decision Maker Relationship (ie \"Primary\")    Today we documented Decision Maker(s) consistent with ACP documents on file. Content/Action Overview: Has ACP document(s) on file - reflects the patient's care preferences  Reviewed DNR/DNI and patient elects Full Code (Attempt Resuscitation)  Length of Voluntary ACP Conversation in minutes:  <16 minutes (Non-Billable)  Hua Prabhakar     Payor Source Payor: VA MEDICARE / Plan: Cesario Rodrigues / Product Type: Medicare /                        Plan for utilizing home health:  PT/OT recommending rehab. Pt/family requesting [facility]. Referral submitted via [employer]. Transition of Care Plan:     - [facility] (referral pending)  - 2nd IM Letter  - Follow-up appointments  - BLS transport vs. Family    81 YO  Male admitted on 10/11/22 for Hyponatremia. . [facility] pt. Lives alone in [facility] house (0 THEA w/ chair lift). Family arranged for private caregiver M-F from 9:00AM-1:00PM that assists with housework, meals, IADLs. Pt just completed [facility] PT/SN and gotten to point of Mod I to I with mobility, ADLs not to include driving until last weekend. Has bobby-walker, cane, and life alert at home. Family arranged for video cameras at top/bottom of steps for supervision as well. Hx of HH ([facility], [facility]), IPR ([facility]). Sometimes pt will stay at daughter's home ([address]) if needed. Preferred Rx is [facility] ([address]). Has Medicare A&B and supplementary insurance. CM spoke with pt's Danitza Vazquez, [phone]) to verify demographics, complete assessment.  Reviewed PT/OT recommendations. FOC offered. Confirmed pt/family requesting IPR at [facility], as pt had IPR there after his CABG with a positive experience. CM provided education re: Acute vs. Sub-Acute Rehab as requested. Family's goal is for pt to get as close to baseline as possible again upon return home from rehab. Likely will need BLS transport at d/c. Referral submitted to [facility] via [employer], awaiting response at this time. CM will continue to remain available to assist with d/c planning needs          Care Management Interventions  PCP Verified by CM:  Yes  Palliative Care Criteria Met (RRAT>21 & CHF Dx)?: No  Mode of Transport at Discharge: BLS  Transition of Care Consult (CM Consult): Discharge Planning  Discharge Durable Medical Equipment: No (has RW, cane, life alert)  Health Maintenance Reviewed: Yes  Physical Therapy Consult: Yes  Occupational Therapy Consult: Yes  Speech Therapy Consult: No  Support Systems: Caregiver/Home Care Staff, Child(charmaine) (M-F 9-1, daughter is also checks on pt on weekends, have cameras at top and bottom of steps for supervision)  Confirm Follow Up Transport: Family  The Plan for Transition of Care is Related to the Following Treatment Goals : IPR  The Patient and/or Patient Representative was Provided with a Choice of Provider and Agrees with the Discharge Plan?: Yes  Name of the Patient Representative Who was Provided with a Choice of Provider and Agrees with the Discharge Plan: Jj Benson (pt), [address] ([place])  [place] of Choice List was Provided with Basic Dialogue that Supports the Patient's Individualized Plan of Care/Goals, Treatment Preferences and Shares the Quality Data Associated with the Providers?: Yes  Discharge Location  Patient Expects to be Discharged to[de-identified] Rehab hospital/unit acute    Kaity Hummel, [address] [facility]

## 2022-10-13 NOTE — PROGRESS NOTES
CM attempted to call patient's room phone x2 to complete initial assessment with no answer. CM called pt's [facility], [phone]) and left confidential VM requesting call back number for this evening and primary CM's contact tomorrow.      Ira Muse,  [facility]

## 2022-10-13 NOTE — CONSULTS
Consultation Note    NAME: Carlos Knutson   :  10/4/1933   MRN:  [id number]     Date/Time:  10/13/2022 2:33 PM    I have been asked to see this patient by Dr. Tracee Yarbrough  for advice/opinion re: hyponatremia. Assessment :    Plan:  Hyponatremia  Weakness  CLL  Gout  DM  HTN     He appears to be euvolemic. His sodium has not improved so far with IV NS. I will repeat sodium now. If no better, I will stop IVF and start a fluid restriction. May need to consider ure-na. ADDENDUM:  (4809)  - sodium about the same despite IV NS. I will stop and start a fluid restriction. Subjective:   CHIEF COMPLAINT:  \"I'm weak. \"    HISTORY OF PRESENT ILLNESS:     Wilmer Brodeirck is a 80 y.o.   male who has a history of CLL admitted with weakness and hyponatremia. Review of chart shows that he was here in July with hyponatremia. At that point I suspected hypovolemic hyponatremia and his sodium improved to 127 by discharge. His sodium has remained on the low side since. His dtr says that he has been trying to drink more water after his chemo. He denies any N/V/D. His dtr says that his food intake has been at baseline. His dtr says that the patient has become increasingly weak. He saw his PCP who did labs tht showed his sodium was low at 125 and he was sent to the Er. He received IV NS and sodium did improve to 130 but is now down to 127.     Past Medical History:   Diagnosis Date    Abnormal nuclear stress test 2014    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 ([facility] [address] 2015    Bereavement 2019    ltcf - uti -strangulated hernia - prostate ca - brother    Bronchiectasis ([facility] [address]     CAD (coronary artery disease)     Chest pain, unspecified     Chronic pain     right leg    CLL (chronic lymphocytic leukemia) ([facility] [address]     Jerica Segura md  [facility]    Diabetes ([facility] [address]     Essential hypertension     GERD (gastroesophageal reflux disease)     Hyperlipidemia     Hypertension     Opacity of lung on imaging study 2017    cxr    Rotator cuff arthropathy     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    S/P coronary artery stent placement 2014 PCI/GUANAKO to prox LAD      Past Surgical History:   Procedure Laterality Date    HX COLONOSCOPY      HX HEART CATHETERIZATION      PTCA SINGLE VESSEL  2015         VASCULAR SURGERY PROCEDURE UNLIST   and     BLE stenting     Social History     Tobacco Use    Smoking status: Former     Types: Cigarettes     Quit date: 1/15/1984     Years since quittin.7    Smokeless tobacco: Never    Tobacco comments:     QUIT    Substance Use Topics    Alcohol use: No     Alcohol/week: 0.0 standard drinks     Comment: quit in       Family History   Problem Relation Age of Onset    Diabetes Mother     Stroke Father       Allergies   Allergen Reactions    Codeine Shortness of Breath    Lipitor [Atorvastatin] Nausea Only      Prior to Admission medications    Medication Sig Start Date End Date Taking? Authorizing Provider   acalabrutinib 100 mg cap Take 100 mg by mouth daily. Taken every other day at night   Yes Provider, Historical   metoprolol tartrate (LOPRESSOR) 25 mg tablet TAKE 1 TABLET TWICE A DAY 22  Yes Carlos A Lentz MD   allopurinoL (ZYLOPRIM) 300 mg tablet TAKE ONE-HALF (1/2) TABLET DAILY 22  Yes Carlos A Lentz MD   glucose blood VI test strips (OneTouch Ultra Test) strip USE TO TEST BLOOD SUGAR ONCE DAILY. DX.E11.9 22  Yes Carlos A Lentz MD   cholecalciferol (VITAMIN D3) (1000 Units /25 mcg) tablet Take 1,000 Units by mouth daily. Yes Provider, Historical   alum-mag hydroxide-simeth (MYLANTA) 200-200-20 mg/5 mL susp Take 30 mL by mouth daily. Was taking daily while on acalabrutinib   Yes Provider, Historical   multivitamin with folic acid (ONE DAILY WITH FOLIC ACID) 041 mcg tab tablet Take 1 Tablet by mouth daily.    Yes Provider, Historical   ascorbic acid, vitamin C, (VITAMIN C) 500 mg tablet Take 500 mg by mouth daily. Yes Provider, Historical   simvastatin (ZOCOR) 40 mg tablet TAKE 1 TABLET DAILY IN THE EVENING 4/6/22  Yes Karma Smith MD   metFORMIN (GLUCOPHAGE) 500 mg tablet TAKE 1 TABLET DAILY 2/10/22  Yes Kyaw Mortensne MD   aspirin 81 mg chewable tablet Take 81 mg by mouth daily. Yes Provider, Historical   brimonidine (ALPHAGAN) 0.15 % ophthalmic solution Administer 1 Drop to both eyes two (2) times a day.  Indications: OPEN ANGLE GLAUCOMA   Yes Provider, Historical     REVIEW OF SYSTEMS:     [x]  Unable to obtain reliable ROS due to  [] mental status  [] sedated   [] intubated   [] Total of 12 systems reviewed as follows:  Constitutional: negative fever, negative chills, negative weight loss  Eyes:   negative visual changes  ENT:   negative sore throat, tongue or lip swelling  Respiratory:  negative cough, negative dyspnea  Cards:  negative for chest pain, palpitations, lower extremity edema  GI:   negative for nausea, vomiting, diarrhea, and abdominal pain  :  negative for frequency, dysuria  Integument:  negative for rash and pruritus  Heme:  negative for easy bruising and gum/nose bleeding  Musculoskel: negative for myalgias,  back pain and muscle weakness  Neuro:  negative for headaches, dizziness, vertigo  Psych:  negative for feelings of anxiety, depression   Travel?: none    Objective:   VITALS:    Visit Vitals  BP (!) 123/45   Pulse (!) 58   Temp 98.1 °F (36.7 °C)   Resp 15   Ht 5' 7\" (1.702 m)   Wt 67.6 kg (149 lb)   SpO2 97%   BMI 23.34 kg/m²     PHYSICAL EXAM:  Gen:  []  WD []  WN  [] cachectic []  thin []  obese []  disheveled             []  ill apearing  []   Critical  [x]   Chronic    [x]  No acute distress    HEENT:   [x] NC/AT/PERRL    [x] pink conjunctivae      [] pale conjunctivae                  PERRL  [] yes  [] no      [] moist mucosa    [] dry mucosa    hearing intact to voice [] yes  [] No                 NECK:   supple [x] yes  [] no        masses [] yes  [x] No thyroid  []  non tender  []  tender    RESP:   [x] CTA bilaterally/no wheezing/rhonchi/rales/crackles    [] rhonchi bilaterally - no dullness  [] wheezing   [] rhonchi   [] crackles     use of accessory muscles [] yes [] no    CARD:   [x]  regular rate and rhythm/No murmurs/rubs/gallops    murmur  [] yes ()  [] no      Rubs  [] yes  [] no       Gallops [] yes  [] no    Rate []  regular  []  irregular        carotid bruits  [] Right  []  Left                 LE edema [] yes  [x] no           JVP  []  yes   []  no    ABD:    [x] soft/non distended/non tender/+bowel sounds/no HSM    []  Rigid    tenderness [] yes [] no   Liver enlargement  []  yes []  no                Spleen enlargement  []  yes []  no     distended []  yes [] no     bowel sound  [] hypoactive   [] hyperactive    LYMPH:    Neck []  yes []  no       Axillae []  yes []  no    SKIN:   Rashes []  yes   []  no    Ulcers []  yes   []  no               [] tight to palpitation    skin turgor []  good  [] poor  [] decreased               Cyanosis/clubbing []  yes []  no    NEUR:   [x] cranial nerves II-XII grossly intact       [] Cranial nerves deficit                 []  facial droop    []  slurred speech   [] aphasic     [] Strength normal     []  weakness  []  LUE  []   RUE/ []  LLE  []   RLE    follows commands  [x]  yes []  no           PSYCH:   insight [x] fair [] good   Alert and Oriented to  [] person  [] place  []  time                    [] depressed [] anxious [] agitated  [] lethargic [] stuporous  [] sedated     LAB DATA REVIEWED:    Recent Labs     10/13/22  0438 10/12/22  0406   .5* 143.0*   HGB 9.0* 8.9*   HCT 28.6* 28.8*   * 121*     Recent Labs     10/13/22  0438 10/12/22  0527 10/11/22  1834   * 130* 126*   K 4.1 4.6 4.8   CL 96* 99 94*   CO2 25 25 26   BUN 10 13 17   CREA 0.90 0.94 1.14   GLU 75 80 104*   CA 8.7 8.5 9.2   MG  --   --  2.1     No results for input(s): ALT, AP, TBIL, TBILI, ALB, GLOB, GGT, AML, LPSE in the last 72 hours. No lab exists for component: SGOT, GPT, AMYP, HLPSE  No results for input(s): INR, PTP, APTT, INREXT in the last 72 hours. No results for input(s): FE, TIBC, PSAT, FERR in the last 72 hours. No results for input(s): PH, PCO2, PO2 in the last 72 hours. No results for input(s): CPK, CKMB in the last 72 hours.     No lab exists for component: TROPONINI  Lab Results   Component Value Date/Time    Glucose (POC) 134 (H) 10/13/2022 11:29 AM    Glucose (POC) 88 10/13/2022 07:59 AM    Glucose (POC) 83 10/12/2022 09:12 PM    Glucose (POC) 100 10/12/2022 04:22 PM    Glucose (POC) 90 10/12/2022 02:05 PM       Procedures: see electronic medical records for all procedures/Xrays and details which were not copied into this note but were reviewed prior to creation of Plan.    ________________________________________________________________________       ___________________________________________________  Consulting Physician: Felix Carlin MD

## 2022-10-13 NOTE — PROGRESS NOTES
Bedside and Verbal shift change report given to Jon Perez RN (oncoming nurse) by Miguel Lopez RN (offgoing nurse). Report included the following information SBAR, Kardex, Intake/Output, MAR, and Recent Results. Pt arrived to the unit after 3pm. He was assessed and assisted with hygiene care. No significant concerns to report.     [address] [facility] [address]

## 2022-10-13 NOTE — PROGRESS NOTES
Problem: Self Care Deficits Care Plan (Adult)  Goal: *Acute Goals and Plan of Care (Insert Text)  Description: FUNCTIONAL STATUS PRIOR TO ADMISSION: recently signed off of [facility] services, had progressed to ambulating without assist devices, performed ADLs on his own and stood in shower to bathe, has a paid caregiver 9-1 M-F to assist with IADLs, daughter works as a nurse and checks on pt on the days she is off    [address]: The patient lived alone with paid caregiver and family to provide assist.    Occupational Therapy Goals:  Initiated 10/13/2022  1. Patient will perform grooming standing with contact guard assist within 7 days. 2. Patient will perform dressing with minimal assistance within 7 days. 3. Patient will perform toileting with minimal assistance within 7 days. 4. Patient will transfer from toilet with minimal assistance using the least restrictive device and appropriate durable medical equipment within 7 days. Outcome: Not Met   OCCUPATIONAL THERAPY EVALUATION  Patient: Shannon Martel (72 y.o. male)  Date: 10/13/2022  Primary Diagnosis: Hyponatremia [E87.1]       Precautions:   Bed Alarm, Fall    ASSESSMENT  Based on the objective data described below, the patient presents with flat affect but was very cooperative. Daughter and son in law were present and supportive. Pt was able to mobilize and perform ADLs on his own prior to onset of most recent weakness. BUE are functional but pt has trunk and LE weakness. Nearly constant support needed for balance seated edge of bed without back support. This did improve over time with balance challenges. Pt tends to keep his head flexed forward. Moderate assist x2 needed for sit to stand and max assist x2 side stepping head of bed with flexed knees. All vitals were stable. Pt is far from his baseline and would benefit from rehab at discharge. If this cannot be arranged pt will need significant assist at home.     Current Level of Function Impacting Discharge (ADLs/self-care): moderate/max assist bed mobility,      Feeding: Setup    Oral Facial Hygiene/Grooming: Setup (needs back support)    Bathing: Maximum assistance      Upper Body Dressing: Moderate assistance    Lower Body Dressing: Maximum assistance    Toileting: Maximum assistance    Functional Outcome Measure: The patient scored 20/100 on the barthel outcome measure which is indicative of significant decline in mobility and ADLS. Other factors to consider for discharge: two assist for attempt at sit to standing, high fall risk, doesn't have 24/7 two physical assist, far from most recent baseline     Patient will benefit from skilled therapy intervention to address the above noted impairments. PLAN :  Recommendations and Planned Interventions: self care training, functional mobility training, therapeutic exercise, balance training, therapeutic activities, patient education, home safety training, and family training/education    Frequency/Duration: Patient will be followed by occupational therapy 4 times a week to address goals. Recommendation for discharge: (in order for the patient to meet his/her long term goals)  Rehab (daughter is requesting [facility])    This discharge recommendation:  Has not yet been discussed the attending provider and/or case management    IF patient discharges home will need the following DME: rolling walker, wheelchair, bedside commode, gait belt       SUBJECTIVE:   Patient stated I am ok.     OBJECTIVE DATA SUMMARY:   HISTORY:   Past Medical History:   Diagnosis Date    Abnormal nuclear stress test 6/6/2014    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 ([facility] [address] 11/2/2015    Bereavement 02/06/2019    ltcf - uti -strangulated hernia - prostate ca - brother    Bronchiectasis ([facility] [address]     CAD (coronary artery disease)     Chest pain, unspecified     Chronic pain     right leg    CLL (chronic lymphocytic leukemia) ([address] [place]  [facility]    Diabetes [facility])     Essential hypertension     GERD (gastroesophageal reflux disease)     Hyperlipidemia     Hypertension     Opacity of lung on imaging study 05/28/2017    cxr    Rotator cuff arthropathy     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    S/P coronary artery stent placement 6/6/2014 6/6/14 PCI/GUANAKO to prox LAD     Past Surgical History:   Procedure Laterality Date    HX COLONOSCOPY      HX HEART CATHETERIZATION      PTCA SINGLE VESSEL  4/4/2015         VASCULAR SURGERY PROCEDURE UNLIST  2014 and 2015    BLE stenting       Expanded or extensive additional review of patient history:     Home Situation  Home Environment: Private residence  # Steps to Enter: 0  One/Two Story Residence: Two story  # of Interior Steps:  (stair lift)  Living Alone: Yes  Support Systems: Caregiver/Home Care Staff (M-F 9-1, daughter is also checks on pt on weekends, have cameras at top and bottom of steps for supervision)  Patient Expects to be Discharged to[de-identified] Home  Current DME Used/Available at Home: Matty Dy, straight (life alert, standard walker)  Tub or Shower Type: Shower    Hand dominance: Right    EXAMINATION OF PERFORMANCE DEFICITS:  Cognitive/Behavioral Status:  Neurologic State: Alert  Orientation Level: Oriented X4  Cognition: Decreased attention/concentration; Follows commands  Perception: Cues to maintain midline in standing;Cues to maintain midline in sitting; Tactile;Verbal;Visual (manual)  Perseveration: No perseveration noted  Safety/Judgement: Fall prevention        Hearing:   intact    Vision/Perceptual:                                Corrective Lenses: Reading glasses    Range of Motion:    AROM: Generally decreased, functional  PROM: Generally decreased, functional                      Strength:    Strength: Generally decreased, functional                Coordination:  Coordination: Generally decreased, functional  Fine Motor Skills-Upper: Left Intact; Right Intact    Gross Motor Skills-Upper: Left Intact; Right Intact    Tone & Sensation:    Tone: Normal  Sensation: Intact                      Balance:  Sitting: Impaired  Sitting - Static: Poor (constant support); Fair (occasional) (initially poor and then with time, fair (posterior lean/LOB))  Standing: Impaired; With support  Standing - Static: Constant support;Poor (B HHA)  Standing - Dynamic : Constant support;Poor (B HHA)    Functional Mobility and Transfers for ADLs:  Bed Mobility:  Supine to Sit: Maximum assistance;Assist x2  Sit to Supine: Moderate assistance  Scooting: Maximum assistance    Transfers:  Sit to Stand: Moderate assistance;Assist x2  Stand to Sit: Moderate assistance;Assist x2  Bed to Chair: Moderate assistance;Maximum assistance; Additional time;Assist x2  Bathroom Mobility:  (unable)  Toilet Transfer : Moderate assistance;Maximum assistance; Additional time;Assist x2 (flexed posture)    ADL Assessment:  Feeding: Setup    Oral Facial Hygiene/Grooming: Setup (needs back support)    Bathing: Maximum assistance      Upper Body Dressing: Moderate assistance    Lower Body Dressing: Maximum assistance    Toileting: Maximum assistance                  ADL Intervention and task modifications:     Max assist socks    Cognitive Retraining  Safety/Judgement: Fall prevention      Therapeutic Exercise:  Seated edge of bed static midline sitting without back support holding head in neutral    Seated edge of bed reaching over head with one arm at at time progressing to reaching above head and looking up. Seated edge of bed reaching for feet and coming to upright midline sitting    Functional Measure:    Barthel Index:  Bathin  Bladder: 5  Bowels: 5  Groomin  Dressin  Feedin  Mobility: 0  Stairs: 0  Toilet Use: 0  Transfer (Bed to Chair and Back): 5  Total: 20/100      The Barthel ADL Index: Guidelines  1. The index should be used as a record of what a patient does, not as a record of what a patient could do.   2. The main aim is to establish degree of independence from any help, physical or verbal, however minor and for whatever reason. 3. The need for supervision renders the patient not independent. 4. A patient's performance should be established using the best available evidence. Asking the patient, friends/relatives and nurses are the usual sources, but direct observation and common sense are also important. However direct testing is not needed. 5. Usually the patient's performance over the preceding 24-48 hours is important, but occasionally longer periods will be relevant. 6. Middle categories imply that the patient supplies over 50 per cent of the effort. 7. Use of aids to be independent is allowed. Score Interpretation (from [address] [facility] [address])    Independent   60-79 Minimally independent   40-59 Partially dependent   20-39 Very dependent   <20 Totally dependent     -[employer]., Barthel, DStephanieW. (1965). Functional evaluation: the Barthel Index. [facility] ([address] (1997). The Barthel activities of daily living index: self-reporting versus actual performance in the old (> or = 75 years). Journal of [address] [place] [address] 45(7), 14 [facility], J..M.F, Ajay Ro., [place]. (1999). Measuring the change in disability after inpatient rehabilitation; comparison of the responsiveness of the Barthel Index and Functional [place] Measure. Journal of Neurology, Neurosurgery, and Psychiatry, 66(4), 041-081. Rodo Calvin, N.J.A, VICKI He, & Jina Montgomery M.A. (2004) Assessment of post-stroke quality of life in cost-effectiveness studies: The usefulness of the Barthel Index and the EuroQoL-5D.  Quality of Life Research, 15, 502-39         Occupational Therapy Evaluation Charge Determination   History Examination Decision-Making   MEDIUM Complexity : Expanded review of history including physical, cognitive and psychosocial  history  MEDIUM Complexity : 3-5 performance deficits relating to physical, cognitive , or psychosocial skils that result in activity limitations and / or participation restrictions MEDIUM Complexity : Patient may present with comorbidities that affect occupational performnce. Miniml to moderate modification of tasks or assistance (eg, physical or verbal ) with assesment(s) is necessary to enable patient to complete evaluation       Based on the above components, the patient evaluation is determined to be of the following complexity level: MEDIUM  Pain Ratin/10    Activity Tolerance:   Fair and requires rest breaks    After treatment patient left in no apparent distress:    Supine in bed, Call bell within reach, Bed / chair alarm activated, Caregiver / family present, and Side rails x 3    COMMUNICATION/EDUCATION:   The patients plan of care was discussed with: Physical therapist, Registered nurse, and patient and his daughter and son in law . Home safety education was provided and the patient/caregiver indicated understanding., Patient/family have participated as able in goal setting and plan of care. , and Patient/family agree to work toward stated goals and plan of care. This patients plan of care is appropriate for delegation to [facility].     Thank you for this referral.  Obdulia Broderick OTR/L  Time Calculation: 26 mins

## 2022-10-14 LAB
ANION GAP SERPL CALC-SCNC: 6 MMOL/L (ref 5–15)
BUN SERPL-MCNC: 11 MG/DL (ref 6–20)
BUN/CREAT SERPL: 12 (ref 12–20)
CALCIUM SERPL-MCNC: 8.4 MG/DL (ref 8.5–10.1)
CHLORIDE SERPL-SCNC: 97 MMOL/L (ref 97–108)
CO2 SERPL-SCNC: 25 MMOL/L (ref 21–32)
COVID-19 RAPID TEST, COVR: NOT DETECTED
CREAT SERPL-MCNC: 0.95 MG/DL (ref 0.7–1.3)
GLUCOSE BLD STRIP.AUTO-MCNC: 110 MG/DL (ref 65–117)
GLUCOSE BLD STRIP.AUTO-MCNC: 144 MG/DL (ref 65–117)
GLUCOSE BLD STRIP.AUTO-MCNC: 82 MG/DL (ref 65–117)
GLUCOSE BLD STRIP.AUTO-MCNC: 92 MG/DL (ref 65–117)
GLUCOSE SERPL-MCNC: 80 MG/DL (ref 65–100)
POTASSIUM SERPL-SCNC: 4.8 MMOL/L (ref 3.5–5.1)
SARS-COV-2, COV2: NORMAL
SERVICE CMNT-IMP: ABNORMAL
SERVICE CMNT-IMP: NORMAL
SODIUM SERPL-SCNC: 128 MMOL/L (ref 136–145)
SOURCE, COVRS: NORMAL

## 2022-10-14 PROCEDURE — 87635 SARS-COV-2 COVID-19 AMP PRB: CPT

## 2022-10-14 PROCEDURE — 74011250637 HC RX REV CODE- 250/637: Performed by: STUDENT IN AN ORGANIZED HEALTH CARE EDUCATION/TRAINING PROGRAM

## 2022-10-14 PROCEDURE — 74011636637 HC RX REV CODE- 636/637: Performed by: STUDENT IN AN ORGANIZED HEALTH CARE EDUCATION/TRAINING PROGRAM

## 2022-10-14 PROCEDURE — 36415 COLL VENOUS BLD VENIPUNCTURE: CPT

## 2022-10-14 PROCEDURE — 80048 BASIC METABOLIC PNL TOTAL CA: CPT

## 2022-10-14 PROCEDURE — 97535 SELF CARE MNGMENT TRAINING: CPT

## 2022-10-14 PROCEDURE — 65270000046 HC RM TELEMETRY

## 2022-10-14 PROCEDURE — 74011000250 HC RX REV CODE- 250: Performed by: STUDENT IN AN ORGANIZED HEALTH CARE EDUCATION/TRAINING PROGRAM

## 2022-10-14 PROCEDURE — 74011250637 HC RX REV CODE- 250/637: Performed by: INTERNAL MEDICINE

## 2022-10-14 PROCEDURE — 74011250636 HC RX REV CODE- 250/636: Performed by: STUDENT IN AN ORGANIZED HEALTH CARE EDUCATION/TRAINING PROGRAM

## 2022-10-14 PROCEDURE — 97530 THERAPEUTIC ACTIVITIES: CPT

## 2022-10-14 PROCEDURE — 82962 GLUCOSE BLOOD TEST: CPT

## 2022-10-14 RX ADMIN — BRIMONIDINE TARTRATE 1 DROP: 2 SOLUTION OPHTHALMIC at 08:10

## 2022-10-14 RX ADMIN — ALLOPURINOL 150 MG: 300 TABLET ORAL at 08:10

## 2022-10-14 RX ADMIN — ENOXAPARIN SODIUM 40 MG: 100 INJECTION SUBCUTANEOUS at 08:11

## 2022-10-14 RX ADMIN — SODIUM CHLORIDE, PRESERVATIVE FREE 10 ML: 5 INJECTION INTRAVENOUS at 22:10

## 2022-10-14 RX ADMIN — METOPROLOL TARTRATE 25 MG: 25 TABLET, FILM COATED ORAL at 08:06

## 2022-10-14 RX ADMIN — Medication 1 PACKET: at 14:58

## 2022-10-14 RX ADMIN — SODIUM CHLORIDE, PRESERVATIVE FREE 10 ML: 5 INJECTION INTRAVENOUS at 21:15

## 2022-10-14 RX ADMIN — SODIUM CHLORIDE, PRESERVATIVE FREE 10 ML: 5 INJECTION INTRAVENOUS at 06:47

## 2022-10-14 RX ADMIN — ASPIRIN 81 MG CHEWABLE TABLET 81 MG: 81 TABLET CHEWABLE at 08:05

## 2022-10-14 RX ADMIN — BRIMONIDINE TARTRATE 1 DROP: 2 SOLUTION OPHTHALMIC at 18:34

## 2022-10-14 RX ADMIN — METOPROLOL TARTRATE 25 MG: 25 TABLET, FILM COATED ORAL at 18:31

## 2022-10-14 RX ADMIN — CHOLECALCIFEROL TAB 25 MCG (1000 UNIT) 1000 UNITS: 25 TAB at 08:09

## 2022-10-14 RX ADMIN — ATORVASTATIN CALCIUM 20 MG: 20 TABLET, FILM COATED ORAL at 21:14

## 2022-10-14 RX ADMIN — SODIUM CHLORIDE, PRESERVATIVE FREE 10 ML: 5 INJECTION INTRAVENOUS at 16:49

## 2022-10-14 RX ADMIN — Medication 2 UNITS: at 11:38

## 2022-10-14 NOTE — PROGRESS NOTES
Transition of Care Plan:    RUR: 22%  Disposition: IPR - [facility] referral pending - will need authorization  Follow up appointments:PCP/Specialist  DME needed: per rehab  Transportation at Discharge: [place] [employer] or means to access home:      family  IM Medicare Letter:will need prior to discharge  Is patient a  and connected with the [address] [facility]? N/a  If yes, was [employer] transfer form completed and [facility] notified? Caregiver Contact:    Primary Decision MakerRcrystal Kinsey - Daughter - [phone]    Secondary Decision Maker: Cat Gottlieb - Daughter - [phone]  Discharge Caregiver contacted prior to discharge? yes  Care Conference needed?: no    CM spoke with Everton Robledo with [facility] - CM asked that Everton Robledo [phone] to call family with updates- states she needs to see patients progression with PT/OT to determine if they can accept patient for IPR - CM contacted rehab department to make them aware. CM staff will follow.

## 2022-10-14 NOTE — PROGRESS NOTES
Problem: Self Care Deficits Care Plan (Adult)  Goal: *Acute Goals and Plan of Care (Insert Text)  Description: FUNCTIONAL STATUS PRIOR TO ADMISSION: recently signed off of PT services, had progressed to ambulating without assist devices, performed ADLs on his own and stood in shower to bathe, has a paid caregiver 9-1 M-F to assist with IADLs, daughter works as a nurse and checks on pt on the days she is off    [address]: The patient lived alone with paid caregiver and family to provide assist.    Occupational Therapy Goals:  Initiated 10/13/2022  1. Patient will perform grooming standing with contact guard assist within 7 days. 2. Patient will perform dressing with minimal assistance within 7 days. 3. Patient will perform toileting with minimal assistance within 7 days. 4. Patient will transfer from toilet with minimal assistance using the least restrictive device and appropriate durable medical equipment within 7 days. Outcome: Not Progressing Towards Goal    OCCUPATIONAL THERAPY TREATMENT  Patient: Daylin Guardado (55 y.o. male)  Date: 10/14/2022  Diagnosis: Hyponatremia [E87.1] <principal problem not specified>      Precautions: Bed Alarm, Fall  Chart, occupational therapy assessment, plan of care, and goals were reviewed. ASSESSMENT  Pt presented supine in bed. Pt's daughter was present during session. Pt presents with general weakness, decreased strength, standing tolerance, standing balance, activity tolerance, flat affect, delayed initiation, and slow processing. Pt completed bed mobility at a maximum assistance level, functional transfers at a moderate assistance x2 level, and ADLs completed on this date ranging from a moderate assistance to maximum assistance level. Pt presents with a left lateral trunk lean. Pt completed left and right weight shifting and lateral lean exercises to assist with midline realignment.  Pt requires verbal, tactile, and visual cues during ADL tasks to assist with maintaining attention to task. Pt ambulates with a RW and requires verbal and physical cueing for appropriate ambulation sequence. Pt requires extra time to complete ADL tasks, functional transfers, and ambulation. Pt's VSS throughout session. Pt continues to benefit from skilled acute care OT services. Recommend discharge to SNF vs Rehab pending pt progress with therapy. PLAN :  Patient continues to benefit from skilled intervention to address the above impairments. Continue treatment per established plan of care to address goals. Recommendation for discharge: (in order for the patient to meet his/her long term goals)  Therapy up to 5 days/week in SNF setting vs Rehab pending pt progress with therapy     This discharge recommendation:  Has not yet been discussed the attending provider and/or case management    IF patient discharges home will need the following DME: TBD upon discharge        SUBJECTIVE:   Patient stated Right.  (when asked if pt could feel his gown in the back)    OBJECTIVE DATA SUMMARY:   Cognitive/Behavioral Status:  Neurologic State: Alert  Orientation Level: Disoriented to time  Cognition: Impaired decision making        Safety/Judgement: Fall prevention;Decreased awareness of environment;Decreased awareness of need for assistance    Functional Mobility and Transfers for ADLs:  Bed Mobility:  Supine to Sit: Maximum assistance    Transfers:  Sit to Stand: Moderate assistance;Assist x2   Stand to sit: Moderate assistance; Assist x2  Bed to Chair: Moderate assistance;Assist x2    Balance:  Sitting: Impaired  Sitting - Static: Fair (occasional)  Sitting - Dynamic: Poor (constant support)  Standing: Impaired  Standing - Static: Constant support;Poor  Standing - Dynamic : Constant support;Poor    ADL Intervention:    Upper Body Dressing Assistance  Shirt simulation with hospital gown:  Moderate assistance (pt performed this while sitting at EOB)  Cues: Verbal cues provided; Tactile cues provided    Lower Body Dressing Assistance  Socks: Maximum assistance  Leg Crossed Method Used: Yes  Position Performed: Seated edge of bed  Cues: Verbal cues provided;Visual cues provided; Tactile cues provided         Cognitive Retraining  Safety/Judgement: Fall prevention;Decreased awareness of environment;Decreased awareness of need for assistance    Therapeutic Exercises:   Pt performed left and right weight shifts ( 2 reps each side) and left and right lateral leans (2 reps each side) to assist pt with sitting upright and midline realignment. Pain:  Pt stated no pain on this date     Activity Tolerance:   Poor and requires rest breaks    After treatment patient left in no apparent distress:   Sitting in chair, Call bell within reach, Bed / chair alarm activated, and Caregiver / family present    COMMUNICATION/COLLABORATION:   The patients plan of care was discussed with: Registered nurse.      Joann Romo OT  Time Calculation: 40 mins

## 2022-10-14 NOTE — PROGRESS NOTES
NAME: Tammy David        :  10/4/1933        MRN:  [id number]                 Assessment   :                                               Plan:  Hyponatremia  Weakness  CLL  Gout  DM  HTN       Sodium about the same despite IV NS. Continue fluid restriction. Start Hayder Zia. Subjective:     Chief Complaint:  \" I feel fine\"  No N/V. No dyspnea. No pain. Review of Systems:    Symptom Y/N Comments  Symptom Y/N Comments   Fever/Chills    Chest Pain     Poor Appetite    Edema     Cough    Abdominal Pain     Sputum    Joint Pain     SOB/JETER    Pruritis/Rash     Nausea/vomit    Tolerating PT/OT     Diarrhea    Tolerating Diet     Constipation    Other       Could not obtain due to:      Objective:     VITALS:   Last 24hrs VS reviewed since prior progress note.  Most recent are:  Visit Vitals  BP (!) 114/56 (BP 1 Location: Right upper arm, BP Patient Position: Sitting)   Pulse (!) 54   Temp 97.7 °F (36.5 °C)   Resp 16   Ht 5' 7\" (1.702 m)   Wt 61 kg (134 lb 7.7 oz)   SpO2 96%   BMI 21.06 kg/m²       Intake/Output Summary (Last 24 hours) at 10/14/2022 1312  Last data filed at 10/14/2022 1156  Gross per 24 hour   Intake 505 ml   Output --   Net 505 ml      Telemetry Reviewed:     PHYSICAL EXAM:  General: NAD   No edema      Lab Data Reviewed: (see below)    Medications Reviewed: (see below)    PMH/SH reviewed - no change compared to H&P  ________________________________________________________________________  Care Plan discussed with:  Patient     Family      RN     Care Manager                    Consultant:          Comments   >50% of visit spent in counseling and coordination of care       ________________________________________________________________________  Km Gudino MD     Procedures: see electronic medical records for all procedures/Xrays and details which  were not copied into this note but were reviewed prior to creation of Plan. LABS:  Recent Labs     10/13/22  0438 10/12/22  0406   .5* 143.0*   HGB 9.0* 8.9*   HCT 28.6* 28.8*   * 121*     Recent Labs     10/14/22  0340 10/13/22  1450 10/13/22  0438 10/12/22  0527 10/11/22  1834   * 128* 127*   < > 126*   K 4.8 4.4 4.1   < > 4.8   CL 97 97 96*   < > 94*   CO2 25 25 25   < > 26   BUN 11 11 10   < > 17   CREA 0.95 0.99 0.90   < > 1.14   GLU 80 113* 75   < > 104*   CA 8.4* 8.6 8.7   < > 9.2   MG  --   --   --   --  2.1    < > = values in this interval not displayed. No results for input(s): AP, TBIL, TP, ALB, GLOB, GGT, AML, LPSE in the last 72 hours. No lab exists for component: SGOT, GPT, AMYP, HLPSE  No results for input(s): INR, PTP, APTT, INREXT in the last 72 hours. No results for input(s): FE, TIBC, PSAT, FERR in the last 72 hours. Lab Results   Component Value Date/Time    Folate 39.5 (H) 07/01/2022 11:56 AM      No results for input(s): PH, PCO2, PO2 in the last 72 hours. No results for input(s): CPK, CKMB in the last 72 hours.     No lab exists for component: TROPONINI  No components found for: [place]  Lab Results   Component Value Date/Time    Color YELLOW/STRAW 10/11/2022 10:28 PM    Appearance CLEAR 10/11/2022 10:28 PM    Specific gravity 1.012 10/11/2022 10:28 PM    Specific gravity 1.010 04/05/2015 01:18 AM    pH (UA) 6.0 10/11/2022 10:28 PM    Protein Negative 10/11/2022 10:28 PM    Glucose Negative 10/11/2022 10:28 PM    Ketone Negative 10/11/2022 10:28 PM    Bilirubin Negative 10/11/2022 10:28 PM    Urobilinogen 0.2 10/11/2022 10:28 PM    Nitrites Negative 10/11/2022 10:28 PM    Leukocyte Esterase Negative 10/11/2022 10:28 PM    Epithelial cells FEW 10/11/2022 10:28 PM    Bacteria Negative 10/11/2022 10:28 PM    WBC 0-4 10/11/2022 10:28 PM    RBC 0-5 10/11/2022 10:28 PM       MEDICATIONS:  Current Facility-Administered Medications   Medication Dose Route Frequency    allopurinoL (ZYLOPRIM) tablet 150 mg 150 mg Oral DAILY    aspirin chewable tablet 81 mg  81 mg Oral DAILY    brimonidine (ALPHAGAN) 0.2 % ophthalmic solution 1 Drop  1 Drop Both Eyes BID    cholecalciferol (VITAMIN D3) (1000 Units /25 mcg) tablet 1,000 Units  1,000 Units Oral DAILY    metoprolol tartrate (LOPRESSOR) tablet 25 mg  25 mg Oral BID    atorvastatin (LIPITOR) tablet 20 mg  20 mg Oral QHS    insulin lispro (HUMALOG) injection   SubCUTAneous AC&HS    glucose chewable tablet 16 g  4 Tablet Oral PRN    glucagon (GLUCAGEN) injection 1 mg  1 mg IntraMUSCular PRN    dextrose 10% infusion 0-250 mL  0-250 mL IntraVENous PRN    sodium chloride (NS) flush 5-40 mL  5-40 mL IntraVENous Q8H    sodium chloride (NS) flush 5-40 mL  5-40 mL IntraVENous PRN    acetaminophen (TYLENOL) tablet 650 mg  650 mg Oral Q6H PRN    Or    acetaminophen (TYLENOL) suppository 650 mg  650 mg Rectal Q6H PRN    polyethylene glycol (MIRALAX) packet 17 g  17 g Oral DAILY PRN    ondansetron (ZOFRAN ODT) tablet 4 mg  4 mg Oral Q8H PRN    Or    ondansetron (ZOFRAN) injection 4 mg  4 mg IntraVENous Q6H PRN    enoxaparin (LOVENOX) injection 40 mg  40 mg SubCUTAneous DAILY

## 2022-10-14 NOTE — PROGRESS NOTES
-Hematology / Oncology ([facility]) -  -Primary Oncologist- Dr. Katarzyna Aponte  -CC-\"I am tired, Dr. Niya Thomas"    -S-  Still holding in ER after 2 nights, didn't rest well ON. No pain reported. Family feel he his more weak today and associate it with his Na dropping a few points. -O-    Patient Vitals for the past 24 hrs:   Temp Pulse Resp BP SpO2   10/14/22 0806 -- 61 -- -- --   10/14/22 0727 98.2 °F (36.8 °C) (!) 56 16 (!) 152/56 92 %   10/14/22 0204 98.2 °F (36.8 °C) 63 17 (!) 158/71 97 %   10/13/22 2213 98 °F (36.7 °C) (!) 56 16 (!) 155/58 96 %   10/13/22 2016 97.8 °F (36.6 °C) 65 15 (!) 182/65 99 %   10/13/22 1535 97.8 °F (36.6 °C) 63 16 (!) 140/46 95 %   10/13/22 1114 98.1 °F (36.7 °C) (!) 58 15 (!) 123/45 97 %   10/13/22 1054 -- (!) 58 -- (!) 132/49 97 %   10/13/22 1043 -- -- -- (!) 136/49 --     No intake/output data recorded. Gen: nad, frail appearing in bed  Chest: bilateral breath sounds present  Cardiac: rrr  Abd: s/nt    -Labs-    Recent Labs     10/14/22  0340 10/13/22  1450 10/13/22  0438 10/12/22  0527 10/12/22  0406 10/11/22  1834   WBC  --   --  137.5*  --  143.0* 207.6*   HGB  --   --  9.0*  --  8.9* 9.9*   PLT  --   --  125*  --  121* 147*   ANEU  --   --  2.8  --  7.2 6.2   * 128* 127* 130*  --  126*   K 4.8 4.4 4.1 4.6  --  4.8   GLU 80 113* 75 80  --  104*   BUN 11 11 10 13  --  17   CREA 0.95 0.99 0.90 0.94  --  1.14   CA 8.4* 8.6 8.7 8.5  --  9.2   MG  --   --   --   --   --  2.1       -Imaging-       -Assessment + Plan-     *) CLL:  - On low low dose acalabrutinib 100mg every other day. Last dose Saturday. Will hold for now and consider switching to Zanubrutinib (baby dose) once/if improved after DC.  - FU with me after DC     *) Hyponatremia:  - Admission in July Nephrology felt it was likely hypovolemic hyponatremia, he initially improved with IV hydration here but then down again.   - Low Na not on SE profile for acalabrutnib.   -Nephrology consulted, now planning trial of fulid restriction *)Bradycardia:  - Pulse in 50's     Please call over weekend, otherwise our service will  on MOnday  *) Fatigue, FTT:  - Hold acalabrutinib

## 2022-10-14 NOTE — PROGRESS NOTES
Hospitalist Progress Note    NAME: yLnn Mandel   :  10/4/1933   MRN:  [id number]       Assessment / Plan:  Weakness/malaise  Mild/moderate hyponatremia  Sodium level improving  Sodium level 128  Recheck tomorrow       CLL undergoing chemotherapy  Hold PTA acalabrutinib  Counts appear at baseline and are likely not contributing to current condition     Gout  Continue PTA allopurinol     CAD status post CABG  Essential hypertension  Continue PTA: Aspirin, atorvastatin, metoprolol     Non-insulin-dependent diabetes mellitus  Sliding scale corrective coverage insulin     BPH  Not currently on medication for this, monitor for urinary retention  If retaining would initiate tamsulosin     Glaucoma  Continue PTA eyedrops     GERD  Family reports patient not taking any scheduled medications, but will use Pepcid or Mylanta as needed  Monitor for symptoms and initiate therapy as indicated      18.5 - 24.9 Normal weight / Body mass index is 21.06 kg/m². Estimated discharge date:    Barriers:    Code status: Full  Prophylaxis: Lovenox  Recommended Disposition: Home w/Family     Subjective:     Chief Complaint / Reason for Physician Visit  \"\". Discussed with RN events overnight. PT OT evaluation ,patient will need placement     Review of Systems:  Symptom Y/N Comments  Symptom Y/N Comments   Fever/Chills n   Chest Pain n    Poor Appetite    Edema     Cough    Abdominal Pain     Sputum    Joint Pain     SOB/JETER    Pruritis/Rash     Nausea/vomit n   Tolerating PT/OT     Diarrhea    Tolerating Diet y    Constipation    Other       Could NOT obtain due to:      Objective:     VITALS:   Last 24hrs VS reviewed since prior progress note.  Most recent are:  Patient Vitals for the past 24 hrs:   Temp Pulse Resp BP SpO2   10/14/22 1052 97.7 °F (36.5 °C) (!) 54 16 (!) 114/56 96 %   10/14/22 0806 -- 61 -- -- --   10/14/22 0727 98.2 °F (36.8 °C) (!) 56 16 (!) 152/56 92 %   10/14/22 0204 98.2 °F (36.8 °C) 63 17 (!) 158/71 97 %   10/13/22 2213 98 °F (36.7 °C) (!) 56 16 (!) 155/58 96 %   10/13/22 2016 97.8 °F (36.6 °C) 65 15 (!) 182/65 99 %   10/13/22 1535 97.8 °F (36.6 °C) 63 16 (!) 140/46 95 %         Intake/Output Summary (Last 24 hours) at 10/14/2022 1507  Last data filed at 10/14/2022 1156  Gross per 24 hour   Intake 505 ml   Output --   Net 505 ml          I had a face to face encounter and independently examined this patient on 10/14/2022, as outlined below:  PHYSICAL EXAM:  General: WD, WN. Alert, cooperative, no acute distress    EENT:  EOMI. Anicteric sclerae. MMM  Resp:  CTA bilaterally, no wheezing or rales. No accessory muscle use  CV:  Regular  rhythm,  No edema  GI:  Soft, Non distended, Non tender. +Bowel sounds  Neurologic:  Alert and oriented X 3, normal speech,   Psych:   Good insight. Not anxious nor agitated  Skin:  No rashes. No jaundice    Reviewed most current lab test results and cultures  YES  Reviewed most current radiology test results   YES  Review and summation of old records today    NO  Reviewed patient's current orders and MAR    YES  PMH/SH reviewed - no change compared to H&P  ________________________________________________________________________  Care Plan discussed with:    Comments   Patient     Family  y    RN y    Care Manager     Consultant                        Multidiciplinary team rounds were held today with , nursing, pharmacist and clinical coordinator. Patient's plan of care was discussed; medications were reviewed and discharge planning was addressed.      ________________________________________________________________________  Total NON critical care TIME:  35   Minutes    Total CRITICAL CARE TIME Spent:   Minutes non procedure based      Comments   >50% of visit spent in counseling and coordination of care     ________________________________________________________________________  Isaac Herron MD     Procedures: see electronic medical records for all procedures/Xrays and details which were not copied into this note but were reviewed prior to creation of Plan. LABS:  I reviewed today's most current labs and imaging studies. Pertinent labs include:  Recent Labs     10/13/22  0438 10/12/22  0406 10/11/22  1834   .5* 143.0* 207.6*   HGB 9.0* 8.9* 9.9*   HCT 28.6* 28.8* 34.1*   * 121* 147*       Recent Labs     10/14/22  0340 10/13/22  1450 10/13/22  0438 10/12/22  0527 10/11/22  1834   * 128* 127*   < > 126*   K 4.8 4.4 4.1   < > 4.8   CL 97 97 96*   < > 94*   CO2 25 25 25   < > 26   GLU 80 113* 75   < > 104*   BUN 11 11 10   < > 17   CREA 0.95 0.99 0.90   < > 1.14   CA 8.4* 8.6 8.7   < > 9.2   MG  --   --   --   --  2.1    < > = values in this interval not displayed. Signed:  Bre Barnes MD

## 2022-10-15 LAB
ANION GAP SERPL CALC-SCNC: 7 MMOL/L (ref 5–15)
BUN SERPL-MCNC: 21 MG/DL (ref 6–20)
BUN/CREAT SERPL: 22 (ref 12–20)
CALCIUM SERPL-MCNC: 9 MG/DL (ref 8.5–10.1)
CHLORIDE SERPL-SCNC: 98 MMOL/L (ref 97–108)
CO2 SERPL-SCNC: 26 MMOL/L (ref 21–32)
CREAT SERPL-MCNC: 0.96 MG/DL (ref 0.7–1.3)
GLUCOSE BLD STRIP.AUTO-MCNC: 100 MG/DL (ref 65–117)
GLUCOSE BLD STRIP.AUTO-MCNC: 103 MG/DL (ref 65–117)
GLUCOSE BLD STRIP.AUTO-MCNC: 108 MG/DL (ref 65–117)
GLUCOSE BLD STRIP.AUTO-MCNC: 116 MG/DL (ref 65–117)
GLUCOSE BLD STRIP.AUTO-MCNC: 150 MG/DL (ref 65–117)
GLUCOSE SERPL-MCNC: 86 MG/DL (ref 65–100)
POTASSIUM SERPL-SCNC: 4.3 MMOL/L (ref 3.5–5.1)
SERVICE CMNT-IMP: ABNORMAL
SERVICE CMNT-IMP: NORMAL
SODIUM SERPL-SCNC: 131 MMOL/L (ref 136–145)

## 2022-10-15 PROCEDURE — 80048 BASIC METABOLIC PNL TOTAL CA: CPT

## 2022-10-15 PROCEDURE — 74011250636 HC RX REV CODE- 250/636: Performed by: STUDENT IN AN ORGANIZED HEALTH CARE EDUCATION/TRAINING PROGRAM

## 2022-10-15 PROCEDURE — 74011250637 HC RX REV CODE- 250/637: Performed by: STUDENT IN AN ORGANIZED HEALTH CARE EDUCATION/TRAINING PROGRAM

## 2022-10-15 PROCEDURE — 65270000046 HC RM TELEMETRY

## 2022-10-15 PROCEDURE — 74011000250 HC RX REV CODE- 250: Performed by: STUDENT IN AN ORGANIZED HEALTH CARE EDUCATION/TRAINING PROGRAM

## 2022-10-15 PROCEDURE — 36415 COLL VENOUS BLD VENIPUNCTURE: CPT

## 2022-10-15 PROCEDURE — 74011250637 HC RX REV CODE- 250/637: Performed by: INTERNAL MEDICINE

## 2022-10-15 PROCEDURE — 82962 GLUCOSE BLOOD TEST: CPT

## 2022-10-15 RX ADMIN — ALLOPURINOL 150 MG: 300 TABLET ORAL at 09:41

## 2022-10-15 RX ADMIN — ATORVASTATIN CALCIUM 20 MG: 20 TABLET, FILM COATED ORAL at 21:59

## 2022-10-15 RX ADMIN — METOPROLOL TARTRATE 25 MG: 25 TABLET, FILM COATED ORAL at 09:41

## 2022-10-15 RX ADMIN — ASPIRIN 81 MG CHEWABLE TABLET 81 MG: 81 TABLET CHEWABLE at 09:41

## 2022-10-15 RX ADMIN — METOPROLOL TARTRATE 25 MG: 25 TABLET, FILM COATED ORAL at 18:09

## 2022-10-15 RX ADMIN — SODIUM CHLORIDE, PRESERVATIVE FREE 10 ML: 5 INJECTION INTRAVENOUS at 21:59

## 2022-10-15 RX ADMIN — CHOLECALCIFEROL TAB 25 MCG (1000 UNIT) 1000 UNITS: 25 TAB at 09:41

## 2022-10-15 RX ADMIN — BRIMONIDINE TARTRATE 1 DROP: 2 SOLUTION OPHTHALMIC at 18:04

## 2022-10-15 RX ADMIN — ENOXAPARIN SODIUM 40 MG: 100 INJECTION SUBCUTANEOUS at 09:41

## 2022-10-15 RX ADMIN — SODIUM CHLORIDE, PRESERVATIVE FREE 10 ML: 5 INJECTION INTRAVENOUS at 14:44

## 2022-10-15 RX ADMIN — Medication 1 PACKET: at 09:41

## 2022-10-15 RX ADMIN — BRIMONIDINE TARTRATE 1 DROP: 2 SOLUTION OPHTHALMIC at 09:45

## 2022-10-15 RX ADMIN — SODIUM CHLORIDE, PRESERVATIVE FREE 10 ML: 5 INJECTION INTRAVENOUS at 05:30

## 2022-10-15 NOTE — PROGRESS NOTES
End of Shift Note    Bedside shift change report given to Shea STOUT (oncoming nurse) by Nsiha Sesay RN (offgoing nurse). Report included the following information SBAR, Kardex, and MAR    Shift worked:  12 hrs     Shift summary and any significant changes:     Patient had a good shift with no significant change in condition still alert but disoriented. Cleaned up and made comfortable in bed       Concerns for physician to address:  none     Zone phone for oncoming shift:   [phone]       Activity:  Activity Level: Up with Assistance  Number times ambulated in hallways past shift: 0  Number of times OOB to chair past shift: 0    Cardiac:   Cardiac Monitoring: Yes      Cardiac Rhythm: Sinus Johnson    Access:  Current line(s): PIV     Genitourinary:   Urinary status: external catheter    Respiratory:   O2 Device: None (Room air)  Chronic home O2 use?: YES  Incentive spirometer at bedside: NO       GI:  Last Bowel Movement Date: 10/12/22  Current diet:  ADULT DIET Regular; 4 carb choices (60 gm/meal); Low Fat/Low Chol/High Fiber/JOEY; 1000 ml  Passing flatus: YES  Tolerating current diet: YES       Pain Management:   Patient states pain is manageable on current regimen: YES    Skin:  Javon Score: 15  Interventions: float heels and limit briefs    Patient Safety:  Fall Score:  Total Score: 4  Interventions: bed/chair alarm  High Fall Risk: Yes    Length of Stay:  Expected LOS: 2d 14h  Actual LOS: 4      Nisha Sesay RN

## 2022-10-15 NOTE — PROGRESS NOTES
Hospitalist Progress Note    NAME: Yessica Graham   :  10/4/1933   MRN:  [id number]       Assessment / Plan:  Weakness/malaise  Mild/moderate hyponatremia  Sodium level improving  Sodium level 131  Recheck tomorrow       CLL undergoing chemotherapy  Hold PTA acalabrutinib  Counts appear at baseline and are likely not contributing to current condition     Gout  Continue PTA allopurinol     CAD status post CABG  Essential hypertension  Continue PTA: Aspirin, atorvastatin, metoprolol     Non-insulin-dependent diabetes mellitus  Sliding scale corrective coverage insulin     BPH  Not currently on medication for this, monitor for urinary retention  If retaining would initiate tamsulosin     Glaucoma  Continue PTA eyedrops     GERD  Family reports patient not taking any scheduled medications, but will use Pepcid or Mylanta as needed  Monitor for symptoms and initiate therapy as indicated      18.5 - 24.9 Normal weight / Body mass index is 21.06 kg/m². Estimated discharge date:    Barriers:    Code status: Full  Prophylaxis: Lovenox  Recommended Disposition: Home w/Family     Subjective:     Chief Complaint / Reason for Physician Visit  \"\". Discussed with RN events overnight. PT OT evaluation ,patient will need placement     Review of Systems:  Symptom Y/N Comments  Symptom Y/N Comments   Fever/Chills n   Chest Pain n    Poor Appetite    Edema     Cough    Abdominal Pain     Sputum    Joint Pain     SOB/JETER    Pruritis/Rash     Nausea/vomit n   Tolerating PT/OT     Diarrhea    Tolerating Diet y    Constipation    Other       Could NOT obtain due to:      Objective:     VITALS:   Last 24hrs VS reviewed since prior progress note.  Most recent are:  Patient Vitals for the past 24 hrs:   Temp Pulse Resp BP SpO2   10/15/22 1444 -- -- -- (!) 123/54 --   10/15/22 1237 -- -- -- (!) 107/47 --   10/15/22 1224 -- -- -- (!) 98/48 --   10/15/22 1218 -- -- -- (!) 94/42 --   10/15/22 1214 97.4 °F (36.3 °C) (!) 51 16 (!) 90/40 95 %   10/15/22 0941 -- (!) 58 -- (!) 130/51 --   10/14/22 2323 98.1 °F (36.7 °C) (!) 51 16 (!) 146/44 94 %   10/14/22 1831 -- (!) 56 -- (!) 136/46 --       No intake or output data in the 24 hours ending 10/15/22 1530       I had a face to face encounter and independently examined this patient on 10/15/2022, as outlined below:  PHYSICAL EXAM:  General: WD, WN. Alert, cooperative, no acute distress    EENT:  EOMI. Anicteric sclerae. MMM  Resp:  CTA bilaterally, no wheezing or rales. No accessory muscle use  CV:  Regular  rhythm,  No edema  GI:  Soft, Non distended, Non tender. +Bowel sounds  Neurologic:  Alert and oriented X 3, normal speech,   Psych:   Good insight. Not anxious nor agitated  Skin:  No rashes. No jaundice    Reviewed most current lab test results and cultures  YES  Reviewed most current radiology test results   YES  Review and summation of old records today    NO  Reviewed patient's current orders and MAR    YES  PMH/ reviewed - no change compared to H&P  ________________________________________________________________________  Care Plan discussed with:    Comments   Patient     Family  y    RN y    Care Manager     Consultant                        Multidiciplinary team rounds were held today with , nursing, pharmacist and clinical coordinator. Patient's plan of care was discussed; medications were reviewed and discharge planning was addressed. ________________________________________________________________________  Total NON critical care TIME:  35   Minutes    Total CRITICAL CARE TIME Spent:   Minutes non procedure based      Comments   >50% of visit spent in counseling and coordination of care     ________________________________________________________________________  Delmy Slaughter MD     Procedures: see electronic medical records for all procedures/Xrays and details which were not copied into this note but were reviewed prior to creation of Plan.       LABS:  I reviewed today's most current labs and imaging studies. Pertinent labs include:  Recent Labs     10/13/22  0438   .5*   HGB 9.0*   HCT 28.6*   *       Recent Labs     10/15/22  0347 10/14/22  0340 10/13/22  1450   * 128* 128*   K 4.3 4.8 4.4   CL 98 97 97   CO2 26 25 25   GLU 86 80 113*   BUN 21* 11 11   CREA 0.96 0.95 0.99   CA 9.0 8.4* 8.6         Signed:  Alex Fitch MD

## 2022-10-15 NOTE — PROGRESS NOTES
NAME: Guanako Eduardo        :  10/4/1933        MRN:  [id number]                 Assessment   :                                               Plan:  Hyponatremia  Weakness  CLL  Gout  DM  HTN>>hypotension today. Asymptomatic. Started on UREA tabs on 10/14  Na improved to 131    Ct FR of 1 L/day  Encourage increase solute intake    Hold parameters for Metoprolol placed    D/W pt and family                   Subjective:     Chief Complaint: oob in chair. Feels Ok. BP low today am  Family at bedside     Review of Systems:    Symptom Y/N Comments  Symptom Y/N Comments   Fever/Chills n   Chest Pain     Poor Appetite    Edema     Cough    Abdominal Pain     Sputum    Joint Pain     SOB/JETER n   Pruritis/Rash     Nausea/vomit n   Tolerating PT/OT     Diarrhea    Tolerating Diet     Constipation    Other       Could not obtain due to:      Objective:     VITALS:   Last 24hrs VS reviewed since prior progress note.  Most recent are:  Visit Vitals  BP (!) 107/47   Pulse (!) 51   Temp 97.4 °F (36.3 °C)   Resp 16   Ht 5' 7\" (1.702 m)   Wt 61 kg (134 lb 7.7 oz)   SpO2 95%   BMI 21.06 kg/m²     No intake or output data in the 24 hours ending 10/15/22 1332     Telemetry Reviewed:     PHYSICAL EXAM:  General: NAD  Clear  RRR, mild kenneth  No edema  AOx3      Lab Data Reviewed: (see below)    Medications Reviewed: (see below)    PMH/SH reviewed - no change compared to H&P  ________________________________________________________________________  Care Plan discussed with:  Patient     Family      RN     Care Manager                    Consultant:          Comments   >50% of visit spent in counseling and coordination of care       ________________________________________________________________________  Carlos Stevenson MD     Procedures: see electronic medical records for all procedures/Xrays and details which  were not copied into this note but were reviewed prior to creation of Plan.       LABS:  Recent Labs     10/13/22  0438   .5*   HGB 9.0*   HCT 28.6*   *         MEDICATIONS:  Current Facility-Administered Medications   Medication Dose Route Frequency    urea (URE-NA) 15 gram packet 1 Packet  1 Packet Oral DAILY    allopurinoL (ZYLOPRIM) tablet 150 mg  150 mg Oral DAILY    aspirin chewable tablet 81 mg  81 mg Oral DAILY    brimonidine (ALPHAGAN) 0.2 % ophthalmic solution 1 Drop  1 Drop Both Eyes BID    cholecalciferol (VITAMIN D3) (1000 Units /25 mcg) tablet 1,000 Units  1,000 Units Oral DAILY    metoprolol tartrate (LOPRESSOR) tablet 25 mg  25 mg Oral BID    atorvastatin (LIPITOR) tablet 20 mg  20 mg Oral QHS    insulin lispro (HUMALOG) injection   SubCUTAneous AC&HS    glucose chewable tablet 16 g  4 Tablet Oral PRN    glucagon (GLUCAGEN) injection 1 mg  1 mg IntraMUSCular PRN    dextrose 10% infusion 0-250 mL  0-250 mL IntraVENous PRN    sodium chloride (NS) flush 5-40 mL  5-40 mL IntraVENous Q8H    sodium chloride (NS) flush 5-40 mL  5-40 mL IntraVENous PRN    acetaminophen (TYLENOL) tablet 650 mg  650 mg Oral Q6H PRN    Or    acetaminophen (TYLENOL) suppository 650 mg  650 mg Rectal Q6H PRN    polyethylene glycol (MIRALAX) packet 17 g  17 g Oral DAILY PRN    ondansetron (ZOFRAN ODT) tablet 4 mg  4 mg Oral Q8H PRN    Or    ondansetron (ZOFRAN) injection 4 mg  4 mg IntraVENous Q6H PRN    enoxaparin (LOVENOX) injection 40 mg  40 mg SubCUTAneous DAILY

## 2022-10-16 LAB
ANION GAP SERPL CALC-SCNC: 8 MMOL/L (ref 5–15)
BUN SERPL-MCNC: 26 MG/DL (ref 6–20)
BUN/CREAT SERPL: 27 (ref 12–20)
CALCIUM SERPL-MCNC: 9.2 MG/DL (ref 8.5–10.1)
CHLORIDE SERPL-SCNC: 98 MMOL/L (ref 97–108)
CO2 SERPL-SCNC: 26 MMOL/L (ref 21–32)
CREAT SERPL-MCNC: 0.97 MG/DL (ref 0.7–1.3)
GLUCOSE BLD STRIP.AUTO-MCNC: 112 MG/DL (ref 65–117)
GLUCOSE BLD STRIP.AUTO-MCNC: 136 MG/DL (ref 65–117)
GLUCOSE BLD STRIP.AUTO-MCNC: 94 MG/DL (ref 65–117)
GLUCOSE BLD STRIP.AUTO-MCNC: 96 MG/DL (ref 65–117)
GLUCOSE SERPL-MCNC: 86 MG/DL (ref 65–100)
POTASSIUM SERPL-SCNC: 4.6 MMOL/L (ref 3.5–5.1)
SERVICE CMNT-IMP: ABNORMAL
SERVICE CMNT-IMP: NORMAL
SODIUM SERPL-SCNC: 132 MMOL/L (ref 136–145)

## 2022-10-16 PROCEDURE — 80048 BASIC METABOLIC PNL TOTAL CA: CPT

## 2022-10-16 PROCEDURE — 36415 COLL VENOUS BLD VENIPUNCTURE: CPT

## 2022-10-16 PROCEDURE — 74011250637 HC RX REV CODE- 250/637: Performed by: INTERNAL MEDICINE

## 2022-10-16 PROCEDURE — 74011250636 HC RX REV CODE- 250/636: Performed by: STUDENT IN AN ORGANIZED HEALTH CARE EDUCATION/TRAINING PROGRAM

## 2022-10-16 PROCEDURE — 74011250637 HC RX REV CODE- 250/637: Performed by: STUDENT IN AN ORGANIZED HEALTH CARE EDUCATION/TRAINING PROGRAM

## 2022-10-16 PROCEDURE — 65270000046 HC RM TELEMETRY

## 2022-10-16 PROCEDURE — 74011000250 HC RX REV CODE- 250: Performed by: STUDENT IN AN ORGANIZED HEALTH CARE EDUCATION/TRAINING PROGRAM

## 2022-10-16 PROCEDURE — 82962 GLUCOSE BLOOD TEST: CPT

## 2022-10-16 RX ADMIN — METOPROLOL TARTRATE 25 MG: 25 TABLET, FILM COATED ORAL at 09:05

## 2022-10-16 RX ADMIN — ATORVASTATIN CALCIUM 20 MG: 20 TABLET, FILM COATED ORAL at 22:46

## 2022-10-16 RX ADMIN — SODIUM CHLORIDE, PRESERVATIVE FREE 10 ML: 5 INJECTION INTRAVENOUS at 22:46

## 2022-10-16 RX ADMIN — Medication 1 PACKET: at 09:06

## 2022-10-16 RX ADMIN — SODIUM CHLORIDE, PRESERVATIVE FREE 10 ML: 5 INJECTION INTRAVENOUS at 05:29

## 2022-10-16 RX ADMIN — ASPIRIN 81 MG CHEWABLE TABLET 81 MG: 81 TABLET CHEWABLE at 09:05

## 2022-10-16 RX ADMIN — SODIUM CHLORIDE, PRESERVATIVE FREE 10 ML: 5 INJECTION INTRAVENOUS at 13:24

## 2022-10-16 RX ADMIN — POLYETHYLENE GLYCOL 3350 17 G: 17 POWDER, FOR SOLUTION ORAL at 13:23

## 2022-10-16 RX ADMIN — CHOLECALCIFEROL TAB 25 MCG (1000 UNIT) 1000 UNITS: 25 TAB at 09:05

## 2022-10-16 RX ADMIN — BRIMONIDINE TARTRATE 1 DROP: 2 SOLUTION OPHTHALMIC at 09:13

## 2022-10-16 RX ADMIN — METOPROLOL TARTRATE 25 MG: 25 TABLET, FILM COATED ORAL at 18:08

## 2022-10-16 RX ADMIN — ALLOPURINOL 150 MG: 300 TABLET ORAL at 09:05

## 2022-10-16 RX ADMIN — ENOXAPARIN SODIUM 40 MG: 100 INJECTION SUBCUTANEOUS at 09:05

## 2022-10-16 RX ADMIN — BRIMONIDINE TARTRATE 1 DROP: 2 SOLUTION OPHTHALMIC at 18:08

## 2022-10-16 NOTE — PROGRESS NOTES
Bedside and Verbal shift change report given to Vanessa Zhang RN (oncoming nurse) by Rea Mcknight RN (offgoing nurse). Report included the following information SBAR, Kardex, Intake/Output, MAR, Recent Results, and Cardiac Rhythm Sinus Bradycardia .

## 2022-10-16 NOTE — PROGRESS NOTES
Transition of Care Plan:     RUR: 21% - High  Disposition: [facility]PR - [facility] (referral pending)  Follow up appointments: PCP & specialists as indicated  DME needed: TBD by rehab - has bobby-walker, cane, life alert at home              *If pt discharges home, PT/OT recommending RW, Wheelchair, BSC, gait belt  Transportation at Discharge: [facility] vs. [facility] or means to access home: Yes      IM Medicare Letter: Needed at d/c  Is patient a  and connected with the [facility]? N/a              If yes, was [employer] transfer form completed and VA notified? N/a  Caregiver Contact: Madisyn Bell DTR/[facility] ([phone])  Discharge Caregiver contacted prior to discharge? Yes  Care Conference needed?: No         11:14AM UPDATE  CM contacted [facility] via [email or web address] requesting update on pending referral status. Informed by nursing that MD is rechecking sodium levels, if stable then pt is medically ready for d/c. Awaiting response/update at this time.      Maria Teresa Everett. Girish Bright 29 Manager  [phone]

## 2022-10-16 NOTE — PROGRESS NOTES
Hospitalist Progress Note    NAME: Duane Mcgill   :  10/4/1933   MRN:  [id number]       Assessment / Plan:  Weakness/malaise  Mild/moderate hyponatremia  Sodium level improving  Sodium level 132  Recheck tomorrow       CLL undergoing chemotherapy  Hold PTA acalabrutinib  Counts appear at baseline and are likely not contributing to current condition     Gout  Continue PTA allopurinol     CAD status post CABG  Essential hypertension  Continue PTA: Aspirin, atorvastatin, metoprolol     Non-insulin-dependent diabetes mellitus  Sliding scale corrective coverage insulin     BPH  Not currently on medication for this, monitor for urinary retention  If retaining would initiate tamsulosin     Glaucoma  Continue PTA eyedrops     GERD  Family reports patient not taking any scheduled medications, but will use Pepcid or Mylanta as needed  Monitor for symptoms and initiate therapy as indicated      18.5 - 24.9 Normal weight / Body mass index is 21.06 kg/m². Estimated discharge date:   Barriers:    Code status: Full  Prophylaxis: Lovenox  Recommended Disposition: Home w/Family     Subjective:     Chief Complaint / Reason for Physician Visit  \"\". Discussed with RN events overnight. PT OT evaluation ,patient will need placement     Review of Systems:  Symptom Y/N Comments  Symptom Y/N Comments   Fever/Chills n   Chest Pain n    Poor Appetite    Edema     Cough    Abdominal Pain     Sputum    Joint Pain     SOB/JETER    Pruritis/Rash     Nausea/vomit n   Tolerating PT/OT     Diarrhea    Tolerating Diet y    Constipation    Other       Could NOT obtain due to:      Objective:     VITALS:   Last 24hrs VS reviewed since prior progress note.  Most recent are:  Patient Vitals for the past 24 hrs:   Temp Pulse Resp BP SpO2   10/16/22 1449 98.5 °F (36.9 °C) (!) 58 17 (!) 140/62 98 %   10/16/22 1149 98.3 °F (36.8 °C) (!) 50 18 (!) 146/57 96 %   10/16/22 0822 98.6 °F (37 °C) (!) 54 17 (!) 142/59 95 %   10/16/22 0306 98.2 °F (36.8 °C) (!) 52 16 (!) 137/59 95 %   10/15/22 2246 98.4 °F (36.9 °C) (!) 55 16 (!) 147/58 94 %   10/15/22 2024 97.7 °F (36.5 °C) 62 16 (!) 140/64 95 %   10/15/22 1809 -- 63 -- (!) 139/58 --         Intake/Output Summary (Last 24 hours) at 10/16/2022 1533  Last data filed at 10/16/2022 4398  Gross per 24 hour   Intake 240 ml   Output 1000 ml   Net -760 ml          I had a face to face encounter and independently examined this patient on 10/16/2022, as outlined below:  PHYSICAL EXAM:  General: WD, WN. Alert, cooperative, no acute distress    EENT:  EOMI. Anicteric sclerae. MMM  Resp:  CTA bilaterally, no wheezing or rales. No accessory muscle use  CV:  Regular  rhythm,  No edema  GI:  Soft, Non distended, Non tender. +Bowel sounds  Neurologic:  Alert and oriented X 3, normal speech,   Psych:   Good insight. Not anxious nor agitated  Skin:  No rashes. No jaundice    Reviewed most current lab test results and cultures  YES  Reviewed most current radiology test results   YES  Review and summation of old records today    NO  Reviewed patient's current orders and MAR    YES  PMH/ reviewed - no change compared to H&P  ________________________________________________________________________  Care Plan discussed with:    Comments   Patient     Family  y    RN y    Care Manager     Consultant                        Multidiciplinary team rounds were held today with , nursing, pharmacist and clinical coordinator. Patient's plan of care was discussed; medications were reviewed and discharge planning was addressed.      ________________________________________________________________________  Total NON critical care TIME:  35   Minutes    Total CRITICAL CARE TIME Spent:   Minutes non procedure based      Comments   >50% of visit spent in counseling and coordination of care     ________________________________________________________________________  Reche Blizzard, MD     Procedures: see electronic medical records for all procedures/Xrays and details which were not copied into this note but were reviewed prior to creation of Plan. LABS:  I reviewed today's most current labs and imaging studies. Pertinent labs include:  No results for input(s): WBC, HGB, HCT, PLT, HGBEXT, HCTEXT, PLTEXT, HGBEXT, HCTEXT, PLTEXT in the last 72 hours. Recent Labs     10/16/22  0402 10/15/22  0347 10/14/22  0340   * 131* 128*   K 4.6 4.3 4.8   CL 98 98 97   CO2 26 26 25   GLU 86 86 80   BUN 26* 21* 11   CREA 0.97 0.96 0.95   CA 9.2 9.0 8.4*         Signed:  Caden Combs MD

## 2022-10-17 LAB
ANION GAP SERPL CALC-SCNC: 8 MMOL/L (ref 5–15)
BUN SERPL-MCNC: 38 MG/DL (ref 6–20)
BUN/CREAT SERPL: 38 (ref 12–20)
CALCIUM SERPL-MCNC: 8.5 MG/DL (ref 8.5–10.1)
CHLORIDE SERPL-SCNC: 96 MMOL/L (ref 97–108)
CO2 SERPL-SCNC: 24 MMOL/L (ref 21–32)
CREAT SERPL-MCNC: 1.01 MG/DL (ref 0.7–1.3)
GLUCOSE BLD STRIP.AUTO-MCNC: 108 MG/DL (ref 65–117)
GLUCOSE BLD STRIP.AUTO-MCNC: 113 MG/DL (ref 65–117)
GLUCOSE BLD STRIP.AUTO-MCNC: 125 MG/DL (ref 65–117)
GLUCOSE BLD STRIP.AUTO-MCNC: 178 MG/DL (ref 65–117)
GLUCOSE SERPL-MCNC: 111 MG/DL (ref 65–100)
POTASSIUM SERPL-SCNC: 6 MMOL/L (ref 3.5–5.1)
SARS-COV-2, COV2: NORMAL
SERVICE CMNT-IMP: ABNORMAL
SERVICE CMNT-IMP: ABNORMAL
SERVICE CMNT-IMP: NORMAL
SERVICE CMNT-IMP: NORMAL
SODIUM SERPL-SCNC: 128 MMOL/L (ref 136–145)

## 2022-10-17 PROCEDURE — 65270000046 HC RM TELEMETRY

## 2022-10-17 PROCEDURE — 74011250637 HC RX REV CODE- 250/637: Performed by: HOSPITALIST

## 2022-10-17 PROCEDURE — 80048 BASIC METABOLIC PNL TOTAL CA: CPT

## 2022-10-17 PROCEDURE — 97530 THERAPEUTIC ACTIVITIES: CPT

## 2022-10-17 PROCEDURE — 97116 GAIT TRAINING THERAPY: CPT

## 2022-10-17 PROCEDURE — 74011636637 HC RX REV CODE- 636/637: Performed by: STUDENT IN AN ORGANIZED HEALTH CARE EDUCATION/TRAINING PROGRAM

## 2022-10-17 PROCEDURE — 36415 COLL VENOUS BLD VENIPUNCTURE: CPT

## 2022-10-17 PROCEDURE — U0005 INFEC AGEN DETEC AMPLI PROBE: HCPCS

## 2022-10-17 PROCEDURE — 74011000250 HC RX REV CODE- 250: Performed by: STUDENT IN AN ORGANIZED HEALTH CARE EDUCATION/TRAINING PROGRAM

## 2022-10-17 PROCEDURE — 74011250636 HC RX REV CODE- 250/636: Performed by: STUDENT IN AN ORGANIZED HEALTH CARE EDUCATION/TRAINING PROGRAM

## 2022-10-17 PROCEDURE — 74011250637 HC RX REV CODE- 250/637: Performed by: INTERNAL MEDICINE

## 2022-10-17 PROCEDURE — 74011250637 HC RX REV CODE- 250/637: Performed by: STUDENT IN AN ORGANIZED HEALTH CARE EDUCATION/TRAINING PROGRAM

## 2022-10-17 PROCEDURE — 82962 GLUCOSE BLOOD TEST: CPT

## 2022-10-17 RX ADMIN — METOPROLOL TARTRATE 25 MG: 25 TABLET, FILM COATED ORAL at 17:19

## 2022-10-17 RX ADMIN — ACETAMINOPHEN 650 MG: 325 TABLET ORAL at 23:07

## 2022-10-17 RX ADMIN — BRIMONIDINE TARTRATE 1 DROP: 2 SOLUTION OPHTHALMIC at 08:49

## 2022-10-17 RX ADMIN — Medication 2 UNITS: at 11:53

## 2022-10-17 RX ADMIN — ATORVASTATIN CALCIUM 20 MG: 20 TABLET, FILM COATED ORAL at 23:07

## 2022-10-17 RX ADMIN — METOPROLOL TARTRATE 25 MG: 25 TABLET, FILM COATED ORAL at 08:48

## 2022-10-17 RX ADMIN — SODIUM CHLORIDE, PRESERVATIVE FREE 10 ML: 5 INJECTION INTRAVENOUS at 05:06

## 2022-10-17 RX ADMIN — Medication 1 PACKET: at 08:48

## 2022-10-17 RX ADMIN — ALLOPURINOL 150 MG: 300 TABLET ORAL at 08:47

## 2022-10-17 RX ADMIN — ASPIRIN 81 MG CHEWABLE TABLET 81 MG: 81 TABLET CHEWABLE at 08:47

## 2022-10-17 RX ADMIN — SODIUM CHLORIDE, PRESERVATIVE FREE 10 ML: 5 INJECTION INTRAVENOUS at 17:20

## 2022-10-17 RX ADMIN — ENOXAPARIN SODIUM 40 MG: 100 INJECTION SUBCUTANEOUS at 08:48

## 2022-10-17 RX ADMIN — SODIUM CHLORIDE, PRESERVATIVE FREE 10 ML: 5 INJECTION INTRAVENOUS at 23:08

## 2022-10-17 RX ADMIN — CHOLECALCIFEROL TAB 25 MCG (1000 UNIT) 1000 UNITS: 25 TAB at 08:47

## 2022-10-17 RX ADMIN — SODIUM ZIRCONIUM CYCLOSILICATE 10 G: 10 POWDER, FOR SUSPENSION ORAL at 23:08

## 2022-10-17 RX ADMIN — BRIMONIDINE TARTRATE 1 DROP: 2 SOLUTION OPHTHALMIC at 17:31

## 2022-10-17 NOTE — PROGRESS NOTES
Bedside and Verbal shift change [facility] ([facility] nurse) by Efe Esposito RN (offgoing nurse). Report included the following information SBAR, Kardex, OR Summary, Intake/Output, MAR, and Cardiac Rhythm Sinus Sarah Feast .

## 2022-10-17 NOTE — PROGRESS NOTES
-Hematology / Oncology ([facility]) -  -Primary Oncologist- Dr. Gertrudis Ram  -CC-follow up for CLL    -S-  fatigue    -O-    Patient Vitals for the past 24 hrs:   Temp Pulse Resp BP SpO2   10/17/22 0735 97.7 °F (36.5 °C) (!) 59 16 127/61 93 %   10/17/22 0501 97.6 °F (36.4 °C) (!) 59 16 137/60 96 %   10/16/22 2248 98.2 °F (36.8 °C) (!) 59 16 137/60 92 %   10/16/22 2028 98.1 °F (36.7 °C) (!) 58 14 (!) 123/54 --   10/16/22 1950 98.1 °F (36.7 °C) 64 18 (!) 116/55 93 %   10/16/22 1808 -- 77 -- 131/61 --   10/16/22 1449 98.5 °F (36.9 °C) (!) 58 17 (!) 140/62 98 %   10/16/22 1149 98.3 °F (36.8 °C) (!) 50 18 (!) 146/57 96 %       No intake/output data recorded. Gen: nad, frail appearing in chair  Resp no distress  -Labs-    Recent Labs     10/16/22  0402 10/15/22  0347   * 131*   K 4.6 4.3   GLU 86 86   BUN 26* 21*   CREA 0.97 0.96   CA 9.2 9.0         -Imaging-       -Assessment + Plan-     *) CLL:  - On low low dose acalabrutinib 100mg every other day. Last dose Saturday. Will hold for now and consider switching to Zanubrutinib (baby dose) once/if improved after DC.  - FU with Dr. Josh Oliva after DC     *) Hyponatremia:  - Admission in July Nephrology felt it was likely hypovolemic hyponatremia, he initially improved with IV hydration here but then down again.   - Low Na not on SE profile for acalabrutnib.   -Nephrology following, on fulid restriction

## 2022-10-17 NOTE — PROGRESS NOTES
Transition of Care Plan:    RUR:16%  Disposition: [facility] - accepted patient - will need PCR prior to discharge  Follow up appointments:PCP  DME needed:per rehab  Transportation at [address] or means to access home:      family  IM Medicare Letter:will need prior to discharge  Is patient a [place] and connected with the [place]? N/a  If yes, was [employer] transfer form completed and VA notified? Caregiver Contact:Cem Grissom Coy, DTR/mPOA1 ([phone])  Discharge Caregiver contacted prior to discharge? yes  Care Conference needed?:       no    CM spoke with [facility] and they have accepted patient but will need a PCR and anticipate having a bed for patient tomorrow if he is medically ready for discharge. MAME Hummel    Spoke with [facility] and they anticipate having a bed tomorrow for patient pending PCR - spoke with patient and daughter at bedside to make them aware- will need BLS transport. Medicare pt has received, reviewed, and signed 2nd IM letter informing them of their right to appeal the discharge. Signed copied has been placed on pt bedside chart.   MAME Hummel

## 2022-10-17 NOTE — PROGRESS NOTES
Problem: Self Care Deficits Care Plan (Adult)  Goal: *Acute Goals and Plan of Care (Insert Text)  Description: FUNCTIONAL STATUS PRIOR TO ADMISSION: recently signed off of HHPT services, had progressed to ambulating without assist devices, performed ADLs on his own and stood in shower to bathe, has a paid caregiver 9-1 M-F to assist with IADLs, daughter works as a nurse and checks on pt on the days she is off    [address]: The patient lived alone with paid caregiver and family to provide assist.    Occupational Therapy Goals:  Initiated 10/13/2022  1. Patient will perform grooming standing with contact guard assist within 7 days. 2. Patient will perform dressing with minimal assistance within 7 days. 3. Patient will perform toileting with minimal assistance within 7 days. 4. Patient will transfer from toilet with minimal assistance using the least restrictive device and appropriate durable medical equipment within 7 days. Outcome: Progressing Towards Goal    OCCUPATIONAL THERAPY TREATMENT  Patient: Wilder Walsh (20 y.o. male)  Date: 10/17/2022  Diagnosis: Hyponatremia [E87.1] <principal problem not specified>      Precautions: Bed Alarm, Fall  Chart, occupational therapy assessment, plan of care, and goals were reviewed. ASSESSMENT  Patient continues with skilled OT services and is progressing towards goals. This patient is mod A to max A for sit to stand (fluctuates) with cues for ant ws, ext of hip/knees/cervical spine with all mobility. He amb to toilet for toilet tx with mod A-max A and max cues, manual blocking of RW as he pushes it too far anterior with mobility-- manual A for increasing step length intermittently on way TO and back from bathroom. He requires A for all ADL tasks currently--D to max A for all ADL tasks. He does self-correct posture and follows one step commands with cues and increased time for processing.  SpO2 stable  on room air, HR in 60's during session, no c/o. He will benefit from IP rehab preferred as he was I for simple ADL and light I-ADL (only had caregiver 4 hours a day), otherwise he was alone and completed ADL and I-ADL at I level. Current Level of Function Impacting Discharge (ADLs): max A to D for ADL/moblity, poor posture    Other factors to consider for discharge: needs rehab         PLAN :  Patient continues to benefit from skilled intervention to address the above impairments. Continue treatment per established plan of care to address goals. Recommendation for discharge: (in order for the patient to meet his/her long term goals)  Therapy 3 hours per day 5-7 days per week    This discharge recommendation:  Has not yet been discussed the attending provider and/or case management    IF patient discharges home will need the following DME: defer to IP         SUBJECTIVE:   Patient stated I can.     OBJECTIVE DATA SUMMARY:   Cognitive/Behavioral Status:        Cognition: Follows commands;Decreased attention/concentration (increased timr for information processing)  Perception: Cues to maintain midline in standing;Verbal;Tactile     Safety/Judgement: Decreased awareness of need for assistance; Fall prevention    Functional Mobility and Transfers for ADLs:  Bed Mobility:  Scooting: Moderate assistance;Minimum assistance; Additional time; Adaptive equipment;Assist x1    Transfers:  Sit to Stand: Moderate assistance;Maximum assistance;Assist x1;Additional time; Adaptive equipment (posterior lean, cues for ant ws, hand placement)  Functional Transfers  Bathroom Mobility: Moderate assistance  Toilet Transfer : Maximum assistance;Assist x1;Additional time; Adaptive equipment       Balance:  Sitting: Impaired; With support  Sitting - Static: Good (unsupported); Fair (occasional)  Sitting - Dynamic: Good (unsupported); Poor (constant support)  Standing: Impaired;Pull to stand; With support  Standing - Static: Constant support;Fair;Poor  Standing - Dynamic : Constant support;Poor    ADL Intervention:  Feeding  Container Management: Total assistance (dependent)    Grooming  Brushing Teeth:  (min A to don lower denture plate, D for cleansing prior to donning. Max cues for using 2 hands, A for stabilizing denture cup so he didn't pull it into lap/spill it, max increased time!)  Cues: Physical assistance; Tactile cues provided;Verbal cues provided;Visual cues provided                             Toileting  Bladder Hygiene: Total assistance (dependent) (with man wick, did not urinate on toilet)  Clothing Management: Total assistance (dependent)  Cues: Verbal cues provided;Physical assistance for pants up;Physical assistance for pants down  Adaptive Equipment: Walker;Grab bars    Cognitive Retraining  Organizing/Sequencing: Breaking task down  Attention to Task: Single task  Following Commands: Follows one step commands/directions  Safety/Judgement: Decreased awareness of need for assistance; Fall prevention    Therapeutic Exercises:       Pain:  No c/o    Activity Tolerance:   Fair    After treatment patient left in no apparent distress:   Sitting in chair, Call bell within reach, Bed / chair alarm activated, and Caregiver / family present    COMMUNICATION/COLLABORATION:   The patients plan of care was discussed with: Registered nurse.      Isaac Gonzalez OTR/L  Time Calculation: 34 mins

## 2022-10-17 NOTE — PROGRESS NOTES
Physical Therapy    Chart reviewed and attempted visit, but patient had just gotten back into bed and reports fatigue. Will return in approx one hour to attempt visit again.       Geovany Leyva

## 2022-10-17 NOTE — PROGRESS NOTES
Problem: Falls - Risk of  Goal: *Absence of Falls  Description: Document Dann Castañeda Fall Risk and appropriate interventions in the flowsheet. Outcome: Progressing Towards Goal  Note: Fall Risk Interventions:  Mobility Interventions: Bed/chair exit alarm    Mentation Interventions: Door open when patient unattended, Bed/chair exit alarm, Toileting rounds    Medication Interventions: Patient to call before getting OOB    Elimination Interventions: Bed/chair exit alarm, Call light in reach, Patient to call for help with toileting needs              Problem: Patient Education: Go to Patient Education Activity  Goal: Patient/Family Education  Outcome: Progressing Towards Goal     Problem: Patient Education: Go to Patient Education Activity  Goal: Patient/Family Education  Outcome: Progressing Towards Goal     Problem: Patient Education: Go to Patient Education Activity  Goal: Patient/Family Education  Outcome: Progressing Towards Goal     Problem: Pressure Injury - Risk of  Goal: *Prevention of pressure injury  Description: Document Javon Scale and appropriate interventions in the flowsheet. Outcome: Progressing Towards Goal  Note: Pressure Injury Interventions:  Sensory Interventions: Assess changes in LOC, Keep linens dry and wrinkle-free    Moisture Interventions: Absorbent underpads, Internal/External urinary devices    Activity Interventions: Increase time out of bed, Assess need for specialty bed    Mobility Interventions: Turn and reposition approx.  every two hours(pillow and wedges), Assess need for specialty bed    Nutrition Interventions: Offer support with meals,snacks and hydration    Friction and Shear Interventions: Apply protective barrier, creams and emollients                Problem: Patient Education: Go to Patient Education Activity  Goal: Patient/Family Education  Outcome: Progressing Towards Goal

## 2022-10-17 NOTE — PROGRESS NOTES
Problem: Mobility Impaired (Adult and Pediatric)  Goal: *Acute Goals and Plan of Care (Insert Text)  Description: FUNCTIONAL STATUS PRIOR TO ADMISSION: Pt has caregivers 9-1 pm daily, daughter is present and reports cameras on the stairs in the home ; after 1 pm, pt is alone with daughter who works as a nurse and checks in ; pt does not drive ; pt was amb. With cane and \"nothing at all sometimes\" per daughter report    HOME SUPPORT PRIOR TO ADMISSION: Pt lives alone with caregivers 9-1 pm daily and daughter that works but checks in    [address]  Initiated 10/13/2022  1. Patient will move from supine to sit and sit to supine , scoot up and down, and roll side to side in bed with minimal assistance/contact guard assist within 7 day(s). 2.  Patient will transfer from bed to chair and chair to bed with minimal assistance/contact guard assist using the least restrictive device within 7 day(s). 3.  Patient will perform sit to stand with minimal assistance/contact guard assist within 7 day(s). 4.  Patient will ambulate with minimal assistance/contact guard assist for 50 feet with the least restrictive device within 7 day(s). Outcome: Progressing Towards Goal   PHYSICAL THERAPY TREATMENT  Patient: Mikhail Bullock (71 y.o. male)  Date: 10/17/2022  Diagnosis: Hyponatremia [E87.1] <principal problem not specified>      Precautions: Bed Alarm, Fall  Chart, physical therapy assessment, plan of care and goals were reviewed. ASSESSMENT  Patient continues with skilled PT services and is progressing towards goals. Patient received in bed and agreeable to participate, daughter also present in room and supportive. Patient able to come to sit EOB with min assist, extra time and verbal cuing for sequence.   Sitting balance is impaired and patient tends to lose balance posteriorly and to the right, worked on weight shifts, core activation, head turns and lateral leans, then demonstrated improved awareness of midline. Came to stand with mod assist, strong lean to right and posterior, again improved with time and weight shifting/tactile facilitation and cuing. Ambulated to chair with RW and shuffling gait, required mod assist  to support trunk and for walker management, was very flexed and has head down posture. Left sitting up in chair with call bell in reach, good tolerance of session and daughter still present in room. Patient is well below baseline and a high falls risk,  will be a good candidate for inpatient rehab, can tolerate three hours of therapy and demonstrates improvement during session. Current Level of Function Impacting Discharge (mobility/balance): mod assist with RW to ambulate, poor dynamic balance in sit and stand    Other factors to consider for discharge: high falls risk, below baseline, good family support         PLAN :  Patient continues to benefit from skilled intervention to address the above impairments. Continue treatment per established plan of care. to address goals. Recommendation for discharge: (in order for the patient to meet his/her long term goals)  Therapy 3 hours per day 5-7 days per week    This discharge recommendation:  Has been made in collaboration with the attending provider and/or case management    IF patient discharges home will need the following DME: to be determined (TBD)       SUBJECTIVE:   Patient stated will you come back tomorrow? Salvatore Bynum    OBJECTIVE DATA SUMMARY:   Critical Behavior:  Neurologic State: Alert  Orientation Level: Oriented to situation, Oriented to place, Oriented to person, Disoriented to time  Cognition: Follows commands, Decreased attention/concentration (increased timr for information processing)  Safety/Judgement: Decreased awareness of need for assistance, Fall prevention  Functional Mobility Training:  Bed Mobility:     Supine to Sit: Minimum assistance     Scooting: Minimum assistance        Transfers:  Sit to Stand:  Moderate assistance  Stand to Sit: Moderate assistance        Bed to Chair: Moderate assistance                    Balance:  Sitting: Impaired  Sitting - Static: Fair (occasional)  Sitting - Dynamic: Poor (constant support)  Standing: Impaired  Standing - Static: Constant support;Poor  Standing - Dynamic : Constant support;Poor  Ambulation/Gait Training:  Distance (ft): 5 Feet (ft)  Assistive Device: Gait belt;Walker, rolling  Ambulation - Level of Assistance: Moderate assistance        Gait Abnormalities: Trunk sway increased; Shuffling gait        Base of Support: Widened     Speed/Yani: Slow;Shuffled  Step Length: Left shortened;Right shortened        Interventions: Safety awareness training;Verbal cues; Tactile cues           Stairs: Therapeutic Exercises:   Heel slides, hip abd add, weight shifts, head turns, trunk lateral leans, reaching  Pain Rating:      Activity Tolerance:   Good and SpO2 stable on RA    After treatment patient left in no apparent distress:   Sitting in chair, Call bell within reach, Bed / chair alarm activated, and Caregiver / family present    COMMUNICATION/COLLABORATION:   The patients plan of care was discussed with: Registered nurse.      Sly Mace, PT   Time Calculation: 26 mins

## 2022-10-17 NOTE — PROGRESS NOTES
Bedside shift change report given to Ilsa Coe RN (oncoming nurse) by Wyatt Ron LPN (offgoing nurse). Report included the following information SBAR, Kardex, and MAR.

## 2022-10-17 NOTE — PROGRESS NOTES
Hospitalist Progress Note    NAME: Gay Rodriguez   :  10/4/1933   MRN:  [id number]       Assessment / Plan:  Weakness/malaise  Mild/moderate hyponatremia  Sodium level improving  Sodium level 132         CLL undergoing chemotherapy  Hold PTA acalabrutinib  Counts appear at baseline and are likely not contributing to current condition     Gout  Continue PTA allopurinol     CAD status post CABG  Essential hypertension  Continue PTA: Aspirin, atorvastatin, metoprolol     Non-insulin-dependent diabetes mellitus  Sliding scale corrective coverage insulin     BPH  Not currently on medication for this, monitor for urinary retention  If retaining would initiate tamsulosin     Glaucoma  Continue PTA eyedrops     GERD  Family reports patient not taking any scheduled medications, but will use Pepcid or Mylanta as needed  Monitor for symptoms and initiate therapy as indicated      18.5 - 24.9 Normal weight / Body mass index is 21.06 kg/m². Estimated discharge date:   Barriers:    Code status: Full  Prophylaxis: Lovenox  Recommended Disposition: Home w/Family     Subjective:     Chief Complaint / Reason for Physician Visit  \"\". Discussed with RN events overnight. PT OT evaluation , patient medically stable need placement    Review of Systems:  Symptom Y/N Comments  Symptom Y/N Comments   Fever/Chills n   Chest Pain n    Poor Appetite    Edema     Cough    Abdominal Pain     Sputum    Joint Pain     SOB/JETER    Pruritis/Rash     Nausea/vomit n   Tolerating PT/OT     Diarrhea    Tolerating Diet y    Constipation    Other       Could NOT obtain due to:      Objective:     VITALS:   Last 24hrs VS reviewed since prior progress note.  Most recent are:  Patient Vitals for the past 24 hrs:   Temp Pulse Resp BP SpO2   10/17/22 1525 97.9 °F (36.6 °C) 66 16 (!) 104/52 97 %   10/17/22 1113 97.3 °F (36.3 °C) 69 16 (!) 100/55 98 %   10/17/22 0735 97.7 °F (36.5 °C) (!) 59 16 127/61 93 %   10/17/22 0501 97.6 °F (36.4 °C) (!) 59 16 137/60 96 %   10/16/22 2248 98.2 °F (36.8 °C) (!) 59 16 137/60 92 %   10/16/22 2028 98.1 °F (36.7 °C) (!) 58 14 (!) 123/54 --   10/16/22 1950 98.1 °F (36.7 °C) 64 18 (!) 116/55 93 %   10/16/22 1808 -- 77 -- 131/61 --         Intake/Output Summary (Last 24 hours) at 10/17/2022 1533  Last data filed at 10/16/2022 2248  Gross per 24 hour   Intake --   Output 450 ml   Net -450 ml          I had a face to face encounter and independently examined this patient on 10/17/2022, as outlined below:  PHYSICAL EXAM:  General: WD, WN. Alert, cooperative, no acute distress    EENT:  EOMI. Anicteric sclerae. MMM  Resp:  CTA bilaterally, no wheezing or rales. No accessory muscle use  CV:  Regular  rhythm,  No edema  GI:  Soft, Non distended, Non tender. +Bowel sounds  Neurologic:  Alert and oriented X 3, normal speech,   Psych:   Good insight. Not anxious nor agitated  Skin:  No rashes. No jaundice    Reviewed most current lab test results and cultures  YES  Reviewed most current radiology test results   YES  Review and summation of old records today    NO  Reviewed patient's current orders and MAR    YES  PMH/SH reviewed - no change compared to H&P  ________________________________________________________________________  Care Plan discussed with:    Comments   Patient     Family  y    RN y    Care Manager     Consultant                        Multidiciplinary team rounds were held today with , nursing, pharmacist and clinical coordinator. Patient's plan of care was discussed; medications were reviewed and discharge planning was addressed.      ________________________________________________________________________  Total NON critical care TIME:  35   Minutes    Total CRITICAL CARE TIME Spent:   Minutes non procedure based      Comments   >50% of visit spent in counseling and coordination of care     ________________________________________________________________________  Lory Fairchild MD     Procedures: see electronic medical records for all procedures/Xrays and details which were not copied into this note but were reviewed prior to creation of Plan. LABS:  I reviewed today's most current labs and imaging studies. Pertinent labs include:  No results for input(s): WBC, HGB, HCT, PLT, HGBEXT, HCTEXT, PLTEXT, HGBEXT, HCTEXT, PLTEXT in the last 72 hours. Recent Labs     10/16/22  0402 10/15/22  0347   * 131*   K 4.6 4.3   CL 98 98   CO2 26 26   GLU 86 86   BUN 26* 21*   CREA 0.97 0.96   CA 9.2 9.0         Signed:  Alex Fitch MD

## 2022-10-17 NOTE — PROGRESS NOTES
NAME: Darrell Jaime        :  10/4/1933        MRN:  [id number]                 Assessment   :                                               Plan:  Hyponatremia  Weakness  CLL  Gout  DM  HTN>>hypotension today. Asymptomatic. Na marija 126 on 10/22; Na improving, now 131 to 132    Urine osm 432 (SIADH)    Ct FR of 1.5 L/day upon discharge; Started on UREA tabs on 10/14 - continue on discharge  Encourage increased solute intake    Hold parameters for Metoprolol placed    I will sign off, but please call with questions/changes                  Subjective:     Chief Complaint: oob in chair. Feels Ok. Not talkative. He is still weak and family at bedside (daughter from [facility]) and we discussed the above    Review of Systems:    Symptom Y/N Comments  Symptom Y/N Comments   Fever/Chills n   Chest Pain     Poor Appetite    Edema     Cough    Abdominal Pain     Sputum    Joint Pain     SOB/JETER n   Pruritis/Rash     Nausea/vomit n   Tolerating PT/OT     Diarrhea    Tolerating Diet     Constipation    Other       Could not obtain due to:      Objective:     VITALS:   Last 24hrs VS reviewed since prior progress note.  Most recent are:  Visit Vitals  /60   Pulse (!) 59   Temp 97.6 °F (36.4 °C)   Resp 16   Ht 5' 7\" (1.702 m)   Wt 61 kg (134 lb 7.7 oz)   SpO2 96%   BMI 21.06 kg/m²       Intake/Output Summary (Last 24 hours) at 10/17/2022 0553  Last data filed at 10/16/2022 2248  Gross per 24 hour   Intake 240 ml   Output 850 ml   Net -610 ml        Telemetry Reviewed:     PHYSICAL EXAM:  General: NAD  Clear  RRR, mild kenneth  No edema  AOx3      Lab Data Reviewed: (see below)    Medications Reviewed: (see below)    PMH/SH reviewed - no change compared to H&P  ________________________________________________________________________  Care Plan discussed with:  Patient     Family      RN     Care Manager                    Consultant: Comments   >50% of visit spent in counseling and coordination of care       ________________________________________________________________________  Hua Dey MD     Procedures: see electronic medical records for all procedures/Xrays and details which  were not copied into this note but were reviewed prior to creation of Plan. LABS:  No results for input(s): WBC, HGB, HCT, PLT, HGBEXT, HCTEXT, PLTEXT, HGBEXT, HCTEXT, PLTEXT in the last 72 hours.       MEDICATIONS:  Current Facility-Administered Medications   Medication Dose Route Frequency    urea (URE-NA) 15 gram packet 1 Packet  1 Packet Oral DAILY    allopurinoL (ZYLOPRIM) tablet 150 mg  150 mg Oral DAILY    aspirin chewable tablet 81 mg  81 mg Oral DAILY    brimonidine (ALPHAGAN) 0.2 % ophthalmic solution 1 Drop  1 Drop Both Eyes BID    cholecalciferol (VITAMIN D3) (1000 Units /25 mcg) tablet 1,000 Units  1,000 Units Oral DAILY    metoprolol tartrate (LOPRESSOR) tablet 25 mg  25 mg Oral BID    atorvastatin (LIPITOR) tablet 20 mg  20 mg Oral QHS    insulin lispro (HUMALOG) injection   SubCUTAneous AC&HS    glucose chewable tablet 16 g  4 Tablet Oral PRN    glucagon (GLUCAGEN) injection 1 mg  1 mg IntraMUSCular PRN    dextrose 10% infusion 0-250 mL  0-250 mL IntraVENous PRN    sodium chloride (NS) flush 5-40 mL  5-40 mL IntraVENous Q8H    sodium chloride (NS) flush 5-40 mL  5-40 mL IntraVENous PRN    acetaminophen (TYLENOL) tablet 650 mg  650 mg Oral Q6H PRN    Or    acetaminophen (TYLENOL) suppository 650 mg  650 mg Rectal Q6H PRN    polyethylene glycol (MIRALAX) packet 17 g  17 g Oral DAILY PRN    ondansetron (ZOFRAN ODT) tablet 4 mg  4 mg Oral Q8H PRN    Or    ondansetron (ZOFRAN) injection 4 mg  4 mg IntraVENous Q6H PRN    enoxaparin (LOVENOX) injection 40 mg  40 mg SubCUTAneous DAILY

## 2022-10-18 VITALS
RESPIRATION RATE: 18 BRPM | HEART RATE: 59 BPM | DIASTOLIC BLOOD PRESSURE: 64 MMHG | WEIGHT: 134.48 LBS | SYSTOLIC BLOOD PRESSURE: 142 MMHG | TEMPERATURE: 97.9 F | BODY MASS INDEX: 21.11 KG/M2 | OXYGEN SATURATION: 99 % | HEIGHT: 67 IN

## 2022-10-18 LAB
ANION GAP SERPL CALC-SCNC: 10 MMOL/L (ref 5–15)
ANION GAP SERPL CALC-SCNC: 8 MMOL/L (ref 5–15)
BASOPHILS # BLD: 0 K/UL (ref 0–0.1)
BASOPHILS NFR BLD: 0 % (ref 0–1)
BLASTS NFR BLD MANUAL: 0 %
BUN SERPL-MCNC: 33 MG/DL (ref 6–20)
BUN SERPL-MCNC: 41 MG/DL (ref 6–20)
BUN/CREAT SERPL: 33 (ref 12–20)
BUN/CREAT SERPL: 37 (ref 12–20)
CALCIUM SERPL-MCNC: 8.9 MG/DL (ref 8.5–10.1)
CALCIUM SERPL-MCNC: 8.9 MG/DL (ref 8.5–10.1)
CHLORIDE SERPL-SCNC: 98 MMOL/L (ref 97–108)
CHLORIDE SERPL-SCNC: 98 MMOL/L (ref 97–108)
CO2 SERPL-SCNC: 25 MMOL/L (ref 21–32)
CO2 SERPL-SCNC: 25 MMOL/L (ref 21–32)
CREAT SERPL-MCNC: 0.99 MG/DL (ref 0.7–1.3)
CREAT SERPL-MCNC: 1.12 MG/DL (ref 0.7–1.3)
DIFFERENTIAL METHOD BLD: ABNORMAL
EOSINOPHIL # BLD: 1.9 K/UL (ref 0–0.4)
EOSINOPHIL NFR BLD: 1 % (ref 0–7)
ERYTHROCYTE [DISTWIDTH] IN BLOOD BY AUTOMATED COUNT: 16 % (ref 11.5–14.5)
GLUCOSE BLD STRIP.AUTO-MCNC: 104 MG/DL (ref 65–117)
GLUCOSE BLD STRIP.AUTO-MCNC: 137 MG/DL (ref 65–117)
GLUCOSE BLD STRIP.AUTO-MCNC: 95 MG/DL (ref 65–117)
GLUCOSE SERPL-MCNC: 177 MG/DL (ref 65–100)
GLUCOSE SERPL-MCNC: 83 MG/DL (ref 65–100)
HCT VFR BLD AUTO: 31.9 % (ref 36.6–50.3)
HGB BLD-MCNC: 9.7 G/DL (ref 12.1–17)
IMM GRANULOCYTES # BLD AUTO: 0 K/UL
IMM GRANULOCYTES NFR BLD AUTO: 0 %
LYMPHOCYTES # BLD: 133.4 K/UL (ref 0.8–3.5)
LYMPHOCYTES NFR BLD: 69 % (ref 12–49)
MCH RBC QN AUTO: 33.7 PG (ref 26–34)
MCHC RBC AUTO-ENTMCNC: 30.4 G/DL (ref 30–36.5)
MCV RBC AUTO: 110.8 FL (ref 80–99)
METAMYELOCYTES NFR BLD MANUAL: 0 %
MONOCYTES # BLD: 58 K/UL (ref 0–1)
MONOCYTES NFR BLD: 30 % (ref 5–13)
MYELOCYTES NFR BLD MANUAL: 0 %
NEUTS BAND NFR BLD MANUAL: 0 % (ref 0–6)
NEUTS SEG # BLD: 0 K/UL (ref 1.8–8)
NEUTS SEG NFR BLD: 0 % (ref 32–75)
NRBC # BLD: 0 K/UL (ref 0–0.01)
NRBC BLD-RTO: 0 PER 100 WBC
OTHER CELLS NFR BLD MANUAL: 0 %
PLATELET # BLD AUTO: 162 K/UL (ref 150–400)
PMV BLD AUTO: 9.9 FL (ref 8.9–12.9)
POTASSIUM SERPL-SCNC: 4.1 MMOL/L (ref 3.5–5.1)
POTASSIUM SERPL-SCNC: 5.8 MMOL/L (ref 3.5–5.1)
PROMYELOCYTES NFR BLD MANUAL: 0 %
RBC # BLD AUTO: 2.88 M/UL (ref 4.1–5.7)
RBC MORPH BLD: ABNORMAL
SARS-COV-2, XPLCVT: NOT DETECTED
SERVICE CMNT-IMP: ABNORMAL
SERVICE CMNT-IMP: NORMAL
SERVICE CMNT-IMP: NORMAL
SODIUM SERPL-SCNC: 131 MMOL/L (ref 136–145)
SODIUM SERPL-SCNC: 133 MMOL/L (ref 136–145)
SOURCE, COVRS: NORMAL
WBC # BLD AUTO: 193.3 K/UL (ref 4.1–11.1)

## 2022-10-18 PROCEDURE — 82962 GLUCOSE BLOOD TEST: CPT

## 2022-10-18 PROCEDURE — 74011250636 HC RX REV CODE- 250/636: Performed by: INTERNAL MEDICINE

## 2022-10-18 PROCEDURE — 74011250636 HC RX REV CODE- 250/636: Performed by: STUDENT IN AN ORGANIZED HEALTH CARE EDUCATION/TRAINING PROGRAM

## 2022-10-18 PROCEDURE — 36415 COLL VENOUS BLD VENIPUNCTURE: CPT

## 2022-10-18 PROCEDURE — 74011250637 HC RX REV CODE- 250/637: Performed by: INTERNAL MEDICINE

## 2022-10-18 PROCEDURE — 74011250637 HC RX REV CODE- 250/637: Performed by: HOSPITALIST

## 2022-10-18 PROCEDURE — 80048 BASIC METABOLIC PNL TOTAL CA: CPT

## 2022-10-18 PROCEDURE — 85027 COMPLETE CBC AUTOMATED: CPT

## 2022-10-18 PROCEDURE — 74011250637 HC RX REV CODE- 250/637: Performed by: STUDENT IN AN ORGANIZED HEALTH CARE EDUCATION/TRAINING PROGRAM

## 2022-10-18 PROCEDURE — 97535 SELF CARE MNGMENT TRAINING: CPT

## 2022-10-18 PROCEDURE — 74011000250 HC RX REV CODE- 250: Performed by: STUDENT IN AN ORGANIZED HEALTH CARE EDUCATION/TRAINING PROGRAM

## 2022-10-18 RX ORDER — SODIUM POLYSTYRENE SULFONATE 15 G/60ML
15 SUSPENSION ORAL; RECTAL
Status: COMPLETED | OUTPATIENT
Start: 2022-10-18 | End: 2022-10-18

## 2022-10-18 RX ORDER — ALLOPURINOL 300 MG/1
150 TABLET ORAL DAILY
Qty: 15 TABLET | Refills: 0 | Status: SHIPPED | OUTPATIENT
Start: 2022-10-19 | End: 2022-11-18

## 2022-10-18 RX ORDER — SODIUM POLYSTYRENE SULFONATE 15 G/60ML
15 SUSPENSION ORAL; RECTAL
Status: DISCONTINUED | OUTPATIENT
Start: 2022-10-18 | End: 2022-10-18

## 2022-10-18 RX ORDER — CALCIUM GLUCONATE 20 MG/ML
1 INJECTION, SOLUTION INTRAVENOUS ONCE
Status: COMPLETED | OUTPATIENT
Start: 2022-10-18 | End: 2022-10-18

## 2022-10-18 RX ADMIN — SODIUM ZIRCONIUM CYCLOSILICATE 10 G: 10 POWDER, FOR SUSPENSION ORAL at 06:46

## 2022-10-18 RX ADMIN — METOPROLOL TARTRATE 25 MG: 25 TABLET, FILM COATED ORAL at 08:28

## 2022-10-18 RX ADMIN — CHOLECALCIFEROL TAB 25 MCG (1000 UNIT) 1000 UNITS: 25 TAB at 08:28

## 2022-10-18 RX ADMIN — SODIUM POLYSTYRENE SULFONATE 15 G: 15 SUSPENSION ORAL; RECTAL at 09:36

## 2022-10-18 RX ADMIN — SODIUM ZIRCONIUM CYCLOSILICATE 10 G: 10 POWDER, FOR SUSPENSION ORAL at 12:34

## 2022-10-18 RX ADMIN — BRIMONIDINE TARTRATE 1 DROP: 2 SOLUTION OPHTHALMIC at 08:28

## 2022-10-18 RX ADMIN — CALCIUM GLUCONATE 1000 MG: 20 INJECTION, SOLUTION INTRAVENOUS at 12:44

## 2022-10-18 RX ADMIN — ALLOPURINOL 150 MG: 300 TABLET ORAL at 08:28

## 2022-10-18 RX ADMIN — ASPIRIN 81 MG CHEWABLE TABLET 81 MG: 81 TABLET CHEWABLE at 08:28

## 2022-10-18 RX ADMIN — ENOXAPARIN SODIUM 40 MG: 100 INJECTION SUBCUTANEOUS at 08:28

## 2022-10-18 RX ADMIN — Medication 1 PACKET: at 08:29

## 2022-10-18 RX ADMIN — SODIUM CHLORIDE, PRESERVATIVE FREE 10 ML: 5 INJECTION INTRAVENOUS at 05:28

## 2022-10-18 NOTE — PROGRESS NOTES
Transition of Care Plan:     RUR:16%  Disposition: [facility] - accepted patient - setup for pickup at 5  pm with [facility]) phone [phone]    Barrier- pending K+ labs-    Spoke with Kathleen Evans # [phone] - patient will need EMTALA completed prior to discharge. Report number is [phone]  Going to room# [phone] at [facility] MD is [phone]    Follow up appointments:PCP  DME needed:per rehab  Transportation at [address] [place] [address] or means to access home:      family  IM Medicare Letter:signed 10/17/22  Is patient a [place] and connected with the [place]? N/a  If yes, was [employer] transfer form completed and [facility] notified? Caregiver Contact:Cem Quinonez DTR/mPOA1 ([phone])  Discharge Caregiver contacted prior to discharge? yes  Care Conference needed?:       no    CM spoke with Kathleen Evans this am and they anticipate having a bed available today for patient - she will call CM back once room assignment is confirmed.  MAME Dee

## 2022-10-18 NOTE — DISCHARGE SUMMARY
Hospitalist Discharge Summary     Patient ID:  Paulino Girard  [id number]  1 y.o.  10/4/1933  10/11/2022    PCP on record: Cecile Teixeira MD    Admit date: 10/11/2022  Discharge date and time: 10/18/2022    DISCHARGE DIAGNOSIS:    PNA     CONSULTATIONS:  IP CONSULT TO HOSPITALIST  IP CONSULT TO ONCOLOGY  IP CONSULT TO NEPHROLOGY    Excerpted HPI from H&P of Carole Mendoza, DO:  Edwin Sierra is a 80 y.o.  male with pertinent past medical history of CAD status post CABG, essential hypertension, non-insulin-dependent diabetes mellitus, BPH, glaucoma, GERD, CLL on acalabrutinib who presents with complaints of progressively worsening weakness/malaise starting Saturday 10/8. Patient accompanied by daughter at bedside who provides majority of history. She reports patient was evaluated by PCP yesterday and started on ciprofloxacin empirically for possible UTI as patient had been experiencing episodes of large-volume urinary incontinence which is fairly uncommon for him. Today, labs resulted from visit demonstrating sodium of 125 and patient continued to decline so PCP directed family to take patient to the emergency department for further evaluation. ROS otherwise negative and they deny any other complaints or concerns at this time. In the ED, afebrile, bradycardic in the 50s (sinus rhythm) hypertensive 140s/50s, saturating upper 90s on room air. CXR demonstrates no evidence of pneumonia or edema. Labs demonstrate: Sodium 126, potassium 4.8, chloride 94, BUN 17, creatinine 1.14 (baseline), magnesium 2.1, .6) baseline), hemoglobin 9.9, platelets 646, UA unremarkable, TSH 4.21. Patient administered 500 cc normal saline with significant improvement in fatigue/malaise.    ______________________________________________________________________  DISCHARGE SUMMARY/HOSPITAL COURSE:  for full details see H&P, daily progress notes, labs, consult notes. Weakness/malaise  Mild/moderate hyponatremia  Sodium level improving  Sodium level 131     Hyperkalemia   S/p kayexalate  Nephrology  follow up             CLL undergoing chemotherapy  Hold PTA acalabrutinib  Counts appear at baseline and are likely not contributing to current condition     Gout  Continue PTA allopurinol     CAD status post CABG  Essential hypertension  Continue PTA: Aspirin, atorvastatin, metoprolol     Non-insulin-dependent diabetes mellitus  Sliding scale corrective coverage insulin     BPH  Not currently on medication for this, monitor for urinary retention  If retaining would initiate tamsulosin     Glaucoma  Continue PTA eyedrops     GERD  Family reports patient not taking any scheduled medications, but will use Pepcid or Mylanta as needed  Monitor for symptoms and initiate therapy as indicated        _______________________________________________________________________  Patient seen and examined by me on discharge day. Pertinent Findings:  Gen:    Not in distress  Chest: Clear lungs  CVS:   Regular rhythm. No edema  Abd:  Soft, not distended, not tender  Neuro:  Alert,   _______________________________________________________________________  DISCHARGE MEDICATIONS:   Current Discharge Medication List        START taking these medications    Details   sodium zirconium cyclosilicate (LOKELMA) 10 gram powder packet Take 1 Packet by mouth daily for 7 days. Qty: 7 Packet, Refills: 0  Start date: 10/19/2022, End date: 10/26/2022      urea (URE-NA) 15 gram packet Take 1 Packet by mouth daily for 30 days. Qty: 30 Packet, Refills: 0  Start date: 10/19/2022, End date: 11/18/2022           CONTINUE these medications which have CHANGED    Details   allopurinoL (ZYLOPRIM) 300 mg tablet Take 0.5 Tablets by mouth daily for 30 days.   Qty: 15 Tablet, Refills: 0  Start date: 10/19/2022, End date: 11/18/2022           CONTINUE these medications which have NOT CHANGED    Details   metoprolol tartrate (LOPRESSOR) 25 mg tablet TAKE 1 TABLET TWICE A DAY  Qty: 180 Tablet, Refills: 3      cholecalciferol (VITAMIN D3) (1000 Units /25 mcg) tablet Take 1,000 Units by mouth daily. multivitamin with folic acid (ONE DAILY WITH FOLIC ACID) 892 mcg tab tablet Take 1 Tablet by mouth daily. simvastatin (ZOCOR) 40 mg tablet TAKE 1 TABLET DAILY IN THE EVENING  Qty: 90 Tablet, Refills: 3      metFORMIN (GLUCOPHAGE) 500 mg tablet TAKE 1 TABLET DAILY  Qty: 90 Tablet, Refills: 3      aspirin 81 mg chewable tablet Take 81 mg by mouth daily. brimonidine (ALPHAGAN) 0.15 % ophthalmic solution Administer 1 Drop to both eyes two (2) times a day. Indications: OPEN ANGLE GLAUCOMA           STOP taking these medications       acalabrutinib 100 mg cap Comments:   Reason for Stopping:         glucose blood VI test strips (OneTouch Ultra Test) strip Comments:   Reason for Stopping:         alum-mag hydroxide-simeth (MYLANTA) 200-200-20 mg/5 mL susp Comments:   Reason for Stopping:         ascorbic acid, vitamin C, (VITAMIN C) 500 mg tablet Comments:   Reason for Stopping:                 Patient Follow Up Instructions: Activity: PT/OT Eval and Treat  Diet: Diabetic Diet  Wound Care: As directed    Follow-up with PCP  in 5 days.   Follow-up tests/labs     Follow-up Information       Follow up With Specialties Details Why Contact Info    Brice Youngblood MD Internal Medicine Physician   [address] [address] 14 [facility] [id number] [place] [address] 1 [facility] [place],[address] Follow up [address] [id number]  [phone]    Waylon Brumfield MD Nephrology Follow up in 1 week(s)  [address]  [facility]  [phone]            ________________________________________________________________    Risk of deterioration: Moderate    Condition at Discharge: Stable  __________________________________________________________________    Disposition  IP Rehab    ____________________________________________________________________    Code Status: Full Code  ___________________________________________________________________      Total time in minutes spent coordinating this discharge (includes going over instructions, follow-up, prescriptions, and preparing report for sign off to her PCP) :  >30 minutes    Signed:   Rajiv Alcantar MD

## 2022-10-18 NOTE — PROGRESS NOTES
Bedside and Verbal shift change report given to [facility] KENDRICKN (oncoming nurse) by Jake Rangel RN (offgoing nurse). Report included the following information SBAR, Kardex, Intake/Output, MAR, Recent Results, and Cardiac Rhythm Sinus Marcos Peraza .

## 2022-10-18 NOTE — PROGRESS NOTES
-Hematology / Oncology ([facility]) -  -Primary Oncologist- Dr. Dawit Luo  --follow up for CLL    -S-  fatigue    -O-    Patient Vitals for the past 24 hrs:   Temp Pulse Resp BP SpO2   10/18/22 0809 97.8 °F (36.6 °C) (!) 49 16 (!) 139/56 95 %   10/18/22 0524 97.8 °F (36.6 °C) (!) 52 16 (!) 122/54 96 %   10/18/22 0417 -- (!) 50 -- -- --   10/17/22 2352 -- (!) 50 -- -- --   10/17/22 2210 97.8 °F (36.6 °C) (!) 51 17 (!) 128/56 94 %   10/17/22 1950 99.9 °F (37.7 °C) (!) 54 17 (!) 135/59 94 %   10/17/22 1930 -- (!) 50 -- -- --   10/17/22 1525 97.9 °F (36.6 °C) 66 16 (!) 104/52 97 %   10/17/22 1113 97.3 °F (36.3 °C) 69 16 (!) 100/55 98 %       No intake/output data recorded. Gen: nad, frail appearing in chair  Resp no distress  -Labs-    Recent Labs     10/18/22  0435 10/17/22  2005 10/16/22  0402   * 128* 132*   K 5.8* 6.0* 4.6   GLU 83 111* 86   BUN 33* 38* 26*   CREA 0.99 1.01 0.97   CA 8.9 8.5 9.2         -Imaging-       -Assessment + Plan-     *) CLL:  - On low low dose acalabrutinib 100mg every other day. Last dose Saturday. Will hold for now and consider switching to Zanubrutinib (baby dose) once/if improved after DC.  - FU with Dr. Schreiber Offer after DC     *) Hyponatremia:  - Admission in July Nephrology felt it was likely hypovolemic hyponatremia, he initially improved with IV hydration here but then down again.   - Low Na not on SE profile for acalabrutnib.   -Nephrology following, on fulid restriction     *) Hyperkalemeia  -getting kayexelate, plan per hospitalist

## 2022-10-18 NOTE — PROGRESS NOTES
Problem: Self Care Deficits Care Plan (Adult)  Goal: *Acute Goals and Plan of Care (Insert Text)  Description: FUNCTIONAL STATUS PRIOR TO ADMISSION: recently signed off of HHPT services, had progressed to ambulating without assist devices, performed ADLs on his own and stood in shower to bathe, has a paid caregiver 9-1 M-F to assist with IADLs, daughter works as a nurse and checks on pt on the days she is off    [address]: The patient lived alone with paid caregiver and family to provide assist.    Occupational Therapy Goals:  Initiated 10/13/2022  1. Patient will perform grooming standing with contact guard assist within 7 days. 2. Patient will perform dressing with minimal assistance within 7 days. 3. Patient will perform toileting with minimal assistance within 7 days. 4. Patient will transfer from toilet with minimal assistance using the least restrictive device and appropriate durable medical equipment within 7 days. 10/18/2022 1320 by Lucian Ayala  Outcome: Progressing Towards Goal  OCCUPATIONAL THERAPY TREATMENT  Patient: Mariluz Rivera (45 y.o. male)  Date: 10/18/2022  Diagnosis: Hyponatremia [E87.1] <principal problem not specified>      Precautions: Bed Alarm, Fall  Chart, occupational therapy assessment, plan of care, and goals were reviewed. ASSESSMENT  Patient continues with skilled OT services and is progressing towards goals. Good participation with therapy despite need for BM; willing to perform toileting transfer x 2 with mod A x 1, SBA second person, with use RW and BSC; NOT safe to try to ambulate to bathroom as patient and daughter want patient to do; he does not have functional standing endurance beyond 3-5 steps with RW, mod A. Max cues for upright posture with use RW    Current Level of Function Impacting Discharge (ADLs):  Mod A-D LE ADLs, Mod A transfers with     Other factors to consider for discharge: supportive family         PLAN :  Patient continues to benefit from skilled intervention to address the above impairments. Continue treatment per established plan of care to address goals. Recommend with staff: up to chair for all meals    Recommend next OT session: Cog screen    Recommendation for discharge: (in order for the patient to meet his/her long term goals)  Therapy 3 hours per day 5-7 days per week    This discharge recommendation:  Has been made in collaboration with the attending provider and/or case management    IF patient discharges home will need the following DME: bedside commode, transfer bench, walker: rolling, and wheelchair       SUBJECTIVE:   Patient stated I need to go to the bathroom.     OBJECTIVE DATA SUMMARY:   Cognitive/Behavioral Status:  Neurologic State: Alert; Appropriate for age  Orientation Level: Oriented X4  Cognition: Follows commands; Impaired decision making (would recommend cog screening)  Perception: Appears intact  Perseveration: Perseverates during conversation (regarding need to have bowel movement)  Safety/Judgement: Fall prevention; Awareness of environment        Transfers:  Sit to Stand: Moderate assistance;Assist x1;Additional time; Adaptive equipment (RW; kyphotic posture with poor ability to scan environement)  Functional Transfers  Toilet Transfer : Maximum assistance;Assist x1;Additional time; Adaptive equipment (RW; BSC)  Bed to Chair: Moderate assistance;Assist x2; Additional time    Balance:  Sitting: Impaired  Sitting - Static: Good (unsupported)  Sitting - Dynamic: Fair (occasional)  Standing: Impaired; With support  Standing - Static: Fair;Poor;Constant support  Standing - Dynamic : Fair;Poor;Constant support    ADL Intervention:         Lower Body Dressing Assistance  Dressing Assistance: Maximum assistance         Cognitive Retraining  Attention to Task: Single task  Maintains Attention For (Time): Greater than 10 minutes  Following Commands:  Follows one step commands/directions  Safety/Judgement: Fall prevention; Awareness of environment    Therapeutic Exercises:   Trained benefit of out of bed to chair to aide endurance, balance, bowel function etc.    Pain:  No pain reported this session    Activity Tolerance:   Fair, Poor, SpO2 stable on RA, requires rest breaks, and observed SOB with activity    After treatment patient left in no apparent distress: on [facility] beside chair, family and RN present  Call bell within reach, Bed / chair alarm activated, Caregiver / family present, and aware he is not to stand up by himself    COMMUNICATION/COLLABORATION:   The patients plan of care was discussed with: Physical therapist, Registered nurse, Case management, and Certified nursing assistant/patient care technician.      Jennifer Romeo OTR/L  Time Calculation: 25 mins

## 2022-10-18 NOTE — PROGRESS NOTES
NAME: Shannon Martel        :  10/4/1933        MRN:  [id number]                 Assessment   :                                               Plan:  Hyponatremia  Hyperkalemia  Weakness  CLL  Gout  DM  HTN>>hypotension today. Asymptomatic. Na marija 126 on 10/22; Na improved, now stable at 131 to 132    Urine osm 432 (SIADH)    K high the last 2 checks, I agree that it is secondary to CLL; will start daily Lokelma for now    Ct FR of 1.5 L/day upon discharge; Started on UREA tabs on 10/14 - continue on discharge  Encourage increased solute intake    Hold parameters for Metoprolol in place    Heme/onc following    If sodium and K stable/better on repeat labs this afternoon, then he would be ok for discharge with repeat labs as an outpatient in a week                      Subjective:     Chief Complaint: oob in chair. Feels Ok. He is still weak and family at bedside (daughter from [facility]) and we discussed the above    Review of Systems:    Symptom Y/N Comments  Symptom Y/N Comments   Fever/Chills n   Chest Pain     Poor Appetite    Edema     Cough    Abdominal Pain     Sputum    Joint Pain     SOB/JTEER n   Pruritis/Rash     Nausea/vomit n   Tolerating PT/OT     Diarrhea    Tolerating Diet     Constipation    Other       Could not obtain due to:      Objective:     VITALS:   Last 24hrs VS reviewed since prior progress note.  Most recent are:  Visit Vitals  BP (!) 139/56   Pulse (!) 49   Temp 97.8 °F (36.6 °C)   Resp 16   Ht 5' 7\" (1.702 m)   Wt 61 kg (134 lb 7.7 oz)   SpO2 95%   BMI 21.06 kg/m²       Intake/Output Summary (Last 24 hours) at 10/18/2022 1059  Last data filed at 10/18/2022 0524  Gross per 24 hour   Intake --   Output 650 ml   Net -650 ml        Telemetry Reviewed:     PHYSICAL EXAM:  General: NAD  Clear  RRR, mild kenneth  No edema  AOx3      Lab Data Reviewed: (see below)    Medications Reviewed: (see below)    PMH/SH reviewed - no change compared to H&P  ________________________________________________________________________  Care Plan discussed with:  Patient     Family      RN     Care Manager                    Consultant:          Comments   >50% of visit spent in counseling and coordination of care       ________________________________________________________________________  Fvaiola Gordon MD     Procedures: see electronic medical records for all procedures/Xrays and details which  were not copied into this note but were reviewed prior to creation of Plan. LABS:  No results for input(s): WBC, HGB, HCT, PLT, HGBEXT, HCTEXT, PLTEXT, HGBEXT, HCTEXT, PLTEXT in the last 72 hours.       MEDICATIONS:  Current Facility-Administered Medications   Medication Dose Route Frequency    calcium gluconate 1 gram in sodium chloride (ISO-OSM) 50 mL infusion  1 g IntraVENous ONCE    urea (URE-NA) 15 gram packet 1 Packet  1 Packet Oral DAILY    allopurinoL (ZYLOPRIM) tablet 150 mg  150 mg Oral DAILY    aspirin chewable tablet 81 mg  81 mg Oral DAILY    brimonidine (ALPHAGAN) 0.2 % ophthalmic solution 1 Drop  1 Drop Both Eyes BID    cholecalciferol (VITAMIN D3) (1000 Units /25 mcg) tablet 1,000 Units  1,000 Units Oral DAILY    metoprolol tartrate (LOPRESSOR) tablet 25 mg  25 mg Oral BID    atorvastatin (LIPITOR) tablet 20 mg  20 mg Oral QHS    insulin lispro (HUMALOG) injection   SubCUTAneous AC&HS    glucose chewable tablet 16 g  4 Tablet Oral PRN    glucagon (GLUCAGEN) injection 1 mg  1 mg IntraMUSCular PRN    dextrose 10% infusion 0-250 mL  0-250 mL IntraVENous PRN    sodium chloride (NS) flush 5-40 mL  5-40 mL IntraVENous Q8H    sodium chloride (NS) flush 5-40 mL  5-40 mL IntraVENous PRN    acetaminophen (TYLENOL) tablet 650 mg  650 mg Oral Q6H PRN    Or    acetaminophen (TYLENOL) suppository 650 mg  650 mg Rectal Q6H PRN    polyethylene glycol (MIRALAX) packet 17 g  17 g Oral DAILY PRN    ondansetron (ZOFRAN ODT) tablet 4 mg  4 mg Oral Q8H PRN    Or    ondansetron (ZOFRAN) injection 4 mg  4 mg IntraVENous Q6H PRN    enoxaparin (LOVENOX) injection 40 mg  40 mg SubCUTAneous DAILY

## 2022-10-18 NOTE — PROGRESS NOTES
Events of hospital stay are reviewed with him and his daughter. CLL is improved. Type 2 diabetes is stable. CKD is stable. No symptoms related to GERD. No further syncope episodes. He will be back to see me in about three months or as scheduled. We just really encourage him to drink at least 64 oz of water a day.

## 2022-10-18 NOTE — PROGRESS NOTES
This is a SHIRIN visit with the nurse navigator completing medication reconciliation and assessment of condition.

## 2022-10-18 NOTE — DISCHARGE INSTRUCTIONS
Hyponatremia: Care Instructions  Your Care Instructions     Hyponatremia (say \"er-tm-rqt-TREE-bart-uh\") means that you don't have enough sodium in your blood. It can cause nausea, vomiting, and headaches. Or you may not feel hungry. In serious cases, it can cause seizures, a coma, or even death. Hyponatremia is not a disease. It is a problem caused by something else, such as medicines or exercising for a long time in hot weather. You can get hyponatremia if you lose a lot of fluids and then you drink a lot of water or other liquids that don't have much sodium. You can also get it if you have kidney, liver, heart, or other health problems. Treatment is focused on getting your sodium levels back to normal.  Follow-up care is a key part of your treatment and safety. Be sure to make and go to all appointments, and call your doctor if you are having problems. It's also a good idea to know your test results and keep a list of the medicines you take. How can you care for yourself at home? If your doctor recommends it, drink fluids that have sodium. Sports drinks are a good choice. Or you can eat salty foods. If your doctor recommends it, limit the amount of water you drink. And limit fluids that are mostly water. These include tea, coffee, and juice. Take your medicines exactly as prescribed. Call your doctor if you have any problems with your medicine. Get your sodium levels tested when your doctor tells you to. When should you call for help? Call 911 anytime you think you may need emergency care. For example, call if:    You have a seizure. You passed out (lost consciousness). Call your doctor now or seek immediate medical care if:    You are confused or it is hard to focus. You have little or no appetite. You feel sick to your stomach or you vomit. You have a headache. You have mood changes.      You feel more tired than usual.   Watch closely for changes in your health, and be sure to contact your doctor if:    You do not get better as expected. Where can you learn more? Go to http://www.gray.com/  Enter U075 in the search box to learn more about \"Hyponatremia: Care Instructions. \"  Current as of: June 17, 2021               Content Version: 13.2  © 6192-2387 [employer]. Care instructions adapted under license by [employer] (which disclaims liability or warranty for this information). If you have questions about a medical condition or this instruction, always ask your healthcare professional. [address] any warranty or liability for your use of this information.     Check NA level And K level within 3 days

## 2022-10-18 NOTE — PROGRESS NOTES
Problem: Falls - Risk of  Goal: *Absence of Falls  Description: Document Darlen Moriarty Fall Risk and appropriate interventions in the flowsheet. Outcome: Progressing Towards Goal  Note: Fall Risk Interventions:  Mobility Interventions: Patient to call before getting OOB    Mentation Interventions: Bed/chair exit alarm, Door open when patient unattended, More frequent rounding, Toileting rounds    Medication Interventions: Patient to call before getting OOB    Elimination Interventions: Call light in reach, Patient to call for help with toileting needs              Problem: Patient Education: Go to Patient Education Activity  Goal: Patient/Family Education  Outcome: Progressing Towards Goal     Problem: Patient Education: Go to Patient Education Activity  Goal: Patient/Family Education  Outcome: Progressing Towards Goal     Problem: Patient Education: Go to Patient Education Activity  Goal: Patient/Family Education  Outcome: Progressing Towards Goal     Problem: Pressure Injury - Risk of  Goal: *Prevention of pressure injury  Description: Document Javon Scale and appropriate interventions in the flowsheet.   Outcome: Progressing Towards Goal  Note: Pressure Injury Interventions:  Sensory Interventions: Assess changes in LOC, Discuss PT/OT consult with provider, Minimize linen layers    Moisture Interventions: Absorbent underpads    Activity Interventions: Increase time out of bed, PT/OT evaluation    Mobility Interventions: HOB 30 degrees or less    Nutrition Interventions: Offer support with meals,snacks and hydration    Friction and Shear Interventions: Foam dressings/transparent film/skin sealants                Problem: Patient Education: Go to Patient Education Activity  Goal: Patient/Family Education  Outcome: Progressing Towards Goal

## 2022-10-18 NOTE — PROGRESS NOTES
Hospitalist Progress Note    NAME: Aixa Graham   :  10/4/1933   MRN:  [id number]       Assessment / Plan:  Weakness/malaise  Mild/moderate hyponatremia  Sodium level improving  Sodium level 131    Hyperkalemia   S/p kayexalate  Nephrology  follow up           CLL undergoing chemotherapy  Hold PTA acalabrutinib  Counts appear at baseline and are likely not contributing to current condition     Gout  Continue PTA allopurinol     CAD status post CABG  Essential hypertension  Continue PTA: Aspirin, atorvastatin, metoprolol     Non-insulin-dependent diabetes mellitus  Sliding scale corrective coverage insulin     BPH  Not currently on medication for this, monitor for urinary retention  If retaining would initiate tamsulosin     Glaucoma  Continue PTA eyedrops     GERD  Family reports patient not taking any scheduled medications, but will use Pepcid or Mylanta as needed  Monitor for symptoms and initiate therapy as indicated      18.5 - 24.9 Normal weight / Body mass index is 21.06 kg/m². Estimated discharge date:   Barriers:    Code status: Full  Prophylaxis: Lovenox  Recommended Disposition: Home w/Family     Subjective:     Chief Complaint / Reason for Physician Visit  \"\". Discussed with RN events overnight. PT OT evaluation , Hyperkalemia Hold DC     Review of Systems:  Symptom Y/N Comments  Symptom Y/N Comments   Fever/Chills n   Chest Pain n    Poor Appetite    Edema     Cough    Abdominal Pain     Sputum    Joint Pain     SOB/JETER    Pruritis/Rash     Nausea/vomit n   Tolerating PT/OT     Diarrhea    Tolerating Diet y    Constipation    Other       Could NOT obtain due to:      Objective:     VITALS:   Last 24hrs VS reviewed since prior progress note.  Most recent are:  Patient Vitals for the past 24 hrs:   Temp Pulse Resp BP SpO2   10/18/22 1225 -- 62 -- (!) 128/54 96 %   10/18/22 1130 97.5 °F (36.4 °C) (!) 56 18 (!) 116/51 95 %   10/18/22 0809 97.8 °F (36.6 °C) (!) 49 16 (!) 139/56 95 % 10/18/22 0524 97.8 °F (36.6 °C) (!) 52 16 (!) 122/54 96 %   10/18/22 0417 -- (!) 50 -- -- --   10/17/22 2352 -- (!) 50 -- -- --   10/17/22 2210 97.8 °F (36.6 °C) (!) 51 17 (!) 128/56 94 %   10/17/22 1950 99.9 °F (37.7 °C) (!) 54 17 (!) 135/59 94 %   10/17/22 1930 -- (!) 50 -- -- --   10/17/22 1525 97.9 °F (36.6 °C) 66 16 (!) 104/52 97 %         Intake/Output Summary (Last 24 hours) at 10/18/2022 1427  Last data filed at 10/18/2022 0524  Gross per 24 hour   Intake --   Output 650 ml   Net -650 ml          I had a face to face encounter and independently examined this patient on 10/18/2022, as outlined below:  PHYSICAL EXAM:  General: WD, WN. Alert, cooperative, no acute distress    EENT:  EOMI. Anicteric sclerae. MMM  Resp:  CTA bilaterally, no wheezing or rales. No accessory muscle use  CV:  Regular  rhythm,  No edema  GI:  Soft, Non distended, Non tender. +Bowel sounds  Neurologic:  Alert and oriented X 3, normal speech,   Psych:   Good insight. Not anxious nor agitated  Skin:  No rashes. No jaundice    Reviewed most current lab test results and cultures  YES  Reviewed most current radiology test results   YES  Review and summation of old records today    NO  Reviewed patient's current orders and MAR    YES  PMH/SH reviewed - no change compared to H&P  ________________________________________________________________________  Care Plan discussed with:    Comments   Patient     Family  y    RN y    Care Manager     Consultant                        Multidiciplinary team rounds were held today with , nursing, pharmacist and clinical coordinator. Patient's plan of care was discussed; medications were reviewed and discharge planning was addressed.      ________________________________________________________________________  Total NON critical care TIME:  35   Minutes    Total CRITICAL CARE TIME Spent:   Minutes non procedure based      Comments   >50% of visit spent in counseling and coordination of care     ________________________________________________________________________  Reche Blizzard, MD     Procedures: see electronic medical records for all procedures/Xrays and details which were not copied into this note but were reviewed prior to creation of Plan. LABS:  I reviewed today's most current labs and imaging studies. Pertinent labs include:  Recent Labs     10/18/22  1323   .3*   HGB 9.7*   HCT 31.9*          Recent Labs     10/18/22  0435 10/17/22  2005 10/16/22  0402   * 128* 132*   K 5.8* 6.0* 4.6   CL 98 96* 98   CO2 25 24 26   GLU 83 111* 86   BUN 33* 38* 26*   CREA 0.99 1.01 0.97   CA 8.9 8.5 9.2         Signed:  Reche Blizzard, MD

## 2022-10-18 NOTE — PROGRESS NOTES
Dr Freddy Kingsley contacted via perfect serve to make aware NA+ 128 and K+ 6.0, new order entered per MD.

## 2022-10-19 ENCOUNTER — PATIENT OUTREACH (OUTPATIENT)
Dept: CASE MANAGEMENT | Age: 87
End: 2022-10-19

## 2022-10-19 NOTE — PROGRESS NOTES
Per EMR patient discharged to [facility]. Transition of care outreach postponed for 7 days due to patient's discharge to inpatient rehab facility.

## 2022-10-27 ENCOUNTER — PATIENT OUTREACH (OUTPATIENT)
Dept: CASE MANAGEMENT | Age: 87
End: 2022-10-27

## 2022-10-27 NOTE — PROGRESS NOTES
Patient is currently admitted to [facility]. Transition of care outreach postponed for 7 days due to patient's discharge to inpatient rehab facility.

## 2022-10-31 NOTE — PROGRESS NOTES
Physician Progress Note      Anju Wheat  CSN #:                  [id number]  :                       10/4/1933  ADMIT DATE:       10/11/2022 6:15 PM  [address] [facility] DATE:        10/18/2022 5:26 PM  RESPONDING  PROVIDER #:        Ean Carter MD          QUERY TEXT:    Pt admitted weakness/malaise and Dx with hyponatremia. Noted documentation of Mild/moderate hyponatremia on DS and SIADH on 10/18 neph pn.  Please document in progress notes and discharge summary:    The medical record reflects the following:  Risk Factors: 51-year-old male with CLL, HTN and DM  Clinical Indicators: Na 126 on admission, up to 131 on dc (10/18). 10/18 Neph pn: Urine osm 432 (SIADH)  Treatment: BMP monitoring,  ml bolus (10/11), 0.9% NS 75 ml/hr    Thank You,  Rock Weinberg RN, CDI  Options provided:  -- [facility] confirmed POA  -- [facility] confirmed not POA  -- SID ruled out  -- Other - I will add my own diagnosis  -- Disagree - Not applicable / Not valid  -- Disagree - Clinically unable to determine / Unknown  -- Refer to Clinical Documentation Reviewer    PROVIDER RESPONSE TEXT:    The diagnosis of [facility] was confirmed as POA.     Query created by: Ramu Clemons on 10/21/2022 10:09 AM      Electronically signed by:  Ean Carter MD 10/31/2022 10:17 AM

## 2022-11-03 ENCOUNTER — PATIENT OUTREACH (OUTPATIENT)
Dept: CASE MANAGEMENT | Age: 87
End: 2022-11-03

## 2022-11-03 NOTE — PROGRESS NOTES
Per staff patient currently admitted to [facility]. Transition of care outreach postponed for 7 days due to patient's discharge to inpatient rehab facility.

## 2022-11-11 ENCOUNTER — PATIENT OUTREACH (OUTPATIENT)
Dept: CASE MANAGEMENT | Age: 87
End: 2022-11-11

## 2022-11-11 NOTE — PROGRESS NOTES
Patient currently admitted to [address] [place] [address] per staff. Transition of care outreach postponed for 7 days due to patient's discharge to inpatient rehab facility.

## 2022-11-15 ENCOUNTER — PATIENT OUTREACH (OUTPATIENT)
Dept: CASE MANAGEMENT | Age: 87
End: 2022-11-15

## 2022-11-18 ENCOUNTER — PATIENT OUTREACH (OUTPATIENT)
Dept: CASE MANAGEMENT | Age: 87
End: 2022-11-18

## 2022-11-18 NOTE — PROGRESS NOTES
[address] [place] [address] Dr Discharge Follow-Up      Date/Time:  2022 12:35 PM    Patient was admitted to [facility] on 10/11/22 and discharged to  [facility]  on 10/18/22. The patients discharge diagnosis was Pneumonia. Patient was discharge on 22 from  [facility] . The discharge summary from the post acute facilty was not available at the time of outreach. Patient was contacted within 4 business days of discharge from the post acute facility. LPN Care Coordinator contacted the patient by telephone to perform post hospital discharge follow up. Verified name and  with patient as identifiers. Provided introduction to self, and explanation of the LPN Care Coordinator role. Patient did not receive post acute facility discharge instructions. LPN Care Coordinator reviewed discharge instructions and red flags with patient who verbalized understanding. Patient given an opportunity to ask questions and does not have any further questions or concerns at this time. The patient agrees to contact the PCP office for questions related to their healthcare. LPN Care Coordinator provided contact information for future reference. Advance Care Planning:   Does patient have an Advance Directive:  reviewed and current     Home Health orders at discharge: [address]: Patient unable to recall the name of   Date of initial visit: this week. Durable Medical Equipment ordered at discharge: none  [address] received: n/a    Medication(s):   The post acute facility medication discharge list was not available for this call. Medication review was performed with patient, who verbalizes understanding of administration of home medications. There were no barriers to obtaining medications identified at this time.     [facility] follow up appointment(s):   Future Appointments   Date Time Provider Department [place]   1/4/2023 11:00 AM Jamil Zaman MD [facility]      Non-[facility] follow up appointment(s): n/a  [facility]:  offered and patient declined

## 2022-11-22 ENCOUNTER — HOSPITAL ENCOUNTER (EMERGENCY)
Age: 87
Discharge: HOME | End: 2022-11-23
Attending: EMERGENCY MEDICINE
Payer: MEDICARE

## 2022-11-22 VITALS
TEMPERATURE: 97.5 F | WEIGHT: 141 LBS | HEART RATE: 75 BPM | HEIGHT: 67 IN | OXYGEN SATURATION: 98 % | RESPIRATION RATE: 16 BRPM | BODY MASS INDEX: 22.13 KG/M2 | SYSTOLIC BLOOD PRESSURE: 153 MMHG | DIASTOLIC BLOOD PRESSURE: 65 MMHG

## 2022-11-22 DIAGNOSIS — T83.511A URINARY TRACT INFECTION ASSOCIATED WITH CATHETERIZATION OF URINARY TRACT, UNSPECIFIED INDWELLING URINARY CATHETER TYPE, INITIAL ENCOUNTER (HCC): ICD-10-CM

## 2022-11-22 DIAGNOSIS — N39.0 URINARY TRACT INFECTION ASSOCIATED WITH CATHETERIZATION OF URINARY TRACT, UNSPECIFIED INDWELLING URINARY CATHETER TYPE, INITIAL ENCOUNTER (HCC): ICD-10-CM

## 2022-11-22 DIAGNOSIS — R33.9 URINARY RETENTION: Primary | ICD-10-CM

## 2022-11-22 PROCEDURE — 99283 EMERGENCY DEPT VISIT LOW MDM: CPT

## 2022-11-22 PROCEDURE — 51702 INSERT TEMP BLADDER CATH: CPT

## 2022-11-23 LAB
APPEARANCE UR: CLEAR
BACTERIA URNS QL MICRO: ABNORMAL /HPF
BILIRUB UR QL: NEGATIVE
COLOR UR: ABNORMAL
EPITH CASTS URNS QL MICRO: ABNORMAL /LPF
GLUCOSE UR STRIP.AUTO-MCNC: NEGATIVE MG/DL
HGB UR QL STRIP: ABNORMAL
HYALINE CASTS URNS QL MICRO: ABNORMAL /LPF (ref 0–2)
KETONES UR QL STRIP.AUTO: NEGATIVE MG/DL
LEUKOCYTE ESTERASE UR QL STRIP.AUTO: ABNORMAL
NITRITE UR QL STRIP.AUTO: NEGATIVE
PH UR STRIP: 6 [PH] (ref 5–8)
PROT UR STRIP-MCNC: ABNORMAL MG/DL
RBC #/AREA URNS HPF: ABNORMAL /HPF (ref 0–5)
SP GR UR REFRACTOMETRY: 1.02
UA: UC IF INDICATED,UAUC: ABNORMAL
UROBILINOGEN UR QL STRIP.AUTO: 1 EU/DL (ref 0.2–1)
WBC URNS QL MICRO: ABNORMAL /HPF (ref 0–4)

## 2022-11-23 PROCEDURE — 81001 URINALYSIS AUTO W/SCOPE: CPT

## 2022-11-23 PROCEDURE — 87186 SC STD MICRODIL/AGAR DIL: CPT

## 2022-11-23 PROCEDURE — 87086 URINE CULTURE/COLONY COUNT: CPT

## 2022-11-23 PROCEDURE — 87077 CULTURE AEROBIC IDENTIFY: CPT

## 2022-11-23 NOTE — ED NOTES
Went over discharge instructions at this time. Family and patient already have follow-up set up with [facility]. Left with #[address] Catheter in lace--draining well--  Reviewed care and maintenance instructions with family member. Has an extra bag and leg bag. Patient left via Wheelchair with family. No distress at the time of discharge.

## 2022-11-23 NOTE — ED PROVIDER NOTES
EMERGENCY DEPARTMENT HISTORY AND PHYSICAL EXAM      Date: 11/22/2022  Patient Name: Mario Lobo    History of Presenting Illness     Chief Complaint   Patient presents with    Urinary Retention     Daughter stated that he has a distended bladder and an enlarged prostate so they put in a catheter. Patient stayed for a few days and did well with it. They removed the catheter earlier today at 1500 by Dr. Madelyn Calloway. Patient was referred here to get the alvarado reinserted and sent home with a follow-up with Dr. Madelyn Calloway later this week. History Provided By: Patient    HPI: Mario Lobo, 80 y.o. male with PMHx significant for coronary artery disease, s/p CABG, diabetes, hypertension, GERD, hyperlipidemia, urinary retention presents to the ED with inability to urinate. Patient was recently admitted at [facility] inpatient rehab and had urinary retention/difficulty urinating. While inpatient at rehab, patient was being catheterized every 6 hours. At the time of his discharge from rehab on 11/3, a Alvarado catheter was placed. Patient had catheter until today when he went to urology appointment with Dr. Jessy Donald. Catheter was DC'd this at about 3 PM and patient voided in the office. Since then, he has not been able to urinate more than note little bit. He feels like his bladder is distended and he has lower abdominal pain. Urology was called and directed patient to come to the ED for replacement of Alvarado catheter. Denies any fevers or chills. Denies any hematuria or burning with urination. Denies any flank pain, nausea, vomiting. PCP: Farheen Ramos MD    No current facility-administered medications on file prior to encounter. Current Outpatient Medications on File Prior to Encounter   Medication Sig Dispense Refill    metoprolol tartrate (LOPRESSOR) 25 mg tablet TAKE 1 TABLET TWICE A  Tablet 3    cholecalciferol (VITAMIN D3) (1000 Units /25 mcg) tablet Take 1,000 Units by mouth daily. multivitamin with folic acid (ONE DAILY WITH FOLIC ACID) 750 mcg tab tablet Take 1 Tablet by mouth daily. simvastatin (ZOCOR) 40 mg tablet TAKE 1 TABLET DAILY IN THE EVENING 90 Tablet 3    metFORMIN (GLUCOPHAGE) 500 mg tablet TAKE 1 TABLET DAILY 90 Tablet 3    aspirin 81 mg chewable tablet Take 81 mg by mouth daily. brimonidine (ALPHAGAN) 0.15 % ophthalmic solution Administer 1 Drop to both eyes two (2) times a day.  Indications: OPEN ANGLE GLAUCOMA         Past History     Past Medical History:  Past Medical History:   Diagnosis Date    Abnormal nuclear stress test 2014    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 ([facility] 2015    Bereavement 2019    ltcf - uti -strangulated hernia - prostate ca - brother    Bronchiectasis ([facility]     CAD (coronary artery disease)     Chest pain, unspecified     Chronic pain     right leg    CLL (chronic lymphocytic leukemia) ([facility]     Jerica Segura md  [facility]    Diabetes ([facility]     Essential hypertension     GERD (gastroesophageal reflux disease)     Hyperlipidemia     Hypertension     Opacity of lung on imaging study 2017    cxr    Rotator cuff arthropathy     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    S/P coronary artery stent placement 2014 PCI/GUANAKO to prox LAD       Past Surgical History:  Past Surgical History:   Procedure Laterality Date    HX COLONOSCOPY      HX HEART CATHETERIZATION      PTCA SINGLE VESSEL  2015         VASCULAR SURGERY PROCEDURE UNLIST   and     BLE stenting       Family History:  Family History   Problem Relation Age of Onset    Diabetes Mother     Stroke Father        Social History:  Social History     Tobacco Use    Smoking status: Former     Types: Cigarettes     Quit date: 1/15/1984     Years since quittin.8    Smokeless tobacco: Never    Tobacco comments:     QUIT    Vaping Use    Vaping Use: Never used   Substance Use Topics    Alcohol use: No     Alcohol/week: 0.0 standard drinks     Comment: quit in 1980    Drug use: No     Types: Prescription, OTC       Allergies: Allergies   Allergen Reactions    Codeine Shortness of Breath    Lipitor [Atorvastatin] Nausea Only         Review of Systems   Review of Systems   Constitutional:  Negative for chills and fever. Respiratory:  Negative for chest tightness and shortness of breath. Cardiovascular:  Negative for chest pain. Gastrointestinal:  Positive for abdominal distention and abdominal pain. Negative for diarrhea, nausea and vomiting. Genitourinary:  Positive for decreased urine volume and difficulty urinating. Negative for dysuria, flank pain and hematuria. Psychiatric/Behavioral:  The patient is nervous/anxious. All other systems reviewed and are negative.       Physical Exam   General appearance - well nourished, well appearing, and in no distress  Chest - clear to auscultation  Heart - normal rate and regular rhythm,  Abdomen - soft, suprapubic tenderness and distention  Musculoskeletal - no joint tenderness, deformity or swelling; normal ROM  Extremities - peripheral pulses normal, no pedal edema  Skin - normal coloration and turgor, no rashes  Neurological - alert, oriented x3, normal speech, no focal findings or movement disorder noted    Diagnostic Study Results     Labs -     Recent Results (from the past 48 hour(s))   URINALYSIS W/ REFLEX CULTURE    Collection Time: 11/23/22 12:26 AM    Specimen: Urine   Result Value Ref Range    Color YELLOW/STRAW      Appearance CLEAR CLEAR      Specific gravity 1.020      pH (UA) 6.0 5.0 - 8.0      Protein TRACE (A) NEG mg/dL    Glucose Negative NEG mg/dL    Ketone Negative NEG mg/dL    Bilirubin Negative NEG      Blood MODERATE (A) NEG      Urobilinogen 1.0 0.2 - 1.0 EU/dL    Nitrites Negative NEG      Leukocyte Esterase MODERATE (A) NEG      UA:UC IF INDICATED URINE CULTURE ORDERED (A) CNI      WBC 10-20 0 - 4 /hpf    RBC 10-20 0 - 5 /hpf    Epithelial cells FEW FEW /lpf Bacteria 3+ (A) NEG /hpf    Hyaline cast 0-2 0 - 2 /lpf   CULTURE, URINE    Collection Time: 11/23/22 12:26 AM    Specimen: Urine   Result Value Ref Range    Special Requests: NO SPECIAL REQUESTS  Reflexed from [id number]        [place] Count >100,000  COLONIES/mL        Culture result: PROBABLE ENTEROCOCCUS SPECIES (A)         Radiologic Studies -   No orders to display     CT Results  (Last 48 hours)      None          CXR Results  (Last 48 hours)      None              Medical Decision Making   I am the first provider for this patient. I reviewed the vital signs, available nursing notes, past medical history, past surgical history, family history and social history. Vital Signs-Reviewed the patient's vital signs. Patient Vitals for the past 12 hrs:   Temp Pulse Resp BP SpO2   11/22/22 2203 97.5 °F (36.4 °C) 75 16 (!) 153/65 98 %           Records Reviewed: Nursing Notes and Old Medical Records    Provider Notes (Medical Decision Making):   Patient with history of urinary retention and recent Sherwood catheter removal with voiding trial at urology office presents with difficulty urinating. Will obtain bladder scan and will likely need replacement of Sherwood catheter. We will check UA. ED Course:   Initial assessment performed. The patients presenting problems have been discussed, and they are in agreement with the care plan formulated and outlined with them. I have encouraged them to ask questions as they arise throughout their visit. Progress Notes:  Bladder scan shows greater than 500 cc. Sherwood catheter placed. UA sent. Discussed with daughter that I would call and start antibiotic if UA shows UTI. UA eventually resulted and showed UTI with 3+ bacteria, moderate leuk esterase. Prescription for Keflex 500 3 times daily sent to 24 hours [facility] in [place]. I called both patient and his daughter to inform them of results and need to  antibiotic as soon as possible.   Patient has follow-up with Dr. Eva Royal in 1 week. Disposition:  Discharge home    PLAN:  1. Keflex 500 mg 3 times daily x7 days  Discharge Medication List as of 11/23/2022  1:02 AM        CONTINUE these medications which have NOT CHANGED    Details   metoprolol tartrate (LOPRESSOR) 25 mg tablet TAKE 1 TABLET TWICE A DAY, Normal, Disp-180 Tablet, R-3      cholecalciferol (VITAMIN D3) (1000 Units /25 mcg) tablet Take 1,000 Units by mouth daily. , Historical Med      multivitamin with folic acid (ONE DAILY WITH FOLIC ACID) 777 mcg tab tablet Take 1 Tablet by mouth daily. , Historical Med      simvastatin (ZOCOR) 40 mg tablet TAKE 1 TABLET DAILY IN THE EVENING, Normal, Disp-90 Tablet, R-3      metFORMIN (GLUCOPHAGE) 500 mg tablet TAKE 1 TABLET DAILY, Normal, Disp-90 Tablet, R-3      aspirin 81 mg chewable tablet Take 81 mg by mouth daily. , Historical Med      brimonidine (ALPHAGAN) 0.15 % ophthalmic solution Administer 1 Drop to both eyes two (2) times a day. Indications: OPEN ANGLE GLAUCOMA, Historical Med           2. Follow-up Information       Follow up With Specialties Details Why Contact Info    \[facility]\"" EMERGENCY DEPT Emergency Medicine  If symptoms worsen [address] [place] [address]    Reggie Molina MD Urology Call today  [address] [phone]            Return to ED if worse     Diagnosis     Clinical Impression:   1. Urinary retention    2.  Urinary tract infection associated with catheterization of urinary tract, unspecified indwelling urinary catheter type, initial encounter ([facility]

## 2022-11-23 NOTE — ED NOTES
Patient arrives in room 7  Family reports he went to [facility]  They removed an indwelling catheter--  Patient has not urinated since 1500    Bladder scanner shows over 526 ML in bladder    Dr Zhang Truong in to evaluate--  Orders to insert indwelling catheter due to urinary retention

## 2022-11-24 RX ORDER — CEPHALEXIN 500 MG/1
500 CAPSULE ORAL 3 TIMES DAILY
Qty: 21 CAPSULE | Refills: 0 | Status: SHIPPED | OUTPATIENT
Start: 2022-11-24 | End: 2022-12-01

## 2022-11-24 RX ORDER — CEPHALEXIN 500 MG/1
500 CAPSULE ORAL 3 TIMES DAILY
Qty: 21 CAPSULE | Refills: 0 | Status: SHIPPED | OUTPATIENT
Start: 2022-11-24 | End: 2022-11-24 | Stop reason: SDUPTHER

## 2022-11-25 LAB
BACTERIA SPEC CULT: ABNORMAL
BACTERIA SPEC CULT: ABNORMAL
CC UR VC: ABNORMAL
SERVICE CMNT-IMP: ABNORMAL

## 2022-11-25 RX ORDER — CIPROFLOXACIN 500 MG/1
500 TABLET ORAL 2 TIMES DAILY
Qty: 20 TABLET | Refills: 0 | Status: SHIPPED | OUTPATIENT
Start: 2022-11-25 | End: 2022-12-05

## 2022-11-25 NOTE — PROGRESS NOTES
RE UC: Patient on Keflex. Not reported but likely resistant. Called and spoke to patient's daughter who verified the patient's birthdate. Informed of UC result and changed Abx to Cipro.

## 2022-11-28 ENCOUNTER — APPOINTMENT (OUTPATIENT)
Dept: GENERAL RADIOLOGY | Age: 87
End: 2022-11-28
Attending: STUDENT IN AN ORGANIZED HEALTH CARE EDUCATION/TRAINING PROGRAM
Payer: MEDICARE

## 2022-11-28 ENCOUNTER — HOSPITAL ENCOUNTER (EMERGENCY)
Age: 87
Discharge: HOME | End: 2022-11-28
Attending: EMERGENCY MEDICINE
Payer: MEDICARE

## 2022-11-28 VITALS
HEIGHT: 67 IN | OXYGEN SATURATION: 100 % | RESPIRATION RATE: 20 BRPM | BODY MASS INDEX: 22.13 KG/M2 | TEMPERATURE: 97.9 F | SYSTOLIC BLOOD PRESSURE: 124 MMHG | WEIGHT: 141 LBS | DIASTOLIC BLOOD PRESSURE: 59 MMHG | HEART RATE: 73 BPM

## 2022-11-28 DIAGNOSIS — J06.9 VIRAL URI WITH COUGH: Primary | ICD-10-CM

## 2022-11-28 LAB
COVID-19 RAPID TEST, COVR: NOT DETECTED
FLUAV AG NPH QL IA: NEGATIVE
FLUBV AG NOSE QL IA: NEGATIVE
SOURCE, COVRS: NORMAL

## 2022-11-28 PROCEDURE — 87804 INFLUENZA ASSAY W/OPTIC: CPT

## 2022-11-28 PROCEDURE — 71046 X-RAY EXAM CHEST 2 VIEWS: CPT

## 2022-11-28 PROCEDURE — 87635 SARS-COV-2 COVID-19 AMP PRB: CPT

## 2022-11-28 PROCEDURE — 99283 EMERGENCY DEPT VISIT LOW MDM: CPT

## 2022-11-28 RX ORDER — DEXTROMETHORPHAN HYDROBROMIDE, GUAIFENESIN 20; 400 MG/20ML; MG/20ML
20 SOLUTION ORAL
Qty: 118 ML | Refills: 0 | Status: SHIPPED | OUTPATIENT
Start: 2022-11-28

## 2022-11-28 RX ORDER — CETIRIZINE HYDROCHLORIDE 10 MG/1
10 TABLET ORAL DAILY
Qty: 20 TABLET | Refills: 0 | Status: SHIPPED | OUTPATIENT
Start: 2022-11-28

## 2022-11-28 NOTE — ED PROVIDER NOTES
EMERGENCY DEPARTMENT HISTORY AND PHYSICAL EXAM      Please note that this dictation was completed with [employer], the computer voice recognition software. Quite often unanticipated grammatical, syntax, homophones, and other interpretive errors are inadvertently transcribed by the computer software. Please disregard these errors. Please excuse any errors that have escaped final proofreading. Date: 11/28/2022  Patient Name: Quique Manzo    History of Presenting Illness     Chief Complaint   Patient presents with    Cough     Pt wheeled into triage with a cc of cough and nasal congestion x 1 week        History Provided By: Patient    HPI: Quique Manzo, 80 y.o. male with a history of CAD, CLL, DM, HTN and others presents ambulatory with his son-in-law to the ED with cc of less than 1 week of mild and intermittent nasal congestion and cough for which there has been no fever. Nothing seems to worsen or improve his symptoms. There are no known sick contacts and there has been no recent travel. I am told he is vaccinated against COVID-19 however has not had a flu vaccine this season. They have tried no medications for his cough or congestion. He has no history of asthma. He is a former smoker. Right now he is taking ciprofloxacin for a urinary tract infection. There has been no chest pain or shortness of breath. There are no other complaints, changes, or physical findings at this time. PCP: Nell Zhu MD    Current Outpatient Medications   Medication Sig Dispense Refill    dextromethorphan-guaiFENesin (Robitussin Cough-Chest Pedro DM) 5-100 mg/5 mL liqd Take 20 mL by mouth four (4) times daily as needed for Cough or Congestion. 118 mL 0    cetirizine (ZyrTEC) 10 mg tablet Take 1 Tablet by mouth daily. 20 Tablet 0    ciprofloxacin HCl (Cipro) 500 mg tablet Take 1 Tablet by mouth two (2) times a day for 10 days.  20 Tablet 0    cephALEXin (Keflex) 500 mg capsule Take 1 Capsule by mouth three (3) times daily for 7 days. 21 Capsule 0    metoprolol tartrate (LOPRESSOR) 25 mg tablet TAKE 1 TABLET TWICE A  Tablet 3    cholecalciferol (VITAMIN D3) (1000 Units /25 mcg) tablet Take 1,000 Units by mouth daily. multivitamin with folic acid (ONE DAILY WITH FOLIC ACID) 180 mcg tab tablet Take 1 Tablet by mouth daily. simvastatin (ZOCOR) 40 mg tablet TAKE 1 TABLET DAILY IN THE EVENING 90 Tablet 3    metFORMIN (GLUCOPHAGE) 500 mg tablet TAKE 1 TABLET DAILY 90 Tablet 3    aspirin 81 mg chewable tablet Take 81 mg by mouth daily. brimonidine (ALPHAGAN) 0.15 % ophthalmic solution Administer 1 Drop to both eyes two (2) times a day.  Indications: OPEN ANGLE GLAUCOMA       Past History     Past Medical History:  Past Medical History:   Diagnosis Date    Abnormal nuclear stress test 6/6/2014    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 ([facility] 11/2/2015    Bereavement 02/06/2019    ltcf - uti -strangulated hernia - prostate ca - brother    Bronchiectasis ([facility]     CAD (coronary artery disease)     Chest pain, unspecified     Chronic pain     right leg    CLL (chronic lymphocytic leukemia) ([facility]     Jerica Segura md  [facility]    Diabetes ([facility]     Essential hypertension     GERD (gastroesophageal reflux disease)     Hyperlipidemia     Hypertension     Opacity of lung on imaging study 05/28/2017    cxr    Rotator cuff arthropathy     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    S/P coronary artery stent placement 6/6/2014 6/6/14 PCI/GUANAKO to prox LAD       Past Surgical History:  Past Surgical History:   Procedure Laterality Date    HX COLONOSCOPY      HX HEART CATHETERIZATION      PTCA SINGLE VESSEL  4/4/2015         VASCULAR SURGERY PROCEDURE UNLIST  2014 and 2015    BLE stenting       Family History:  Family History   Problem Relation Age of Onset    Diabetes Mother     Stroke Father        Social History:  Social History     Tobacco Use    Smoking status: Former     Types: Cigarettes     Quit date: 1/15/1984     Years since quittin.8    Smokeless tobacco: Never    Tobacco comments:     QUIT    Vaping Use    Vaping Use: Never used   Substance Use Topics    Alcohol use: No     Alcohol/week: 0.0 standard drinks     Comment: quit in     Drug use: No     Types: Prescription, OTC       Allergies: Allergies   Allergen Reactions    Codeine Shortness of Breath    Lipitor [Atorvastatin] Nausea Only     Review of Systems   Review of Systems   Constitutional:  Negative for fever. HENT:  Positive for congestion. Negative for sore throat. Eyes:  Negative for pain. Respiratory:  Positive for cough. Negative for shortness of breath. Cardiovascular:  Negative for chest pain. Gastrointestinal:  Negative for abdominal pain. Genitourinary:  Negative for flank pain. Musculoskeletal:  Negative for back pain. Skin:  Negative for rash. Neurological:  Negative for headaches. Physical Exam   Physical Exam  Vitals and nursing note reviewed. Constitutional:       General: He is not in acute distress. Appearance: He is well-developed. He is not toxic-appearing. HENT:      Head: Normocephalic and atraumatic. No right periorbital erythema or left periorbital erythema. Right Ear: External ear normal.      Left Ear: External ear normal.      Nose: Nose normal.      Mouth/Throat:      Mouth: Mucous membranes are moist.   Eyes:      General: No scleral icterus. Conjunctiva/sclera: Conjunctivae normal.      Pupils: Pupils are equal, round, and reactive to light. Cardiovascular:      Rate and Rhythm: Normal rate. Pulmonary:      Effort: Pulmonary effort is normal. No respiratory distress. Comments:   Breathing is unlabored and lungs are clear to auscultation throughout  Abdominal:      Palpations: Abdomen is soft. Tenderness: There is no abdominal tenderness. Musculoskeletal:         General: Normal range of motion. Cervical back: Normal range of motion.    Skin: Findings: No rash. Neurological:      Mental Status: He is alert and oriented to person, place, and time. He is not disoriented. Cranial Nerves: No cranial nerve deficit. Sensory: No sensory deficit. Psychiatric:         Speech: Speech normal.     Diagnostic Study Results     Labs -     Recent Results (from the past 12 hour(s))   INFLUENZA A+B VIRAL AGS    Collection Time: 11/28/22 10:57 AM   Result Value Ref Range    Influenza A Antigen Negative NEG      Influenza B Antigen Negative NEG     COVID-19 RAPID TEST    Collection Time: 11/28/22 10:57 AM   Result Value Ref Range    Specimen source Nasopharyngeal      COVID-19 rapid test Not detected NOTD         Radiologic Studies -   XR CHEST PA LAT   Final Result   No acute disease. No significant interval change. CT Results  (Last 48 hours)      None          CXR Results  (Last 48 hours)                 11/28/22 1032  XR CHEST PA LAT Final result    Impression:  No acute disease. No significant interval change. Narrative:  INDICATION: cough       EXAM: CXR 2 Views. COMPARISON: 10/11/2022. FINDINGS: Frontal and lateral views of the chest show the lungs are free of   acute disease. Heart size is normal. There is prior median sternotomy. There is   no pulmonary edema. There is no evident pneumothorax or pleural effusion. Medical Decision Making   I am the first provider for this patient. I reviewed the vital signs, available nursing notes, past medical history, past surgical history, family history and social history. Vital Signs-Reviewed the patient's vital signs.   Patient Vitals for the past 12 hrs:   Temp Pulse Resp BP SpO2   11/28/22 1003 97.9 °F (36.6 °C) 73 20 (!) 124/59 100 %       Pulse Oximetry Analysis - 100% on RA    Records Reviewed: Nursing Notes, Old Medical Records, Previous Radiology Studies, and Previous Laboratory Studies    Provider Notes (Medical Decision Making):   DDx:Pneumonia, COVID-19, influenza, bronchitis, viral URI    Afebrile and well-appearing. Breathing is unlabored and lungs are clear to auscultation throughout. Oxygen saturation is 100% on room air. Chest x-ray is clear. Testing for flu and COVID are negative. Additional testing deferred. He is currently on ciprofloxacin for urinary tract infection. Will defer additional antibiotics. We will offer an antitussive, expectorant and an antihistamine for his symptoms. Refer to primary care. Return precautions for worsening symptoms or any concerns. ED Course:   Initial assessment performed. The patients presenting problems have been discussed, and they are in agreement with the care plan formulated and outlined with them. I have encouraged them to ask questions as they arise throughout their visit. Disposition:  Discharge    PLAN:  1. Current Discharge Medication List        START taking these medications    Details   dextromethorphan-guaiFENesin (Robitussin Cough-Chest Pedro DM) 5-100 mg/5 mL liqd Take 20 mL by mouth four (4) times daily as needed for Cough or Congestion. Qty: 118 mL, Refills: 0  Start date: 11/28/2022      cetirizine (ZyrTEC) 10 mg tablet Take 1 Tablet by mouth daily. Qty: 20 Tablet, Refills: 0  Start date: 11/28/2022           2. Follow-up Information       Follow up With Specialties Details Why Contact Info    Doris Oneill MD Internal Medicine Physician Call  PRIMARY CARE: call to schedule follow up [place] [address]  [place] [address]  [phone]            Return to ED if worse     Diagnosis     Clinical Impression:   1.  Viral URI with cough

## 2022-12-08 ENCOUNTER — PATIENT OUTREACH (OUTPATIENT)
Dept: CASE MANAGEMENT | Age: 87
End: 2022-12-08

## 2022-12-08 NOTE — PROGRESS NOTES
CCM: Diabetes w/ Nephropathy/Chronic lymphocytic leukemia/GERD/Chronic Ischemic Heart Disease f/up. Chart Review:  11/22: UTI  11/28:  ED:  Cough/URI    Patient on Complex CM Enrollment Report/fu Contact. Left message on voice mail with my contact information for return call. Need to assess for ongoing needs and CCM enrollment/fu. Ambulatory Care Management Note    Date/Time:  12/8/2022 10:00 AM    This Ambulatory Care Manager (ACM) reviewed and updated the following screenings during this call; disease specific assessment     Patient's challenges to self-management identified:   medical condition      Medication Management:  good adherence    Advance Care Planning:   Does patient have an Advance Directive:  yes; reviewed and current     [employer], Referrals, and Durable Medical Equipment: Urology. Oncologist.  [facility]. Health Maintenance Due   Topic Date Due    Shingrix Vaccine Age 49> (1 of 2) Never done    COVID-19 Vaccine (4 - Booster for Moderna series) 01/13/2022    Eye Exam Retinal or Dilated  06/16/2022    Flu Vaccine (1) 08/01/2022    Foot Exam Q1  10/12/2022    MICROALBUMIN Q1  12/08/2022    Medicare Yearly Exam  12/09/2022     Health Maintenance Reviewed: due to leukemia-PCP consult needed at next visit. Patient was asked to consider health care goals that they would like to focus on with this ACM. ACM will follow up with patient to discuss goals and establish care plan in the next 7-14 days. Diet for proper Na+ level (previously low). Goals Addressed                   This Visit's Progress     Attends follow-up appointments as directed. On track     10/5/2022  -Attended PCP follow up on 9/28/2022  -Will follow up with PCP in 3 months  SP  9/27/2022  -Will attend follow up with PCP on 9/28/2022  -CTN to follow up in 1 week  SP    12/8/2022:  Patient has upcoming Oncology (Dr. Harriett Scott) f/up visit due to Endosense; plan is to start on new lower strength med. Scheduled for Urodynamic study with Dr. Efe Brandon; Nephrology appointment 1/3/2023. No barriers voiced by caretaker daughter. EW ACM           Take off Na+ tabs and placed on diet: 1500g Na+ (f/up).     PCP/Specialist follow up:   Future Appointments   Date Time Provider Renzo Gutierrez   1/4/2023 11:00 AM Nlel Zhu MD [facility] MAIN BS AMB

## 2022-12-12 ENCOUNTER — PATIENT OUTREACH (OUTPATIENT)
Dept: CASE MANAGEMENT | Age: 87
End: 2022-12-12

## 2022-12-12 RX ORDER — AMLODIPINE BESYLATE 2.5 MG/1
TABLET ORAL
Qty: 90 TABLET | Refills: 3 | Status: SHIPPED | OUTPATIENT
Start: 2022-12-12

## 2022-12-12 NOTE — PROGRESS NOTES
I.  Patient on Complex CM Enrollment Report/fu Contact. Left message on voice mail with my contact information for return call. Need to assess for ongoing needs and CCM enrollment/fu. Ambulatory Care Management Note      Date/Time:  12/12/2022 11:27 AM    This patient was received as a referral from [facility] [employer]. Top Challenges reviewed with the provider   11/22/2022: was in ED for Urinary Retention/UTI. Multiple follow-ups: Patient has upcoming Oncology (Dr. Mayocl Osorio) f/up visit due to [facility]; plan is to start on new lower strength med. Scheduled for Urodynamic study with Dr. Madelyn Calloway; Nephrology appointment 1/3/2023    PCP appointment 1/4/2022             Ambulatory  contacted patient for discussion and case management of Leukemia/Oncology results. Summary of patients top problems:   Patient's daughter is a nurse; discussion on ways to maintain/increase Na+ diet level. Appetite needs & dietary desirableness. Patient's challenges to self management identified:   medical condition      Medication Management:  experiencing side effects-plans by Onocology to change leukemia meds to lower dose. Advance Care Planning:   Does patient have an Advance Directive:  yes; reviewed and current   No changes to daughter Aleah Reese remaining [address] [employer], Referrals, and Durable Medical Equipment: Onocology. Urology. PCP/Specialist follow up:   Future Appointments   Date Time Provider Renzo Gutierrez   1/4/2023 11:00 AM Farheen Ramos MD [facility] MAIN BS AMB       Goals        Attends follow-up appointments as directed. 10/5/2022  -Attended PCP follow up on 9/28/2022  -Will follow up with PCP in 3 months  SP  9/27/2022  -Will attend follow up with PCP on 9/28/2022  -CTN to follow up in 1 week  SP    12/8/2022:  Patient has upcoming Oncology (Dr. Maycol Osorio) f/up visit due to [facility]; plan is to start on new lower strength med. Scheduled for Urodynamic study with Dr. Madelyn Calloway; Nephrology appointment 1/3/2023. No barriers voiced by caretaker daughter. EW ACM       Prevent complications post hospitalization. 10/5/2022  -Daughter reports BP is stable 130's/60's. Will take BP daily and record. Will report BP >180/100 or <80/40.   -Per PCP recommendations patient to drink 64oz of water daily  -Will follow up in 1 week  SP  9/27/2022  -Daughter to monitor fluid intake and hydration status. Will take BP daily and record. Will report BP >180/100 or <80/40. -CTN to follow up in 1 week  SP             Patient verbalized understanding of all information discussed. Patient has this Ambulatory Care Manager's contact information for any further questions, concerns, or needs. II. Patient has graduated from the Complex Case Management  program on 12/12/2022. Patient/family has the ability to self-manage at this time. Care management goals have been completed. No further Ambulatory Care Manager follow up scheduled. Goals        Attends follow-up appointments as directed. 10/5/2022  -Attended PCP follow up on 9/28/2022  -Will follow up with PCP in 3 months  SP  9/27/2022  -Will attend follow up with PCP on 9/28/2022  -CTN to follow up in 1 week  SP    12/8/2022:  Patient has upcoming Oncology (Dr. Maycol Osorio) f/up visit due to [employer]; plan is to start on new lower strength med. Scheduled for Urodynamic study with Dr. Madelyn Calloway; Nephrology appointment 1/3/2023. No barriers voiced by caretaker daughter. EW ACM       Prevent complications post hospitalization. 10/5/2022  -Daughter reports BP is stable 130's/60's. Will take BP daily and record. Will report BP >180/100 or <80/40.   -Per PCP recommendations patient to drink 64oz of water daily  -Will follow up in 1 week  SP  9/27/2022  -Daughter to monitor fluid intake and hydration status. Will take BP daily and record. Will report BP >180/100 or <80/40.    -CTN to follow up in 1 week  SP             Patient has Ambulatory Care Manager's contact information for any further questions, concerns, or needs. Patients upcoming visits:    Future Appointments   Date Time Provider Renzo Gutierrez   1/4/2023 11:00 AM Jeff Sparks MD [facility] MAIN BS AMB        Goals Addressed                   This Visit's Progress     Attends follow-up appointments as directed. On track     10/5/2022  -Attended PCP follow up on 9/28/2022  -Will follow up with PCP in 3 months  SP  9/27/2022  -Will attend follow up with PCP on 9/28/2022  -CTN to follow up in 1 week  SP    12/8/2022:  Patient has upcoming Oncology (Dr. Dunn Began) f/up visit due to [employer]; plan is to start on new lower strength med. Scheduled for Urodynamic study with Dr. Juan Pablo Miranda; Nephrology appointment 1/3/2023. No barriers voiced by caretaker daughter. DINORA PRESLEY    12/12/2022:  No barriers voiced re multiple appointments. Caretaker daughter reports she works from home.   DINORA PRESLEY

## 2023-01-01 ENCOUNTER — HOSPICE ADMISSION (OUTPATIENT)
Dept: HOSPICE | Facility: HOSPICE | Age: 88
End: 2023-01-01
Payer: MEDICARE

## 2023-01-01 ENCOUNTER — HOSPITAL ENCOUNTER (INPATIENT)
Age: 88
LOS: 1 days | End: 2023-02-04
Attending: FAMILY MEDICINE | Admitting: FAMILY MEDICINE
Payer: OTHER MISCELLANEOUS

## 2023-01-01 ENCOUNTER — APPOINTMENT (OUTPATIENT)
Dept: NON INVASIVE DIAGNOSTICS | Age: 88
DRG: 177 | End: 2023-01-01
Attending: STUDENT IN AN ORGANIZED HEALTH CARE EDUCATION/TRAINING PROGRAM
Payer: MEDICARE

## 2023-01-01 ENCOUNTER — APPOINTMENT (OUTPATIENT)
Dept: GENERAL RADIOLOGY | Age: 88
DRG: 177 | End: 2023-01-01
Attending: GENERAL ACUTE CARE HOSPITAL
Payer: MEDICARE

## 2023-01-01 ENCOUNTER — APPOINTMENT (OUTPATIENT)
Dept: GENERAL RADIOLOGY | Age: 88
DRG: 177 | End: 2023-01-01
Attending: STUDENT IN AN ORGANIZED HEALTH CARE EDUCATION/TRAINING PROGRAM
Payer: MEDICARE

## 2023-01-01 ENCOUNTER — HOSPITAL ENCOUNTER (INPATIENT)
Age: 88
LOS: 8 days | Discharge: HOSPICE | DRG: 177 | End: 2023-02-03
Attending: STUDENT IN AN ORGANIZED HEALTH CARE EDUCATION/TRAINING PROGRAM | Admitting: STUDENT IN AN ORGANIZED HEALTH CARE EDUCATION/TRAINING PROGRAM
Payer: MEDICARE

## 2023-01-01 VITALS
TEMPERATURE: 97.5 F | HEART RATE: 71 BPM | BODY MASS INDEX: 21.49 KG/M2 | HEIGHT: 67 IN | WEIGHT: 136.91 LBS | RESPIRATION RATE: 18 BRPM | OXYGEN SATURATION: 88 % | SYSTOLIC BLOOD PRESSURE: 155 MMHG | DIASTOLIC BLOOD PRESSURE: 56 MMHG

## 2023-01-01 VITALS
TEMPERATURE: 97.8 F | RESPIRATION RATE: 27 BRPM | OXYGEN SATURATION: 72 % | DIASTOLIC BLOOD PRESSURE: 69 MMHG | HEART RATE: 101 BPM | SYSTOLIC BLOOD PRESSURE: 147 MMHG

## 2023-01-01 DIAGNOSIS — Z51.5 HOSPICE CARE: Primary | ICD-10-CM

## 2023-01-01 DIAGNOSIS — J12.82 PNEUMONIA DUE TO COVID-19 VIRUS: ICD-10-CM

## 2023-01-01 DIAGNOSIS — R52 GENERALIZED PAIN: ICD-10-CM

## 2023-01-01 DIAGNOSIS — J96.01 ACUTE RESPIRATORY FAILURE WITH HYPOXIA (HCC): Primary | ICD-10-CM

## 2023-01-01 DIAGNOSIS — R06.82 TACHYPNEA: ICD-10-CM

## 2023-01-01 DIAGNOSIS — I21.4 NSTEMI (NON-ST ELEVATED MYOCARDIAL INFARCTION) (HCC): ICD-10-CM

## 2023-01-01 DIAGNOSIS — R41.89 DECREASED RESPONSIVENESS: ICD-10-CM

## 2023-01-01 DIAGNOSIS — U07.1 PNEUMONIA DUE TO COVID-19 VIRUS: ICD-10-CM

## 2023-01-01 LAB
ABO + RH BLD: NORMAL
ALBUMIN SERPL-MCNC: 1.9 G/DL (ref 3.5–5)
ALBUMIN SERPL-MCNC: 1.9 G/DL (ref 3.5–5)
ALBUMIN SERPL-MCNC: 2.1 G/DL (ref 3.5–5)
ALBUMIN SERPL-MCNC: 2.2 G/DL (ref 3.5–5)
ALBUMIN SERPL-MCNC: 2.5 G/DL (ref 3.5–5)
ALBUMIN/GLOB SERPL: 0.5 (ref 1.1–2.2)
ALBUMIN/GLOB SERPL: 0.6 (ref 1.1–2.2)
ALBUMIN/GLOB SERPL: 0.8 (ref 1.1–2.2)
ALP SERPL-CCNC: 192 U/L (ref 45–117)
ALP SERPL-CCNC: 196 U/L (ref 45–117)
ALP SERPL-CCNC: 214 U/L (ref 45–117)
ALP SERPL-CCNC: 219 U/L (ref 45–117)
ALP SERPL-CCNC: 227 U/L (ref 45–117)
ALT SERPL-CCNC: 42 U/L (ref 12–78)
ALT SERPL-CCNC: 46 U/L (ref 12–78)
ALT SERPL-CCNC: 48 U/L (ref 12–78)
ALT SERPL-CCNC: 74 U/L (ref 12–78)
ALT SERPL-CCNC: 76 U/L (ref 12–78)
ANION GAP BLD CALC-SCNC: 13 (ref 10–20)
ANION GAP SERPL CALC-SCNC: 13 MMOL/L (ref 5–15)
ANION GAP SERPL CALC-SCNC: 14 MMOL/L (ref 5–15)
ANION GAP SERPL CALC-SCNC: 4 MMOL/L (ref 5–15)
ANION GAP SERPL CALC-SCNC: 5 MMOL/L (ref 5–15)
ANION GAP SERPL CALC-SCNC: 7 MMOL/L (ref 5–15)
ANION GAP SERPL CALC-SCNC: 7 MMOL/L (ref 5–15)
ANION GAP SERPL CALC-SCNC: 8 MMOL/L (ref 5–15)
ANION GAP SERPL CALC-SCNC: 9 MMOL/L (ref 5–15)
APPEARANCE UR: CLEAR
APTT PPP: 30.3 SEC (ref 22.1–31)
APTT PPP: 55.1 SEC (ref 22.1–31)
AST SERPL-CCNC: 45 U/L (ref 15–37)
AST SERPL-CCNC: 50 U/L (ref 15–37)
AST SERPL-CCNC: 55 U/L (ref 15–37)
AST SERPL-CCNC: 56 U/L (ref 15–37)
AST SERPL-CCNC: 71 U/L (ref 15–37)
ATRIAL RATE: 103 BPM
ATRIAL RATE: 75 BPM
ATRIAL RATE: 81 BPM
BACTERIA SPEC CULT: ABNORMAL
BACTERIA SPEC CULT: NORMAL
BACTERIA SPEC CULT: NORMAL
BACTERIA URNS QL MICRO: ABNORMAL /HPF
BASE DEFICIT BLD-SCNC: 3.4 MMOL/L
BASOPHILS # BLD: 0 K/UL (ref 0–0.1)
BASOPHILS NFR BLD: 0 % (ref 0–1)
BILIRUB SERPL-MCNC: 1.2 MG/DL (ref 0.2–1)
BILIRUB SERPL-MCNC: 1.4 MG/DL (ref 0.2–1)
BILIRUB SERPL-MCNC: 1.5 MG/DL (ref 0.2–1)
BILIRUB SERPL-MCNC: 1.6 MG/DL (ref 0.2–1)
BILIRUB SERPL-MCNC: 2 MG/DL (ref 0.2–1)
BILIRUB UR QL: NEGATIVE
BLD PROD TYP BPU: NORMAL
BLOOD GROUP ANTIBODIES SERPL: NORMAL
BNP SERPL-MCNC: 2837 PG/ML
BPU ID: NORMAL
BUN SERPL-MCNC: 14 MG/DL (ref 6–20)
BUN SERPL-MCNC: 17 MG/DL (ref 6–20)
BUN SERPL-MCNC: 17 MG/DL (ref 6–20)
BUN SERPL-MCNC: 19 MG/DL (ref 6–20)
BUN SERPL-MCNC: 23 MG/DL (ref 6–20)
BUN SERPL-MCNC: 26 MG/DL (ref 6–20)
BUN SERPL-MCNC: 27 MG/DL (ref 6–20)
BUN SERPL-MCNC: 29 MG/DL (ref 6–20)
BUN/CREAT SERPL: 14 (ref 12–20)
BUN/CREAT SERPL: 15 (ref 12–20)
BUN/CREAT SERPL: 18 (ref 12–20)
BUN/CREAT SERPL: 22 (ref 12–20)
BUN/CREAT SERPL: 24 (ref 12–20)
BUN/CREAT SERPL: 26 (ref 12–20)
BUN/CREAT SERPL: 28 (ref 12–20)
BUN/CREAT SERPL: 32 (ref 12–20)
CA-I BLD-MCNC: 1.13 MMOL/L (ref 1.12–1.32)
CALCIUM SERPL-MCNC: 5.8 MG/DL (ref 8.5–10.1)
CALCIUM SERPL-MCNC: 7.8 MG/DL (ref 8.5–10.1)
CALCIUM SERPL-MCNC: 7.8 MG/DL (ref 8.5–10.1)
CALCIUM SERPL-MCNC: 8.3 MG/DL (ref 8.5–10.1)
CALCIUM SERPL-MCNC: 8.4 MG/DL (ref 8.5–10.1)
CALCIUM SERPL-MCNC: 8.5 MG/DL (ref 8.5–10.1)
CALCIUM SERPL-MCNC: 8.6 MG/DL (ref 8.5–10.1)
CALCIUM SERPL-MCNC: 8.7 MG/DL (ref 8.5–10.1)
CALCULATED P AXIS, ECG09: 27 DEGREES
CALCULATED P AXIS, ECG09: 27 DEGREES
CALCULATED P AXIS, ECG09: 4 DEGREES
CALCULATED R AXIS, ECG10: -24 DEGREES
CALCULATED R AXIS, ECG10: -25 DEGREES
CALCULATED R AXIS, ECG10: -28 DEGREES
CALCULATED T AXIS, ECG11: -122 DEGREES
CALCULATED T AXIS, ECG11: -6 DEGREES
CALCULATED T AXIS, ECG11: 21 DEGREES
CC UR VC: ABNORMAL
CHLORIDE BLD-SCNC: 102 MMOL/L (ref 100–108)
CHLORIDE SERPL-SCNC: 102 MMOL/L (ref 97–108)
CHLORIDE SERPL-SCNC: 84 MMOL/L (ref 97–108)
CHLORIDE SERPL-SCNC: 93 MMOL/L (ref 97–108)
CHLORIDE SERPL-SCNC: 95 MMOL/L (ref 97–108)
CHLORIDE SERPL-SCNC: 96 MMOL/L (ref 97–108)
CHLORIDE SERPL-SCNC: 98 MMOL/L (ref 97–108)
CHOLEST SERPL-MCNC: 67 MG/DL
CO2 BLD-SCNC: 18 MMOL/L (ref 19–24)
CO2 SERPL-SCNC: 20 MMOL/L (ref 21–32)
CO2 SERPL-SCNC: 21 MMOL/L (ref 21–32)
CO2 SERPL-SCNC: 23 MMOL/L (ref 21–32)
CO2 SERPL-SCNC: 24 MMOL/L (ref 21–32)
CO2 SERPL-SCNC: 24 MMOL/L (ref 21–32)
CO2 SERPL-SCNC: 26 MMOL/L (ref 21–32)
CO2 SERPL-SCNC: 27 MMOL/L (ref 21–32)
CO2 SERPL-SCNC: 30 MMOL/L (ref 21–32)
COLOR UR: ABNORMAL
COMMENT, HOLDF: NORMAL
COMMENT, HOLDF: NORMAL
COVID-19 RAPID TEST, COVR: DETECTED
CREAT SERPL-MCNC: 0.85 MG/DL (ref 0.7–1.3)
CREAT SERPL-MCNC: 0.87 MG/DL (ref 0.7–1.3)
CREAT SERPL-MCNC: 0.9 MG/DL (ref 0.7–1.3)
CREAT SERPL-MCNC: 0.97 MG/DL (ref 0.7–1.3)
CREAT SERPL-MCNC: 0.97 MG/DL (ref 0.7–1.3)
CREAT SERPL-MCNC: 0.99 MG/DL (ref 0.7–1.3)
CREAT SERPL-MCNC: 1.1 MG/DL (ref 0.7–1.3)
CREAT SERPL-MCNC: 1.1 MG/DL (ref 0.7–1.3)
CREAT UR-MCNC: 0.8 MG/DL (ref 0.6–1.3)
CROSSMATCH RESULT,%XM: NORMAL
CRP SERPL HS-MCNC: >9.5 MG/L
CRP SERPL-MCNC: 1.86 MG/DL (ref 0–0.6)
CRP SERPL-MCNC: 14.4 MG/DL (ref 0–0.6)
CRP SERPL-MCNC: 15.5 MG/DL (ref 0–0.6)
CRP SERPL-MCNC: 18.1 MG/DL (ref 0–0.6)
CRP SERPL-MCNC: 8.87 MG/DL (ref 0–0.6)
D DIMER PPP FEU-MCNC: 4.7 MG/L FEU (ref 0–0.65)
DIAGNOSIS, 93000: NORMAL
DIFFERENTIAL METHOD BLD: ABNORMAL
ECHO AV AREA PEAK VELOCITY: 1.5 CM2
ECHO AV AREA VTI: 1.8 CM2
ECHO AV AREA/BSA PEAK VELOCITY: 0.9 CM2/M2
ECHO AV AREA/BSA VTI: 1.1 CM2/M2
ECHO AV MEAN GRADIENT: 7 MMHG
ECHO AV MEAN VELOCITY: 1.3 M/S
ECHO AV PEAK GRADIENT: 16 MMHG
ECHO AV PEAK VELOCITY: 2 M/S
ECHO AV VELOCITY RATIO: 0.55
ECHO AV VTI: 42 CM
ECHO LA DIAMETER INDEX: 1.99 CM/M2
ECHO LA DIAMETER: 3.4 CM
ECHO LA VOL 4C: 84 ML (ref 18–58)
ECHO LA VOLUME INDEX A4C: 49 ML/M2 (ref 16–34)
ECHO LV E' LATERAL VELOCITY: 438 CM/S
ECHO LV EDV A4C: 116 ML
ECHO LV EDV INDEX A4C: 68 ML/M2
ECHO LV EJECTION FRACTION A4C: 55 %
ECHO LV ESV A4C: 52 ML
ECHO LV ESV INDEX A4C: 30 ML/M2
ECHO LV FRACTIONAL SHORTENING: 17 % (ref 28–44)
ECHO LV INTERNAL DIMENSION DIASTOLE INDEX: 2.75 CM/M2
ECHO LV INTERNAL DIMENSION DIASTOLIC MMODE: 5.3 CM (ref 4.2–5.9)
ECHO LV INTERNAL DIMENSION DIASTOLIC: 4.7 CM (ref 4.2–5.9)
ECHO LV INTERNAL DIMENSION SYSTOLIC INDEX: 2.28 CM/M2
ECHO LV INTERNAL DIMENSION SYSTOLIC MMODE: 3.7 CM
ECHO LV INTERNAL DIMENSION SYSTOLIC: 3.9 CM
ECHO LV IVSD: 0.9 CM (ref 0.6–1)
ECHO LV MASS 2D: 142.7 G (ref 88–224)
ECHO LV MASS INDEX 2D: 83.5 G/M2 (ref 49–115)
ECHO LV POSTERIOR WALL DIASTOLIC: 0.9 CM (ref 0.6–1)
ECHO LV RELATIVE WALL THICKNESS RATIO: 0.38
ECHO LVOT AREA: 2.8 CM2
ECHO LVOT AV VTI INDEX: 0.64
ECHO LVOT DIAM: 1.9 CM
ECHO LVOT MEAN GRADIENT: 2 MMHG
ECHO LVOT PEAK GRADIENT: 5 MMHG
ECHO LVOT PEAK VELOCITY: 1.1 M/S
ECHO LVOT STROKE VOLUME INDEX: 44.2 ML/M2
ECHO LVOT SV: 75.7 ML
ECHO LVOT VTI: 26.7 CM
ECHO MV A VELOCITY: 0.9 M/S
ECHO MV AREA VTI: 2.8 CM2
ECHO MV E DECELERATION TIME (DT): 224.2 MS
ECHO MV E VELOCITY: 1.28 M/S
ECHO MV E/A RATIO: 1.42
ECHO MV E/E' LATERAL: 0.29
ECHO MV LVOT VTI INDEX: 1.03
ECHO MV MAX VELOCITY: 1.3 M/S
ECHO MV MEAN GRADIENT: 3 MMHG
ECHO MV MEAN VELOCITY: 0.9 M/S
ECHO MV PEAK GRADIENT: 6 MMHG
ECHO MV VTI: 27.5 CM
ECHO PULMONARY ARTERY END DIASTOLIC PRESSURE: 9 MMHG
ECHO PV MAX VELOCITY: 0.9 M/S
ECHO PV PEAK GRADIENT: 3 MMHG
ECHO PV REGURGITANT MAX VELOCITY: 1.5 M/S
ECHO RV INTERNAL DIMENSION: 4.6 CM
ECHO RV TAPSE: 1.6 CM (ref 1.7–?)
ECHO TV REGURGITANT MAX VELOCITY: 3.2 M/S
ECHO TV REGURGITANT PEAK GRADIENT: 41 MMHG
EOSINOPHIL # BLD: 0 K/UL (ref 0–0.4)
EOSINOPHIL NFR BLD: 0 % (ref 0–7)
EPITH CASTS URNS QL MICRO: ABNORMAL /LPF
ERYTHROCYTE [DISTWIDTH] IN BLOOD BY AUTOMATED COUNT: 23.7 % (ref 11.5–14.5)
ERYTHROCYTE [DISTWIDTH] IN BLOOD BY AUTOMATED COUNT: 26.1 % (ref 11.5–14.5)
ERYTHROCYTE [DISTWIDTH] IN BLOOD BY AUTOMATED COUNT: 26.3 % (ref 11.5–14.5)
ERYTHROCYTE [DISTWIDTH] IN BLOOD BY AUTOMATED COUNT: 26.5 % (ref 11.5–14.5)
ERYTHROCYTE [DISTWIDTH] IN BLOOD BY AUTOMATED COUNT: 26.9 % (ref 11.5–14.5)
ERYTHROCYTE [DISTWIDTH] IN BLOOD BY AUTOMATED COUNT: 28 % (ref 11.5–14.5)
ERYTHROCYTE [DISTWIDTH] IN BLOOD BY AUTOMATED COUNT: 28.1 % (ref 11.5–14.5)
ERYTHROCYTE [DISTWIDTH] IN BLOOD BY AUTOMATED COUNT: 29 % (ref 11.5–14.5)
ERYTHROCYTE [SEDIMENTATION RATE] IN BLOOD: 51 MM/HR (ref 0–20)
ERYTHROCYTE [SEDIMENTATION RATE] IN BLOOD: 87 MM/HR (ref 0–20)
EST. AVERAGE GLUCOSE BLD GHB EST-MCNC: 131 MG/DL
EST. AVERAGE GLUCOSE BLD GHB EST-MCNC: 137 MG/DL
FERRITIN SERPL-MCNC: 3029 NG/ML (ref 26–388)
FERRITIN SERPL-MCNC: 3525 NG/ML (ref 26–388)
FLUAV AG NPH QL IA: NEGATIVE
FLUBV AG NOSE QL IA: NEGATIVE
GLOBULIN SER CALC-MCNC: 3.1 G/DL (ref 2–4)
GLOBULIN SER CALC-MCNC: 3.8 G/DL (ref 2–4)
GLOBULIN SER CALC-MCNC: 3.8 G/DL (ref 2–4)
GLOBULIN SER CALC-MCNC: 3.9 G/DL (ref 2–4)
GLOBULIN SER CALC-MCNC: 4.2 G/DL (ref 2–4)
GLUCOSE BLD STRIP.AUTO-MCNC: 116 MG/DL (ref 65–117)
GLUCOSE BLD STRIP.AUTO-MCNC: 130 MG/DL (ref 65–117)
GLUCOSE BLD STRIP.AUTO-MCNC: 140 MG/DL (ref 65–117)
GLUCOSE BLD STRIP.AUTO-MCNC: 143 MG/DL (ref 65–117)
GLUCOSE BLD STRIP.AUTO-MCNC: 151 MG/DL (ref 65–117)
GLUCOSE BLD STRIP.AUTO-MCNC: 158 MG/DL (ref 65–117)
GLUCOSE BLD STRIP.AUTO-MCNC: 161 MG/DL (ref 65–117)
GLUCOSE BLD STRIP.AUTO-MCNC: 163 MG/DL (ref 65–117)
GLUCOSE BLD STRIP.AUTO-MCNC: 165 MG/DL (ref 65–117)
GLUCOSE BLD STRIP.AUTO-MCNC: 170 MG/DL (ref 65–117)
GLUCOSE BLD STRIP.AUTO-MCNC: 170 MG/DL (ref 65–117)
GLUCOSE BLD STRIP.AUTO-MCNC: 179 MG/DL (ref 65–117)
GLUCOSE BLD STRIP.AUTO-MCNC: 182 MG/DL (ref 65–117)
GLUCOSE BLD STRIP.AUTO-MCNC: 192 MG/DL (ref 65–117)
GLUCOSE BLD STRIP.AUTO-MCNC: 193 MG/DL (ref 65–117)
GLUCOSE BLD STRIP.AUTO-MCNC: 194 MG/DL (ref 65–117)
GLUCOSE BLD STRIP.AUTO-MCNC: 196 MG/DL (ref 65–117)
GLUCOSE BLD STRIP.AUTO-MCNC: 197 MG/DL (ref 65–117)
GLUCOSE BLD STRIP.AUTO-MCNC: 200 MG/DL (ref 65–117)
GLUCOSE BLD STRIP.AUTO-MCNC: 205 MG/DL (ref 65–117)
GLUCOSE BLD STRIP.AUTO-MCNC: 211 MG/DL (ref 74–106)
GLUCOSE BLD STRIP.AUTO-MCNC: 212 MG/DL (ref 65–117)
GLUCOSE BLD STRIP.AUTO-MCNC: 217 MG/DL (ref 65–117)
GLUCOSE BLD STRIP.AUTO-MCNC: 218 MG/DL (ref 65–117)
GLUCOSE BLD STRIP.AUTO-MCNC: 218 MG/DL (ref 65–117)
GLUCOSE BLD STRIP.AUTO-MCNC: 233 MG/DL (ref 65–117)
GLUCOSE BLD STRIP.AUTO-MCNC: 241 MG/DL (ref 65–117)
GLUCOSE BLD STRIP.AUTO-MCNC: 243 MG/DL (ref 65–117)
GLUCOSE BLD STRIP.AUTO-MCNC: 245 MG/DL (ref 65–117)
GLUCOSE BLD STRIP.AUTO-MCNC: 252 MG/DL (ref 65–117)
GLUCOSE BLD STRIP.AUTO-MCNC: 258 MG/DL (ref 65–117)
GLUCOSE BLD STRIP.AUTO-MCNC: 279 MG/DL (ref 65–117)
GLUCOSE BLD STRIP.AUTO-MCNC: 305 MG/DL (ref 65–117)
GLUCOSE SERPL-MCNC: 139 MG/DL (ref 65–100)
GLUCOSE SERPL-MCNC: 157 MG/DL (ref 65–100)
GLUCOSE SERPL-MCNC: 163 MG/DL (ref 65–100)
GLUCOSE SERPL-MCNC: 167 MG/DL (ref 65–100)
GLUCOSE SERPL-MCNC: 206 MG/DL (ref 65–100)
GLUCOSE SERPL-MCNC: 208 MG/DL (ref 65–100)
GLUCOSE SERPL-MCNC: 241 MG/DL (ref 65–100)
GLUCOSE SERPL-MCNC: 621 MG/DL (ref 65–100)
GLUCOSE UR STRIP.AUTO-MCNC: NEGATIVE MG/DL
HBA1C MFR BLD: 6.2 % (ref 4–5.6)
HBA1C MFR BLD: 6.4 % (ref 4–5.6)
HCO3 BLDA-SCNC: 19 MMOL/L
HCT VFR BLD AUTO: 26.6 % (ref 36.6–50.3)
HCT VFR BLD AUTO: 26.8 % (ref 36.6–50.3)
HCT VFR BLD AUTO: 27.6 % (ref 36.6–50.3)
HCT VFR BLD AUTO: 28.9 % (ref 36.6–50.3)
HCT VFR BLD AUTO: 30.4 % (ref 36.6–50.3)
HCT VFR BLD AUTO: 32.2 % (ref 36.6–50.3)
HCT VFR BLD AUTO: 32.3 % (ref 36.6–50.3)
HCT VFR BLD AUTO: 33.8 % (ref 36.6–50.3)
HCT VFR BLD AUTO: 34.2 % (ref 36.6–50.3)
HDLC SERPL-MCNC: 16 MG/DL
HDLC SERPL: 4.2 (ref 0–5)
HGB BLD-MCNC: 6.6 G/DL (ref 12.1–17)
HGB BLD-MCNC: 7 G/DL (ref 12.1–17)
HGB BLD-MCNC: 7.7 G/DL (ref 12.1–17)
HGB BLD-MCNC: 8 G/DL (ref 12.1–17)
HGB BLD-MCNC: 8.3 G/DL (ref 12.1–17)
HGB BLD-MCNC: 8.5 G/DL (ref 12.1–17)
HGB BLD-MCNC: 8.7 G/DL (ref 12.1–17)
HGB UR QL STRIP: ABNORMAL
HISTORY CHECKED?,CKHIST: NORMAL
IMM GRANULOCYTES # BLD AUTO: 0 K/UL (ref 0–0.04)
IMM GRANULOCYTES NFR BLD AUTO: 0 % (ref 0–0.5)
KETONES UR QL STRIP.AUTO: NEGATIVE MG/DL
LACTATE BLD-SCNC: 2.39 MMOL/L (ref 0.4–2)
LDH SERPL L TO P-CCNC: 770 U/L (ref 85–241)
LDH SERPL L TO P-CCNC: 778 U/L (ref 85–241)
LDLC SERPL CALC-MCNC: 33.6 MG/DL (ref 0–100)
LEUKOCYTE ESTERASE UR QL STRIP.AUTO: NEGATIVE
LYMPHOCYTES # BLD: 360 K/UL (ref 0.8–3.5)
LYMPHOCYTES # BLD: 363 K/UL (ref 0.8–3.5)
LYMPHOCYTES # BLD: 365.1 K/UL (ref 0.8–3.5)
LYMPHOCYTES # BLD: 418.9 K/UL (ref 0.8–3.5)
LYMPHOCYTES # BLD: 597.4 K/UL (ref 0.8–3.5)
LYMPHOCYTES # BLD: 690.5 K/UL (ref 0.8–3.5)
LYMPHOCYTES NFR BLD: 71 % (ref 12–49)
LYMPHOCYTES NFR BLD: 75 % (ref 12–49)
LYMPHOCYTES NFR BLD: 80 % (ref 12–49)
LYMPHOCYTES NFR BLD: 84 % (ref 12–49)
LYMPHOCYTES NFR BLD: 89 % (ref 12–49)
LYMPHOCYTES NFR BLD: 98 % (ref 12–49)
MAGNESIUM SERPL-MCNC: 2 MG/DL (ref 1.6–2.4)
MAGNESIUM SERPL-MCNC: 2.3 MG/DL (ref 1.6–2.4)
MCH RBC QN AUTO: 25.1 PG (ref 26–34)
MCH RBC QN AUTO: 25.2 PG (ref 26–34)
MCH RBC QN AUTO: 25.5 PG (ref 26–34)
MCH RBC QN AUTO: 26.5 PG (ref 26–34)
MCH RBC QN AUTO: 26.6 PG (ref 26–34)
MCH RBC QN AUTO: 26.6 PG (ref 26–34)
MCH RBC QN AUTO: 27.1 PG (ref 26–34)
MCH RBC QN AUTO: 27.3 PG (ref 26–34)
MCHC RBC AUTO-ENTMCNC: 24.3 G/DL (ref 30–36.5)
MCHC RBC AUTO-ENTMCNC: 24.6 G/DL (ref 30–36.5)
MCHC RBC AUTO-ENTMCNC: 25.4 G/DL (ref 30–36.5)
MCHC RBC AUTO-ENTMCNC: 25.4 G/DL (ref 30–36.5)
MCHC RBC AUTO-ENTMCNC: 25.7 G/DL (ref 30–36.5)
MCHC RBC AUTO-ENTMCNC: 26.3 G/DL (ref 30–36.5)
MCHC RBC AUTO-ENTMCNC: 26.3 G/DL (ref 30–36.5)
MCHC RBC AUTO-ENTMCNC: 36.6 G/DL (ref 30–36.5)
MCV RBC AUTO: 100.4 FL (ref 80–99)
MCV RBC AUTO: 100.6 FL (ref 80–99)
MCV RBC AUTO: 101 FL (ref 80–99)
MCV RBC AUTO: 101.9 FL (ref 80–99)
MCV RBC AUTO: 102.5 FL (ref 80–99)
MCV RBC AUTO: 104 FL (ref 80–99)
MCV RBC AUTO: 104.7 FL (ref 80–99)
MCV RBC AUTO: 105.3 FL (ref 80–99)
MONOCYTES # BLD: 121.7 K/UL (ref 0–1)
MONOCYTES # BLD: 143.1 K/UL (ref 0–1)
MONOCYTES # BLD: 6.1 K/UL (ref 0–1)
MONOCYTES # BLD: 74.8 K/UL (ref 0–1)
MONOCYTES # BLD: 85.3 K/UL (ref 0–1)
MONOCYTES # BLD: 85.5 K/UL (ref 0–1)
MONOCYTES NFR BLD: 1 % (ref 5–13)
MONOCYTES NFR BLD: 11 % (ref 5–13)
MONOCYTES NFR BLD: 15 % (ref 5–13)
MONOCYTES NFR BLD: 19 % (ref 5–13)
MONOCYTES NFR BLD: 25 % (ref 5–13)
MONOCYTES NFR BLD: 28 % (ref 5–13)
NEUTS SEG # BLD: 0 K/UL (ref 1.8–8)
NEUTS SEG # BLD: 4.5 K/UL (ref 1.8–8)
NEUTS SEG # BLD: 5.1 K/UL (ref 1.8–8)
NEUTS SEG # BLD: 6.1 K/UL (ref 1.8–8)
NEUTS SEG NFR BLD: 0 % (ref 32–75)
NEUTS SEG NFR BLD: 1 % (ref 32–75)
NITRITE UR QL STRIP.AUTO: NEGATIVE
NRBC # BLD: 0.02 K/UL (ref 0–0.01)
NRBC # BLD: 0.04 K/UL (ref 0–0.01)
NRBC # BLD: 0.04 K/UL (ref 0–0.01)
NRBC # BLD: 0.07 K/UL (ref 0–0.01)
NRBC # BLD: 0.07 K/UL (ref 0–0.01)
NRBC # BLD: 0.08 K/UL (ref 0–0.01)
NRBC # BLD: 0.08 K/UL (ref 0–0.01)
NRBC # BLD: 0.11 K/UL (ref 0–0.01)
NRBC BLD-RTO: 0 PER 100 WBC
OTHER CELLS NFR BLD MANUAL: 1
P-R INTERVAL, ECG05: 124 MS
P-R INTERVAL, ECG05: 142 MS
P-R INTERVAL, ECG05: 148 MS
PCO2 BLDV: 27.5 MMHG (ref 41–51)
PH BLDV: 7.45 (ref 7.32–7.42)
PH UR STRIP: 5.5 (ref 5–8)
PHOSPHATE SERPL-MCNC: 3.8 MG/DL (ref 2.6–4.7)
PLATELET # BLD AUTO: 41 K/UL (ref 150–400)
PLATELET # BLD AUTO: 41 K/UL (ref 150–400)
PLATELET # BLD AUTO: 44 K/UL (ref 150–400)
PLATELET # BLD AUTO: 46 K/UL (ref 150–400)
PLATELET # BLD AUTO: 50 K/UL (ref 150–400)
PLATELET # BLD AUTO: 57 K/UL (ref 150–400)
PLATELET # BLD AUTO: 63 K/UL (ref 150–400)
PLATELET # BLD AUTO: 70 K/UL (ref 150–400)
PMV BLD AUTO: 11 FL (ref 8.9–12.9)
PMV BLD AUTO: 11.5 FL (ref 8.9–12.9)
PMV BLD AUTO: 11.7 FL (ref 8.9–12.9)
PMV BLD AUTO: 12 FL (ref 8.9–12.9)
PMV BLD AUTO: 12 FL (ref 8.9–12.9)
PMV BLD AUTO: 12.2 FL (ref 8.9–12.9)
PMV BLD AUTO: 12.9 FL (ref 8.9–12.9)
PMV BLD AUTO: 12.9 FL (ref 8.9–12.9)
PO2 BLDV: 34 MMHG (ref 25–40)
POTASSIUM BLD-SCNC: 4.8 MMOL/L (ref 3.5–5.5)
POTASSIUM SERPL-SCNC: 3.6 MMOL/L (ref 3.5–5.1)
POTASSIUM SERPL-SCNC: 4.8 MMOL/L (ref 3.5–5.1)
POTASSIUM SERPL-SCNC: 5.1 MMOL/L (ref 3.5–5.1)
POTASSIUM SERPL-SCNC: 5.5 MMOL/L (ref 3.5–5.1)
POTASSIUM SERPL-SCNC: 7.4 MMOL/L (ref 3.5–5.1)
POTASSIUM SERPL-SCNC: 7.5 MMOL/L (ref 3.5–5.1)
POTASSIUM SERPL-SCNC: 7.6 MMOL/L (ref 3.5–5.1)
POTASSIUM SERPL-SCNC: 8.2 MMOL/L (ref 3.5–5.1)
PROCALCITONIN SERPL-MCNC: 0.08 NG/ML
PROCALCITONIN SERPL-MCNC: 0.34 NG/ML
PROCALCITONIN SERPL-MCNC: 0.91 NG/ML
PROT SERPL-MCNC: 5.6 G/DL (ref 6.4–8.2)
PROT SERPL-MCNC: 5.7 G/DL (ref 6.4–8.2)
PROT SERPL-MCNC: 5.8 G/DL (ref 6.4–8.2)
PROT SERPL-MCNC: 6 G/DL (ref 6.4–8.2)
PROT SERPL-MCNC: 6.3 G/DL (ref 6.4–8.2)
PROT UR STRIP-MCNC: 100 MG/DL
Q-T INTERVAL, ECG07: 352 MS
Q-T INTERVAL, ECG07: 364 MS
Q-T INTERVAL, ECG07: 460 MS
QRS DURATION, ECG06: 86 MS
QRS DURATION, ECG06: 90 MS
QRS DURATION, ECG06: 94 MS
QTC CALCULATION (BEZET), ECG08: 408 MS
QTC CALCULATION (BEZET), ECG08: 476 MS
QTC CALCULATION (BEZET), ECG08: 513 MS
RBC # BLD AUTO: 2.63 M/UL (ref 4.1–5.7)
RBC # BLD AUTO: 2.75 M/UL (ref 4.1–5.7)
RBC # BLD AUTO: 2.82 M/UL (ref 4.1–5.7)
RBC # BLD AUTO: 3.01 M/UL (ref 4.1–5.7)
RBC # BLD AUTO: 3.01 M/UL (ref 4.1–5.7)
RBC # BLD AUTO: 3.21 M/UL (ref 4.1–5.7)
RBC # BLD AUTO: 3.21 M/UL (ref 4.1–5.7)
RBC # BLD AUTO: 3.29 M/UL (ref 4.1–5.7)
RBC #/AREA URNS HPF: ABNORMAL /HPF (ref 0–5)
RBC MORPH BLD: ABNORMAL
SAMPLES BEING HELD,HOLD: NORMAL
SAMPLES BEING HELD,HOLD: NORMAL
SAO2 % BLD: 70 %
SERVICE CMNT-IMP: ABNORMAL
SERVICE CMNT-IMP: NORMAL
SODIUM BLD-SCNC: 133 MMOL/L (ref 136–145)
SODIUM SERPL-SCNC: 121 MMOL/L (ref 136–145)
SODIUM SERPL-SCNC: 127 MMOL/L (ref 136–145)
SODIUM SERPL-SCNC: 128 MMOL/L (ref 136–145)
SODIUM SERPL-SCNC: 128 MMOL/L (ref 136–145)
SODIUM SERPL-SCNC: 132 MMOL/L (ref 136–145)
SODIUM SERPL-SCNC: 132 MMOL/L (ref 136–145)
SODIUM SERPL-SCNC: 133 MMOL/L (ref 136–145)
SODIUM SERPL-SCNC: 133 MMOL/L (ref 136–145)
SOURCE, COVRS: ABNORMAL
SP GR UR REFRACTOMETRY: 1.02
SPECIMEN EXP DATE BLD: NORMAL
SPECIMEN SITE: ABNORMAL
STATUS OF UNIT,%ST: NORMAL
T4 FREE SERPL-MCNC: 1.2 NG/DL (ref 0.8–1.5)
THERAPEUTIC RANGE,PTTT: ABNORMAL SECS (ref 58–77)
THERAPEUTIC RANGE,PTTT: NORMAL SECS (ref 58–77)
TRIGL SERPL-MCNC: 87 MG/DL (ref ?–150)
TROPONIN-HIGH SENSITIVITY: 225 NG/L (ref 0–76)
TROPONIN-HIGH SENSITIVITY: 239 NG/L (ref 0–76)
TROPONIN-HIGH SENSITIVITY: 267 NG/L (ref 0–76)
TSH SERPL DL<=0.05 MIU/L-ACNC: 5.45 UIU/ML (ref 0.36–3.74)
UA: UC IF INDICATED,UAUC: ABNORMAL
UFH PPP CHRO-ACNC: NORMAL IU/ML
UNIT DIVISION, %UDIV: 0
URATE SERPL-MCNC: 3.9 MG/DL (ref 3.5–7.2)
UROBILINOGEN UR QL STRIP.AUTO: 4 EU/DL (ref 0.2–1)
VENTRICULAR RATE, ECG03: 103 BPM
VENTRICULAR RATE, ECG03: 75 BPM
VENTRICULAR RATE, ECG03: 81 BPM
VLDLC SERPL CALC-MCNC: 17.4 MG/DL
WBC # BLD AUTO: 450 K/UL (ref 4.1–11.1)
WBC # BLD AUTO: 455.5 K/UL (ref 4.1–11.1)
WBC # BLD AUTO: 486.8 K/UL (ref 4.1–11.1)
WBC # BLD AUTO: 498.7 K/UL (ref 4.1–11.1)
WBC # BLD AUTO: 511.2 K/UL (ref 4.1–11.1)
WBC # BLD AUTO: 538.3 K/UL (ref 4.1–11.1)
WBC # BLD AUTO: 609.6 K/UL (ref 4.1–11.1)
WBC # BLD AUTO: 775.8 K/UL (ref 4.1–11.1)
WBC MORPH BLD: ABNORMAL
WBC URNS QL MICRO: ABNORMAL /HPF (ref 0–4)

## 2023-01-01 PROCEDURE — 86140 C-REACTIVE PROTEIN: CPT

## 2023-01-01 PROCEDURE — 74011000250 HC RX REV CODE- 250: Performed by: GENERAL ACUTE CARE HOSPITAL

## 2023-01-01 PROCEDURE — 36415 COLL VENOUS BLD VENIPUNCTURE: CPT

## 2023-01-01 PROCEDURE — 65270000046 HC RM TELEMETRY

## 2023-01-01 PROCEDURE — 74011250637 HC RX REV CODE- 250/637: Performed by: INTERNAL MEDICINE

## 2023-01-01 PROCEDURE — 74011250636 HC RX REV CODE- 250/636: Performed by: FAMILY MEDICINE

## 2023-01-01 PROCEDURE — 74011000250 HC RX REV CODE- 250: Performed by: INTERNAL MEDICINE

## 2023-01-01 PROCEDURE — 93306 TTE W/DOPPLER COMPLETE: CPT

## 2023-01-01 PROCEDURE — 93005 ELECTROCARDIOGRAM TRACING: CPT

## 2023-01-01 PROCEDURE — 99232 SBSQ HOSP IP/OBS MODERATE 35: CPT | Performed by: FAMILY MEDICINE

## 2023-01-01 PROCEDURE — 77010033678 HC OXYGEN DAILY

## 2023-01-01 PROCEDURE — 80048 BASIC METABOLIC PNL TOTAL CA: CPT

## 2023-01-01 PROCEDURE — 74011000258 HC RX REV CODE- 258: Performed by: GENERAL ACUTE CARE HOSPITAL

## 2023-01-01 PROCEDURE — 74011250636 HC RX REV CODE- 250/636: Performed by: STUDENT IN AN ORGANIZED HEALTH CARE EDUCATION/TRAINING PROGRAM

## 2023-01-01 PROCEDURE — 0656 HSPC GENERAL INPATIENT

## 2023-01-01 PROCEDURE — 74011250636 HC RX REV CODE- 250/636: Performed by: INTERNAL MEDICINE

## 2023-01-01 PROCEDURE — 82962 GLUCOSE BLOOD TEST: CPT

## 2023-01-01 PROCEDURE — 77010033711 HC HIGH FLOW OXYGEN

## 2023-01-01 PROCEDURE — 85025 COMPLETE CBC W/AUTO DIFF WBC: CPT

## 2023-01-01 PROCEDURE — 85027 COMPLETE CBC AUTOMATED: CPT

## 2023-01-01 PROCEDURE — C9113 INJ PANTOPRAZOLE SODIUM, VIA: HCPCS | Performed by: STUDENT IN AN ORGANIZED HEALTH CARE EDUCATION/TRAINING PROGRAM

## 2023-01-01 PROCEDURE — 83036 HEMOGLOBIN GLYCOSYLATED A1C: CPT

## 2023-01-01 PROCEDURE — 74011636637 HC RX REV CODE- 636/637: Performed by: INTERNAL MEDICINE

## 2023-01-01 PROCEDURE — 74011000250 HC RX REV CODE- 250: Performed by: STUDENT IN AN ORGANIZED HEALTH CARE EDUCATION/TRAINING PROGRAM

## 2023-01-01 PROCEDURE — 85730 THROMBOPLASTIN TIME PARTIAL: CPT

## 2023-01-01 PROCEDURE — 74011000250 HC RX REV CODE- 250: Performed by: PHYSICIAN ASSISTANT

## 2023-01-01 PROCEDURE — 80053 COMPREHEN METABOLIC PANEL: CPT

## 2023-01-01 PROCEDURE — 74011250637 HC RX REV CODE- 250/637: Performed by: STUDENT IN AN ORGANIZED HEALTH CARE EDUCATION/TRAINING PROGRAM

## 2023-01-01 PROCEDURE — 97530 THERAPEUTIC ACTIVITIES: CPT

## 2023-01-01 PROCEDURE — 74011250636 HC RX REV CODE- 250/636: Performed by: NURSE PRACTITIONER

## 2023-01-01 PROCEDURE — 80061 LIPID PANEL: CPT

## 2023-01-01 PROCEDURE — 85520 HEPARIN ASSAY: CPT

## 2023-01-01 PROCEDURE — 36430 TRANSFUSION BLD/BLD COMPNT: CPT

## 2023-01-01 PROCEDURE — 74011000250 HC RX REV CODE- 250: Performed by: FAMILY MEDICINE

## 2023-01-01 PROCEDURE — 83880 ASSAY OF NATRIURETIC PEPTIDE: CPT

## 2023-01-01 PROCEDURE — 74011250637 HC RX REV CODE- 250/637: Performed by: PHYSICIAN ASSISTANT

## 2023-01-01 PROCEDURE — 84145 PROCALCITONIN (PCT): CPT

## 2023-01-01 PROCEDURE — 87804 INFLUENZA ASSAY W/OPTIC: CPT

## 2023-01-01 PROCEDURE — 30233N1 TRANSFUSION OF NONAUTOLOGOUS RED BLOOD CELLS INTO PERIPHERAL VEIN, PERCUTANEOUS APPROACH: ICD-10-PCS | Performed by: GENERAL ACUTE CARE HOSPITAL

## 2023-01-01 PROCEDURE — 96365 THER/PROPH/DIAG IV INF INIT: CPT

## 2023-01-01 PROCEDURE — 74011000250 HC RX REV CODE- 250: Performed by: NURSE PRACTITIONER

## 2023-01-01 PROCEDURE — 86900 BLOOD TYPING SEROLOGIC ABO: CPT

## 2023-01-01 PROCEDURE — 74011000258 HC RX REV CODE- 258: Performed by: STUDENT IN AN ORGANIZED HEALTH CARE EDUCATION/TRAINING PROGRAM

## 2023-01-01 PROCEDURE — 94762 N-INVAS EAR/PLS OXIMTRY CONT: CPT

## 2023-01-01 PROCEDURE — 83735 ASSAY OF MAGNESIUM: CPT

## 2023-01-01 PROCEDURE — 84484 ASSAY OF TROPONIN QUANT: CPT

## 2023-01-01 PROCEDURE — 82728 ASSAY OF FERRITIN: CPT

## 2023-01-01 PROCEDURE — 65660000001 HC RM ICU INTERMED STEPDOWN

## 2023-01-01 PROCEDURE — 84100 ASSAY OF PHOSPHORUS: CPT

## 2023-01-01 PROCEDURE — 74011000258 HC RX REV CODE- 258: Performed by: INTERNAL MEDICINE

## 2023-01-01 PROCEDURE — 65270000015 HC RM PRIVATE ONCOLOGY

## 2023-01-01 PROCEDURE — 97166 OT EVAL MOD COMPLEX 45 MIN: CPT

## 2023-01-01 PROCEDURE — 84443 ASSAY THYROID STIM HORMONE: CPT

## 2023-01-01 PROCEDURE — 99222 1ST HOSP IP/OBS MODERATE 55: CPT | Performed by: NURSE PRACTITIONER

## 2023-01-01 PROCEDURE — 96375 TX/PRO/DX INJ NEW DRUG ADDON: CPT

## 2023-01-01 PROCEDURE — 84439 ASSAY OF FREE THYROXINE: CPT

## 2023-01-01 PROCEDURE — 74011636637 HC RX REV CODE- 636/637: Performed by: STUDENT IN AN ORGANIZED HEALTH CARE EDUCATION/TRAINING PROGRAM

## 2023-01-01 PROCEDURE — 87635 SARS-COV-2 COVID-19 AMP PRB: CPT

## 2023-01-01 PROCEDURE — P9016 RBC LEUKOCYTES REDUCED: HCPCS

## 2023-01-01 PROCEDURE — 74011636637 HC RX REV CODE- 636/637: Performed by: PHYSICIAN ASSISTANT

## 2023-01-01 PROCEDURE — 83615 LACTATE (LD) (LDH) ENZYME: CPT

## 2023-01-01 PROCEDURE — 74011250636 HC RX REV CODE- 250/636: Performed by: PHYSICIAN ASSISTANT

## 2023-01-01 PROCEDURE — 85379 FIBRIN DEGRADATION QUANT: CPT

## 2023-01-01 PROCEDURE — 71045 X-RAY EXAM CHEST 1 VIEW: CPT

## 2023-01-01 PROCEDURE — 85652 RBC SED RATE AUTOMATED: CPT

## 2023-01-01 PROCEDURE — 74011636637 HC RX REV CODE- 636/637: Performed by: GENERAL ACUTE CARE HOSPITAL

## 2023-01-01 PROCEDURE — 87040 BLOOD CULTURE FOR BACTERIA: CPT

## 2023-01-01 PROCEDURE — 99233 SBSQ HOSP IP/OBS HIGH 50: CPT | Performed by: NURSE PRACTITIONER

## 2023-01-01 PROCEDURE — 74011250636 HC RX REV CODE- 250/636: Performed by: GENERAL ACUTE CARE HOSPITAL

## 2023-01-01 PROCEDURE — 84550 ASSAY OF BLOOD/URIC ACID: CPT

## 2023-01-01 PROCEDURE — 97116 GAIT TRAINING THERAPY: CPT

## 2023-01-01 PROCEDURE — 99285 EMERGENCY DEPT VISIT HI MDM: CPT

## 2023-01-01 PROCEDURE — 81001 URINALYSIS AUTO W/SCOPE: CPT

## 2023-01-01 PROCEDURE — 87077 CULTURE AEROBIC IDENTIFY: CPT

## 2023-01-01 PROCEDURE — 82947 ASSAY GLUCOSE BLOOD QUANT: CPT

## 2023-01-01 PROCEDURE — 97162 PT EVAL MOD COMPLEX 30 MIN: CPT

## 2023-01-01 PROCEDURE — 87186 SC STD MICRODIL/AGAR DIL: CPT

## 2023-01-01 PROCEDURE — 86141 C-REACTIVE PROTEIN HS: CPT

## 2023-01-01 PROCEDURE — 86923 COMPATIBILITY TEST ELECTRIC: CPT

## 2023-01-01 PROCEDURE — 87086 URINE CULTURE/COLONY COUNT: CPT

## 2023-01-01 PROCEDURE — 85018 HEMOGLOBIN: CPT

## 2023-01-01 RX ORDER — NITROGLYCERIN 0.4 MG/1
0.4 TABLET SUBLINGUAL AS NEEDED
Status: DISCONTINUED | OUTPATIENT
Start: 2023-01-01 | End: 2023-01-01

## 2023-01-01 RX ORDER — LORAZEPAM 2 MG/ML
1 INJECTION INTRAMUSCULAR EVERY 4 HOURS
Status: DISCONTINUED | OUTPATIENT
Start: 2023-01-01 | End: 2023-02-05 | Stop reason: HOSPADM

## 2023-01-01 RX ORDER — HYDROMORPHONE HYDROCHLORIDE 1 MG/ML
0.5 INJECTION, SOLUTION INTRAMUSCULAR; INTRAVENOUS; SUBCUTANEOUS
Status: DISCONTINUED | OUTPATIENT
Start: 2023-01-01 | End: 2023-01-01

## 2023-01-01 RX ORDER — LORAZEPAM 2 MG/ML
1 INJECTION INTRAMUSCULAR
Status: DISCONTINUED | OUTPATIENT
Start: 2023-01-01 | End: 2023-01-01

## 2023-01-01 RX ORDER — FUROSEMIDE 10 MG/ML
80 INJECTION INTRAMUSCULAR; INTRAVENOUS ONCE
Status: COMPLETED | OUTPATIENT
Start: 2023-01-01 | End: 2023-01-01

## 2023-01-01 RX ORDER — LIDOCAINE HYDROCHLORIDE 20 MG/ML
JELLY TOPICAL ONCE
Status: COMPLETED | OUTPATIENT
Start: 2023-01-01 | End: 2023-01-01

## 2023-01-01 RX ORDER — ENOXAPARIN SODIUM 100 MG/ML
40 INJECTION SUBCUTANEOUS EVERY 24 HOURS
Status: DISCONTINUED | OUTPATIENT
Start: 2023-01-01 | End: 2023-01-01

## 2023-01-01 RX ORDER — ACETAMINOPHEN 325 MG/1
650 TABLET ORAL
Status: DISCONTINUED | OUTPATIENT
Start: 2023-01-01 | End: 2023-01-01

## 2023-01-01 RX ORDER — GUAIFENESIN 600 MG/1
600 TABLET, EXTENDED RELEASE ORAL EVERY 12 HOURS
Status: DISCONTINUED | OUTPATIENT
Start: 2023-01-01 | End: 2023-01-01

## 2023-01-01 RX ORDER — HYDROMORPHONE HYDROCHLORIDE 1 MG/ML
1 INJECTION, SOLUTION INTRAMUSCULAR; INTRAVENOUS; SUBCUTANEOUS
Status: DISCONTINUED | OUTPATIENT
Start: 2023-01-01 | End: 2023-02-05 | Stop reason: HOSPADM

## 2023-01-01 RX ORDER — GLYCOPYRROLATE 0.2 MG/ML
0.2 INJECTION INTRAMUSCULAR; INTRAVENOUS
Status: DISCONTINUED | OUTPATIENT
Start: 2023-01-01 | End: 2023-01-01

## 2023-01-01 RX ORDER — HEPARIN SODIUM 1000 [USP'U]/ML
40 INJECTION, SOLUTION INTRAVENOUS; SUBCUTANEOUS AS NEEDED
Status: DISCONTINUED | OUTPATIENT
Start: 2023-01-01 | End: 2023-01-01

## 2023-01-01 RX ORDER — GUAIFENESIN 100 MG/5ML
325 LIQUID (ML) ORAL
Status: ACTIVE | OUTPATIENT
Start: 2023-01-01 | End: 2023-01-01

## 2023-01-01 RX ORDER — ALLOPURINOL 300 MG/1
300 TABLET ORAL DAILY
Qty: 90 TABLET | Refills: 3 | Status: SHIPPED | OUTPATIENT
Start: 2023-01-01

## 2023-01-01 RX ORDER — ENOXAPARIN SODIUM 100 MG/ML
40 INJECTION SUBCUTANEOUS DAILY
Status: DISCONTINUED | OUTPATIENT
Start: 2023-01-01 | End: 2023-01-01

## 2023-01-01 RX ORDER — BALSAM PERU/CASTOR OIL
OINTMENT (GRAM) TOPICAL 2 TIMES DAILY
Status: DISCONTINUED | OUTPATIENT
Start: 2023-01-01 | End: 2023-01-01 | Stop reason: HOSPADM

## 2023-01-01 RX ORDER — HYDROMORPHONE HYDROCHLORIDE 1 MG/ML
0.5 INJECTION, SOLUTION INTRAMUSCULAR; INTRAVENOUS; SUBCUTANEOUS EVERY 4 HOURS
Status: DISCONTINUED | OUTPATIENT
Start: 2023-01-01 | End: 2023-01-01

## 2023-01-01 RX ORDER — PROMETHAZINE HYDROCHLORIDE 25 MG/1
12.5 TABLET ORAL
Status: DISCONTINUED | OUTPATIENT
Start: 2023-01-01 | End: 2023-01-01

## 2023-01-01 RX ORDER — SODIUM CHLORIDE 0.9 % (FLUSH) 0.9 %
5-10 SYRINGE (ML) INJECTION AS NEEDED
Status: DISCONTINUED | OUTPATIENT
Start: 2023-01-01 | End: 2023-02-05 | Stop reason: HOSPADM

## 2023-01-01 RX ORDER — HEPARIN SODIUM 1000 [USP'U]/ML
80 INJECTION, SOLUTION INTRAVENOUS; SUBCUTANEOUS AS NEEDED
Status: DISCONTINUED | OUTPATIENT
Start: 2023-01-01 | End: 2023-01-01

## 2023-01-01 RX ORDER — DEXAMETHASONE SODIUM PHOSPHATE 4 MG/ML
4 INJECTION, SOLUTION INTRA-ARTICULAR; INTRALESIONAL; INTRAMUSCULAR; INTRAVENOUS; SOFT TISSUE ONCE
Status: COMPLETED | OUTPATIENT
Start: 2023-01-01 | End: 2023-01-01

## 2023-01-01 RX ORDER — MORPHINE SULFATE 2 MG/ML
1 INJECTION, SOLUTION INTRAMUSCULAR; INTRAVENOUS
Status: DISCONTINUED | OUTPATIENT
Start: 2023-01-01 | End: 2023-01-01

## 2023-01-01 RX ORDER — IBUPROFEN 200 MG
16 TABLET ORAL AS NEEDED
Status: DISCONTINUED | OUTPATIENT
Start: 2023-01-01 | End: 2023-01-01 | Stop reason: SDUPTHER

## 2023-01-01 RX ORDER — SCOLOPAMINE TRANSDERMAL SYSTEM 1 MG/1
1 PATCH, EXTENDED RELEASE TRANSDERMAL
Status: DISCONTINUED | OUTPATIENT
Start: 2023-01-01 | End: 2023-01-01 | Stop reason: HOSPADM

## 2023-01-01 RX ORDER — CETIRIZINE HYDROCHLORIDE 10 MG/1
10 TABLET ORAL DAILY
Status: DISCONTINUED | OUTPATIENT
Start: 2023-01-01 | End: 2023-01-01

## 2023-01-01 RX ORDER — DIPHENHYDRAMINE HYDROCHLORIDE 50 MG/ML
50 INJECTION, SOLUTION INTRAMUSCULAR; INTRAVENOUS
Status: DISCONTINUED | OUTPATIENT
Start: 2023-01-01 | End: 2023-01-01 | Stop reason: ALTCHOICE

## 2023-01-01 RX ORDER — BRIMONIDINE TARTRATE 2 MG/ML
1 SOLUTION/ DROPS OPHTHALMIC 2 TIMES DAILY
Status: DISCONTINUED | OUTPATIENT
Start: 2023-01-01 | End: 2023-01-01

## 2023-01-01 RX ORDER — POLYETHYLENE GLYCOL 3350 17 G/17G
17 POWDER, FOR SOLUTION ORAL DAILY PRN
Status: DISCONTINUED | OUTPATIENT
Start: 2023-01-01 | End: 2023-01-01

## 2023-01-01 RX ORDER — ALLOPURINOL 100 MG/1
300 TABLET ORAL DAILY
Status: DISCONTINUED | OUTPATIENT
Start: 2023-01-01 | End: 2023-01-01

## 2023-01-01 RX ORDER — BENZONATATE 100 MG/1
100 CAPSULE ORAL
Status: DISCONTINUED | OUTPATIENT
Start: 2023-01-01 | End: 2023-01-01

## 2023-01-01 RX ORDER — CEPHALEXIN 250 MG/1
500 CAPSULE ORAL 4 TIMES DAILY
Status: DISCONTINUED | OUTPATIENT
Start: 2023-01-01 | End: 2023-01-01

## 2023-01-01 RX ORDER — ONDANSETRON 2 MG/ML
4 INJECTION INTRAMUSCULAR; INTRAVENOUS
Status: DISCONTINUED | OUTPATIENT
Start: 2023-01-01 | End: 2023-01-01

## 2023-01-01 RX ORDER — ATORVASTATIN CALCIUM 20 MG/1
20 TABLET, FILM COATED ORAL DAILY
Status: DISCONTINUED | OUTPATIENT
Start: 2023-01-01 | End: 2023-01-01

## 2023-01-01 RX ORDER — DEXTROSE MONOHYDRATE 100 MG/ML
250 INJECTION, SOLUTION INTRAVENOUS ONCE
Status: COMPLETED | OUTPATIENT
Start: 2023-01-01 | End: 2023-01-01

## 2023-01-01 RX ORDER — SODIUM BICARBONATE 84 MG/ML
50 INJECTION, SOLUTION INTRAVENOUS ONCE
Status: COMPLETED | OUTPATIENT
Start: 2023-01-01 | End: 2023-01-01

## 2023-01-01 RX ORDER — ASPIRIN 325 MG
325 TABLET ORAL ONCE
Status: ACTIVE | OUTPATIENT
Start: 2023-01-01 | End: 2023-01-01

## 2023-01-01 RX ORDER — HYDROMORPHONE HYDROCHLORIDE 1 MG/ML
1 INJECTION, SOLUTION INTRAMUSCULAR; INTRAVENOUS; SUBCUTANEOUS EVERY 4 HOURS
Status: DISCONTINUED | OUTPATIENT
Start: 2023-01-01 | End: 2023-02-05 | Stop reason: HOSPADM

## 2023-01-01 RX ORDER — ALBUTEROL SULFATE 0.83 MG/ML
5 SOLUTION RESPIRATORY (INHALATION)
Status: DISCONTINUED | OUTPATIENT
Start: 2023-01-01 | End: 2023-01-01

## 2023-01-01 RX ORDER — DEXTROSE MONOHYDRATE 50 MG/ML
100 INJECTION, SOLUTION INTRAVENOUS CONTINUOUS
Status: DISCONTINUED | OUTPATIENT
Start: 2023-01-01 | End: 2023-01-01

## 2023-01-01 RX ORDER — IBUPROFEN 200 MG
4 TABLET ORAL AS NEEDED
Status: DISCONTINUED | OUTPATIENT
Start: 2023-01-01 | End: 2023-01-01

## 2023-01-01 RX ORDER — DEXTROSE MONOHYDRATE 100 MG/ML
0-250 INJECTION, SOLUTION INTRAVENOUS AS NEEDED
Status: DISCONTINUED | OUTPATIENT
Start: 2023-01-01 | End: 2023-01-01

## 2023-01-01 RX ORDER — CALCIUM GLUCONATE 20 MG/ML
1 INJECTION, SOLUTION INTRAVENOUS ONCE
Status: COMPLETED | OUTPATIENT
Start: 2023-01-01 | End: 2023-01-01

## 2023-01-01 RX ORDER — LORAZEPAM 2 MG/ML
1 INJECTION INTRAMUSCULAR
Status: DISCONTINUED | OUTPATIENT
Start: 2023-01-01 | End: 2023-01-01 | Stop reason: HOSPADM

## 2023-01-01 RX ORDER — GUAIFENESIN/DEXTROMETHORPHAN 100-10MG/5
20 SYRUP ORAL
Status: DISCONTINUED | OUTPATIENT
Start: 2023-01-01 | End: 2023-01-01

## 2023-01-01 RX ORDER — AMLODIPINE BESYLATE 2.5 MG/1
2.5 TABLET ORAL DAILY
Status: DISCONTINUED | OUTPATIENT
Start: 2023-01-01 | End: 2023-01-01

## 2023-01-01 RX ORDER — HEPARIN SODIUM 10000 [USP'U]/100ML
18-36 INJECTION, SOLUTION INTRAVENOUS
Status: DISCONTINUED | OUTPATIENT
Start: 2023-01-01 | End: 2023-01-01

## 2023-01-01 RX ORDER — FUROSEMIDE 10 MG/ML
40 INJECTION INTRAMUSCULAR; INTRAVENOUS ONCE
Status: COMPLETED | OUTPATIENT
Start: 2023-01-01 | End: 2023-01-01

## 2023-01-01 RX ORDER — FACIAL-BODY WIPES
10 EACH TOPICAL DAILY PRN
Status: DISCONTINUED | OUTPATIENT
Start: 2023-01-01 | End: 2023-02-05 | Stop reason: HOSPADM

## 2023-01-01 RX ORDER — INSULIN LISPRO 100 [IU]/ML
1-8 INJECTION, SOLUTION INTRAVENOUS; SUBCUTANEOUS
Status: DISCONTINUED | OUTPATIENT
Start: 2023-01-01 | End: 2023-01-01 | Stop reason: SDUPTHER

## 2023-01-01 RX ORDER — KETOROLAC TROMETHAMINE 30 MG/ML
30 INJECTION, SOLUTION INTRAMUSCULAR; INTRAVENOUS
Status: DISCONTINUED | OUTPATIENT
Start: 2023-01-01 | End: 2023-02-05 | Stop reason: HOSPADM

## 2023-01-01 RX ORDER — ACETAMINOPHEN 650 MG/1
650 SUPPOSITORY RECTAL
Status: DISCONTINUED | OUTPATIENT
Start: 2023-01-01 | End: 2023-01-01

## 2023-01-01 RX ORDER — METOPROLOL TARTRATE 25 MG/1
25 TABLET, FILM COATED ORAL 2 TIMES DAILY
Status: DISCONTINUED | OUTPATIENT
Start: 2023-01-01 | End: 2023-01-01

## 2023-01-01 RX ORDER — GLYCOPYRROLATE 0.2 MG/ML
0.2 INJECTION INTRAMUSCULAR; INTRAVENOUS
Status: DISCONTINUED | OUTPATIENT
Start: 2023-01-01 | End: 2023-01-01 | Stop reason: HOSPADM

## 2023-01-01 RX ORDER — SODIUM BICARBONATE 1 MEQ/ML
50 SYRINGE (ML) INTRAVENOUS ONCE
Status: COMPLETED | OUTPATIENT
Start: 2023-01-01 | End: 2023-01-01

## 2023-01-01 RX ORDER — DEXAMETHASONE 4 MG/1
10 TABLET ORAL
Status: DISCONTINUED | OUTPATIENT
Start: 2023-01-01 | End: 2023-01-01

## 2023-01-01 RX ORDER — MELATONIN
1000 DAILY
Status: DISCONTINUED | OUTPATIENT
Start: 2023-01-01 | End: 2023-01-01

## 2023-01-01 RX ORDER — EPINEPHRINE 1 MG/ML
0.3 INJECTION, SOLUTION, CONCENTRATE INTRAVENOUS
Status: DISCONTINUED | OUTPATIENT
Start: 2023-01-01 | End: 2023-01-01 | Stop reason: ALTCHOICE

## 2023-01-01 RX ORDER — SODIUM CHLORIDE 0.9 % (FLUSH) 0.9 %
5-10 SYRINGE (ML) INJECTION AS NEEDED
Status: DISCONTINUED | OUTPATIENT
Start: 2023-01-01 | End: 2023-01-01 | Stop reason: HOSPADM

## 2023-01-01 RX ORDER — ALBUTEROL SULFATE 0.83 MG/ML
10 SOLUTION RESPIRATORY (INHALATION) ONCE
Status: ACTIVE | OUTPATIENT
Start: 2023-01-01 | End: 2023-01-01

## 2023-01-01 RX ORDER — INSULIN LISPRO 100 [IU]/ML
15 INJECTION, SOLUTION INTRAVENOUS; SUBCUTANEOUS ONCE
Status: COMPLETED | OUTPATIENT
Start: 2023-01-01 | End: 2023-01-01

## 2023-01-01 RX ORDER — ACETAMINOPHEN 325 MG/1
650 TABLET ORAL
Status: DISCONTINUED | OUTPATIENT
Start: 2023-01-01 | End: 2023-01-01 | Stop reason: SDUPTHER

## 2023-01-01 RX ORDER — SODIUM CHLORIDE 0.9 % (FLUSH) 0.9 %
5 SYRINGE (ML) INJECTION AS NEEDED
Status: DISCONTINUED | OUTPATIENT
Start: 2023-01-01 | End: 2023-02-05 | Stop reason: HOSPADM

## 2023-01-01 RX ORDER — SODIUM BICARBONATE 1 MEQ/ML
100 SYRINGE (ML) INTRAVENOUS ONCE
Status: COMPLETED | OUTPATIENT
Start: 2023-01-01 | End: 2023-01-01

## 2023-01-01 RX ORDER — LORAZEPAM 2 MG/ML
1 INJECTION INTRAMUSCULAR
Status: DISCONTINUED | OUTPATIENT
Start: 2023-01-01 | End: 2023-02-05 | Stop reason: HOSPADM

## 2023-01-01 RX ORDER — LEVOFLOXACIN 5 MG/ML
750 INJECTION, SOLUTION INTRAVENOUS EVERY 24 HOURS
Status: DISCONTINUED | OUTPATIENT
Start: 2023-01-01 | End: 2023-01-01

## 2023-01-01 RX ORDER — INSULIN LISPRO 100 [IU]/ML
INJECTION, SOLUTION INTRAVENOUS; SUBCUTANEOUS
Status: DISCONTINUED | OUTPATIENT
Start: 2023-01-01 | End: 2023-01-01

## 2023-01-01 RX ORDER — HYDROMORPHONE HYDROCHLORIDE 1 MG/ML
0.5 INJECTION, SOLUTION INTRAMUSCULAR; INTRAVENOUS; SUBCUTANEOUS
Status: DISCONTINUED | OUTPATIENT
Start: 2023-01-01 | End: 2023-01-01 | Stop reason: HOSPADM

## 2023-01-01 RX ORDER — DEXAMETHASONE 4 MG/1
6 TABLET ORAL DAILY
Status: DISCONTINUED | OUTPATIENT
Start: 2023-01-01 | End: 2023-01-01

## 2023-01-01 RX ORDER — GUAIFENESIN 100 MG/5ML
81 LIQUID (ML) ORAL DAILY
Status: DISCONTINUED | OUTPATIENT
Start: 2023-01-01 | End: 2023-01-01

## 2023-01-01 RX ORDER — SODIUM CHLORIDE 9 MG/ML
250 INJECTION, SOLUTION INTRAVENOUS AS NEEDED
Status: DISCONTINUED | OUTPATIENT
Start: 2023-01-01 | End: 2023-01-01

## 2023-01-01 RX ORDER — GLYCOPYRROLATE 0.2 MG/ML
0.2 INJECTION INTRAMUSCULAR; INTRAVENOUS EVERY 4 HOURS
Status: DISCONTINUED | OUTPATIENT
Start: 2023-01-01 | End: 2023-02-05 | Stop reason: HOSPADM

## 2023-01-01 RX ADMIN — ASPIRIN 81 MG: 81 TABLET, CHEWABLE ORAL at 08:54

## 2023-01-01 RX ADMIN — TOCILIZUMAB 496.8 MG: 20 INJECTION, SOLUTION, CONCENTRATE INTRAVENOUS at 15:59

## 2023-01-01 RX ADMIN — METOPROLOL TARTRATE 25 MG: 25 TABLET, FILM COATED ORAL at 18:08

## 2023-01-01 RX ADMIN — APIXABAN 5 MG: 5 TABLET, FILM COATED ORAL at 23:23

## 2023-01-01 RX ADMIN — BARICITINIB 2 MG: 2 TABLET, FILM COATED ORAL at 09:23

## 2023-01-01 RX ADMIN — MULTIPLE VITAMINS W/ MINERALS TAB 1 TABLET: TAB at 08:39

## 2023-01-01 RX ADMIN — MULTIPLE VITAMINS W/ MINERALS TAB 1 TABLET: TAB at 09:23

## 2023-01-01 RX ADMIN — ALLOPURINOL 300 MG: 100 TABLET ORAL at 08:58

## 2023-01-01 RX ADMIN — CHOLECALCIFEROL TAB 25 MCG (1000 UNIT) 1000 UNITS: 25 TAB at 08:27

## 2023-01-01 RX ADMIN — Medication 5 UNITS: at 18:05

## 2023-01-01 RX ADMIN — Medication 5 UNITS: at 11:36

## 2023-01-01 RX ADMIN — METOPROLOL TARTRATE 25 MG: 25 TABLET, FILM COATED ORAL at 18:05

## 2023-01-01 RX ADMIN — GUAIFENESIN 600 MG: 600 TABLET, EXTENDED RELEASE ORAL at 20:46

## 2023-01-01 RX ADMIN — ATORVASTATIN CALCIUM 20 MG: 40 TABLET, FILM COATED ORAL at 08:28

## 2023-01-01 RX ADMIN — CASTOR OIL AND BALSAM, PERU: 788; 87 OINTMENT TOPICAL at 22:18

## 2023-01-01 RX ADMIN — CEPHALEXIN 500 MG: 250 CAPSULE ORAL at 08:54

## 2023-01-01 RX ADMIN — BRIMONIDINE TARTRATE 1 DROP: 2 SOLUTION OPHTHALMIC at 08:38

## 2023-01-01 RX ADMIN — GUAIFENESIN 600 MG: 600 TABLET, EXTENDED RELEASE ORAL at 08:54

## 2023-01-01 RX ADMIN — METOPROLOL TARTRATE 25 MG: 25 TABLET, FILM COATED ORAL at 18:33

## 2023-01-01 RX ADMIN — GUAIFENESIN 600 MG: 600 TABLET, EXTENDED RELEASE ORAL at 09:27

## 2023-01-01 RX ADMIN — GUAIFENESIN 600 MG: 600 TABLET, EXTENDED RELEASE ORAL at 10:01

## 2023-01-01 RX ADMIN — BRIMONIDINE TARTRATE 1 DROP: 2 SOLUTION OPHTHALMIC at 11:20

## 2023-01-01 RX ADMIN — DEXAMETHASONE 6 MG: 4 TABLET ORAL at 09:22

## 2023-01-01 RX ADMIN — CETIRIZINE HYDROCHLORIDE 10 MG: 10 TABLET, FILM COATED ORAL at 08:54

## 2023-01-01 RX ADMIN — SODIUM CHLORIDE 40 MG: 9 INJECTION, SOLUTION INTRAMUSCULAR; INTRAVENOUS; SUBCUTANEOUS at 09:25

## 2023-01-01 RX ADMIN — CETIRIZINE HYDROCHLORIDE 10 MG: 10 TABLET, FILM COATED ORAL at 09:26

## 2023-01-01 RX ADMIN — ATORVASTATIN CALCIUM 20 MG: 40 TABLET, FILM COATED ORAL at 08:59

## 2023-01-01 RX ADMIN — SODIUM BICARBONATE 100 MEQ: 84 INJECTION INTRAVENOUS at 09:25

## 2023-01-01 RX ADMIN — AMLODIPINE BESYLATE 2.5 MG: 2.5 TABLET ORAL at 09:05

## 2023-01-01 RX ADMIN — CASTOR OIL AND BALSAM, PERU: 788; 87 OINTMENT TOPICAL at 09:40

## 2023-01-01 RX ADMIN — HYDROMORPHONE HYDROCHLORIDE 0.5 MG: 1 INJECTION, SOLUTION INTRAMUSCULAR; INTRAVENOUS; SUBCUTANEOUS at 12:18

## 2023-01-01 RX ADMIN — AMLODIPINE BESYLATE 2.5 MG: 2.5 TABLET ORAL at 09:27

## 2023-01-01 RX ADMIN — BENZONATATE 100 MG: 100 CAPSULE ORAL at 09:22

## 2023-01-01 RX ADMIN — CHOLECALCIFEROL TAB 25 MCG (1000 UNIT) 1000 UNITS: 25 TAB at 08:39

## 2023-01-01 RX ADMIN — ASPIRIN 81 MG: 81 TABLET, CHEWABLE ORAL at 09:24

## 2023-01-01 RX ADMIN — CEFTRIAXONE 1 G: 1 INJECTION, POWDER, FOR SOLUTION INTRAMUSCULAR; INTRAVENOUS at 17:16

## 2023-01-01 RX ADMIN — SODIUM ZIRCONIUM CYCLOSILICATE 10 G: 10 POWDER, FOR SUSPENSION ORAL at 18:05

## 2023-01-01 RX ADMIN — ALLOPURINOL 300 MG: 100 TABLET ORAL at 08:27

## 2023-01-01 RX ADMIN — Medication 2 UNITS: at 18:10

## 2023-01-01 RX ADMIN — BRIMONIDINE TARTRATE 1 DROP: 2 SOLUTION OPHTHALMIC at 22:03

## 2023-01-01 RX ADMIN — ACETAMINOPHEN 650 MG: 325 TABLET ORAL at 05:12

## 2023-01-01 RX ADMIN — CASTOR OIL AND BALSAM, PERU: 788; 87 OINTMENT TOPICAL at 23:24

## 2023-01-01 RX ADMIN — SODIUM CHLORIDE 500 ML: 9 INJECTION, SOLUTION INTRAVENOUS at 02:42

## 2023-01-01 RX ADMIN — MULTIPLE VITAMINS W/ MINERALS TAB 1 TABLET: TAB at 10:01

## 2023-01-01 RX ADMIN — DEXAMETHASONE 6 MG: 4 TABLET ORAL at 09:27

## 2023-01-01 RX ADMIN — CETIRIZINE HYDROCHLORIDE 10 MG: 10 TABLET, FILM COATED ORAL at 09:22

## 2023-01-01 RX ADMIN — Medication 3 UNITS: at 18:05

## 2023-01-01 RX ADMIN — ATORVASTATIN CALCIUM 20 MG: 40 TABLET, FILM COATED ORAL at 09:06

## 2023-01-01 RX ADMIN — METOPROLOL TARTRATE 25 MG: 25 TABLET, FILM COATED ORAL at 17:22

## 2023-01-01 RX ADMIN — LORAZEPAM 1 MG: 2 INJECTION INTRAMUSCULAR; INTRAVENOUS at 12:19

## 2023-01-01 RX ADMIN — AMLODIPINE BESYLATE 2.5 MG: 2.5 TABLET ORAL at 08:54

## 2023-01-01 RX ADMIN — BRIMONIDINE TARTRATE 1 DROP: 2 SOLUTION OPHTHALMIC at 21:50

## 2023-01-01 RX ADMIN — Medication 2 UNITS: at 08:47

## 2023-01-01 RX ADMIN — CHOLECALCIFEROL TAB 25 MCG (1000 UNIT) 1000 UNITS: 25 TAB at 08:59

## 2023-01-01 RX ADMIN — APIXABAN 5 MG: 5 TABLET, FILM COATED ORAL at 21:00

## 2023-01-01 RX ADMIN — CASTOR OIL AND BALSAM, PERU: 788; 87 OINTMENT TOPICAL at 08:51

## 2023-01-01 RX ADMIN — CETIRIZINE HYDROCHLORIDE 10 MG: 10 TABLET, FILM COATED ORAL at 08:48

## 2023-01-01 RX ADMIN — AMLODIPINE BESYLATE 2.5 MG: 2.5 TABLET ORAL at 10:01

## 2023-01-01 RX ADMIN — ALLOPURINOL 300 MG: 100 TABLET ORAL at 09:22

## 2023-01-01 RX ADMIN — CHOLECALCIFEROL TAB 25 MCG (1000 UNIT) 1000 UNITS: 25 TAB at 09:27

## 2023-01-01 RX ADMIN — GLYCOPYRROLATE 0.2 MG: 0.2 INJECTION INTRAMUSCULAR; INTRAVENOUS at 11:24

## 2023-01-01 RX ADMIN — SODIUM ZIRCONIUM CYCLOSILICATE 10 G: 10 POWDER, FOR SUSPENSION ORAL at 11:36

## 2023-01-01 RX ADMIN — LEVOFLOXACIN 750 MG: 5 INJECTION, SOLUTION INTRAVENOUS at 15:32

## 2023-01-01 RX ADMIN — ATORVASTATIN CALCIUM 20 MG: 40 TABLET, FILM COATED ORAL at 09:22

## 2023-01-01 RX ADMIN — CALCIUM GLUCONATE 1000 MG: 20 INJECTION, SOLUTION INTRAVENOUS at 18:18

## 2023-01-01 RX ADMIN — AZITHROMYCIN MONOHYDRATE 500 MG: 500 INJECTION, POWDER, LYOPHILIZED, FOR SOLUTION INTRAVENOUS at 17:22

## 2023-01-01 RX ADMIN — MULTIPLE VITAMINS W/ MINERALS TAB 1 TABLET: TAB at 09:26

## 2023-01-01 RX ADMIN — NITROGLYCERIN 0.4 MG: 0.4 TABLET, ORALLY DISINTEGRATING SUBLINGUAL at 05:12

## 2023-01-01 RX ADMIN — GUAIFENESIN 600 MG: 600 TABLET, EXTENDED RELEASE ORAL at 22:02

## 2023-01-01 RX ADMIN — ATORVASTATIN CALCIUM 20 MG: 40 TABLET, FILM COATED ORAL at 09:27

## 2023-01-01 RX ADMIN — APIXABAN 5 MG: 5 TABLET, FILM COATED ORAL at 09:23

## 2023-01-01 RX ADMIN — Medication 2 UNITS: at 11:57

## 2023-01-01 RX ADMIN — SODIUM CHLORIDE, PRESERVATIVE FREE 10 ML: 5 INJECTION INTRAVENOUS at 21:34

## 2023-01-01 RX ADMIN — MULTIPLE VITAMINS W/ MINERALS TAB 1 TABLET: TAB at 08:48

## 2023-01-01 RX ADMIN — Medication 2 UNITS: at 22:02

## 2023-01-01 RX ADMIN — LIDOCAINE HYDROCHLORIDE 1 ML: 20 JELLY TOPICAL at 11:24

## 2023-01-01 RX ADMIN — CETIRIZINE HYDROCHLORIDE 10 MG: 10 TABLET, FILM COATED ORAL at 08:28

## 2023-01-01 RX ADMIN — BRIMONIDINE TARTRATE 1 DROP: 2 SOLUTION OPHTHALMIC at 21:34

## 2023-01-01 RX ADMIN — DEXAMETHASONE 6 MG: 4 TABLET ORAL at 08:47

## 2023-01-01 RX ADMIN — BRIMONIDINE TARTRATE 1 DROP: 2 SOLUTION OPHTHALMIC at 20:09

## 2023-01-01 RX ADMIN — SODIUM CHLORIDE, PRESERVATIVE FREE 10 ML: 5 INJECTION INTRAVENOUS at 22:02

## 2023-01-01 RX ADMIN — Medication 2 UNITS: at 12:12

## 2023-01-01 RX ADMIN — METOPROLOL TARTRATE 25 MG: 25 TABLET, FILM COATED ORAL at 08:51

## 2023-01-01 RX ADMIN — FUROSEMIDE 80 MG: 10 INJECTION, SOLUTION INTRAMUSCULAR; INTRAVENOUS at 02:41

## 2023-01-01 RX ADMIN — DEXTROSE MONOHYDRATE 250 ML: 100 INJECTION, SOLUTION INTRAVENOUS at 16:54

## 2023-01-01 RX ADMIN — GUAIFENESIN 600 MG: 600 TABLET, EXTENDED RELEASE ORAL at 21:48

## 2023-01-01 RX ADMIN — ALLOPURINOL 300 MG: 100 TABLET ORAL at 09:26

## 2023-01-01 RX ADMIN — SODIUM CHLORIDE 40 MG: 9 INJECTION, SOLUTION INTRAMUSCULAR; INTRAVENOUS; SUBCUTANEOUS at 09:06

## 2023-01-01 RX ADMIN — LORAZEPAM 1 MG: 2 INJECTION INTRAMUSCULAR; INTRAVENOUS at 16:30

## 2023-01-01 RX ADMIN — ALLOPURINOL 300 MG: 100 TABLET ORAL at 10:02

## 2023-01-01 RX ADMIN — CASTOR OIL AND BALSAM, PERU: 788; 87 OINTMENT TOPICAL at 20:47

## 2023-01-01 RX ADMIN — METOPROLOL TARTRATE 25 MG: 25 TABLET, FILM COATED ORAL at 09:23

## 2023-01-01 RX ADMIN — BRIMONIDINE TARTRATE 1 DROP: 2 SOLUTION OPHTHALMIC at 09:24

## 2023-01-01 RX ADMIN — BARICITINIB 2 MG: 2 TABLET, FILM COATED ORAL at 08:48

## 2023-01-01 RX ADMIN — Medication 2 UNITS: at 17:46

## 2023-01-01 RX ADMIN — DEXAMETHASONE 6 MG: 4 TABLET ORAL at 08:59

## 2023-01-01 RX ADMIN — HEPARIN SODIUM 2480 UNITS: 1000 INJECTION INTRAVENOUS; SUBCUTANEOUS at 02:49

## 2023-01-01 RX ADMIN — Medication 2 UNITS: at 08:38

## 2023-01-01 RX ADMIN — HYDROMORPHONE HYDROCHLORIDE 0.5 MG: 1 INJECTION, SOLUTION INTRAMUSCULAR; INTRAVENOUS; SUBCUTANEOUS at 16:23

## 2023-01-01 RX ADMIN — METOPROLOL TARTRATE 25 MG: 25 TABLET, FILM COATED ORAL at 08:39

## 2023-01-01 RX ADMIN — CEFTRIAXONE 1 G: 1 INJECTION, POWDER, FOR SOLUTION INTRAMUSCULAR; INTRAVENOUS at 08:48

## 2023-01-01 RX ADMIN — CETIRIZINE HYDROCHLORIDE 10 MG: 10 TABLET, FILM COATED ORAL at 09:05

## 2023-01-01 RX ADMIN — GUAIFENESIN 600 MG: 600 TABLET, EXTENDED RELEASE ORAL at 08:48

## 2023-01-01 RX ADMIN — SODIUM CHLORIDE 40 MG: 9 INJECTION, SOLUTION INTRAMUSCULAR; INTRAVENOUS; SUBCUTANEOUS at 09:24

## 2023-01-01 RX ADMIN — Medication 10 UNITS: at 09:28

## 2023-01-01 RX ADMIN — FUROSEMIDE 40 MG: 10 INJECTION, SOLUTION INTRAMUSCULAR; INTRAVENOUS at 16:52

## 2023-01-01 RX ADMIN — ASPIRIN 81 MG: 81 TABLET, CHEWABLE ORAL at 10:01

## 2023-01-01 RX ADMIN — LORAZEPAM 1 MG: 2 INJECTION INTRAMUSCULAR; INTRAVENOUS at 10:19

## 2023-01-01 RX ADMIN — CEPHALEXIN 500 MG: 250 CAPSULE ORAL at 13:11

## 2023-01-01 RX ADMIN — SODIUM CHLORIDE 40 MG: 9 INJECTION, SOLUTION INTRAMUSCULAR; INTRAVENOUS; SUBCUTANEOUS at 10:01

## 2023-01-01 RX ADMIN — CEFTRIAXONE 1 G: 1 INJECTION, POWDER, FOR SOLUTION INTRAMUSCULAR; INTRAVENOUS at 22:42

## 2023-01-01 RX ADMIN — DEXAMETHASONE 10 MG: 4 TABLET ORAL at 08:27

## 2023-01-01 RX ADMIN — CASTOR OIL AND BALSAM, PERU: 788; 87 OINTMENT TOPICAL at 10:06

## 2023-01-01 RX ADMIN — Medication 5 UNITS: at 02:41

## 2023-01-01 RX ADMIN — DEXAMETHASONE 6 MG: 4 TABLET ORAL at 08:39

## 2023-01-01 RX ADMIN — APIXABAN 5 MG: 5 TABLET, FILM COATED ORAL at 10:01

## 2023-01-01 RX ADMIN — GUAIFENESIN 600 MG: 600 TABLET, EXTENDED RELEASE ORAL at 23:23

## 2023-01-01 RX ADMIN — MULTIPLE VITAMINS W/ MINERALS TAB 1 TABLET: TAB at 09:06

## 2023-01-01 RX ADMIN — SODIUM BICARBONATE 50 MEQ: 84 INJECTION, SOLUTION INTRAVENOUS at 16:50

## 2023-01-01 RX ADMIN — DEXTROSE MONOHYDRATE 250 ML: 100 INJECTION, SOLUTION INTRAVENOUS at 09:21

## 2023-01-01 RX ADMIN — SODIUM BICARBONATE 50 MEQ: 84 INJECTION INTRAVENOUS at 02:43

## 2023-01-01 RX ADMIN — GLYCOPYRROLATE 0.2 MG: 0.2 INJECTION INTRAMUSCULAR; INTRAVENOUS at 16:22

## 2023-01-01 RX ADMIN — GLYCOPYRROLATE 0.2 MG: 0.2 INJECTION INTRAMUSCULAR; INTRAVENOUS at 09:50

## 2023-01-01 RX ADMIN — DEXAMETHASONE 6 MG: 4 TABLET ORAL at 09:06

## 2023-01-01 RX ADMIN — MULTIPLE VITAMINS W/ MINERALS TAB 1 TABLET: TAB at 08:28

## 2023-01-01 RX ADMIN — HYDROMORPHONE HYDROCHLORIDE 1 MG: 1 INJECTION, SOLUTION INTRAMUSCULAR; INTRAVENOUS; SUBCUTANEOUS at 16:29

## 2023-01-01 RX ADMIN — BRIMONIDINE TARTRATE 1 DROP: 2 SOLUTION OPHTHALMIC at 10:05

## 2023-01-01 RX ADMIN — APIXABAN 5 MG: 5 TABLET, FILM COATED ORAL at 20:46

## 2023-01-01 RX ADMIN — Medication 3 UNITS: at 16:30

## 2023-01-01 RX ADMIN — AMLODIPINE BESYLATE 2.5 MG: 2.5 TABLET ORAL at 08:39

## 2023-01-01 RX ADMIN — HYDROMORPHONE HYDROCHLORIDE 0.5 MG: 1 INJECTION, SOLUTION INTRAMUSCULAR; INTRAVENOUS; SUBCUTANEOUS at 20:20

## 2023-01-01 RX ADMIN — LORAZEPAM 1 MG: 2 INJECTION INTRAMUSCULAR; INTRAVENOUS at 11:23

## 2023-01-01 RX ADMIN — Medication 2 UNITS: at 21:40

## 2023-01-01 RX ADMIN — METOPROLOL TARTRATE 25 MG: 25 TABLET, FILM COATED ORAL at 09:06

## 2023-01-01 RX ADMIN — CETIRIZINE HYDROCHLORIDE 10 MG: 10 TABLET, FILM COATED ORAL at 10:02

## 2023-01-01 RX ADMIN — AMLODIPINE BESYLATE 2.5 MG: 2.5 TABLET ORAL at 08:28

## 2023-01-01 RX ADMIN — CASTOR OIL AND BALSAM, PERU: 788; 87 OINTMENT TOPICAL at 20:09

## 2023-01-01 RX ADMIN — Medication 2 UNITS: at 09:23

## 2023-01-01 RX ADMIN — APIXABAN 5 MG: 5 TABLET, FILM COATED ORAL at 20:10

## 2023-01-01 RX ADMIN — DEXTROSE MONOHYDRATE 100 ML/HR: 50 INJECTION, SOLUTION INTRAVENOUS at 02:42

## 2023-01-01 RX ADMIN — GUAIFENESIN 600 MG: 600 TABLET, EXTENDED RELEASE ORAL at 08:27

## 2023-01-01 RX ADMIN — BARICITINIB 2 MG: 2 TABLET, FILM COATED ORAL at 08:39

## 2023-01-01 RX ADMIN — GUAIFENESIN 600 MG: 600 TABLET, EXTENDED RELEASE ORAL at 08:39

## 2023-01-01 RX ADMIN — BARICITINIB 2 MG: 2 TABLET, FILM COATED ORAL at 10:01

## 2023-01-01 RX ADMIN — ACETAMINOPHEN 650 MG: 325 TABLET ORAL at 15:55

## 2023-01-01 RX ADMIN — ALLOPURINOL 300 MG: 100 TABLET ORAL at 09:06

## 2023-01-01 RX ADMIN — SODIUM CHLORIDE 40 MG: 9 INJECTION, SOLUTION INTRAMUSCULAR; INTRAVENOUS; SUBCUTANEOUS at 08:45

## 2023-01-01 RX ADMIN — ALLOPURINOL 300 MG: 100 TABLET ORAL at 08:47

## 2023-01-01 RX ADMIN — HYDROMORPHONE HYDROCHLORIDE 0.5 MG: 1 INJECTION, SOLUTION INTRAMUSCULAR; INTRAVENOUS; SUBCUTANEOUS at 04:35

## 2023-01-01 RX ADMIN — Medication 2 UNITS: at 21:48

## 2023-01-01 RX ADMIN — HYDROMORPHONE HYDROCHLORIDE 0.5 MG: 1 INJECTION, SOLUTION INTRAMUSCULAR; INTRAVENOUS; SUBCUTANEOUS at 00:01

## 2023-01-01 RX ADMIN — ATORVASTATIN CALCIUM 20 MG: 40 TABLET, FILM COATED ORAL at 10:01

## 2023-01-01 RX ADMIN — SODIUM CHLORIDE 500 ML: 9 INJECTION, SOLUTION INTRAVENOUS at 22:43

## 2023-01-01 RX ADMIN — Medication 2 UNITS: at 17:22

## 2023-01-01 RX ADMIN — Medication 2 UNITS: at 11:30

## 2023-01-01 RX ADMIN — ENOXAPARIN SODIUM 40 MG: 100 INJECTION SUBCUTANEOUS at 08:54

## 2023-01-01 RX ADMIN — CASTOR OIL AND BALSAM, PERU: 788; 87 OINTMENT TOPICAL at 08:38

## 2023-01-01 RX ADMIN — GUAIFENESIN 600 MG: 600 TABLET, EXTENDED RELEASE ORAL at 09:07

## 2023-01-01 RX ADMIN — SODIUM CHLORIDE 40 MG: 9 INJECTION, SOLUTION INTRAMUSCULAR; INTRAVENOUS; SUBCUTANEOUS at 08:29

## 2023-01-01 RX ADMIN — GUAIFENESIN 600 MG: 600 TABLET, EXTENDED RELEASE ORAL at 21:00

## 2023-01-01 RX ADMIN — Medication 2 UNITS: at 22:17

## 2023-01-01 RX ADMIN — Medication 15 UNITS: at 09:27

## 2023-01-01 RX ADMIN — METOPROLOL TARTRATE 25 MG: 25 TABLET, FILM COATED ORAL at 17:46

## 2023-01-01 RX ADMIN — HYDROMORPHONE HYDROCHLORIDE 0.5 MG: 1 INJECTION, SOLUTION INTRAMUSCULAR; INTRAVENOUS; SUBCUTANEOUS at 11:23

## 2023-01-01 RX ADMIN — CHOLECALCIFEROL TAB 25 MCG (1000 UNIT) 1000 UNITS: 25 TAB at 09:22

## 2023-01-01 RX ADMIN — AMLODIPINE BESYLATE 2.5 MG: 2.5 TABLET ORAL at 08:48

## 2023-01-01 RX ADMIN — ALLOPURINOL 300 MG: 100 TABLET ORAL at 08:39

## 2023-01-01 RX ADMIN — BENZONATATE 100 MG: 100 CAPSULE ORAL at 05:12

## 2023-01-01 RX ADMIN — GUAIFENESIN 600 MG: 600 TABLET, EXTENDED RELEASE ORAL at 20:10

## 2023-01-01 RX ADMIN — Medication 2 UNITS: at 09:05

## 2023-01-01 RX ADMIN — HYDROMORPHONE HYDROCHLORIDE 1 MG: 1 INJECTION, SOLUTION INTRAMUSCULAR; INTRAVENOUS; SUBCUTANEOUS at 12:19

## 2023-01-01 RX ADMIN — SODIUM ZIRCONIUM CYCLOSILICATE 10 G: 10 POWDER, FOR SUSPENSION ORAL at 11:59

## 2023-01-01 RX ADMIN — GUAIFENESIN 600 MG: 600 TABLET, EXTENDED RELEASE ORAL at 09:22

## 2023-01-01 RX ADMIN — HEPARIN SODIUM 18 UNITS/KG/HR: 10000 INJECTION, SOLUTION INTRAVENOUS at 18:01

## 2023-01-01 RX ADMIN — SODIUM CHLORIDE 40 MG: 9 INJECTION, SOLUTION INTRAMUSCULAR; INTRAVENOUS; SUBCUTANEOUS at 08:53

## 2023-01-01 RX ADMIN — LEVOFLOXACIN 750 MG: 5 INJECTION, SOLUTION INTRAVENOUS at 17:24

## 2023-01-01 RX ADMIN — APIXABAN 5 MG: 5 TABLET, FILM COATED ORAL at 08:39

## 2023-01-01 RX ADMIN — Medication 2 UNITS: at 08:29

## 2023-01-01 RX ADMIN — SODIUM CHLORIDE 40 MG: 9 INJECTION, SOLUTION INTRAMUSCULAR; INTRAVENOUS; SUBCUTANEOUS at 08:48

## 2023-01-01 RX ADMIN — METOPROLOL TARTRATE 25 MG: 25 TABLET, FILM COATED ORAL at 18:01

## 2023-01-01 RX ADMIN — ASPIRIN 81 MG: 81 TABLET, CHEWABLE ORAL at 09:00

## 2023-01-01 RX ADMIN — METOPROLOL TARTRATE 25 MG: 25 TABLET, FILM COATED ORAL at 08:27

## 2023-01-01 RX ADMIN — Medication 2 UNITS: at 11:52

## 2023-01-01 RX ADMIN — HYDROMORPHONE HYDROCHLORIDE 0.5 MG: 1 INJECTION, SOLUTION INTRAMUSCULAR; INTRAVENOUS; SUBCUTANEOUS at 08:43

## 2023-01-01 RX ADMIN — Medication 10 UNITS: at 16:51

## 2023-01-01 RX ADMIN — DEXAMETHASONE SODIUM PHOSPHATE 4 MG: 4 INJECTION, SOLUTION INTRAMUSCULAR; INTRAVENOUS at 12:55

## 2023-01-01 RX ADMIN — HYDROMORPHONE HYDROCHLORIDE 0.5 MG: 1 INJECTION, SOLUTION INTRAMUSCULAR; INTRAVENOUS; SUBCUTANEOUS at 10:19

## 2023-01-01 RX ADMIN — LEVOFLOXACIN 750 MG: 5 INJECTION, SOLUTION INTRAVENOUS at 15:56

## 2023-01-01 RX ADMIN — DEXAMETHASONE 6 MG: 4 TABLET ORAL at 10:01

## 2023-01-01 RX ADMIN — GUAIFENESIN 600 MG: 600 TABLET, EXTENDED RELEASE ORAL at 21:34

## 2023-01-01 RX ADMIN — BARICITINIB 2 MG: 2 TABLET, FILM COATED ORAL at 13:10

## 2023-01-01 RX ADMIN — CASTOR OIL AND BALSAM, PERU: 788; 87 OINTMENT TOPICAL at 22:07

## 2023-01-01 RX ADMIN — METOPROLOL TARTRATE 25 MG: 25 TABLET, FILM COATED ORAL at 10:01

## 2023-01-01 RX ADMIN — METOPROLOL TARTRATE 25 MG: 25 TABLET, FILM COATED ORAL at 08:54

## 2023-01-01 RX ADMIN — CEFTRIAXONE 1 G: 1 INJECTION, POWDER, FOR SOLUTION INTRAMUSCULAR; INTRAVENOUS at 08:45

## 2023-01-01 RX ADMIN — APIXABAN 5 MG: 5 TABLET, FILM COATED ORAL at 08:47

## 2023-01-01 RX ADMIN — SODIUM ZIRCONIUM CYCLOSILICATE 10 G: 10 POWDER, FOR SUSPENSION ORAL at 09:04

## 2023-01-01 RX ADMIN — CHOLECALCIFEROL TAB 25 MCG (1000 UNIT) 1000 UNITS: 25 TAB at 09:07

## 2023-01-01 RX ADMIN — CASTOR OIL AND BALSAM, PERU: 788; 87 OINTMENT TOPICAL at 11:19

## 2023-01-01 RX ADMIN — CASTOR OIL AND BALSAM, PERU: 788; 87 OINTMENT TOPICAL at 09:00

## 2023-01-01 RX ADMIN — Medication 3 UNITS: at 09:28

## 2023-01-01 RX ADMIN — BRIMONIDINE TARTRATE 1 DROP: 2 SOLUTION OPHTHALMIC at 22:18

## 2023-01-01 RX ADMIN — CHOLECALCIFEROL TAB 25 MCG (1000 UNIT) 1000 UNITS: 25 TAB at 10:01

## 2023-01-01 RX ADMIN — GUAIFENESIN 600 MG: 600 TABLET, EXTENDED RELEASE ORAL at 01:48

## 2023-01-01 RX ADMIN — CETIRIZINE HYDROCHLORIDE 10 MG: 10 TABLET, FILM COATED ORAL at 09:00

## 2023-01-01 RX ADMIN — Medication 2 UNITS: at 10:00

## 2023-01-01 RX ADMIN — CASTOR OIL AND BALSAM, PERU: 788; 87 OINTMENT TOPICAL at 11:36

## 2023-01-01 RX ADMIN — AZITHROMYCIN MONOHYDRATE 500 MG: 500 INJECTION, POWDER, LYOPHILIZED, FOR SOLUTION INTRAVENOUS at 16:00

## 2023-01-01 RX ADMIN — LORAZEPAM 1 MG: 2 INJECTION INTRAMUSCULAR; INTRAVENOUS at 05:53

## 2023-01-01 RX ADMIN — GLYCOPYRROLATE 0.2 MG: 0.2 INJECTION INTRAMUSCULAR; INTRAVENOUS at 16:30

## 2023-01-01 RX ADMIN — Medication 3 UNITS: at 18:00

## 2023-01-01 RX ADMIN — SODIUM ZIRCONIUM CYCLOSILICATE 10 G: 10 POWDER, FOR SUSPENSION ORAL at 17:54

## 2023-01-01 RX ADMIN — GUAIFENESIN 600 MG: 600 TABLET, EXTENDED RELEASE ORAL at 22:17

## 2023-01-01 RX ADMIN — AMLODIPINE BESYLATE 2.5 MG: 2.5 TABLET ORAL at 09:22

## 2023-01-01 RX ADMIN — CALCIUM GLUCONATE 1000 MG: 20 INJECTION, SOLUTION INTRAVENOUS at 09:17

## 2023-01-01 RX ADMIN — CASTOR OIL AND BALSAM, PERU: 788; 87 OINTMENT TOPICAL at 21:50

## 2023-01-01 RX ADMIN — BRIMONIDINE TARTRATE 1 DROP: 2 SOLUTION OPHTHALMIC at 11:36

## 2023-01-01 RX ADMIN — AZITHROMYCIN 500 MG: 500 INJECTION, POWDER, LYOPHILIZED, FOR SOLUTION INTRAVENOUS at 23:17

## 2023-01-03 ENCOUNTER — HOSPITAL ENCOUNTER (OUTPATIENT)
Dept: INFUSION THERAPY | Age: 88
Discharge: HOME | End: 2023-01-03
Payer: MEDICARE

## 2023-01-03 LAB — HISTORY CHECKED?,CKHIST: NORMAL

## 2023-01-03 PROCEDURE — 86923 COMPATIBILITY TEST ELECTRIC: CPT

## 2023-01-03 PROCEDURE — 86900 BLOOD TYPING SEROLOGIC ABO: CPT

## 2023-01-03 PROCEDURE — 36415 COLL VENOUS BLD VENIPUNCTURE: CPT

## 2023-01-03 NOTE — PROGRESS NOTES
[facility] Progress Note    Date: January 3, 2023    Name: Lynn Mandel    MRN: [id number]         : 10/4/1933      1310:  Pt arrived ambulatory and in no distress, for lab visit. Labs drawn via RFA, patient tolerated well. The patient was asked the following questions and answered as documented below:    Do you have any symptoms of COVID-19? SOB, coughing, fever, or generally not feeling well? NO  Have you been exposed to COVID-19 recently? NO  Have you had any recent contact with family/friend that has a pending COVID test? NO      Follow Up: Proceed with treatment    Visit Vitals  BP (!) (P) 127/48 (BP 1 Location: Right arm, BP Patient Position: Sitting)   Pulse (P) 74   Temp (P) 98.6 °F (37 °C)   Resp (P) 18   SpO2 (P) 97%           1325: Departed [facility] ambulatory and in no distress.     Future Appointments   Date Time Provider Renzo Gutierrez   2023 10:00 AM [facility] [place] [place] [facility] [place] INFUSION NURSE [address] [place] [address]       Noel Wong RN  January 3, 2023

## 2023-01-04 ENCOUNTER — HOSPITAL ENCOUNTER (OUTPATIENT)
Dept: INFUSION THERAPY | Age: 88
Discharge: HOME | End: 2023-01-04
Payer: MEDICARE

## 2023-01-04 VITALS
DIASTOLIC BLOOD PRESSURE: 42 MMHG | SYSTOLIC BLOOD PRESSURE: 140 MMHG | TEMPERATURE: 98.9 F | HEART RATE: 63 BPM | RESPIRATION RATE: 18 BRPM | OXYGEN SATURATION: 96 %

## 2023-01-04 VITALS
SYSTOLIC BLOOD PRESSURE: 127 MMHG | HEART RATE: 74 BPM | RESPIRATION RATE: 18 BRPM | DIASTOLIC BLOOD PRESSURE: 48 MMHG | TEMPERATURE: 98.6 F | OXYGEN SATURATION: 97 %

## 2023-01-04 PROCEDURE — 77030013169 SET IV BLD ICUM -A

## 2023-01-04 PROCEDURE — 74011000250 HC RX REV CODE- 250: Performed by: NURSE PRACTITIONER

## 2023-01-04 PROCEDURE — 36430 TRANSFUSION BLD/BLD COMPNT: CPT

## 2023-01-04 PROCEDURE — 74011250636 HC RX REV CODE- 250/636: Performed by: NURSE PRACTITIONER

## 2023-01-04 PROCEDURE — P9016 RBC LEUKOCYTES REDUCED: HCPCS

## 2023-01-04 RX ORDER — SODIUM CHLORIDE 0.9 % (FLUSH) 0.9 %
5-10 SYRINGE (ML) INJECTION AS NEEDED
Status: DISCONTINUED | OUTPATIENT
Start: 2023-01-04 | End: 2023-01-05 | Stop reason: HOSPADM

## 2023-01-04 RX ORDER — SODIUM CHLORIDE 9 MG/ML
250 INJECTION, SOLUTION INTRAVENOUS AS NEEDED
Status: DISCONTINUED | OUTPATIENT
Start: 2023-01-04 | End: 2023-01-06 | Stop reason: HOSPADM

## 2023-01-04 RX ADMIN — SODIUM CHLORIDE, PRESERVATIVE FREE 10 ML: 5 INJECTION INTRAVENOUS at 13:16

## 2023-01-04 RX ADMIN — SODIUM CHLORIDE 250 ML: 9 INJECTION, SOLUTION INTRAVENOUS at 10:11

## 2023-01-04 RX ADMIN — SODIUM CHLORIDE, PRESERVATIVE FREE 10 ML: 5 INJECTION INTRAVENOUS at 10:09

## 2023-01-04 NOTE — PROGRESS NOTES
[facility]IC Progress Note    Date: 2023    Name: Ion Garrison    MRN: [id number]         : 10/4/1933      0950: Pt arrived at [facility] via wheelchair and in no distress for transfusion of ONE unit PRBCs. OPI COVID-19 SCREENING      The patient was asked the following questions and answered as documented below:    Do you have any symptoms of COVID-19? SOB, coughing, fever, or generally not feeling well? NO  Have you been exposed to COVID-19 recently? NO  Have you had any recent contact with family/friend that has a pending COVID test? NO      Follow Up: Proceed with treatment    Assessment completed, no new complaints voiced. Problem: Anemia Care Plan (Adult and Pediatric)  Goal: *Labs within defined limits  Outcome: Progressing Towards Goal     Problem: Knowledge Deficit  Goal: *Verbalizes understanding of procedures and medications  Outcome: Progressing Towards Goal     Problem: Patient Education:  Go to Education Activity  Goal: Patient/Family Education  Outcome: Progressing Towards Goal    Lines: 22 gauge IV placed to the RFA  Peripheral IV 23 Anterior;Right Forearm (Active)   Site Assessment Clean, dry, & intact 23 1008   Phlebitis Assessment 0 23 1008   Infiltration Assessment 0 23 1008   Dressing Status Clean, dry, & intact;New;Occlusive 23 1008   Dressing Type Transparent 23 1008   Hub Color/Line Status Blue;Flushed;Patent 23 1008        Education provided regarding reason for blood transfusion and possible reactions. All questions and concerns regarding transfusion answered, patient voiced understanding.       Medications received:  Medications Administered       0.9% sodium chloride infusion 250 mL       Admin Date  2023 Action  New Bag Dose  250 mL Rate  15 mL/hr Route  IntraVENous Administered By  Reggie Jane RN              sodium chloride (NS) flush 5-10 mL       Admin Date  2023 Action  Given Dose  10 mL Route  IntraVENous Administered By  Andres Jolley RN               Admin Date  01/04/2023 Action  Given Dose  10 mL Route  IntraVENous Administered By  Andres Jolley RN                      1030:  1st unit PRBCs started and infusing without difficulty, observed x 15 minutes  1245:  1st unit completed without adverse reaction noted, NS flushing line. Patient Vitals for the past 12 hrs:   Temp Pulse Resp BP SpO2   01/04/23 1314 98.9 °F (37.2 °C) 63 18 (!) 140/42 --   01/04/23 1115 98.9 °F (37.2 °C) 67 18 (!) 119/41 --   01/04/23 1045 99.3 °F (37.4 °C) 71 18 (!) 117/43 --   01/04/23 1028 99.5 °F (37.5 °C) 71 18 (!) 114/42 96 %       1320 Tolerated transfusion  well, no adverse reaction noted. D/C instructions reviewed, copy to pt, voiced understanding. Patient declined 1 hour post transfusion observation/vital signs. D/Cd from [facility] via wheelchair and in no distress accompanied by daughter and son-in-law. No future appointments.  Patient to follow up with referring MD.       Alana Mendez RN  January 4, 2023

## 2023-01-05 LAB
ABO + RH BLD: NORMAL
BLD PROD TYP BPU: NORMAL
BLOOD GROUP ANTIBODIES SERPL: NORMAL
BPU ID: NORMAL
CROSSMATCH RESULT,%XM: NORMAL
SPECIMEN EXP DATE BLD: NORMAL
STATUS OF UNIT,%ST: NORMAL
UNIT DIVISION, %UDIV: 0

## 2023-01-08 ENCOUNTER — APPOINTMENT (OUTPATIENT)
Dept: GENERAL RADIOLOGY | Age: 88
End: 2023-01-08
Attending: EMERGENCY MEDICINE
Payer: MEDICARE

## 2023-01-08 ENCOUNTER — HOSPITAL ENCOUNTER (EMERGENCY)
Age: 88
Discharge: HOME | End: 2023-01-08
Attending: STUDENT IN AN ORGANIZED HEALTH CARE EDUCATION/TRAINING PROGRAM
Payer: MEDICARE

## 2023-01-08 VITALS
HEART RATE: 67 BPM | HEIGHT: 67 IN | SYSTOLIC BLOOD PRESSURE: 151 MMHG | TEMPERATURE: 98.2 F | OXYGEN SATURATION: 98 % | WEIGHT: 139 LBS | RESPIRATION RATE: 20 BRPM | DIASTOLIC BLOOD PRESSURE: 63 MMHG | BODY MASS INDEX: 21.82 KG/M2

## 2023-01-08 DIAGNOSIS — D64.9 ANEMIA, UNSPECIFIED TYPE: Primary | ICD-10-CM

## 2023-01-08 LAB
ALBUMIN SERPL-MCNC: 2.9 G/DL (ref 3.5–5)
ALBUMIN/GLOB SERPL: 1 (ref 1.1–2.2)
ALP SERPL-CCNC: 224 U/L (ref 45–117)
ALT SERPL-CCNC: 25 U/L (ref 12–78)
ANION GAP SERPL CALC-SCNC: 7 MMOL/L (ref 5–15)
AST SERPL-CCNC: 39 U/L (ref 15–37)
BILIRUB SERPL-MCNC: 1.1 MG/DL (ref 0.2–1)
BNP SERPL-MCNC: 559 PG/ML
BUN SERPL-MCNC: 17 MG/DL (ref 6–20)
BUN/CREAT SERPL: 15 (ref 12–20)
CALCIUM SERPL-MCNC: 8.6 MG/DL (ref 8.5–10.1)
CHLORIDE SERPL-SCNC: 106 MMOL/L (ref 97–108)
CO2 SERPL-SCNC: 26 MMOL/L (ref 21–32)
CREAT SERPL-MCNC: 1.14 MG/DL (ref 0.7–1.3)
GLOBULIN SER CALC-MCNC: 3 G/DL (ref 2–4)
GLUCOSE SERPL-MCNC: 151 MG/DL (ref 65–100)
HISTORY CHECKED?,CKHIST: NORMAL
POTASSIUM SERPL-SCNC: 4.2 MMOL/L (ref 3.5–5.1)
PROT SERPL-MCNC: 5.9 G/DL (ref 6.4–8.2)
SODIUM SERPL-SCNC: 139 MMOL/L (ref 136–145)
TROPONIN-HIGH SENSITIVITY: 17 NG/L (ref 0–76)

## 2023-01-08 PROCEDURE — 84484 ASSAY OF TROPONIN QUANT: CPT

## 2023-01-08 PROCEDURE — 83880 ASSAY OF NATRIURETIC PEPTIDE: CPT

## 2023-01-08 PROCEDURE — 74011250636 HC RX REV CODE- 250/636: Performed by: STUDENT IN AN ORGANIZED HEALTH CARE EDUCATION/TRAINING PROGRAM

## 2023-01-08 PROCEDURE — P9040 RBC LEUKOREDUCED IRRADIATED: HCPCS

## 2023-01-08 PROCEDURE — P9016 RBC LEUKOCYTES REDUCED: HCPCS

## 2023-01-08 PROCEDURE — 93005 ELECTROCARDIOGRAM TRACING: CPT

## 2023-01-08 PROCEDURE — 80053 COMPREHEN METABOLIC PANEL: CPT

## 2023-01-08 PROCEDURE — 99285 EMERGENCY DEPT VISIT HI MDM: CPT

## 2023-01-08 PROCEDURE — 86900 BLOOD TYPING SEROLOGIC ABO: CPT

## 2023-01-08 PROCEDURE — 85025 COMPLETE CBC W/AUTO DIFF WBC: CPT

## 2023-01-08 PROCEDURE — 36415 COLL VENOUS BLD VENIPUNCTURE: CPT

## 2023-01-08 PROCEDURE — 86923 COMPATIBILITY TEST ELECTRIC: CPT

## 2023-01-08 PROCEDURE — 36430 TRANSFUSION BLD/BLD COMPNT: CPT

## 2023-01-08 PROCEDURE — 88184 FLOWCYTOMETRY/ TC 1 MARKER: CPT

## 2023-01-08 PROCEDURE — 71045 X-RAY EXAM CHEST 1 VIEW: CPT

## 2023-01-08 PROCEDURE — 88185 FLOWCYTOMETRY/TC ADD-ON: CPT

## 2023-01-08 RX ORDER — SODIUM CHLORIDE 9 MG/ML
250 INJECTION, SOLUTION INTRAVENOUS AS NEEDED
Status: DISCONTINUED | OUTPATIENT
Start: 2023-01-08 | End: 2023-01-08 | Stop reason: HOSPADM

## 2023-01-08 RX ADMIN — SODIUM CHLORIDE 250 ML: 9 INJECTION, SOLUTION INTRAVENOUS at 15:20

## 2023-01-08 NOTE — ED PROVIDER NOTES
[facility] EMERGENCY DEPT  EMERGENCY DEPARTMENT ENCOUNTER       Pt Name: Stacy Sanchez  MRN: [id number]  [place] 10/4/1933  Date of evaluation: 1/8/2023  Provider: Ariana Guzman MD   PCP: Ana Nicholson MD  Note Started: 12:16 PM 1/8/23     CHIEF COMPLAINT       Chief Complaint   Patient presents with    Chest Pain     Wheelchair into triage with cc of \"tightness\" in chest. Pt has CLL, had bloodwork done Friday and HGB was 7.3. recent transfusion, pt arrives with jenny in [place]        HISTORY OF PRESENT ILLNESS: 1 or more elements    History From: Patient and Patient's Daughter  HPI Limitations : None     Stacy Sanchez is a 80 y.o. male with Pmhx listed below     Patient presented with concern of cute onset shortness of breath upon wakening this morning, patient has history of CLL, has history of anemia and receiving blood transfusions in the past, states that he has never had shortness of breath with this in the past, presenting with family that states that he had blood checked last week and was 6.2, concerned that anemia may be contributing, patient also has history of CAD with CABG in 2015 but has not had any recurrent chest pain, denies any recent URI symptoms such as fevers, chills, nausea, vomiting or diarrhea, states that patient is otherwise been in his usual state of health is scheduled to begin his chemotherapy but is not on anything currently. Of note patient has had no edema, no history of PE, no blood thinners. Nursing Notes were all reviewed and agreed with or any disagreements were addressed in the HPI. REVIEW OF SYSTEMS      Positives and Pertinent negatives as per HPI.     PAST HISTORY     Past Medical History:  Past Medical History:   Diagnosis Date    Abnormal nuclear stress test 6/6/2014    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 ([address] 11/2/2015    Bereavement 02/06/2019    ltcf - uti -strangulated hernia - prostate ca - brother    Bronchiectasis ([facility]     CAD (coronary artery disease)     Chest pain, unspecified     Chronic pain     right leg    CLL (chronic lymphocytic leukemia) ([facility]     Jerica Segura md  [facility]    Diabetes ([facility]     Essential hypertension     GERD (gastroesophageal reflux disease)     Hyperlipidemia     Hypertension     Opacity of lung on imaging study 2017    cxr    Rotator cuff arthropathy     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    S/P coronary artery stent placement 2014 PCI/GUANAKO to prox LAD     Previous Medications    AMLODIPINE (NORVASC) 2.5 MG TABLET    TAKE 1 TABLET DAILY    ASPIRIN 81 MG CHEWABLE TABLET    Take 81 mg by mouth daily. BRIMONIDINE (ALPHAGAN) 0.15 % OPHTHALMIC SOLUTION    Administer 1 Drop to both eyes two (2) times a day. Indications: OPEN ANGLE GLAUCOMA    CETIRIZINE (ZYRTEC) 10 MG TABLET    Take 1 Tablet by mouth daily. CHOLECALCIFEROL (VITAMIN D3) (1000 UNITS /25 MCG) TABLET    Take 1,000 Units by mouth daily. DEXTROMETHORPHAN-GUAIFENESIN (ROBITUSSIN COUGH-CHEST JOB DM) 5-100 MG/5 ML LIQD    Take 20 mL by mouth four (4) times daily as needed for Cough or Congestion. METFORMIN (GLUCOPHAGE) 500 MG TABLET    TAKE 1 TABLET DAILY    METOPROLOL TARTRATE (LOPRESSOR) 25 MG TABLET    TAKE 1 TABLET TWICE A DAY    MULTIVITAMIN WITH FOLIC ACID (ONE DAILY WITH FOLIC ACID) 623 MCG TAB TABLET    Take 1 Tablet by mouth daily.     SIMVASTATIN (ZOCOR) 40 MG TABLET    TAKE 1 TABLET DAILY IN THE EVENING       Past Surgical History:  Past Surgical History:   Procedure Laterality Date    HX COLONOSCOPY      HX HEART CATHETERIZATION      MD UNLISTED PROCEDURE VASCULAR SURGERY   and     BLE stenting    PTCA SINGLE VESSEL  2015            Family History:  Family History   Problem Relation Age of Onset    Diabetes Mother     Stroke Father        Social History:  Social History     Tobacco Use    Smoking status: Former     Types: Cigarettes     Quit date: 1/15/1984     Years since quittin.0    Smokeless tobacco: Never    Tobacco comments:     QUIT 1980   Vaping Use    Vaping Use: Never used   Substance Use Topics    Alcohol use: No     Alcohol/week: 0.0 standard drinks     Comment: quit in 1980    Drug use: No     Types: Prescription, OTC       Allergies: Allergies   Allergen Reactions    Codeine Shortness of Breath    Lipitor [Atorvastatin] Nausea Only       PHYSICAL EXAM      ED Triage Vitals   ED Encounter Vitals Group      BP 01/08/23 1106 (!) 136/58      Pulse (Heart Rate) 01/08/23 1106 76      Resp Rate 01/08/23 1123 21      Temp 01/08/23 1106 98.1 °F (36.7 °C)      Temp src --       O2 Sat (%) 01/08/23 1106 97 %      Weight 01/08/23 1105 139 lb      Height 01/08/23 1124 5' 7\"        Physical Exam  Vitals reviewed. Constitutional:       General: He is not in acute distress. Appearance: Normal appearance. He is not ill-appearing, toxic-appearing or diaphoretic. HENT:      Head: Normocephalic. Mouth/Throat:      Mouth: Mucous membranes are moist.   Eyes:      Conjunctiva/sclera: Conjunctivae normal.   Cardiovascular:      Rate and Rhythm: Normal rate. Pulmonary:      Effort: Pulmonary effort is normal. No respiratory distress. Breath sounds: No decreased breath sounds, wheezing, rhonchi or rales. Abdominal:      General: Abdomen is flat. Musculoskeletal:         General: No deformity. Cervical back: Neck supple. Right lower leg: No edema. Left lower leg: No edema. Skin:     General: Skin is warm and dry. Neurological:      General: No focal deficit present. Mental Status: He is alert. Psychiatric:         Mood and Affect: Mood normal.        EMERGENCY DEPARTMENT COURSE and DIFFERENTIAL DIAGNOSIS/MDM   CC/HPI Summary, DDx, ED Course, and Reassessment:     MDM  Number of Diagnoses or Management Options  Anemia, unspecified type  Diagnosis management comments: Is a well-appearing 42-year-old male presenting with concern of shortness of breath upon wakening this morning. Patient states that he has no history of this in the past, does have history of CLL and anemia, requiring blood transfusions, states that he may feel as if this is going on today. Patient currently is not short of breath, denies any chest pain, states that he has had no nausea, vomiting, diarrhea, denies any leg swelling, no cough or URI symptoms or fevers. Patient present stable,    Rest, vital signs within normal limits, nontachycardic, well-appearing, no edema on exam, lungs are clear bilaterally, considered anemia is likely cause of symptoms will obtain CBC due to this, will also obtain other causes of shortness of breath including BNP, chest x-ray to evaluate for pneumonia, troponin, electrolytes and reevaluation, will consider CT PE if anemia does not present as patient does have cancer history and is higher risk but if anemia is present this is more likely. ED Course as of 01/08/23 1626   Sun Jan 08, 2023   1610 WBC at 519, hemoglobin 6.6, given that he is symptomatic with chest pain and has history of this in the past we will plan on transfusion, patient improved and no symptoms after transfusion we will plan on discharge afterwards, per family WBCs have been increasing and persistently have been 500s during recent checks.  [RN]      ED Course User Index  [RN] Steven Bullock MD       Vitals:    01/08/23 1124 01/08/23 1332 01/08/23 1513 01/08/23 1530   BP:  (!) 143/58 (!) 151/52 (!) 149/64   Pulse:  64 66 69   Resp:  20 23 24   Temp:   98.2 °F (36.8 °C) 98.2 °F (36.8 °C)   SpO2:  94% 92% 94%   Weight: 63 kg (139 lb)      Height: 5' 7\" (1.702 m)           Patient was given the following medications:  Medications   0.9% sodium chloride infusion 250 mL (250 mL IntraVENous New Bag 1/8/23 1520)       CONSULTS:  None    Social Determinants affecting Dx or Tx: None    Records Reviewed (source and summary of external notes): Nursing Notes  DIAGNOSTIC RESULTS   LABS:     Recent Results (from the past 12 hour(s))   EKG, 12 LEAD, INITIAL    Collection Time: 01/08/23 11:16 AM   Result Value Ref Range    Ventricular Rate 69 BPM    Atrial Rate 69 BPM    P-R Interval 124 ms    QRS Duration 94 ms    Q-T Interval 414 ms    QTC Calculation (Bezet) 443 ms    Calculated P Axis -4 degrees    Calculated R Axis -28 degrees    Calculated T Axis -28 degrees    Diagnosis       Normal sinus rhythm  Minimal voltage criteria for LVH, may be normal variant  Nonspecific ST and T wave abnormality  When compared with ECG of 11-OCT-2022 18:30,  No significant change was found     CBC WITH AUTOMATED DIFF    Collection Time: 01/08/23 11:54 AM   Result Value Ref Range    .2 (HH) 4.1 - 11.1 K/uL    RBC 2.08 (L) 4.10 - 5.70 M/uL    HGB 6.6 (L) 12.1 - 17.0 g/dL    HCT 22.4 (L) 36.6 - 50.3 %    .7 (H) 80.0 - 99.0 FL    MCH 31.7 26.0 - 34.0 PG    MCHC 29.5 (L) 30.0 - 36.5 g/dL    RDW 23.1 (H) 11.5 - 14.5 %    PLATELET 94 (L) 770 - 400 K/uL    MPV 10.5 8.9 - 12.9 FL    NRBC 0.0 0  WBC    ABSOLUTE NRBC 0.09 (H) 0.00 - 0.01 K/uL    NEUTROPHILS PENDING %    LYMPHOCYTES PENDING %    MONOCYTES PENDING %    EOSINOPHILS PENDING %    BASOPHILS PENDING %    IMMATURE GRANULOCYTES PENDING %    ABS. NEUTROPHILS PENDING K/UL    ABS. LYMPHOCYTES PENDING K/UL    ABS. MONOCYTES PENDING K/UL    ABS. EOSINOPHILS PENDING K/UL    ABS. BASOPHILS PENDING K/UL    ABS. IMM. GRANS. PENDING K/UL    DF PENDING    METABOLIC PANEL, COMPREHENSIVE    Collection Time: 01/08/23 11:54 AM   Result Value Ref Range    Sodium 139 136 - 145 mmol/L    Potassium 4.2 3.5 - 5.1 mmol/L    Chloride 106 97 - 108 mmol/L    CO2 26 21 - 32 mmol/L    Anion gap 7 5 - 15 mmol/L    Glucose 151 (H) 65 - 100 mg/dL    BUN 17 6 - 20 MG/DL    Creatinine 1.14 0.70 - 1.30 MG/DL    BUN/Creatinine ratio 15 12 - 20      eGFR >60 >60 ml/min/1.73m2    Calcium 8.6 8.5 - 10.1 MG/DL    Bilirubin, total 1.1 (H) 0.2 - 1.0 MG/DL    ALT (SGPT) 25 12 - 78 U/L    AST (SGOT) 39 (H) 15 - 37 U/L    Alk. phosphatase 224 (H) 45 - 117 U/L    Protein, total 5.9 (L) 6.4 - 8.2 g/dL    Albumin 2.9 (L) 3.5 - 5.0 g/dL    Globulin 3.0 2.0 - 4.0 g/dL    A-G Ratio 1.0 (L) 1.1 - 2.2     NT-PRO BNP    Collection Time: 01/08/23 11:54 AM   Result Value Ref Range    NT pro- (H) <450 PG/ML   TROPONIN-HIGH SENSITIVITY    Collection Time: 01/08/23 11:54 AM   Result Value Ref Range    Troponin-High Sensitivity 17 0 - 76 ng/L   TYPE & SCREEN    Collection Time: 01/08/23 11:54 AM   Result Value Ref Range    Crossmatch Expiration 01/11/2023,2352     ABO/Rh(D) A POSITIVE     Antibody screen NEG     Unit number [id number]     Blood component type RC LRIR,2     Unit division 00     Status of unit ISSUED     Crossmatch result Compatible    RBC, ALLOCATE    Collection Time: 01/08/23  2:15 PM   Result Value Ref Range    HISTORY CHECKED? Historical check performed         EKG: nomral      RADIOLOGY:  Non-plain film images such as CT, Ultrasound and MRI are read by the radiologist. Plain radiographic images are visualized and preliminarily interpreted by the ED Provider with the below findings: none     Interpretation per the Radiologist below, if available at the time of this note:     XR CHEST PORT    Result Date: 1/8/2023  INDICATION: chest pain EXAM:  AP CHEST RADIOGRAPH COMPARISON: 11/28/2022 FINDINGS: AP portable view of the chest demonstrates prior median sternotomy. Heart size is normal. There is no edema, effusion, consolidation, or pneumothorax. The osseous structures are unremarkable. No acute process. PROCEDURES   Unless otherwise noted below, none  Procedures     CRITICAL CARE TIME   none    FINAL IMPRESSION     1. Anemia, unspecified type          DISPOSITION/PLAN   Discharged    Discharge Note: The patient is stable for discharge home. The signs, symptoms, diagnosis, and discharge instructions have been discussed, understanding conveyed, and agreed upon.  The patient is to follow up as recommended or return to ER should their symptoms worsen. PATIENT REFERRED TO:  Follow-up Information       Follow up With Specialties Details Why Contact Info    Marcia Oviedo MD Internal Medicine Physician Schedule an appointment as soon as possible for a visit in 1 week  [facility]  [address]  [place] [phone]      [facility] EMERGENCY DEPT Emergency Medicine  If symptoms worsen [address] [place] [address]  [phone]              DISCHARGE MEDICATIONS:  Current Discharge Medication List            DISCONTINUED MEDICATIONS:  Current Discharge Medication List          I am the Primary Clinician of Record. Signed By: Theron Oconnor MD     January 8, 2023      (Please note that parts of this dictation were completed with voice recognition software. Quite often unanticipated grammatical, syntax, homophones, and other interpretive errors are inadvertently transcribed by the computer software. Please disregards these errors.  Please excuse any errors that have escaped final proofreading.)

## 2023-01-09 LAB
ABO + RH BLD: NORMAL
ATRIAL RATE: 69 BPM
BLD PROD TYP BPU: NORMAL
BLOOD GROUP ANTIBODIES SERPL: NORMAL
BPU ID: NORMAL
CALCULATED P AXIS, ECG09: -4 DEGREES
CALCULATED R AXIS, ECG10: -28 DEGREES
CALCULATED T AXIS, ECG11: -28 DEGREES
CROSSMATCH RESULT,%XM: NORMAL
DIAGNOSIS, 93000: NORMAL
P-R INTERVAL, ECG05: 124 MS
Q-T INTERVAL, ECG07: 414 MS
QRS DURATION, ECG06: 94 MS
QTC CALCULATION (BEZET), ECG08: 443 MS
SPECIMEN EXP DATE BLD: NORMAL
STATUS OF UNIT,%ST: NORMAL
UNIT DIVISION, %UDIV: 0
VENTRICULAR RATE, ECG03: 69 BPM

## 2023-01-09 NOTE — ED NOTES
I have reviewed discharge instructions with the patient. The patient verbalized understanding. Patient left ED via [facility].

## 2023-01-10 LAB
BASOPHILS # BLD: 0 K/UL (ref 0–0.1)
BASOPHILS NFR BLD: 0 % (ref 0–1)
DIFFERENTIAL METHOD BLD: ABNORMAL
EOSINOPHIL # BLD: 0 K/UL (ref 0–0.4)
EOSINOPHIL NFR BLD: 0 % (ref 0–7)
ERYTHROCYTE [DISTWIDTH] IN BLOOD BY AUTOMATED COUNT: 23.1 % (ref 11.5–14.5)
HCT VFR BLD AUTO: 22.4 % (ref 36.6–50.3)
HGB BLD-MCNC: 6.6 G/DL (ref 12.1–17)
IMM GRANULOCYTES # BLD AUTO: 0 K/UL (ref 0–0.04)
IMM GRANULOCYTES NFR BLD AUTO: 0 % (ref 0–0.5)
LYMPHOCYTES # BLD: 332.3 K/UL (ref 0.8–3.5)
LYMPHOCYTES NFR BLD: 64 % (ref 12–49)
MCH RBC QN AUTO: 31.7 PG (ref 26–34)
MCHC RBC AUTO-ENTMCNC: 29.5 G/DL (ref 30–36.5)
MCV RBC AUTO: 107.7 FL (ref 80–99)
MONOCYTES # BLD: 83.1 K/UL (ref 0–1)
MONOCYTES NFR BLD: 16 % (ref 5–13)
NEUTS SEG # BLD: 0 K/UL (ref 1.8–8)
NEUTS SEG NFR BLD: 0 % (ref 32–75)
NRBC # BLD: 0.09 K/UL (ref 0–0.01)
NRBC BLD-RTO: 0 PER 100 WBC
OTHER CELLS NFR BLD MANUAL: 20
PATH REV BLD -IMP: ABNORMAL
PLATELET # BLD AUTO: 94 K/UL (ref 150–400)
PLATELET COMMENTS,PCOM: ABNORMAL
PMV BLD AUTO: 10.5 FL (ref 8.9–12.9)
RBC # BLD AUTO: 2.08 M/UL (ref 4.1–5.7)
RBC MORPH BLD: ABNORMAL
RBC MORPH BLD: ABNORMAL
WBC # BLD AUTO: 519.2 K/UL (ref 4.1–11.1)
WBC MORPH BLD: ABNORMAL

## 2023-01-11 LAB — PATH REV BLD -IMP: NORMAL

## 2023-01-13 ENCOUNTER — APPOINTMENT (OUTPATIENT)
Dept: GENERAL RADIOLOGY | Age: 88
End: 2023-01-13
Attending: EMERGENCY MEDICINE
Payer: MEDICARE

## 2023-01-13 ENCOUNTER — APPOINTMENT (OUTPATIENT)
Dept: CT IMAGING | Age: 88
End: 2023-01-13
Attending: EMERGENCY MEDICINE
Payer: MEDICARE

## 2023-01-13 ENCOUNTER — HOSPITAL ENCOUNTER (EMERGENCY)
Age: 88
Discharge: STILL ADMITTED OR ADMINISTRATIVE | End: 2023-01-13
Attending: EMERGENCY MEDICINE
Payer: MEDICARE

## 2023-01-13 VITALS
SYSTOLIC BLOOD PRESSURE: 177 MMHG | DIASTOLIC BLOOD PRESSURE: 76 MMHG | OXYGEN SATURATION: 92 % | WEIGHT: 135 LBS | TEMPERATURE: 97.8 F | RESPIRATION RATE: 20 BRPM | HEART RATE: 84 BPM | BODY MASS INDEX: 21.19 KG/M2 | HEIGHT: 67 IN

## 2023-01-13 DIAGNOSIS — I26.94 MULTIPLE SUBSEGMENTAL PULMONARY EMBOLI WITHOUT ACUTE COR PULMONALE (HCC): Primary | ICD-10-CM

## 2023-01-13 DIAGNOSIS — D72.820 LYMPHOCYTOSIS: ICD-10-CM

## 2023-01-13 DIAGNOSIS — C91.10 CLL (CHRONIC LYMPHOCYTIC LEUKEMIA) (HCC): ICD-10-CM

## 2023-01-13 DIAGNOSIS — E87.5 ACUTE HYPERKALEMIA: ICD-10-CM

## 2023-01-13 LAB
ABO + RH BLD: NORMAL
ALBUMIN SERPL-MCNC: 2.8 G/DL (ref 3.5–5)
ALBUMIN SERPL-MCNC: 3 G/DL (ref 3.5–5)
ALBUMIN/GLOB SERPL: 1 (ref 1.1–2.2)
ALBUMIN/GLOB SERPL: 1 (ref 1.1–2.2)
ALP SERPL-CCNC: 221 U/L (ref 45–117)
ALP SERPL-CCNC: 231 U/L (ref 45–117)
ALT SERPL-CCNC: 27 U/L (ref 12–78)
ALT SERPL-CCNC: 32 U/L (ref 12–78)
ANION GAP SERPL CALC-SCNC: 4 MMOL/L (ref 5–15)
ANION GAP SERPL CALC-SCNC: 6 MMOL/L (ref 5–15)
APPEARANCE UR: CLEAR
APTT PPP: 23.6 SEC (ref 22.1–31)
AST SERPL-CCNC: 51 U/L (ref 15–37)
AST SERPL-CCNC: 71 U/L (ref 15–37)
BACTERIA URNS QL MICRO: ABNORMAL /HPF
BASOPHILS # BLD: 0 K/UL (ref 0–0.1)
BASOPHILS NFR BLD: 0 % (ref 0–1)
BILIRUB SERPL-MCNC: 0.9 MG/DL (ref 0.2–1)
BILIRUB SERPL-MCNC: 1.2 MG/DL (ref 0.2–1)
BILIRUB UR QL: NEGATIVE
BLOOD GROUP ANTIBODIES SERPL: NORMAL
BNP SERPL-MCNC: 820 PG/ML
BUN SERPL-MCNC: 15 MG/DL (ref 6–20)
BUN SERPL-MCNC: 16 MG/DL (ref 6–20)
BUN/CREAT SERPL: 14 (ref 12–20)
BUN/CREAT SERPL: 15 (ref 12–20)
CALCIUM SERPL-MCNC: 8.4 MG/DL (ref 8.5–10.1)
CALCIUM SERPL-MCNC: 8.7 MG/DL (ref 8.5–10.1)
CHLORIDE SERPL-SCNC: 107 MMOL/L (ref 97–108)
CHLORIDE SERPL-SCNC: 108 MMOL/L (ref 97–108)
CO2 SERPL-SCNC: 23 MMOL/L (ref 21–32)
CO2 SERPL-SCNC: 26 MMOL/L (ref 21–32)
COLOR UR: ABNORMAL
COMMENT, HOLDF: NORMAL
COMMENT, HOLDF: NORMAL
COVID-19 RAPID TEST, COVR: NOT DETECTED
CREAT SERPL-MCNC: 1.01 MG/DL (ref 0.7–1.3)
CREAT SERPL-MCNC: 1.18 MG/DL (ref 0.7–1.3)
DIFFERENTIAL METHOD BLD: ABNORMAL
EOSINOPHIL # BLD: 0 K/UL (ref 0–0.4)
EOSINOPHIL NFR BLD: 0 % (ref 0–7)
EPITH CASTS URNS QL MICRO: ABNORMAL /LPF
ERYTHROCYTE [DISTWIDTH] IN BLOOD BY AUTOMATED COUNT: 22.7 % (ref 11.5–14.5)
FLUAV AG NPH QL IA: NEGATIVE
FLUBV AG NOSE QL IA: NEGATIVE
GLOBULIN SER CALC-MCNC: 2.9 G/DL (ref 2–4)
GLOBULIN SER CALC-MCNC: 3.1 G/DL (ref 2–4)
GLUCOSE SERPL-MCNC: 109 MG/DL (ref 65–100)
GLUCOSE SERPL-MCNC: 142 MG/DL (ref 65–100)
GLUCOSE UR STRIP.AUTO-MCNC: NEGATIVE MG/DL
HCT VFR BLD AUTO: 26.4 % (ref 36.6–50.3)
HGB BLD-MCNC: 7.8 G/DL (ref 12.1–17)
HGB UR QL STRIP: ABNORMAL
HYALINE CASTS URNS QL MICRO: ABNORMAL /LPF (ref 0–5)
IMM GRANULOCYTES # BLD AUTO: 0 K/UL (ref 0–0.04)
IMM GRANULOCYTES NFR BLD AUTO: 0 % (ref 0–0.5)
INR PPP: 1.1 (ref 0.9–1.1)
KETONES UR QL STRIP.AUTO: NEGATIVE MG/DL
LACTATE BLD-SCNC: 0.81 MMOL/L (ref 0.4–2)
LDH SERPL L TO P-CCNC: 1216 U/L (ref 85–241)
LEUKOCYTE ESTERASE UR QL STRIP.AUTO: ABNORMAL
LYMPHOCYTES # BLD: 406.5 K/UL (ref 0.8–3.5)
LYMPHOCYTES NFR BLD: 78 % (ref 12–49)
MCH RBC QN AUTO: 32.2 PG (ref 26–34)
MCHC RBC AUTO-ENTMCNC: 29.5 G/DL (ref 30–36.5)
MCV RBC AUTO: 109.1 FL (ref 80–99)
MONOCYTES # BLD: 73 K/UL (ref 0–1)
MONOCYTES NFR BLD: 14 % (ref 5–13)
NEUTS SEG # BLD: 0 K/UL (ref 1.8–8)
NEUTS SEG NFR BLD: 0 % (ref 32–75)
NITRITE UR QL STRIP.AUTO: NEGATIVE
NRBC # BLD: 0.04 K/UL (ref 0–0.01)
NRBC BLD-RTO: 0 PER 100 WBC
OTHER CELLS NFR BLD MANUAL: 8
PH UR STRIP: 6.5 (ref 5–8)
PLATELET # BLD AUTO: 83 K/UL (ref 150–400)
PLATELET COMMENTS,PCOM: ABNORMAL
PMV BLD AUTO: 10.5 FL (ref 8.9–12.9)
POTASSIUM SERPL-SCNC: 4.6 MMOL/L (ref 3.5–5.1)
POTASSIUM SERPL-SCNC: 8.1 MMOL/L (ref 3.5–5.1)
PROT SERPL-MCNC: 5.7 G/DL (ref 6.4–8.2)
PROT SERPL-MCNC: 6.1 G/DL (ref 6.4–8.2)
PROT UR STRIP-MCNC: 30 MG/DL
PROTHROMBIN TIME: 11.9 SEC (ref 9–11.1)
RBC # BLD AUTO: 2.42 M/UL (ref 4.1–5.7)
RBC #/AREA URNS HPF: ABNORMAL /HPF (ref 0–5)
RBC MORPH BLD: ABNORMAL
RBC MORPH BLD: ABNORMAL
SAMPLES BEING HELD,HOLD: NORMAL
SAMPLES BEING HELD,HOLD: NORMAL
SODIUM SERPL-SCNC: 135 MMOL/L (ref 136–145)
SODIUM SERPL-SCNC: 139 MMOL/L (ref 136–145)
SOURCE, COVRS: NORMAL
SP GR UR REFRACTOMETRY: 1.01
SPECIMEN EXP DATE BLD: NORMAL
THERAPEUTIC RANGE,PTTT: NORMAL SECS (ref 58–77)
TROPONIN-HIGH SENSITIVITY: 13 NG/L (ref 0–76)
TROPONIN-HIGH SENSITIVITY: 13 NG/L (ref 0–76)
UA: UC IF INDICATED,UAUC: ABNORMAL
UFH PPP CHRO-ACNC: <0.1 IU/ML
URATE SERPL-MCNC: 6.5 MG/DL (ref 3.5–7.2)
UROBILINOGEN UR QL STRIP.AUTO: 1 EU/DL (ref 0.2–1)
WBC # BLD AUTO: 521.1 K/UL (ref 4.1–11.1)
WBC MORPH BLD: ABNORMAL
WBC URNS QL MICRO: ABNORMAL /HPF (ref 0–4)

## 2023-01-13 PROCEDURE — 83880 ASSAY OF NATRIURETIC PEPTIDE: CPT

## 2023-01-13 PROCEDURE — 84550 ASSAY OF BLOOD/URIC ACID: CPT

## 2023-01-13 PROCEDURE — 80053 COMPREHEN METABOLIC PANEL: CPT

## 2023-01-13 PROCEDURE — 36415 COLL VENOUS BLD VENIPUNCTURE: CPT

## 2023-01-13 PROCEDURE — 96374 THER/PROPH/DIAG INJ IV PUSH: CPT

## 2023-01-13 PROCEDURE — 83605 ASSAY OF LACTIC ACID: CPT

## 2023-01-13 PROCEDURE — 71275 CT ANGIOGRAPHY CHEST: CPT

## 2023-01-13 PROCEDURE — 86900 BLOOD TYPING SEROLOGIC ABO: CPT

## 2023-01-13 PROCEDURE — 99285 EMERGENCY DEPT VISIT HI MDM: CPT

## 2023-01-13 PROCEDURE — 87086 URINE CULTURE/COLONY COUNT: CPT

## 2023-01-13 PROCEDURE — 74011250636 HC RX REV CODE- 250/636: Performed by: EMERGENCY MEDICINE

## 2023-01-13 PROCEDURE — 74011250637 HC RX REV CODE- 250/637: Performed by: EMERGENCY MEDICINE

## 2023-01-13 PROCEDURE — 85610 PROTHROMBIN TIME: CPT

## 2023-01-13 PROCEDURE — 83615 LACTATE (LD) (LDH) ENZYME: CPT

## 2023-01-13 PROCEDURE — 81001 URINALYSIS AUTO W/SCOPE: CPT

## 2023-01-13 PROCEDURE — 71045 X-RAY EXAM CHEST 1 VIEW: CPT

## 2023-01-13 PROCEDURE — 87804 INFLUENZA ASSAY W/OPTIC: CPT

## 2023-01-13 PROCEDURE — 87635 SARS-COV-2 COVID-19 AMP PRB: CPT

## 2023-01-13 PROCEDURE — 74011000636 HC RX REV CODE- 636: Performed by: EMERGENCY MEDICINE

## 2023-01-13 PROCEDURE — 84484 ASSAY OF TROPONIN QUANT: CPT

## 2023-01-13 PROCEDURE — 85730 THROMBOPLASTIN TIME PARTIAL: CPT

## 2023-01-13 PROCEDURE — 74011636637 HC RX REV CODE- 636/637: Performed by: EMERGENCY MEDICINE

## 2023-01-13 PROCEDURE — 74011000258 HC RX REV CODE- 258: Performed by: EMERGENCY MEDICINE

## 2023-01-13 PROCEDURE — 96361 HYDRATE IV INFUSION ADD-ON: CPT

## 2023-01-13 PROCEDURE — 85520 HEPARIN ASSAY: CPT

## 2023-01-13 PROCEDURE — 74011000250 HC RX REV CODE- 250: Performed by: EMERGENCY MEDICINE

## 2023-01-13 PROCEDURE — 93005 ELECTROCARDIOGRAM TRACING: CPT

## 2023-01-13 PROCEDURE — 85025 COMPLETE CBC W/AUTO DIFF WBC: CPT

## 2023-01-13 PROCEDURE — 96375 TX/PRO/DX INJ NEW DRUG ADDON: CPT

## 2023-01-13 PROCEDURE — 87040 BLOOD CULTURE FOR BACTERIA: CPT

## 2023-01-13 RX ORDER — HEPARIN SODIUM 10000 [USP'U]/100ML
18-36 INJECTION, SOLUTION INTRAVENOUS
Status: DISCONTINUED | OUTPATIENT
Start: 2023-01-13 | End: 2023-01-13 | Stop reason: HOSPADM

## 2023-01-13 RX ORDER — HEPARIN SODIUM 1000 [USP'U]/ML
80 INJECTION, SOLUTION INTRAVENOUS; SUBCUTANEOUS AS NEEDED
Status: DISCONTINUED | OUTPATIENT
Start: 2023-01-13 | End: 2023-01-13 | Stop reason: HOSPADM

## 2023-01-13 RX ORDER — CALCIUM GLUCONATE 20 MG/ML
1 INJECTION, SOLUTION INTRAVENOUS ONCE
Status: COMPLETED | OUTPATIENT
Start: 2023-01-13 | End: 2023-01-13

## 2023-01-13 RX ORDER — SODIUM CHLORIDE 0.9 % (FLUSH) 0.9 %
5-10 SYRINGE (ML) INJECTION AS NEEDED
Status: DISCONTINUED | OUTPATIENT
Start: 2023-01-13 | End: 2023-01-13 | Stop reason: HOSPADM

## 2023-01-13 RX ORDER — HEPARIN SODIUM 1000 [USP'U]/ML
40 INJECTION, SOLUTION INTRAVENOUS; SUBCUTANEOUS AS NEEDED
Status: DISCONTINUED | OUTPATIENT
Start: 2023-01-13 | End: 2023-01-13 | Stop reason: HOSPADM

## 2023-01-13 RX ORDER — HEPARIN SODIUM 1000 [USP'U]/ML
80 INJECTION, SOLUTION INTRAVENOUS; SUBCUTANEOUS ONCE
Status: COMPLETED | OUTPATIENT
Start: 2023-01-13 | End: 2023-01-13

## 2023-01-13 RX ORDER — DEXTROSE MONOHYDRATE 100 MG/ML
INJECTION, SOLUTION INTRAVENOUS
Status: DISCONTINUED
Start: 2023-01-13 | End: 2023-01-13 | Stop reason: HOSPADM

## 2023-01-13 RX ORDER — SODIUM BICARBONATE 84 MG/ML
50 INJECTION, SOLUTION INTRAVENOUS
Status: COMPLETED | OUTPATIENT
Start: 2023-01-13 | End: 2023-01-13

## 2023-01-13 RX ORDER — DEXTROSE MONOHYDRATE 100 MG/ML
250 INJECTION, SOLUTION INTRAVENOUS ONCE
Status: COMPLETED | OUTPATIENT
Start: 2023-01-13 | End: 2023-01-13

## 2023-01-13 RX ADMIN — HEPARIN SODIUM AND DEXTROSE 18 UNITS/KG/HR: 10000; 5 INJECTION INTRAVENOUS at 13:14

## 2023-01-13 RX ADMIN — SODIUM BICARBONATE 50 MEQ: 84 INJECTION, SOLUTION INTRAVENOUS at 11:54

## 2023-01-13 RX ADMIN — HEPARIN SODIUM 4900 UNITS: 1000 INJECTION INTRAVENOUS; SUBCUTANEOUS at 13:13

## 2023-01-13 RX ADMIN — DEXTROSE MONOHYDRATE 250 ML: 100 INJECTION, SOLUTION INTRAVENOUS at 13:16

## 2023-01-13 RX ADMIN — IOPAMIDOL 76 ML: 755 INJECTION, SOLUTION INTRAVENOUS at 11:06

## 2023-01-13 RX ADMIN — Medication 10 UNITS: at 11:54

## 2023-01-13 RX ADMIN — SODIUM CHLORIDE 1 G: 900 INJECTION INTRAVENOUS at 11:30

## 2023-01-13 RX ADMIN — CALCIUM GLUCONATE 1000 MG: 20 INJECTION, SOLUTION INTRAVENOUS at 11:54

## 2023-01-13 RX ADMIN — SODIUM ZIRCONIUM CYCLOSILICATE 10 G: 10 POWDER, FOR SUSPENSION ORAL at 13:10

## 2023-01-13 NOTE — ED PROVIDER NOTES
[facility] EMERGENCY DEPT  EMERGENCY DEPARTMENT ENCOUNTER       Pt Name: Nallely Cam  MRN: [id number]  [place] 10/4/1933  Date of evaluation: 1/13/2023  Provider: Glo Franz DO   PCP: Yonas Rose MD  Note Started: 9:01 AM 1/13/23     CHIEF COMPLAINT       CC- CP       HISTORY OF PRESENT ILLNESS: 1 or more elements      History From: Patient, History limited by: none     Nallely Cam is a 80 y.o. male who presents to the emergency department by EMS secondary to chest pain. Patient does have a history of CLL. He states this morning he had a pain that came across his chest which has since resolved. He states the pain was moderate in severity at the time. Patient states he has been feeling short of breath and has had a cough. He states that shortness of breath is worse with exertion. Patient has not had any prior lung issues requiring oxygenation. He denies any abdominal pain. He denies any nausea vomiting or diarrhea. He denies any recent leg pain or swelling. Nursing Notes were all reviewed and agreed with or any disagreements were addressed in the HPI. REVIEW OF SYSTEMS        Positives and Pertinent negatives as per HPI. Episode of chest pain which has resolved.     PAST HISTORY     Past Medical History:  Past Medical History:   Diagnosis Date    Abnormal nuclear stress test 6/6/2014    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 ([facility] [address] 11/2/2015    Bereavement 02/06/2019    ltcf - uti -strangulated hernia - prostate ca - brother    Bronchiectasis ([facility] [address]     CAD (coronary artery disease)     Chest pain, unspecified     Chronic pain     right leg    CLL (chronic lymphocytic leukemia) ([facility] [address]     Jerica Segura md  [facility]    Diabetes ([facility] [address]     Essential hypertension     GERD (gastroesophageal reflux disease)     Hyperlipidemia     Hypertension     Opacity of lung on imaging study 05/28/2017    cxr    Rotator cuff arthropathy     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    S/P coronary artery stent placement 2014 PCI/GUANAKO to prox LAD       Past Surgical History:  Past Surgical History:   Procedure Laterality Date    HX COLONOSCOPY      HX HEART CATHETERIZATION      IA UNLISTED PROCEDURE VASCULAR SURGERY   and     BLE stenting    PTCA SINGLE VESSEL  2015            Family History:  Family History   Problem Relation Age of Onset    Diabetes Mother     Stroke Father        Social History:  Social History     Tobacco Use    Smoking status: Former     Types: Cigarettes     Quit date: 1/15/1984     Years since quittin.0    Smokeless tobacco: Never    Tobacco comments:     QUIT    Vaping Use    Vaping Use: Never used   Substance Use Topics    Alcohol use: No     Alcohol/week: 0.0 standard drinks     Comment: quit in     Drug use: No     Types: Prescription, OTC       Allergies: Allergies   Allergen Reactions    Codeine Shortness of Breath    Lipitor [Atorvastatin] Nausea Only       CURRENT MEDICATIONS      Previous Medications    AMLODIPINE (NORVASC) 2.5 MG TABLET    TAKE 1 TABLET DAILY    ASPIRIN 81 MG CHEWABLE TABLET    Take 81 mg by mouth daily. BRIMONIDINE (ALPHAGAN) 0.15 % OPHTHALMIC SOLUTION    Administer 1 Drop to both eyes two (2) times a day. Indications: OPEN ANGLE GLAUCOMA    CETIRIZINE (ZYRTEC) 10 MG TABLET    Take 1 Tablet by mouth daily. CHOLECALCIFEROL (VITAMIN D3) (1000 UNITS /25 MCG) TABLET    Take 1,000 Units by mouth daily. DEXTROMETHORPHAN-GUAIFENESIN (ROBITUSSIN COUGH-CHEST JOB DM) 5-100 MG/5 ML LIQD    Take 20 mL by mouth four (4) times daily as needed for Cough or Congestion. METFORMIN (GLUCOPHAGE) 500 MG TABLET    TAKE 1 TABLET DAILY    METOPROLOL TARTRATE (LOPRESSOR) 25 MG TABLET    TAKE 1 TABLET TWICE A DAY    MULTIVITAMIN WITH FOLIC ACID (ONE DAILY WITH FOLIC ACID) 340 MCG TAB TABLET    Take 1 Tablet by mouth daily.     SIMVASTATIN (ZOCOR) 40 MG TABLET    TAKE 1 TABLET DAILY IN THE EVENING       SCREENINGS               No data recorded         PHYSICAL EXAM      ED Triage Vitals   ED Encounter Vitals Group      BP       Pulse       Resp       Temp       Temp src       SpO2       Weight       Height         Physical Exam  Vitals and nursing note reviewed. Constitutional:       General: He is not in acute distress. Appearance: He is well-developed. He is not diaphoretic. Comments: Elderly male with chronic Cleen ill-appearing   HENT:      Head: Normocephalic and atraumatic. Mouth/Throat:      Pharynx: No oropharyngeal exudate. Eyes:      Extraocular Movements: Extraocular movements intact. Pupils: Pupils are equal, round, and reactive to light. Comments: Pale conjunctive a   Neck:      Vascular: No JVD. Trachea: No tracheal deviation. Cardiovascular:      Rate and Rhythm: Normal rate and regular rhythm. Heart sounds: Normal heart sounds. No murmur heard. Pulmonary:      Effort: Pulmonary effort is normal. Tachypnea present. No respiratory distress. Breath sounds: No stridor. No wheezing or rales. Comments: Diminished in the bases bilaterally  Abdominal:      General: There is no distension. Palpations: Abdomen is soft. Tenderness: There is no abdominal tenderness. There is no guarding or rebound. Musculoskeletal:         General: No tenderness. Normal range of motion. Cervical back: Normal range of motion and neck supple. Right lower leg: Edema (trace) present. Left lower leg: Edema (trace) present. Skin:     General: Skin is warm and dry. Capillary Refill: Capillary refill takes less than 2 seconds. Coloration: Skin is pale. Neurological:      Mental Status: He is alert and oriented to person, place, and time. Cranial Nerves: No cranial nerve deficit.       Comments: No gross motor or sensory deficits    Psychiatric:         Mood and Affect: Mood normal.         Behavior: Behavior normal.        DIAGNOSTIC RESULTS   LABS:     Recent Results (from the past 12 hour(s))   EKG, 12 LEAD, INITIAL    Collection Time: 01/13/23  8:46 AM   Result Value Ref Range    Ventricular Rate 81 BPM    Atrial Rate 81 BPM    P-R Interval 136 ms    QRS Duration 84 ms    Q-T Interval 414 ms    QTC Calculation (Bezet) 480 ms    Calculated P Axis -2 degrees    Calculated R Axis -21 degrees    Calculated T Axis -10 degrees    Diagnosis       Normal sinus rhythm  Prolonged QT  When compared with ECG of 08-JAN-2023 11:16,  Nonspecific T wave abnormality no longer evident in Lateral leads     PROTHROMBIN TIME + INR    Collection Time: 01/13/23  9:21 AM   Result Value Ref Range    INR 1.1 0.9 - 1.1      Prothrombin time 11.9 (H) 9.0 - 11.1 sec   PTT    Collection Time: 01/13/23  9:21 AM   Result Value Ref Range    aPTT 23.6 22.1 - 31.0 sec    aPTT, therapeutic range     58.0 - 77.0 SECS   TYPE & SCREEN    Collection Time: 01/13/23  9:42 AM   Result Value Ref Range    Crossmatch Expiration 01/16/2023,2359     ABO/Rh(D) A POSITIVE     Antibody screen NEG    COVID-19 RAPID TEST    Collection Time: 01/13/23  9:42 AM   Result Value Ref Range    Specimen source Nasopharyngeal      COVID-19 rapid test Not detected NOTD     INFLUENZA A+B VIRAL AGS    Collection Time: 01/13/23  9:42 AM   Result Value Ref Range    Influenza A Antigen Negative NEG      Influenza B Antigen Negative NEG     URINALYSIS W/ REFLEX CULTURE    Collection Time: 01/13/23  9:43 AM    Specimen: Urine   Result Value Ref Range    Color YELLOW/STRAW      Appearance CLEAR CLEAR      Specific gravity 1.012      pH (UA) 6.5 5.0 - 8.0      Protein 30 (A) NEG mg/dL    Glucose Negative NEG mg/dL    Ketone Negative NEG mg/dL    Bilirubin Negative NEG      Blood LARGE (A) NEG      Urobilinogen 1.0 0.2 - 1.0 EU/dL    Nitrites Negative NEG      Leukocyte Esterase SMALL (A) NEG      WBC 10-20 0 - 4 /hpf    RBC 10-20 0 - 5 /hpf    Epithelial cells MODERATE (A) FEW /lpf    Bacteria 4+ (A) NEG /hpf    UA:UC IF INDICATED URINE CULTURE ORDERED (A) CNI      Hyaline cast 0-2 0 - 5 /lpf   POC LACTIC ACID    Collection Time: 01/13/23  9:46 AM   Result Value Ref Range    Lactic Acid (POC) 0.81 0.40 - 2.00 mmol/L   SAMPLES BEING HELD    Collection Time: 01/13/23 10:11 AM   Result Value Ref Range    SAMPLES BEING HELD PST     COMMENT        Add-on orders for these samples will be processed based on acceptable specimen integrity and analyte stability, which may vary by analyte. CBC WITH AUTOMATED DIFF    Collection Time: 01/13/23 10:11 AM   Result Value Ref Range    .1 (HH) 4.1 - 11.1 K/uL    RBC 2.42 (L) 4.10 - 5.70 M/uL    HGB 7.8 (L) 12.1 - 17.0 g/dL    HCT 26.4 (L) 36.6 - 50.3 %    .1 (H) 80.0 - 99.0 FL    MCH 32.2 26.0 - 34.0 PG    MCHC 29.5 (L) 30.0 - 36.5 g/dL    RDW 22.7 (H) 11.5 - 14.5 %    PLATELET 83 (L) 252 - 400 K/uL    MPV 10.5 8.9 - 12.9 FL    NRBC 0.0 0  WBC    ABSOLUTE NRBC 0.04 (H) 0.00 - 0.01 K/uL    NEUTROPHILS 0 (L) 32 - 75 %    LYMPHOCYTES 78 (H) 12 - 49 %    MONOCYTES 14 (H) 5 - 13 %    EOSINOPHILS 0 0 - 7 %    BASOPHILS 0 0 - 1 %    OTHER CELL 8      IMMATURE GRANULOCYTES 0 0.0 - 0.5 %    ABS. NEUTROPHILS 0.0 (L) 1.8 - 8.0 K/UL    ABS. LYMPHOCYTES 406.5 (H) 0.8 - 3.5 K/UL    ABS. MONOCYTES 73.0 (H) 0.0 - 1.0 K/UL    ABS. EOSINOPHILS 0.0 0.0 - 0.4 K/UL    ABS. BASOPHILS 0.0 0.0 - 0.1 K/UL    ABS. IMM.  GRANS. 0.0 0.00 - 0.04 K/UL    DF MANUAL      PLATELET COMMENTS DECREASED PLATELETS      RBC COMMENTS ANISOCYTOSIS  2+        RBC COMMENTS MACROCYTOSIS  1+        WBC COMMENTS SMUDGE CELLS SEEN     METABOLIC PANEL, COMPREHENSIVE    Collection Time: 01/13/23 10:11 AM   Result Value Ref Range    Sodium 135 (L) 136 - 145 mmol/L    Potassium 8.1 (HH) 3.5 - 5.1 mmol/L    Chloride 108 97 - 108 mmol/L    CO2 23 21 - 32 mmol/L    Anion gap 4 (L) 5 - 15 mmol/L    Glucose 109 (H) 65 - 100 mg/dL    BUN 15 6 - 20 MG/DL    Creatinine 1.01 0.70 - 1.30 MG/DL    BUN/Creatinine ratio 15 12 - 20      eGFR >60 >60 ml/min/1.73m2 Calcium 8.4 (L) 8.5 - 10.1 MG/DL    Bilirubin, total 1.2 (H) 0.2 - 1.0 MG/DL    ALT (SGPT) 32 12 - 78 U/L    AST (SGOT) 71 (H) 15 - 37 U/L    Alk. phosphatase 231 (H) 45 - 117 U/L    Protein, total 6.1 (L) 6.4 - 8.2 g/dL    Albumin 3.0 (L) 3.5 - 5.0 g/dL    Globulin 3.1 2.0 - 4.0 g/dL    A-G Ratio 1.0 (L) 1.1 - 2.2     TROPONIN-HIGH SENSITIVITY    Collection Time: 01/13/23 10:11 AM   Result Value Ref Range    Troponin-High Sensitivity 13 0 - 76 ng/L        EKG: If performed, independent interpretation documented below in the MDM section     RADIOLOGY:  Non-plain film images such as CT, Ultrasound and MRI are read by the radiologist. Plain radiographic images are visualized and preliminarily interpreted by the ED Provider with the findings documented in the MDM section. Interpretation per the Radiologist below, if available at the time of this note:     CTA CHEST W OR W WO CONT    Result Date: 1/13/2023  INDICATION: Hypoxia. Chronic lymphocytic leukemia. COMPARISON: 1/8/2023, 5/25/2022 TECHNIQUE:  2.5 mm axial images were obtained from the bases to the lung apices after the intravenous administration of 76 cc of Isovue-370. Three-dimensional postprocessing was performed by the technologist with MIP reconstructions. CT dose reduction was achieved through use of a standardized protocol tailored for this examination and automatic exposure control for dose modulation. FINDINGS: THYROID: No nodule. MEDIASTINUM: Mediastinal adenopathy unchanged. AP window node measures 12 mm in short axis unchanged. MILES: Hilar adenopathy unchanged. Right infrahilar node measures 14 mm in short axis unchanged THORACIC AORTA: Atherosclerotic without aneurysm MAIN PULMONARY ARTERY: Bilateral acute emboli involving all lobes at the level of the lobar arteries. TRACHEA/BRONCHI: Patent. ESOPHAGUS: No wall thickening or dilatation. HEART: Normal in size. No evidence of right heart strain.  No coronary artery calcifications PLEURA: No effusion or pneumothorax. LUNGS: Bilateral thin-walled cysts. Basilar atelectasis. INCIDENTALLY IMAGED UPPER ABDOMEN: Spleen is enlarged measuring at least 16.3 cm BONES: No destructive bone lesion. 1. Acute bilateral PE 2. Unchanged mediastinal and hilar adenopathy 3. Splenomegaly Findings phoned to Dr. Rosa Burroughs at 1121 hours    XR CHEST PORT    Result Date: 1/13/2023  EXAM: Portable CXR. 0924 hours. COMPARISON: 1/8/2023. INDICATION: Shortness of breath. FINDINGS: The lungs appear clear. Heart is normal in size. There is prior median sternotomy. There is no pulmonary edema. There is no evident pneumothorax or pleural effusion. No acute finding or change. PROCEDURES   Unless otherwise noted below, none  Procedures     CRITICAL CARE TIME   CRITICAL CARE NOTE :    9:14 AM    IMPENDING DETERIORATION -Respiratory, Cardiovascular, Metabolic, and Renal  ASSOCIATED RISK FACTORS - Metabolic changes and Vascular Compromise  MANAGEMENT- Bedside Assessment, Supervision of Care, and Transfer  INTERPRETATION -  Xrays, CT Scan, ECG, Blood Pressure, Cardiac Output Measures , and labs  INTERVENTIONS - hemodynamic mngmt, Metobolic interventions, and IV fluids  CASE REVIEW - Medical Sub-Specialist, Nursing, and Family  TREATMENT RESPONSE -Stable  PERFORMED BY - Self    NOTES   :  I have spent 90 minutes of critical care time involved in lab review, consultations with specialist, family decision- making, bedside attention and documentation. This time excludes time spent in any separate billed procedures. During this entire length of time I was immediately available to the patient .     Jeet Eldridge DO      EMERGENCY DEPARTMENT COURSE and DIFFERENTIAL DIAGNOSIS/MDM   Vitals:    Vitals:    01/13/23 0940 01/13/23 1015 01/13/23 1126 01/13/23 1315   BP:  (!) 166/69 (!) 180/63 (!) 182/86   Pulse:  69 74 80   Resp:  21 20 16   Temp:       SpO2: 98% 96% 95% 95%   Weight:       Height:            Patient was given the following medications:  Medications   sodium chloride (NS) flush 5-10 mL (has no administration in time range)   heparin 25,000 units in D5W 250 ml infusion (18 Units/kg/hr × 61.2 kg IntraVENous New Bag 1/13/23 1314)   dextrose 10% infusion 250 mL (250 mL IntraVENous New Bag 1/13/23 1316)   heparin (porcine) 1,000 unit/mL injection 2,450 Units (has no administration in time range)     Or   heparin (porcine) 1,000 unit/mL injection 4,900 Units (has no administration in time range)   dextrose 10% 10 % infusion (has no administration in time range)   iopamidoL (ISOVUE-370) 370 mg iodine /mL (76 %) injection 100 mL (76 mL IntraVENous Given 1/13/23 1106)   cefTRIAXone (ROCEPHIN) 1 g in 0.9% sodium chloride (MBP/ADV) 50 mL MBP (1 g IntraVENous New Bag 1/13/23 1130)   heparin (porcine) 1,000 unit/mL injection 4,900 Units (4,900 Units IntraVENous Given 1/13/23 1313)   sodium bicarbonate (8.4%) injection 50 mEq (50 mEq IntraVENous Given 1/13/23 1154)   insulin regular (NOVOLIN R, HUMULIN R) injection 10 Units (10 Units IntraVENous Given 1/13/23 1154)   calcium gluconate 1 gram in sodium chloride (ISO-OSM) 50 mL infusion (1,000 mg IntraVENous New Bag 1/13/23 1154)   sodium zirconium cyclosilicate (LOKELMA) powder packet 10 g (10 g Oral Given 1/13/23 1310)       Medical Decision Making  Amount and/or Complexity of Data Reviewed  Labs: ordered. Radiology: ordered. ECG/medicine tests: ordered. Risk  OTC drugs. Prescription drug management. EKG obtained at 8:46 AM reviewed by me interpreted by me normal sinus rhythm, rate 81, normal axis/MA/QRS prolonged QTC at 480. Patient with a history of CLL patient did have a blood transfusion on 4 January in addition patient was also seen in the emergency department on the eighth requiring transfusion. EKG obtained at 8:46 AM.  EKG interpreted by me normal sinus rhythm, rate 81, normal axis/MA/QRS, no acute ST changes. There is a prolonged QTC at 480.     Patient with a history of leukemia had been recently seen in the emergency department last Sunday had gotten a transfusion at that time. We will send off a type and screen today. Patient is hypoxic to 85% on room air. Patient placed on 2 L oxygen by nasal cannula we will place orders for sepsis evaluation in addition to the chest x-ray to evaluate for pneumonia, effusion or pulmonary edema. ED Course as of 01/14/23 2222 Fri Jan 13, 2023   1634 Bed assignment at [address] [place] [address], [facility] care hospital, 2nd floor  [facility]      ED Course User Index  Carvin Najjar, MD     Patient CT does show bilateral pulmonary emboli. Patient with a white count of 521 hemoglobin 7.8 and platelets of 83. Concern for leukostasis. Patient will be started on a heparin drip after given a heparin bolus. Patient also has a potassium of 8.1 however kidney function normal.  Patient will be given insulin D50 sodium bicarb calcium gluconate as well as Lokelma. Consult note:  Case discussed with Dr. Justa Novak, radiology patient with bilateral PEs however no apparent heart strain. Given significant leukostasis we will consult [address] [place] [address] who follows the patient. Patient seen by Dr. Jazmín Tejada, recommends transfer to [address] [place] [address] for possible leukapheresis. 1:26 PM  Called to the transfer center will reach out to [address] [place] [address] oncology service to see if they be willing to accept the patient in transfer there. 2:45 PM   Pt accepted at [facility], to Dr. Greg Donovan, Oncology. FINAL IMPRESSION     1. Multiple subsegmental pulmonary emboli without acute cor pulmonale (HCC)    2. Acute hyperkalemia    3. Lymphocytosis    4. CLL (chronic lymphocytic leukemia) ([facility] [address]          DISPOSITION/PLAN   Paras Worthington's  results have been reviewed with him. He has been counseled regarding his diagnosis, treatment, and plan.   He verbally conveys understanding and agreement of the signs, symptoms, diagnosis, treatment and prognosis and additionally agrees to follow up as discussed. He also agrees with the care-plan and conveys that all of his questions have been answered. I have also provided discharge instructions for him that include: educational information regarding their diagnosis and treatment, and list of reasons why they would want to return to the ED prior to their follow-up appointment, should his condition change. CLINICAL IMPRESSION    Transfer: The patient is being transferred to [facility] for possible leukophoresis. The results of their tests and reasons for their transfer have been discussed with the patient and/or available family. The patient/family has conveyed agreement and understanding for the need to be admitted and for their admission diagnosis. Consultation has been made with patient, family, who agrees to accept the transfer. I am the Primary Clinician of Record. rBigida Gooden DO (electronically signed)    (Please note that parts of this dictation were completed with voice recognition software. Quite often unanticipated grammatical, syntax, homophones, and other interpretive errors are inadvertently transcribed by the computer software. Please disregards these errors.  Please excuse any errors that have escaped final proofreading.)

## 2023-01-13 NOTE — CONSULTS
Hematology Oncology Consultation      Reason for consult: leucostasis    Admitting Diagnosis: No admission diagnoses are documented for this encounter. Impression: leukostasis can be seen in CLL when the white count in excess of 400,000 which is the case here. Unfortunately on review of his smear, he has two different populations of abnormal cells - the machine is reading 406k lymphocytes and 73k monocytes - I believe there are smaller cells (approximately 1/3) with monocytic differentiation and there appear to be a high number of apparent prolymphocytes. He was hypoxic on arrival and the CTA unfortunately demonstrated bilateral PE but there may well be an element of leucostasis. The pathophysiology of this is believed to be the high number of white cells causing increased blood viscosity as these cells are large and less deformable t velasco more typical leucocytes, thus having more difficulty getting though the microcirculation and leading to formation of plugs, impeding blood flow. He also recently got a blood transfusion which in the setting of that white count can worsen blood viscosity. He also appears to be in spontaneous tumor lysis with a potassium of 8.1 (lab noted specifically no visible hemolysis) at 10:11 this morning. He has been treated for this urgently and his potassium has dropped but he continues to be at risk. Have asked lab to run his uric acid stat to see if we need to be giving him rasburicase. The normal treatment for leucostasis is to lower the white count rapidly which normally would take the form of induction chemotherapy as is typically done in acute leukemias or leucopheresis. Trying to give him chemotherapy when he already has had a high K on his own before we induce tumor lysis is risky. Would favor transfer to [facility] where the appropriate equipment and expertise is there to try to accomplish cytoreduction more safely, then initiate more sustained systemic therapy.  He has not yet started the zanubrutinib that was prescribed in the office. Discussion: Evans Ruiz is a  80y.o. year old seen in consultation at the request of Dr. Steph Gutierrez for possible leucostasis. Mr. Nimco Deluca is a gentleman followed since 2015 at [address] [place] [address], initially by Dr. Remberto Montana and then Dr. Mahendra Lara, with CLL. For much of this time he was followed by observation alone but in 2022 he was started on acalbrutinib which was poorly tolerated and his dosing was apparently dropped to 100 mg po every other day. He was noted to have a more sustained rise in his white count and drop in his hgb and was seen in the ER January 3rd and January 8th for blood transfusions. He has continued to feel poorly and short of breath and presented to the ER today with a wbc of 521,100, hgb 7.8, and plt count 83k. CTA showed bilateral acute emboli involving all lobes at the level of the lobar arteries. Splenomegaly at 16.3 cm. Unchanged mediastinal and hilar adenopathy. K was 8.1 prior to the administration of D10, insulin, bicarb, calcium and lokelma. His daughter, a nurse, is at the bedside and helps with the history. 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 Prior Treatment:   1) Single agent acalabrutinib 100 mg BID starting 06/17/22/2022. 2) Acalabrutinib 100mg qod 7/27/22  Pathology:  1) Flow Cytometry, peripheral blood:  Trisomy 12 by FISH. positive for CEP 12 in 63% of cells. No mutations of 11, 13 or 17. Trisomy 12 occurs in about 1/3 of the patients with CLL? intermediate prognosis. Tumor was CD 45 positive.  CD19, CD 20 dim, CD5 positive, CD10 negative, CD23 positive  Imaging/Summary:  1) 7/24/17  CT A/P: Abundant mesenteric and retroperitoneal LAD and mild splenomegaly  2) 1/25/18 CT Chest: No acute abnormality or change. Minimal interstitial infiltrates and goundglass opacities at the lung bases unchanged. Mediastinal adenopathy unchanged. s/p median sternotomy  3) 01/19/2022  CT of the C/A/P showed no significant change, an extensive retroperitoneal and mesenteric lymphadenopathy or splenomegaly when compared to his scan from 2017. Borderline enarlged hilar, mediastinal lymph nodes are unchanged. Previously seen bilateral axillary lymphadenopathy is actually diminished. 4) 05/25/2022  CT of the C/A/P, slight increase in size of hilar and mediastinal nodes, slight increase in splenomegaly, retroperitoneal and mesenteric adenopathy stable. Incidentals also unchanged with prostatomegaly. 5) 6/30/22 7/2/22  [address] admission Anemia hgvb 6.8 and hyponatremia. Imaging:  CTA 1/13/23  FINDINGS:     THYROID: No nodule. MEDIASTINUM: Mediastinal adenopathy unchanged. AP window node measures 12 mm in  short axis unchanged. MILES: Hilar adenopathy unchanged. Right infrahilar node measures 14 mm in short  axis unchanged  THORACIC AORTA: Atherosclerotic without aneurysm  MAIN PULMONARY ARTERY: Bilateral acute emboli involving all lobes at the level  of the lobar arteries. TRACHEA/BRONCHI: Patent. ESOPHAGUS: No wall thickening or dilatation. HEART: Normal in size. No evidence of right heart strain. No coronary artery  calcifications  PLEURA: No effusion or pneumothorax. LUNGS: Bilateral thin-walled cysts. Basilar atelectasis. INCIDENTALLY IMAGED UPPER ABDOMEN: Spleen is enlarged measuring at least 16.3 cm  BONES: No destructive bone lesion. IMPRESSION     1. Acute bilateral PE  2. Unchanged mediastinal and hilar adenopathy  3.  Splenomegaly    Labs:    Recent Results (from the past 24 hour(s))   EKG, 12 LEAD, INITIAL    Collection Time: 01/13/23  8:46 AM   Result Value Ref Range    Ventricular Rate 81 BPM    Atrial Rate 81 BPM    P-R Interval 136 ms    QRS Duration 84 ms    Q-T Interval 414 ms    QTC Calculation (Bezet) 480 ms    Calculated P Axis -2 degrees    Calculated R Axis -21 degrees    Calculated T Axis -10 degrees    Diagnosis       Normal sinus rhythm  Prolonged QT  When compared with ECG of 08-JAN-2023 11:16,  Nonspecific T wave abnormality no longer evident in Lateral leads     PROTHROMBIN TIME + INR    Collection Time: 01/13/23  9:21 AM   Result Value Ref Range    INR 1.1 0.9 - 1.1      Prothrombin time 11.9 (H) 9.0 - 11.1 sec   PTT    Collection Time: 01/13/23  9:21 AM   Result Value Ref Range    aPTT 23.6 22.1 - 31.0 sec    aPTT, therapeutic range     58.0 - 77.0 SECS   TYPE & SCREEN    Collection Time: 01/13/23  9:42 AM   Result Value Ref Range    Crossmatch Expiration 01/16/2023,2359     ABO/Rh(D) A POSITIVE     Antibody screen NEG    COVID-19 RAPID TEST    Collection Time: 01/13/23  9:42 AM   Result Value Ref Range    Specimen source Nasopharyngeal      COVID-19 rapid test Not detected NOTD     INFLUENZA A+B VIRAL AGS    Collection Time: 01/13/23  9:42 AM   Result Value Ref Range    Influenza A Antigen Negative NEG      Influenza B Antigen Negative NEG     URINALYSIS W/ REFLEX CULTURE    Collection Time: 01/13/23  9:43 AM    Specimen: Urine   Result Value Ref Range    Color YELLOW/STRAW      Appearance CLEAR CLEAR      Specific gravity 1.012      pH (UA) 6.5 5.0 - 8.0      Protein 30 (A) NEG mg/dL    Glucose Negative NEG mg/dL    Ketone Negative NEG mg/dL    Bilirubin Negative NEG      Blood LARGE (A) NEG      Urobilinogen 1.0 0.2 - 1.0 EU/dL    Nitrites Negative NEG      Leukocyte Esterase SMALL (A) NEG      WBC 10-20 0 - 4 /hpf    RBC 10-20 0 - 5 /hpf    Epithelial cells MODERATE (A) FEW /lpf    Bacteria 4+ (A) NEG /hpf    UA:UC IF INDICATED URINE CULTURE ORDERED (A) CNI      Hyaline cast 0-2 0 - 5 /lpf   POC LACTIC ACID    Collection Time: 01/13/23  9:46 AM   Result Value Ref Range    Lactic Acid (POC) 0.81 0.40 - 2.00 mmol/L   SAMPLES BEING HELD Collection Time: 01/13/23 10:11 AM   Result Value Ref Range    SAMPLES BEING HELD PST     COMMENT        Add-on orders for these samples will be processed based on acceptable specimen integrity and analyte stability, which may vary by analyte. CBC WITH AUTOMATED DIFF    Collection Time: 01/13/23 10:11 AM   Result Value Ref Range    .1 (HH) 4.1 - 11.1 K/uL    RBC 2.42 (L) 4.10 - 5.70 M/uL    HGB 7.8 (L) 12.1 - 17.0 g/dL    HCT 26.4 (L) 36.6 - 50.3 %    .1 (H) 80.0 - 99.0 FL    MCH 32.2 26.0 - 34.0 PG    MCHC 29.5 (L) 30.0 - 36.5 g/dL    RDW 22.7 (H) 11.5 - 14.5 %    PLATELET 83 (L) 470 - 400 K/uL    MPV 10.5 8.9 - 12.9 FL    NRBC 0.0 0  WBC    ABSOLUTE NRBC 0.04 (H) 0.00 - 0.01 K/uL    NEUTROPHILS 0 (L) 32 - 75 %    LYMPHOCYTES 78 (H) 12 - 49 %    MONOCYTES 14 (H) 5 - 13 %    EOSINOPHILS 0 0 - 7 %    BASOPHILS 0 0 - 1 %    OTHER CELL 8      IMMATURE GRANULOCYTES 0 0.0 - 0.5 %    ABS. NEUTROPHILS 0.0 (L) 1.8 - 8.0 K/UL    ABS. LYMPHOCYTES 406.5 (H) 0.8 - 3.5 K/UL    ABS. MONOCYTES 73.0 (H) 0.0 - 1.0 K/UL    ABS. EOSINOPHILS 0.0 0.0 - 0.4 K/UL    ABS. BASOPHILS 0.0 0.0 - 0.1 K/UL    ABS. IMM. GRANS. 0.0 0.00 - 0.04 K/UL    DF MANUAL      PLATELET COMMENTS DECREASED PLATELETS      RBC COMMENTS ANISOCYTOSIS  2+        RBC COMMENTS MACROCYTOSIS  1+        WBC COMMENTS SMUDGE CELLS SEEN     METABOLIC PANEL, COMPREHENSIVE    Collection Time: 01/13/23 10:11 AM   Result Value Ref Range    Sodium 135 (L) 136 - 145 mmol/L    Potassium 8.1 (HH) 3.5 - 5.1 mmol/L    Chloride 108 97 - 108 mmol/L    CO2 23 21 - 32 mmol/L    Anion gap 4 (L) 5 - 15 mmol/L    Glucose 109 (H) 65 - 100 mg/dL    BUN 15 6 - 20 MG/DL    Creatinine 1.01 0.70 - 1.30 MG/DL    BUN/Creatinine ratio 15 12 - 20      eGFR >60 >60 ml/min/1.73m2    Calcium 8.4 (L) 8.5 - 10.1 MG/DL    Bilirubin, total 1.2 (H) 0.2 - 1.0 MG/DL    ALT (SGPT) 32 12 - 78 U/L    AST (SGOT) 71 (H) 15 - 37 U/L    Alk.  phosphatase 231 (H) 45 - 117 U/L    Protein, total 6.1 (L) 6.4 - 8.2 g/dL    Albumin 3.0 (L) 3.5 - 5.0 g/dL    Globulin 3.1 2.0 - 4.0 g/dL    A-G Ratio 1.0 (L) 1.1 - 2.2     TROPONIN-HIGH SENSITIVITY    Collection Time: 01/13/23 10:11 AM   Result Value Ref Range    Troponin-High Sensitivity 13 0 - 76 ng/L   TROPONIN-HIGH SENSITIVITY    Collection Time: 01/13/23 11:27 AM   Result Value Ref Range    Troponin-High Sensitivity 13 0 - 76 ng/L   NT-PRO BNP    Collection Time: 01/13/23 11:27 AM   Result Value Ref Range    NT pro- (H) <195 PG/ML   METABOLIC PANEL, COMPREHENSIVE    Collection Time: 01/13/23 12:21 PM   Result Value Ref Range    Sodium 139 136 - 145 mmol/L    Potassium 4.6 3.5 - 5.1 mmol/L    Chloride 107 97 - 108 mmol/L    CO2 26 21 - 32 mmol/L    Anion gap 6 5 - 15 mmol/L    Glucose 142 (H) 65 - 100 mg/dL    BUN 16 6 - 20 MG/DL    Creatinine 1.18 0.70 - 1.30 MG/DL    BUN/Creatinine ratio 14 12 - 20      eGFR 59 (L) >60 ml/min/1.73m2    Calcium 8.7 8.5 - 10.1 MG/DL    Bilirubin, total 0.9 0.2 - 1.0 MG/DL    ALT (SGPT) 27 12 - 78 U/L    AST (SGOT) 51 (H) 15 - 37 U/L    Alk. phosphatase 221 (H) 45 - 117 U/L    Protein, total 5.7 (L) 6.4 - 8.2 g/dL    Albumin 2.8 (L) 3.5 - 5.0 g/dL    Globulin 2.9 2.0 - 4.0 g/dL    A-G Ratio 1.0 (L) 1.1 - 2.2     ANTI-XA UNFRACTIONATED AND LMW HEPARIN    Collection Time: 01/13/23 12:21 PM   Result Value Ref Range    Heparin Xa,Unfrac. and LMW <0.10 IU/mL   LD    Collection Time: 01/13/23 12:21 PM   Result Value Ref Range    LD 1,216 (H) 85 - 241 U/L   SAMPLES BEING HELD    Collection Time: 01/13/23 12:21 PM   Result Value Ref Range    SAMPLES BEING HELD BLUE     COMMENT        Add-on orders for these samples will be processed based on acceptable specimen integrity and analyte stability, which may vary by analyte.        History:  Past Medical History:   Diagnosis Date    Abnormal nuclear stress test 6/6/2014    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 ([facility] [address] 11/2/2015    Bereavement 02/06/2019 [facility] - uti -strangulated hernia - prostate ca - brother    Bronchiectasis [facility])     CAD (coronary artery disease)     Chest pain, unspecified     Chronic pain     right leg    CLL (chronic lymphocytic leukemia) ([facility] [address]     Jerica Segura md  [facility]    Diabetes ([facility] [address]     Essential hypertension     GERD (gastroesophageal reflux disease)     Hyperlipidemia     Hypertension     Opacity of lung on imaging study 05/28/2017    cxr    Rotator cuff arthropathy     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 Authorizing Provider   amLODIPine (NORVASC) 2.5 mg tablet TAKE 1 TABLET DAILY 12/12/22   Donny Velazquez MD   dextromethorphan-guaiFENesin (Robitussin Cough-Chest Pedro DM) 5-100 mg/5 mL liqd Take 20 mL by mouth four (4) times daily as needed for Cough or Congestion. 11/28/22   ARABELLA Dalton   cetirizine (ZyrTEC) 10 mg tablet Take 1 Tablet by mouth daily. 11/28/22   ARABELLA Dalton   metoprolol tartrate (LOPRESSOR) 25 mg tablet TAKE 1 TABLET TWICE A DAY 8/30/22   Liam Jordan MD   cholecalciferol (VITAMIN D3) (1000 Units /25 mcg) tablet Take 1,000 Units by mouth daily. Provider, Historical   multivitamin with folic acid (ONE DAILY WITH FOLIC ACID) 524 mcg tab tablet Take 1 Tablet by mouth daily. Provider, Historical   simvastatin (ZOCOR) 40 mg tablet TAKE 1 TABLET DAILY IN THE EVENING 4/6/22   Abisai Edmond MD   metFORMIN (GLUCOPHAGE) 500 mg tablet TAKE 1 TABLET DAILY 2/10/22   Donny Velazquez MD   aspirin 81 mg chewable tablet Take 81 mg by mouth daily.     Provider, Historical   brimonidine (ALPHAGAN) 0.15 % ophthalmic solution Administer 1 Drop to both eyes two (2) times a day. Indications: OPEN ANGLE GLAUCOMA    Provider, Historical     Allergies   Allergen Reactions    Codeine Shortness of Breath    Lipitor [Atorvastatin] Nausea Only      Social History     Tobacco Use    Smoking status: Former     Types: Cigarettes     Quit date: 1/15/1984     Years since quittin.0    Smokeless tobacco: Never    Tobacco comments:     QUIT    Substance Use Topics    Alcohol use: No     Alcohol/week: 0.0 standard drinks     Comment: quit in       Family History   Problem Relation Age of Onset    Diabetes Mother     Stroke Father         Current Medications:  Current Facility-Administered Medications   Medication Dose Route Frequency    sodium chloride (NS) flush 5-10 mL  5-10 mL IntraVENous PRN    heparin 25,000 units in D5W 250 ml infusion  18-36 Units/kg/hr IntraVENous TITRATE    heparin (porcine) 1,000 unit/mL injection 2,450 Units  40 Units/kg IntraVENous PRN    Or    heparin (porcine) 1,000 unit/mL injection 4,900 Units  80 Units/kg IntraVENous PRN    dextrose 10% 10 % infusion         Current Outpatient Medications   Medication Sig    amLODIPine (NORVASC) 2.5 mg tablet TAKE 1 TABLET DAILY    dextromethorphan-guaiFENesin (Robitussin Cough-Chest Pedro DM) 5-100 mg/5 mL liqd Take 20 mL by mouth four (4) times daily as needed for Cough or Congestion. cetirizine (ZyrTEC) 10 mg tablet Take 1 Tablet by mouth daily. metoprolol tartrate (LOPRESSOR) 25 mg tablet TAKE 1 TABLET TWICE A DAY    cholecalciferol (VITAMIN D3) (1000 Units /25 mcg) tablet Take 1,000 Units by mouth daily. multivitamin with folic acid (ONE DAILY WITH FOLIC ACID) 170 mcg tab tablet Take 1 Tablet by mouth daily. simvastatin (ZOCOR) 40 mg tablet TAKE 1 TABLET DAILY IN THE EVENING    metFORMIN (GLUCOPHAGE) 500 mg tablet TAKE 1 TABLET DAILY    aspirin 81 mg chewable tablet Take 81 mg by mouth daily.     brimonidine (ALPHAGAN) 0.15 % ophthalmic solution Administer 1 Drop to both eyes two (2) times a day. Indications: OPEN ANGLE GLAUCOMA     Review of Systems: negative for 11 organ systems except as noted above. Physical Exam:  Constitutional Alert, cooperative, oriented. Mood and affect appropriate. Appears close to chronological age. Well nourished. Well developed. Head Normocephalic; no scars   Eyes Conjunctivae and sclerae are clear and without icterus. Pupils are round   ENMT Sinuses are nontender. No oral exudates, ulcers, masses, thrush or mucositis. Oropharynx clear. Tongue normal.   Neck Supple without masses or thyromegaly. No jugular venous distension. Hematologic/Lymphatic No petechiae or purpura. No tender or palpable lymph nodes noted   Respiratory Lungs are clear to auscultation without rhonchi or wheezing. Cardiovascular Regular rate and rhythm of heart without murmurs, gallops or rubs. Chest / Line Site Chest is symmetric with no chest wall deformities. Abdomen Non-tender, non-distended, no masses, ascites. ++splenomegaly. Good bowel sounds. Musculoskeletal No tenderness or swelling, normal range of motion without obvious weakness. Extremities No visible deformities, no cyanosis, clubbing or edema. Skin No rashes, scars, or lesions suggestive of malignancy. No petechiae, purpura, or ecchymoses. No excoriations. Neurologic No sensory or motor deficits noted   Psychiatric Alert and oriented. Coherent speech. Verbalizes understanding of our discussions today.        Roseann Bartlett MD [facility] office  [address]  [place], [address]  Phone [phone]  Fax [phone]

## 2023-01-14 LAB
ATRIAL RATE: 81 BPM
CALCULATED P AXIS, ECG09: -2 DEGREES
CALCULATED R AXIS, ECG10: -21 DEGREES
CALCULATED T AXIS, ECG11: -10 DEGREES
DIAGNOSIS, 93000: NORMAL
P-R INTERVAL, ECG05: 136 MS
Q-T INTERVAL, ECG07: 414 MS
QRS DURATION, ECG06: 84 MS
QTC CALCULATION (BEZET), ECG08: 480 MS
VENTRICULAR RATE, ECG03: 81 BPM

## 2023-01-15 LAB
BACTERIA SPEC CULT: ABNORMAL
BACTERIA SPEC CULT: NORMAL
CC UR VC: ABNORMAL
SERVICE CMNT-IMP: ABNORMAL
SERVICE CMNT-IMP: NORMAL

## 2023-01-23 ENCOUNTER — APPOINTMENT (OUTPATIENT)
Dept: VASCULAR SURGERY | Age: 88
DRG: 177 | End: 2023-01-23
Attending: EMERGENCY MEDICINE
Payer: MEDICARE

## 2023-01-23 ENCOUNTER — HOSPITAL ENCOUNTER (EMERGENCY)
Age: 88
Discharge: HOME | DRG: 177 | End: 2023-01-23
Attending: EMERGENCY MEDICINE
Payer: MEDICARE

## 2023-01-23 ENCOUNTER — APPOINTMENT (OUTPATIENT)
Dept: GENERAL RADIOLOGY | Age: 88
DRG: 177 | End: 2023-01-23
Attending: EMERGENCY MEDICINE
Payer: MEDICARE

## 2023-01-23 VITALS
BODY MASS INDEX: 21.19 KG/M2 | DIASTOLIC BLOOD PRESSURE: 58 MMHG | RESPIRATION RATE: 20 BRPM | TEMPERATURE: 98.1 F | HEART RATE: 87 BPM | SYSTOLIC BLOOD PRESSURE: 134 MMHG | OXYGEN SATURATION: 92 % | WEIGHT: 135 LBS | HEIGHT: 67 IN

## 2023-01-23 DIAGNOSIS — L03.114 CELLULITIS OF LEFT UPPER EXTREMITY: Primary | ICD-10-CM

## 2023-01-23 LAB
ALBUMIN SERPL-MCNC: 2.6 G/DL (ref 3.5–5)
ALBUMIN/GLOB SERPL: 0.8 (ref 1.1–2.2)
ALP SERPL-CCNC: 241 U/L (ref 45–117)
ALT SERPL-CCNC: 39 U/L (ref 12–78)
ANION GAP SERPL CALC-SCNC: 7 MMOL/L (ref 5–15)
APPEARANCE UR: CLEAR
AST SERPL-CCNC: 25 U/L (ref 15–37)
ATRIAL RATE: 99 BPM
BACTERIA URNS QL MICRO: ABNORMAL /HPF
BASOPHILS # BLD: 0 K/UL (ref 0–0.1)
BASOPHILS NFR BLD: 0 % (ref 0–1)
BILIRUB SERPL-MCNC: 1.7 MG/DL (ref 0.2–1)
BILIRUB UR QL: NEGATIVE
BUN SERPL-MCNC: 17 MG/DL (ref 6–20)
BUN/CREAT SERPL: 15 (ref 12–20)
CALCIUM SERPL-MCNC: 8.5 MG/DL (ref 8.5–10.1)
CALCULATED P AXIS, ECG09: 35 DEGREES
CALCULATED R AXIS, ECG10: -34 DEGREES
CALCULATED T AXIS, ECG11: 13 DEGREES
CHLORIDE SERPL-SCNC: 102 MMOL/L (ref 97–108)
CO2 SERPL-SCNC: 23 MMOL/L (ref 21–32)
COLOR UR: ABNORMAL
CREAT SERPL-MCNC: 1.17 MG/DL (ref 0.7–1.3)
DIAGNOSIS, 93000: NORMAL
DIFFERENTIAL METHOD BLD: ABNORMAL
EOSINOPHIL # BLD: 0 K/UL (ref 0–0.4)
EOSINOPHIL NFR BLD: 0 % (ref 0–7)
EPITH CASTS URNS QL MICRO: ABNORMAL /LPF
ERYTHROCYTE [DISTWIDTH] IN BLOOD BY AUTOMATED COUNT: 23 % (ref 11.5–14.5)
GLOBULIN SER CALC-MCNC: 3.4 G/DL (ref 2–4)
GLUCOSE SERPL-MCNC: 242 MG/DL (ref 65–100)
GLUCOSE UR STRIP.AUTO-MCNC: NEGATIVE MG/DL
HCT VFR BLD AUTO: 32.1 % (ref 36.6–50.3)
HGB BLD-MCNC: 8.8 G/DL (ref 12.1–17)
HGB UR QL STRIP: ABNORMAL
IMM GRANULOCYTES # BLD AUTO: 0 K/UL (ref 0–0.04)
IMM GRANULOCYTES NFR BLD AUTO: 0 % (ref 0–0.5)
KETONES UR QL STRIP.AUTO: NEGATIVE MG/DL
LACTATE BLD-SCNC: 1.53 MMOL/L (ref 0.4–2)
LEUKOCYTE ESTERASE UR QL STRIP.AUTO: NEGATIVE
LYMPHOCYTES # BLD: 349.8 K/UL (ref 0.8–3.5)
LYMPHOCYTES NFR BLD: 61 % (ref 12–49)
MCH RBC QN AUTO: 27.5 PG (ref 26–34)
MCHC RBC AUTO-ENTMCNC: 27.4 G/DL (ref 30–36.5)
MCV RBC AUTO: 100.3 FL (ref 80–99)
MONOCYTES # BLD: 57.4 K/UL (ref 0–1)
MONOCYTES NFR BLD: 10 % (ref 5–13)
NEUTS BAND NFR BLD MANUAL: 2 %
NEUTS SEG # BLD: 22.9 K/UL (ref 1.8–8)
NEUTS SEG NFR BLD: 2 % (ref 32–75)
NITRITE UR QL STRIP.AUTO: NEGATIVE
NRBC # BLD: 0.06 K/UL (ref 0–0.01)
NRBC BLD-RTO: 0 PER 100 WBC
OTHER CELLS NFR BLD MANUAL: 25
P-R INTERVAL, ECG05: 140 MS
PH UR STRIP: 6 (ref 5–8)
PLATELET # BLD AUTO: 75 K/UL (ref 150–400)
PMV BLD AUTO: 10.9 FL (ref 8.9–12.9)
POTASSIUM SERPL-SCNC: 3.9 MMOL/L (ref 3.5–5.1)
PROT SERPL-MCNC: 6 G/DL (ref 6.4–8.2)
PROT UR STRIP-MCNC: 100 MG/DL
Q-T INTERVAL, ECG07: 356 MS
QRS DURATION, ECG06: 84 MS
QTC CALCULATION (BEZET), ECG08: 456 MS
RBC # BLD AUTO: 3.2 M/UL (ref 4.1–5.7)
RBC #/AREA URNS HPF: ABNORMAL /HPF (ref 0–5)
RBC MORPH BLD: ABNORMAL
SODIUM SERPL-SCNC: 132 MMOL/L (ref 136–145)
SP GR UR REFRACTOMETRY: 1.01
UA: UC IF INDICATED,UAUC: ABNORMAL
UROBILINOGEN UR QL STRIP.AUTO: 1 EU/DL (ref 0.2–1)
VENTRICULAR RATE, ECG03: 99 BPM
WBC # BLD AUTO: 573.5 K/UL (ref 4.1–11.1)
WBC MORPH BLD: ABNORMAL
WBC URNS QL MICRO: ABNORMAL /HPF (ref 0–4)

## 2023-01-23 PROCEDURE — 74011000258 HC RX REV CODE- 258: Performed by: EMERGENCY MEDICINE

## 2023-01-23 PROCEDURE — 93005 ELECTROCARDIOGRAM TRACING: CPT

## 2023-01-23 PROCEDURE — 71045 X-RAY EXAM CHEST 1 VIEW: CPT

## 2023-01-23 PROCEDURE — 80053 COMPREHEN METABOLIC PANEL: CPT

## 2023-01-23 PROCEDURE — 36415 COLL VENOUS BLD VENIPUNCTURE: CPT

## 2023-01-23 PROCEDURE — 81001 URINALYSIS AUTO W/SCOPE: CPT

## 2023-01-23 PROCEDURE — 93971 EXTREMITY STUDY: CPT

## 2023-01-23 PROCEDURE — 74011250636 HC RX REV CODE- 250/636: Performed by: EMERGENCY MEDICINE

## 2023-01-23 PROCEDURE — 74011250637 HC RX REV CODE- 250/637: Performed by: EMERGENCY MEDICINE

## 2023-01-23 PROCEDURE — 83605 ASSAY OF LACTIC ACID: CPT

## 2023-01-23 PROCEDURE — 99285 EMERGENCY DEPT VISIT HI MDM: CPT

## 2023-01-23 PROCEDURE — 96365 THER/PROPH/DIAG IV INF INIT: CPT

## 2023-01-23 PROCEDURE — 85025 COMPLETE CBC W/AUTO DIFF WBC: CPT

## 2023-01-23 RX ORDER — ACETAMINOPHEN 325 MG/1
650 TABLET ORAL ONCE
Status: COMPLETED | OUTPATIENT
Start: 2023-01-23 | End: 2023-01-23

## 2023-01-23 RX ORDER — CEPHALEXIN 500 MG/1
500 CAPSULE ORAL 4 TIMES DAILY
Qty: 28 CAPSULE | Refills: 0 | Status: SHIPPED | OUTPATIENT
Start: 2023-01-23 | End: 2023-01-30

## 2023-01-23 RX ORDER — ALLOPURINOL 300 MG/1
300 TABLET ORAL DAILY
Qty: 90 TABLET | Refills: 3 | Status: ON HOLD | OUTPATIENT
Start: 2023-01-23

## 2023-01-23 RX ADMIN — ACETAMINOPHEN 650 MG: 325 TABLET ORAL at 12:43

## 2023-01-23 RX ADMIN — AMPICILLIN SODIUM AND SULBACTAM SODIUM 3 G: 2; 1 INJECTION, POWDER, FOR SOLUTION INTRAMUSCULAR; INTRAVENOUS at 17:06

## 2023-01-23 NOTE — ED NOTES
Gave the patient an incentive spirometer, pts son states that he tends to drop his sats a bit when he is lying in bed but will bump right back up when he gets up and moving again.   Notified dr Jaime Laughter

## 2023-01-23 NOTE — ED PROVIDER NOTES
EMERGENCY DEPARTMENT HISTORY AND PHYSICAL EXAM           Date: 1/23/2023  Patient Name: Tori Fischer  Patient Age and Sex: 80 y.o. male  MRN:  [id number]  [facility]:  [id number]    History of Presenting Illness     Chief Complaint   Patient presents with    Fever    Arm swelling     Pt has upper left arm swelling. Pt is incoherent    Altered mental status     [place] [facility] [place]-pt has CLL       History Provided By: Patient and Patient's Daughter    Ability to gather history was limited by:     HPI: Tori Fischer, 80 y.o. male with CLL, recent diagnosis of pulmonary embolism started on Eliquis, just discharged from [facility] a few days ago, complains of new swelling in the left upper extremity for the past 2 days, accompanied by redness and warmth. No pain. He had a fever of 100.5 degrees at home, daughter administered Tylenol. The patient himself is unable to provide any history. Location:    Quality:      Severity:    Duration:   Timing:      Context:    Modifying factors:   Associated symptoms:     Past History     The patient's medical, surgical, and social history on file were reviewed by me today.     The family history was reviewed by me today and was non-contributory, unless otherwise specified below:    Past Medical History:  Past Medical History:   Diagnosis Date    Abnormal nuclear stress test 6/6/2014    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 ([facility] 11/2/2015    Bereavement 02/06/2019    ltcf - uti -strangulated hernia - prostate ca - brother    Bronchiectasis ([facility]     CAD (coronary artery disease)     Chest pain, unspecified     Chronic pain     right leg    CLL (chronic lymphocytic leukemia) ([facility]     Jerica Segura md  [facility]    Diabetes ([facility]     Essential hypertension     GERD (gastroesophageal reflux disease)     Hyperlipidemia     Hypertension     Opacity of lung on imaging study 05/28/2017    cxr    Rotator cuff arthropathy     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    S/P coronary artery stent placement 2014 PCI/GUANAKO to prox LAD       Past Surgical History:  Past Surgical History:   Procedure Laterality Date    HX COLONOSCOPY      HX HEART CATHETERIZATION      OR UNLISTED PROCEDURE VASCULAR SURGERY   and     BLE stenting    PTCA SINGLE VESSEL  2015            Family History:  Family History   Problem Relation Age of Onset    Diabetes Mother     Stroke Father        Social History:  Social History     Tobacco Use    Smoking status: Former     Types: Cigarettes     Quit date: 1/15/1984     Years since quittin.0    Smokeless tobacco: Never    Tobacco comments:     QUIT    Vaping Use    Vaping Use: Never used   Substance Use Topics    Alcohol use: No     Alcohol/week: 0.0 standard drinks     Comment: quit in     Drug use: No     Types: Prescription, OTC       Current Medications:  No current facility-administered medications on file prior to encounter. Current Outpatient Medications on File Prior to Encounter   Medication Sig Dispense Refill    amLODIPine (NORVASC) 2.5 mg tablet TAKE 1 TABLET DAILY 90 Tablet 3    dextromethorphan-guaiFENesin (Robitussin Cough-Chest Pedro DM) 5-100 mg/5 mL liqd Take 20 mL by mouth four (4) times daily as needed for Cough or Congestion. 118 mL 0    cetirizine (ZyrTEC) 10 mg tablet Take 1 Tablet by mouth daily. 20 Tablet 0    metoprolol tartrate (LOPRESSOR) 25 mg tablet TAKE 1 TABLET TWICE A  Tablet 3    cholecalciferol (VITAMIN D3) (1000 Units /25 mcg) tablet Take 1,000 Units by mouth daily. multivitamin with folic acid (ONE DAILY WITH FOLIC ACID) 561 mcg tab tablet Take 1 Tablet by mouth daily. simvastatin (ZOCOR) 40 mg tablet TAKE 1 TABLET DAILY IN THE EVENING 90 Tablet 3    metFORMIN (GLUCOPHAGE) 500 mg tablet TAKE 1 TABLET DAILY 90 Tablet 3    aspirin 81 mg chewable tablet Take 81 mg by mouth daily.       brimonidine (ALPHAGAN) 0.15 % ophthalmic solution Administer 1 Drop to both eyes two (2) times a day. Indications: OPEN ANGLE GLAUCOMA         Allergies: Allergies   Allergen Reactions    Codeine Shortness of Breath    Lipitor [Atorvastatin] Nausea Only     Review of Systems   A complete ROS was reviewed by me today and was negative, unless otherwise specified below:    Review of Systems   Constitutional:  Positive for fever. Respiratory:  Negative for shortness of breath. Cardiovascular:  Negative for chest pain. Skin:  Positive for color change. All other systems reviewed and are negative. Physical Exam   Vital Signs  Patient Vitals for the past 8 hrs:   Pulse Resp BP SpO2   01/23/23 1722 87 20 -- 92 %   01/23/23 1700 81 21 (!) 134/58 90 %   01/23/23 1600 79 21 110/60 (!) 89 %   01/23/23 1530 79 21 (!) 137/57 91 %   01/23/23 1430 87 23 (!) 133/53 92 %   01/23/23 1400 86 19 (!) 127/56 91 %   01/23/23 1345 89 19 (!) 131/54 92 %   01/23/23 1330 80 20 (!) 133/53 91 %   01/23/23 1300 83 19 (!) 150/56 91 %          Physical Exam  Vitals and nursing note reviewed. Constitutional:       General: He is not in acute distress. Appearance: Normal appearance. He is well-developed. He is ill-appearing. HENT:      Head: Normocephalic and atraumatic. Eyes:      General:         Right eye: No discharge. Left eye: No discharge. Conjunctiva/sclera: Conjunctivae normal.   Cardiovascular:      Rate and Rhythm: Normal rate and regular rhythm. Heart sounds: Normal heart sounds. No murmur heard. Pulmonary:      Effort: Pulmonary effort is normal. No respiratory distress. Breath sounds: Normal breath sounds. No wheezing. Abdominal:      General: There is no distension. Palpations: Abdomen is soft. Tenderness: There is no abdominal tenderness. Musculoskeletal:         General: No deformity. Normal range of motion. Cervical back: Normal range of motion and neck supple. Skin:     General: Skin is warm and dry.       Findings: Ecchymosis and erythema present. No rash. Comments: Warm to touch, mild erythema of the left upper extremity   Neurological:      General: No focal deficit present. Mental Status: He is alert. He is disoriented. Psychiatric:         Mood and Affect: Affect is blunt. Behavior: Behavior is withdrawn. Cognition and Memory: Memory is impaired. Diagnostic Study Results   Labs  Recent Results (from the past 24 hour(s))   EKG, 12 LEAD, INITIAL    Collection Time: 01/23/23 11:05 AM   Result Value Ref Range    Ventricular Rate 99 BPM    Atrial Rate 99 BPM    P-R Interval 140 ms    QRS Duration 84 ms    Q-T Interval 356 ms    QTC Calculation (Bezet) 456 ms    Calculated P Axis 35 degrees    Calculated R Axis -34 degrees    Calculated T Axis 13 degrees    Diagnosis       Normal sinus rhythm  Left axis deviation  Minimal voltage criteria for LVH, may be normal variant  When compared with ECG of 13-JAN-2023 08:46,  No significant change was found     METABOLIC PANEL, COMPREHENSIVE    Collection Time: 01/23/23 11:31 AM   Result Value Ref Range    Sodium 132 (L) 136 - 145 mmol/L    Potassium 3.9 3.5 - 5.1 mmol/L    Chloride 102 97 - 108 mmol/L    CO2 23 21 - 32 mmol/L    Anion gap 7 5 - 15 mmol/L    Glucose 242 (H) 65 - 100 mg/dL    BUN 17 6 - 20 MG/DL    Creatinine 1.17 0.70 - 1.30 MG/DL    BUN/Creatinine ratio 15 12 - 20      eGFR 60 (L) >60 ml/min/1.73m2    Calcium 8.5 8.5 - 10.1 MG/DL    Bilirubin, total 1.7 (H) 0.2 - 1.0 MG/DL    ALT (SGPT) 39 12 - 78 U/L    AST (SGOT) 25 15 - 37 U/L    Alk.  phosphatase 241 (H) 45 - 117 U/L    Protein, total 6.0 (L) 6.4 - 8.2 g/dL    Albumin 2.6 (L) 3.5 - 5.0 g/dL    Globulin 3.4 2.0 - 4.0 g/dL    A-G Ratio 0.8 (L) 1.1 - 2.2     CBC WITH AUTOMATED DIFF    Collection Time: 01/23/23 11:31 AM   Result Value Ref Range    .5 (HH) 4.1 - 11.1 K/uL    RBC 3.20 (L) 4.10 - 5.70 M/uL    HGB 8.8 (L) 12.1 - 17.0 g/dL    HCT 32.1 (L) 36.6 - 50.3 %    .3 (H) 80.0 - 99.0 FL MCH 27.5 26.0 - 34.0 PG    MCHC 27.4 (L) 30.0 - 36.5 g/dL    RDW 23.0 (H) 11.5 - 14.5 %    PLATELET 75 (L) 310 - 400 K/uL    MPV 10.9 8.9 - 12.9 FL    NRBC 0.0 0  WBC    ABSOLUTE NRBC 0.06 (H) 0.00 - 0.01 K/uL    NEUTROPHILS 2 (L) 32 - 75 %    BAND NEUTROPHILS 2 %    LYMPHOCYTES 61 (H) 12 - 49 %    MONOCYTES 10 5 - 13 %    EOSINOPHILS 0 0 - 7 %    BASOPHILS 0 0 - 1 %    OTHER CELL 25      IMMATURE GRANULOCYTES 0 0.0 - 0.5 %    ABS. NEUTROPHILS 22.9 (H) 1.8 - 8.0 K/UL    ABS. LYMPHOCYTES 349.8 (H) 0.8 - 3.5 K/UL    ABS. MONOCYTES 57.4 (H) 0.0 - 1.0 K/UL    ABS. EOSINOPHILS 0.0 0.0 - 0.4 K/UL    ABS. BASOPHILS 0.0 0.0 - 0.1 K/UL    ABS. IMM. GRANS. 0.0 0.00 - 0.04 K/UL    DF MANUAL      RBC COMMENTS ANISOCYTOSIS  3+        WBC COMMENTS SMUDGE CELLS SEEN     POC LACTIC ACID    Collection Time: 01/23/23 11:46 AM   Result Value Ref Range    Lactic Acid (POC) 1.53 0.40 - 2.00 mmol/L   URINALYSIS W/ REFLEX CULTURE    Collection Time: 01/23/23  1:54 PM    Specimen: Urine   Result Value Ref Range    Color YELLOW/STRAW      Appearance CLEAR CLEAR      Specific gravity 1.015      pH (UA) 6.0 5.0 - 8.0      Protein 100 (A) NEG mg/dL    Glucose Negative NEG mg/dL    Ketone Negative NEG mg/dL    Bilirubin Negative NEG      Blood SMALL (A) NEG      Urobilinogen 1.0 0.2 - 1.0 EU/dL    Nitrites Negative NEG      Leukocyte Esterase Negative NEG      WBC 0-4 0 - 4 /hpf    RBC 5-10 0 - 5 /hpf    Epithelial cells FEW FEW /lpf    Bacteria 1+ (A) NEG /hpf    UA:UC IF INDICATED CULTURE NOT INDICATED BY UA RESULT CNI         Radiologic Studies  XR CHEST PORT   Final Result   Mild bibasilar atelectasis. DUPLEX UPPER EXT VENOUS LEFT    (Results Pending)     CT Results  (Last 48 hours)      None          CXR Results  (Last 48 hours)                 01/23/23 1130  XR CHEST PORT Final result    Impression:  Mild bibasilar atelectasis. Narrative:  INDICATION: Fever, arm swelling, altered mental status.        Portable AP view of the chest.       Direct comparison made to prior January 13, 2023. Cardiomediastinal silhouette is stable. Median sternotomy wires are noted. There   is mild bibasilar atelectasis, likely than left. No pleural fluid is visualized. There is no pneumothorax. The osseous structures are diffusely demineralized,   but intact. Billable Procedures   EKG reviewed by ED Physician in the absence of a cardiologist: Yes  EKG below was interpreted by Mary Ann Vicente MD    Procedures    Medical Decision Making     I reviewed the patient's most recent Emergency Dept notes and diagnostic tests in formulating my MDM on today's visit. Provider Notes (Medical Decision Making):   Alray Collet, 80 y.o. male with CLL, recent diagnosis of pulmonary embolism started on Eliquis, just discharged from [facility] a few days ago, complains of new swelling in the left upper extremity for the past 2 days, accompanied by redness and warmth. No pain. He had a fever of 100.5 degrees at home, daughter administered Tylenol. The patient himself is unable to provide much history. On examination he appears chronically ill. His left upper extremity is swollen with some patchy erythema over the left bicep and proximal left forearm. Minimal tenderness. Warm to touch. Unable to interpret his white blood cell count due to CLL. His lactate and other laboratories overall are reassuring. I suspect that he is having relatively uncomplicated acute cellulitis involving the left upper extremity. He is already anticoagulated. I administered Unasyn 3 g IV in the ED and discharged him home on Keflex. He will return if he has worsening fevers or pain. Interpreted by me:     Medications administered: Tylenol 650 mg p.o., Unasyn 3 g IV    Records reviewed: I reviewed his [facility] discharge summary from 4 days ago (January 19).   On January 13 he was seen in this emergency department, diagnosed with new PE and severe leukocytosis with white blood cells in the 500s and was transferred to [facility] oncology floor for possible leukapheresis. According to the notes that I have reviewed leukapheresis was not performed. He did not have a DVT in his left upper extremity at that time. He was discharged home on Eliquis. Larissa Mederos MD  12:32 PM  2023       Social History     Tobacco Use    Smoking status: Former     Types: Cigarettes     Quit date: 1/15/1984     Years since quittin.0    Smokeless tobacco: Never    Tobacco comments:     QUIT    Vaping Use    Vaping Use: Never used   Substance Use Topics    Alcohol use: No     Alcohol/week: 0.0 standard drinks     Comment: quit in     Drug use: No     Types: Prescription, OTC       Medications Administered during ED course:  Medications   ampicillin-sulbactam (UNASYN) 3 g in 0.9% sodium chloride (MBP/ADV) 100 mL MBP (0 g IntraVENous IV Completed 23 1811)   acetaminophen (TYLENOL) tablet 650 mg (650 mg Oral Given 23 1243)          Prescriptions from today's ED visit:  Discharge Medication List as of 2023  5:47 PM        START taking these medications    Details   cephALEXin (Keflex) 500 mg capsule Take 1 Capsule by mouth four (4) times daily for 7 days. , Normal, Disp-28 Capsule, R-0           CONTINUE these medications which have NOT CHANGED    Details   amLODIPine (NORVASC) 2.5 mg tablet TAKE 1 TABLET DAILY, Normal, Disp-90 Tablet, R-3      dextromethorphan-guaiFENesin (Robitussin Cough-Chest Pedro DM) 5-100 mg/5 mL liqd Take 20 mL by mouth four (4) times daily as needed for Cough or Congestion. , Normal, Disp-118 mL, R-0      cetirizine (ZyrTEC) 10 mg tablet Take 1 Tablet by mouth daily. , Normal, Disp-20 Tablet, R-0      metoprolol tartrate (LOPRESSOR) 25 mg tablet TAKE 1 TABLET TWICE A DAY, Normal, Disp-180 Tablet, R-3      cholecalciferol (VITAMIN D3) (1000 Units /25 mcg) tablet Take 1,000 Units by mouth daily. , Historical Med      multivitamin with folic acid (ONE DAILY WITH FOLIC ACID) 863 mcg tab tablet Take 1 Tablet by mouth daily. , Historical Med      simvastatin (ZOCOR) 40 mg tablet TAKE 1 TABLET DAILY IN THE EVENING, Normal, Disp-90 Tablet, R-3      metFORMIN (GLUCOPHAGE) 500 mg tablet TAKE 1 TABLET DAILY, Normal, Disp-90 Tablet, R-3      aspirin 81 mg chewable tablet Take 81 mg by mouth daily. , Historical Med      brimonidine (ALPHAGAN) 0.15 % ophthalmic solution Administer 1 Drop to both eyes two (2) times a day. Indications: OPEN ANGLE GLAUCOMA, Historical Med            Diagnosis and Disposition     Disposition:  Discharged    Clinical Impression:   1. Cellulitis of left upper extremity        Attestation:  I personally performed the services described in this documentation on this date 1/23/2023 for patient Dallin Monday. Faraz Nascimento MD        I was the first provider for this patient on this visit. To the best of my ability I reviewed relevant prior medical records, electrocardiograms, laboratories, and radiologic studies. The patient's presenting problems were discussed, and the patient was in agreement with the care plan formulated and outlined with them. Faraz Nascimento MD    Please note that this dictation was completed with Dragon voice recognition software. Quite often unanticipated grammatical, syntax, homophones, and other interpretive errors are inadvertently transcribed by the computer software. Please disregard these errors and excuse any errors that have escaped final proofreading.

## 2023-01-23 NOTE — DISCHARGE INSTRUCTIONS
The ultrasound today was negative for DVT. I would recommend that you take Keflex 4 times a day, starting tonight before you go to bed, to treat what appears to be a skin infection developing in your left upper arm. Please return to the emergency department if you have worsening pain, fevers, weakness, confusion, or other concerning symptoms. Use the incentive spirometer at least 4 times a day to help with breathing. Continue all of your medications including blood thinners as prescribed. It was a pleasure taking care of you at [facility] Emergency Department today. We know that when you come to [address] [place] [address], you are entrusting us with your health, comfort, and safety. Our physicians and nurses honor that trust, and we truly appreciate the opportunity to care for you and your loved ones. We also value your feedback. If you receive a survey about your Emergency Department experience today, please fill it out. We care about our patients' feedback, and we listen to what you have to say. Thank you!

## 2023-01-25 NOTE — Clinical Note
Status[de-identified] INPATIENT [101]   Type of Bed: Telemetry [19]   Cardiac Monitoring Required?: Yes   Inpatient Hospitalization Certified Necessary for the Following Reasons: 9.  Other (further clarification in H&P documentation)   Admitting Diagnosis: Elevated troponin [[id number]]   Admitting Diagnosis: Acute hypoxemic respiratory failure [facility]) [[id number]]   Admitting Diagnosis: COVID-19 [[id number]]   Admitting Physician: Giovanni Zavala [[id number]]   Attending Physician: Gael Mortensen   Estimated Length of Stay: 2 Midnights   Discharge Plan[de-identified] Home with Office Follow-up

## 2023-01-26 PROBLEM — U07.1 COVID-19: Status: ACTIVE | Noted: 2023-01-01

## 2023-01-26 PROBLEM — R77.8 ELEVATED TROPONIN: Status: ACTIVE | Noted: 2023-01-01

## 2023-01-26 PROBLEM — J96.01 ACUTE HYPOXEMIC RESPIRATORY FAILURE (HCC): Status: ACTIVE | Noted: 2023-01-01

## 2023-01-26 PROBLEM — R79.89 ELEVATED TROPONIN: Status: ACTIVE | Noted: 2023-01-01

## 2023-01-26 NOTE — ED NOTES
ADMISSION SBAR NOTE    IP UNIT CALLED NOTE IS READY: Yes Spoke to [employer]  IF there are questions Call Keven Juarez RN at phone # [phone]    SITUATION/BACKGROUND:    Patient is being transferred to [address], Room# 2115    Patient's Chief Complaint was Shortness of Breath and is admitted for acute respiratory failure with hypoxia, PNA due to Covid 19, NSTEMI. CODE STATUS: DNR    ISOLATION/PRECAUTIONS: Yes   ISOLATION TYPE: droplet plus    Called outstanding consults: Yes     Are there still sign and held orders that need to be released? No     STAT labs collected: Yes      All STAT orders are complete: Yes    The following personal items will be sent with the patient during transfer to the floor: All valuables:   ITEM:    ITEM:    ITEM:    ITEM:    ITEM:         ASSESSMENT:    CIWA Assessment: No      NIH SCORE:    ADAM SCREENING:      NEURO ASSESSMENT: Neuro  Neurologic State: Alert (01/26/23 1000)  Orientation Level: Oriented X4 (01/26/23 1000)  Cognition: Appropriate decision making, Follows commands (however is poor historian) (01/26/23 1000)  Speech: Clear (01/26/23 0700)  LUE Motor Response: Purposeful, Weak (01/26/23 0700)  LLE Motor Response: Purposeful, Weak (01/26/23 0700)  RUE Motor Response: Purposeful, Weak (01/26/23 0700)  RLE Motor Response: Purposeful, Weak (01/26/23 0700)    Is patient impulsive? No   Is patient oriented? Yes   Do they follow commands? Yes  Is the patient ambulatory? No      FALL RISK? Yes       RESPIRATORY: Is patient on Oxygen? Yes   OXYGEN: Oxygen Therapy  O2 Device: Nasal cannula (01/26/23 1722)  O2 Flow Rate (L/min): 4 l/min (01/26/23 1722)    CARDIAC: Is cardiac monitoring ordered? Yes Last Rhythm: NSR  Patient to transfer with tele box on? Yes  Is patient using a LIFE VEST? No     LINE ACCESS:   Peripheral IV 01/25/23 Right Antecubital (Active)   Site Assessment Other (Comment); Swelling 01/26/23 0700   Phlebitis Assessment 0 01/26/23 0700   Infiltration Assessment 0 01/26/23 0700   Dressing Status Clean, dry, & intact 01/26/23 0700   Dressing Type Transparent 01/26/23 0700        /GI: CONTINENT BOWEL/BLADDER? Yes  URINARY OUTPUT: voiding    INTEGUMENTARY:   IS THE PATIENT UNDRESSED? Yes  ARE THERE WOUNDS PRESENT? Yes  ARE THE WOUNDS DOCUMENTED?  Yes    RESTRAINTS IN USE: No      Vital Signs  Level of Consciousness: Alert (0) (01/26/23 1722)  Temp: 97.4 °F (36.3 °C) (01/26/23 1722)  Temp Source: Oral (01/26/23 1722)  Pulse (Heart Rate): 90 (01/26/23 1722)  Heart Rate Source: Monitor (01/26/23 1722)  Cardiac Rhythm: Sinus Rhythm (01/26/23 1722)  Resp Rate: 26 (01/26/23 1700)  BP: (!) 158/68 (01/26/23 1722)  MAP (Monitor): 95 (01/26/23 1700)  MAP (Calculated): 98 (01/26/23 1722)  BP 1 Location: Right arm (01/26/23 1722)  BP 1 Method: Automatic (01/26/23 1722)  BP Patient Position: At rest (01/26/23 1722)  MEWS Score: 2 (01/26/23 1621)  Pain 1  Pain Scale 1: Numeric (0 - 10) (01/26/23 0700)  Pain Intensity 1: 0 (01/26/23 0700)  Patient Stated Pain Goal: 0 (01/26/23 0700)      REVIEW:

## 2023-01-26 NOTE — H&P
PLEASE NOTE: I HAVE GENERATED THIS NOTE WITH THE ASSISTANCE OF VOICE-RECOGNITION TECHNOLOGY. PLEASE EXCUSE ANY SPELLING, GRAMMATICAL, AND SYNTAX ERRORS YOU MAY FIND. IF YOU NEED CLARIFICATION ON ANYTHING, PLEASE REACH OUT TO ME.  [facility]  Hospitalist Group  History and Physical - Dr. Silva Cosyb:     80 y.o. male with pertinent medical hx of nothing really pertinent presented with shortness of breath    Differential diagnosis, explanation and analysis: Patient is positive for COVID despite being vaccinated. Elevated troponin is likely secondary to demand ischemia. Acute problems:    # Acute ACS, likely demand ischemia  - Heparin gtt not needed right now, will fu tropes  -  stat   - Morphine PRN, O2 PRN, Nitrates PRN  - ECHO  - Cardio not needed right now will fu tropes    # Acute Hypoxemic Respiratory Failure 2/2 Covid Infection  - Decadron  - O2 PRN via PRN  - No need for ABx - procal is pending  - Will give DVT PPX per 'Surviving Sepsis' guidelines  - Pharmacy to dose Baricitinib    PRN Orders: + zofran, + ACEP, + bowel regimen    Chronic Problems:    Chronic pulmonary embolisms  -Continue home Eliquis    Hypertension  -Continue home Norvasc, Lopressor    Diabetes  -Hold home metformin  -Sign scale insulin    Gout  -Continue Zyloprim    General Admission Parameters:    GI Prophylaxis: We will start patient on Protonix since he is now on steroids  Gathering data from Iva, et. al; Joshua et al; and [facility], et al - Please note that routine use of GI PPX in all patients is inappropriate    DVT Prophylaxis: Lovenox  I have used the Padua score to determine if patient needs DVT PPX.   Please note that routine use of DVT PPX in all patients is inappropriate    Code status: DNR  If code status was discussed with the patient, then code status entered as per the orders Subjective:   Primary Care Provider: Parveen Roe MD  Date of Service:  See Date Note Was Originated  Chief Complaint: Shortness of breath    80 y.o. male presents with 3 days duration of abrupt onset, gradual worsening, severe, constant, shortness of breath that is associated with productive cough, remitted by nothing, exacerbated by exertion. Further history: Patient has been feeling worse and worse over the past couple days, today tested himself at home currently positive for COVID. Pertinent chart review: Patient was recently admitted to [facility] on 1/13/2023 for his leukemia    Review of Systems:  12 point ROS obtained and otherwise negative, except as per HPI and above. Past Medical History:   Diagnosis Date    Abnormal nuclear stress test 6/6/2014    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 ([facility] [address] 11/2/2015    Bereavement 02/06/2019    ltcf - uti -strangulated hernia - prostate ca - brother    Bronchiectasis ([address]     CAD (coronary artery disease)     Chest pain, unspecified     Chronic pain     right leg    CLL (chronic lymphocytic leukemia) ([facility] [address]     Jerica Segura md  [facility]    Diabetes ([facility] [address]     Essential hypertension     GERD (gastroesophageal reflux disease)     Hyperlipidemia     Hypertension     Opacity of lung on imaging study 05/28/2017    cxr    Rotator cuff arthropathy     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 Authorizing Provider   allopurinoL (ZYLOPRIM) 300 mg tablet Take 1 Tablet by mouth daily.  1/23/23   Parveen Roe MD cephALEXin (Keflex) 500 mg capsule Take 1 Capsule by mouth four (4) times daily for 7 days. 23  Mary Kramer MD   amLODIPine (NORVASC) 2.5 mg tablet TAKE 1 TABLET DAILY 22   Kayley Schwarz MD   dextromethorphan-guaiFENesin (Robitussin Cough-Chest Pedro DM) 5-100 mg/5 mL liqd Take 20 mL by mouth four (4) times daily as needed for Cough or Congestion. 22   ARABELLA Butler   cetirizine (ZyrTEC) 10 mg tablet Take 1 Tablet by mouth daily. 22   ARABELLA Butler   metoprolol tartrate (LOPRESSOR) 25 mg tablet TAKE 1 TABLET TWICE A DAY 22   Paulo Jordan MD   cholecalciferol (VITAMIN D3) (1000 Units /25 mcg) tablet Take 1,000 Units by mouth daily. Provider, Historical   multivitamin with folic acid (ONE DAILY WITH FOLIC ACID) 539 mcg tab tablet Take 1 Tablet by mouth daily. Provider, Historical   simvastatin (ZOCOR) 40 mg tablet TAKE 1 TABLET DAILY IN THE EVENING 22   Nerissa Irene MD   metFORMIN (GLUCOPHAGE) 500 mg tablet TAKE 1 TABLET DAILY 2/10/22   Kayley Schwarz MD   aspirin 81 mg chewable tablet Take 81 mg by mouth daily. Provider, Historical   brimonidine (ALPHAGAN) 0.15 % ophthalmic solution Administer 1 Drop to both eyes two (2) times a day. Indications: OPEN ANGLE GLAUCOMA    Provider, Historical     Allergies   Allergen Reactions    Codeine Shortness of Breath    Lipitor [Atorvastatin] Nausea Only      Family History   Problem Relation Age of Onset    Diabetes Mother     Stroke Father    .   Social History     Socioeconomic History    Marital status:    Tobacco Use    Smoking status: Former     Types: Cigarettes     Quit date: 1/15/1984     Years since quittin.0    Smokeless tobacco: Never    Tobacco comments:     QUIT    Vaping Use    Vaping Use: Never used   Substance and Sexual Activity    Alcohol use: No     Alcohol/week: 0.0 standard drinks     Comment: quit in     Drug use: No     Types: Prescription, OTC Sexual activity: Not Currently     Partners: Female   Other Topics Concern     Service No    Blood Transfusions No    Caffeine Concern No    Occupational Exposure No    Hobby Hazards No    Sleep Concern No    Stress Concern No    Weight Concern No    Special Diet Yes     Comment: low sugar    Back Care No    Exercise No    Bike Helmet No    Seat Belt Yes    Self-Exams No   Social History Narrative    Family History: Mother:  61 yrs, DM complicationsFather:  80 yrs, strokeSister(s):  76 yrs, pneumonia, DM,    HTNBrother(s):  61 yrs, DM, HTN, CVAChildren: aliveGrandmother: unknow nGrandfather: deceasedSpouse: deceased1 brother(s) -    healthy. 2daughter(s) - healthy. Erica Del Real [phone] [facility] ([phone])DECRQ Leeland Peeling ([phone])        Social History: Alcohol Use Patient does not use alcohol. Smoking Status Patient is a former smoker, Quit in: 36. Caffeine: none. Marital Status: W idow . Lives w ith: alone. Children: yes 2 d. Education/School: has highschool diploma, college 2 y. Social Determinants of Health     Financial Resource Strain: Low Risk     Difficulty of Paying Living Expenses: Not hard at all   Food Insecurity: No Food Insecurity    Worried About [facility] in the Last Year: Never true    [facility] in the Last Year: Never true   Transportation Needs: No Transportation Needs    Lack of Transportation (Medical): No    Lack of Transportation (Non-Medical): No   Physical Activity: Inactive    Days of Exercise per Week: 0 days    Minutes of Exercise per Session: 0 min   .   Objective:   Physical Exam:     VS as below    Const'l:          Normal body habitus, a&o, no acute distress  Head/Neck:       no cervical/head mass  Eyes:     nonicteric sclera, eom intact  ENT:      auditory acuity grossly intact either with or without device, no nasal deformity  Cardio:           Regular rate regular rhythm  Pulm:     no accessory muscle use  Abd:       S NT ND  Derm:     no rashes, no ulcers, no lesions  Extr:      No edema, no cyanosis, no calf tenderness, no varicosities  Neuro:    cn II-XII intact  Psych:   mood intact, judgement intact    Please note, due to worsening COVID-19 pandemic, bedside evaluation was not done on this patient, rather I evaluated him from a distance. Data Review: All diagnostic labs and studies have been reviewed.

## 2023-01-26 NOTE — PROGRESS NOTES
Problem: Mobility Impaired (Adult and Pediatric)  Goal: *Acute Goals and Plan of Care (Insert Text)  Description: FUNCTIONAL STATUS PRIOR TO ADMISSION: Pt was a very scattered historian. He initially seemed to state he lives alone but then stated he lives with his dtr since his last hospitalization but dtr works but then later stated his son in law is with him as caregiver all the time. Unsure full extent of assist. He seemed to indicate he uses a rolling walker for amb. HOME SUPPORT PRIOR TO ADMISSION: The patient lived with family. Physical Therapy Goals  Initiated 1/26/2023  1. Patient will move from supine to sit and sit to supine , scoot up and down, and roll side to side in bed with modified independence within 7 day(s). 2.  Patient will transfer from bed to chair and chair to bed with modified independence using the least restrictive device within 7 day(s). 3.  Patient will perform sit to stand with modified independence within 7 day(s). 4.  Patient will ambulate with supervision/set-up for 150 feet with the least restrictive device within 7 day(s). Outcome: Not Met   PHYSICAL THERAPY EVALUATION  Patient: Quique Manzo (1 y.o. male)  Date: 1/26/2023  Primary Diagnosis: Elevated troponin [R77.8]  Acute hypoxemic respiratory failure (HCC) [J96.01]  COVID-19 [U07.1]       Precautions: fall, COVID droplet+       ASSESSMENT  Based on the objective data described below, the patient presents with limitations in gait, functional mobility, balance and activity tolerance on 4L O2 with drop to 90% with activity. Unable to get fully reliable standing BP as when pt would stand he would have significantly increased coughing. Pt was mod A of 1+ min A of 1 to come to the edge of the bed. He was CGA to come to stand but used legs on back of bed to assist. He needed BUE support in stand and was able to side step to edge of bed. Still coughing and unable to progress further with activity.    If pt has 24 hr assist of family at home, he would likely do best with return to home with HHPT/OT. If however it is determined that he does not, he may need SNF rehab prior to return home. Vitals:    01/26/23 1054 01/26/23 1103 01/26/23 1108   BP: (!) 154/64 (!) 159/70 (!) 173/68   BP Patient Position: Supine Sitting Standing; Other (Comment)  Comment: but pt unable to remain standing for full measure due to coughing   Pulse: 75 87 94   Temp:      Resp:      Height:      Weight:      SpO2: 93% 92% 90%        Current Level of Function Impacting Discharge (mobility/balance): see above and details; limited tolerance due to coughing; on 4L    Functional Outcome Measure: The patient scored 45/100 on the barthel outcome measure which is indicative of 55% functional impairment. Other factors to consider for discharge: Pt not on O2 at baseline; social situation will need to be clarified; fall risk; COVID+     Patient will benefit from skilled therapy intervention to address the above noted impairments. PLAN :  Recommendations and Planned Interventions: bed mobility training, transfer training, gait training, therapeutic exercises, patient and family training/education, and therapeutic activities      Frequency/Duration: Patient will be followed by physical therapy:  4 times a week to address goals. Recommendation for discharge: (in order for the patient to meet his/her long term goals)  If pt has 24 hr assist of family at home, he would likely do best with return to home with HHPT/OT. If however it is determined that he does not, he may need SNF rehab prior to return home. This discharge recommendation:  Has been made in collaboration with the attending provider and/or case management    IF patient discharges home will need the following DME: rolling walker         SUBJECTIVE:   Patient stated I live with my dtr.     OBJECTIVE DATA SUMMARY:   HISTORY:    Past Medical History:   Diagnosis Date    Abnormal nuclear stress test 6/6/2014    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 ([facility] 11/2/2015    Bereavement 02/06/2019    ltcf - uti -strangulated hernia - prostate ca - brother    [facility]     CAD (coronary artery disease)     Chest pain, unspecified     Chronic pain     right leg    CLL (chronic lymphocytic leukemia) ([facility]     Jerica Segura md  [facility]CI    Diabetes ([facility]     Essential hypertension     GERD (gastroesophageal reflux disease)     Hyperlipidemia     Hypertension     Opacity of lung on imaging study 05/28/2017    cxr    Rotator cuff arthropathy     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    S/P coronary artery stent placement 6/6/2014 6/6/14 PCI/GUANAKO to prox LAD     Past Surgical History:   Procedure Laterality Date    HX COLONOSCOPY      HX HEART CATHETERIZATION      [place] UNLISTED PROCEDURE VASCULAR SURGERY  2014 and 2015    BLE stenting    PTCA SINGLE VESSEL  4/4/2015            Personal factors and/or comorbidities impacting plan of care:     Home Situation  Home Environment: Private residence  One/Two Story Residence: One story  Living Alone: No  Support Systems: Child(charmaine) (lives with daughter)  Patient Expects to be Discharged to[de-identified] Home  Current DME Used/Available at Home: [place], straight, Walker, rolling, Wheelchair, Shower chair, Grab bars, Commode, bedside    EXAMINATION/PRESENTATION/DECISION MAKING:   Critical Behavior:  Neurologic State: Alert  Orientation Level: Oriented X4  Cognition: Appropriate decision making, Follows commands (however is poor historian)  Safety/Judgement: Awareness of environment    Range Of Motion:  AROM: Generally decreased, functional                       Strength:    Strength: Generally decreased, functional                    Tone & Sensation:   Tone: Normal              Sensation: Intact (by pt report)               Coordination:  Coordination: Within functional limits  Vision:   Acuity: Within Defined Limits  Functional Mobility:  Bed Mobility:     Supine to Sit: Moderate assistance;Minimum assistance;Assist x2  Sit to Supine: Minimum assistance;Assist x1  Scooting: Contact guard assistance  Transfers:  Sit to Stand: Contact guard assistance; Other (comment) (uses bed behind legs to assist)  Stand to Sit: Contact guard assistance        Bed to Chair: Minimum assistance              Balance:   Sitting: Impaired  Sitting - Static: Fair (occasional)  Sitting - Dynamic: Fair (occasional)  Standing: Impaired  Standing - Static: Constant support;Fair; Other (comment) (needed BUE support)  Standing - Dynamic : Constant support;Fair; Other (comment) (needed BUE support)  Ambulation/Gait Training:              Gait Description (WDL):  (side steps at edge of bed with HHA of 2)                         Functional Measure:  Barthel Index:    Bathin  Bladder: 5  Bowels: 5  Groomin  Dressin  Feeding: 10  Mobility: 0  Stairs: 0  Toilet Use: 5  Transfer (Bed to Chair and Back): 10  Total: 45/100       The Barthel ADL Index: Guidelines  1. The index should be used as a record of what a patient does, not as a record of what a patient could do. 2. The main aim is to establish degree of independence from any help, physical or verbal, however minor and for whatever reason. 3. The need for supervision renders the patient not independent. 4. A patient's performance should be established using the best available evidence. Asking the patient, friends/relatives and nurses are the usual sources, but direct observation and common sense are also important. However direct testing is not needed. 5. Usually the patient's performance over the preceding 24-48 hours is important, but occasionally longer periods will be relevant. 6. Middle categories imply that the patient supplies over 50 per cent of the effort. 7. Use of aids to be independent is allowed.     Score Interpretation (from [address] [address])    Independent   60-79 Minimally independent   40-59 Partially dependent   20-39 Very dependent   <20 Totally dependent     -Divya Sanford, Barthel, D.W. (3438). Functional evaluation: the Barthel Index. [address] ([address] (1997). The Barthel activities of daily living index: self-reporting versus actual performance in the old (> or = 75 years). Journal of [facility]4 [facility] 45(7), 14 [facility], JAN, [place]., Sienna Bynum. (1999). Measuring the change in disability after inpatient rehabilitation; comparison of the responsiveness of the Barthel Index and Functional [place] Measure. Journal of Neurology, Neurosurgery, and Psychiatry, 66(4), 950-012. Mohini Merrill, [place].MINOO, VICKI He, & Leo Gasca MTOSHIA. (2004) Assessment of post-stroke quality of life in cost-effectiveness studies: The usefulness of the Barthel Index and the EuroQoL-5D. Quality of Life Research, 15, 293-39           Physical Therapy Evaluation Charge Determination   History Examination Presentation Decision-Making   HIGH Complexity :3+ comorbidities / personal factors will impact the outcome/ POC  HIGH Complexity : 4+ Standardized tests and measures addressing body structure, function, activity limitation and / or participation in recreation  MEDIUM Complexity : Evolving with changing characteristics  MEDIUM Complexity : FOTO score of 26-74      Based on the above components, the patient evaluation is determined to be of the following complexity level: MEDIUM    Pain Rating:  No c/o    Activity Tolerance:   Fair and desaturates with exertion and requires oxygen    After treatment patient left in no apparent distress:   Supine in bed, Call bell within reach, and stretcher rails up    COMMUNICATION/EDUCATION:   The patients plan of care was discussed with: Occupational therapist, Registered nurse, and Case management.      Fall prevention education was provided and the patient/caregiver indicated understanding., Patient/family have participated as able in goal setting and plan of care. , and Patient/family agree to work toward stated goals and plan of care.     Thank you for this referral.  Brenton Almendarez, PT   Time Calculation: 31 mins

## 2023-01-26 NOTE — PROGRESS NOTES
[facility]cer [facility]ill at [address], [place], [address] [facility]  W: [phone] F: [phone]    Consult acknowledged by Sue Bravo Dr, patient is followed by [facility]. Please notify Dr. Kulwant Pritchard of consult.

## 2023-01-26 NOTE — PROGRESS NOTES
Problem: Self Care Deficits Care Plan (Adult)  Goal: *Acute Goals and Plan of Care (Insert Text)  Description: FUNCTIONAL STATUS PRIOR TO ADMISSION: Difficulty to assess PLOF due to patient's cognitive status. He initially provided his own home set/up and PLOF then stated he was living with her daughter immediately prior to admission since his last hospitalization. Per the patient he uses a RW for mobility, gets help with bathing and dressing from family. HOME SUPPORT: The patient lived with daughter and MARCOS and required minimal assistance/contact guard assist for ADLs. Daughter works as an RN but his MARCOS is home with him per the patient     Occupational Therapy Goals  Initiated 1/26/2023  1. Patient will perform grooming at sink level with supervision/set-up within 7 day(s). 2.  Patient will perform lower body dressing with supervision/set-up within 7 day(s). 3.  Patient will perform upper body dressing with supervision/set-up within 7 day(s). 4.  Patient will perform toilet transfers with supervision/set-up within 7 day(s). 5.  Patient will perform all aspects of toileting with supervision/set-up within 7 day(s). 6.  Patient will utilize energy conservation techniques during functional activities with verbal cues within 7 day(s). Outcome: Not Met   OCCUPATIONAL THERAPY EVALUATION  Patient: Nallely Cam (89 y.o. male)  Date: 1/26/2023  Primary Diagnosis: Elevated troponin [R77.8]  Acute hypoxemic respiratory failure ([facility] [J96.01]  COVID-19 [U07.1]       Precautions: Other (comment) (COVID droplet+)    ASSESSMENT  Based on the objective data described below, the patient presents with decreased stamina, endurance, and strength following above dx. He was AAOx4 but demonstrated decreased insight into deficits as well as PLOF or medical Hx timeline. He needed min A to mod A for ADLs (anticipated) and min A to mod A for bed mobility, min A x2 to CGA x2 for sit<>stand and standing balance.  The patient had decreased stamina, coughing in standing and needing to sit down due to SOB, cues for PLB to decreased RR. Patient was on 4L NC and desaturated to 90% as lowest. Patient was a difficult historian but if he does have the support of his family for 24/7 care feel he can dc home with them and [facility]. Current Level of Function Impacting Discharge (ADLs/self-care): min A to mod A for self-care    Functional Outcome Measure: The patient scored Total: 45/100 on the Barthel Index outcome measure which is indicative of being moderate impairment in basic self-care. Other factors to consider for discharge: supportive family but was unable to get a clear PLOF     Patient will benefit from skilled therapy intervention to address the above noted impairments. PLAN :  Recommendations and Planned Interventions: self care training, functional mobility training, therapeutic exercise, balance training, visual/perceptual training, therapeutic activities, cognitive retraining, endurance activities, patient education, home safety training, and family training/education    Frequency/Duration: Patient will be followed by occupational therapy 4 times a week to address goals. Recommendation for discharge: (in order for the patient to meet his/her long term goals)  Occupational therapy at least 2 days/week in the home AND ensure assist and/or supervision for safety with functional mobility and ADLs    This discharge recommendation:  Has not yet been discussed the attending provider and/or case management    IF patient discharges home will need the following DME: patient owns DME required for discharge       SUBJECTIVE:   Patient stated Oh before here I was living with my daughter.     OBJECTIVE DATA SUMMARY:   HISTORY:   Past Medical History:   Diagnosis Date    Abnormal nuclear stress test 6/6/2014    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 ([facility] [address] 11/2/2015    Bereavement 02/06/2019    lt - uti -strangulated hernia - prostate ca - brother    Bronchiectasis ([facility] [address]     CAD (coronary artery disease)     Chest pain, unspecified     Chronic pain     right leg    CLL (chronic lymphocytic leukemia) ([facility] [address]     Jerica Segura md  [facility]    [facility]     Essential hypertension     GERD (gastroesophageal reflux disease)     Hyperlipidemia     Hypertension     Opacity of lung on imaging study 05/28/2017    cxr    Rotator cuff arthropathy     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    S/P coronary artery stent placement 6/6/2014 6/6/14 PCI/GUANAKO to prox LAD     Past Surgical History:   Procedure Laterality Date    HX COLONOSCOPY      HX HEART CATHETERIZATION      MD UNLISTED PROCEDURE VASCULAR SURGERY  2014 and 2015    BLE stenting    PTCA SINGLE VESSEL  4/4/2015            Expanded or extensive additional review of patient history:     Home Situation  Home Environment: Private residence  One/Two Story Residence: One story  Living Alone: No  Support Systems: Child(charmaine) (lives with daughter)  Patient Expects to be Discharged to[de-identified] Home  Current DME Used/Available at Home: Leopoldo Areola, straight, Walker, rolling, Wheelchair, Shower chair, Grab bars, Commode, bedside    Hand dominance: Right    EXAMINATION OF PERFORMANCE DEFICITS:  Cognitive/Behavioral Status:  Neurologic State: Alert  Orientation Level: Oriented X4  Cognition: Appropriate decision making; Follows commands (however is poor historian)  Perception: Appears intact  Perseveration: No perseveration noted  Safety/Judgement: Awareness of environment;Decreased insight into deficits    Skin: visible areas intact     Edema: none noted     Hearing:   Knik? Vision/Perceptual:                           Acuity: Within Defined Limits         Range of Motion:    AROM: Generally decreased, functional                         Strength:    Strength: Generally decreased, functional                Coordination:  Coordination: Within functional limits  Fine Motor Skills-Upper: Left Intact; Right Intact    Gross Motor Skills-Upper: Left Intact; Right Intact    Tone & Sensation:    Tone: Normal  Sensation: Intact (by pt report)                      Balance:  Sitting: Impaired  Sitting - Static: Fair (occasional)  Sitting - Dynamic: Fair (occasional)  Standing: Impaired  Standing - Static: Constant support;Fair; Other (comment) (needed BUE support)  Standing - Dynamic : Constant support;Fair; Other (comment) (needed BUE support)    Functional Mobility and Transfers for ADLs:  Bed Mobility:  Supine to Sit: Moderate assistance;Minimum assistance;Assist x2  Sit to Supine: Minimum assistance;Assist x1  Scooting: Contact guard assistance    Transfers:  Sit to Stand: Contact guard assistance; Other (comment) (uses bed behind legs to assist)  Stand to Sit: Contact guard assistance  Bed to Chair: Minimum assistance    ADL Assessment:  Feeding: Setup    Oral Facial Hygiene/Grooming: Setup (bed level)    Bathing: Moderate assistance         Upper Body Dressing: Minimum assistance    Lower Body Dressing: Moderate assistance    Toileting: Total assistance (alvarado)                ADL Intervention and task modifications:  Encouraged activities to build endurance, sit upright in bed and participate in some self-care        Cognitive Retraining  Safety/Judgement: Awareness of environment;Decreased insight into deficits    Therapeutic Exercise:  Static Standing Balance: brief 2-3 minutes, coughing and unable to stay standing   Functional mobility: min A to CGA x2 for steps to head of bed     Functional Measure:    Barthel Index:  Bathin  Bladder: 5  Bowels: 5  Groomin  Dressin  Feeding: 10  Mobility: 0  Stairs: 0  Toilet Use: 5  Transfer (Bed to Chair and Back): 10  Total: 45/100      The Barthel ADL Index: Guidelines  1. The index should be used as a record of what a patient does, not as a record of what a patient could do.   2. The main aim is to establish degree of independence from any help, physical or verbal, however minor and for whatever reason. 3. The need for supervision renders the patient not independent. 4. A patient's performance should be established using the best available evidence. Asking the patient, friends/relatives and nurses are the usual sources, but direct observation and common sense are also important. However direct testing is not needed. 5. Usually the patient's performance over the preceding 24-48 hours is important, but occasionally longer periods will be relevant. 6. Middle categories imply that the patient supplies over 50 per cent of the effort. 7. Use of aids to be independent is allowed. Score Interpretation (from [facility])    Independent   60-79 Minimally independent   40-59 Partially dependent   20-39 Very dependent   <20 Totally dependent     -Taco Sanford., Barthel, D.W. (1965). Functional evaluation: the Barthel Index. [facility] ([address] (1997). The Barthel activities of daily living index: self-reporting versus actual performance in the old (> or = 75 years). Journal [address] 45(7), [facility], J.J.M.F, Ryan Muñoz., Katlyn Aranda. (1999). Measuring the change in disability after inpatient rehabilitation; comparison of the responsiveness of the Barthel Index and Functional [place] Measure. Journal of Neurology, Neurosurgery, and Psychiatry, 66(4), 997-477. DAGMAR Car, VICKI He, & Ronna Hopper MStephanieA. (2004) Assessment of post-stroke quality of life in cost-effectiveness studies: The usefulness of the Barthel Index and the EuroQoL-5D.  Quality of Life Research, 15, 332-16     Occupational Therapy Evaluation Charge Determination   History Examination Decision-Making   MEDIUM Complexity : Expanded review of history including physical, cognitive and psychosocial  history  MEDIUM Complexity : 3-5 performance deficits relating to physical, cognitive , or psychosocial skils that result in activity limitations and / or participation restrictions MEDIUM Complexity : Patient may present with comorbidities that affect occupational performnce. Miniml to moderate modification of tasks or assistance (eg, physical or verbal ) with assesment(s) is necessary to enable patient to complete evaluation       Based on the above components, the patient evaluation is determined to be of the following complexity level: MEDIUM  Pain Rating:  No pain reported     Activity Tolerance:   Fair and requires rest breaks    After treatment patient left in no apparent distress:    Supine in bed, Call bell within reach, and Side rails x 3    COMMUNICATION/EDUCATION:   The patients plan of care was discussed with: Physical therapist and Registered nurse. Home safety education was provided and the patient/caregiver indicated understanding., Patient/family have participated as able in goal setting and plan of care. , and Patient/family agree to work toward stated goals and plan of care. This patients plan of care is appropriate for delegation to [facility].     Thank you for this referral.  Hiro Henriquez  Time Calculation: 32 mins

## 2023-01-26 NOTE — PROGRESS NOTES
Baricitinib Use for COVID-19    This patient meets criteria for initiation of baricitinib based on the following:  Confirmed COVID-19 (+) and hospitalized--yes  Requiring oxygen support--yes  Elevated inflammatory markers (ANY ELEVATION) in CRP, D-dimer, LDH, or ferritin*---yes   Concomitant therapy with dexamethasone 6-20 mg IV/PO daily (or equivalent steroid)--yes    Weigh risks/benefits before initiating therapy in patients with the following:   Pregnancy-n/a  Severe hepatic impairment--none  Chronic/recurrent infections--none  Hemoglobin <8.0 g/dL --yes  History/current thrombosis--yes    CMP and CBC are recommended to be monitored daily while on baricitinib     VTE prophylaxis is recommended while on baricitinib unless contraindicated       Medication orders should be limited to 14 days    If possible, remind ordering provider to review the EUA with the patient prior to initiating the order and to give an EUA fact sheet to the patient and/or caregiver. Fact sheets can be found on the FDA EUA webpage: TalkFail.se. Will dose baricitinib, but monitor for hgb and anticoagulation per provider.     ROSALINA Sorto

## 2023-01-26 NOTE — PROGRESS NOTES
Patient complained of chest pain. Informed MAREK Rowell of the concerns. Nitro, tylenol, and pearls given. EKG obtained. Will continue to monitor.

## 2023-01-26 NOTE — ED PROVIDER NOTES
EMERGENCY DEPARTMENT HISTORY AND PHYSICAL EXAM      Date: 1/25/2023  Patient Name: Carmen Reyes    History of Presenting Illness     Chief Complaint   Patient presents with    Shortness of Breath     Pt presents to the ED via EMS with a c/o sob. EMS started pt on 2l on scene pt was sat of 88%. Pt tested positive today for Covid. HPI: History From: Shortness of breath, fever and cough, History limited by: none  Carmen Reyes, 80 y.o. male presents to the ED with cc of 3 days of productive cough, increasing shortness of breath, and fevers. He tested positive for COVID at home today. He has been compliant with all of his medications including his daily Eliquis. He says that he is vaccinated for COVID. He denies any chest pain. There are no other complaints, changes, or physical findings at this time. PCP: Rosalba Chavez MD    No current facility-administered medications on file prior to encounter. Current Outpatient Medications on File Prior to Encounter   Medication Sig Dispense Refill    allopurinoL (ZYLOPRIM) 300 mg tablet Take 1 Tablet by mouth daily. 90 Tablet 3    cephALEXin (Keflex) 500 mg capsule Take 1 Capsule by mouth four (4) times daily for 7 days. 28 Capsule 0    amLODIPine (NORVASC) 2.5 mg tablet TAKE 1 TABLET DAILY 90 Tablet 3    dextromethorphan-guaiFENesin (Robitussin Cough-Chest Pedro DM) 5-100 mg/5 mL liqd Take 20 mL by mouth four (4) times daily as needed for Cough or Congestion. 118 mL 0    cetirizine (ZyrTEC) 10 mg tablet Take 1 Tablet by mouth daily. 20 Tablet 0    metoprolol tartrate (LOPRESSOR) 25 mg tablet TAKE 1 TABLET TWICE A  Tablet 3    cholecalciferol (VITAMIN D3) (1000 Units /25 mcg) tablet Take 1,000 Units by mouth daily. multivitamin with folic acid (ONE DAILY WITH FOLIC ACID) 507 mcg tab tablet Take 1 Tablet by mouth daily.       simvastatin (ZOCOR) 40 mg tablet TAKE 1 TABLET DAILY IN THE EVENING 90 Tablet 3    metFORMIN (GLUCOPHAGE) 500 mg tablet TAKE 1 TABLET DAILY 90 Tablet 3    aspirin 81 mg chewable tablet Take 81 mg by mouth daily. brimonidine (ALPHAGAN) 0.15 % ophthalmic solution Administer 1 Drop to both eyes two (2) times a day. Indications: OPEN ANGLE GLAUCOMA         Past History     Past Medical History:  Past Medical History:   Diagnosis Date    Abnormal nuclear stress test 2014    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 ([facility] 2015    Bereavement 2019    ltcf - uti -strangulated hernia - prostate ca - brother    Bronchiectasis ([facility]     CAD (coronary artery disease)     Chest pain, unspecified     Chronic pain     right leg    CLL (chronic lymphocytic leukemia) ([facility]     Jreica Segura md  [facility]    Diabetes ([facility]     Essential hypertension     GERD (gastroesophageal reflux disease)     Hyperlipidemia     Hypertension     Opacity of lung on imaging study 2017    cxr    Rotator cuff arthropathy     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    S/P coronary artery stent placement 2014 PCI/GUANAKO to prox LAD       Past Surgical History:  Past Surgical History:   Procedure Laterality Date    HX COLONOSCOPY      HX HEART CATHETERIZATION      KS UNLISTED PROCEDURE VASCULAR SURGERY   and     BLE stenting    PTCA SINGLE VESSEL  2015            Family History:  Family History   Problem Relation Age of Onset    Diabetes Mother     Stroke Father        Social History:  Social History     Tobacco Use    Smoking status: Former     Types: Cigarettes     Quit date: 1/15/1984     Years since quittin.0    Smokeless tobacco: Never    Tobacco comments:     QUIT    Vaping Use    Vaping Use: Never used   Substance Use Topics    Alcohol use: No     Alcohol/week: 0.0 standard drinks     Comment: quit in     Drug use: No     Types: Prescription, OTC       Allergies:   Allergies   Allergen Reactions    Codeine Shortness of Breath    Lipitor [Atorvastatin] Nausea Only         Physical Exam Physical Exam  Constitutional:       Appearance: He is not toxic-appearing. Comments: He is tachypneic with without significant labored respirations   HENT:      Head: Normocephalic and atraumatic. Eyes:      Extraocular Movements: Extraocular movements intact. Cardiovascular:      Rate and Rhythm: Regular rhythm. Tachycardia present. Pulmonary:      Comments: Respirations are mildly tachypneic, diffuse coarse breath sounds on inspiration bilaterally  Abdominal:      Palpations: Abdomen is soft. Tenderness: There is no abdominal tenderness. Skin:     General: Skin is warm and dry. Neurological:      General: No focal deficit present. Mental Status: He is alert and oriented to person, place, and time.    Psychiatric:         Mood and Affect: Mood normal.       Diagnostic Study Results     Labs -     Recent Results (from the past 24 hour(s))   EKG, 12 LEAD, INITIAL    Collection Time: 01/25/23  9:03 PM   Result Value Ref Range    Ventricular Rate 103 BPM    Atrial Rate 103 BPM    P-R Interval 124 ms    QRS Duration 94 ms    Q-T Interval 364 ms    QTC Calculation (Bezet) 476 ms    Calculated P Axis 27 degrees    Calculated R Axis -25 degrees    Calculated T Axis 21 degrees    Diagnosis       Sinus tachycardia  Minimal voltage criteria for LVH, may be normal variant  Septal infarct , age undetermined  When compared with ECG of 23-JAN-2023 11:05,  No significant change was found     BLOOD GAS,CHEM8,LACTIC ACID POC    Collection Time: 01/25/23  9:08 PM   Result Value Ref Range    pH, venous (POC) 7.45 (H) 7.32 - 7.42      pCO2, venous (POC) 27.5 (L) 41 - 51 MMHG    pO2, venous (POC) 34 25 - 40 mmHg    BICARBONATE 19 mmol/L    Base deficit (POC) 3.4 mmol/L    O2 SAT 70 %    Sodium,  (L) 136 - 145 MMOL/L    Potassium, POC 4.8 3.5 - 5.5 MMOL/L    Chloride,  100 - 108 MMOL/L    CO2, POC 18 (L) 19 - 24 MMOL/L    Anion gap, POC 13 10 - 20      Glucose,  (H) 74 - 106 MG/DL Creatinine, POC 0.8 0.6 - 1.3 MG/DL    eGFR (POC) >60 >60 ml/min/1.73m2    Calcium, ionized (POC) 1.13 1.12 - 1.32 mmol/L    Lactic Acid (POC) 2.39 (HH) 0.40 - 2.00 mmol/L    Sample source VENOUS BLOOD      Critical value read back MIN V    CBC WITH AUTOMATED DIFF    Collection Time: 01/25/23  9:10 PM   Result Value Ref Range    .0 (HH) 4.1 - 11.1 K/uL    RBC 2.75 (L) 4.10 - 5.70 M/uL    HGB 7.0 (L) 12.1 - 17.0 g/dL    HCT 27.6 (L) 36.6 - 50.3 %    .4 (H) 80.0 - 99.0 FL    MCH 25.5 (L) 26.0 - 34.0 PG    MCHC 25.4 (L) 30.0 - 36.5 g/dL    RDW 23.7 (H) 11.5 - 14.5 %    PLATELET 70 (L) 812 - 400 K/uL    MPV 11.7 8.9 - 12.9 FL    NRBC 0.0 0  WBC    ABSOLUTE NRBC 0.08 (H) 0.00 - 0.01 K/uL    NEUTROPHILS 1 (L) 32 - 75 %    LYMPHOCYTES 80 (H) 12 - 49 %    MONOCYTES 19 (H) 5 - 13 %    EOSINOPHILS 0 0 - 7 %    BASOPHILS 0 0 - 1 %    IMMATURE GRANULOCYTES 0 0.0 - 0.5 %    ABS. NEUTROPHILS 4.5 1.8 - 8.0 K/UL    ABS. LYMPHOCYTES 360.0 (H) 0.8 - 3.5 K/UL    ABS. MONOCYTES 85.5 (H) 0.0 - 1.0 K/UL    ABS. EOSINOPHILS 0.0 0.0 - 0.4 K/UL    ABS. BASOPHILS 0.0 0.0 - 0.1 K/UL    ABS. IMM. GRANS. 0.0 0.00 - 0.04 K/UL    DF MANUAL      RBC COMMENTS NORMOCYTIC, NORMOCHROMIC      WBC COMMENTS SMUDGE CELLS SEEN     METABOLIC PANEL, COMPREHENSIVE    Collection Time: 01/25/23  9:10 PM   Result Value Ref Range    Sodium 132 (L) 136 - 145 mmol/L    Potassium 5.1 3.5 - 5.1 mmol/L    Chloride 102 97 - 108 mmol/L    CO2 21 21 - 32 mmol/L    Anion gap 9 5 - 15 mmol/L    Glucose 206 (H) 65 - 100 mg/dL    BUN 17 6 - 20 MG/DL    Creatinine 1.10 0.70 - 1.30 MG/DL    BUN/Creatinine ratio 15 12 - 20      eGFR >60 >60 ml/min/1.73m2    Calcium 8.3 (L) 8.5 - 10.1 MG/DL    Bilirubin, total 1.6 (H) 0.2 - 1.0 MG/DL    ALT (SGPT) 48 12 - 78 U/L    AST (SGOT) 56 (H) 15 - 37 U/L    Alk.  phosphatase 227 (H) 45 - 117 U/L    Protein, total 6.0 (L) 6.4 - 8.2 g/dL    Albumin 2.2 (L) 3.5 - 5.0 g/dL    Globulin 3.8 2.0 - 4.0 g/dL    A-G Ratio 0.6 (L) 1.1 - 2.2     TROPONIN-HIGH SENSITIVITY    Collection Time: 01/25/23  9:10 PM   Result Value Ref Range    Troponin-High Sensitivity 225 (HH) 0 - 76 ng/L   COVID-19 RAPID TEST    Collection Time: 01/25/23  9:10 PM   Result Value Ref Range    Specimen source Nasopharyngeal      COVID-19 rapid test Detected (AA) NOTD     INFLUENZA A+B VIRAL AGS    Collection Time: 01/25/23  9:10 PM   Result Value Ref Range    Influenza A Antigen Negative NEG      Influenza B Antigen Negative NEG     NT-PRO BNP    Collection Time: 01/25/23  9:10 PM   Result Value Ref Range    NT pro-BNP 2,837 (H) <450 PG/ML   SAMPLES BEING HELD    Collection Time: 01/25/23  9:10 PM   Result Value Ref Range    SAMPLES BEING HELD RED, BLUE     COMMENT        Add-on orders for these samples will be processed based on acceptable specimen integrity and analyte stability, which may vary by analyte. Radiologic Studies -   XR CHEST PORT   Final Result   Interstitial edema pattern, versus infectious or inflammatory bibasilar   infiltrate, slightly worsened since prior study. .  . CT Results  (Last 48 hours)      None          CXR Results  (Last 48 hours)                 01/25/23 2119  XR CHEST PORT Final result    Impression:  Interstitial edema pattern, versus infectious or inflammatory bibasilar   infiltrate, slightly worsened since prior study. .  . Narrative:  INDICATION:  Eval for Infiltrate        EXAM: Chest single view. COMPARISON: 1/23/2023. FINDINGS: A single frontal view of the chest at 2116 hours shows diffuse   interstitial infiltrate, perihilar and bibasilar predominant and slightly   worsened since prior study. Moderate lung volumes. No new focal infiltrate. .    The heart, mediastinum and pulmonary vasculature are stable status post median   sternotomy . The bony thorax is unremarkable for age. .                     Medical Decision Making   I am the first provider for this patient.     I reviewed the vital signs, available nursing notes, past medical history, past surgical history, family history and social history. Vital Signs-Reviewed the patient's vital signs. Patient Vitals for the past 24 hrs:   Temp Pulse Resp BP SpO2   01/25/23 2114 -- -- -- -- 93 %   01/25/23 2114 98.2 °F (36.8 °C) -- -- -- --   01/25/23 2059 -- (!) 115 26 (!) 146/63 91 %         Provider Notes (Medical Decision Making):   44-year-old male presenting with fever, cough, shortness of breath. Differential includes COVID-19 pneumonia, bacterial pneumonia, arrhythmia, dehydration, electrolyte or metabolic abnormalities. He is hypoxic on room air, requiring 2 L nasal cannula. ED Course:     Initial assessment performed. The patients presenting problems have been discussed, and they are in agreement with the care plan formulated and outlined with them. I have encouraged them to ask questions as they arise throughout their visit. Medications   sodium chloride (NS) flush 5-10 mL (has no administration in time range)   cefTRIAXone (ROCEPHIN) 1 g in 0.9% sodium chloride (MBP/ADV) 50 mL MBP (1 g IntraVENous New Bag 1/25/23 2242)   azithromycin (ZITHROMAX) 500 mg in 0.9% sodium chloride 250 mL ([id number]) (has no administration in time range)   aspirin chewable tablet 324 mg (324 mg Oral Given 1/25/23 2242)   sodium chloride 0.9 % bolus infusion 500 mL (500 mL IntraVENous New Bag 1/25/23 2243)        ED Course as of 01/25/23 2258 Wed Jan 25, 2023 2154 Chest x-ray is reviewed by myself, shows diffuse bilateral patchy and and interstitial prominence, likely COVID pneumonia per my interpretation, will await formal radiology read. [CM]      ED Course User Index  [CM] Min-Prudence Dwyer MD        EKG is performed at 21: 03, shows sinus tachycardia, not concerning for ACS per my interpretation. CBC with leukocytosis, has history of this, and is not significantly changed from prior per chart review.   Basic metabolic panel with normal renal function, mild hyponatremia of 132. His troponin is elevated, however he continues to deny chest pain, I suspect this is likely demand ischemia in the setting of hypoxic respiratory failure. His COVID test is positive unsurprisingly. Lactic acid is elevated, likely in setting of COVID and hypoxia, however given patchy opacities on x-ray, also treat for community-acquired pneumonia. BNP is elevated, he would not benefit from more than above fluid administration. Chart is reviewed, discharge summary from [facility] on 1/19/2023, noted to be started on Eliquis at that time for PE, and history of CLL as well. Given that he has been compliant with all doses of apixaban, obvious cause of his hypoxia and shortness of breath, unlikely PE. He is resting comfortably reevaluation, breathing more comfortably, saturating greater than 90% on nasal cannula. He agrees to admission. Critical Care Time:     I have spent 45 minutes of critical care time in evaluating and treating this patient. This includes time spent at bedside, time with family and decision makers, documentation, review of labs and imaging, and/or consultation with specialists. It does not include time spent on separately billed procedures. This patient presents with a critical illness or injury that acutely impairs one or more vital organ systems such that there is a high probability of imminent or life threatening deterioration in the patient's condition. This case involved decision making of high complexity to assess, manipulate, and support vital organ system failure and/or to prevent further life threatening deterioration of the patient's condition. Failure to initiate these interventions on an urgent basis would likely result in sudden, clinically significant or life threatening deterioration in the patient's condition.     Abnormal findings supporting critical care: Hypoxia, elevated troponin, COVID-pneumonia, tachycardia  Interventions to support critical care: Oxygen, antibiotics, aspirin  Failure to intervene may result in: Hypoxic respiratory failure, cardiopulmonary compromise    Disposition:  Admit  Jordan Worthington's  results have been reviewed with him. He has been counseled regarding his diagnosis, treatment, and plan. He verbally conveys understanding and agreement of the signs, symptoms, diagnosis, treatment and prognosis and additionally agrees to follow up as discussed. He also agrees with the care-plan and conveys that all of his questions have been answered. I have also provided discharge instructions for him that include: educational information regarding their diagnosis and treatment, and list of reasons why they would want to return to the ED prior to their follow-up appointment, should his condition change. PLAN:  1. Current Discharge Medication List        2.    Follow-up Information    None       Return to ED if worse     Diagnosis     Clinical Impression: Acute hypoxic respiratory failure secondary to COVID-19, acute elevated troponin

## 2023-01-26 NOTE — CONSULTS
Asked to se pt for alvarado xchange , daughter states alvarado changed by Urologist .   Chart review noted monthly catheter changes in [facility] office by treatment nurse . Primary nurse instructed to replaced with 14 fr coude  see office note for reference             Reason for Visit:  catheter change    Comments: Patient presents today for a monthly catheter change. He is accompnied by his daughter and son in law. Patient reports no any issue after his last catheter placement. Patient was able to ambulate to the exam table with some assistance. The 10ml balloon was deflated, the existing 14fr coude catheter was removed with ease. Patient prepped in a sterile fashion with BZK swabs/glydo jelly, 14fr coude catheter was inserted with ease under sterile conditions, clear yellow residual urine return is noted. Inflated balloon with 10ml of sterile water, catheter was attached to an overnight bag. Patient tolerated well, and without complaint. Extra overnight bag given to take home. Informed next scheduled appt. Patient escorted to checkout in stable condition. Treatment Given By:  Eliane Vaughn,  January  3, 2023 9:52 AM              Electronically signed by Eliane Vaughn on 01/03/2023 at 9:52 AM    Electronically signed by Katiana Huang MD on 01/03/2023 at 10:09 AM  ________________________________________________________________________      Urology will sign off please call for questions .        D/w Dr Sarah Juan

## 2023-01-26 NOTE — PROGRESS NOTES
Per Hematology patient may resume Eliquis home dose or stay on Lovenox. I do no see home dosage in chart. Notified MD. Patient unsure of dosage but states he does take this.

## 2023-01-26 NOTE — PROGRESS NOTES
Bedside and Verbal shift change report given to Orion Vazquez RN (oncoming nurse) by Clara Skinner RN (offgoing nurse). Report included the following information SBAR and Kardex.

## 2023-01-26 NOTE — PROGRESS NOTES
Patient seen and evaluated at bedside, patient admitted earlier this a.m. Agree with current plan  Continue Decadron  Change antibiotics to ceftriaxone azithromycin  Patient noted to have urinary tract infection, will be covered with ceftriaxone  Discontinue Keflex as patient will be on ceftriaxone  Hematology consult appreciated and noted, resume patient's home Eliquis  Obtain repeat labs in a.m.   Trend inflammatory markers  Case discussed with patient, I have personally called patient's daughter Ms. Loja Juwan [phone] who is an RN, and updated her with patient's clinical status, answered all questions

## 2023-01-26 NOTE — CONSULTS
[facility]    Name:  Denita Kapadia  MR#:  [id number]  :  10/04/1933  ACCOUNT #:  [de-identified]  DATE OF SERVICE:  2023    REFERRING PHYSICIAN: Hospitalist    REASON FOR CONSULT:  CLL. HISTORY OF PRESENT ILLNESS:  The patient is an 80-year-old gentleman who is a patient of one of my partners, Dr. Javid Peres.  Dr. Javid Peres is seeing him for CLL. He was supposed to start y prime recently but did not as he was being treated for cellulitis. He came to the emergency room today with shortness of breath. He was found to be COVID positive. He is being admitted for that. I am seeing him from the doorway. He is COVID positive, so I did not go into his room to examine him as I am saving PPE for those who need to go in. He reports that he feels well and really has no complaints. PAST MEDICAL HISTORY:  Significant for CLL Rojo stage III, coronary artery disease, chronic pain, hypertension, and hyperlipidemia. CURRENT MEDICATIONS:  He is on,  1. Allopurinol. 2.  Amlodipine. 3.  Aspirin. 4.  Atorvastatin. 5.  Keflex for the cellulitis of his left arm. 6.  Zyrtec. 7.  Decadron. 8.  Lovenox prophylactic dose. 9.  Guaifenesin. 10.  Metoprolol. 11.  Protonix. REVIEW OF SYSTEMS:  A 12-point systems done and negative except for as above. PHYSICAL EXAMINATION:  Physical exam was not done. I just observed him from the hallway. GENERAL:  Does not appear to be in any stress. RESPIRATIONS:  Does not appear to be in any distress. PSYCH:  Normal.    LABORATORY VALUES:  White blood cell count 450, hemoglobin 7.0, platelets 70. His lymphocyte count is 360. IMPRESSION AND PLAN:  The patient is an 80-year-old gentleman with chronic lymphocytic leukemia Rojo stage III. His white count is fairly stable from what it has been recently. Dr. Javid Peres had hope to be able to treat him with Brukinsa that has been on hold as he is being treated for cellulitis.   At this point, I would continue to hold the [id number] while he gets over COVID and over the cellulitis. I would transfuse him for hemoglobin less than 7. His platelet count is fairly stable. We would transfuse him for platelet count less than 10 or any active bleeding. We will continue to follow along with you and make further recommendations as needed.         Horace Arreola MD      [id number]  D:  01/26/2023 11:26  T:  01/26/2023 12:55  JOB #:  [id number]

## 2023-01-26 NOTE — ED NOTES
Patient is being transferred to [facility] Clinical OBS, ED CDU Room # 37. Report given to Elkin Ibanez on Duane Mcgill for routine progression of care. Report consisted of the following information SBAR, ED Summary, MAR, and Recent Results. Patient transferred to receiving unit by: Andrea Cheng (RN or tech name). Outstanding consults needed: No     Next labs due: Yes    The following personal items will be sent with the patient during transfer to the floor: All valuables:    Cardiac monitoring ordered: Yes    The following CURRENT information was reported to the receiving RN:    Code status: DNR at time of transfer    Last set of vital signs:  Vital Signs  Temp: 98.2 °F (36.8 °C) (01/25/23 2114)  Pulse (Heart Rate): 83 (01/25/23 2259)  Cardiac Rhythm: Sinus Tachy (01/25/23 2114)  Resp Rate: 25 (01/25/23 2259)  BP: (!) 153/61 (01/25/23 2259)  MAP (Monitor): 88 (01/25/23 2259)  MAP (Calculated): 92 (01/25/23 2259)         Oxygen Therapy  O2 Sat (%): 97 % (01/25/23 2259)  Pulse via Oximetry: 83 beats per minute (01/25/23 2259)      Last pain assessment:         Wounds: No     Urinary catheter: alvarado  Is there a alvarado order:  pt presented to ED w/ chronic alvarado    LDAs:       Peripheral IV 01/25/23 Right Antecubital (Active)         Opportunity for questions and clarification was provided.     Sheldon Evans

## 2023-01-27 NOTE — PROGRESS NOTES
Problem: Risk for Spread of Infection  Goal: Prevent transmission of infectious organism to others  Description: Prevent the transmission of infectious organisms to other patients, staff members, and visitors.   Outcome: Progressing Towards Goal     Problem: Patient Education:  Go to Education Activity  Goal: Patient/Family Education  Outcome: Progressing Towards Goal     Problem: Patient Education: Go to Patient Education Activity  Goal: Patient/Family Education  Outcome: Progressing Towards Goal     Problem: Patient Education: Go to Patient Education Activity  Goal: Patient/Family Education  Outcome: Progressing Towards Goal

## 2023-01-27 NOTE — PROGRESS NOTES
Hospitalist Progress Note    NAME: Darrell Jaime   :  10/4/1933   MRN:  [id number]            Subjective:     Chief Complaint / Reason for Physician Visit  Patient seen and evaluated at bedside, overnight events reviewed, currently complaining of shortness of breath however states overall he is feeling better since yesterday. Discussed with RN events overnight. Review of Systems:  Symptom Y/N Comments  Symptom Y/N Comments   Fever/Chills N   Chest Pain N    Poor Appetite Y   Edema N    Cough Y   Abdominal Pain N    Sputum Y   Joint Pain N    SOB/JETER Y   Pruritis/Rash N    Nausea/vomit N   Tolerating PT/OT NA    Diarrhea N   Tolerating Diet Y    Constipation N   Other       Could NOT obtain due to:      Objective:     VITALS:   Last 24hrs VS reviewed since prior progress note. Most recent are:  Patient Vitals for the past 24 hrs:   Temp Pulse Resp BP SpO2   23 0839 -- 86 -- (!) 161/65 --   23 0647 97.6 °F (36.4 °C) 87 17 (!) 158/70 95 %   23 0530 -- 78 -- -- 93 %   23 0520 -- 84 -- -- 94 %   23 0510 -- 74 -- -- 95 %   23 0310 97.4 °F (36.3 °C) 85 22 (!) 159/65 95 %   23 2300 -- 76 -- -- 94 %   23 2230 98.4 °F (36.9 °C) 73 20 (!) 146/60 94 %   23 2200 -- 75 -- -- 93 %   23 2130 -- 75 -- -- 93 %   23 -- 94 -- -- 90 %   23 -- 82 -- -- 94 %   23 -- 87 -- -- 92 %   23 1857 98.1 °F (36.7 °C) 84 24 (!) 150/70 91 %   23 1800 -- 87 21 (!) 148/87 94 %   23 1730 -- 87 28 -- 91 %   23 1722 97.4 °F (36.3 °C) 90 -- (!) 158/68 92 %   23 1700 -- 82 26 (!) 158/68 91 %   23 1621 97.5 °F (36.4 °C) 88 23 (!) 168/69 92 %       Intake/Output Summary (Last 24 hours) at 2023 1141  Last data filed at 2023 0845  Gross per 24 hour   Intake 240 ml   Output 1040 ml   Net -800 ml        PHYSICAL EXAM:  General: Patient appears comfortable   EENT:  EOMI. Anicteric sclerae.  MMM  Resp:  Decreased air entry bilaterally with appreciable bilateral bibasillar crackles  CV:  Regular  rhythm, s1/s2 no m/r/g No edema  GI:  Soft, Non distended, Non tender. +Bowel sounds  Neurologic:  Alert and oriented X 3, normal speech,   Psych:   Good insight. Not anxious nor agitated  Skin:  No rashes. No jaundice    Procedures: see electronic medical records for all procedures/Xrays and details which were not copied into this note but were reviewed prior to creation of Plan. LABS:  I reviewed today's most current labs and imaging studies. Pertinent labs include:  Recent Labs     01/27/23 0207 01/25/23 2110   .8* 450.0*   HGB 7.7* 7.0*   HCT 28.9* 27.6*   PLT 57* 70*     Recent Labs     01/27/23 0207 01/26/23 0139 01/25/23 2110   *  --  132*   K 5.5*  --  5.1     --  102   CO2 24  --  21   *  --  206*   BUN 17  --  17   CREA 0.97  --  1.10   CA 8.7  --  8.3*   MG 2.3 2.0  --    ALB 2.1*  --  2.2*   TBILI 1.4*  --  1.6*   ALT 42  --  48       Signed: Abhishek Wilkerson MD    X-ray chest:IMPRESSION  Interstitial edema pattern, versus infectious or inflammatory bibasilar  infiltrate, slightly worsened since prior study. .  .     Reviewed most current lab test results and cultures  YES  Reviewed most current radiology test results   YES  Review and summation of old records today    NO  Reviewed patient's current orders and MAR    YES  PMH/SH reviewed - no change compared to H&P      Assessment / Plan:  Acute respiratory failure with hypoxia secondary to COVID-19 pneumonia-of note patient still requiring significant oxygen, currently on 4 L oxygen, does not meet sepsis criteria at this time  Follow-up blood cultures  Follow sputum cultures  Continue Decadron once daily  Trend Inflammatory markers  Continue baricitinib once daily  Continue ceftriaxone azithromycin for antibiotic coverage  Attempted wean oxygen  Continue to monitor patient's clinical status    Elevated serum troponin-likely secondary to demand, serial troponins have down trended  Continuous telemetry monitoring  Continue to monitor patient's clinical status    Hypertension-continue current antihypertensive medications    Hyperglycemia type 2 diabetes-continue insulin sliding scale    Prophylaxis-Eliquis  FEN-cardiac/diabetic diet, replete potassium and magnesium  DNR, will clarify about surrogate decision-maker  Disposition-pending clinical improvement, weaning down on oxygen, likely to skilled nursing facility, greater than 48 hours      18.5 - 24.9 Normal weight / Body mass index is 21.14 kg/m². Code status: Full  Prophylaxis: Lovenox  Recommended Disposition: SNF/LTC     ________________________________________________________________________  Care Plan discussed with:    Comments   Patient x    Family      RN x    Care Manager x    Consultant  x                     x Multidiciplinary team rounds were held today with , nursing, pharmacist and clinical coordinator. Patient's plan of care was discussed; medications were reviewed and discharge planning was addressed.      ________________________________________________________________________  Total NON critical care TIME:  35   Minutes        Comments   >50% of visit spent in counseling and coordination of care x    ________________________________________________________________________  Radha Mathew MD

## 2023-01-27 NOTE — PROGRESS NOTES
Transition of Care Plan:    RUR: 27  LINETTE: 1/30? Wean oxygen and medical stability  COVID Pt. Disposition: Plan A: Plan Home with DTR, [place] November and PETER with [facility] RN/PT/OT. With 24/7 care from family. Shantelle Davis Address:   [address] [facility], [address]  Plan B: [facility]. - pending    4:55 PM   Reviewed therapy rec with DTR over the phone. We agreed to see if Oxygen is able to be weaned and see how he is progressing with treatment. If he is doing better she would like to bring him home and if he is the same or worse she would consider rehab. CM sent updates to [facility] and sent a referral to [facility] to see if they are interested per DTR request.     If SNF or IPR: Date FOC offered:  Date FOC received:  Date authorization started with reference number:  Date authorization received and expires:  Accepting facility:    Follow up appointments: PCP: Jonn Edge  Urology: Hope Stakes: Chronic [place]  Onc: Aníbal  Nephro: NEHA Fair. DME needed: tbd  Transportation at Discharge: DTR vs transport  [address] [place] [address] or means to access home:   DTR     IM Medicare Letter: 1st IM letter 1/26  2nd IM letter to be delivered prior to DC. Is patient a  and connected with the [address]?  no              If yes, was [employer] transfer form completed and [facility] notified? Caregiver Contact: DTR: Lizzette Lindquist: [phone]  Discharge Caregiver contacted prior to discharge?  yes  Care Conference needed?:               no    Reason for Admission:   Pt was admitted on 1/26 d/t COVID                 RUR Score:     27      PCP: First and Last name:  Karma Smith MD     Name of Practice:   Are you a current patient: Yes/No: yes   Approximate date of last visit: 3 months   Can you do a virtual visit with your PCP: yes             Resources/supports as identified by patient/family:   Pt has Medicare and [facility] facing patient (as identified by patient/family and CM): Finances/Medication cost?      Pt has prescription drug coverage. Preferred Rx is [facility] ([address]? Family transports              Support system or lack thereof? Supportive DTR and Son in law that currently provide 24/7 care                     Living arrangements? Currently living with DTR and Son in law in two story home            Self-care/ADLs/Cognition? Alert and oriented. Was I W ADLs. Current Advanced Directive/Advance Care Plan:  DNR      Healthcare Decision Maker:       Primary Decision MakeAimee Lucas - Daughter - [phone]    Secondary Decision Maker: Sybil Flores - Daughter - [phone]    Payor Source Payor: Fausto Gillespie / Plan: [employer] / Product Type: Medicare /                             Plan for utilizing home health:    Open to [facility]                 CM will continue to monitor discharge plan. Care Management Interventions  PCP Verified by CM:  Yes  Palliative Care Criteria Met (RRAT>21 & CHF Dx)?: No  Mode of Transport at Discharge: [facility]  Transition of Care Consult (CM Consult): Discharge Planning, [address]: No  Reason Outside [place]: Patient already serviced by other home care/hospice agency  MyChart Signup: Yes  Discharge Durable Medical Equipment: No  Physical Therapy Consult: Yes  Occupational Therapy Consult: Yes  Support Systems: Child(charmaine), Other Family Member(s), Friend/Neighbor  Confirm Follow Up Transport: Family  The Plan for Transition of Care is Related to the Following Treatment Goals : [facility]  The Patient and/or Patient Representative was Provided with a Choice of Provider and Agrees with the Discharge Plan?: Yes  Name of the Patient Representative Who was Provided with a Choice of Provider and Agrees with the Discharge Plan: pt dtr  Freedom of Choice List was Provided with Basic Dialogue that Supports the Patient's Individualized Plan of Care/Goals, Treatment Preferences and Shares the Quality Data Associated with the Providers?: Yes  Discharge Location  Patient Expects to be Discharged to[de-identified] Home with home health    Jeronimo Leong

## 2023-01-27 NOTE — PROGRESS NOTES
Physician Progress Note      Malvin Estes  [facility] #:                  [id number]  :                       10/4/1933  ADMIT DATE:       2023 8:53 PM  DISCH DATE:  RESPONDING  PROVIDER #:        Yessica Romero MD          QUERY TEXT:    97CDK pt admitted with COVID-19 infection and UTI. Pt noted to have chronic indwelling urinary catheter changed monthly. Last catheter exchange done on 1/3/23. If possible, please document in the progress notes and discharge summary if you are evaluating and/or treating any of the following: The medical record reflects the following:  Risk Factors: 79yo M w/ CLL, requires monthly alvarado catheter exchange  Clinical Indicators: UA findings of Bact 1+, negative LEs, mod epiths, WBC 10-20, urine cx pending  Treatment: IV Rocephin, Dc Keflex, Urine cx. Thank you,  Minus Dandy RN, CDI  Options provided:  -- UTI due to chronic indwelling urinary catheter  -- UTI not due to indwelling urinary catheter  -- Other - I will add my own diagnosis  -- Disagree - Not applicable / Not valid  -- Disagree - Clinically unable to determine / Unknown  -- Refer to Clinical Documentation Reviewer    PROVIDER RESPONSE TEXT:    UTI is due to the chronic indwelling urinary catheter.     Query created by: Ilsa Mendoza on 2023 10:24 AM      Electronically signed by:  Yessica Romero MD 2023 10:33 AM

## 2023-01-27 NOTE — PROGRESS NOTES
Hematology Oncology Progress Note    Follow up for: CLL    Chart notes reviewed since last visit. Case discussed with following: .     Patient complains of the following: Tired but no other complaints, talked with patient from doorway, did not go in as Covid + and saving PPE for those who need to go in    Additional concerns noted by the staff:     Patient Vitals for the past 24 hrs:   BP Temp Pulse Resp SpO2   01/27/23 0839 (!) 161/65 -- 86 -- --   01/27/23 0647 (!) 158/70 97.6 °F (36.4 °C) 87 17 95 %   01/27/23 0530 -- -- 78 -- 93 %   01/27/23 0520 -- -- 84 -- 94 %   01/27/23 0510 -- -- 74 -- 95 %   01/27/23 0310 (!) 159/65 97.4 °F (36.3 °C) 85 22 95 %   01/26/23 2300 -- -- 76 -- 94 %   01/26/23 2230 (!) 146/60 98.4 °F (36.9 °C) 73 20 94 %   01/26/23 2200 -- -- 75 -- 93 %   01/26/23 2130 -- -- 75 -- 93 %   01/26/23 2100 -- -- 94 -- 90 %   01/26/23 2030 -- -- 82 -- 94 %   01/26/23 2000 -- -- 87 -- 92 %   01/26/23 1857 (!) 150/70 98.1 °F (36.7 °C) 84 24 91 %   01/26/23 1800 (!) 148/87 -- 87 21 94 %   01/26/23 1730 -- -- 87 28 91 %   01/26/23 1722 (!) 158/68 97.4 °F (36.3 °C) 90 -- 92 %   01/26/23 1700 (!) 158/68 -- 82 26 91 %   01/26/23 1621 (!) 168/69 97.5 °F (36.4 °C) 88 23 92 %   01/26/23 1108 (!) 173/68 -- 94 -- 90 %   01/26/23 1103 (!) 159/70 -- 87 -- 92 %   01/26/23 1054 (!) 154/64 -- 75 -- 93 %       Review of Systems:  12 point ROS done and negative except as above    Physical Examination:  Gen NAD  Resp no distress  Psych normal      Labs:  Recent Results (from the past 24 hour(s))   ECHO ADULT COMPLETE    Collection Time: 01/26/23 10:17 AM   Result Value Ref Range    IVSd 0.9 0.6 - 1.0 cm    LVIDd 4.7 4.2 - 5.9 cm    LVIDs 3.9 cm    LVOT Diameter 1.9 cm    LVPWd 0.9 0.6 - 1.0 cm    LV Ejection Fraction A4C 55 %    LV EDV A4C 116 mL    LV ESV A4C 52 mL    LVIDd M-mode 5.3 4.2 - 5.9 cm    LVIDs M-mode 3.7 cm    LVOT Peak Gradient 5 mmHg    LVOT Mean Gradient 2 mmHg    LVOT SV 75.7 ml    LVOT Peak Velocity 1.1 m/s    LVOT VTI 26.7 cm    RVIDd 4.6 cm    LA Diameter 3.4 cm    LA Volume 4C 84 (A) 18 - 58 mL    AV Area by Peak Velocity 1.5 cm2    AV Area by VTI 1.8 cm2    AV Peak Gradient 16 mmHg    AV Mean Gradient 7 mmHg    AV Peak Velocity 2.0 m/s    AV Mean Velocity 1.3 m/s    AV VTI 42.0 cm    MV A Velocity 0.90 m/s    MV E Wave Deceleration Time 224.2 ms    MV E Velocity 1.28 m/s    MV Area by VTI 2.8 cm2    LV E' Lateral Velocity 438 cm/s    MV Peak Gradient 6 mmHg    MV Mean Gradient 3 mmHg    MV Max Velocity 1.3 m/s    MV Mean Velocity 0.9 m/s    MV VTI 27.5 cm    Pulmonary Artery EDP 9 mmHg    LA Max Velocity 1.5 m/s    PV Peak Gradient 3 mmHg    PV Max Velocity 0.9 m/s    TAPSE 1.6 (A) 1.7 cm    TR Peak Gradient 41 mmHg    TR Max Velocity 3.20 m/s    Fractional Shortening 2D 17 28 - 44 %    LV ESV Index A4C 30 mL/m2    LV EDV Index A4C 68 mL/m2    LVIDd Index 2.75 cm/m2    LVIDs Index 2.28 cm/m2    LV RWT Ratio 0.38     LV Mass 2D 142.7 88 - 224 g    LV Mass 2D Index 83.5 49 - 115 g/m2    MV E/A 1.42     E/E' Lateral 0.29     LVOT Stroke Volume Index 44.2 mL/m2    LVOT Area 2.8 cm2    LA Volume Index 4C 49 (A) 16 - 34 mL/m2    LA Size Index 1.99 cm/m2    AV Velocity Ratio 0.55     LVOT:AV VTI Index 0.64     JOSE/BSA VTI 1.1 cm2/m2    JOSE/BSA Peak Velocity 0.9 cm2/m2    MV:LVOT VTI Index 1.03    GLUCOSE, POC    Collection Time: 01/26/23 11:36 AM   Result Value Ref Range    Glucose (POC) 170 (H) 65 - 117 mg/dL    Performed by Dena Cardona RN    TROPONIN-HIGH SENSITIVITY    Collection Time: 01/26/23 11:38 AM   Result Value Ref Range    Troponin-High Sensitivity 239 (HH) 0 - 76 ng/L   URINALYSIS W/ REFLEX CULTURE    Collection Time: 01/26/23 11:40 AM    Specimen: Urine   Result Value Ref Range    Color YELLOW/STRAW      Appearance CLEAR CLEAR      Specific gravity 1.017      pH (UA) 5.5 5.0 - 8.0      Protein 100 (A) NEG mg/dL    Glucose Negative NEG mg/dL    Ketone Negative NEG mg/dL    Bilirubin Negative NEG Blood LARGE (A) NEG      Urobilinogen 4.0 (H) 0.2 - 1.0 EU/dL    Nitrites Negative NEG      Leukocyte Esterase Negative NEG      WBC 10-20 0 - 4 /hpf    RBC  0 - 5 /hpf    Epithelial cells MODERATE (A) FEW /lpf    Bacteria 1+ (A) NEG /hpf    UA:UC IF INDICATED URINE CULTURE ORDERED (A) CNI     HEMOGLOBIN A1C WITH EAG    Collection Time: 01/26/23  3:45 PM   Result Value Ref Range    Hemoglobin A1c 6.2 (H) 4.0 - 5.6 %    Est. average glucose 131 mg/dL   GLUCOSE, POC    Collection Time: 01/26/23  5:07 PM   Result Value Ref Range    Glucose (POC) 218 (H) 65 - 117 mg/dL    Performed by Rakesh Roman \"Romi\" ARGELIA    GLUCOSE, POC    Collection Time: 01/26/23  9:05 PM   Result Value Ref Range    Glucose (POC) 193 (H) 65 - 117 mg/dL    Performed by Dennis KNAPP    MAGNESIUM    Collection Time: 01/27/23  2:07 AM   Result Value Ref Range    Magnesium 2.3 1.6 - 2.4 mg/dL   METABOLIC PANEL, COMPREHENSIVE    Collection Time: 01/27/23  2:07 AM   Result Value Ref Range    Sodium 133 (L) 136 - 145 mmol/L    Potassium 5.5 (H) 3.5 - 5.1 mmol/L    Chloride 102 97 - 108 mmol/L    CO2 24 21 - 32 mmol/L    Anion gap 7 5 - 15 mmol/L    Glucose 157 (H) 65 - 100 mg/dL    BUN 17 6 - 20 MG/DL    Creatinine 0.97 0.70 - 1.30 MG/DL    BUN/Creatinine ratio 18 12 - 20      eGFR >60 >60 ml/min/1.73m2    Calcium 8.7 8.5 - 10.1 MG/DL    Bilirubin, total 1.4 (H) 0.2 - 1.0 MG/DL    ALT (SGPT) 42 12 - 78 U/L    AST (SGOT) 45 (H) 15 - 37 U/L    Alk.  phosphatase 219 (H) 45 - 117 U/L    Protein, total 6.3 (L) 6.4 - 8.2 g/dL    Albumin 2.1 (L) 3.5 - 5.0 g/dL    Globulin 4.2 (H) 2.0 - 4.0 g/dL    A-G Ratio 0.5 (L) 1.1 - 2.2     CBC WITH AUTOMATED DIFF    Collection Time: 01/27/23  2:07 AM   Result Value Ref Range    .8 (HH) 4.1 - 11.1 K/uL    RBC 2.82 (L) 4.10 - 5.70 M/uL    HGB 7.7 (L) 12.1 - 17.0 g/dL    HCT 28.9 (L) 36.6 - 50.3 %    .5 (H) 80.0 - 99.0 FL    MCH 27.3 26.0 - 34.0 PG    MCHC 36.6 (H) 30.0 - 36.5 g/dL    RDW 26.1 (H) 11.5 - 14.5 %    PLATELET 57 (L) 212 - 400 K/uL    MPV 12.9 8.9 - 12.9 FL    NRBC 0.0 0  WBC    ABSOLUTE NRBC 0.08 (H) 0.00 - 0.01 K/uL    NEUTROPHILS 0 (L) 32 - 75 %    LYMPHOCYTES 75 (H) 12 - 49 %    MONOCYTES 25 (H) 5 - 13 %    EOSINOPHILS 0 0 - 7 %    BASOPHILS 0 0 - 1 %    IMMATURE GRANULOCYTES 0 0.0 - 0.5 %    ABS. NEUTROPHILS 0.0 (L) 1.8 - 8.0 K/UL    ABS. LYMPHOCYTES 365.1 (H) 0.8 - 3.5 K/UL    ABS. MONOCYTES 121.7 (H) 0.0 - 1.0 K/UL    ABS. EOSINOPHILS 0.0 0.0 - 0.4 K/UL    ABS. BASOPHILS 0.0 0.0 - 0.1 K/UL    ABS. IMM.  GRANS. 0.0 0.00 - 0.04 K/UL    DF AUTOMATED      RBC COMMENTS NORMOCYTIC, NORMOCHROMIC      RBC COMMENTS SMUDGE CELLS SEEN     SED RATE (ESR)    Collection Time: 01/27/23  2:07 AM   Result Value Ref Range    Sed rate, automated 51 (H) 0 - 20 mm/hr   LD    Collection Time: 01/27/23  2:07 AM   Result Value Ref Range     (H) 85 - 241 U/L   FERRITIN    Collection Time: 01/27/23  2:10 AM   Result Value Ref Range    Ferritin 3,525 (H) 26 - 388 NG/ML   GLUCOSE, POC    Collection Time: 01/27/23  6:54 AM   Result Value Ref Range    Glucose (POC) 140 (H) 65 - 117 mg/dL    Performed by Balta Stokes EDT      Assessment and Plan:   CLL  -Patient of Dr. Demarco Simmons  -Was supposed to start [address] [address] but has not yet as has been in hospital  -Cont to hold off on starting until over Covid  -Would transfuse PRBCs for HBG < 7, trasnfuse PLTs if < 10 or any bleeding    Recent PE  -On eliquis  -Ok to continue as long as PLT > 50  -trop most likely elevated from that, no chest pain, trop trending down    Covid  -Treatment per Hospitalist    Cellulitis of left arm  -On abx per Hospitalist

## 2023-01-27 NOTE — WOUND CARE
Wound Care Consulted for \"Hyperpigmented skin vs. Pressure injury\". Wound care nurse spoke with the nurse taking care of this patient on Day Shift. The wound was described as \"just dark\" and \"no open wounds or pain\":  His Javon Scores have been 15 - 19 mostly. The skin on the buttocks and sacrum is just hyperpigmented. Pt. Is incontinent so he needs his skin protected with zinc oxide and prompt attention to incontinence care. Zinc oxide cream ordered.    Marvel Hall RN, BSN, [employer]

## 2023-01-27 NOTE — PROGRESS NOTES
Problem: Risk for Spread of Infection  Goal: Prevent transmission of infectious organism to others  Description: Prevent the transmission of infectious organisms to other patients, staff members, and visitors. 1/27/2023 1059 by Laura Sy RN  Outcome: Progressing Towards Goal  1/27/2023 0812 by Laura Sy RN  Outcome: Progressing Towards Goal     Problem: Patient Education:  Go to Education Activity  Goal: Patient/Family Education  1/27/2023 1059 by Laura Sy RN  Outcome: Progressing Towards Goal  1/27/2023 0812 by Laura Sy RN  Outcome: Progressing Towards Goal     Problem: Patient Education: Go to Patient Education Activity  Goal: Patient/Family Education  Outcome: Progressing Towards Goal     Problem: Patient Education: Go to Patient Education Activity  Goal: Patient/Family Education  Outcome: Progressing Towards Goal     Problem: Airway Clearance - Ineffective  Goal: Achieve or maintain patent airway  Outcome: Progressing Towards Goal     Problem: Gas Exchange - Impaired  Goal: Absence of hypoxia  Outcome: Progressing Towards Goal  Goal: Promote optimal lung function  Outcome: Progressing Towards Goal     Problem: Breathing Pattern - Ineffective  Goal: Ability to achieve and maintain a regular respiratory rate  Outcome: Progressing Towards Goal     Problem:  Body Temperature -  Risk of, Imbalanced  Goal: Ability to maintain a body temperature within defined limits  Outcome: Progressing Towards Goal

## 2023-01-27 NOTE — PROGRESS NOTES
Physician Progress Note      Titi Taylor  [facility] #:                  [id number]  :                       10/4/1933  ADMIT DATE:       2023 8:53 PM  DISCH DATE:  RESPONDING  PROVIDER #:        Alida Mclaughlin MD          QUERY TEXT:    99XRW patient admitted with COVID-19 and respiratory failure w/ hypoxia, noted to have CXR findings of interstitial edema pattern vs infectious or inflammatory bibasilar infiltrate, slightly worsened since prior study . If possible, please document in progress notes and discharge summary if you are evaluating and/or treating any of the following: The medical record reflects the following:  Risk Factors:  89yoM w/ CLL, former smoker, COVID+  Clinical Indicators: p/w 3 D duration of abrupt onset SOB assoc w/ productive cough worse w/ exertion; hypoxic RR  24 - 28  86%  - 90% on 4LNC; tested COVID  + @ home, & COVID+ detected; elevated Lactic Acid (POC) 2.39.   CXR  Interstitial edema pattern, versus infectious or inflammatory bibasilar  infiltrate, slightly worsened since prior study. ()  Treatment: IV Zithromax., IV Rocephin, blood cx, supplemental O2, PPE, isolation precautions. Thank you,  Anna Gupta RN, CDI  Options provided:  -- COVID-19 Pneumonia  -- Aspiration pneumonia  -- Gram negative pneumonia  -- Other - I will add my own diagnosis  -- Disagree - Not applicable / Not valid  -- Disagree - Clinically unable to determine / Unknown  -- Refer to Clinical Documentation Reviewer    PROVIDER RESPONSE TEXT:    This patient has COVID-19 Pneumonia.     Query created by: Neda Pichardo on 2023 10:41 AM      Electronically signed by:  Alida Mclaughlin MD 2023 10:43 AM

## 2023-01-27 NOTE — PROGRESS NOTES
Problem: Mobility Impaired (Adult and Pediatric)  Goal: *Acute Goals and Plan of Care (Insert Text)  Description: FUNCTIONAL STATUS PRIOR TO ADMISSION: Pt was a very scattered historian. He initially seemed to state he lives alone but then stated he lives with his dtr since his last hospitalization but dtr works but then later stated his son in law is with him as caregiver all the time. Unsure full extent of assist. He seemed to indicate he uses a rolling walker for amb. HOME SUPPORT PRIOR TO ADMISSION: The patient lived with family. Physical Therapy Goals  Initiated 1/26/2023  1. Patient will move from supine to sit and sit to supine , scoot up and down, and roll side to side in bed with modified independence within 7 day(s). 2.  Patient will transfer from bed to chair and chair to bed with modified independence using the least restrictive device within 7 day(s). 3.  Patient will perform sit to stand with modified independence within 7 day(s). 4.  Patient will ambulate with supervision/set-up for 150 feet with the least restrictive device within 7 day(s). Outcome: Progressing Towards Goal   PHYSICAL THERAPY TREATMENT  Patient: Duane Mcgill (89 y.o. male)  Date: 1/27/2023  Diagnosis: Elevated troponin [R77.8]  Acute hypoxemic respiratory failure ([facility] [address] [J96.01]  COVID-19 [U07.1] <principal problem not specified>      Precautions: Other (comment) (COVID droplet+)  Chart, physical therapy assessment, plan of care and goals were reviewed. ASSESSMENT  Patient continues with skilled PT services and is progressing slowly towards goals. ,but is limited by weakness, fatigue, O2 requirements and coughing. Pt received in bed reporting the need to have a BM. Pt on 5L of O2 and coughing constantly t/o the session. He requires physical assistance for bed mobility, transfer to bedside commode<>bed, but is able to stand at RW to get cleaned up and transfer sit to supine without physical assistance.   Pt reports significant fatigue with inability to eat his lunch. O2 sats decreased to 83% and never went higher than 88% on 5L of O2.  RN notified. Feel pt may benefit from rehab prior to discharging home, as it isn't clear of his PLOF prior to this admission or what assistance he required or was available at home. Current Level of Function Impacting Discharge (mobility/balance):   Bed Mobility:  Supine to Sit: Moderate assistance  Sit to Supine: Supervision  Scooting: Contact guard assistance;Minimum assistance  Transfers:  Sit to Stand: Contact guard assistance;Minimum assistance (min a from bed, cga from [facility])  Stand to Sit: Contact guard assistance  Bed to Chair: Minimum assistance  Ambulation/Gait Training:  Distance (ft): 2 Feet (ft)  Assistive Device: Gait belt;Walker, rolling  Ambulation - Level of Assistance: Minimal assistance; Moderate assistance    Other factors to consider for discharge: O2 dependent, Covid +, fatigues easily,          PLAN :  Patient continues to benefit from skilled intervention to address the above impairments. Continue treatment per established plan of care. to address goals. Recommendation for discharge: (in order for the patient to meet his/her long term goals)  To be determined: rehab    This discharge recommendation:  Has been made in collaboration with the attending provider and/or case management    IF patient discharges home will need the following DME: to be determined (TBD)       SUBJECTIVE:   Patient stated I don't feel like eating.     OBJECTIVE DATA SUMMARY:   Critical Behavior:  Neurologic State: Alert  Orientation Level: Oriented X4  Cognition: Follows commands  Safety/Judgement: Awareness of environment, Decreased insight into deficits  Functional Mobility Training:  Bed Mobility:     Supine to Sit: Moderate assistance  Sit to Supine: Supervision  Scooting: Contact guard assistance;Minimum assistance        Transfers:  Sit to Stand: Contact guard assistance;Minimum assistance (min a from bed, cga from [facility])  Stand to Sit: Contact guard assistance        Bed to Chair: Minimum assistance            Balance:  Sitting: Impaired  Sitting - Static: Fair (occasional)  Sitting - Dynamic: Not tested  Standing: Impaired  Standing - Static: Fair;Occasional;Constant support (RW)  Standing - Dynamic : Fair;Occasional;Constant support (RW)  Ambulation/Gait Training:  Distance (ft): 2 Feet (ft)  Assistive Device: Gait belt;Walker, rolling  Ambulation - Level of Assistance: Minimal assistance; Moderate assistance        Gait Abnormalities: Decreased step clearance;Shuffling gait        Base of Support: Widened     Speed/Yani: Slow;Shuffled  Step Length: Right shortened;Left shortened       Activity Tolerance:   Fair, Poor, desaturates with exertion and requires oxygen, requires frequent rest breaks, and observed SOB with activity    After treatment patient left in no apparent distress:   Supine in bed(HOB elevated to allow pt to eat), Call bell within reach, Bed / chair alarm activated, and Side rails x 3    COMMUNICATION/COLLABORATION:   The patients plan of care was discussed with: Registered nurse and     Fanny Banks, PT   Time Calculation: 38 mins

## 2023-01-28 NOTE — PROGRESS NOTES
Hematology Oncology Progress Note    Follow up for: CLL    Chart notes reviewed since last visit.     Case discussed with following: .nurse    Pt is now on 12 L 02; getting prbc; denies pain or bleeding     Patient Vitals for the past 24 hrs:   BP Temp Pulse Resp SpO2 Weight   01/28/23 1150 (!) 138/54 98.1 °F (36.7 °C) 85 16 92 % --   01/28/23 0848 (!) 155/59 -- 84 -- -- --   01/28/23 0759 -- 98.1 °F (36.7 °C) -- -- -- --   01/28/23 0754 (!) 151/68 98 °F (36.7 °C) 83 20 91 % --   01/28/23 0635 -- -- -- -- 92 % --   01/28/23 0321 (!) 137/46 97.7 °F (36.5 °C) 74 18 (!) 89 % --   01/28/23 0240 -- -- -- -- -- 66.7 kg (147 lb 1.6 oz)   01/28/23 0006 (!) 140/78 97.7 °F (36.5 °C) 71 18 90 % --   01/27/23 2051 (!) 143/61 98 °F (36.7 °C) 81 16 92 % --   01/27/23 2000 -- -- -- -- 92 % --   01/27/23 1808 (!) 168/66 -- 96 -- -- --   01/27/23 1606 -- -- -- -- 92 % --         Review of Systems:  12 point ROS done and negative except as above    Physical Examination:  Gen NAD  Resp bilateral crackles  Heart regular  Abdomen soft  Psych normal      Labs:  Recent Results (from the past 24 hour(s))   GLUCOSE, POC    Collection Time: 01/27/23  4:13 PM   Result Value Ref Range    Glucose (POC) 192 (H) 65 - 117 mg/dL    Performed by Susi Onofre PCT    GLUCOSE, POC    Collection Time: 01/27/23 10:04 PM   Result Value Ref Range    Glucose (POC) 196 (H) 65 - 117 mg/dL    Performed by Faustina Aranda PCT    CBC W/O DIFF    Collection Time: 01/28/23  2:30 AM   Result Value Ref Range    .5 (HH) 4.1 - 11.1 K/uL    RBC 2.63 (L) 4.10 - 5.70 M/uL    HGB 6.6 (L) 12.1 - 17.0 g/dL    HCT 26.8 (L) 36.6 - 50.3 %    .9 (H) 80.0 - 99.0 FL    MCH 25.1 (L) 26.0 - 34.0 PG    MCHC 24.6 (L) 30.0 - 36.5 g/dL    RDW 26.9 (H) 11.5 - 14.5 %    PLATELET 63 (L) 850 - 400 K/uL    MPV 11.0 8.9 - 12.9 FL    NRBC 0.0 0  WBC    ABSOLUTE NRBC 0.02 (H) 0.00 - 9.71 K/uL   METABOLIC PANEL, COMPREHENSIVE    Collection Time: 01/28/23  2:30 AM   Result Value Ref Range    Sodium 133 (L) 136 - 145 mmol/L    Potassium 4.8 3.5 - 5.1 mmol/L    Chloride 102 97 - 108 mmol/L    CO2 23 21 - 32 mmol/L    Anion gap 8 5 - 15 mmol/L    Glucose 167 (H) 65 - 100 mg/dL    BUN 19 6 - 20 MG/DL    Creatinine 0.85 0.70 - 1.30 MG/DL    BUN/Creatinine ratio 22 (H) 12 - 20      eGFR >60 >60 ml/min/1.73m2    Calcium 8.6 8.5 - 10.1 MG/DL    Bilirubin, total 1.2 (H) 0.2 - 1.0 MG/DL    ALT (SGPT) 46 12 - 78 U/L    AST (SGOT) 55 (H) 15 - 37 U/L    Alk. phosphatase 192 (H) 45 - 117 U/L    Protein, total 5.8 (L) 6.4 - 8.2 g/dL    Albumin 1.9 (L) 3.5 - 5.0 g/dL    Globulin 3.9 2.0 - 4.0 g/dL    A-G Ratio 0.5 (L) 1.1 - 2.2     LD    Collection Time: 01/28/23  2:30 AM   Result Value Ref Range     (H) 85 - 241 U/L   FERRITIN    Collection Time: 01/28/23  2:30 AM   Result Value Ref Range    Ferritin 3,029 (H) 26 - 388 NG/ML   SED RATE (ESR)    Collection Time: 01/28/23  2:30 AM   Result Value Ref Range    Sed rate, automated 87 (H) 0 - 20 mm/hr   RBC, ALLOCATE    Collection Time: 01/28/23  5:15 AM   Result Value Ref Range    HISTORY CHECKED?  Historical check performed    TYPE & SCREEN    Collection Time: 01/28/23  5:26 AM   Result Value Ref Range    Crossmatch Expiration 01/31/2023,2359     ABO/Rh(D) A POSITIVE     Antibody screen NEG     Unit number [id number]     Blood component type RC LR     Unit division 00     Status of unit ISSUED     Crossmatch result Compatible    GLUCOSE, POC    Collection Time: 01/28/23  8:02 AM   Result Value Ref Range    Glucose (POC) 151 (H) 65 - 117 mg/dL    Performed by Dixie Alfaro PCT    GLUCOSE, POC    Collection Time: 01/28/23 12:36 PM   Result Value Ref Range    Glucose (POC) 130 (H) 65 - 117 mg/dL    Performed by Sergio Shi PCT      Assessment and Plan:   CLL  -Patient of Dr. Osei Keita  -Was supposed to start [address] but has not yet as has been in hospital  -Cont to hold off on starting until over Covid  -Would transfuse PRBCs for HBG < 7, trasnfuse PLTs if < 10 or any bleeding  Getting prbc today 1/28    Recent PE  -On eliquis  -Ok to continue as long as PLT > 50  -trop most likely elevated from that, no chest pain, trop trending down    Covid  -Treatment per Hospitalist, worsening 02 requirement    Cellulitis of left arm  -On abx per Hospitalist

## 2023-01-28 NOTE — PROGRESS NOTES
Received notification from bedside RN about patient with regards to: H/H 6.6/26.8  VS: /46, HR 74, RR 18, O2 sat 89% on NC 6 L    Intervention given: Transfuse 1 unit PRBC, CBC for tomorrow AM ordered

## 2023-01-28 NOTE — PROGRESS NOTES
1150: Blood transfusion began. Dual verification completed with second RN. 1205: This RN at bedside for first 15 minutes of blood transfusion. Patient tolerated without signs or symptoms of transfusion reaction. End of Shift Note    Bedside shift change report given to ARGELIA Olivares (oncoming nurse) by Katina Guevara RN (offgoing nurse). Report included the following information SBAR, Kardex, Intake/Output, MAR, Recent Results, and Cardiac Rhythm sinus rhythm    Shift worked:  0696-7437     Shift summary and any significant changes:    Blood transfusion completed.  Repeat H&H drawn; hgb 8.0  Patient remains on 13L O2 with O2 sats 90-92%  New 20G PIV placed in RAC     Concerns for physician to address:       Zone phone for oncoming shift:          Activity:  Activity Level: Bed Rest  Number times ambulated in hallways past shift: 0  Number of times OOB to chair past shift: 0    Cardiac:   Cardiac Monitoring: Yes      Cardiac Rhythm: Sinus Rhythm    Access:  Current line(s): PIV     Genitourinary:   Urinary status: alvarado    Respiratory:   O2 Device: Hi flow nasal cannula  Chronic home O2 use?: NO  Incentive spirometer at bedside: YES       GI:  Last Bowel Movement Date: 01/27/23  Current diet:  ADULT DIET Regular  Passing flatus: YES  Tolerating current diet: YES       Pain Management:   Patient states pain is manageable on current regimen: YES    Skin:  Javon Score: 16  Interventions: float heels, increase time out of bed, foam dressing, PT/OT consult, limit briefs, and internal/external urinary devices    Patient Safety:  Fall Score:    Interventions: bed/chair alarm, assistive device (walker, cane, etc), gripper socks, pt to call before getting OOB, and stay with me (per policy)       Length of Stay:  Expected LOS: 3d 12h  Actual LOS: 2      Katina Guevara RN

## 2023-01-28 NOTE — PROGRESS NOTES
End of Shift Note    Bedside shift change report given to ARGELIA Olivares (oncoming nurse) by Rigo Sanchez RN (offgoing nurse). Report included the following information SBAR, Kardex, Intake/Output, MAR, Recent Results, and Cardiac Rhythm sinus rhythm    Shift worked:  7099-3179     Shift summary and any significant changes:     Patient worked with PT/OT this afternoon  O2 sats 87-92% on 5L; Dr. Macarena Hoffman notified and orders for hiflow O2 received. Patient on 6L O2 at end of shift.       Concerns for physician to address:       Zone phone for oncoming shift:          Activity:  Activity Level: Bed Rest  Number times ambulated in hallways past shift: 0  Number of times OOB to chair past shift: 0    Cardiac:   Cardiac Monitoring: Yes      Cardiac Rhythm: Sinus Rhythm    Access:  Current line(s): PIV     Genitourinary:   Urinary status: alvarado    Respiratory:   O2 Device: Nasal cannula  Chronic home O2 use?: NO  Incentive spirometer at bedside: YES       GI:  Last Bowel Movement Date: 01/24/23  Current diet:  ADULT DIET Regular  Passing flatus: YES  Tolerating current diet: YES       Pain Management:   Patient states pain is manageable on current regimen: YES    Skin:  Javon Score: 15  Interventions: float heels, increase time out of bed, PT/OT consult, limit briefs, and internal/external urinary devices    Patient Safety:  Fall Score:    Interventions: bed/chair alarm, assistive device (walker, cane, etc), gripper socks, pt to call before getting OOB, and stay with me (per policy)       Length of Stay:  Expected LOS: 3d 12h  Actual LOS: 1      Rigo Sanchez RN

## 2023-01-28 NOTE — PROGRESS NOTES
Hospitalist Progress Note    NAME: Anthony Jamil   :  10/4/1933   MRN:  [id number]       Assessment / Plan:  Acute respiratory failure with hypoxia POA- increased O2 requirement at 13L/m today AM, up from 6L/m yesterday  Due to COVID-19 pneumonia POA  Blood cultures- neg x 3 days  Urine Cx +ve for Enterococcus Fecalis sensitive to  Ampicillin/levaquin noted  ESR 51->87  cRP= 18.1-> >9.5    Continue Decadron once daily  Trend Inflammatory markers  Continue baricitinib once daily  S/p ceftriaxone azithromycin - change to Levaquin today to cover for Enterococcus in Urine and Lung/PNA  Cont to attempted wean oxygen-  Continue to monitor patient's clinical status     CLL- stable  Anemia- worsened Hb at 6.6 today AM  Thrombocytopenia      Transfuse 1 PRBC as ordered  Follow H/H post transfusion for improvement  CBC in AM  Pt has consented for 1 PRBC today AM      Elevated serum troponin- remains flat  likely secondary to demand, serial troponins have down trended  Continuous telemetry monitoring  Continue to monitor patient's clinical status     Hypertension-  continue current antihypertensive medications    Hyperglycemia type 2 diabetes-  continue insulin sliding scale       18.5 - 24.9 Normal weight / Body mass index is 21.14 kg/m². Code status: Full  Prophylaxis: Eliquis  Recommended Disposition: SNF/LTC? Estimated discharge date: ? Barriers: Oxygen stability/weaning, Hb stability       Subjective:     Chief Complaint / Reason for Physician Visit: F/U COVID infection, PNA, ARF, Enterococcal UTI  \"I am better\". Discussed with RN events overnight.      Review of Systems:  Symptom Y/N Comments  Symptom Y/N Comments   Fever/Chills n   Chest Pain n    Poor Appetite n   Edema n    Cough y   Abdominal Pain n    Sputum y   Joint Pain     SOB/JETER y   Pruritis/Rash     Nausea/vomit    Tolerating PT/OT y    Diarrhea    Tolerating Diet y    Constipation    Other       Could NOT obtain due to: Objective:     VITALS:   Last 24hrs VS reviewed since prior progress note. Most recent are:  Patient Vitals for the past 24 hrs:   Temp Pulse Resp BP SpO2   01/28/23 1150 98.1 °F (36.7 °C) 85 16 (!) 138/54 92 %   01/28/23 0848 -- 84 -- (!) 155/59 --   01/28/23 0759 98.1 °F (36.7 °C) -- -- -- --   01/28/23 0754 98 °F (36.7 °C) 83 20 (!) 151/68 91 %   01/28/23 0635 -- -- -- -- 92 %   01/28/23 0321 97.7 °F (36.5 °C) 74 18 (!) 137/46 (!) 89 %   01/28/23 0006 97.7 °F (36.5 °C) 71 18 (!) 140/78 90 %   01/27/23 2051 98 °F (36.7 °C) 81 16 (!) 143/61 92 %   01/27/23 2000 -- -- -- -- 92 %   01/27/23 1808 -- 96 -- (!) 168/66 --   01/27/23 1606 -- -- -- -- 92 %   01/27/23 1438 98 °F (36.7 °C) 97 18 (!) 155/62 91 %       Intake/Output Summary (Last 24 hours) at 1/28/2023 1416  Last data filed at 1/28/2023 0855  Gross per 24 hour   Intake --   Output 3650 ml   Net -3650 ml        I had a face to face encounter and independently examined this patient on 1/28/2023, as outlined below:  PHYSICAL EXAM:  General: WD, WN. Alert, cooperative, no acute distress    EENT:  EOMI. Anicteric sclerae. MMM  Resp:  Bilateral wheezing noted +  No accessory muscle use  CV:  Regular  rhythm,  No edema  GI:  Soft, Non distended, Non tender. +Bowel sounds  Neurologic:  Alert and oriented X 3, normal speech,   Psych:   Good insight. Not anxious nor agitated  Skin:  No rashes. No jaundice    Reviewed most current lab test results and cultures  YES  Reviewed most current radiology test results   YES  Review and summation of old records today    NO  Reviewed patient's current orders and MAR    YES  PMH/SH reviewed - no change compared to H&P  ________________________________________________________________________  Care Plan discussed with:    Comments   Patient x    Family      RN x    Care Manager x    Consultant                        Multidiciplinary team rounds were held today with , nursing, pharmacist and clinical coordinator. Patient's plan of care was discussed; medications were reviewed and discharge planning was addressed. ________________________________________________________________________  Total NON critical care TIME:  36   Minutes    Total CRITICAL CARE TIME Spent:   Minutes non procedure based      Comments   >50% of visit spent in counseling and coordination of care x    ________________________________________________________________________  Jalyn Donovan MD     Procedures: see electronic medical records for all procedures/Xrays and details which were not copied into this note but were reviewed prior to creation of Plan. LABS:  I reviewed today's most current labs and imaging studies.   Pertinent labs include:  Recent Labs     01/28/23  0230 01/27/23  0207 01/25/23 2110   .5* 486.8* 450.0*   HGB 6.6* 7.7* 7.0*   HCT 26.8* 28.9* 27.6*   PLT 63* 57* 70*     Recent Labs     01/28/23  0230 01/27/23  0207 01/26/23  0139 01/25/23  2110   * 133*  --  132*   K 4.8 5.5*  --  5.1    102  --  102   CO2 23 24  --  21   * 157*  --  206*   BUN 19 17  --  17   CREA 0.85 0.97  --  1.10   CA 8.6 8.7  --  8.3*   MG  --  2.3 2.0  --    ALB 1.9* 2.1*  --  2.2*   TBILI 1.2* 1.4*  --  1.6*   ALT 46 42  --  48       Signed: Jalyn Donovan MD

## 2023-01-28 NOTE — PROGRESS NOTES
Problem: Risk for Spread of Infection  Goal: Prevent transmission of infectious organism to others  Description: Prevent the transmission of infectious organisms to other patients, staff members, and visitors.   Outcome: Progressing Towards Goal     Problem: Patient Education:  Go to Education Activity  Goal: Patient/Family Education  Outcome: Progressing Towards Goal     Problem: Airway Clearance - Ineffective  Goal: Achieve or maintain patent airway  Outcome: Progressing Towards Goal     Problem: Gas Exchange - Impaired  Goal: Absence of hypoxia  Outcome: Progressing Towards Goal     Problem: Breathing Pattern - Ineffective  Goal: Ability to achieve and maintain a regular respiratory rate  Outcome: Progressing Towards Goal

## 2023-01-29 NOTE — PROGRESS NOTES
Called to bedside as patient desatting to 81% of 15L bubbel bottle high flow. Placed patient on 15L non-rebreather as well. Patient to be moved to negative pressure room to start Heated High Flow.  Patient satting 90%

## 2023-01-29 NOTE — PROGRESS NOTES
Hospitalist Progress Note    NAME: Darrell Jaime   :  10/4/1933   MRN:  [id number]       Assessment / Plan:  Acute respiratory failure with hypoxia POA- increased O2 requirement at 50 L/m today AM, up from 13 L/m yesterday  Due to COVID-19 pneumonia POA  Blood cultures- neg x 4 days  Urine Cx +ve for Enterococcus Fecalis sensitive to  Ampicillin/levaquin noted  ESR 51->87  cRP= 18.1-> >9.5- >pending today  Procal 0.34    Continue Decadron once daily  Trend Inflammatory markers daily  Continue baricitinib once daily  S/p ceftriaxone azithromycin - changed to Levaquin today to cover for Enterococcus in Urine and Lung/PNA  Cont to attempted wean oxygen-  Continue to monitor patient's clinical status     CLL- stable  Anemia- improved Hb at 8.5 today AM s/p 1 unit PRBC   Thrombocytopenia  ->511  PLT 63->44 today    S/p Transfuse 1 PRBC yesterday  Check CBC in AM  IP Hematology following      Elevated serum troponin- remains flat  likely secondary to demand, serial troponins have down trended  Continuous telemetry monitoring  Continue to monitor patient's clinical status     Hypertension-  continue current antihypertensive medications    Hyperglycemia type 2 diabetes-  continue insulin sliding scale       18.5 - 24.9 Normal weight / Body mass index is 21.14 kg/m². Code status: Full  Prophylaxis: Eliquis  Recommended Disposition: SNF/LTC? Estimated discharge date: -? Barriers: Oxygen stability/weaning, Hb, PLT stability       Subjective:     Chief Complaint / Reason for Physician Visit: F/U COVID infection, PNA, ARF, Enterococcal UTI  \"I am better\". Discussed with RN events overnight.      Review of Systems:  Symptom Y/N Comments  Symptom Y/N Comments   Fever/Chills n   Chest Pain n    Poor Appetite n   Edema n    Cough y   Abdominal Pain n    Sputum y   Joint Pain     SOB/JETER y   Pruritis/Rash     Nausea/vomit    Tolerating PT/OT y    Diarrhea    Tolerating Diet y    Constipation    Other Could NOT obtain due to:      Objective:     VITALS:   Last 24hrs VS reviewed since prior progress note. Most recent are:  Patient Vitals for the past 24 hrs:   Temp Pulse Resp BP SpO2   01/29/23 0731 97.6 °F (36.4 °C) 99 18 (!) 152/68 95 %   01/29/23 0431 -- -- -- -- 95 %   01/29/23 0335 98.2 °F (36.8 °C) (!) 107 18 (!) 149/74 95 %   01/28/23 2318 98.4 °F (36.9 °C) (!) 110 20 (!) 148/68 93 %   01/28/23 2258 -- -- -- -- 92 %   01/28/23 2217 -- -- -- -- 90 %   01/28/23 2000 -- -- -- -- 90 %   01/28/23 1745 -- 93 -- 121/64 91 %   01/28/23 1730 -- 87 -- (!) 156/74 93 %   01/28/23 1723 -- 78 -- (!) 166/75 90 %   01/28/23 1722 -- 80 -- (!) 166/71 --   01/28/23 1715 -- 72 -- (!) 154/56 91 %   01/28/23 1700 -- 71 -- (!) 155/61 92 %   01/28/23 1645 -- 70 -- (!) 155/60 93 %   01/28/23 1630 98.1 °F (36.7 °C) 79 20 (!) 153/57 92 %   01/28/23 1615 -- 74 -- (!) 153/57 92 %   01/28/23 1600 -- 82 -- (!) 152/59 90 %   01/28/23 1530 97.5 °F (36.4 °C) 80 20 (!) 151/57 91 %   01/28/23 1439 97.5 °F (36.4 °C) 77 16 (!) 147/60 91 %   01/28/23 1215 98 °F (36.7 °C) 78 16 (!) 153/64 --   01/28/23 1205 98.1 °F (36.7 °C) 78 18 (!) 153/64 92 %   01/28/23 1150 98.1 °F (36.7 °C) 85 16 (!) 138/54 92 %         Intake/Output Summary (Last 24 hours) at 1/29/2023 1041  Last data filed at 1/29/2023 0335  Gross per 24 hour   Intake 510 ml   Output 3200 ml   Net -2690 ml          I had a face to face encounter and independently examined this patient on 1/29/2023, as outlined below:  PHYSICAL EXAM:  General: WD, WN. Alert, cooperative, no acute distress    EENT:  EOMI. Anicteric sclerae. MMM  Resp:  Bilateral wheezing noted +  No accessory muscle use  CV:  Regular  rhythm,  No edema  GI:  Soft, Non distended, Non tender. +Bowel sounds  Neurologic:  Alert and oriented X 3, normal speech,   Psych:   Good insight. Not anxious nor agitated  Skin:  No rashes.   No jaundice    Reviewed most current lab test results and cultures  YES  Reviewed most current radiology test results   YES  Review and summation of old records today    NO  Reviewed patient's current orders and MAR    YES  PMH/SH reviewed - no change compared to H&P  ________________________________________________________________________  Care Plan discussed with:    Comments   Patient x    Family      RN x    Care Manager x    Consultant                        Multidiciplinary team rounds were held today with , nursing, pharmacist and clinical coordinator. Patient's plan of care was discussed; medications were reviewed and discharge planning was addressed. ________________________________________________________________________  Total NON critical care TIME:  36   Minutes    Total CRITICAL CARE TIME Spent:   Minutes non procedure based      Comments   >50% of visit spent in counseling and coordination of care x    ________________________________________________________________________  Diane Acosta MD     Procedures: see electronic medical records for all procedures/Xrays and details which were not copied into this note but were reviewed prior to creation of Plan. LABS:  I reviewed today's most current labs and imaging studies.   Pertinent labs include:  Recent Labs     01/29/23  0334 01/28/23  1727 01/28/23  0230 01/27/23  0207   .2*  --  455.5* 486.8*   HGB 8.5* 8.0* 6.6* 7.7*   HCT 32.2* 32.3* 26.8* 28.9*   PLT 44*  --  63* 57*       Recent Labs     01/28/23  0230 01/27/23  0207   * 133*   K 4.8 5.5*    102   CO2 23 24   * 157*   BUN 19 17   CREA 0.85 0.97   CA 8.6 8.7   MG  --  2.3   ALB 1.9* 2.1*   TBILI 1.2* 1.4*   ALT 46 42         Signed: Diane Acosta MD

## 2023-01-29 NOTE — PROGRESS NOTES
Problem: Risk for Spread of Infection  Goal: Prevent transmission of infectious organism to others  Description: Prevent the transmission of infectious organisms to other patients, staff members, and visitors. Outcome: Progressing Towards Goal     Problem: Patient Education:  Go to Education Activity  Goal: Patient/Family Education  Outcome: Progressing Towards Goal     Problem: Patient Education: Go to Patient Education Activity  Goal: Patient/Family Education  Outcome: Progressing Towards Goal     Problem: Patient Education: Go to Patient Education Activity  Goal: Patient/Family Education  Outcome: Progressing Towards Goal     Problem: Airway Clearance - Ineffective  Goal: Achieve or maintain patent airway  Outcome: Progressing Towards Goal     Problem: Gas Exchange - Impaired  Goal: Absence of hypoxia  Outcome: Progressing Towards Goal  Goal: Promote optimal lung function  Outcome: Progressing Towards Goal     Problem: Breathing Pattern - Ineffective  Goal: Ability to achieve and maintain a regular respiratory rate  Outcome: Progressing Towards Goal     Problem:  Body Temperature -  Risk of, Imbalanced  Goal: Ability to maintain a body temperature within defined limits  Outcome: Progressing Towards Goal  Goal: Will regain or maintain usual level of consciousness  Outcome: Progressing Towards Goal  Goal: Complications related to the disease process, condition or treatment will be avoided or minimized  Outcome: Progressing Towards Goal     Problem: Isolation Precautions - Risk of Spread of Infection  Goal: Prevent transmission of infectious organism to others  Outcome: Progressing Towards Goal     Problem: Nutrition Deficits  Goal: Optimize nutrtional status  Outcome: Progressing Towards Goal     Problem: Risk for Fluid Volume Deficit  Goal: Maintain normal heart rhythm  Outcome: Progressing Towards Goal  Goal: Maintain absence of muscle cramping  Outcome: Progressing Towards Goal  Goal: Maintain normal serum potassium, sodium, calcium, phosphorus, and pH  Outcome: Progressing Towards Goal     Problem: Loneliness or Risk for Loneliness  Goal: Demonstrate positive use of time alone when socialization is not possible  Outcome: Progressing Towards Goal     Problem: Fatigue  Goal: Verbalize increase energy and improved vitality  Outcome: Progressing Towards Goal     Problem: Falls - Risk of  Goal: *Absence of Falls  Description: Document Adriana Fall Risk and appropriate interventions in the flowsheet.   Outcome: Progressing Towards Goal  Note: Fall Risk Interventions:                                Problem: Patient Education: Go to Patient Education Activity  Goal: Patient/Family Education  Outcome: Progressing Towards Goal

## 2023-01-29 NOTE — PROGRESS NOTES
Hematology Oncology Progress Note    Follow up for: CLL    Chart notes reviewed since last visit.     Case discussed with following: .nurse    Events noted; pt is now on HF 02; is lethargic     Patient Vitals for the past 24 hrs:   BP Temp Pulse Resp SpO2 Weight   01/29/23 0731 (!) 152/68 97.6 °F (36.4 °C) 99 18 95 % --   01/29/23 0508 -- -- -- -- -- 68.3 kg (150 lb 8 oz)   01/29/23 0431 -- -- -- -- 95 % --   01/29/23 0335 (!) 149/74 98.2 °F (36.8 °C) (!) 107 18 95 % --   01/28/23 2318 (!) 148/68 98.4 °F (36.9 °C) (!) 110 20 93 % --   01/28/23 2258 -- -- -- -- 92 % --   01/28/23 2217 -- -- -- -- 90 % --   01/28/23 2000 -- -- -- -- 90 % --   01/28/23 1745 121/64 -- 93 -- 91 % --   01/28/23 1730 (!) 156/74 -- 87 -- 93 % --   01/28/23 1723 (!) 166/75 -- 78 -- 90 % --   01/28/23 1722 (!) 166/71 -- 80 -- -- --   01/28/23 1715 (!) 154/56 -- 72 -- 91 % --   01/28/23 1700 (!) 155/61 -- 71 -- 92 % --   01/28/23 1645 (!) 155/60 -- 70 -- 93 % --   01/28/23 1630 (!) 153/57 98.1 °F (36.7 °C) 79 20 92 % --   01/28/23 1615 (!) 153/57 -- 74 -- 92 % --   01/28/23 1600 (!) 152/59 -- 82 -- 90 % --   01/28/23 1530 (!) 151/57 97.5 °F (36.4 °C) 80 20 91 % --         Review of Systems:  12 point ROS done and negative except as above    Physical Examination:  Lethargic  On HF02    Labs:  Recent Results (from the past 24 hour(s))   GLUCOSE, POC    Collection Time: 01/28/23  4:08 PM   Result Value Ref Range    Glucose (POC) 165 (H) 65 - 117 mg/dL    Performed by Annalise Rosa PCT    HGB & HCT    Collection Time: 01/28/23  5:27 PM   Result Value Ref Range    HGB 8.0 (L) 12.1 - 17.0 g/dL    HCT 32.3 (L) 36.6 - 50.3 %   GLUCOSE, POC    Collection Time: 01/28/23  9:09 PM   Result Value Ref Range    Glucose (POC) 161 (H) 65 - 117 mg/dL    Performed by Yadi Cartwright PCT    CBC WITH AUTOMATED DIFF    Collection Time: 01/29/23  3:34 AM   Result Value Ref Range    .2 (HH) 4.1 - 11.1 K/uL    RBC 3.21 (L) 4.10 - 5.70 M/uL    HGB 8.5 (L) 12.1 - 17.0 g/dL    HCT 32.2 (L) 36.6 - 50.3 %    .6 (H) 80.0 - 99.0 FL    MCH 26.5 26.0 - 34.0 PG    MCHC 26.3 (L) 30.0 - 36.5 g/dL    RDW 26.3 (H) 11.5 - 14.5 %    PLATELET 44 (LL) 695 - 400 K/uL    MPV 11.5 8.9 - 12.9 FL    NRBC 0.0 0  WBC    ABSOLUTE NRBC 0.04 (H) 0.00 - 0.01 K/uL    NEUTROPHILS 1 (L) 32 - 75 %    LYMPHOCYTES 71 (H) 12 - 49 %    MONOCYTES 28 (H) 5 - 13 %    EOSINOPHILS 0 0 - 7 %    BASOPHILS 0 0 - 1 %    IMMATURE GRANULOCYTES 0 0.0 - 0.5 %    ABS. NEUTROPHILS 5.1 1.8 - 8.0 K/UL    ABS. LYMPHOCYTES 363.0 (H) 0.8 - 3.5 K/UL    ABS. MONOCYTES 143.1 (H) 0.0 - 1.0 K/UL    ABS. EOSINOPHILS 0.0 0.0 - 0.4 K/UL    ABS. BASOPHILS 0.0 0.0 - 0.1 K/UL    ABS. IMM.  GRANS. 0.0 0.00 - 0.04 K/UL    DF MANUAL      RBC COMMENTS MACROCYTOSIS  1+        RBC COMMENTS ANISOCYTOSIS  3+       C REACTIVE PROTEIN, QT    Collection Time: 01/29/23  3:34 AM   Result Value Ref Range    C-Reactive protein 15.50 (H) 0.00 - 0.60 mg/dL   PROCALCITONIN    Collection Time: 01/29/23  3:34 AM   Result Value Ref Range    Procalcitonin 0.34 ng/mL   GLUCOSE, POC    Collection Time: 01/29/23  6:46 AM   Result Value Ref Range    Glucose (POC) 143 (H) 65 - 117 mg/dL    Performed by Ariana Crowe RN      Assessment and Plan:   CLL  -Patient of Dr. Gary Adhikari  -Was supposed to start [id number] but has not yet as has been in hospital  -Cont to hold off on starting until over Covid  -Would transfuse PRBCs for HBG < 7, trasnfuse PLTs if < 10 or any bleeding  Getting prbc today 1/28  Hgb better ,8.5 on 1/29  Wbc quite high; leukostases not commonly seen in CLL    Recent PE  -On eliquis  -Ok to continue as long as PLT > 50  -trop most likely elevated from that, no chest pain, trop trending down  On eliquis but in light of his dropping plts would switch to heparin, short acting which can be stopped in case of bleeding; I would not hold on anticoagulation as he is at high risk for thrombosis ( covid, CLL) and any additional thrombosis/ PE cna further compromise his lungs     Covid  -Treatment per Hospitalist, worsening 02 requirement, on HF02 , pt is DNR    Cellulitis of left arm  -On abx per Hospitalist

## 2023-01-30 NOTE — PROGRESS NOTES
Hematology Oncology Progress Note    Follow up for: CLL    Chart notes reviewed since last visit.     Case discussed with following: .nurse    Events noted; pt is now on HF 40; remains lethargic     Patient Vitals for the past 24 hrs:   BP Temp Pulse Resp SpO2 Weight   01/30/23 0808 (!) 143/65 97.4 °F (36.3 °C) 99 20 94 % --   01/30/23 0555 -- -- -- -- -- 62.1 kg (136 lb 12.8 oz)   01/30/23 0415 -- -- -- -- 98 % --   01/30/23 0314 (!) 133/56 98.1 °F (36.7 °C) 73 16 96 % --   01/30/23 0214 -- -- -- -- 97 % --   01/29/23 2341 (!) 122/53 98.1 °F (36.7 °C) 77 18 98 % --   01/29/23 2008 120/64 98.2 °F (36.8 °C) 85 20 97 % --   01/29/23 1805 134/78 98 °F (36.7 °C) (!) 105 18 96 % --       Review of Systems:  12 point ROS done and negative except as above    Physical Examination:  Lethargic  On HF02    Labs:  Recent Results (from the past 24 hour(s))   GLUCOSE, POC    Collection Time: 01/29/23  4:35 PM   Result Value Ref Range    Glucose (POC) 205 (H) 65 - 117 mg/dL    Performed by Oliverio Owens PCT    GLUCOSE, POC    Collection Time: 01/29/23  8:43 PM   Result Value Ref Range    Glucose (POC) 233 (H) 65 - 117 mg/dL    Performed by Tammi Ram RN    C REACTIVE PROTEIN, QT    Collection Time: 01/30/23  3:31 AM   Result Value Ref Range    C-Reactive protein 14.40 (H) 0.00 - 0.60 mg/dL   CBC WITH AUTOMATED DIFF    Collection Time: 01/30/23  3:31 AM   Result Value Ref Range    .7 (HH) 4.1 - 11.1 K/uL    RBC 3.01 (L) 4.10 - 5.70 M/uL    HGB 8.0 (L) 12.1 - 17.0 g/dL    HCT 30.4 (L) 36.6 - 50.3 %    .0 (H) 80.0 - 99.0 FL    MCH 26.6 26.0 - 34.0 PG    MCHC 26.3 (L) 30.0 - 36.5 g/dL    RDW 26.5 (H) 11.5 - 14.5 %    PLATELET 41 (LL) 036 - 400 K/uL    MPV 12.2 8.9 - 12.9 FL    NRBC 0.0 0  WBC    ABSOLUTE NRBC 0.11 (H) 0.00 - 0.01 K/uL    NEUTROPHILS 0 (L) 32 - 75 %    LYMPHOCYTES 84 (H) 12 - 49 %    MONOCYTES 15 (H) 5 - 13 %    EOSINOPHILS 0 0 - 7 %    BASOPHILS 0 0 - 1 %    OTHER CELL 1      IMMATURE GRANULOCYTES 0 0.0 - 0.5 %    ABS. NEUTROPHILS 0.0 (L) 1.8 - 8.0 K/UL    ABS. LYMPHOCYTES 418.9 (H) 0.8 - 3.5 K/UL    ABS. MONOCYTES 74.8 (H) 0.0 - 1.0 K/UL    ABS. EOSINOPHILS 0.0 0.0 - 0.4 K/UL    ABS. BASOPHILS 0.0 0.0 - 0.1 K/UL    ABS. IMM. GRANS. 0.0 0.00 - 0.04 K/UL    DF MANUAL      RBC COMMENTS MACROCYTOSIS  1+        RBC COMMENTS ANISOCYTOSIS  3+        WBC COMMENTS SMUDGE CELLS SEEN     GLUCOSE, POC    Collection Time: 01/30/23  7:36 AM   Result Value Ref Range    Glucose (POC) 182 (H) 65 - 117 mg/dL    Performed by Antonia Ramos PCT      Assessment and Plan:   *) CLL  - follows with me in clinic for CLL  -Was supposed to start [id number] but has not yet as has been in hospital  -Cont to hold off on starting until over Covid  -Would transfuse PRBCs for HBG < 7, trasnfuse PLTs if < 10 or any bleeding  - Transfused prbc today 1/28  - Hgb stable at 8 today  - Wbc quite high; leukostases not commonly seen in CLL    *) Recent PE  -On eliquis  -Ok to continue as long as PLT > 50  -trop most likely elevated from that, no chest pain, trop trending down  - Switch to heparin gtt since it is short acting and can be stopped in case of bleeding; I would not hold on anticoagulation as he is at high risk for thrombosis ( covid, CLL) and any additional thrombosis/ PE  cna further compromise his lungs.      *) Covid  -Treatment per Hospitalist, worsening 02 requirement, on HF02 at 50ml/min , pt is DNR    *) Cellulitis of left arm  -On abx per Hospitalist

## 2023-01-30 NOTE — PROGRESS NOTES
1031:  Received message from lab that pt had critical lab - .7. Attempted to look up results so provider could be notified; they are not posted to the chart yet. Called lab, they said it would be 30 min before it was in the chart. 0532: CBC results still not available. Called lab to follow up. They stated it would be 30 min before available.

## 2023-01-30 NOTE — PROGRESS NOTES
Problem: Risk for Spread of Infection  Goal: Prevent transmission of infectious organism to others  Description: Prevent the transmission of infectious organisms to other patients, staff members, and visitors.   Outcome: Progressing Towards Goal     Problem: Fatigue  Goal: Verbalize increase energy and improved vitality  Outcome: Progressing Towards Goal

## 2023-01-30 NOTE — PROGRESS NOTES
7309 - MD would like to start heparin drip. Eliquis was already given this AM. Start heparin at 18 units/kg/hr per pharmacist.     Since patient had eliquis at 0900, heparin drip can be started at 1800 per Dr. Michael Stanley. Asked pharmacy to retime.      1652 - CBC, PTT, xa sent to lab

## 2023-01-30 NOTE — PROGRESS NOTES
End of Shift Note    Bedside shift change report given to Doris Henao RN (oncoming nurse) by Clive Adams RN (offgoing nurse). Report included the following information SBAR and Kardex    Shift worked:  7a-7p     Shift summary and any significant changes:     Uneventful shift. Updated both daughters over the phone. Pt fels better but still requiring high amount of O2.      Concerns for physician to address:       Zone phone for oncoming shift:          Activity:  Activity Level: Bed Rest  Number times ambulated in hallways past shift: 0  Number of times OOB to chair past shift: 0    Cardiac:   Cardiac Monitoring: Yes      Cardiac Rhythm: Sinus Tachy    Access:  Current line(s): PIV     Genitourinary:   Urinary status: alvarado    Respiratory:   O2 Device: Hi flow nasal cannula  Chronic home O2 use?: NO  Incentive spirometer at bedside: NO       GI:  Last Bowel Movement Date: 01/27/23  Current diet:  ADULT DIET Regular  Passing flatus: YES  Tolerating current diet: YES       Pain Management:   Patient states pain is manageable on current regimen: YES    Skin:  Javon Score: 14  Interventions: float heels, PT/OT consult, limit briefs, and internal/external urinary devices    Patient Safety:  Fall Score:    Interventions: bed/chair alarm, gripper socks, and pt to call before getting OOB       Length of Stay:  Expected LOS: 3d 12h  Actual LOS: 3      Clive Adams RN

## 2023-01-30 NOTE — PROGRESS NOTES
Hospitalist Progress Note    NAME: Moses Alexander   :  10/4/1933   MRN:  [id number]       Assessment / Plan:  Acute respiratory failure with hypoxia POA- increased O2 requirement at 50 L/m today AM, up from 13 L/m yesterday  Due to COVID-19 pneumonia POA  Blood cultures- neg x 4 days  Urine Cx +ve for Enterococcus Fecalis sensitive to  Ampicillin/levaquin noted  ESR 51->87  cRP= 18.1- >15.5->14.4  Procal 0.34    Continue Decadron once daily  Trend Inflammatory markers daily- trending down  Continue baricitinib once daily  S/p ceftriaxone azithromycin - changed to Levaquin today to cover for Enterococcus in Urine and Lung/PNA  Cont to attempted wean oxygen- stable at 50L/m (slightly improved)  Continue to monitor patient's clinical status     CLL- stable  Anemia- improved/stable Hb at 8.0 today AM s/p 1 unit PRBC   Thrombocytopenia  Recent PE  ->511->498  PLT 63->44->41 today    S/p Transfuse 1 PRBC yesterday  Check CBC in AM  IP Hematology following- recommended to change to IV heparin with PLT <50, DCd eliquis      Elevated serum troponin- remains flat  likely secondary to demand, serial troponins have down trended  Continuous telemetry monitoring  Continue to monitor patient's clinical status     Hypertension-  continue current antihypertensive medications    Hyperglycemia type 2 diabetes-  continue insulin sliding scale       18.5 - 24.9 Normal weight / Body mass index is 21.14 kg/m². Code status: DNR  Prophylaxis: Eliquis  Recommended Disposition: SNF/LTC? Estimated discharge date: ?-5  Barriers: Oxygen stability/weaning, Hb, PLT stability       Subjective:     Chief Complaint / Reason for Physician Visit: F/U COVID infection, PNA, ARF, Enterococcal UTI, recent PE  \"I am better\". Discussed with RN events overnight.      Review of Systems:  Symptom Y/N Comments  Symptom Y/N Comments   Fever/Chills n   Chest Pain n    Poor Appetite n   Edema n    Cough y   Abdominal Pain n    Sputum y Joint Pain     SOB/JETER y   Pruritis/Rash     Nausea/vomit    Tolerating PT/OT y    Diarrhea    Tolerating Diet y    Constipation    Other       Could NOT obtain due to:      Objective:     VITALS:   Last 24hrs VS reviewed since prior progress note. Most recent are:  Patient Vitals for the past 24 hrs:   Temp Pulse Resp BP SpO2   01/30/23 0808 97.4 °F (36.3 °C) 99 20 (!) 143/65 94 %   01/30/23 0415 -- -- -- -- 98 %   01/30/23 0314 98.1 °F (36.7 °C) 73 16 (!) 133/56 96 %   01/30/23 0214 -- -- -- -- 97 %   01/29/23 2341 98.1 °F (36.7 °C) 77 18 (!) 122/53 98 %   01/29/23 2008 98.2 °F (36.8 °C) 85 20 120/64 97 %   01/29/23 1805 98 °F (36.7 °C) (!) 105 18 134/78 96 %         Intake/Output Summary (Last 24 hours) at 1/30/2023 0932  Last data filed at 1/30/2023 9523  Gross per 24 hour   Intake 250 ml   Output 1950 ml   Net -1700 ml          I had a face to face encounter and independently examined this patient on 1/30/2023, as outlined below:  PHYSICAL EXAM:  General: WD, WN. Alert, cooperative, no acute distress    EENT:  EOMI. Anicteric sclerae. MMM  Resp:  Bilateral wheezing noted +  No accessory muscle use  CV:  Regular  rhythm,  No edema  GI:  Soft, Non distended, Non tender. +Bowel sounds  Neurologic:  Alert and oriented X 3, normal speech,   Psych:   Good insight. Not anxious nor agitated  Skin:  No rashes. No jaundice    Reviewed most current lab test results and cultures  YES  Reviewed most current radiology test results   YES  Review and summation of old records today    NO  Reviewed patient's current orders and MAR    YES  PMH/SH reviewed - no change compared to H&P  ________________________________________________________________________  Care Plan discussed with:    Comments   Patient x    Family      RN x    Care Manager x    Consultant                       x Multidiciplinary team rounds were held today with , nursing, pharmacist and clinical coordinator.   Patient's plan of care was discussed; medications were reviewed and discharge planning was addressed. ________________________________________________________________________  Total NON critical care TIME:  36   Minutes    Total CRITICAL CARE TIME Spent:   Minutes non procedure based      Comments   >50% of visit spent in counseling and coordination of care x    ________________________________________________________________________  Libby Baca MD     Procedures: see electronic medical records for all procedures/Xrays and details which were not copied into this note but were reviewed prior to creation of Plan. LABS:  I reviewed today's most current labs and imaging studies.   Pertinent labs include:  Recent Labs     01/30/23  0331 01/29/23  0334 01/28/23  1727 01/28/23  0230   .7* 511.2*  --  455.5*   HGB 8.0* 8.5* 8.0* 6.6*   HCT 30.4* 32.2* 32.3* 26.8*   PLT 41* 44*  --  63*       Recent Labs     01/28/23  0230   *   K 4.8      CO2 23   *   BUN 19   CREA 0.85   CA 8.6   ALB 1.9*   TBILI 1.2*   ALT 46         Signed: Libby Baca MD

## 2023-01-31 NOTE — PROGRESS NOTES
Hematology Oncology Progress Note    Follow up for: CLL    Chart notes reviewed since last visit. Case discussed with following: .nurse    Events noted;  still on HF, daughter and son in hallway, discussed with them and their sister Bonny Parham as well. Discussed Hospice is very appropriate at this point given K is not responding to treatment despite therapies, not candidate for HD.     Patient Vitals for the past 24 hrs:   BP Temp Pulse Resp SpO2 Weight   01/31/23 0815 -- -- -- -- 94 % --   01/31/23 0555 -- -- -- -- 91 % --   01/31/23 0451 -- -- -- -- -- 62.4 kg (137 lb 9.1 oz)   01/31/23 0241 (!) 120/54 97.9 °F (36.6 °C) 78 18 96 % --   01/30/23 2256 123/60 97.8 °F (36.6 °C) 67 20 95 % --   01/30/23 2202 -- -- -- -- 95 % --   01/30/23 1946 122/60 97.6 °F (36.4 °C) 95 18 94 % --   01/30/23 1651 -- -- -- -- 91 % --   01/30/23 1433 (!) 123/55 97.7 °F (36.5 °C) 83 18 93 % --   01/30/23 1216 102/76 97.5 °F (36.4 °C) 88 20 93 % --   01/30/23 1148 -- -- -- -- 92 % --       Review of Systems:  12 point ROS done and negative except as above    Physical Examination:  Lethargic  On HF02    Labs:  Recent Results (from the past 24 hour(s))   GLUCOSE, POC    Collection Time: 01/30/23 10:40 AM   Result Value Ref Range    Glucose (POC) 197 (H) 65 - 117 mg/dL    Performed by Garth Alarcon PCT    GLUCOSE, POC    Collection Time: 01/30/23  4:47 PM   Result Value Ref Range    Glucose (POC) 217 (H) 65 - 117 mg/dL    Performed by Ki Bowles PCT    ANTI-XA UNFRACTIONATED AND LMW HEPARIN    Collection Time: 01/30/23  4:49 PM   Result Value Ref Range    Heparin Xa,Unfrac. and LMW  IU/mL     Unable to obtain results due to specimen interference, spoke with Patrick STOUT at 1754 [facility]   PTT    Collection Time: 01/30/23  4:49 PM   Result Value Ref Range    aPTT 30.3 22.1 - 31.0 sec    aPTT, therapeutic range     58.0 - 77.0 SECS   CBC WITH AUTOMATED DIFF    Collection Time: 01/30/23  4:49 PM   Result Value Ref Range    .8 (HH) 4.1 - 11.1 K/uL    RBC 3.29 (L) 4.10 - 5.70 M/uL    HGB 8.3 (L) 12.1 - 17.0 g/dL    HCT 34.2 (L) 36.6 - 50.3 %    .0 (H) 80.0 - 99.0 FL    MCH 25.2 (L) 26.0 - 34.0 PG    MCHC 24.3 (L) 30.0 - 36.5 g/dL    RDW 28.1 (H) 11.5 - 14.5 %    PLATELET 50 (L) 241 - 400 K/uL    MPV 12.0 8.9 - 12.9 FL    NRBC 0.0 0  WBC    ABSOLUTE NRBC 0.07 (H) 0.00 - 0.01 K/uL    NEUTROPHILS 0 (L) 32 - 75 %    LYMPHOCYTES 89 (H) 12 - 49 %    MONOCYTES 11 5 - 13 %    EOSINOPHILS 0 0 - 7 %    BASOPHILS 0 0 - 1 %    IMMATURE GRANULOCYTES 0 0.0 - 0.5 %    ABS. NEUTROPHILS 0.0 (L) 1.8 - 8.0 K/UL    ABS. LYMPHOCYTES 690.5 (H) 0.8 - 3.5 K/UL    ABS. MONOCYTES 85.3 (H) 0.0 - 1.0 K/UL    ABS. EOSINOPHILS 0.0 0.0 - 0.4 K/UL    ABS. BASOPHILS 0.0 0.0 - 0.1 K/UL    ABS. IMM. GRANS. 0.0 0.00 - 0.04 K/UL    DF MANUAL      RBC COMMENTS ANISOCYTOSIS  2+        RBC COMMENTS MACROCYTOSIS  2+        WBC COMMENTS ATYPICAL LYMPHOCYTES PRESENT     SAMPLES BEING HELD    Collection Time: 01/30/23  4:49 PM   Result Value Ref Range    SAMPLES BEING HELD BLUE     COMMENT        Add-on orders for these samples will be processed based on acceptable specimen integrity and analyte stability, which may vary by analyte.    GLUCOSE, POC    Collection Time: 01/30/23  9:16 PM   Result Value Ref Range    Glucose (POC) 241 (H) 65 - 117 mg/dL    Performed by Donavan Noel RN    C REACTIVE PROTEIN, QT    Collection Time: 01/31/23 12:00 AM   Result Value Ref Range    C-Reactive protein 8.87 (H) 0.00 - 0.60 mg/dL   CBC W/O DIFF    Collection Time: 01/31/23 12:00 AM   Result Value Ref Range    .3 (HH) 4.1 - 11.1 K/uL    RBC 3.01 (L) 4.10 - 5.70 M/uL    HGB 8.0 (L) 12.1 - 17.0 g/dL    HCT 26.6 (L) 36.6 - 50.3 %    .7 (H) 80.0 - 99.0 FL    MCH 26.6 26.0 - 34.0 PG    MCHC 25.4 (L) 30.0 - 36.5 g/dL    RDW 28.0 (H) 11.5 - 14.5 %    PLATELET 41 (LL) 413 - 400 K/uL    MPV 12.0 8.9 - 12.9 FL    NRBC 0.0 0  WBC    ABSOLUTE NRBC 0.04 (H) 0.00 - 3.93 K/uL   METABOLIC PANEL, BASIC    Collection Time: 01/31/23 12:00 AM   Result Value Ref Range    Sodium 128 (L) 136 - 145 mmol/L    Potassium 7.5 (HH) 3.5 - 5.1 mmol/L    Chloride 98 97 - 108 mmol/L    CO2 26 21 - 32 mmol/L    Anion gap 4 (L) 5 - 15 mmol/L    Glucose 139 (H) 65 - 100 mg/dL    BUN 23 (H) 6 - 20 MG/DL    Creatinine 0.87 0.70 - 1.30 MG/DL    BUN/Creatinine ratio 26 (H) 12 - 20      eGFR >60 >60 ml/min/1.73m2    Calcium 8.4 (L) 8.5 - 10.1 MG/DL   PTT    Collection Time: 01/31/23 12:00 AM   Result Value Ref Range    aPTT 55.1 (H) 22.1 - 31.0 sec    aPTT, therapeutic range     58.0 - 77.0 SECS   EKG, 12 LEAD, INITIAL    Collection Time: 01/31/23  1:50 AM   Result Value Ref Range    Ventricular Rate 75 BPM    Atrial Rate 75 BPM    P-R Interval 148 ms    QRS Duration 86 ms    Q-T Interval 460 ms    QTC Calculation (Bezet) 513 ms    Calculated P Axis 27 degrees    Calculated R Axis -28 degrees    Calculated T Axis -122 degrees    Diagnosis       Normal sinus rhythm  Minimal voltage criteria for LVH, may be normal variant ( R in aVL )  ST & T wave abnormality, consider inferior ischemia  ST & T wave abnormality, consider anterolateral ischemia  Prolonged QT  Abnormal ECG  When compared with ECG of 26-JAN-2023 04:48,  T wave inversion more evident in Inferior leads  T wave inversion now evident in Anterolateral leads  QT has lengthened     METABOLIC PANEL, BASIC    Collection Time: 01/31/23  5:22 AM   Result Value Ref Range    Sodium 121 (L) 136 - 145 mmol/L    Potassium 7.6 (HH) 3.5 - 5.1 mmol/L    Chloride 84 (L) 97 - 108 mmol/L    CO2 24 21 - 32 mmol/L    Anion gap 13 5 - 15 mmol/L    Glucose 621 (HH) 65 - 100 mg/dL    BUN 26 (H) 6 - 20 MG/DL    Creatinine 1.10 0.70 - 1.30 MG/DL    BUN/Creatinine ratio 24 (H) 12 - 20      eGFR >60 >60 ml/min/1.73m2    Calcium 7.8 (L) 8.5 - 10.1 MG/DL   GLUCOSE, POC    Collection Time: 01/31/23  6:38 AM   Result Value Ref Range    Glucose (POC) 245 (H) 65 - 117 mg/dL    Performed by Cheo Worthington Anthony Ellis RN    METABOLIC PANEL, BASIC    Collection Time: 01/31/23  6:55 AM   Result Value Ref Range    Sodium 128 (L) 136 - 145 mmol/L    Potassium 8.2 (HH) 3.5 - 5.1 mmol/L    Chloride 96 (L) 97 - 108 mmol/L    CO2 27 21 - 32 mmol/L    Anion gap 5 5 - 15 mmol/L    Glucose 241 (H) 65 - 100 mg/dL    BUN 27 (H) 6 - 20 MG/DL    Creatinine 0.97 0.70 - 1.30 MG/DL    BUN/Creatinine ratio 28 (H) 12 - 20      eGFR >60 >60 ml/min/1.73m2    Calcium 7.8 (L) 8.5 - 10.1 MG/DL   PHOSPHORUS    Collection Time: 01/31/23  6:55 AM   Result Value Ref Range    Phosphorus 3.8 2.6 - 4.7 MG/DL   URIC ACID    Collection Time: 01/31/23  6:55 AM   Result Value Ref Range    Uric acid 3.9 3.5 - 7.2 MG/DL   GLUCOSE, POC    Collection Time: 01/31/23  8:25 AM   Result Value Ref Range    Glucose (POC) 218 (H) 65 - 117 mg/dL    Performed by Verona Varela      Assessment and Plan:   *) CLL  - follows with me in clinic for CLL  -Was supposed to start Deloras Batch but has not yet as has been in hospital with multiple infections, PE etc.  -Would transfuse PRBCs for HBG < 7, trasnfuse PLTs if < 10 or any bleeding  - Transfused prbc  1/28  - Hyperkalemia despite multiple therapies, discussed with daughter/son and dtr Fly Barbosa on speaker phone high K due to CLL which we can't treat currently and feel hospice is good option. Unfortunately, we can't address his CLL safely at this time in setting of bad covid infection requiring high flow, he has a lot of air hunger and hospice can address this, family seems to be more willing to consider hospice. Dr. Cruz Public also discussed with me and family. He is DNR/DNI    *) Recent PE  -was onn eliquis  -Ok to continue as long as PLT > 50  -trop most likely elevated from that, no chest pain, trop trending down  - Switch to heparin gtt since it is short acting and can be stopped in case of bleeding; I would not hold on anticoagulation as he is at high risk for thrombosis ( covid, CLL) and any additional thrombosis/ PE  cna further compromise his lungs.      *) Covid  -Treatment per Hospitalist, worsening 02 requirement, on HF02 at 50ml/min , pt is DNR    *) Cellulitis of left arm  -On abx per Hospitalist

## 2023-01-31 NOTE — PROGRESS NOTES
Problem: Risk for Spread of Infection  Goal: Prevent transmission of infectious organism to others  Description: Prevent the transmission of infectious organisms to other patients, staff members, and visitors. Outcome: Progressing Towards Goal     Problem: Patient Education:  Go to Education Activity  Goal: Patient/Family Education  Outcome: Progressing Towards Goal     Problem: Patient Education: Go to Patient Education Activity  Goal: Patient/Family Education  Outcome: Progressing Towards Goal     Problem: Patient Education: Go to Patient Education Activity  Goal: Patient/Family Education  Outcome: Progressing Towards Goal     Problem: Airway Clearance - Ineffective  Goal: Achieve or maintain patent airway  Outcome: Progressing Towards Goal     Problem: Gas Exchange - Impaired  Goal: Absence of hypoxia  Outcome: Progressing Towards Goal  Goal: Promote optimal lung function  Outcome: Progressing Towards Goal     Problem: Breathing Pattern - Ineffective  Goal: Ability to achieve and maintain a regular respiratory rate  Outcome: Progressing Towards Goal     Problem:  Body Temperature -  Risk of, Imbalanced  Goal: Ability to maintain a body temperature within defined limits  Outcome: Progressing Towards Goal  Goal: Will regain or maintain usual level of consciousness  Outcome: Progressing Towards Goal  Goal: Complications related to the disease process, condition or treatment will be avoided or minimized  Outcome: Progressing Towards Goal     Problem: Isolation Precautions - Risk of Spread of Infection  Goal: Prevent transmission of infectious organism to others  Outcome: Progressing Towards Goal     Problem: Nutrition Deficits  Goal: Optimize nutrtional status  Outcome: Progressing Towards Goal     Problem: Risk for Fluid Volume Deficit  Goal: Maintain normal heart rhythm  Outcome: Progressing Towards Goal  Goal: Maintain absence of muscle cramping  Outcome: Progressing Towards Goal  Goal: Maintain normal serum potassium, sodium, calcium, phosphorus, and pH  Outcome: Progressing Towards Goal     Problem: Loneliness or Risk for Loneliness  Goal: Demonstrate positive use of time alone when socialization is not possible  Outcome: Progressing Towards Goal     Problem: Fatigue  Goal: Verbalize increase energy and improved vitality  Outcome: Progressing Towards Goal     Problem: Patient Education: Go to Patient Education Activity  Goal: Patient/Family Education  Outcome: Progressing Towards Goal     Problem: Falls - Risk of  Goal: *Absence of Falls  Description: Document Adriana Fall Risk and appropriate interventions in the flowsheet. Outcome: Progressing Towards Goal  Note: Fall Risk Interventions:  Mobility Interventions: Assess mobility with egress test    Mentation Interventions: Adequate sleep, hydration, pain control    Medication Interventions: Evaluate medications/consider consulting pharmacy    Elimination Interventions: Call light in reach              Problem: Patient Education: Go to Patient Education Activity  Goal: Patient/Family Education  Outcome: Progressing Towards Goal     Problem: Infection - Risk of, Urinary Catheter-Associated Urinary Tract Infection  Goal: *Absence of infection signs and symptoms  Outcome: Progressing Towards Goal     Problem: Patient Education: Go to Patient Education Activity  Goal: Patient/Family Education  Outcome: Progressing Towards Goal     Problem: Pressure Injury - Risk of  Goal: *Prevention of pressure injury  Description: Document Javon Scale and appropriate interventions in the flowsheet.   Outcome: Progressing Towards Goal     Problem: Patient Education: Go to Patient Education Activity  Goal: Patient/Family Education  Outcome: Progressing Towards Goal

## 2023-01-31 NOTE — PROGRESS NOTES
0700: Bedside and Verbal shift change report given to Freddy Leon (oncoming nurse) by Feliz Ayala (offgoing nurse). Report included the following information SBAR, Kardex, Intake/Output, MAR, and Recent Results.

## 2023-01-31 NOTE — PROGRESS NOTES
End of Shift Note    Bedside shift change report given to  RN (oncoming nurse) by Susan Moran RN (offgoing nurse). Report included the following information SBAR, Kardex, MAR, and Recent Results    Shift worked:  7p-7a     Shift summary and any significant changes:     Pt had critical potassium of 7.5, Night time provider notified. Orders put in. Concerns for physician to address:       Zone phone for oncoming shift:          Activity:  Activity Level: Bed Rest  Number times ambulated in hallways past shift: 0  Number of times OOB to chair past shift: 0    Cardiac:   Cardiac Monitoring: Yes      Cardiac Rhythm: Sinus Rhythm    Access:  Current line(s): PIV     Genitourinary:   Urinary status: alvarado    Respiratory:   O2 Device: Heated, Hi flow nasal cannula  Chronic home O2 use?: NO  Incentive spirometer at bedside: NO       GI:  Last Bowel Movement Date: 01/27/23  Current diet:  ADULT DIET Regular  Passing flatus: YES  Tolerating current diet: YES       Pain Management:   Patient states pain is manageable on current regimen: YES    Skin:  Javon Score: 14  Interventions: turn team, speciality bed, float heels, increase time out of bed, and PT/OT consult    Patient Safety:  Fall Score:  Total Score: 4  Interventions: bed/chair alarm, assistive device (walker, cane, etc), gripper socks, pt to call before getting OOB, and stay with me (per policy)  High Fall Risk: Yes    Length of Stay:  Expected LOS: 3d 12h  Actual LOS: 5      Susan Moran RN

## 2023-01-31 NOTE — PROGRESS NOTES
Bedside shift change report given to Yash Sow (oncoming nurse) by Chele Rashid (offgoing nurse). Report included the following information SBAR, Kardex, Procedure Summary, Intake/Output, MAR, Accordion, Recent Results, Med Rec Status, and Cardiac Rhythm NSR .

## 2023-01-31 NOTE — PROGRESS NOTES
Physical Therapy  Chart reviewed for updates and spoke with OT and RN. Per RN, patient is not appropriate for therapy services and family seeking hospice services. She is recommending PT sign off.    Jose Alford, PT, DPT

## 2023-01-31 NOTE — HOSPICE
[facility]  [phone]    Nursing Note   Patient Name: Elif Brady  YOB: 1933  Age: 80 y.o. [address] [facility] Viktor RN Note:      Hospice consult noted. Chart reviewed. Spoke with Cem/daughter by phone. She shared that she would like to wait until her sister is here; she is flying in from [address] and will not be her until late this evening. Hospice meeting is planned for tomorrow, Wednesday 2/1, at 10:30am.  If there are any questions, please call the main [address] number at [phone]. Thank you for the opportunity to be of service to this patient and his family.

## 2023-01-31 NOTE — PROGRESS NOTES
Problem: Risk for Spread of Infection  Goal: Prevent transmission of infectious organism to others  Description: Prevent the transmission of infectious organisms to other patients, staff members, and visitors. Outcome: Progressing Towards Goal     Problem: Patient Education: Go to Patient Education Activity  Goal: Patient/Family Education  Outcome: Progressing Towards Goal     Problem: Body Temperature -  Risk of, Imbalanced  Goal: Ability to maintain a body temperature within defined limits  Outcome: Progressing Towards Goal  Goal: Will regain or maintain usual level of consciousness  Outcome: Progressing Towards Goal  Goal: Complications related to the disease process, condition or treatment will be avoided or minimized  Outcome: Progressing Towards Goal     Problem: Risk for Fluid Volume Deficit  Goal: Maintain normal heart rhythm  Outcome: Progressing Towards Goal  Goal: Maintain absence of muscle cramping  Outcome: Progressing Towards Goal  Goal: Maintain normal serum potassium, sodium, calcium, phosphorus, and pH  Outcome: Progressing Towards Goal     Problem: Fatigue  Goal: Verbalize increase energy and improved vitality  Outcome: Progressing Towards Goal     Problem: Loneliness or Risk for Loneliness  Goal: Demonstrate positive use of time alone when socialization is not possible  Outcome: Progressing Towards Goal     Problem: Falls - Risk of  Goal: *Absence of Falls  Description: Document Adriana Fall Risk and appropriate interventions in the flowsheet.   Outcome: Progressing Towards Goal  Note: Fall Risk Interventions:  Mobility Interventions: Assess mobility with egress test    Mentation Interventions: Adequate sleep, hydration, pain control    Medication Interventions: Evaluate medications/consider consulting pharmacy    Elimination Interventions: Call light in reach              Problem: Infection - Risk of, Urinary Catheter-Associated Urinary Tract Infection  Goal: *Absence of infection signs and symptoms  Outcome: Progressing Towards Goal     Problem: Pressure Injury - Risk of  Goal: *Prevention of pressure injury  Description: Document Javon Scale and appropriate interventions in the flowsheet.   Outcome: Progressing Towards Goal

## 2023-01-31 NOTE — PROGRESS NOTES
Hospitalist Progress Note    NAME: Stella Mendoza   :  10/4/1933   MRN:  [id number]       Assessment / Plan:  Severe Hyperkalemia - refractory, trending up to 8.2 from 7.5 despite treatment  S/p Insulin + D50, Ca-Gluconate, Kayexalate overnight  Serial BMP showing rising K+   Give IV Bicarb 2 amps x 1 now  Spoke with Daughter who is on her way to hospital-- will decide on Considering Hospice which was recommended Strongly today.   IP Palliative consulted for now-- help family transition to [facility] care for now   Pt remains DNR  IP Nephrology consulted overnight team- will cancel if Comfort care decided  Pt not a good candidate for Apheresis with Low PLTs and endstage process with CLL    Acute respiratory failure with hypoxia POA- increased O2 requirement at 50 L/m today AM, up from 13 L/m yesterday  Due to COVID-19 pneumonia POA  Blood cultures- neg x 4 days  Urine Cx +ve for Enterococcus Fecalis sensitive to  Ampicillin/levaquin noted  ESR 51->87  cRP= 18.1- >15.5->14.4  Procal 0.34    Continue Decadron once daily  Trend Inflammatory markers daily- trending down  Continue baricitinib once daily  S/p ceftriaxone azithromycin - changed to Levaquin today to cover for Enterococcus in Urine and Lung/PNA  Cont to attempted wean oxygen- stable at 50L/m (slightly improved)  Continue to monitor patient's clinical status           CLL- likely cause for Hyperkalemia due to lysis of WBC  Anemia- improved/stable Hb at 8.0 today AM s/p 1 unit PRBC   Thrombocytopenia  Recent PE  ->511->498->775->538 today AM  PLT 63->44->41 today    S/p Transfuse 1 PRBC yesterday  Check CBC in AM  IP Hematology following- recommended to change to IV heparin with PLT <50, DCd eliquis      Elevated serum troponin- remains flat  likely secondary to demand, serial troponins have down trended  Continuous telemetry monitoring  Continue to monitor patient's clinical status     Hypertension-  continue current antihypertensive medications    Hyperglycemia type 2 diabetes-  continue insulin sliding scale       18.5 - 24.9 Normal weight / Body mass index is 21.14 kg/m². Code status: DNR  Prophylaxis: IV heparin  Recommended Disposition:Hospice? Estimated discharge date: 2/6? Barriers: Oxygen stability/weaning, Hb, PLT stability, Hyperkalemia-- ?IP HOSPICE       Subjective:     Chief Complaint / Reason for Physician Visit: F/U COVID infection, PNA, ARF, Enterococcal UTI, recent PE, Hyperkalemia  \"I am SOB\". Discussed with RN events overnight. Review of Systems:  Symptom Y/N Comments  Symptom Y/N Comments   Fever/Chills n   Chest Pain n    Poor Appetite n   Edema n    Cough y   Abdominal Pain n    Sputum y   Joint Pain     SOB/JETER y   Pruritis/Rash     Nausea/vomit    Tolerating PT/OT n    Diarrhea    Tolerating Diet y    Constipation    Other       Could NOT obtain due to:      Objective:     VITALS:   Last 24hrs VS reviewed since prior progress note. Most recent are:  Patient Vitals for the past 24 hrs:   Temp Pulse Resp BP SpO2   01/31/23 0815 -- -- -- -- 94 %   01/31/23 0555 -- -- -- -- 91 %   01/31/23 0241 97.9 °F (36.6 °C) 78 18 (!) 120/54 96 %   01/30/23 2256 97.8 °F (36.6 °C) 67 20 123/60 95 %   01/30/23 2202 -- -- -- -- 95 %   01/30/23 1946 97.6 °F (36.4 °C) 95 18 122/60 94 %   01/30/23 1651 -- -- -- -- 91 %   01/30/23 1433 97.7 °F (36.5 °C) 83 18 (!) 123/55 93 %   01/30/23 1216 97.5 °F (36.4 °C) 88 20 102/76 93 %   01/30/23 1148 -- -- -- -- 92 %         Intake/Output Summary (Last 24 hours) at 1/31/2023 0818  Last data filed at 1/31/2023 3415  Gross per 24 hour   Intake 240 ml   Output 4000 ml   Net -3760 ml          I had a face to face encounter and independently examined this patient on 1/31/2023, as outlined below:  PHYSICAL EXAM:  General: WD, WN. Alert, cooperative, no acute distress    EENT:  EOMI. Anicteric sclerae.  MMM  Resp:  Bilateral wheezing noted +  No accessory muscle use  CV:  Regular rhythm,  No edema  GI:  Soft, Non distended, Non tender. +Bowel sounds  Neurologic:  Alert and oriented X 3, normal speech,   Psych:   Good insight. Not anxious nor agitated  Skin:  No rashes. No jaundice    Reviewed most current lab test results and cultures  YES  Reviewed most current radiology test results   YES  Review and summation of old records today    NO  Reviewed patient's current orders and MAR    YES  PMH/SH reviewed - no change compared to H&P  ________________________________________________________________________  Care Plan discussed with:    Comments   Patient x    Family  x Daughter Terell Aldana on phone today AM    RN x    Care Manager x    Consultant                       x Multidiciplinary team rounds were held today with , nursing, pharmacist and clinical coordinator. Patient's plan of care was discussed; medications were reviewed and discharge planning was addressed. ________________________________________________________________________  Total NON critical care TIME:  36   Minutes    Total CRITICAL CARE TIME Spent:   Minutes non procedure based      Comments   >50% of visit spent in counseling and coordination of care x    ________________________________________________________________________  Hair Best MD     Procedures: see electronic medical records for all procedures/Xrays and details which were not copied into this note but were reviewed prior to creation of Plan. LABS:  I reviewed today's most current labs and imaging studies.   Pertinent labs include:  Recent Labs     01/31/23  0000 01/30/23  1649 01/30/23  0331   .3* 775.8* 498.7*   HGB 8.0* 8.3* 8.0*   HCT 26.6* 34.2* 30.4*   PLT 41* 50* 41*       Recent Labs     01/31/23  0655 01/31/23  0522 01/31/23  0000   * 121* 128*   K 8.2* 7.6* 7.5*   CL 96* 84* 98   CO2 27 24 26   * 621* 139*   BUN 27* 26* 23*   CREA 0.97 1.10 0.87   CA 7.8* 7.8* 8.4*   PHOS 3.8  --   --          Signed: Anastasia Greene MD

## 2023-01-31 NOTE — PROGRESS NOTES
Transition of Care Plan:    RUR:28% high  Disposition:Hospice  If SNF or IPR: Date FOC offered:na  Date FOC received:na  Date authorization started with reference number:na  Date authorization received and expires:na  Accepting facility:na  Follow up appointments:na  DME needed:mark  [place] [address] [facility] [place] means to access [place]:na        IM Medicare Letter:na  Is patient a [place] and connected with the [address] [facility]? If yes, was [employer] transfer form completed and [facility] notified? Caregiver Contact:Cem Naqvi (Daughter)   [phone]   Discharge Caregiver contacted prior to discharge? Care Conference needed?:       CM noted Hospice consult. I entered it in Epic and spoke with Hospice nurse briefly. I spoke with the family out side the room. CM will continue to follow for any additional needs .     [address] [place] [address]  [phone]

## 2023-01-31 NOTE — PROGRESS NOTES
OT completing order- patient unable to participate in therapy services. Family seeking hospice services.    Sanju Mcallister, OTR/L glasses

## 2023-01-31 NOTE — PROGRESS NOTES
Cross coverage note: This is a 71-year-old male with CLL currently followed by oncology who had a potassium of 7.5. EKG did not reveal spiked T waves or GA prolongation. Patient resting comfortably. Have ordered bicarbonate, IV fluids, Lasix, insulin with D5, and repeat BMP after adequate diuresis. Have also added Lokelma. Patient may require nephrology evaluation as he had a previous potassium of 8.1 on 1/13/2023. Unclear if this represents tumor lysis syndrome as the patient is currently not on chemotherapy. Patient with profound leukocytosis consistent with CLL. Repeat potassium after diuretics, Lokelma, insulin and D5 revealed a potassium of 7.6. Glucose was greater than 600. We will give another dose of insulin now and consult nephrology. Blood work was drawn from the dextrose line. We will repeat BMP for accurate data.  Calcium given

## 2023-02-01 NOTE — PROGRESS NOTES
Problem: Risk for Spread of Infection  Goal: Prevent transmission of infectious organism to others  Description: Prevent the transmission of infectious organisms to other patients, staff members, and visitors. Outcome: Progressing Towards Goal     Problem: Patient Education:  Go to Education Activity  Goal: Patient/Family Education  Outcome: Progressing Towards Goal     Problem: Patient Education: Go to Patient Education Activity  Goal: Patient/Family Education  Outcome: Progressing Towards Goal     Problem: Patient Education: Go to Patient Education Activity  Goal: Patient/Family Education  Outcome: Progressing Towards Goal     Problem: Airway Clearance - Ineffective  Goal: Achieve or maintain patent airway  Outcome: Progressing Towards Goal     Problem: Gas Exchange - Impaired  Goal: Absence of hypoxia  Outcome: Progressing Towards Goal  Goal: Promote optimal lung function  Outcome: Progressing Towards Goal     Problem: Breathing Pattern - Ineffective  Goal: Ability to achieve and maintain a regular respiratory rate  Outcome: Progressing Towards Goal     Problem:  Body Temperature -  Risk of, Imbalanced  Goal: Ability to maintain a body temperature within defined limits  Outcome: Progressing Towards Goal  Goal: Will regain or maintain usual level of consciousness  Outcome: Progressing Towards Goal  Goal: Complications related to the disease process, condition or treatment will be avoided or minimized  Outcome: Progressing Towards Goal     Problem: Isolation Precautions - Risk of Spread of Infection  Goal: Prevent transmission of infectious organism to others  Outcome: Progressing Towards Goal     Problem: Nutrition Deficits  Goal: Optimize nutrtional status  Outcome: Progressing Towards Goal     Problem: Risk for Fluid Volume Deficit  Goal: Maintain normal heart rhythm  Outcome: Progressing Towards Goal  Goal: Maintain absence of muscle cramping  Outcome: Progressing Towards Goal  Goal: Maintain normal serum potassium, sodium, calcium, phosphorus, and pH  Outcome: Progressing Towards Goal     Problem: Loneliness or Risk for Loneliness  Goal: Demonstrate positive use of time alone when socialization is not possible  Outcome: Progressing Towards Goal     Problem: Fatigue  Goal: Verbalize increase energy and improved vitality  Outcome: Progressing Towards Goal     Problem: Patient Education: Go to Patient Education Activity  Goal: Patient/Family Education  Outcome: Progressing Towards Goal     Problem: Falls - Risk of  Goal: *Absence of Falls  Description: Document Adriana Fall Risk and appropriate interventions in the flowsheet.   Outcome: Progressing Towards Goal  Note: Fall Risk Interventions:  Mobility Interventions: Assess mobility with egress test    Mentation Interventions: Adequate sleep, hydration, pain control    Medication Interventions: Evaluate medications/consider consulting pharmacy    Elimination Interventions: Call light in reach              Problem: Patient Education: Go to Patient Education Activity  Goal: Patient/Family Education  Outcome: Progressing Towards Goal     Problem: Infection - Risk of, Urinary Catheter-Associated Urinary Tract Infection  Goal: *Absence of infection signs and symptoms  Outcome: Progressing Towards Goal     Problem: Patient Education: Go to Patient Education Activity  Goal: Patient/Family Education  Outcome: Progressing Towards Goal

## 2023-02-01 NOTE — PROGRESS NOTES
Problem: Risk for Spread of Infection  Goal: Prevent transmission of infectious organism to others  Description: Prevent the transmission of infectious organisms to other patients, staff members, and visitors. Outcome: Progressing Towards Goal     Problem: Patient Education:  Go to Education Activity  Goal: Patient/Family Education  Outcome: Progressing Towards Goal     Problem: Patient Education: Go to Patient Education Activity  Goal: Patient/Family Education  Outcome: Progressing Towards Goal     Problem: Patient Education: Go to Patient Education Activity  Goal: Patient/Family Education  Outcome: Progressing Towards Goal     Problem: Airway Clearance - Ineffective  Goal: Achieve or maintain patent airway  Outcome: Progressing Towards Goal     Problem: Gas Exchange - Impaired  Goal: Absence of hypoxia  Outcome: Progressing Towards Goal  Goal: Promote optimal lung function  Outcome: Progressing Towards Goal     Problem: Breathing Pattern - Ineffective  Goal: Ability to achieve and maintain a regular respiratory rate  Outcome: Progressing Towards Goal     Problem:  Body Temperature -  Risk of, Imbalanced  Goal: Ability to maintain a body temperature within defined limits  Outcome: Progressing Towards Goal  Goal: Will regain or maintain usual level of consciousness  Outcome: Progressing Towards Goal  Goal: Complications related to the disease process, condition or treatment will be avoided or minimized  Outcome: Progressing Towards Goal     Problem: Isolation Precautions - Risk of Spread of Infection  Goal: Prevent transmission of infectious organism to others  Outcome: Progressing Towards Goal     Problem: Nutrition Deficits  Goal: Optimize nutrtional status  Outcome: Progressing Towards Goal     Problem: Risk for Fluid Volume Deficit  Goal: Maintain normal heart rhythm  Outcome: Progressing Towards Goal  Goal: Maintain absence of muscle cramping  Outcome: Progressing Towards Goal  Goal: Maintain normal serum potassium, sodium, calcium, phosphorus, and pH  Outcome: Progressing Towards Goal     Problem: Loneliness or Risk for Loneliness  Goal: Demonstrate positive use of time alone when socialization is not possible  Outcome: Progressing Towards Goal     Problem: Fatigue  Goal: Verbalize increase energy and improved vitality  Outcome: Progressing Towards Goal     Problem: Patient Education: Go to Patient Education Activity  Goal: Patient/Family Education  Outcome: Progressing Towards Goal     Problem: Falls - Risk of  Goal: *Absence of Falls  Description: Document Adriana Fall Risk and appropriate interventions in the flowsheet. Outcome: Progressing Towards Goal  Note: Fall Risk Interventions:  Mobility Interventions: Assess mobility with egress test    Mentation Interventions: Adequate sleep, hydration, pain control    Medication Interventions: Evaluate medications/consider consulting pharmacy    Elimination Interventions: Call light in reach              Problem: Patient Education: Go to Patient Education Activity  Goal: Patient/Family Education  Outcome: Progressing Towards Goal     Problem: Infection - Risk of, Urinary Catheter-Associated Urinary Tract Infection  Goal: *Absence of infection signs and symptoms  Outcome: Progressing Towards Goal     Problem: Patient Education: Go to Patient Education Activity  Goal: Patient/Family Education  Outcome: Progressing Towards Goal     Problem: Pressure Injury - Risk of  Goal: *Prevention of pressure injury  Description: Document Javon Scale and appropriate interventions in the flowsheet.   Outcome: Progressing Towards Goal  Note: Pressure Injury Interventions:  Sensory Interventions: Assess changes in LOC, Assess need for specialty bed, Avoid rigorous massage over bony prominences    Moisture Interventions: Absorbent underpads, Apply protective barrier, creams and emollients, Internal/External urinary devices, Limit adult briefs, Maintain skin hydration (lotion/cream)    Activity Interventions: Assess need for specialty bed, Increase time out of bed, Pressure redistribution bed/mattress(bed type), PT/OT evaluation    Mobility Interventions: Assess need for specialty bed, HOB 30 degrees or less, Float heels, Pressure redistribution bed/mattress (bed type), Suspension boots    Nutrition Interventions: Document food/fluid/supplement intake    Friction and Shear Interventions: Apply protective barrier, creams and emollients, Lift sheet, Lift team/patient mobility team, Minimize layers                Problem: Patient Education: Go to Patient Education Activity  Goal: Patient/Family Education  Outcome: Progressing Towards Goal

## 2023-02-01 NOTE — PROGRESS NOTES
Hematology Oncology Progress Note    Follow up for: CLL    Chart notes reviewed since last visit. Case discussed with following: nurse    Events noted;  still on HF, no family at bedside today. Family having hospice mtg today at 10:30    Patient Vitals for the past 24 hrs:   BP Temp Pulse Resp SpO2 Weight   02/01/23 0905 135/61 -- (!) 108 -- -- --   02/01/23 0530 -- -- -- -- -- 62.1 kg (136 lb 14.5 oz)   02/01/23 0359 -- -- -- -- 93 % --   02/01/23 0227 (!) 129/50 97.7 °F (36.5 °C) 86 18 90 % --   01/31/23 2346 -- -- -- -- 91 % --   01/31/23 2234 (!) 121/53 97.9 °F (36.6 °C) 76 18 98 % --   01/31/23 2127 -- -- -- -- 97 % --   01/31/23 1914 (!) 125/55 97.8 °F (36.6 °C) 97 18 95 % --   01/31/23 1746 (!) 141/58 -- 91 -- -- --   01/31/23 1505 (!) 124/53 97.7 °F (36.5 °C) 88 17 93 % --   01/31/23 1504 -- -- -- -- 95 % --   01/31/23 1213 -- -- -- -- 95 % --   01/31/23 1108 (!) 116/55 97.7 °F (36.5 °C) (!) 108 18 92 % --       Review of Systems:  12 point ROS done and negative except as above    Physical Examination:  Seen from window and talked on phone briefly given respiratory status.   Lethargic  On HF02    Labs:  Recent Results (from the past 24 hour(s))   GLUCOSE, POC    Collection Time: 01/31/23 11:08 AM   Result Value Ref Range    Glucose (POC) 252 (H) 65 - 117 mg/dL    Performed by [address] [place] [address], POC    Collection Time: 01/31/23  4:49 PM   Result Value Ref Range    Glucose (POC) 194 (H) 65 - 117 mg/dL    Performed by Anny Galeano PCT    GLUCOSE, POC    Collection Time: 01/31/23 10:12 PM   Result Value Ref Range    Glucose (POC) 243 (H) 65 - 117 mg/dL    Performed by Francine Kenney RN    GLUCOSE, POC    Collection Time: 02/01/23  8:04 AM   Result Value Ref Range    Glucose (POC) 179 (H) 65 - 117 mg/dL    Performed by Jos Fenton PCT      Assessment and Plan:   *) CLL  - follows with me in clinic for CLL  -Was supposed to start [address] but has not yet as has been in hospital with multiple infections, PE etc.  -Would transfuse PRBCs for HBG < 7, trasnfuse PLTs if < 10 or any bleeding  - Transfused prbc  1/28  - Hyperkalemia despite multiple therapies, Yesterday I discussed with daughter/son and dtr Berenice Hatchet on speaker phone high K due to CLL which we can't treat currently and feel hospice is appropriate. Unfortunately, we can't address his CLL safely at this time in setting of bad covid infection requiring high flow, he has a lot of air hunger and hospice can address this, family seems to be more willing to consider hospice. He is DNR/DNI. Family mtg with hospice later this am planned. - I have tried to discuss hopsice in office on prior visits and was addressed during last admission at [facility] per [facility] but family has been very hesitant to pursue and have been wanting aggressive measures.     *) Recent PE  -was onn eliquis  -Ok to continue as long as PLT > 50  -trop most likely elevated from that, no chest pain, trop trending down  - off AC given low plt    *) Covid  -Treatment per Hospitalist, worsening 02 requirement, on HF02 at 50ml/min , pt is DNR    *) Cellulitis of left arm  -On abx per Hospitalist

## 2023-02-01 NOTE — HOSPICE
[employer] RN note:  Met with pt's family including daughter Saul Weiss and Valente Melendezirmer outside pt room. Discussed hospice philosophy and services as they relate to home vs inpt with plan to wean off of High flow in end of life scenario. Family with several questions re pt's ability to respond to treatment. Family state that pt is not fully aware of current clinical status or that hospice is consulted. Family requesting palliative consult to help guide pt and family through goals of care discussion. Not all family members have the same understanding of current clinical status. Communicated with palliative NP Zoey Chin. Thank you for the opportunity to care for this pt and family. Please contact hospice at [phone] with any questions or concerns.

## 2023-02-01 NOTE — PROGRESS NOTES
Baritciinib was stopped 1/31. Consulted again for baricitinib vs tocilizumab.  Pt on hi flow and CRP >7.5 so will give tocilizumab x1

## 2023-02-01 NOTE — PROGRESS NOTES
Transition of Care Plan:     RUR:28% high  Disposition:Hospice  If SNF or IPR: Date FOC offered:na  Date FOC received:na  Date authorization started with reference number:na  Date authorization received and expires:na  Accepting facility:na  Follow up appointments:na  DME needed:na  Transportation at [address] [place] [address] or means to access home:na        IM Medicare Letter:na  Is patient a [place] and connected with the [facility]? If yes, was [employer] transfer form completed and [facility] notified? Caregiver Contact:Cem Naqvi (Daughter)   [phone]   Discharge Caregiver contacted prior to discharge? Care Conference needed?:       CM reviewed Hospice note. I perfect served Palliative to let me know what becomes of their meeting with the patient and the family. CM will continue to follow up with patient's DC needs.     Nic Castanon RN [address]  [address] [place] [address]  [address]94

## 2023-02-01 NOTE — PROGRESS NOTES
End of Shift Note    Bedside shift change report given to [id number] [facility] (oncoming nurse) by Yonny Vidal RN (offgoing nurse). Report included the following information SBAR, Kardex, MAR, and Recent Results    Shift worked:  7p-7a     Shift summary and any significant changes:     Pt slept most of the night. No AM labs. Pt turned every 2 hours. Concerns for physician to address:       Zone phone for oncoming shift:          Activity:  Activity Level: Bed Rest  Number times ambulated in hallways past shift: 0  Number of times OOB to chair past shift: 0    Cardiac:   Cardiac Monitoring: Yes      Cardiac Rhythm: Sinus Rhythm    Access:  Current line(s): PIV     Genitourinary:   Urinary status: alvarado    Respiratory:   O2 Device: Heated, Hi flow nasal cannula  Chronic home O2 use?: NO  Incentive spirometer at bedside: YES       GI:  Last Bowel Movement Date: 01/27/23  Current diet:  ADULT DIET Regular  Passing flatus: YES  Tolerating current diet: YES       Pain Management:   Patient states pain is manageable on current regimen: YES    Skin:  Javon Score: 14  Interventions: turn team, float heels, increase time out of bed, foam dressing, limit briefs, and internal/external urinary devices    Patient Safety:  Fall Score:  Total Score: 4  Interventions: bed/chair alarm, assistive device (walker, cane, etc), gripper socks, pt to call before getting OOB, and stay with me (per policy)  High Fall Risk: Yes    Length of Stay:  Expected LOS: 3d 12h  Actual LOS: 6      Yonny Vidal, RN

## 2023-02-01 NOTE — CONSULTS
Consultation Note    NAME: Darrell Jaime   :  10/4/1933   MRN:  [id number]     Date/Time:  2023 12:58 PM    I have been asked to see this patient by Dr. Iza Sebastian  for advice/opinion re: Hyperkalemia. Assessment :    Plan:  Refractory hyperkalemia secondary to CLL    Hyponatremia/SIADH    CLL (WBC 583K)    Covid pna    Recent PE     Cannot get CLL treatment due to comorbid conditions; K now 8.2 despite Lokelma 20 gms on  and , insulin/glucose, bicarb, lasix and volume bolus    Calcium corrects to nml    check a VBG potassium (might be pseudohyperkalemia)    Continue with Lokelma 10 grams po tid pending VBG potasium    Not a dialysis candidate due to age and comorbid conditions           Subjective:   CHIEF COMPLAINT:  hyperkalemia    HISTORY OF PRESENT ILLNESS:     Rahel Collier is a 80 y.o.   male who has a history of the below. Seen remotely to limit potential spread of covid. Chart reviewed.              Past Medical History:   Diagnosis Date    Abnormal nuclear stress test 2014    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 ([address] 2015    Bereavement 2019    ltcf - uti -strangulated hernia - prostate ca - brother    Bronchiectasis ([address]     CAD (coronary artery disease)     Chest pain, unspecified     Chronic pain     right leg    CLL (chronic lymphocytic leukemia) ([address]     Jerica Segura md  [facility]    Diabetes ([facility]     Essential hypertension     GERD (gastroesophageal reflux disease)     Hyperlipidemia     Hypertension     Opacity of lung on imaging study 2017    cxr    Rotator cuff arthropathy     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    S/P coronary artery stent placement 2014 PCI/GUANAKO to prox LAD      Past Surgical History:   Procedure Laterality Date    HX COLONOSCOPY      HX HEART CATHETERIZATION      FL UNLISTED PROCEDURE VASCULAR SURGERY   and     BLE stenting    PTCA SINGLE VESSEL  2015          Social History     Tobacco Use Smoking status: Former     Types: Cigarettes     Quit date: 1/15/1984     Years since quittin.0    Smokeless tobacco: Never    Tobacco comments:     QUIT    Substance Use Topics    Alcohol use: No     Alcohol/week: 0.0 standard drinks     Comment: quit in       Family History   Problem Relation Age of Onset    Diabetes Mother     Stroke Father       Allergies   Allergen Reactions    Codeine Shortness of Breath    Lipitor [Atorvastatin] Nausea Only      Prior to Admission medications    Medication Sig Start Date End Date Taking? Authorizing Provider   allopurinoL (ZYLOPRIM) 300 mg tablet Take 1 Tablet by mouth daily. 23   Talia Sutherland MD   amLODIPine (NORVASC) 2.5 mg tablet TAKE 1 TABLET DAILY 22   Echo Hernandez MD   dextromethorphan-guaiFENesin (Robitussin Cough-Chest Pedro DM) 5-100 mg/5 mL liqd Take 20 mL by mouth four (4) times daily as needed for Cough or Congestion. 22   ARABELLA Ocampo   cetirizine (ZyrTEC) 10 mg tablet Take 1 Tablet by mouth daily. 22   ARABELLA Ocampo   metoprolol tartrate (LOPRESSOR) 25 mg tablet TAKE 1 TABLET TWICE A DAY 22   Kady Jordan MD   cholecalciferol (VITAMIN D3) (1000 Units /25 mcg) tablet Take 1,000 Units by mouth daily. Provider, Historical   multivitamin with folic acid (ONE DAILY WITH FOLIC ACID) 489 mcg tab tablet Take 1 Tablet by mouth daily. Provider, Historical   simvastatin (ZOCOR) 40 mg tablet TAKE 1 TABLET DAILY IN THE EVENING 22   Talia Sutherland MD   metFORMIN (GLUCOPHAGE) 500 mg tablet TAKE 1 TABLET DAILY 2/10/22   Echo Hernandez MD   aspirin 81 mg chewable tablet Take 81 mg by mouth daily. Provider, Historical   brimonidine (ALPHAGAN) 0.15 % ophthalmic solution Administer 1 Drop to both eyes two (2) times a day.  Indications: OPEN ANGLE GLAUCOMA    Provider, Historical     REVIEW OF SYSTEMS:     []  Unable to obtain reliable ROS due to  [] mental status  [] sedated   [] intubated   [x] Total of 12 systems reviewed as follows:  Constitutional: negative fever, negative chills, negative weight loss  Eyes:   negative visual changes  ENT:   negative sore throat, tongue or lip swelling  Respiratory:  negative cough, + dyspnea  Cards:  negative for chest pain, palpitations, lower extremity edema  GI:   negative for nausea, vomiting, diarrhea, and abdominal pain  :  negative for frequency, dysuria  Integument:  negative for rash and pruritus  Heme:  negative for easy bruising and gum/nose bleeding  Musculoskel: negative for myalgias,  back pain and muscle weakness  Neuro:  negative for headaches, dizziness, vertigo  Psych:  negative for feelings of anxiety, depression   Travel?: none    Objective:   VITALS:    Visit Vitals  BP (!) 122/56   Pulse 80   Temp 98.2 °F (36.8 °C)   Resp 18   Ht 5' 7\" (1.702 m)   Wt 62.1 kg (136 lb 14.5 oz)   SpO2 95%   BMI 21.44 kg/m²     PHYSICAL EXAM:  Gen:  []  WD []  WN  [] cachectic []  thin []  obese []  disheveled             [x]  ill apearing  []   Critical  []   Chronic    []  No acute distress    HEENT:   [] NC/AT/PERRL    [] pink conjunctivae      [] pale conjunctivae                  PERRL  [] yes  [] no      [] moist mucosa    [] dry mucosa    hearing intact to voice [x] yes  [] No                 NECK:   supple [] yes  [] no        masses [] yes  [] No               thyroid  []  non tender  []  tender    RESP:   [] CTA bilaterally/no wheezing/rhonchi/rales/crackles    [] rhonchi bilaterally - no dullness  [] wheezing   [] rhonchi   [] crackles     use of accessory muscles [] yes [] no  On high flow o2  CARD:   []  regular rate and rhythm/No murmurs/rubs/gallops    murmur  [] yes ()  [] no      Rubs  [] yes  [] no       Gallops [] yes  [] no    Rate []  regular  []  irregular        carotid bruits  [] Right  []  Left                 LE edema [] yes  [] no           JVP  []  yes   []  no    ABD:    [] soft/non distended/non tender/+bowel sounds/no HSM    []  Rigid    tenderness [] yes [] no   Liver enlargement  []  yes []  no                Spleen enlargement  []  yes []  no     distended []  yes [] no     bowel sound  [] hypoactive   [] hyperactive    LYMPH:    Neck []  yes []  no       Axillae []  yes []  no    SKIN:   Rashes []  yes   []  no    Ulcers []  yes   []  no               [] tight to palpitation    skin turgor []  good  [] poor  [] decreased               Cyanosis/clubbing []  yes []  no    NEUR:   [] cranial nerves II-XII grossly intact       [] Cranial nerves deficit                 []  facial droop    []  slurred speech   [] aphasic     [] Strength normal     []  weakness  []  LUE  []   RUE/ []  LLE  []   RLE    follows commands  [x]  yes []  no           PSYCH:   insight [] poor [] good   Alert and Oriented to  [x] person  [x] place  []  time                    [] depressed [] anxious [] agitated  [] lethargic [] stuporous  [] sedated     LAB DATA REVIEWED:    Recent Labs     01/31/23  0000 01/30/23  1649   .3* 775.8*   HGB 8.0* 8.3*   HCT 26.6* 34.2*   PLT 41* 50*     Recent Labs     01/31/23  0655 01/31/23  0522 01/31/23  0000   * 121* 128*   K 8.2* 7.6* 7.5*   CL 96* 84* 98   CO2 27 24 26   BUN 27* 26* 23*   CREA 0.97 1.10 0.87   * 621* 139*   CA 7.8* 7.8* 8.4*   PHOS 3.8  --   --    URICA 3.9  --   --      No results for input(s): ALT, AP, TBIL, TBILI, ALB, GLOB, GGT, AML, LPSE in the last 72 hours. No lab exists for component: SGOT, GPT, AMYP, HLPSE  Recent Labs     01/31/23  0000 01/30/23  1649   APTT 55.1* 30.3      No results for input(s): FE, TIBC, PSAT, FERR in the last 72 hours. No results for input(s): PH, PCO2, PO2 in the last 72 hours. No results for input(s): CPK, CKMB in the last 72 hours.     No lab exists for component: TROPONINI  Lab Results   Component Value Date/Time    Glucose (POC) 163 (H) 02/01/2023 11:18 AM    Glucose (POC) 179 (H) 02/01/2023 08:04 AM    Glucose (POC) 243 (H) 01/31/2023 10:12 PM    Glucose (POC) 194 (H) 01/31/2023 04:49 PM    Glucose (POC) 252 (H) 01/31/2023 11:08 AM       Procedures: see electronic medical records for all procedures/Xrays and details which were not copied into this note but were reviewed prior to creation of Plan.    ________________________________________________________________________       ___________________________________________________  Consulting Physician:  Elida Robledo MD

## 2023-02-02 PROBLEM — R53.83 FATIGUE, UNSPECIFIED TYPE: Status: ACTIVE | Noted: 2023-01-01

## 2023-02-02 PROBLEM — Z51.5 COMFORT MEASURES ONLY STATUS: Status: ACTIVE | Noted: 2023-01-01

## 2023-02-02 PROBLEM — R54 FRAILTY: Status: ACTIVE | Noted: 2023-01-01

## 2023-02-02 PROBLEM — Z71.89 GOALS OF CARE, COUNSELING/DISCUSSION: Status: ACTIVE | Noted: 2023-01-01

## 2023-02-02 NOTE — PROGRESS NOTES
Comprehensive Nutrition Assessment    Type and Reason for Visit: Initial, RD nutrition re-screen/LOS    Nutrition Recommendations/Plan:   Continue current diet  Nepro shakes BID  Please document % meals and supplements consumed in flowsheet I/O's under intake      Malnutrition Assessment:  Malnutrition Status:  Insufficient data (02/02/23 1309)        Nutrition Assessment:     Chart reviewed for LOS. Pt medically noted for severe hyperkalemia (improved today), acute hypoxic respiratory failure d/t COVID-19, PNA, CLL, anemia, HTN, DM2. MST negative for malnutrition risk factors PTA. Fairly good PO intake documented throughout admission. Noted discussions about considering hospice, pending family meetings. Will add supplements per orders from last admission. Gradual weight loss noted per EMR, not significant for the time frame. Patient Vitals for the past 168 hrs:   % Diet Eaten   02/01/23 1152 76 - 100%   02/01/23 0736 1 - 25%   01/31/23 1213 1 - 25%   01/31/23 0815 51 - 75%   01/29/23 1805 51 - 75%   01/29/23 0731 51 - 75%   01/28/23 1458 26 - 50%   01/28/23 0847 51 - 75%   01/27/23 0845 51 - 75%     Wt Readings from Last 5 Encounters:   02/01/23 62.1 kg (136 lb 14.5 oz)   01/23/23 61.2 kg (135 lb)   01/13/23 61.2 kg (135 lb)   01/08/23 63 kg (139 lb)   11/28/22 64 kg (141 lb)   ]    Nutrition Related Findings:    Labs: Na 132, -170-212. Meds: lipitor, vit D3, decadron, MVI, protonix. Edema: 1+BLE. BM 1/27. Wound Type: None    Current Nutrition Intake & Therapies:  Average Meal Intake: 51-75%  Average Supplement Intake: None ordered  ADULT DIET Regular; Low Potassium (Less than 3000 mg/day)  ADULT ORAL NUTRITION SUPPLEMENT Breakfast, Dinner; Renal Supplement    Anthropometric Measures:  Height: 5' 7\" (170.2 cm)  Ideal Body Weight (IBW): 148 lbs (67 kg)     Current Body Wt:  62.1 kg (136 lb 14.5 oz), 92.5 % IBW.  Bed scale  Current BMI (kg/m2): 21.4        Weight Adjustment: No adjustment BMI Category: Underweight (BMI less than 22) age over 72    Estimated Daily Nutrient Needs:  Energy Requirements Based On: Formula  Weight Used for Energy Requirements: Current  Energy (kcal/day): 1620 kcals (BMR x 1. 3AF)  Weight Used for Protein Requirements: Current  Protein (g/day): 75g (1.2g/kg)  Method Used for Fluid Requirements: 1 ml/kcal  Fluid (ml/day): 1600mL    Nutrition Diagnosis:   Inadequate protein-energy intake related to catabolic illness as evidenced by  (CLL and COVID-19)    Nutrition Interventions:   Food and/or Nutrient Delivery: Continue current diet, Start oral nutrition supplement  Nutrition Education/Counseling: No recommendations at this time  Coordination of Nutrition Care: Continue to monitor while inpatient       Goals:     Goals: PO intake 75% or greater, by next RD assessment       Nutrition Monitoring and Evaluation:   Behavioral-Environmental Outcomes: None identified  Food/Nutrient Intake Outcomes: Food and nutrient intake, Supplement intake  Physical Signs/Symptoms Outcomes: Biochemical data, GI status, Weight, Fluid status or edema, Nutrition focused physical findings    Discharge Planning:    Continue current diet, Continue oral nutrition supplement    Caitlyn Espinal RD  Contact: [phone]

## 2023-02-02 NOTE — PROGRESS NOTES
Transition of Care Plan:     RUR:28% high  Disposition:Hospice  If SNF or IPR: Date FOC offered:na  Date FOC received:na  Date authorization started with reference number:na  Date authorization received and expires:na  Accepting facility:na  Follow up appointments:na  DME needed:mark  Transportation at [address]  or means to access home:na        IM Medicare Letter:na  Is patient a [place] and connected with the [place]? If yes, was [employer] transfer form completed and [facility] notified? Caregiver Contact:Cem Naqvi (Daughter)   [phone]   Discharge Caregiver contacted prior to discharge? Care Conference needed?:       CM aware that patient will go [address] in the am on the same unit. He is still on High Flow 02 CM will follow up on any additional DC needs. Family has been outside room visiting with patient through window.     [address] [place] [address]  [phone]

## 2023-02-02 NOTE — PROGRESS NOTES
Problem: Risk for Spread of Infection  Goal: Prevent transmission of infectious organism to others  Description: Prevent the transmission of infectious organisms to other patients, staff members, and visitors. Outcome: Progressing Towards Goal     Problem: Airway Clearance - Ineffective  Goal: Achieve or maintain patent airway  Outcome: Progressing Towards Goal     Problem: Gas Exchange - Impaired  Goal: Absence of hypoxia  Outcome: Progressing Towards Goal     Problem: Breathing Pattern - Ineffective  Goal: Ability to achieve and maintain a regular respiratory rate  Outcome: Progressing Towards Goal     Problem: Body Temperature -  Risk of, Imbalanced  Goal: Ability to maintain a body temperature within defined limits  Outcome: Progressing Towards Goal     Problem: Isolation Precautions - Risk of Spread of Infection  Goal: Prevent transmission of infectious organism to others  Outcome: Progressing Towards Goal     Problem: Falls - Risk of  Goal: *Absence of Falls  Description: Document Sindhu Barrios Fall Risk and appropriate interventions in the flowsheet. Outcome: Progressing Towards Goal  Note: Fall Risk Interventions:  Mobility Interventions: Assess mobility with egress test    Mentation Interventions: Adequate sleep, hydration, pain control    Medication Interventions: Evaluate medications/consider consulting pharmacy    Elimination Interventions: Call light in reach              Problem: Infection - Risk of, Urinary Catheter-Associated Urinary Tract Infection  Goal: *Absence of infection signs and symptoms  Outcome: Progressing Towards Goal     Problem: Pressure Injury - Risk of  Goal: *Prevention of pressure injury  Description: Document Javon Scale and appropriate interventions in the flowsheet.   Outcome: Progressing Towards Goal  Note: Pressure Injury Interventions:  Sensory Interventions: Assess changes in LOC, Float heels, Minimize linen layers, Keep linens dry and wrinkle-free, Maintain/enhance activity level    Moisture Interventions: Absorbent underpads, Apply protective barrier, creams and emollients, Internal/External urinary devices    Activity Interventions: Increase time out of bed    Mobility Interventions: HOB 30 degrees or less, Pressure redistribution bed/mattress (bed type), Float heels    Nutrition Interventions: Document food/fluid/supplement intake    Friction and Shear Interventions: Apply protective barrier, creams and emollients, Feet elevated on foot rest, HOB 30 degrees or less, Lift sheet, Minimize layers

## 2023-02-02 NOTE — PROGRESS NOTES
Hematology Oncology Progress Note    Follow up for: CLL    Chart notes reviewed since last visit. Case discussed with following: nurse    Events noted;  still on HF, no family at bedside today. Seen remotely     Patient Vitals for the past 24 hrs:   BP Temp Pulse Resp SpO2   02/02/23 0827 (!) 147/59 -- (!) 108 -- --   02/02/23 0801 (!) 142/60 97.6 °F (36.4 °C) (!) 107 18 94 %   02/02/23 0404 -- -- -- -- 94 %   02/02/23 0328 130/71 97.1 °F (36.2 °C) 95 20 90 %   02/01/23 2344 -- -- -- -- 91 %   02/01/23 2320 (!) 125/56 97.4 °F (36.3 °C) 74 19 100 %   02/01/23 2153 -- -- -- -- 94 %   02/01/23 2002 (!) 128/52 97.7 °F (36.5 °C) 84 18 96 %   02/01/23 1805 139/63 -- (!) 108 -- --   02/01/23 1651 137/61 -- 93 -- --   02/01/23 1605 (!) 126/51 97.8 °F (36.6 °C) 77 18 95 %   02/01/23 1118 (!) 122/56 98.2 °F (36.8 °C) 80 18 95 %   02/01/23 1059 -- -- -- -- 95 %       Review of Systems:  12 point ROS done and negative except as above    Physical Examination:  Seen from window remotely to reduced spread of covid and conserve ppe.   Lethargic  On HF02    Labs:  Recent Results (from the past 24 hour(s))   GLUCOSE, POC    Collection Time: 02/01/23 11:18 AM   Result Value Ref Range    Glucose (POC) 163 (H) 65 - 117 mg/dL    Performed by Galina Chaparro RN    D DIMER    Collection Time: 02/01/23  2:09 PM   Result Value Ref Range    D-dimer 4.70 (H) 0.00 - 0.65 mg/L FEU   PROCALCITONIN    Collection Time: 02/01/23  2:09 PM   Result Value Ref Range    Procalcitonin 1.68 ng/mL   METABOLIC PANEL, COMPREHENSIVE    Collection Time: 02/01/23  2:09 PM   Result Value Ref Range    Sodium 127 (L) 136 - 145 mmol/L    Potassium 7.4 (HH) 3.5 - 5.1 mmol/L    Chloride 93 (L) 97 - 108 mmol/L    CO2 20 (L) 21 - 32 mmol/L    Anion gap 14 5 - 15 mmol/L    Glucose 208 (H) 65 - 100 mg/dL    BUN 14 6 - 20 MG/DL    Creatinine 0.99 0.70 - 1.30 MG/DL    BUN/Creatinine ratio 14 12 - 20      eGFR >60 >60 ml/min/1.73m2    Calcium 5.8 (LL) 8.5 - 10.1 MG/DL Bilirubin, total 1.5 (H) 0.2 - 1.0 MG/DL    ALT (SGPT) 74 12 - 78 U/L    AST (SGOT) 71 (H) 15 - 37 U/L    Alk. phosphatase 196 (H) 45 - 117 U/L    Protein, total 5.7 (L) 6.4 - 8.2 g/dL    Albumin 1.9 (L) 3.5 - 5.0 g/dL    Globulin 3.8 2.0 - 4.0 g/dL    A-G Ratio 0.5 (L) 1.1 - 2.2     GLUCOSE, POC    Collection Time: 02/01/23  5:05 PM   Result Value Ref Range    Glucose (POC) 279 (H) 65 - 117 mg/dL    Performed by Polly Preciado RN    GLUCOSE, POC    Collection Time: 02/01/23  6:12 PM   Result Value Ref Range    Glucose (POC) 305 (H) 65 - 117 mg/dL    Performed by Polly Preciado RN    GLUCOSE, POC    Collection Time: 02/01/23  7:54 PM   Result Value Ref Range    Glucose (POC) 258 (H) 65 - 117 mg/dL    Performed by Polly Preciado RN    GLUCOSE, POC    Collection Time: 02/01/23  9:26 PM   Result Value Ref Range    Glucose (POC) 212 (H) 65 - 117 mg/dL    Performed by Jj Reveles RN    CBC WITH AUTOMATED DIFF    Collection Time: 02/02/23  6:10 AM   Result Value Ref Range    .6 (HH) 4.1 - 11.1 K/uL    RBC 3.21 (L) 4.10 - 5.70 M/uL    HGB 8.7 (L) 12.1 - 17.0 g/dL    HCT 33.8 (L) 36.6 - 50.3 %    .3 (H) 80.0 - 99.0 FL    MCH 27.1 26.0 - 34.0 PG    MCHC 25.7 (L) 30.0 - 36.5 g/dL    RDW 29.0 (H) 11.5 - 14.5 %    PLATELET 46 (LL) 087 - 400 K/uL    MPV 12.9 8.9 - 12.9 FL    NRBC 0.0 0  WBC    ABSOLUTE NRBC 0.07 (H) 0.00 - 0.01 K/uL    NEUTROPHILS 1 (L) 32 - 75 %    LYMPHOCYTES 98 (H) 12 - 49 %    MONOCYTES 1 (L) 5 - 13 %    EOSINOPHILS 0 0 - 7 %    BASOPHILS 0 0 - 1 %    IMMATURE GRANULOCYTES 0 0.0 - 0.5 %    ABS. NEUTROPHILS 6.1 1.8 - 8.0 K/UL    ABS. LYMPHOCYTES 597.4 (H) 0.8 - 3.5 K/UL    ABS. MONOCYTES 6.1 (H) 0.0 - 1.0 K/UL    ABS. EOSINOPHILS 0.0 0.0 - 0.4 K/UL    ABS. BASOPHILS 0.0 0.0 - 0.1 K/UL    ABS. IMM.  GRANS. 0.0 0.00 - 0.04 K/UL    DF MANUAL      RBC COMMENTS ANISOCYTOSIS  3+        RBC COMMENTS MACROCYTOSIS  1+        WBC COMMENTS SMUDGE CELLS SEEN     METABOLIC PANEL, COMPREHENSIVE    Collection Time: 02/02/23  6:10 AM   Result Value Ref Range    Sodium 132 (L) 136 - 145 mmol/L    Potassium 3.6 3.5 - 5.1 mmol/L    Chloride 95 (L) 97 - 108 mmol/L    CO2 30 21 - 32 mmol/L    Anion gap 7 5 - 15 mmol/L    Glucose 163 (H) 65 - 100 mg/dL    BUN 29 (H) 6 - 20 MG/DL    Creatinine 0.90 0.70 - 1.30 MG/DL    BUN/Creatinine ratio 32 (H) 12 - 20      eGFR >60 >60 ml/min/1.73m2    Calcium 8.5 8.5 - 10.1 MG/DL    Bilirubin, total 2.0 (H) 0.2 - 1.0 MG/DL    ALT (SGPT) 76 12 - 78 U/L    AST (SGOT) 50 (H) 15 - 37 U/L    Alk. phosphatase 214 (H) 45 - 117 U/L    Protein, total 5.6 (L) 6.4 - 8.2 g/dL    Albumin 2.5 (L) 3.5 - 5.0 g/dL    Globulin 3.1 2.0 - 4.0 g/dL    A-G Ratio 0.8 (L) 1.1 - 2.2     GLUCOSE, POC    Collection Time: 02/02/23  6:58 AM   Result Value Ref Range    Glucose (POC) 170 (H) 65 - 117 mg/dL    Performed by Kayla Horn PCT      Assessment and Plan:   *) CLL  - follows with me in clinic for CLL  -Was supposed to start Elray Hurt but has not yet as has been in hospital with multiple infections, PE etc.  -Would transfuse PRBCs for HBG < 7, trasnfuse PLTs if < 10 or any bleeding  - Transfused prbc on 1/28  - on 1/13-discussed with daughter/MARCOS and dtr Sharona Patel on speaker phone (in [place]) that I feel hospice is appropriate. Unfortunately, we can't address his CLL safely at this time in setting of bad covid infection requiring high flow, he has a lot of air hunger and hospice can address this, family seems to be more willing to consider hospice. He is DNR/DNI. Family mtg with hospice and they wanted to wait for dtr from [place] to come in - I have tried to discuss hopsice in office on prior visits and was addressed during last admission at [address] [place] [address] per [facility] Fellow but family has been very hesitant to pursue and have been wanting aggressive measures.     *) Recent PE  -was onn eliquis  -Ok to continue as long as PLT > 50  -trop most likely elevated from that, no chest pain, trop trending down  - off AC given low plt    *) Covid  -Treatment per Hospitalist, worsening 02 requirement, on HF02 at 50ml/min , pt is DNR    *) Cellulitis of left arm  -On abx per Hospitalist

## 2023-02-02 NOTE — PROGRESS NOTES
0700 Bedside and Verbal shift change report [id number] by Ethan Euceda (offgoing nurse). Report included the following information SBAR, Kardex, ED Summary, Intake/Output, MAR, Recent Results, and Cardiac Rhythm NSR .     1220 Nasim STEWARD at bedside, gave verbal orders to d/c sliding scale insulin and POC glucose checks    End of Shift Note    Bedside shift change report given to Ethan Euceda (oncoming nurse) by Marily Lees RN (offgoing nurse). Report included the following information SBAR, Kardex, ED Summary, Intake/Output, MAR, Recent Results, and Cardiac Rhythm NSR    Shift worked:  7a to 7p     Shift summary and any significant changes:     See above    Transitioning to comfort care tomorrow morning 2/3       Concerns for physician to address:  none     Zone phone for oncoming shift:           Activity:  Activity Level: Bed Rest  Number times ambulated in hallways past shift: 0  Number of times OOB to chair past shift: 0    Cardiac:   Cardiac Monitoring: Yes      Cardiac Rhythm: Sinus Rhythm    Access:  Current line(s): PIV     Genitourinary:   Urinary status: voiding    Respiratory:   O2 Device: Hi flow nasal cannula, Heated  Chronic home O2 use?: NO  Incentive spirometer at bedside: NO       GI:  Last Bowel Movement Date: 01/27/23  Current diet:  ADULT DIET Regular; Low Potassium (Less than 3000 mg/day)  ADULT ORAL NUTRITION SUPPLEMENT Breakfast, Dinner; Renal Supplement  Passing flatus: YES  Tolerating current diet: YES       Pain Management:   Patient states pain is manageable on current regimen: YES    Skin:  Javon Score: 14  Interventions: turn team, speciality bed, float heels, increase time out of bed, foam dressing, PT/OT consult, limit briefs, internal/external urinary devices, and nutritional support     Patient Safety:  Fall Score:  Total Score: 4  Interventions: bed/chair alarm, assistive device (walker, cane, etc), gripper socks, pt to call before getting OOB, stay with me (per policy), and gait belt  High Fall Risk: Yes    Length of Stay:  Expected LOS: 3d 12h  Actual LOS: [address] F[address] RN

## 2023-02-02 NOTE — PROGRESS NOTES
Problem: Risk for Spread of Infection  Goal: Prevent transmission of infectious organism to others  Description: Prevent the transmission of infectious organisms to other patients, staff members, and visitors. Outcome: Progressing Towards Goal     Problem: Patient Education:  Go to Education Activity  Goal: Patient/Family Education  Outcome: Progressing Towards Goal     Problem: Patient Education: Go to Patient Education Activity  Goal: Patient/Family Education  Outcome: Progressing Towards Goal     Problem: Patient Education: Go to Patient Education Activity  Goal: Patient/Family Education  Outcome: Progressing Towards Goal     Problem: Airway Clearance - Ineffective  Goal: Achieve or maintain patent airway  Outcome: Progressing Towards Goal     Problem: Gas Exchange - Impaired  Goal: Absence of hypoxia  Outcome: Progressing Towards Goal  Goal: Promote optimal lung function  Outcome: Progressing Towards Goal     Problem: Breathing Pattern - Ineffective  Goal: Ability to achieve and maintain a regular respiratory rate  Outcome: Progressing Towards Goal     Problem:  Body Temperature -  Risk of, Imbalanced  Goal: Ability to maintain a body temperature within defined limits  Outcome: Progressing Towards Goal  Goal: Will regain or maintain usual level of consciousness  Outcome: Progressing Towards Goal  Goal: Complications related to the disease process, condition or treatment will be avoided or minimized  Outcome: Progressing Towards Goal     Problem: Isolation Precautions - Risk of Spread of Infection  Goal: Prevent transmission of infectious organism to others  Outcome: Progressing Towards Goal     Problem: Nutrition Deficits  Goal: Optimize nutrtional status  Outcome: Progressing Towards Goal     Problem: Risk for Fluid Volume Deficit  Goal: Maintain normal heart rhythm  Outcome: Progressing Towards Goal  Goal: Maintain absence of muscle cramping  Outcome: Progressing Towards Goal  Goal: Maintain normal serum potassium, sodium, calcium, phosphorus, and pH  Outcome: Progressing Towards Goal     Problem: Loneliness or Risk for Loneliness  Goal: Demonstrate positive use of time alone when socialization is not possible  Outcome: Progressing Towards Goal     Problem: Fatigue  Goal: Verbalize increase energy and improved vitality  Outcome: Progressing Towards Goal     Problem: Patient Education: Go to Patient Education Activity  Goal: Patient/Family Education  Outcome: Progressing Towards Goal     Problem: Falls - Risk of  Goal: *Absence of Falls  Description: Document Adriana Fall Risk and appropriate interventions in the flowsheet. Outcome: Progressing Towards Goal  Note: Fall Risk Interventions:  Mobility Interventions: Assess mobility with egress test    Mentation Interventions: Adequate sleep, hydration, pain control    Medication Interventions: Evaluate medications/consider consulting pharmacy    Elimination Interventions: Call light in reach              Problem: Patient Education: Go to Patient Education Activity  Goal: Patient/Family Education  Outcome: Progressing Towards Goal     Problem: Infection - Risk of, Urinary Catheter-Associated Urinary Tract Infection  Goal: *Absence of infection signs and symptoms  Outcome: Progressing Towards Goal     Problem: Patient Education: Go to Patient Education Activity  Goal: Patient/Family Education  Outcome: Progressing Towards Goal     Problem: Pressure Injury - Risk of  Goal: *Prevention of pressure injury  Description: Document Javon Scale and appropriate interventions in the flowsheet.   Outcome: Progressing Towards Goal  Note: Pressure Injury Interventions:  Sensory Interventions: Assess changes in LOC, Float heels, Minimize linen layers, Keep linens dry and wrinkle-free, Maintain/enhance activity level    Moisture Interventions: Absorbent underpads, Apply protective barrier, creams and emollients, Internal/External urinary devices    Activity Interventions: Increase time out of bed    Mobility Interventions: HOB 30 degrees or less, Pressure redistribution bed/mattress (bed type), Float heels    Nutrition Interventions: Document food/fluid/supplement intake    Friction and Shear Interventions: Apply protective barrier, creams and emollients, Feet elevated on foot rest, HOB 30 degrees or less, Lift sheet, Minimize layers                Problem: Patient Education: Go to Patient Education Activity  Goal: Patient/Family Education  Outcome: Progressing Towards Goal

## 2023-02-02 NOTE — CONSULTS
Palliative Medicine Consult  [place]: [phone] ([phone])    Patient Name: Paulino Girard  YOB: 1933    Date of Initial Consult: 2/2/23  Date of Today's Visit: 2/2/2023  Reason for Consult: Care Decisions  Requesting Provider: Justyn Zavala MD   Primary Care Physician: Cecile Teixeira MD     SUMMARY:   Paulino Girard is a 80 y.o. with a past history of bronchiectasis, CAD s/p CABG x3 and stent placement, CLL, DM, GERD, HLD, HTN, , who was admitted on 1/25/2023 from home with a diagnosis of ACS from demand ischemia, and acute respiratory failure 2/2 COVID. Social: Mr Blane Roldan has two daughters, one local and one who lives in [facility] whom is he very close with      PALLIATIVE DIAGNOSES:   Goals of care  Comfort Care  Frailty  Fatigue     PLAN:   Initially I met with Mr Navjot Byrd two daughters and two son in laws who were outside his room  They shared with me their processing over the last few days, and where they are today. They are so appreciative that the medical team continued to give them data to reassure them that we have checked every angle- and are recognizing that he is stuck in a cycle of overall decline. They are in support of transitioning to comfort care and weaning him off his oxygen if he is ready to do so  I went in with daughter Ron Graves to talk to him and see where he is with his decision making. He seems to understand that the high flow is a form of life support at this point. After we talked for a bit, and he had a chance to talk to Ori on the phone (she was outside the room), he decided he would like to be make comfort care tomorrow morning, including taking him off of his high flow, stopping all treatments, and allowing him to pass comfortably.    I will stop labs for tomorrow morning, but will come back tomorrow to meet with family and ensure he has all his comfort orders  Initial consult note routed to primary continuity provider and/or primary health care team members  Communicated plan of care with: Palliative IDT, [facility] Team     GOALS OF CARE / TREATMENT PREFERENCES:     GOALS OF CARE:  Patient/Health Care Proxy Stated Goals: Comfort    TREATMENT PREFERENCES:   Code Status: DNR    Advance Care Planning:  [x] [facility] Interdisciplinary Team has updated the ACP Navigator with Health Care Decision Maker and Patient Capacity      Primary Decision MakerSsharon Sims - Daughter - [phone]    Secondary Decision Maker: Desiree Alfaro - Daughter - [phone]    Medical Interventions: Limited additional interventions       Other:    As far as possible, the palliative care team has discussed with patient / health care proxy about goals of care / treatment preferences for patient.      HISTORY:     History obtained from: chart, family    CHIEF COMPLAINT: \"I want to go to heaven in the morning\"    HPI/SUBJECTIVE:    The patient is:   [x] Verbal and participatory  [] Non-participatory due to:        Clinical Pain Assessment (nonverbal scale for severity on nonverbal patients):   Clinical Pain Assessment  Severity: 0          Duration: for how long has pt been experiencing pain (e.g., 2 days, 1 month, years)  Frequency: how often pain is an issue (e.g., several times per day, once every few days, constant)     FUNCTIONAL ASSESSMENT:     Palliative Performance Scale (PPS):  PPS: 30       PSYCHOSOCIAL/SPIRITUAL SCREENING:     Palliative IDT has assessed this patient for cultural preferences / practices and a referral made as appropriate to needs (Cultural Services, Patient Advocacy, Ethics, etc.)    Any spiritual / Jewish concerns:  [] Yes /  [x] No   If \"Yes\" to discuss with pastoral care during IDT     Does caregiver feel burdened by caring for their loved one:   [] Yes /  [x] No /  [] No Caregiver Present/Available [] No Caregiver [] Pt Lives at [facility]  If \"Yes\" to discuss with social work during IDT    Anticipatory grief assessment:   [x] Normal  / [] Maladaptive     If \"Maladaptive\" to discuss with social work during IDT    ESAS Anxiety: Anxiety: 0    ESAS Depression: Depression: 0        REVIEW OF SYSTEMS:     Positive and pertinent negative findings in ROS are noted above in HPI. The following systems were [x] reviewed / [] unable to be reviewed as noted in HPI  Other findings are noted below. Systems: constitutional, ears/nose/mouth/throat, respiratory, gastrointestinal, genitourinary, musculoskeletal, integumentary, neurologic, psychiatric, endocrine. Positive findings noted below. Modified ESAS Completed by: provider   Fatigue: 2     Depression: 0 Pain: 0   Anxiety: 0 Nausea: 0   Anorexia: 4 Dyspnea: 2     Constipation: No              PHYSICAL EXAM:     From RN flowsheet:  Wt Readings from Last 3 Encounters:   02/01/23 136 lb 14.5 oz (62.1 kg)   01/23/23 135 lb (61.2 kg)   01/13/23 135 lb (61.2 kg)     Blood pressure (!) 143/59, pulse 91, temperature 97.6 °F (36.4 °C), resp. rate 18, height 5' 7\" (1.702 m), weight 136 lb 14.5 oz (62.1 kg), SpO2 96 %.     Pain Scale 1: Numeric (0 - 10)  Pain Intensity 1: 0                 Last bowel movement, if known:     Constitutional: awake, frail, a little sleepy, appears chronically ill  Eyes: pupils equal, anicteric  ENMT: no nasal discharge, moist mucous membranes  Cardiovascular: regular rhythm, distal pulses intact  Respiratory: breathing slightly labored, symmetric  Gastrointestinal: soft non-tender, +bowel sounds  Musculoskeletal: no deformity, no tenderness to palpation  Skin: warm, dry  Neurologic: following commands, moving all extremities  Psychiatric: full affect, no hallucinations  Other:       HISTORY:     Active Problems:    Elevated troponin (1/26/2023)      Acute hypoxemic respiratory failure ([address] (1/26/2023)      COVID-19 (1/26/2023)    Past Medical History:   Diagnosis Date    Abnormal nuclear stress test 6/6/2014    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 ([facility] 11/2/2015 Bereavement 2019    ltcf - uti -strangulated hernia - prostate ca - brother    Bronchiectasis ([facility]     CAD (coronary artery disease)     Chest pain, unspecified     Chronic pain     right leg    CLL (chronic lymphocytic leukemia) ([facility]     Jerica Segura md  [facility]    Diabetes ([facility]     Essential hypertension     GERD (gastroesophageal reflux disease)     Hyperlipidemia     Hypertension     Opacity of lung on imaging study 2017    cxr    Rotator cuff arthropathy     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    S/P coronary artery stent placement 2014 PCI/GUANAKO to prox LAD      Past Surgical History:   Procedure Laterality Date    HX COLONOSCOPY      HX HEART CATHETERIZATION      CT UNLISTED PROCEDURE VASCULAR SURGERY   and     BLE stenting    PTCA SINGLE VESSEL  2015           Family History   Problem Relation Age of Onset    Diabetes Mother     Stroke Father       History reviewed, no pertinent family history.   Social History     Tobacco Use    Smoking status: Former     Types: Cigarettes     Quit date: 1/15/1984     Years since quittin.0    Smokeless tobacco: Never    Tobacco comments:     QUIT    Substance Use Topics    Alcohol use: No     Alcohol/week: 0.0 standard drinks     Comment: quit in      Allergies   Allergen Reactions    Codeine Shortness of Breath    Lipitor [Atorvastatin] Nausea Only      Current Facility-Administered Medications   Medication Dose Route Frequency    dexAMETHasone (DECADRON) tablet 10 mg  10 mg Oral DAILY WITH BREAKFAST    dextrose 10 % infusion 125-250 mL  125-250 mL IntraVENous PRN    morphine injection 1 mg  1 mg IntraVENous Q2H PRN    LORazepam (ATIVAN) injection 1 mg  1 mg IntraVENous Q2H PRN    0.9% sodium chloride infusion 250 mL  250 mL IntraVENous PRN    nitroglycerin (NITROSTAT) tablet 0.4 mg  0.4 mg SubLINGual PRN    guaiFENesin ER (MUCINEX) tablet 600 mg  600 mg Oral Q12H    benzonatate (TESSALON) capsule 100 mg  100 mg Oral TID PRN acetaminophen (TYLENOL) tablet 650 mg  650 mg Oral Q6H PRN    Or    acetaminophen (TYLENOL) suppository 650 mg  650 mg Rectal Q6H PRN    polyethylene glycol (MIRALAX) packet 17 g  17 g Oral DAILY PRN    promethazine (PHENERGAN) tablet 12.5 mg  12.5 mg Oral Q6H PRN    Or    ondansetron (ZOFRAN) injection 4 mg  4 mg IntraVENous Q6H PRN    aspirin chewable tablet 81 mg  81 mg Oral DAILY    metoprolol tartrate (LOPRESSOR) tablet 25 mg  25 mg Oral BID    cetirizine (ZYRTEC) tablet 10 mg  10 mg Oral DAILY    amLODIPine (NORVASC) tablet 2.5 mg  2.5 mg Oral DAILY    allopurinoL (ZYLOPRIM) tablet 300 mg  300 mg Oral DAILY    atorvastatin (LIPITOR) tablet 20 mg  20 mg Oral DAILY    pantoprazole (PROTONIX) 40 mg in 0.9% sodium chloride 10 mL injection  40 mg IntraVENous DAILY    brimonidine (ALPHAGAN) 0.2 % ophthalmic solution 1 Drop  1 Drop Both Eyes BID    cholecalciferol (VITAMIN D3) (1000 Units /25 mcg) tablet 1,000 Units  1,000 Units Oral DAILY    guaiFENesin-dextromethorphan (ROBITUSSIN DM) 100-10 mg/5 mL syrup 20 mL  20 mL Oral TID PRN    glucose chewable tablet 16 g  4 Tablet Oral PRN    glucagon (GLUCAGEN) injection 1 mg  1 mg IntraMUSCular PRN    dextrose 10% infusion 0-250 mL  0-250 mL IntraVENous PRN    multivitamin, tx-iron-ca-min (THERA-M w/ IRON) tablet 1 Tablet  1 Tablet Oral DAILY    balsam peru-castor oiL (VENELEX) ointment   Topical BID    sodium chloride (NS) flush 5-10 mL  5-10 mL IntraVENous PRN          LAB AND IMAGING FINDINGS:     Lab Results   Component Value Date/Time    .6 (HH) 02/02/2023 06:10 AM    HGB 8.7 (L) 02/02/2023 06:10 AM    PLATELET 46 (LL) 10/38/5840 06:10 AM     Lab Results   Component Value Date/Time    Sodium 132 (L) 02/02/2023 06:10 AM    Potassium 3.6 02/02/2023 06:10 AM    Chloride 95 (L) 02/02/2023 06:10 AM    CO2 30 02/02/2023 06:10 AM    BUN 29 (H) 02/02/2023 06:10 AM    Creatinine 0.90 02/02/2023 06:10 AM    Calcium 8.5 02/02/2023 06:10 AM    Magnesium 2.3 01/27/2023 02:07 AM    Phosphorus 3.8 01/31/2023 06:55 AM      Lab Results   Component Value Date/Time    Alk. phosphatase 214 (H) 02/02/2023 06:10 AM    Protein, total 5.6 (L) 02/02/2023 06:10 AM    Albumin 2.5 (L) 02/02/2023 06:10 AM    Globulin 3.1 02/02/2023 06:10 AM     Lab Results   Component Value Date/Time    INR 1.1 01/13/2023 09:21 AM    Prothrombin time 11.9 (H) 01/13/2023 09:21 AM    aPTT 55.1 (H) 01/31/2023 12:00 AM      Lab Results   Component Value Date/Time    Iron 106 07/01/2022 11:56 AM    TIBC 330 07/01/2022 11:56 AM    Iron % saturation 32 07/01/2022 11:56 AM    Ferritin 3,029 (H) 01/28/2023 02:30 AM      Lab Results   Component Value Date/Time    pH 7.36 11/06/2015 04:25 PM    PCO2 36 11/06/2015 04:25 PM    PO2 89 11/06/2015 04:25 PM     No components found for: [place]   Lab Results   Component Value Date/Time     05/28/2017 02:46 PM    CK - MB <1.0 05/28/2017 02:46 PM                Total time:   Counseling / coordination time, spent as noted above:   > 50% counseling / coordination?:     Prolonged service was provided for  []30 min   []75 min in face to face time in the presence of the patient, spent as noted above. Time Start:   Time End:   Note: this can only be billed with 34634 (initial) or 55507 (follow up). If multiple start / stop times, list each separately.

## 2023-02-02 NOTE — PROGRESS NOTES
Hospitalist Progress Note    NAME: Roula Cronin   :  10/4/1933   MRN:  [id number]       Assessment / Plan:  Severe Hyperkalemia - refractory  S/p Insulin + D50, Ca-Gluconate, Kayexalate , Bicarb, lasix, albuterol   Serial BMP  Spoke with Daughter who is on her way to hospital-- will decide on Considering Hospice which was recommended Strongly today. IP Palliative/hospice consulted   Pt wishes to continue DNR and not decided re apheresis/dialysis   IP Nephrology consulted   Pt not a good candidate for Apheresis/dialysis with Low PLTs and endstage process with CLL, pt/family understand that    Acute respiratory failure with hypoxia POA   Due to COVID-19 pneumonia POA  Blood cultures- neg x 4 days  Urine Cx +ve for Enterococcus Fecalis sensitive to  Ampicillin/levaquin noted  ESR 51->87  cRP= 18.1- >15.5->14.4  Procal neg X2  Repeat CXR worse  increase Decadron dose  Trend Inflammatory markers daily   baricitinib switched to Actemra   S/p ceftriaxone azithromycin Levaquin  Cont to attempted wean oxygen- on 50L/m   Continue to monitor patient's clinical status     CLL- likely cause for Hyperkalemia due to lysis of WBC  Anemia- s/p 1 unit PRBC   Thrombocytopenia  Recent PE  ->511->498->775->538 today AM  PLT 63->44->41 today  S/p Transfuse 1 PRBC   IP Hematology following- recommended to change to IV heparin with PLT <50, DCd eliquis    Elevated serum troponin- remains flat  likely secondary to demand, serial troponins have down trended  Continuous telemetry monitoring  Continue to monitor patient's clinical status     Hypertension-  continue current antihypertensive medications    Hyperglycemia type 2 diabetes-  continue insulin sliding scale       18.5 - 24.9 Normal weight / Body mass index is 21.14 kg/m². Code status: DNR  Prophylaxis: IV heparin  Recommended Disposition:Hospice? Estimated discharge date: ?   Barriers: Oxygen stability/weaning, Hb, PLT stability, Hyperkalemia-- ?IP HOSPICE Subjective:     Chief Complaint / Reason for Physician Visit: F/U COVID infection, PNA, ARF, Enterococcal UTI, recent PE, Hyperkalemia  \"I am SOB\". Discussed with RN events overnight. Review of Systems:  Symptom Y/N Comments  Symptom Y/N Comments   Fever/Chills n   Chest Pain n    Poor Appetite n   Edema n    Cough y   Abdominal Pain n    Sputum y   Joint Pain     SOB/JETER y   Pruritis/Rash     Nausea/vomit    Tolerating PT/OT n    Diarrhea    Tolerating Diet y    Constipation    Other       Could NOT obtain due to:      Objective:     VITALS:   Last 24hrs VS reviewed since prior progress note. Most recent are:  Patient Vitals for the past 24 hrs:   Temp Pulse Resp BP SpO2   02/01/23 1805 -- (!) 108 -- 139/63 --   02/01/23 1651 -- 93 -- 137/61 --   02/01/23 1605 97.8 °F (36.6 °C) 77 18 (!) 126/51 95 %   02/01/23 1118 98.2 °F (36.8 °C) 80 18 (!) 122/56 95 %   02/01/23 1059 -- -- -- -- 95 %   02/01/23 0905 -- (!) 108 -- 135/61 --   02/01/23 0736 -- (!) 111 18 132/61 90 %   02/01/23 0359 -- -- -- -- 93 %   02/01/23 0227 97.7 °F (36.5 °C) 86 18 (!) 129/50 90 %   01/31/23 2346 -- -- -- -- 91 %   01/31/23 2234 97.9 °F (36.6 °C) 76 18 (!) 121/53 98 %   01/31/23 2127 -- -- -- -- 97 %         Intake/Output Summary (Last 24 hours) at 2/1/2023 1959  Last data filed at 2/1/2023 1805  Gross per 24 hour   Intake 680 ml   Output 1500 ml   Net -820 ml          I had a face to face encounter and independently examined this patient on 2/1/2023, as outlined below:  PHYSICAL EXAM:  General: WD, WN. Alert, cooperative, no acute distress    EENT:  EOMI. Anicteric sclerae. MMM  Resp:  Rales. No accessory muscle use. No wheezing. CV:  Regular  rhythm,  No edema  GI:  Soft, Non distended, Non tender. +Bowel sounds  Neurologic:  Alert and oriented X 3, normal speech,   Psych:   Good insight. Not anxious nor agitated  Skin:  No rashes.   No jaundice    Reviewed most current lab test results and cultures  YES  Reviewed most current radiology test results   YES  Review and summation of old records today    NO  Reviewed patient's current orders and MAR    YES  PMH/SH reviewed - no change compared to H&P  ________________________________________________________________________  Care Plan discussed with:    Comments   Patient x    Family  x Daughter    RN x    Care Manager x    Consultant  x Hospice                     x Multidiciplinary team rounds were held today with , nursing, pharmacist and clinical coordinator. Patient's plan of care was discussed; medications were reviewed and discharge planning was addressed. ________________________________________________________________________  Total NON critical care TIME:    Minutes    Total CRITICAL CARE TIME Spent: 60  Minutes non procedure based      Comments   >50% of visit spent in counseling and coordination of care x    ________________________________________________________________________  Garland Orourke MD     Procedures: see electronic medical records for all procedures/Xrays and details which were not copied into this note but were reviewed prior to creation of Plan. LABS:  I reviewed today's most current labs and imaging studies.   Pertinent labs include:  Recent Labs     01/31/23  0000 01/30/23  1649 01/30/23  0331   .3* 775.8* 498.7*   HGB 8.0* 8.3* 8.0*   HCT 26.6* 34.2* 30.4*   PLT 41* 50* 41*       Recent Labs     02/01/23  1409 01/31/23  0655 01/31/23  0522   * 128* 121*   K 7.4* 8.2* 7.6*   CL 93* 96* 84*   CO2 20* 27 24   * 241* 621*   BUN 14 27* 26*   CREA 0.99 0.97 1.10   CA 5.8* 7.8* 7.8*   PHOS  --  3.8  --    ALB 1.9*  --   --    TBILI 1.5*  --   --    ALT 74  --   --          Signed: Garland Orourke MD

## 2023-02-02 NOTE — PROGRESS NOTES
0700: End of Shift Note    Bedside shift change report given to Murray Torrez RN (oncoming nurse) by Kendell Orr (offgoing nurse). Report included the following information SBAR, Kardex, Intake/Output, MAR, Recent Results, and Cardiac Rhythm SR    Shift worked:  8939-0216     Shift summary and any significant changes:     No events     Concerns for physician to address:       Zone phone for oncoming shift:          Activity:  Activity Level: Bed Rest  Number times ambulated in hallways past shift: 0  Number of times OOB to chair past shift: 0    Cardiac:   Cardiac Monitoring: Yes      Cardiac Rhythm: Sinus Rhythm    Access:  Current line(s): PIV     Genitourinary:   Urinary status: alvarado    Respiratory:   O2 Device: Heated, Hi flow nasal cannula  Chronic home O2 use?: NO  Incentive spirometer at bedside: NO       GI:  Last Bowel Movement Date: 01/27/23  Current diet:  ADULT DIET Regular; Low Potassium (Less than 3000 mg/day)  Passing flatus: YES  Tolerating current diet: YES       Pain Management:   Patient states pain is manageable on current regimen: YES    Skin:  Javon Score: 14  Interventions: increase time out of bed and internal/external urinary devices    Patient Safety:  Fall Score:  Total Score: 4  Interventions: bed/chair alarm, assistive device (walker, cane, etc), gripper socks, and pt to call before getting OOB  High Fall Risk: Yes    Length of Stay:  Expected LOS: 3d 12h  Actual LOS: [facility]

## 2023-02-02 NOTE — PROGRESS NOTES
Spiritual Care Assessment/Progress Note  [facility]      NAME: Elif Brady      MRN: [id number]  AGE: 80 y.o.  SEX: male  Pentecostalism Affiliation: Hoahaoism   Language: English     2/2/2023     Total Time (in minutes): 15     Spiritual Assessment begun in [facility] CARDIOPULMONARY C[facility] through conversation with:         []Patient        [x] Family    [] Friend(s)        Reason for Consult: Palliative Care, Initial/Spiritual Assessment     Spiritual beliefs: (Please include comment if needed)     [x] Identifies with a jose tradition:         [x] Supported by a jose community:            [] Claims no spiritual orientation:           [] Seeking spiritual identity:                [] Adheres to an individual form of spirituality:           [] Not able to assess:                           Identified resources for coping:      [x] Prayer                               [] Music                  [] Guided Imagery     [x] Family/friends                 [] Pet visits     [] Devotional reading                         [] Unknown     [] Other:                                            Interventions offered during this visit: (See comments for more details)          Family/Friend(s): Initial Assessment, Coping skills reviewed/reinforced, End of life issues discussed, Affirmation of jose, Iconic (affirming the presence of God/Higher Power), Affirmation of emotions/emotional suffering     Plan of Care:     [] Support spiritual and/or cultural needs    [] Support AMD and/or advance care planning process      [] Support grieving process   [] Coordinate Rites and/or Rituals    [] Coordination with community clergy   [] No spiritual needs identified at this time   [] Detailed Plan of Care below (See Comments)  [] Make referral to Music Therapy  [] Make referral to Pet Therapy     [] Make referral to Addiction services  [] Make referral to [facility]  [] Make referral to Spiritual Care Partner  [] No future visits requested        [x] Contact Spiritual Care for further referrals     Comments: Initial spiritual assessment with pt's daughters outside of pt's rooms.  introduced self and role and explored how family was doing. Daughters stated that pt had expressed that he was ready to go and be with his maker and savior. Daughters are at peace and find comfort in pt's strong assurance. Affirmed jose and drawing from this extended words of comfort. Commended daughters for their presence, more family on their way from other states included. Advised on availability of chaplains and provided blessing. Daughters expressed appreciation. Contact Spiritual Care for any further referrals.  Jose Garcia M.Div, [facility]   Paging Service [phone])

## 2023-02-02 NOTE — PROGRESS NOTES
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 Review of Systems:    Symptom Y/N Comments  Symptom Y/N Comments   Fever/Chills    Chest Pain     Poor Appetite    Edema     Cough    Abdominal Pain     Sputum    Joint Pain     SOB/JETER    Pruritis/Rash     Nausea/vomit    Tolerating PT/OT     Diarrhea    Tolerating Diet     Constipation    Other       Could not obtain due to:      Objective:     VITALS:   Last 24hrs VS reviewed since prior progress note.  Most recent are:  Visit Vitals  BP (!) 147/59   Pulse (!) 108   Temp 97.6 °F (36.4 °C)   Resp 18   Ht 5' 7\" (1.702 m)   Wt 62.1 kg (136 lb 14.5 oz)   SpO2 94%   BMI 21.44 kg/m²       Intake/Output Summary (Last 24 hours) at 2023 1208  Last data filed at 2023 2323  Gross per 24 hour   Intake 120 ml   Output 2125 ml   Net - ml      Telemetry Reviewed:     PHYSICAL EXAM:  General: NAD      Lab Data Reviewed: (see below)    Medications Reviewed: (see below)    PMH/SH reviewed - no change compared to H&P  ________________________________________________________________________  Care Plan discussed with:  Patient     Family      RN     Care Manager                    Consultant:          Comments   >50% of visit spent in counseling and coordination of care       ________________________________________________________________________  Kim Avilez MD     Procedures: see electronic medical records for all procedures/Xrays and details which  were not copied into this note but were reviewed prior to creation of Plan. LABS:  Recent Labs     02/02/23  0610 01/31/23  0000   .6* 538.3*   HGB 8.7* 8.0*   HCT 33.8* 26.6*   PLT 46* 41*     Recent Labs     02/02/23  0610 02/01/23  1409 01/31/23  0655   * 127* 128*   K 3.6 7.4* 8.2*   CL 95* 93* 96*   CO2 30 20* 27   BUN 29* 14 27*   CREA 0.90 0.99 0.97   * 208* 241*   CA 8.5 5.8* 7.8*   PHOS  --   --  3.8   URICA  --   --  3.9     Recent Labs     02/02/23  0610 02/01/23  1409   * 196*   TP 5.6* 5.7*   ALB 2.5* 1.9*   GLOB 3.1 3.8     Recent Labs     01/31/23  0000 01/30/23  1649   APTT 55.1* 30.3      No results for input(s): FE, TIBC, PSAT, FERR in the last 72 hours. Lab Results   Component Value Date/Time    Folate 39.5 (H) 07/01/2022 11:56 AM      No results for input(s): PH, PCO2, PO2 in the last 72 hours. No results for input(s): CPK, CKMB in the last 72 hours.     No lab exists for component: TROPONINI  No components found for: [place]  Lab Results   Component Value Date/Time    Color YELLOW/STRAW 01/26/2023 11:40 AM    Appearance CLEAR 01/26/2023 11:40 AM    Specific gravity 1.017 01/26/2023 11:40 AM    Specific gravity 1.010 04/05/2015 01:18 AM    pH (UA) 5.5 01/26/2023 11:40 AM    Protein 100 (A) 01/26/2023 11:40 AM    Glucose Negative 01/26/2023 11:40 AM    Ketone Negative 01/26/2023 11:40 AM    Bilirubin Negative 01/26/2023 11:40 AM    Urobilinogen 4.0 (H) 01/26/2023 11:40 AM    Nitrites Negative 01/26/2023 11:40 AM    Leukocyte Esterase Negative 01/26/2023 11:40 AM    Epithelial cells MODERATE (A) 01/26/2023 11:40 AM    Bacteria 1+ (A) 01/26/2023 11:40 AM    WBC 10-20 01/26/2023 11:40 AM    RBC  01/26/2023 11:40 AM MEDICATIONS:  Current Facility-Administered Medications   Medication Dose Route Frequency    dexAMETHasone (DECADRON) tablet 10 mg  10 mg Oral DAILY WITH BREAKFAST    dextrose 10 % infusion 125-250 mL  125-250 mL IntraVENous PRN    morphine injection 1 mg  1 mg IntraVENous Q2H PRN    LORazepam (ATIVAN) injection 1 mg  1 mg IntraVENous Q2H PRN    0.9% sodium chloride infusion 250 mL  250 mL IntraVENous PRN    nitroglycerin (NITROSTAT) tablet 0.4 mg  0.4 mg SubLINGual PRN    guaiFENesin ER (MUCINEX) tablet 600 mg  600 mg Oral Q12H    benzonatate (TESSALON) capsule 100 mg  100 mg Oral TID PRN    acetaminophen (TYLENOL) tablet 650 mg  650 mg Oral Q6H PRN    Or    acetaminophen (TYLENOL) suppository 650 mg  650 mg Rectal Q6H PRN    polyethylene glycol (MIRALAX) packet 17 g  17 g Oral DAILY PRN    promethazine (PHENERGAN) tablet 12.5 mg  12.5 mg Oral Q6H PRN    Or    ondansetron (ZOFRAN) injection 4 mg  4 mg IntraVENous Q6H PRN    aspirin chewable tablet 81 mg  81 mg Oral DAILY    metoprolol tartrate (LOPRESSOR) tablet 25 mg  25 mg Oral BID    cetirizine (ZYRTEC) tablet 10 mg  10 mg Oral DAILY    amLODIPine (NORVASC) tablet 2.5 mg  2.5 mg Oral DAILY    allopurinoL (ZYLOPRIM) tablet 300 mg  300 mg Oral DAILY    atorvastatin (LIPITOR) tablet 20 mg  20 mg Oral DAILY    pantoprazole (PROTONIX) 40 mg in 0.9% sodium chloride 10 mL injection  40 mg IntraVENous DAILY    brimonidine (ALPHAGAN) 0.2 % ophthalmic solution 1 Drop  1 Drop Both Eyes BID    cholecalciferol (VITAMIN D3) (1000 Units /25 mcg) tablet 1,000 Units  1,000 Units Oral DAILY    guaiFENesin-dextromethorphan (ROBITUSSIN DM) 100-10 mg/5 mL syrup 20 mL  20 mL Oral TID PRN    glucose chewable tablet 16 g  4 Tablet Oral PRN    glucagon (GLUCAGEN) injection 1 mg  1 mg IntraMUSCular PRN    dextrose 10% infusion 0-250 mL  0-250 mL IntraVENous PRN    insulin lispro (HUMALOG) injection   SubCUTAneous AC&HS    multivitamin, tx-iron-ca-min (THERA-M w/ IRON) tablet 1 Tablet  1 Tablet Oral DAILY    balsam peru-castor oiL (VENELEX) ointment   Topical BID    sodium chloride (NS) flush 5-10 mL  5-10 mL IntraVENous PRN

## 2023-02-03 PROBLEM — J96.90 RESPIRATORY FAILURE (HCC): Status: ACTIVE | Noted: 2023-01-01

## 2023-02-03 NOTE — PROGRESS NOTES
Hematology Oncology Progress Note    Follow up for: CLL    Chart notes reviewed since last visit. Case discussed with following: nurse    Events noted;  still on HF, daughter Liam Manzano in [facility], family decided with help of palliative care to puruse comfort and take him off oxygen later this am. His dtr Alana Carroll is coming in [facility] today. Pt made this decision and is still oriented. Patient Vitals for the past 24 hrs:   BP Temp Pulse Resp SpO2 Height   02/03/23 0800 (!) 155/56 97.5 °F (36.4 °C) 71 -- -- --   02/03/23 0410 -- -- -- -- 96 % --   02/03/23 0338 (!) 147/59 97.1 °F (36.2 °C) 90 18 96 % --   02/03/23 0021 -- -- -- -- 96 % --   02/02/23 2329 (!) 143/50 98.1 °F (36.7 °C) 85 16 96 % --   02/02/23 1951 (!) 143/60 97.5 °F (36.4 °C) 97 18 95 % --   02/02/23 1833 (!) 144/57 -- 95 -- -- --   02/02/23 1618 -- -- -- -- 95 % --   02/02/23 1521 (!) 143/59 97.6 °F (36.4 °C) 91 18 96 % --   02/02/23 1306 -- -- -- -- -- 5' 7\" (1.702 m)   02/02/23 1200 (!) 134/53 97.8 °F (36.6 °C) 77 20 96 % --   02/02/23 1155 -- -- -- -- 96 % --       Review of Systems:  12 point ROS done and negative except as above    Physical Examination:  Seen from window remotely to reduced spread of covid and conserve ppe. Lethargic  On HF02    Labs:  Recent Results (from the past 24 hour(s))   GLUCOSE, POC    Collection Time: 02/02/23 11:58 AM   Result Value Ref Range    Glucose (POC) 200 (H) 65 - 117 mg/dL    Performed by Dago Segura RN      Assessment and Plan:   *) CLL  - follows with me in clinic for CLL  -Was supposed to start [address] but has not yet as has been in hospital with multiple infections, PE etc.  - Family/Patient decided to pursue comfort later this am and take him off Oxygen, pt states he is ready to go to [facility] and see his wife. Family coming in from [place] (grandson) and dtr Alana Carroll from [place] today per daughter Dell Wheatley.     *) Recent PE      *) Covid  -Treatment per Hospitalist, worsening 02 requirement, on HF02 at 50ml/min , pt is DNR    *) Cellulitis of left arm  -On abx per Hospitalist    Please call us with any questions. Will sign off .

## 2023-02-03 NOTE — PROGRESS NOTES
Problem: Risk for Spread of Infection  Goal: Prevent transmission of infectious organism to others  Description: Prevent the transmission of infectious organisms to other patients, staff members, and visitors. Outcome: Progressing Towards Goal     Problem: Airway Clearance - Ineffective  Goal: Achieve or maintain patent airway  Outcome: Progressing Towards Goal     Problem: Gas Exchange - Impaired  Goal: Absence of hypoxia  Outcome: Progressing Towards Goal     Problem: Breathing Pattern - Ineffective  Goal: Ability to achieve and maintain a regular respiratory rate  Outcome: Progressing Towards Goal     Problem:  Body Temperature -  Risk of, Imbalanced  Goal: Ability to maintain a body temperature within defined limits  Outcome: Progressing Towards Goal  Goal: Will regain or maintain usual level of consciousness  Outcome: Progressing Towards Goal     Problem: Isolation Precautions - Risk of Spread of Infection  Goal: Prevent transmission of infectious organism to others  Outcome: Progressing Towards Goal

## 2023-02-03 NOTE — HOSPICE
[facility]  [phone]    Inpatient Nursing Admission   Patient Name: Chacorta Gaspar  YOB: 1933  Age: 80 y.o. Date of Hospice Admission: 2/3/2023  Hospice Attending Elected by Patient: Beth King MD  Primary Care Physician: Rosemary Javed MD  Admitting RN: Ирина Cruz RN  : not present     Level of Care (GIP/Routine/Respite): GIP  Facility of Care: [facility]  Patient Room: 2141/01     HOSPICE SUMMARY   ER Visits/ Hospitalizations in past year:   Hospice Diagnosis: Respiratory failure ([facility] [address] [J96.90]  Onset Date of Hospice Diagnosis:2/3  Summary of Disease Progression Leading to Hospice Diagnosis: Chacorta Gaspar is a 80 y.o. with a past history of bronchiectasis, CAD s/p CABG x3 and stent placement, CLL, DM, GERD, HLD, HTN, , who was admitted on 1/25/2023 from home with a diagnosis of ACS from demand ischemia, and acute respiratory failure 2/2 COVID. Hospital course included progressive decline in resp status requiring 50L high flow 02. Given co morbidity of ES CLL pt decided to transition to [address] and wean off of high flow 02 knowing this to be a terminal event. Sandroiyl in agreement with admitting to inpt hospice for ongoing symptom management.       Co-Morbidities:   Patient Active Problem List   Diagnosis Code    Mixed hyperlipidemia E78.2    Peripheral vascular disease ([facility] [address] I73.9    Chronic ischemic heart disease I25.9    Coronary atherosclerosis of native coronary artery I25.10    Atherosclerosis of native artery of extremity with intermittent claudication (HCC) I70.219    Essential hypertension, benign I10    S/P coronary artery stent placement Z95.5    GERD (gastroesophageal reflux disease) K21.9    Leukocytosis D72.829    S/P angioplasty with stent--4/15 Z95.820    CLL (chronic lymphocytic leukemia) ([facility]) C91.10    Rotator cuff arthropathy M12.819    S/P CABG x 3 Z95.1    Opacity of lung on imaging study R91.8    Bronchiectasis [facility]) J47.9    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    Chronic renal disease, stage III N18.30    Hyponatremia E87.1    Anemia D64.9    Left leg cellulitis L03.116    Syncope and collapse R55    Elevated troponin R77.8    Acute hypoxemic respiratory failure ([facility]) J96.01    COVID-19 U07.1    Goals of care, counseling/discussion Z71.89    Comfort measures only status Z51.5    Frailty R54    Fatigue, unspecified type R53.83    Respiratory failure ([facility]) J96.90     Diagnoses RELATED to the terminal prognosis: resp failure r/t covid  Other Diagnoses: ES CLL    Rationale for a prognosis of life expectancy of 6 months or less if the disease follows its normal course (Disease Specific History):     Lan Dyer is a 80 y.o. who was admitted to [address] [place] [address]. The patient's principle diagnosis of acute resp failure  r/t covid PNAhas resulted in resp distress, unresponsiveness. Functionally, the patient's Palliative Performance Scale has declined over a period of weeks and is estimated at 10%. Objective information that support this patients limited prognosis includes: .6, covid +  CXR 2/1  IMPRESSION  New multifocal bilateral airspace disease, consistent with pneumonia. The patient/family chose comfort measures with the support of Hospice. Patient meets for GIP LOC as evidenced by severe resp distress through High Flow weaning,  secretions with inability to clear airway. Prognosis estimated based on 02/03/23 clinical assessment is:   [] Few to Many Hours  [x] Hours to Days   [] Few to Many Days   [] Days to Weeks   [] Few to Many Weeks   [] Weeks to Months   [] Few to Many Months    ASSESSMENT    Patient self-reports:  []  Yes    [x] No    SYMPTOMS: resp distress post high flow wean    SIGNS/PHYSICAL FINDINGS: pt is unresponsive post High flow wean    KARNOFSKY: 10%    FAST for all dementia:      Learning Assessment:  Patient  N/A  Is patient willing/able to learn?   What is the highest level of education completed? Learning preference (written material, demonstration, visual)? Learning barriers (ESOL, [place], poor vision)? Caregiver  daughter Adams Garland  Is caregiver willing to learn care for patient? What is the highest level of education completed? Learning preference (written material, demonstration, visual)? Learning barriers (ESOL, [place], poor vision)? CLINICAL INFORMATION     Wt Readings from Last 3 Encounters:   02/01/23 62.1 kg (136 lb 14.5 oz)   01/23/23 61.2 kg (135 lb)   01/13/23 61.2 kg (135 lb)     Ht Readings from Last 3 Encounters:   02/02/23 5' 7\" (1.702 m)   01/23/23 5' 7\" (1.702 m)   01/13/23 5' 7\" (1.702 m)     There is no height or weight on file to calculate BMI. There were no vitals taken for this visit. LAB VALUES  No results found for this visit on 02/03/23 (from the past 12 hour(s)). No results found for this visit on 02/03/23 (from the past 6 hour(s)). Lab Results   Component Value Date/Time    Protein, total 5.6 (L) 02/02/2023 06:10 AM    Albumin 2.5 (L) 02/02/2023 06:10 AM       Currently this patient has:  [] Supplemental O2 [x] Peripheral IV  [] PICC    [] PORT   [] Alvarado Catheter [] NG Tube   [] PEG Tube [] Ostomy    [] AICD: Has ICD been deactivated? [] Yes [] No:______    PLAN     1. Admit GIP level of care  2. Resp distress:  scheduled IV dilaudid 0.5mg every 4 hrs with PRN availability  3. Secretions:  IV robinul 0.2mg every 4 hrs PRN to prevent secretions accumulation. Pt struggling to clear airway. Gentle suction may be required  4. Fevers:  potential throughj end of life process, IV toradol available PRN  5. Urinary retention:  PRN alvarado placement for urine retention >400cc   6. Emotional and spiritual support to pt and family through dying process    Hospice Team Frequency Orders:  Skilled Nurse -   Daily x 7 days /every other day x 7 days  with 5 PRN visits for symptom control.  MSW - 1 visit for initial assessment/evaluation for family support and need for [employer] services. Ronnell Souza - 1 visit for initial assessment/evaluation for spiritual support. ADVANCE CARE PLANNING (Complete in ACP Flow Sheet)   Code Status: DNR  Durable DNR: []  Yes  []  No  Code Status Discussed/Confirmed:  Preference for Other Life Sustaining Treatment Discussed/Confirmed:  Hospitalization Preference:  (ex. Patient would like to receive end of life care in the hospital, in a facility, at home etc.)  Advance Care Planning 2023   Patient's Eddie 8 is: -   Primary Decision Maker Name -   Primary Decision Maker Phone Number -   Primary Decision Maker Relationship to Patient -   Confirm Advance Directive Yes, not on file   Patient Would Like to Complete Advance Directive -   Does the patient have other document types -        Service: [] Yes  []  No      [] Unknown  Appropriate for Pinning Ceremony:  [] Yes     [] No  Yazidi: [employer]   Home: TBD    DISCHARGE PLANNING     1. Discharge Plan: home with hospice should pt stabilize  2. Patient/Family teaching: end of life process  3. Response to patient/family teaching: expressed understanding    SOCIAL/EMOTIONAL/SPIRITUAL NEEDS     Spiritual Issues Identified: pt is Anglican, of strong jose, gospel music requested, reading the bible at bedside    Psych/ Social/ Emotional Issues Identified: pt has 2 daughter holding forrest along with their husbands. Grief appears to be adaptive with extensive family support. Caregiver Support:  [] Provided information on End of Life Care   [] Material Provided: [facility] [employer] [facility] or Brenda Phan contacted, discharge to hospice order received  Dr. Ana Luisa Briceno contacted, agrees to serve as attending provider for hospice and provided verbal certification of terminal illness with life expectancy of 6 months or less. Orders for hospice admission, medications and plan of treatment received. Medication reconciliation completed.   MEDS: See medication list below  DME: Per hospital  Supplies: Per hospital  IDT communication to include MD, SN, SW, CH and support team    ALLERGIES AND MEDICATIONS     Allergies:    Allergies   Allergen Reactions    Codeine Shortness of Breath    Lipitor [Atorvastatin] Nausea Only     Current Facility-Administered Medications   Medication Dose Route Frequency    LORazepam (ATIVAN) injection 1 mg  1 mg IntraVENous Q15MIN PRN    ketorolac (TORADOL) injection 30 mg  30 mg IntraVENous Q8H PRN    glycopyrrolate (ROBINUL) injection 0.2 mg  0.2 mg IntraVENous Q4H PRN    bisacodyL (DULCOLAX) suppository 10 mg  10 mg Rectal DAILY PRN    HYDROmorphone (DILAUDID) injection 0.5 mg  0.5 mg IntraVENous Q15MIN PRN    sodium chloride (NS) flush 5 mL  5 mL IntraVENous PRN

## 2023-02-03 NOTE — PROGRESS NOTES
0700: End of Shift Note    Bedside shift change report given to Vicenta Figueroa RN (oncoming nurse) by Dustin Duong (offgoing nurse). Report included the following information SBAR, Kardex, Intake/Output, MAR, and Cardiac Rhythm SR    Shift worked:  0151-4145     Shift summary and any significant changes:     Still of HHF; got confused twice during the night and got out of bed     Concerns for physician to address:  Transitioning to comfort care in the mornings     Zone phone for oncoming shift:          Activity:  Activity Level: Bed Rest  Number times ambulated in hallways past shift: 0  Number of times OOB to chair past shift: 0    Cardiac:   Cardiac Monitoring: Yes      Cardiac Rhythm: Sinus Rhythm    Access:  Current line(s): PIV     Genitourinary:   Urinary status: alvarado    Respiratory:   O2 Device: Heated, Hi flow nasal cannula  Chronic home O2 use?: NO  Incentive spirometer at bedside: NO       GI:  Last Bowel Movement Date: 01/27/23  Current diet:  ADULT DIET Regular; Low Potassium (Less than 3000 mg/day)  ADULT ORAL NUTRITION SUPPLEMENT Breakfast, Dinner; Renal Supplement  Passing flatus: YES  Tolerating current diet: YES       Pain Management:   Patient states pain is manageable on current regimen: YES    Skin:  Javon Score: 14  Interventions: internal/external urinary devices    Patient Safety:  Fall Score:  Total Score: 4  Interventions: bed/chair alarm, gripper socks, and pt to call before getting OOB  High Fall Risk: Yes    Length of Stay:  Expected LOS: 3d 12h  Actual LOS: [address] [place] [address]

## 2023-02-03 NOTE — DISCHARGE SUMMARY
Hospitalist Discharge Summary     Patient ID:  Almeta Ahumada  [id number]  32 y.o.  10/4/1933  2/3/2023    PCP on record: Jaquelin Tafoya MD    Admit date: 2/3/2023  Discharge date and time: 2/3/2023    DISCHARGE DIAGNOSIS:  As below     CONSULTATIONS:  None    Excerpted HPI from H&P of Cindi Tapia MD:  ***  ____________________________________________________________________  DISCHARGE SUMMARY/HOSPITAL COURSE:  for full details see H&P, daily progress notes, labs, consult notes. All Micro Results       None           01/25/23    ECHO ADULT COMPLETE 01/26/2023 1/26/2023    Interpretation Summary    Left Ventricle: Mildly reduced left ventricular systolic function with a visually estimated EF of 45 - 50%. Left ventricle size is normal. Normal wall thickness. Normal wall motion. Normal diastolic function. Mitral Valve: Moderately thickened leaflet. Mildly calcified leaflet. Mild regurgitation. Signed by: Nanda Green MD on 1/26/2023  1:23 PM    _______________________________________________________________________  Patient seen and examined by me on discharge day. Pertinent Findings:  Gen:    Not in distress  Chest: Clear lungs  CVS:   Regular rhythm. No edema  Abd/: Soft, not distended, not tender  Neuro:  Alert, ***  _______________________________________________________________________  DISCHARGE MEDICATIONS:   Current Discharge Medication List            Patient Follow Up Instructions: Activity: {discharge activity:51660}  Diet: DIET ONE TIME MESSAGE  Wound Care: {wound care:89729}  Follow-up with PCP in  1 week. Follow-up tests/labs ***  If you have any concerns that you feel you need to come back to the hospital, please do not hesitate.     Follow-up Information    None       ________________________________________________________________    Risk of deterioration: {BSI BLANK LIST:20061::\"Low\",\"Moderate\",\"High\"}    Condition at Discharge: Stable  __________________________________________________________________    Disposition  {BSHSI BLANK LIST:20061::\"Home with family, no needs\",\"Home with family and home health services\",\"SNF/LTC\",\"IP Rehab\",\"Home with hospice services\",\"IP Hospice\",\"***\"}    ____________________________________________________________________    Code Status: {BSHSI BLANK LIST:20061::\"DNR/DNI\",\"Partial\",\"Full Code\"}  ___________________________________________________________________      Total time in minutes spent coordinating this discharge (includes going over instructions, follow-up, prescriptions, and preparing report for sign off to her PCP) :  *** minutes    Signed:  He Abdi MD

## 2023-02-03 NOTE — PROGRESS NOTES
To transition to comfort care. I will sign off, but please call if questions/changes.     Isabel Cao

## 2023-02-03 NOTE — CONSULTS
Palliative Medicine Consult  [place]: [phone] ([phone])    Patient Name: Duane Mcgill  YOB: 1933    Date of Initial Consult: 2/2/23  Date of Today's Visit: 2/3/2023  Reason for Consult: Care Decisions  Requesting Provider: Shemar Schmitt MD   Primary Care Physician: Jeff Sparks MD     SUMMARY:   Duane Mcgill is a 80 y.o. with a past history of bronchiectasis, CAD s/p CABG x3 and stent placement, CLL, DM, GERD, HLD, HTN, , who was admitted on 1/25/2023 from home with a diagnosis of ACS from demand ischemia, and acute respiratory failure 2/2 COVID. Social: Mr John Lowe has two daughters, one local and one who lives in [facility] whom is he very close with      PALLIATIVE DIAGNOSES:   Goals of care  Comfort Care  Frailty  Fatigue     PLAN:   Met with family outside the room- they are ready to proceed with transitioning to comfort  I talked them through the process, then I went and talked with Mr John Lowe- he is very comfortable and at peace this morning, and is anxious to get started. Put CMO ordered in and worked with nursing- plan to wean quickly off his high flow (cutting it in half) after pre-medicating with ativan and hydromorphone.  Mr John Lowe is clear he wants to be asleep before we do anything  Hospice aware of his situation  Will leave some of his PO meds in place initially, in the event that he is awake and alert enough to take them, but Mr John Lowe was not keen on taking any pills this morning, so will likely d/c them after we see how he is doing later today  Initial consult note routed to primary continuity provider and/or primary health care team members  Communicated plan of care with: Palliative IDTAnoop 192 Team     GOALS OF CARE / TREATMENT PREFERENCES:     GOALS OF CARE:  Patient/Health Care Proxy Stated Goals: Comfort    TREATMENT PREFERENCES:   Code Status: DNR    Advance Care Planning:  [x] The Pall Med Interdisciplinary Team has updated the ACP Navigator with Health Care Decision Maker and Patient Capacity      Primary Decision Maker: Eric Velazquez - Daughter - [phone]    Secondary Decision Maker: Jenni Killian - Daughter - [phone]    Medical Interventions: Limited additional interventions       Other:    As far as possible, the palliative care team has discussed with patient / health care proxy about goals of care / treatment preferences for patient. HISTORY:     History obtained from: chart, family    CHIEF COMPLAINT: \"I am ready! \"    HPI/SUBJECTIVE:    The patient is:   [x] Verbal and participatory  [] Non-participatory due to:        Clinical Pain Assessment (nonverbal scale for severity on nonverbal patients):   Clinical Pain Assessment  Severity: 0          Duration: for how long has pt been experiencing pain (e.g., 2 days, 1 month, years)  Frequency: how often pain is an issue (e.g., several times per day, once every few days, constant)     FUNCTIONAL ASSESSMENT:     Palliative Performance Scale (PPS):  PPS: 30       PSYCHOSOCIAL/SPIRITUAL SCREENING:     Palliative IDT has assessed this patient for cultural preferences / practices and a referral made as appropriate to needs (Cultural Services, Patient Advocacy, Ethics, etc.)    Any spiritual / Confucianism concerns:  [] Yes /  [x] No   If \"Yes\" to discuss with pastoral care during IDT     Does caregiver feel burdened by caring for their loved one:   [] Yes /  [x] No /  [] No Caregiver Present/Available [] No Caregiver [] Pt Lives at [facility]  If \"Yes\" to discuss with social work during IDT    Anticipatory grief assessment:   [x] Normal  / [] Maladaptive     If \"Maladaptive\" to discuss with social work during IDT    ESAS Anxiety: Anxiety: 0    ESAS Depression: Depression: 0        REVIEW OF SYSTEMS:     Positive and pertinent negative findings in ROS are noted above in HPI. The following systems were [x] reviewed / [] unable to be reviewed as noted in HPI  Other findings are noted below.   Systems: constitutional, ears/nose/mouth/throat, respiratory, gastrointestinal, genitourinary, musculoskeletal, integumentary, neurologic, psychiatric, endocrine. Positive findings noted below. Modified ESAS Completed by: provider   Fatigue: 2     Depression: 0 Pain: 0   Anxiety: 0 Nausea: 0   Anorexia: 4 Dyspnea: 2     Constipation: No              PHYSICAL EXAM:     From RN flowsheet:  Wt Readings from Last 3 Encounters:   02/01/23 136 lb 14.5 oz (62.1 kg)   01/23/23 135 lb (61.2 kg)   01/13/23 135 lb (61.2 kg)     Blood pressure (!) 155/56, pulse 71, temperature 97.5 °F (36.4 °C), resp. rate 18, height 5' 7\" (1.702 m), weight 136 lb 14.5 oz (62.1 kg), SpO2 (!) 88 %.     Pain Scale 1: Numeric (0 - 10)  Pain Intensity 1: 0                 Last bowel movement, if known:     Constitutional: awake, frail, a little sleepy, appears chronically ill  Eyes: pupils equal, anicteric  ENMT: no nasal discharge, moist mucous membranes  Cardiovascular: regular rhythm, distal pulses intact  Respiratory: breathing slightly labored, symmetric  Gastrointestinal: soft non-tender, +bowel sounds  Musculoskeletal: no deformity, no tenderness to palpation  Skin: warm, dry  Neurologic: following commands, moving all extremities  Psychiatric: full affect, no hallucinations  Other:       HISTORY:     Active Problems:    Elevated troponin (1/26/2023)      Acute hypoxemic respiratory failure ([facility] [address] (1/26/2023)      COVID-19 (1/26/2023)      Goals of care, counseling/discussion (2/2/2023)      Comfort measures only status (2/2/2023)      Frailty (2/2/2023)      Fatigue, unspecified type (2/2/2023)    Past Medical History:   Diagnosis Date    Abnormal nuclear stress test 6/6/2014    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 ([address] 11/2/2015    Bereavement 02/06/2019    ltcf - uti -strangulated hernia - prostate ca - brother    Bronchiectasis ([facility] [address]     CAD (coronary artery disease)     Chest pain, unspecified     Chronic pain right leg    CLL (chronic lymphocytic leukemia) ([facility] [address]     Jerica Segura md  [facility]    Diabetes ([facility] [address]     Essential hypertension     GERD (gastroesophageal reflux disease)     Hyperlipidemia     Hypertension     Opacity of lung on imaging study 2017    cxr    Rotator cuff arthropathy     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    S/P coronary artery stent placement 2014 PCI/GUANAKO to prox LAD      Past Surgical History:   Procedure Laterality Date    HX COLONOSCOPY      HX HEART CATHETERIZATION      MA UNLISTED PROCEDURE VASCULAR SURGERY   and     BLE stenting    PTCA SINGLE VESSEL  2015           Family History   Problem Relation Age of Onset    Diabetes Mother     Stroke Father       History reviewed, no pertinent family history.   Social History     Tobacco Use    Smoking status: Former     Types: Cigarettes     Quit date: 1/15/1984     Years since quittin.0    Smokeless tobacco: Never    Tobacco comments:     QUIT    Substance Use Topics    Alcohol use: No     Alcohol/week: 0.0 standard drinks     Comment: quit in      Allergies   Allergen Reactions    Codeine Shortness of Breath    Lipitor [Atorvastatin] Nausea Only      Current Facility-Administered Medications   Medication Dose Route Frequency    LORazepam (ATIVAN) injection 1 mg  1 mg IntraVENous Q15MIN PRN    glycopyrrolate (ROBINUL) injection 0.2 mg  0.2 mg IntraVENous Q4H PRN    scopolamine (TRANSDERM-SCOP) 1 mg over 3 days 1 Patch  1 Patch TransDERmal Q72H PRN    HYDROmorphone (DILAUDID) injection 0.5 mg  0.5 mg IntraVENous Q15MIN PRN    dexAMETHasone (DECADRON) tablet 10 mg  10 mg Oral DAILY WITH BREAKFAST    guaiFENesin ER (MUCINEX) tablet 600 mg  600 mg Oral Q12H    metoprolol tartrate (LOPRESSOR) tablet 25 mg  25 mg Oral BID    amLODIPine (NORVASC) tablet 2.5 mg  2.5 mg Oral DAILY    allopurinoL (ZYLOPRIM) tablet 300 mg  300 mg Oral DAILY    brimonidine (ALPHAGAN) 0.2 % ophthalmic solution 1 Drop  1 Drop Both Eyes BID balsam peru-castor oiL (VENELEX) ointment   Topical BID    sodium chloride (NS) flush 5-10 mL  5-10 mL IntraVENous PRN          LAB AND IMAGING FINDINGS:     Lab Results   Component Value Date/Time    .6 (HH) 02/02/2023 06:10 AM    HGB 8.7 (L) 02/02/2023 06:10 AM    PLATELET 46 (LL) 88/70/5114 06:10 AM     Lab Results   Component Value Date/Time    Sodium 132 (L) 02/02/2023 06:10 AM    Potassium 3.6 02/02/2023 06:10 AM    Chloride 95 (L) 02/02/2023 06:10 AM    CO2 30 02/02/2023 06:10 AM    BUN 29 (H) 02/02/2023 06:10 AM    Creatinine 0.90 02/02/2023 06:10 AM    Calcium 8.5 02/02/2023 06:10 AM    Magnesium 2.3 01/27/2023 02:07 AM    Phosphorus 3.8 01/31/2023 06:55 AM      Lab Results   Component Value Date/Time    Alk. phosphatase 214 (H) 02/02/2023 06:10 AM    Protein, total 5.6 (L) 02/02/2023 06:10 AM    Albumin 2.5 (L) 02/02/2023 06:10 AM    Globulin 3.1 02/02/2023 06:10 AM     Lab Results   Component Value Date/Time    INR 1.1 01/13/2023 09:21 AM    Prothrombin time 11.9 (H) 01/13/2023 09:21 AM    aPTT 55.1 (H) 01/31/2023 12:00 AM      Lab Results   Component Value Date/Time    Iron 106 07/01/2022 11:56 AM    TIBC 330 07/01/2022 11:56 AM    Iron % saturation 32 07/01/2022 11:56 AM    Ferritin 3,029 (H) 01/28/2023 02:30 AM      Lab Results   Component Value Date/Time    pH 7.36 11/06/2015 04:25 PM    PCO2 36 11/06/2015 04:25 PM    PO2 89 11/06/2015 04:25 PM     No components found for: [place]   Lab Results   Component Value Date/Time     05/28/2017 02:46 PM    CK - MB <1.0 05/28/2017 02:46 PM                Total time:   Counseling / coordination time, spent as noted above:   > 50% counseling / coordination?:     Prolonged service was provided for  []30 min   []75 min in face to face time in the presence of the patient, spent as noted above. Time Start:   Time End:   Note: this can only be billed with 04921 (initial) or 78262 (follow up). If multiple start / stop times, list each separately.

## 2023-02-03 NOTE — PROGRESS NOTES
Hospitalist Progress Note    NAME: Paulino Girard   :  10/4/1933   MRN:  [id number]       Assessment / Plan:  Severe Hyperkalemia   S/p Insulin + D50, Ca-Gluconate, Kayexalate , Bicarb, lasix, albuterol   Serial BMP  IP Palliative/hospice consulted    IP Nephrology consulted   Pt not a good candidate for Apheresis/dialysis with Low PLTs and endstage process with CLL, pt/family understand that    Acute respiratory failure with hypoxia POA   Due to COVID-19 pneumonia POA  Blood cultures- neg x 4 days  Urine Cx +ve for Enterococcus Fecalis sensitive to  Ampicillin/levaquin noted  ESR 51->87  cRP= 18.1- >15.5->14.4  Procal neg X2  Repeat CXR worse  increase Decadron dose  Trend Inflammatory markers daily   baricitinib switched to Actemra   S/p ceftriaxone azithromycin Levaquin  Cont to attempted wean oxygen- on 50L/m   Pt decided to pursue comfort care tmw, palliative recs appreciated     CLL- likely cause for Hyperkalemia due to lysis of WBC  Anemia- s/p 1 unit PRBC   Thrombocytopenia  Recent PE  ->511->498->775->538 today AM  PLT 63->44->41 today  S/p Transfuse 1 PRBC   IP Hematology following- recommended to change to IV heparin with PLT <50, DCd eliquis    Elevated serum troponin  likely secondary to demand, serial troponins have down trended  Continuous telemetry monitoring  Continue to monitor patient's clinical status     Hypertension-  continue current antihypertensive medications    Hyperglycemia type 2 diabetes-  continue insulin sliding scale       18.5 - 24.9 Normal weight / Body mass index is 21.14 kg/m². Code status: DNR  Prophylaxis: IV heparin  Recommended Disposition:Hospice, ? GIP    Estimated discharge date:   Barriers: IP HOSPICE       Subjective:     Chief Complaint / Reason for Physician Visit: F/U COVID infection, PNA, ARF, Enterococcal UTI, recent PE, Hyperkalemia  \"I am SOB\". Discussed with RN events overnight.      Review of Systems:  Symptom Y/N Comments  Symptom Y/N Comments   Fever/Chills n   Chest Pain n    Poor Appetite n   Edema n    Cough y   Abdominal Pain n    Sputum y   Joint Pain     SOB/JETER y   Pruritis/Rash     Nausea/vomit    Tolerating PT/OT n    Diarrhea    Tolerating Diet y    Constipation    Other       Could NOT obtain due to:      Objective:     VITALS:   Last 24hrs VS reviewed since prior progress note. Most recent are:  Patient Vitals for the past 24 hrs:   Temp Pulse Resp BP SpO2   02/02/23 2329 98.1 °F (36.7 °C) 85 16 (!) 143/50 96 %   02/02/23 1951 97.5 °F (36.4 °C) 97 18 (!) 143/60 95 %   02/02/23 1833 -- 95 -- (!) 144/57 --   02/02/23 1618 -- -- -- -- 95 %   02/02/23 1521 97.6 °F (36.4 °C) 91 18 (!) 143/59 96 %   02/02/23 1200 97.8 °F (36.6 °C) 77 20 (!) 134/53 96 %   02/02/23 1155 -- -- -- -- 96 %   02/02/23 0827 -- (!) 108 -- (!) 147/59 --   02/02/23 0801 97.6 °F (36.4 °C) (!) 107 18 (!) 142/60 94 %   02/02/23 0720 -- -- -- -- 96 %   02/02/23 0404 -- -- -- -- 94 %   02/02/23 0328 97.1 °F (36.2 °C) 95 20 130/71 90 %         Intake/Output Summary (Last 24 hours) at 2/3/2023 0154  Last data filed at 2/2/2023 1408  Gross per 24 hour   Intake 120 ml   Output 750 ml   Net -630 ml          I had a face to face encounter and independently examined this patient on 2/3/2023, as outlined below:  PHYSICAL EXAM:  General: WD, WN. Alert, cooperative, no acute distress    EENT:  EOMI. Anicteric sclerae. MMM  Resp:  Rales. No accessory muscle use. No wheezing. CV:  Regular  rhythm,  No edema  GI:  Soft, Non distended, Non tender. +Bowel sounds  Neurologic:  Alert and oriented X 3, normal speech,   Psych:   Good insight. Not anxious nor agitated  Skin:  No rashes.   No jaundice    Reviewed most current lab test results and cultures  YES  Reviewed most current radiology test results   YES  Review and summation of old records today    NO  Reviewed patient's current orders and MAR    YES  PMH/SH reviewed - no change compared to H&P  ________________________________________________________________________  Care Plan discussed with:    Comments   Patient x    Family  x Daughter    RN x    Care Manager x    Consultant  x Palliative                      x Multidiciplinary team rounds were held today with , nursing, pharmacist and clinical coordinator. Patient's plan of care was discussed; medications were reviewed and discharge planning was addressed. ________________________________________________________________________  Total NON critical care TIME: 33   Minutes    Total CRITICAL CARE TIME Spent: Minutes non procedure based      Comments   >50% of visit spent in counseling and coordination of care x    ________________________________________________________________________  Yu Jackson MD     Procedures: see electronic medical records for all procedures/Xrays and details which were not copied into this note but were reviewed prior to creation of Plan. LABS:  I reviewed today's most current labs and imaging studies.   Pertinent labs include:  Recent Labs     02/02/23  0610   .6*   HGB 8.7*   HCT 33.8*   PLT 46*       Recent Labs     02/02/23  0610 02/01/23  1409 01/31/23  0655   * 127* 128*   K 3.6 7.4* 8.2*   CL 95* 93* 96*   CO2 30 20* 27   * 208* 241*   BUN 29* 14 27*   CREA 0.90 0.99 0.97   CA 8.5 5.8* 7.8*   PHOS  --   --  3.8   ALB 2.5* 1.9*  --    TBILI 2.0* 1.5*  --    ALT 76 74  --          Signed: Yu Jackson MD

## 2023-02-03 NOTE — PROGRESS NOTES
0930 - pt family here at the bedside. Latisha Ratliff Aubrey with palliative for anticipated comfort measures. 1019 - given prn dilaudid and ativan for comfort measures. Weaned HF NC to 30%/15L. Pt resting comfortably with family at the bedside. 0 - weaned down HF NC placed pt on 2L NC.      1130 - NC removed. Pt turned and repositioned in bed. Pt resting comfortably.

## 2023-02-03 NOTE — HOSPICE
[facility] RN note:  discussed pt with palliative NP Jason Ledbetter. Pt now on 2L NC. Will plan to support family and admit to inpt hospice when appropriate. 12:51---pt now on RA, breathing steady and shallow, saturations 70's, appears comfortable with PRN IV dilaudid and ativan. Family holding forrest. Discussed admitting to inpt hospice for ongoing symptom management. All in agreement. Daughter Starr Youngblood to remi consent form. Approval for inpt hospice at [facility] level of care per Dr Edilma Wise. Thank you for the opportunity to care for this pt and family. Please contact hospice at [phone] with any questions or concerns.

## 2023-02-04 NOTE — PROGRESS NOTES
Spiritual Care Assessment/Progress Note  [facility]      NAME: Thien Junior      MRN: [id number]  AGE: 80 y.o. SEX: male  Holiness Affiliation: Oriental orthodox   Language: English     2/4/2023     Total Time (in minutes): 30     Spiritual Assessment begun in [facility] through conversation with:         []Patient        [x] Family    [] Friend(s)        Reason for Consult: Death, Hospice     Spiritual beliefs: (Please include comment if needed)     [x] Identifies with a jose tradition:         [] Supported by a jose community:            [] Claims no spiritual orientation:           [] Seeking spiritual identity:                [] Adheres to an individual form of spirituality:           [] Not able to assess:                           Identified resources for coping:      [x] Prayer                               [] Music                  [] Guided Imagery     [] Family/friends                 [] Pet visits     [] Devotional reading                         [] Unknown     [] Other:                                            Interventions offered during this visit: (See comments for more details)          Family/Friend(s): Affirmation of jose, Normalization of emotional/spiritual concerns, Prayer (actual), Prayer (assurance of)     Plan of Care:     [] Support spiritual and/or cultural needs    [] Support AMD and/or advance care planning process      [x] Support grieving process   [] Coordinate Rites and/or Rituals    [] Coordination with community clergy   [] No spiritual needs identified at this time   [] Detailed Plan of Care below (See Comments)  [] Make referral to Music Therapy  [] Make referral to Pet Therapy     [] Make referral to Addiction services  [] Make referral to [facility]  [] Make referral to Spiritual Care Partner  [] No future visits requested        [] Contact Spiritual Care for further referrals     Comments: Responded to page for the family of Mr. Pope brewster daughters and husbands and grand children. Affirmation of jose. Normalization of emotions. Prayer and assurance of prayer. [address] [place], [place]. Div   Paging Service [phone]   .

## 2023-02-04 NOTE — PROGRESS NOTES
.End of Shift Note    Bedside shift change report given to Wen (oncoming nurse) by Thang Diggs RN (offgoing nurse). Report included the following information SBAR, Kardex, and MAR    Shift worked: [id number]     Shift summary and any significant changes:    Patient received at [phone]. Vital signs and assessment completed. Medications given per MAR. Family present at bedside and given updates. Hourly rounding completed and pt is resting quietly.           Thang Diggs RN

## 2023-02-04 NOTE — H&P
[facility] Help to Those in Need  [phone]    Patient Name: Margi Sterling  YOB: 1933    Date of Provider Hospice Visit: 02/03/23    Level of Care:   [x] General Inpatient (GIP)    [] Routine   [] Respite    Current Location of Care:  [] [facility] [] [facility] [x] [facility] [] [address] [place] [address] [] Hospice House THE [facility]    IF [facility], patient referred from:  [] [facility] [] [facility] [] [facility] [] [address] [place] [address] [] Home [] Other:     Date of Original Hospice Admission: 2/3/2023  Hospice Medical Director at time of admission: Dr. Johanna Adam Diagnosis: respiratory failure secondary to COVID 19  Diagnoses RELATED to the terminal prognosis:  CLL, ACS from demand ischemia  Other Diagnoses:  hx of bronchiectasis, CAD with CABG X 3 and stent, DM, GERD, HLD, HTN, chronic PE's, gout     HOSPICE SUMMARY      Margi Sterling is a 80y.o. year old who was admitted to [facility]. Patient cme to the ER on 1/26 with sob. He was found to be COVID positive and had been completely vaccinated. He was diagnosed with acute ACS felt to be demand ischemia. For COVID with hypoxemic respiratory failure he received decadron, oxygen transitioned to HF, baricitinib. Patient takes eliquis for chronic PE's. He continued to decline with severe hyperkalemia (likely due to cell lysis from CLL) and was not a good candidate for apheresis/dialysis secondary to low platelets and endstage CLL. He did receive insulin + D50, calcium gluconate, kayexalate, bicard, lasix and albuterol. He was also treated for a UTI. Baricitinib was switched to Actrema and he received IV antibiotics. Patient met with palliative and decided he wanted to come off HiFlow and transition to hospice care. The patient's principle diagnosis has resulted in respiratory distress, hospice and obtundation        Functionally, the patient's Karnofsky and/or Palliative Performance Scale has declined over a period of days and is estimated at 10%.  The patient is dependent on the following ADLs:    Objective information that support this patients limited prognosis includes:  tapered off HF oxygen and now severely hypoxic and obtunded. Patient has end stage CLL and COVID respiratory failure. The patient/family chose comfort measures with the support of Hospice. HOSPICE DIAGNOSES   Active Symptoms:  1. Pain  2. Hypoxia with respiratory distress  3. Obtundation  4. COVID-19   5. CLL  6. Hospice care patient     PLAN   Admit to [facility] LOC. Patient requires frequent nursing assessments and IV medication adjustments for symptom control. Patient is not stable for transfer. Hydromorphone 0.5 mg IV every 4 hours and every 15 minutes prn pain/respiratory distress  Lorazepam 1 mg IV every 15 minutes prn agitation/respiratory distress  All other comfort meds ordered  Oxygen for comfort by OLIVERIO Sherwood for bladder decompression   and SW to support family needs  Disposition: Patient expected to die in the hospital  Hospice Plan of care was reviewed in detail and agree with current plan of care    Prognosis estimated based on 02/04/23 clinical assessment is:   [x] Hours to Days    [] Days to Weeks    [] Other:    Communicated plan of care with: Hospice Case Manager; Hospice IDT; Care Team     GOALS OF CARE     Patient/Medical POA stated Goal of Care: hospice care    [x] I have reviewed and/or updated ACP information in the Advance Care Planning Navigator. This information is available in the [address] link in the patient's chart header.     Primary Decision [facility] Agent):   Primary Decision Maker: Sadiq Farah - Daughter - [phone]    Secondary Decision Maker: Michelle Wolff - Daughter - [phone]    Resuscitation Status: DNR  If DNR is there a Durable DNR on file? : [] Yes [] No (If no, complete Durable DNR)    HISTORY     History obtained from: chart review and IDT    CHIEF COMPLAINT:    The patient is:   [] Verbal  [] Nonverbal  [x] Unresponsive    HPI/SUBJECTIVE:  patient unresponsive.          REVIEW OF SYSTEMS     The following systems were: [] reviewed  [x] unable to be reviewed    Positive ROS include:  Constitutional: fatigue, weakness, in pain, short of breath  Ears/nose/mouth/throat: increased airway secretions  Respiratory:shortness of breath, wheezing  Gastrointestinal:poor appetite, nausea, vomiting, abdominal pain, constipation, diarrhea  Musculoskeletal:pain, deformities, swelling legs  Neurologic:confusion, hallucinations, weakness  Psychiatric:anxiety, feeling depressed, poor sleep  Endocrine:     Adult Non-Verbal Pain Assessment Score: 2/10    Face  [x] 0   No particular expression or smile  [] 1   Occasional grimace, tearing, frowning, wrinkled forehead  [] 2   Frequent grimace, tearing, frowning, wrinkled forehead    Activity (movement)  [x] 0   Lying quietly, normal position  [] 1   Seeking attention through movement or slow, cautious movement  [] 2   Restless, excessive activity and/or withdrawal reflexes    Guarding  [x] 0   Lying quietly, no positioning of hands over areas of body  [] 1   Splinting areas of the body, tense  [] 2   Rigid, stiff    Physiology (vital signs)  [] 0   Stable vital signs  [x] 1   Change in any of the following: SBP > 20mm Hg; HR > 20/minute  [] 2   Change in any of the following: SBP > 30mm Hg; HR > 25/minute    Respiratory  [] 0   Baseline RR/SpO2, compliant with ventilator  [x] 1   RR > 10 above baseline, or 5% drop SpO2, mild asynchrony with ventilator  [] 2   RR > 20 above baseline, or 10% drop SpO2, asynchrony with ventilator     FUNCTIONAL ASSESSMENT     Palliative Performance Scale (PPS):10%       PSYCHOSOCIAL/SPIRITUAL ASSESSMENT     Active Problems:    Respiratory failure ([address] (2/3/2023)      Past Medical History:   Diagnosis Date    Abnormal nuclear stress test 6/6/2014    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 ([address] 11/2/2015    Bereavement 02/06/2019    ltcf - uti -strangulated hernia - prostate ca - brother    Bronchiectasis [facility])     CAD (coronary artery disease)     Chest pain, unspecified     Chronic pain     right leg    CLL (chronic lymphocytic leukemia) ([facility])     Jerica Segura md  VCI    Diabetes ([facility] [address]     Essential hypertension     GERD (gastroesophageal reflux disease)     Hyperlipidemia     Hypertension     Opacity of lung on imaging study 2017    cxr    Rotator cuff arthropathy     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    S/P coronary artery stent placement 2014 PCI/GUANAKO to prox LAD      Past Surgical History:   Procedure Laterality Date    HX COLONOSCOPY      HX HEART CATHETERIZATION      CT UNLISTED PROCEDURE VASCULAR SURGERY   and     BLE stenting    PTCA SINGLE VESSEL  2015           Social History     Tobacco Use    Smoking status: Former     Types: Cigarettes     Quit date: 1/15/1984     Years since quittin.0    Smokeless tobacco: Never    Tobacco comments:     QUIT    Substance Use Topics    Alcohol use: No     Alcohol/week: 0.0 standard drinks     Comment: quit in      Family History   Problem Relation Age of Onset    Diabetes Mother     Stroke Father       Allergies   Allergen Reactions    Codeine Shortness of Breath    Lipitor [Atorvastatin] Nausea Only      Current Facility-Administered Medications   Medication Dose Route Frequency    HYDROmorphone (DILAUDID) injection 1 mg  1 mg IntraVENous Q4H    glycopyrrolate (ROBINUL) injection 0.2 mg  0.2 mg IntraVENous Q4H    LORazepam (ATIVAN) injection 1 mg  1 mg IntraVENous Q4H    HYDROmorphone (DILAUDID) injection 1 mg  1 mg IntraVENous Q15MIN PRN    sodium chloride (NS) flush 5-10 mL  5-10 mL IntraVENous PRN    LORazepam (ATIVAN) injection 1 mg  1 mg IntraVENous Q15MIN PRN    ketorolac (TORADOL) injection 30 mg  30 mg IntraVENous Q8H PRN    bisacodyL (DULCOLAX) suppository 10 mg  10 mg Rectal DAILY PRN    sodium chloride (NS) flush 5 mL  5 mL IntraVENous PRN PHYSICAL EXAM     Wt Readings from Last 3 Encounters:   02/01/23 62.1 kg (136 lb 14.5 oz)   01/23/23 61.2 kg (135 lb)   01/13/23 61.2 kg (135 lb)       Visit Vitals  BP (!) 147/69 (BP 1 Location: Left upper arm, BP Patient Position: At rest)   Pulse (!) 101   Temp 97.8 °F (36.6 °C)   Resp 27   SpO2 (!) 72%       Supplemental O2  [x] Yes  [] NO  Last bowel movement: N/A    Currently this patient has:  [x] Peripheral IV [] PICC  [] PORT [] ICD    [] Sherwood Catheter [] NG Tube   [] PEG Tube    [] Rectal Tube [] Drain  [] Other:     Constitutional:frail elderly male not responsive, no increased wob  Eyes: closed  ENMT: no mouth breathing  Cardiovascular: tachycardia at 108, no edema  Respiratory: no increased wob, rhonchi throughout  Gastrointestinal: no distention  Musculoskeletal: no contractures or deformities  Skin: warm and dry  Neurologic:unresponsive  Psychiatric: unresponsive  Other:       Pertinent Lab and or Imaging Tests:  Lab Results   Component Value Date/Time    Sodium 132 (L) 02/02/2023 06:10 AM    Potassium 3.6 02/02/2023 06:10 AM    Chloride 95 (L) 02/02/2023 06:10 AM    CO2 30 02/02/2023 06:10 AM    Anion gap 7 02/02/2023 06:10 AM    Glucose 163 (H) 02/02/2023 06:10 AM    BUN 29 (H) 02/02/2023 06:10 AM    Creatinine 0.90 02/02/2023 06:10 AM    BUN/Creatinine ratio 32 (H) 02/02/2023 06:10 AM    GFR est AA >60 09/26/2022 04:28 AM    GFR est non-AA >60 09/26/2022 04:28 AM    Calcium 8.5 02/02/2023 06:10 AM     Lab Results   Component Value Date/Time    Protein, total 5.6 (L) 02/02/2023 06:10 AM    Albumin 2.5 (L) 02/02/2023 06:10 AM         Guanako Landon NP

## 2023-02-04 NOTE — PROGRESS NOTES
Pt passed at 17:25, Hospice RN Segundo Morin pronounced TOD. Nursing Supervisor, anisha and life net notified. Family at bedside.

## 2023-02-04 NOTE — HOSPICE
Dg 4 Help to Those in Need  [phone]    Discharge/Death Nursing Note   Patient Name: Khoi Badillo  YOB: 1933  Age: 80 y.o. Date of Death: 23  Admitted Date: 2/3/2023  Time of Death: [address] of Care: [address]  Level of Care: [facility]  Patient Room: [facility]/     Hospice Attending: Debra Nuno MD  Hospice Diagnosis: Respiratory failure [facility]) [J96.90]    Death Pronouncement   Pronouncement of death completed by: Olman Baez hospice RN    Agency staff was present at the time of death    At the time of death the patient was documented as:     No RR nor apical pulse for 2 minutes    The pt  within Oncology Unit    The following were notified of the patient's death: Georgiana Naqvi/daughter and large family presence.      Medications were disposed of per facility protocol     Discharge Summary   Discharge Reason: Death    Summary of Care Provided:    [x] Post mortem care provided by Oncology staff  [x] Notification of  home by nursing supervisor  [] Referrals/Community resources provided:   [] Goals completed  [] Durable Medical Equipment vendor notified     Disciplines involved: [x] RN [] SW [x]  [] REILLY [] Vol [] PT [] OT [] ST [] BC    [x] IDT communication/notification    Attending Physician, Dr. Jannette Mckeon, notified of death    Bereaved   Georgiana Naqvi/daughter     Advance Care Planning 2023   Patient's [address] is: -   Primary Decision Maker Name -   Primary Decision [facility] Phone Number -   Primary Decision Maker Relationship to Patient -   Confirm Advance Directive Yes, not on file   Patient Would Like to Complete Advance Directive -   Does the patient have other document types -

## 2023-02-04 NOTE — HOSPICE
Dg  Help to Those in Need  [phone]    Nursing Note   Patient Name: Khoi Badillo  YOB: 1933  Age: 80 y.o.     [facility] Hospice RN Note:      Was notified that patient has passed:    No RR nor apical pulse for 2 minutes    TOD: 3001

## 2023-02-05 NOTE — HOSPICE
This LMSW contacted the patient's daughter and offered condolences. She stated the family go together last night and shared stories and supported one another. She stated that the family is pleased with the care they all received. LMSW reviewed bereavement services. No needs expressed at this time.

## 2023-02-05 NOTE — PROGRESS NOTES
[address] [facility] Help to Those in Need  [phone]    Patient Name: Margi Sterling  YOB: 1933    Date of Provider Hospice Visit: 02/03/23    Level of Care:   [x] General Inpatient (GIP)    [] Routine   [] Respite    Current Location of Care:  [] [facility] [] [facility] [x] [address] [] [facility] [] Hospice House THE [facility]    IF [facility], patient referred from:  [] [facility] [] [facility] [] [address] [] [facility] [] Home [] Other:     Date of Original Hospice Admission: 2/3/2023  Hospice Medical Director at time of admission: Dr. Johanna Adam Diagnosis: respiratory failure secondary to COVID 19  Diagnoses RELATED to the terminal prognosis:  CLL, ACS from demand ischemia  Other Diagnoses:  hx of bronchiectasis, CAD with CABG X 3 and stent, DM, GERD, HLD, HTN, chronic PE's, gout     HOSPICE SUMMARY      Margi Sterling is a 80y.o. year old who was admitted to [facility]. Patient cme to the ER on 1/26 with sob. He was found to be COVID positive and had been completely vaccinated. He was diagnosed with acute ACS felt to be demand ischemia. For COVID with hypoxemic respiratory failure he received decadron, oxygen transitioned to HF, baricitinib. Patient takes eliquis for chronic PE's. He continued to decline with severe hyperkalemia (likely due to cell lysis from CLL) and was not a good candidate for apheresis/dialysis secondary to low platelets and endstage CLL. He did receive insulin + D50, calcium gluconate, kayexalate, bicard, lasix and albuterol. He was also treated for a UTI. Baricitinib was switched to Actrema and he received IV antibiotics. Patient met with palliative and decided he wanted to come off HiFlow and transition to hospice care. The patient's principle diagnosis has resulted in respiratory distress, hospice and obtundation        Functionally, the patient's Karnofsky and/or Palliative Performance Scale has declined over a period of days and is estimated at 10%.  The patient is dependent on the following ADLs:    Objective information that support this patients limited prognosis includes:  tapered off HF oxygen and now severely hypoxic and obtunded. Patient has end stage CLL and COVID respiratory failure. The patient/family chose comfort measures with the support of Hospice. HOSPICE DIAGNOSES   Active Symptoms:  1. Pain  2. Hypoxia with respiratory distress  3. Obtundation  4. COVID-19   5. CLL  6. Hospice care patient     PLAN   Continued GIP LOC. Patient requires frequent nursing assessments and IV medication adjustments for symptom control. Patient is not stable for transfer. I visited patient this afternoon. RN was giving sx meds at the time. He appeared to be approaching end of life. I discussed this with family and reviewed eol signs with family at bedside. We titrated Hydromorphone to 1 mg IV every 4 hours and every 15 minutes prn pain/respiratory distress  We scheduled Lorazepam 1 mg IV every 4 hours routinely and every 15 minutes prn agitation/respiratory distress  All other comfort meds ordered  Oxygen for comfort by OLIVERIO Sherwood for bladder decompression   and SW to support family needs  Disposition: Patient  later in the day after I saw him  Hospice Plan of care was reviewed in detail and agree with current plan of care    Prognosis estimated based on 23 clinical assessment is:   [x] Hours to Days    [] Days to Weeks    [] Other:    Communicated plan of care with: Hospice Case Manager; Hospice IDT; Care Team     GOALS OF CARE     Patient/Medical POA stated Goal of Care: hospice care    [x] I have reviewed and/or updated ACP information in the Advance Care Planning Navigator. This information is available in the [address] link in the patient's chart header.     Primary Decision [facility] Agent):   Primary Decision MakerAlyx Irwin Daughter - [phone]    Secondary Decision Maker: Desiree Alfaro - Daughter - [phone]    Resuscitation Status: DNR  If DNR is there a Durable DNR on file? : [] Yes [] No (If no, complete Durable DNR)    HISTORY     History obtained from: chart review and IDT    CHIEF COMPLAINT:    The patient is:   [] Verbal  [] Nonverbal  [x] Unresponsive    HPI/SUBJECTIVE:  patient unresponsive at time of my exam.       REVIEW OF SYSTEMS     The following systems were: [] reviewed  [x] unable to be reviewed    Positive ROS include:  Constitutional: fatigue, weakness, in pain, short of breath  Ears/nose/mouth/throat: increased airway secretions  Respiratory:shortness of breath, wheezing  Gastrointestinal:poor appetite, nausea, vomiting, abdominal pain, constipation, diarrhea  Musculoskeletal:pain, deformities, swelling legs  Neurologic:confusion, hallucinations, weakness  Psychiatric:anxiety, feeling depressed, poor sleep  Endocrine:     Adult Non-Verbal Pain Assessment Score: 2/10    Face  [x] 0   No particular expression or smile  [] 1   Occasional grimace, tearing, frowning, wrinkled forehead  [] 2   Frequent grimace, tearing, frowning, wrinkled forehead    Activity (movement)  [x] 0   Lying quietly, normal position  [] 1   Seeking attention through movement or slow, cautious movement  [] 2   Restless, excessive activity and/or withdrawal reflexes    Guarding  [x] 0   Lying quietly, no positioning of hands over areas of body  [] 1   Splinting areas of the body, tense  [] 2   Rigid, stiff    Physiology (vital signs)  [] 0   Stable vital signs  [x] 1   Change in any of the following: SBP > 20mm Hg; HR > 20/minute  [] 2   Change in any of the following: SBP > 30mm Hg; HR > 25/minute    Respiratory  [] 0   Baseline RR/SpO2, compliant with ventilator  [x] 1   RR > 10 above baseline, or 5% drop SpO2, mild asynchrony with ventilator  [] 2   RR > 20 above baseline, or 10% drop SpO2, asynchrony with ventilator     FUNCTIONAL ASSESSMENT     Palliative Performance Scale (PPS):10%       PSYCHOSOCIAL/SPIRITUAL ASSESSMENT     Active Problems:    Respiratory failure ([facility] (2/3/2023)    Past Medical History:   Diagnosis Date    Abnormal nuclear stress test 2014    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 ([facility] 2015    Bereavement 2019    ltcf - uti -strangulated hernia - prostate ca - brother    Bronchiectasis ([facility]     CAD (coronary artery disease)     Chest pain, unspecified     Chronic pain     right leg    CLL (chronic lymphocytic leukemia) ([facility]     Jerica Segura md  VCI    Diabetes ([facility]     Essential hypertension     GERD (gastroesophageal reflux disease)     Hyperlipidemia     Hypertension     Opacity of lung on imaging study 2017    cxr    Rotator cuff arthropathy     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    S/P coronary artery stent placement 2014 PCI/GUANAKO to prox LAD      Past Surgical History:   Procedure Laterality Date    HX COLONOSCOPY      HX HEART CATHETERIZATION      NE UNLISTED PROCEDURE VASCULAR SURGERY   and     BLE stenting    PTCA SINGLE VESSEL  2015           Social History     Tobacco Use    Smoking status: Former     Types: Cigarettes     Quit date: 1/15/1984     Years since quittin.0    Smokeless tobacco: Never    Tobacco comments:     QUIT    Substance Use Topics    Alcohol use: No     Alcohol/week: 0.0 standard drinks     Comment: quit in      Family History   Problem Relation Age of Onset    Diabetes Mother     Stroke Father       Allergies   Allergen Reactions    Codeine Shortness of Breath    Lipitor [Atorvastatin] Nausea Only      Current Facility-Administered Medications   Medication Dose Route Frequency    HYDROmorphone (DILAUDID) injection 1 mg  1 mg IntraVENous Q4H    glycopyrrolate (ROBINUL) injection 0.2 mg  0.2 mg IntraVENous Q4H    LORazepam (ATIVAN) injection 1 mg  1 mg IntraVENous Q4H    HYDROmorphone (DILAUDID) injection 1 mg  1 mg IntraVENous Q15MIN PRN    sodium chloride (NS) flush 5-10 mL  5-10 mL IntraVENous PRN    LORazepam (ATIVAN) injection 1 mg  1 mg IntraVENous Q15MIN PRN    ketorolac (TORADOL) injection 30 mg  30 mg IntraVENous Q8H PRN    bisacodyL (DULCOLAX) suppository 10 mg  10 mg Rectal DAILY PRN    sodium chloride (NS) flush 5 mL  5 mL IntraVENous PRN        PHYSICAL EXAM     Wt Readings from Last 3 Encounters:   02/01/23 62.1 kg (136 lb 14.5 oz)   01/23/23 61.2 kg (135 lb)   01/13/23 61.2 kg (135 lb)       Visit Vitals  BP (!) 147/69 (BP 1 Location: Left upper arm, BP Patient Position: At rest)   Pulse (!) 101   Temp 97.8 °F (36.6 °C)   Resp 27   SpO2 (!) 72%       Supplemental O2  [x] Yes  [] NO  Last bowel movement: N/A    Currently this patient has:  [x] Peripheral IV [] PICC  [] PORT [] ICD    [] Sherwood Catheter [] NG Tube   [] PEG Tube    [] Rectal Tube [] Drain  [] Other:     Constitutional:frail elderly male not responsive, eyes open  Eyes: lids normal  ENMT: +mouth breathing  Cardiovascular: tachycardia, no edema  Respiratory: RR 20s, short and shallow, mild increased wob  Gastrointestinal: no distention  Musculoskeletal: no contractures or deformities  Skin: knees and feet are cold to touch  Neurologic:unresponsive  Psychiatric: unresponsive  Other:       Pertinent Lab and or Imaging Tests:  Lab Results   Component Value Date/Time    Sodium 132 (L) 02/02/2023 06:10 AM    Potassium 3.6 02/02/2023 06:10 AM    Chloride 95 (L) 02/02/2023 06:10 AM    CO2 30 02/02/2023 06:10 AM    Anion gap 7 02/02/2023 06:10 AM    Glucose 163 (H) 02/02/2023 06:10 AM    BUN 29 (H) 02/02/2023 06:10 AM    Creatinine 0.90 02/02/2023 06:10 AM    BUN/Creatinine ratio 32 (H) 02/02/2023 06:10 AM    GFR est AA >60 09/26/2022 04:28 AM    GFR est non-AA >60 09/26/2022 04:28 AM    Calcium 8.5 02/02/2023 06:10 AM     Lab Results   Component Value Date/Time    Protein, total 5.6 (L) 02/02/2023 06:10 AM    Albumin 2.5 (L) 02/02/2023 06:10 AM

## 2023-02-05 NOTE — DISCHARGE SUMMARY
Hospice Discharge Summary    [facility]  Good Help to Those in Need        Date of Admission: 2/3/2023  Date of Discharge: 2/4/2023    Stella Mendoza is a 80y.o. year old who was admitted to [facility] [address] [facility] [facility] with a Hospice diagnosis of Respiratory failure ([facility] [address] [J96.90]. The patient's care was focused on comfort and the patient passed away on 2/4/2023.

## 2023-02-06 RX ORDER — METFORMIN HYDROCHLORIDE 500 MG/1
TABLET ORAL
Qty: 90 TABLET | Refills: 3 | OUTPATIENT
Start: 2023-02-06

## 2023-02-17 NOTE — DISCHARGE SUMMARY
.                  Hospitalist Discharge Summary     Patient ID:  Thalia Herron  [id number]  80 y.o.  10/4/1933  1/25/2023    PCP on record: Margo Barrera MD    Admit date: 1/25/2023  Discharge date and time: 2/17/2023    DISCHARGE DIAGNOSIS:  Severe Hyperkalemia   Acute respiratory failure with hypoxia POA   Due to COVID-19 pneumonia POA  CLL-  Anemia-   Thrombocytopenia  Recent PE     Elevated serum troponin    CONSULTATIONS:  IP CONSULT TO UROLOGY  IP CONSULT TO HEMATOLOGY  IP CONSULT TO NEPHROLOGY    Excerpted HPI from H&P of Johnnie Norris, DO:  80 y.o. male presents with 3 days duration of abrupt onset, gradual worsening, severe, constant, shortness of breath that is associated with productive cough, remitted by nothing, exacerbated by exertion. Further history: Patient has been feeling worse and worse over the past couple days, today tested himself at home currently positive for COVID. Pertinent chart review: Patient was recently admitted to [facility] on 1/13/2023 for his leukemia  ____________________________________________________________________  DISCHARGE SUMMARY/HOSPITAL COURSE:  for full details see H&P, daily progress notes, labs, consult notes.      Severe Hyperkalemia   S/p Insulin + D50, Ca-Gluconate, Kayexalate , Bicarb, lasix, albuterol   Serial BMP  IP Palliative/hospice consulted    IP Nephrology consulted   Pt not a good candidate for Apheresis/dialysis with Low PLTs and endstage process with CLL, pt/family understand that     Acute respiratory failure with hypoxia POA   Due to COVID-19 pneumonia POA  Blood cultures- neg x 4 days  Urine Cx +ve for Enterococcus Fecalis sensitive to  Ampicillin/levaquin noted  ESR 51->87  cRP= 18.1- >15.5->14.4  Procal neg X2  Repeat CXR worse  increase Decadron dose  Trend Inflammatory markers daily   baricitinib switched to Actemra   S/p ceftriaxone azithromycin Levaquin  Cont to attempted wean oxygen- on 50L/m   On 2/2, Pt decided to pursue comfort care tmw, Pt DC to hospice care       CLL- likely cause for Hyperkalemia due to lysis of WBC  Anemia- s/p 1 unit PRBC 1/28  Thrombocytopenia  Recent PE  ->511->498->775->538 today AM  PLT 63->44->41 today  S/p Transfuse 1 PRBC   IP Hematology following- recommended to change to IV heparin with PLT <50, DCd eliquis     Elevated serum troponin  likely secondary to demand, serial troponins have down trended  Continuous telemetry monitoring  Continue to monitor patient's clinical status     Hypertension-  continue current antihypertensive medications    Hyperglycemia type 2 diabetes-      All Micro Results       Procedure Component Value Units Date/Time    CULTURE, BLOOD [[id number]] Collected: 01/25/23 2055    Order Status: Completed Specimen: Blood Updated: 01/31/23 0635     Special Requests: NO SPECIAL REQUESTS        Culture result: NO GROWTH 6 DAYS       CULTURE, BLOOD [[id number]] Collected: 01/25/23 2110    Order Status: Completed Specimen: Blood Updated: 01/31/23 0635     Special Requests: NO SPECIAL REQUESTS        Culture result: NO GROWTH 6 DAYS       CULTURE, URINE [[id number]]  (Abnormal)  (Susceptibility) Collected: 01/26/23 1140    Order Status: Completed Specimen: Urine Updated: 01/28/23 1250     Special Requests: --        NO SPECIAL REQUESTS  Reflexed from [id number]       [place] Count --        >100,000  COLONIES/mL       Culture result: Enterococcus faecalis       COVID-19 RAPID TEST [[id number]]  (Abnormal) Collected: 01/25/23 2110    Order Status: Completed Specimen: Nasopharyngeal Updated: 01/25/23 2144     Specimen source Nasopharyngeal        COVID-19 rapid test Detected        Comment: Rapid [employer] ID Now       The specimen is POSITIVE for SARS-CoV-2, the novel coronavirus associated with COVID-19. This test has been authorized by the FDA under an Emergency Use Authorization (EUA) for use by authorized laboratories.         Fact sheet for Healthcare Providers: http://www.zenaida.malcolm/  Fact sheet for Patients: http://www.zenaida.malcolm/       Methodology: Isothermal Nucleic Acid Amplification  CALLED TO AND READ BACK BY  Sidra Bryan RN AT 2143 SL                01/25/23    ECHO ADULT COMPLETE 01/26/2023 1/26/2023    Interpretation Summary    Left Ventricle: Mildly reduced left ventricular systolic function with a visually estimated EF of 45 - 50%. Left ventricle size is normal. Normal wall thickness. Normal wall motion. Normal diastolic function. Mitral Valve: Moderately thickened leaflet. Mildly calcified leaflet. Mild regurgitation. Signed by: Mendel Moore, MD on 1/26/2023  1:23 PM    _______________________________________________________________________  Patient seen and examined by me on discharge day. Pertinent Findings:  Gen:    Not in distress  Chest: Clear lungs  CVS:   Regular rhythm. No edema  Abd/: Soft, not distended, not tender  Neuro:  Alert, orientred  ______________________________________________________  Discharge medication  As per hospice team    Patient Follow Up Instructions: Activity: Activity as tolerated  Diet: No diet orders on file  Wound Care: As directed  Follow-up with PCP in  1 week. Follow-up tests/labs None  If you have any concerns that you feel you need to come back to the hospital, please do not hesitate.     Follow-up Information       Follow up With Specialties Details Why Contact Info    Philemon Brunner, MD Internal Medicine Physician   [facility]  [address] [address]  [address] [address], [facility]  [phone]            ________________________________________________________________    Risk of deterioration: High    Condition at Discharge:  Stable  __________________________________________________________________    Disposition  IP Hospice    ____________________________________________________________________    Code Status: DNR/DNI  ___________________________________________________________________      Total time in minutes spent coordinating this discharge (includes going over instructions, follow-up, prescriptions, and preparing report for sign off to her PCP) :  41 minutes    Signed:  Guilherme Kan MD
